# Patient Record
Sex: MALE | Race: BLACK OR AFRICAN AMERICAN | NOT HISPANIC OR LATINO | Employment: OTHER | URBAN - METROPOLITAN AREA
[De-identification: names, ages, dates, MRNs, and addresses within clinical notes are randomized per-mention and may not be internally consistent; named-entity substitution may affect disease eponyms.]

---

## 2017-01-09 ENCOUNTER — CLINICAL SUPPORT (OUTPATIENT)
Dept: FAMILY MEDICINE CLINIC | Facility: CLINIC | Age: 67
End: 2017-01-09

## 2017-01-09 DIAGNOSIS — I82.5Y3 CHRONIC DEEP VEIN THROMBOSIS (DVT) OF PROXIMAL VEIN OF BOTH LOWER EXTREMITIES (HCC): Primary | ICD-10-CM

## 2017-01-09 LAB — INR PPP: 1.9 (ref 2–3)

## 2017-01-09 PROCEDURE — 36416 COLLJ CAPILLARY BLOOD SPEC: CPT | Performed by: FAMILY MEDICINE

## 2017-01-09 PROCEDURE — 99211 OFF/OP EST MAY X REQ PHY/QHP: CPT | Performed by: FAMILY MEDICINE

## 2017-01-09 PROCEDURE — 85610 PROTHROMBIN TIME: CPT | Performed by: FAMILY MEDICINE

## 2017-01-16 DIAGNOSIS — M51.37 DDD (DEGENERATIVE DISC DISEASE), LUMBOSACRAL: ICD-10-CM

## 2017-01-16 DIAGNOSIS — M79.7 FIBROMYALGIA: ICD-10-CM

## 2017-01-16 RX ORDER — HYDROCODONE BITARTRATE AND ACETAMINOPHEN 10; 325 MG/1; MG/1
2 TABLET ORAL EVERY 6 HOURS PRN
Qty: 240 TABLET | Refills: 0 | Status: SHIPPED | OUTPATIENT
Start: 2017-01-16 | End: 2017-02-21 | Stop reason: SDUPTHER

## 2017-02-03 ENCOUNTER — CLINICAL SUPPORT (OUTPATIENT)
Dept: FAMILY MEDICINE CLINIC | Facility: CLINIC | Age: 67
End: 2017-02-03

## 2017-02-03 DIAGNOSIS — I82.5Y3 CHRONIC DEEP VEIN THROMBOSIS (DVT) OF PROXIMAL VEIN OF BOTH LOWER EXTREMITIES (HCC): Primary | ICD-10-CM

## 2017-02-03 DIAGNOSIS — G45.9 TRANSIENT CEREBRAL ISCHEMIA, UNSPECIFIED TYPE: ICD-10-CM

## 2017-02-03 LAB — INR PPP: 3.7 (ref 2–3)

## 2017-02-03 PROCEDURE — 36416 COLLJ CAPILLARY BLOOD SPEC: CPT | Performed by: FAMILY MEDICINE

## 2017-02-03 PROCEDURE — 85610 PROTHROMBIN TIME: CPT | Performed by: FAMILY MEDICINE

## 2017-02-06 ENCOUNTER — CLINICAL SUPPORT (OUTPATIENT)
Dept: FAMILY MEDICINE CLINIC | Facility: CLINIC | Age: 67
End: 2017-02-06

## 2017-02-06 DIAGNOSIS — G45.9 TRANSIENT CEREBRAL ISCHEMIA, UNSPECIFIED TYPE: Primary | ICD-10-CM

## 2017-02-06 LAB — INR PPP: 1.4 (ref 2–3)

## 2017-02-06 PROCEDURE — 36416 COLLJ CAPILLARY BLOOD SPEC: CPT | Performed by: FAMILY MEDICINE

## 2017-02-06 PROCEDURE — 99211 OFF/OP EST MAY X REQ PHY/QHP: CPT | Performed by: FAMILY MEDICINE

## 2017-02-06 PROCEDURE — 85610 PROTHROMBIN TIME: CPT | Performed by: FAMILY MEDICINE

## 2017-02-13 ENCOUNTER — CLINICAL SUPPORT (OUTPATIENT)
Dept: FAMILY MEDICINE CLINIC | Facility: CLINIC | Age: 67
End: 2017-02-13

## 2017-02-13 DIAGNOSIS — I82.5Y3 CHRONIC DEEP VEIN THROMBOSIS (DVT) OF PROXIMAL VEIN OF BOTH LOWER EXTREMITIES (HCC): Primary | ICD-10-CM

## 2017-02-13 LAB — INR PPP: 2.9 (ref 2–3)

## 2017-02-13 PROCEDURE — 36416 COLLJ CAPILLARY BLOOD SPEC: CPT | Performed by: PHYSICIAN ASSISTANT

## 2017-02-13 PROCEDURE — 85610 PROTHROMBIN TIME: CPT | Performed by: PHYSICIAN ASSISTANT

## 2017-02-21 ENCOUNTER — OFFICE VISIT (OUTPATIENT)
Dept: FAMILY MEDICINE CLINIC | Facility: CLINIC | Age: 67
End: 2017-02-21

## 2017-02-21 VITALS
BODY MASS INDEX: 29.66 KG/M2 | HEART RATE: 79 BPM | TEMPERATURE: 98.6 F | RESPIRATION RATE: 16 BRPM | OXYGEN SATURATION: 97 % | HEIGHT: 72 IN | WEIGHT: 219 LBS | SYSTOLIC BLOOD PRESSURE: 126 MMHG | DIASTOLIC BLOOD PRESSURE: 78 MMHG

## 2017-02-21 DIAGNOSIS — K21.00 REFLUX ESOPHAGITIS: ICD-10-CM

## 2017-02-21 DIAGNOSIS — R10.32 LEFT GROIN PAIN: ICD-10-CM

## 2017-02-21 DIAGNOSIS — R63.4 WEIGHT LOSS: ICD-10-CM

## 2017-02-21 DIAGNOSIS — E78.2 MIXED HYPERLIPIDEMIA: Primary | ICD-10-CM

## 2017-02-21 DIAGNOSIS — I25.2 PAST HEART ATTACK: ICD-10-CM

## 2017-02-21 DIAGNOSIS — M51.37 DDD (DEGENERATIVE DISC DISEASE), LUMBOSACRAL: ICD-10-CM

## 2017-02-21 DIAGNOSIS — Z86.711 HISTORY OF PULMONARY EMBOLUS (PE): ICD-10-CM

## 2017-02-21 DIAGNOSIS — M79.7 FIBROMYALGIA: ICD-10-CM

## 2017-02-21 DIAGNOSIS — R06.83 SNORING: ICD-10-CM

## 2017-02-21 DIAGNOSIS — G45.9 TRANSIENT CEREBRAL ISCHEMIA, UNSPECIFIED TYPE: ICD-10-CM

## 2017-02-21 PROCEDURE — 99214 OFFICE O/P EST MOD 30 MIN: CPT | Performed by: PHYSICIAN ASSISTANT

## 2017-02-21 RX ORDER — HYDROCODONE BITARTRATE AND ACETAMINOPHEN 10; 325 MG/1; MG/1
TABLET ORAL
Qty: 120 TABLET | Refills: 0
Start: 2017-02-21 | End: 2017-03-21 | Stop reason: SDUPTHER

## 2017-02-21 RX ORDER — PREGABALIN 75 MG/1
75 CAPSULE ORAL 2 TIMES DAILY
Qty: 60 CAPSULE | Refills: 5 | Status: SHIPPED | OUTPATIENT
Start: 2017-02-21 | End: 2017-08-24 | Stop reason: SDUPTHER

## 2017-02-21 RX ORDER — DULOXETIN HYDROCHLORIDE 30 MG/1
30 CAPSULE, DELAYED RELEASE ORAL DAILY
Qty: 30 CAPSULE | Refills: 1 | Status: SHIPPED | OUTPATIENT
Start: 2017-02-21 | End: 2017-03-21 | Stop reason: SDUPTHER

## 2017-02-21 NOTE — PROGRESS NOTES
Subjective   Javy Reeves is a 66 y.o. male.     History of Present Illness   Javy Reeves 66 y.o. male who presents today for routine follow up check and medication refills.  he has a history of   Patient Active Problem List   Diagnosis   • Arthritis   • Family history of early CAD   • DDD (degenerative disc disease), cervical   • DVT (deep venous thrombosis)   • Fibromyalgia   • Past heart attack   • Hypertension   • Hyperlipidemia   • DDD (degenerative disc disease), lumbosacral   • History of pulmonary embolus (PE)   • Reflux esophagitis   • TIA (transient ischemic attack)   .  Since the last visit, he has overall felt tired.  He has GERD and is well controlled on PPI medication.  he has been compliant with current medications have reviewed them.  The patient denies medication side effects.    He does need f/u DR Manley about weight loss;  Had several tests last year; weight is stable    I will update all labs.  Snores and needs f/u sleep study  I will refer to pain management   Has left chronic hip pain    The following portions of the patient's history were reviewed and updated as appropriate: allergies, current medications, past family history, past medical history, past social history, past surgical history and problem list.    Review of Systems   Constitutional: Positive for appetite change, diaphoresis and unexpected weight change. Negative for activity change.   HENT: Positive for congestion, dental problem, sinus pressure and sore throat. Negative for nosebleeds and trouble swallowing.    Eyes: Negative for pain and visual disturbance.   Respiratory: Negative for chest tightness, shortness of breath and wheezing.    Cardiovascular: Positive for chest pain. Negative for palpitations.   Gastrointestinal: Positive for abdominal pain and rectal pain. Negative for blood in stool.   Endocrine: Negative.    Genitourinary: Negative for difficulty urinating and hematuria.   Musculoskeletal: Positive for back  pain, joint swelling, neck pain and neck stiffness.   Skin: Negative for color change and rash.   Allergic/Immunologic: Negative.    Neurological: Positive for weakness and numbness. Negative for syncope and speech difficulty.   Hematological: Negative for adenopathy.   Psychiatric/Behavioral: Negative for agitation and confusion.   All other systems reviewed and are negative.      Objective   Physical Exam   Constitutional: He is oriented to person, place, and time. He appears well-developed and well-nourished. No distress.   HENT:   Head: Normocephalic and atraumatic.   Eyes: Conjunctivae and EOM are normal. Pupils are equal, round, and reactive to light. Right eye exhibits no discharge. Left eye exhibits no discharge. No scleral icterus.   Neck: Normal range of motion. Neck supple. No tracheal deviation present. No thyromegaly present.   Cardiovascular: Normal rate, regular rhythm, normal heart sounds, intact distal pulses and normal pulses.  Exam reveals no gallop.    No murmur heard.  Pulmonary/Chest: Effort normal and breath sounds normal. No respiratory distress. He has no wheezes. He has no rales.   Musculoskeletal: Normal range of motion.   Neurological: He is alert and oriented to person, place, and time. He exhibits normal muscle tone. Coordination normal.   Skin: Skin is warm. No rash noted. No erythema. No pallor.   Psychiatric: He has a normal mood and affect. His behavior is normal. Judgment and thought content normal.   Nursing note and vitals reviewed.      Assessment/Plan   Problems Addressed this Visit        Cardiovascular and Mediastinum    Hyperlipidemia - Primary    Relevant Orders    Comprehensive metabolic panel    Lipid panel    CBC and Differential    TSH    Urinalysis With Microscopic    T4, Free    Ambulatory Referral to Sleep Medicine    Ambulatory Referral to Orthopedic Surgery    Ambulatory Referral to Pain Management    TIA (transient ischemic attack)    Relevant Orders     Comprehensive metabolic panel    Lipid panel    CBC and Differential    TSH    Urinalysis With Microscopic    T4, Free    Ambulatory Referral to Sleep Medicine    Ambulatory Referral to Orthopedic Surgery    Ambulatory Referral to Pain Management       Digestive    Reflux esophagitis       Nervous and Auditory    Left groin pain    Relevant Orders    Comprehensive metabolic panel    Lipid panel    CBC and Differential    TSH    Urinalysis With Microscopic    T4, Free    Ambulatory Referral to Sleep Medicine    Ambulatory Referral to Orthopedic Surgery    Ambulatory Referral to Pain Management       Musculoskeletal and Integument    Fibromyalgia    Relevant Orders    Comprehensive metabolic panel    Lipid panel    CBC and Differential    TSH    Urinalysis With Microscopic    T4, Free    Ambulatory Referral to Sleep Medicine    Ambulatory Referral to Orthopedic Surgery    Ambulatory Referral to Pain Management    DDD (degenerative disc disease), lumbosacral    Relevant Orders    Comprehensive metabolic panel    Lipid panel    CBC and Differential    TSH    Urinalysis With Microscopic    T4, Free    Ambulatory Referral to Sleep Medicine    Ambulatory Referral to Orthopedic Surgery    Ambulatory Referral to Pain Management       Other    History of pulmonary embolus (PE)    Relevant Orders    Comprehensive metabolic panel    Lipid panel    CBC and Differential    TSH    Urinalysis With Microscopic    T4, Free    Ambulatory Referral to Sleep Medicine    Ambulatory Referral to Orthopedic Surgery    Ambulatory Referral to Pain Management      Other Visit Diagnoses     Snoring        Relevant Orders    Comprehensive metabolic panel    Lipid panel    CBC and Differential    TSH    Urinalysis With Microscopic    T4, Free    Ambulatory Referral to Sleep Medicine    Ambulatory Referral to Orthopedic Surgery    Ambulatory Referral to Pain Management    Weight loss        Relevant Orders    Ambulatory Referral to Gastroenterology  (Completed)

## 2017-02-21 NOTE — PATIENT INSTRUCTIONS
Low glycemic index diet  Exercise 30 minutes most days of the week  Make sure you get results on any labs or tests we ordered today  We discussed medications and how to take them as prescribed  Sleep 6-8 hours each night if possible  If you have not signed up for [employer], please activate your code ASAP from your After Visit Summary today    LDL goal <100  LDL goal if heart disease <70  HDL goal >60  Triglyceride goal <150  BP goal =<130/80  Fasting glucose <100    Referral made  Labs  Try Cymbalta for pain

## 2017-03-03 ENCOUNTER — OFFICE (OUTPATIENT)
Dept: URBAN - METROPOLITAN AREA CLINIC 75 | Facility: CLINIC | Age: 67
End: 2017-03-03

## 2017-03-03 VITALS
WEIGHT: 221 LBS | SYSTOLIC BLOOD PRESSURE: 176 MMHG | DIASTOLIC BLOOD PRESSURE: 98 MMHG | HEIGHT: 72 IN | HEART RATE: 66 BPM

## 2017-03-03 DIAGNOSIS — R10.13 EPIGASTRIC PAIN: ICD-10-CM

## 2017-03-03 PROCEDURE — 99213 OFFICE O/P EST LOW 20 MIN: CPT | Performed by: INTERNAL MEDICINE

## 2017-03-13 ENCOUNTER — CLINICAL SUPPORT (OUTPATIENT)
Dept: FAMILY MEDICINE CLINIC | Facility: CLINIC | Age: 67
End: 2017-03-13

## 2017-03-13 DIAGNOSIS — I82.5Y3 CHRONIC DEEP VEIN THROMBOSIS (DVT) OF PROXIMAL VEIN OF BOTH LOWER EXTREMITIES (HCC): Primary | ICD-10-CM

## 2017-03-13 LAB — INR PPP: 3.7 (ref 2–3)

## 2017-03-13 PROCEDURE — 99211 OFF/OP EST MAY X REQ PHY/QHP: CPT | Performed by: PHYSICIAN ASSISTANT

## 2017-03-13 PROCEDURE — 85610 PROTHROMBIN TIME: CPT | Performed by: PHYSICIAN ASSISTANT

## 2017-03-13 PROCEDURE — 36416 COLLJ CAPILLARY BLOOD SPEC: CPT | Performed by: PHYSICIAN ASSISTANT

## 2017-03-16 ENCOUNTER — CLINICAL SUPPORT (OUTPATIENT)
Dept: FAMILY MEDICINE CLINIC | Facility: CLINIC | Age: 67
End: 2017-03-16

## 2017-03-16 DIAGNOSIS — I82.5Y3 CHRONIC DEEP VEIN THROMBOSIS (DVT) OF PROXIMAL VEIN OF BOTH LOWER EXTREMITIES (HCC): Primary | ICD-10-CM

## 2017-03-16 DIAGNOSIS — G45.9 TRANSIENT CEREBRAL ISCHEMIA, UNSPECIFIED TYPE: ICD-10-CM

## 2017-03-16 LAB — INR PPP: 1.4 (ref 2–3)

## 2017-03-16 PROCEDURE — 36416 COLLJ CAPILLARY BLOOD SPEC: CPT | Performed by: PHYSICIAN ASSISTANT

## 2017-03-16 PROCEDURE — 85610 PROTHROMBIN TIME: CPT | Performed by: PHYSICIAN ASSISTANT

## 2017-03-17 ENCOUNTER — OFFICE VISIT (OUTPATIENT)
Dept: ORTHOPEDIC SURGERY | Facility: CLINIC | Age: 67
End: 2017-03-17

## 2017-03-17 VITALS — BODY MASS INDEX: 34.37 KG/M2 | HEIGHT: 67 IN | TEMPERATURE: 98 F | WEIGHT: 219 LBS

## 2017-03-17 DIAGNOSIS — M54.50 LUMBAR SPINE PAIN: Primary | ICD-10-CM

## 2017-03-17 PROCEDURE — 72100 X-RAY EXAM L-S SPINE 2/3 VWS: CPT | Performed by: ORTHOPAEDIC SURGERY

## 2017-03-17 PROCEDURE — 99204 OFFICE O/P NEW MOD 45 MIN: CPT | Performed by: ORTHOPAEDIC SURGERY
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
## 2017-03-20 ENCOUNTER — CLINICAL SUPPORT (OUTPATIENT)
Dept: FAMILY MEDICINE CLINIC | Facility: CLINIC | Age: 67
End: 2017-03-20

## 2017-03-20 DIAGNOSIS — I82.5Y3 CHRONIC DEEP VEIN THROMBOSIS (DVT) OF PROXIMAL VEIN OF BOTH LOWER EXTREMITIES (HCC): Primary | ICD-10-CM

## 2017-03-20 DIAGNOSIS — G45.9 TRANSIENT CEREBRAL ISCHEMIA, UNSPECIFIED TYPE: ICD-10-CM

## 2017-03-20 LAB — INR PPP: 1.9 (ref 2–3)

## 2017-03-20 PROCEDURE — 36416 COLLJ CAPILLARY BLOOD SPEC: CPT | Performed by: PHYSICIAN ASSISTANT

## 2017-03-20 PROCEDURE — 85610 PROTHROMBIN TIME: CPT | Performed by: PHYSICIAN ASSISTANT

## 2017-03-21 ENCOUNTER — OFFICE VISIT (OUTPATIENT)
Dept: FAMILY MEDICINE CLINIC | Facility: CLINIC | Age: 67
End: 2017-03-21

## 2017-03-21 VITALS
RESPIRATION RATE: 16 BRPM | OXYGEN SATURATION: 98 % | SYSTOLIC BLOOD PRESSURE: 120 MMHG | HEIGHT: 72 IN | WEIGHT: 216 LBS | DIASTOLIC BLOOD PRESSURE: 80 MMHG | TEMPERATURE: 98 F | HEART RATE: 64 BPM | BODY MASS INDEX: 29.26 KG/M2

## 2017-03-21 DIAGNOSIS — M79.7 FIBROMYALGIA: ICD-10-CM

## 2017-03-21 DIAGNOSIS — E78.2 MIXED HYPERLIPIDEMIA: Primary | ICD-10-CM

## 2017-03-21 DIAGNOSIS — I82.5Y3 CHRONIC DEEP VEIN THROMBOSIS (DVT) OF PROXIMAL VEIN OF BOTH LOWER EXTREMITIES (HCC): ICD-10-CM

## 2017-03-21 DIAGNOSIS — I25.2 PAST HEART ATTACK: ICD-10-CM

## 2017-03-21 DIAGNOSIS — M51.37 DDD (DEGENERATIVE DISC DISEASE), LUMBOSACRAL: ICD-10-CM

## 2017-03-21 DIAGNOSIS — M50.30 DDD (DEGENERATIVE DISC DISEASE), CERVICAL: ICD-10-CM

## 2017-03-21 PROCEDURE — 99213 OFFICE O/P EST LOW 20 MIN: CPT | Performed by: PHYSICIAN ASSISTANT

## 2017-03-21 RX ORDER — HYDROCODONE BITARTRATE AND ACETAMINOPHEN 10; 325 MG/1; MG/1
TABLET ORAL
Qty: 120 TABLET | Refills: 0 | Status: CANCELLED
Start: 2017-03-21

## 2017-03-21 RX ORDER — HYDROCODONE BITARTRATE AND ACETAMINOPHEN 10; 325 MG/1; MG/1
TABLET ORAL
Qty: 120 TABLET | Refills: 0
Start: 2017-03-21 | End: 2017-05-15

## 2017-03-21 RX ORDER — DULOXETIN HYDROCHLORIDE 30 MG/1
30 CAPSULE, DELAYED RELEASE ORAL DAILY
Qty: 30 CAPSULE | Refills: 1 | Status: SHIPPED | OUTPATIENT
Start: 2017-03-21 | End: 2017-08-24

## 2017-03-21 NOTE — PROGRESS NOTES
Subjective   Javy Reeves is a 66 y.o. male.     History of Present Illness   Javy Reeves 66 y.o. male who presents today for routine follow up check and medication refills.  he has a history of   Patient Active Problem List   Diagnosis   • Arthritis   • Family history of early CAD   • DDD (degenerative disc disease), cervical   • DVT (deep venous thrombosis)   • Fibromyalgia   • Past heart attack   • Hyperlipidemia   • DDD (degenerative disc disease), lumbosacral   • History of pulmonary embolus (PE)   • Reflux esophagitis   • TIA (transient ischemic attack)   • Left groin pain   .  Since the last visit, he has overall felt well.  He has GERD and is well controlled on PPI medication and CAD and is currently stable on medication management.  he has been compliant with current medications have reviewed them.  The patient denies medication side effects.    He has hx CAD and is not on a statin.  I will get the labs I ordered last visit and confirm liver functions and start this.  I will monitor INR.  I will also monitor INR with adding Cymbalta.  He has not started it yet.  He did see ortho  Needs to see DR Queen for pain management    Has sores in nose and onset 3-4 weeks ago.  Will try Bactroban    The following portions of the patient's history were reviewed and updated as appropriate: allergies, current medications, past family history, past medical history, past social history, past surgical history and problem list.    Review of Systems   Constitutional: Positive for unexpected weight change. Negative for activity change and appetite change.   HENT: Positive for congestion and nosebleeds. Negative for trouble swallowing.    Eyes: Negative for pain and visual disturbance.   Respiratory: Negative for chest tightness, shortness of breath and wheezing.    Cardiovascular: Positive for chest pain. Negative for palpitations.   Gastrointestinal: Negative for abdominal pain and blood in stool.   Endocrine:  Negative.    Genitourinary: Negative for difficulty urinating and hematuria.   Musculoskeletal: Positive for back pain, joint swelling, myalgias and neck pain.   Skin: Negative for color change and rash.   Allergic/Immunologic: Negative.    Neurological: Negative for syncope and speech difficulty.   Hematological: Negative for adenopathy.   Psychiatric/Behavioral: Negative for agitation and confusion.   All other systems reviewed and are negative.      Objective   Physical Exam   Constitutional: He is oriented to person, place, and time. He appears well-developed and well-nourished. No distress.   HENT:   Head: Normocephalic and atraumatic.   Sores in nares   Eyes: Conjunctivae and EOM are normal. Pupils are equal, round, and reactive to light. Right eye exhibits no discharge. Left eye exhibits no discharge. No scleral icterus.   Neck: Normal range of motion. Neck supple. No tracheal deviation present. No thyromegaly present.   Cardiovascular: Normal rate, regular rhythm, normal heart sounds, intact distal pulses and normal pulses.  Exam reveals no gallop.    No murmur heard.  Pulmonary/Chest: Effort normal and breath sounds normal. No respiratory distress. He has no wheezes. He has no rales.   Musculoskeletal: Normal range of motion.   Neurological: He is alert and oriented to person, place, and time. He exhibits normal muscle tone. Coordination normal.   Skin: Skin is warm. No rash noted. No erythema. No pallor.   Psychiatric: He has a normal mood and affect. His behavior is normal. Judgment and thought content normal.   Nursing note and vitals reviewed.      Assessment/Plan   Problems Addressed this Visit        Cardiovascular and Mediastinum    DVT (deep venous thrombosis)    Hyperlipidemia - Primary       Musculoskeletal and Integument    DDD (degenerative disc disease), cervical    Relevant Orders    Ambulatory Referral to Pain Management (Completed)    Fibromyalgia    Relevant Medications     HYDROcodone-acetaminophen (NORCO)  MG per tablet    Other Relevant Orders    Ambulatory Referral to Pain Management (Completed)    DDD (degenerative disc disease), lumbosacral    Relevant Medications    HYDROcodone-acetaminophen (NORCO)  MG per tablet       Other    Past heart attack                I have reviewed the notes, assessments, and/or procedures performed by Aletha Ochoa PA-C, I concur with her/his documentation of Javy Reeves.

## 2017-03-21 NOTE — PATIENT INSTRUCTIONS
Will add statin after labs done  INR Friday and will watch as add meds  Low glycemic index diet  Exercise 30 minutes most days of the week  Make sure you get results on any labs or tests we ordered today  We discussed medications and how to take them as prescribed  Sleep 6-8 hours each night if possible  If you have not signed up for [employer], please activate your code ASAP from your After Visit Summary today    LDL goal <100  LDL goal if heart disease <70  HDL goal >60  Triglyceride goal <150  BP goal =<130/80  Fasting glucose <100    Stop smoking

## 2017-03-22 LAB
ALBUMIN SERPL-MCNC: 4.6 G/DL (ref 3.5–5.2)
ALBUMIN/GLOB SERPL: 1.6 G/DL
ALP SERPL-CCNC: 54 U/L (ref 39–117)
ALT SERPL-CCNC: 15 U/L (ref 1–41)
APPEARANCE UR: CLEAR
AST SERPL-CCNC: 22 U/L (ref 1–40)
BACTERIA #/AREA URNS HPF: NORMAL /HPF
BASOPHILS # BLD AUTO: 0.01 10*3/MM3 (ref 0–0.2)
BASOPHILS NFR BLD AUTO: 0.2 % (ref 0–1.5)
BILIRUB SERPL-MCNC: 0.8 MG/DL (ref 0.1–1.2)
BILIRUB UR QL STRIP: NEGATIVE
BUN SERPL-MCNC: 18 MG/DL (ref 8–23)
BUN/CREAT SERPL: 17.5 (ref 7–25)
CALCIUM SERPL-MCNC: 9.6 MG/DL (ref 8.6–10.5)
CHLORIDE SERPL-SCNC: 102 MMOL/L (ref 98–107)
CHOLEST SERPL-MCNC: 214 MG/DL (ref 0–200)
CO2 SERPL-SCNC: 27.4 MMOL/L (ref 22–29)
COLOR UR: YELLOW
CREAT SERPL-MCNC: 1.03 MG/DL (ref 0.76–1.27)
EOSINOPHIL # BLD AUTO: 0.05 10*3/MM3 (ref 0–0.7)
EOSINOPHIL NFR BLD AUTO: 0.9 % (ref 0.3–6.2)
EPI CELLS #/AREA URNS HPF: NORMAL /HPF
ERYTHROCYTE [DISTWIDTH] IN BLOOD BY AUTOMATED COUNT: 14 % (ref 11.5–14.5)
GLOBULIN SER CALC-MCNC: 2.9 GM/DL
GLUCOSE SERPL-MCNC: 105 MG/DL (ref 65–99)
GLUCOSE UR QL: NEGATIVE
HCT VFR BLD AUTO: 42.9 % (ref 40.4–52.2)
HDLC SERPL-MCNC: 39 MG/DL (ref 40–60)
HGB BLD-MCNC: 14.6 G/DL (ref 13.7–17.6)
HGB UR QL STRIP: (no result)
IMM GRANULOCYTES # BLD: 0 10*3/MM3 (ref 0–0.03)
IMM GRANULOCYTES NFR BLD: 0 % (ref 0–0.5)
KETONES UR QL STRIP: NEGATIVE
LDLC SERPL CALC-MCNC: 149 MG/DL (ref 0–100)
LEUKOCYTE ESTERASE UR QL STRIP: NEGATIVE
LYMPHOCYTES # BLD AUTO: 2.67 10*3/MM3 (ref 0.9–4.8)
LYMPHOCYTES NFR BLD AUTO: 45.7 % (ref 19.6–45.3)
MCH RBC QN AUTO: 32.7 PG (ref 27–32.7)
MCHC RBC AUTO-ENTMCNC: 34 G/DL (ref 32.6–36.4)
MCV RBC AUTO: 96 FL (ref 79.8–96.2)
MONOCYTES # BLD AUTO: 0.67 10*3/MM3 (ref 0.2–1.2)
MONOCYTES NFR BLD AUTO: 11.5 % (ref 5–12)
NEUTROPHILS # BLD AUTO: 2.44 10*3/MM3 (ref 1.9–8.1)
NEUTROPHILS NFR BLD AUTO: 41.7 % (ref 42.7–76)
NITRITE UR QL STRIP: NEGATIVE
PH UR STRIP: 6 [PH] (ref 5–8)
PLATELET # BLD AUTO: 193 10*3/MM3 (ref 140–500)
POTASSIUM SERPL-SCNC: 4.5 MMOL/L (ref 3.5–5.2)
PROT SERPL-MCNC: 7.5 G/DL (ref 6–8.5)
PROT UR QL STRIP: NEGATIVE
RBC # BLD AUTO: 4.47 10*6/MM3 (ref 4.6–6)
RBC #/AREA URNS HPF: NORMAL /HPF
SODIUM SERPL-SCNC: 142 MMOL/L (ref 136–145)
SP GR UR: 1.03 (ref 1–1.03)
T4 FREE SERPL-MCNC: 1.18 NG/DL (ref 0.93–1.7)
TRIGL SERPL-MCNC: 131 MG/DL (ref 0–150)
TSH SERPL DL<=0.005 MIU/L-ACNC: 2.12 MIU/ML (ref 0.27–4.2)
UROBILINOGEN UR STRIP-MCNC: (no result) MG/DL
VLDLC SERPL CALC-MCNC: 26.2 MG/DL (ref 5–40)
WBC # BLD AUTO: 5.84 10*3/MM3 (ref 4.5–10.7)
WBC #/AREA URNS HPF: NORMAL /HPF

## 2017-03-23 RX ORDER — ROSUVASTATIN CALCIUM 20 MG/1
20 TABLET, COATED ORAL DAILY
Qty: 30 TABLET | Refills: 11 | Status: SHIPPED | OUTPATIENT
Start: 2017-03-23 | End: 2017-11-02 | Stop reason: ALTCHOICE

## 2017-03-24 ENCOUNTER — CLINICAL SUPPORT (OUTPATIENT)
Dept: FAMILY MEDICINE CLINIC | Facility: CLINIC | Age: 67
End: 2017-03-24

## 2017-03-24 DIAGNOSIS — G45.9 TRANSIENT CEREBRAL ISCHEMIA, UNSPECIFIED TYPE: Primary | ICD-10-CM

## 2017-03-24 DIAGNOSIS — I82.5Y3 CHRONIC DEEP VEIN THROMBOSIS (DVT) OF PROXIMAL VEIN OF BOTH LOWER EXTREMITIES (HCC): ICD-10-CM

## 2017-03-24 LAB — INR PPP: 2 (ref 2–3)

## 2017-03-24 PROCEDURE — 85610 PROTHROMBIN TIME: CPT | Performed by: PHYSICIAN ASSISTANT

## 2017-03-24 PROCEDURE — 36416 COLLJ CAPILLARY BLOOD SPEC: CPT | Performed by: PHYSICIAN ASSISTANT

## 2017-03-29 LAB
HBA1C MFR BLD: 5.5 % (ref 4.8–5.6)
WRITTEN AUTHORIZATION: NORMAL

## 2017-04-07 ENCOUNTER — CLINICAL SUPPORT (OUTPATIENT)
Dept: FAMILY MEDICINE CLINIC | Facility: CLINIC | Age: 67
End: 2017-04-07

## 2017-04-07 DIAGNOSIS — I82.5Y3 CHRONIC DEEP VEIN THROMBOSIS (DVT) OF PROXIMAL VEIN OF BOTH LOWER EXTREMITIES (HCC): ICD-10-CM

## 2017-04-07 DIAGNOSIS — G45.9 TRANSIENT CEREBRAL ISCHEMIA, UNSPECIFIED TYPE: Primary | ICD-10-CM

## 2017-04-07 LAB — INR PPP: 2.9 (ref 2–3)

## 2017-04-07 PROCEDURE — 36416 COLLJ CAPILLARY BLOOD SPEC: CPT | Performed by: PHYSICIAN ASSISTANT

## 2017-04-07 PROCEDURE — 85610 PROTHROMBIN TIME: CPT | Performed by: PHYSICIAN ASSISTANT

## 2017-05-03 RX ORDER — WARFARIN SODIUM 5 MG/1
10 TABLET ORAL
Qty: 60 TABLET | Refills: 5 | Status: SHIPPED | OUTPATIENT
Start: 2017-05-03 | End: 2017-10-03 | Stop reason: SDUPTHER

## 2017-05-04 ENCOUNTER — CLINICAL SUPPORT (OUTPATIENT)
Dept: FAMILY MEDICINE CLINIC | Facility: CLINIC | Age: 67
End: 2017-05-04

## 2017-05-04 DIAGNOSIS — Z86.711 HISTORY OF PULMONARY EMBOLUS (PE): Primary | ICD-10-CM

## 2017-05-04 LAB — INR PPP: 1.1 (ref 2–3)

## 2017-05-04 PROCEDURE — 85610 PROTHROMBIN TIME: CPT

## 2017-05-04 PROCEDURE — 36416 COLLJ CAPILLARY BLOOD SPEC: CPT

## 2017-05-08 ENCOUNTER — CLINICAL SUPPORT (OUTPATIENT)
Dept: FAMILY MEDICINE CLINIC | Facility: CLINIC | Age: 67
End: 2017-05-08

## 2017-05-08 DIAGNOSIS — I82.5Y3 CHRONIC DEEP VEIN THROMBOSIS (DVT) OF PROXIMAL VEIN OF BOTH LOWER EXTREMITIES (HCC): Primary | ICD-10-CM

## 2017-05-08 LAB — INR PPP: 2 (ref 2–3)

## 2017-05-08 PROCEDURE — 36416 COLLJ CAPILLARY BLOOD SPEC: CPT | Performed by: PHYSICIAN ASSISTANT

## 2017-05-08 PROCEDURE — 85610 PROTHROMBIN TIME: CPT | Performed by: PHYSICIAN ASSISTANT

## 2017-05-15 ENCOUNTER — OFFICE VISIT (OUTPATIENT)
Dept: FAMILY MEDICINE CLINIC | Facility: CLINIC | Age: 67
End: 2017-05-15

## 2017-05-15 VITALS
HEART RATE: 69 BPM | OXYGEN SATURATION: 97 % | SYSTOLIC BLOOD PRESSURE: 118 MMHG | RESPIRATION RATE: 16 BRPM | WEIGHT: 216 LBS | TEMPERATURE: 98.7 F | BODY MASS INDEX: 29.26 KG/M2 | DIASTOLIC BLOOD PRESSURE: 80 MMHG | HEIGHT: 72 IN

## 2017-05-15 DIAGNOSIS — J06.9 ACUTE URI: Primary | ICD-10-CM

## 2017-05-15 DIAGNOSIS — M79.7 FIBROMYALGIA: ICD-10-CM

## 2017-05-15 PROCEDURE — 99213 OFFICE O/P EST LOW 20 MIN: CPT | Performed by: PHYSICIAN ASSISTANT

## 2017-05-15 RX ORDER — CEFDINIR 300 MG/1
CAPSULE ORAL
Qty: 20 CAPSULE | Refills: 0 | Status: SHIPPED | OUTPATIENT
Start: 2017-05-15 | End: 2017-08-24

## 2017-05-17 ENCOUNTER — CLINICAL SUPPORT (OUTPATIENT)
Dept: FAMILY MEDICINE CLINIC | Facility: CLINIC | Age: 67
End: 2017-05-17

## 2017-05-17 DIAGNOSIS — I82.5Y3 CHRONIC DEEP VEIN THROMBOSIS (DVT) OF PROXIMAL VEIN OF BOTH LOWER EXTREMITIES (HCC): Primary | ICD-10-CM

## 2017-05-17 DIAGNOSIS — G45.9 TRANSIENT CEREBRAL ISCHEMIA, UNSPECIFIED TYPE: ICD-10-CM

## 2017-05-17 LAB — INR PPP: 1.8 (ref 2–3)

## 2017-05-17 PROCEDURE — 36416 COLLJ CAPILLARY BLOOD SPEC: CPT | Performed by: PHYSICIAN ASSISTANT

## 2017-05-17 PROCEDURE — 85610 PROTHROMBIN TIME: CPT | Performed by: PHYSICIAN ASSISTANT

## 2017-05-17 PROCEDURE — 99211 OFF/OP EST MAY X REQ PHY/QHP: CPT | Performed by: PHYSICIAN ASSISTANT

## 2017-05-17 RX ORDER — WARFARIN SODIUM 1 MG/1
TABLET ORAL
Qty: 30 TABLET | Refills: 0 | Status: SHIPPED | OUTPATIENT
Start: 2017-05-17 | End: 2017-10-23 | Stop reason: SDUPTHER

## 2017-05-19 ENCOUNTER — CLINICAL SUPPORT (OUTPATIENT)
Dept: FAMILY MEDICINE CLINIC | Facility: CLINIC | Age: 67
End: 2017-05-19

## 2017-05-19 DIAGNOSIS — I82.5Y3 CHRONIC DEEP VEIN THROMBOSIS (DVT) OF PROXIMAL VEIN OF BOTH LOWER EXTREMITIES (HCC): Primary | ICD-10-CM

## 2017-05-19 LAB — INR PPP: 2.4 (ref 2–3)

## 2017-05-19 PROCEDURE — 36416 COLLJ CAPILLARY BLOOD SPEC: CPT | Performed by: PHYSICIAN ASSISTANT

## 2017-05-19 PROCEDURE — 85610 PROTHROMBIN TIME: CPT | Performed by: PHYSICIAN ASSISTANT

## 2017-05-22 ENCOUNTER — CLINICAL SUPPORT (OUTPATIENT)
Dept: FAMILY MEDICINE CLINIC | Facility: CLINIC | Age: 67
End: 2017-05-22

## 2017-05-22 DIAGNOSIS — I82.5Y3 CHRONIC DEEP VEIN THROMBOSIS (DVT) OF PROXIMAL VEIN OF BOTH LOWER EXTREMITIES (HCC): Primary | ICD-10-CM

## 2017-05-22 LAB — INR PPP: 2.5 (ref 2–3)

## 2017-05-22 PROCEDURE — 36416 COLLJ CAPILLARY BLOOD SPEC: CPT | Performed by: PHYSICIAN ASSISTANT

## 2017-05-22 PROCEDURE — 85610 PROTHROMBIN TIME: CPT | Performed by: PHYSICIAN ASSISTANT

## 2017-05-25 ENCOUNTER — CLINICAL SUPPORT (OUTPATIENT)
Dept: FAMILY MEDICINE CLINIC | Facility: CLINIC | Age: 67
End: 2017-05-25

## 2017-05-25 DIAGNOSIS — I82.5Y3 CHRONIC DEEP VEIN THROMBOSIS (DVT) OF PROXIMAL VEIN OF BOTH LOWER EXTREMITIES (HCC): Primary | ICD-10-CM

## 2017-05-25 LAB — INR PPP: 2.8 (ref 2–3)

## 2017-05-25 PROCEDURE — 85610 PROTHROMBIN TIME: CPT | Performed by: PHYSICIAN ASSISTANT

## 2017-05-25 PROCEDURE — 36416 COLLJ CAPILLARY BLOOD SPEC: CPT | Performed by: PHYSICIAN ASSISTANT

## 2017-06-01 ENCOUNTER — CLINICAL SUPPORT (OUTPATIENT)
Dept: FAMILY MEDICINE CLINIC | Facility: CLINIC | Age: 67
End: 2017-06-01

## 2017-06-01 DIAGNOSIS — I82.5Y3 CHRONIC DEEP VEIN THROMBOSIS (DVT) OF PROXIMAL VEIN OF BOTH LOWER EXTREMITIES (HCC): Primary | ICD-10-CM

## 2017-06-01 LAB — INR PPP: 2.2 (ref 2–3)

## 2017-06-01 PROCEDURE — 85610 PROTHROMBIN TIME: CPT | Performed by: PHYSICIAN ASSISTANT

## 2017-06-01 PROCEDURE — 36416 COLLJ CAPILLARY BLOOD SPEC: CPT | Performed by: PHYSICIAN ASSISTANT

## 2017-06-23 ENCOUNTER — RESULTS ENCOUNTER (OUTPATIENT)
Dept: FAMILY MEDICINE CLINIC | Facility: CLINIC | Age: 67
End: 2017-06-23

## 2017-06-23 DIAGNOSIS — G45.9 TRANSIENT CEREBRAL ISCHEMIA, UNSPECIFIED TYPE: ICD-10-CM

## 2017-06-23 DIAGNOSIS — R06.83 SNORING: ICD-10-CM

## 2017-06-23 DIAGNOSIS — E78.2 MIXED HYPERLIPIDEMIA: ICD-10-CM

## 2017-06-23 DIAGNOSIS — M51.37 DDD (DEGENERATIVE DISC DISEASE), LUMBOSACRAL: ICD-10-CM

## 2017-06-23 DIAGNOSIS — M79.7 FIBROMYALGIA: ICD-10-CM

## 2017-06-23 DIAGNOSIS — Z86.711 HISTORY OF PULMONARY EMBOLUS (PE): ICD-10-CM

## 2017-06-23 DIAGNOSIS — K21.00 REFLUX ESOPHAGITIS: ICD-10-CM

## 2017-06-23 DIAGNOSIS — R10.32 LEFT GROIN PAIN: ICD-10-CM

## 2017-06-23 DIAGNOSIS — R63.4 WEIGHT LOSS: ICD-10-CM

## 2017-06-23 DIAGNOSIS — I25.2 PAST HEART ATTACK: ICD-10-CM

## 2017-06-29 ENCOUNTER — CLINICAL SUPPORT (OUTPATIENT)
Dept: FAMILY MEDICINE CLINIC | Facility: CLINIC | Age: 67
End: 2017-06-29

## 2017-06-29 DIAGNOSIS — I82.5Y3 CHRONIC DEEP VEIN THROMBOSIS (DVT) OF PROXIMAL VEIN OF BOTH LOWER EXTREMITIES (HCC): Primary | ICD-10-CM

## 2017-06-29 LAB — INR PPP: 2.5 (ref 2–3)

## 2017-06-29 PROCEDURE — 85610 PROTHROMBIN TIME: CPT | Performed by: PHYSICIAN ASSISTANT

## 2017-06-29 PROCEDURE — 36416 COLLJ CAPILLARY BLOOD SPEC: CPT | Performed by: PHYSICIAN ASSISTANT

## 2017-07-27 ENCOUNTER — CLINICAL SUPPORT (OUTPATIENT)
Dept: FAMILY MEDICINE CLINIC | Facility: CLINIC | Age: 67
End: 2017-07-27

## 2017-07-27 DIAGNOSIS — I82.5Y3 CHRONIC DEEP VEIN THROMBOSIS (DVT) OF PROXIMAL VEIN OF BOTH LOWER EXTREMITIES (HCC): Primary | ICD-10-CM

## 2017-07-27 LAB — INR PPP: 1.5 (ref 2–3)

## 2017-07-27 PROCEDURE — 36416 COLLJ CAPILLARY BLOOD SPEC: CPT

## 2017-07-27 PROCEDURE — 85610 PROTHROMBIN TIME: CPT

## 2017-08-03 ENCOUNTER — CLINICAL SUPPORT (OUTPATIENT)
Dept: FAMILY MEDICINE CLINIC | Facility: CLINIC | Age: 67
End: 2017-08-03

## 2017-08-03 DIAGNOSIS — I82.5Y3 CHRONIC DEEP VEIN THROMBOSIS (DVT) OF PROXIMAL VEIN OF BOTH LOWER EXTREMITIES (HCC): Primary | ICD-10-CM

## 2017-08-03 DIAGNOSIS — G45.9 TRANSIENT CEREBRAL ISCHEMIA, UNSPECIFIED TYPE: ICD-10-CM

## 2017-08-03 LAB — INR PPP: 2.6 (ref 2–3)

## 2017-08-03 PROCEDURE — 36416 COLLJ CAPILLARY BLOOD SPEC: CPT | Performed by: PHYSICIAN ASSISTANT

## 2017-08-03 PROCEDURE — 85610 PROTHROMBIN TIME: CPT | Performed by: PHYSICIAN ASSISTANT

## 2017-08-23 RX ORDER — NITROGLYCERIN 0.4 MG/1
0.4 TABLET SUBLINGUAL
Qty: 30 TABLET | Refills: 5 | Status: SHIPPED | OUTPATIENT
Start: 2017-08-23 | End: 2017-08-24 | Stop reason: SDUPTHER

## 2017-08-24 ENCOUNTER — OFFICE VISIT (OUTPATIENT)
Dept: FAMILY MEDICINE CLINIC | Facility: CLINIC | Age: 67
End: 2017-08-24

## 2017-08-24 VITALS
TEMPERATURE: 98.4 F | HEIGHT: 72 IN | BODY MASS INDEX: 30.07 KG/M2 | OXYGEN SATURATION: 95 % | RESPIRATION RATE: 16 BRPM | HEART RATE: 74 BPM | SYSTOLIC BLOOD PRESSURE: 120 MMHG | DIASTOLIC BLOOD PRESSURE: 70 MMHG | WEIGHT: 222 LBS

## 2017-08-24 DIAGNOSIS — Z72.0 TOBACCO ABUSE: ICD-10-CM

## 2017-08-24 DIAGNOSIS — I25.2 PAST HEART ATTACK: ICD-10-CM

## 2017-08-24 DIAGNOSIS — M79.7 FIBROMYALGIA: ICD-10-CM

## 2017-08-24 DIAGNOSIS — D22.9 ATYPICAL NEVI: ICD-10-CM

## 2017-08-24 DIAGNOSIS — E78.2 MIXED HYPERLIPIDEMIA: Primary | ICD-10-CM

## 2017-08-24 DIAGNOSIS — K21.00 REFLUX ESOPHAGITIS: ICD-10-CM

## 2017-08-24 DIAGNOSIS — I20.9 ISCHEMIC CHEST PAIN (HCC): ICD-10-CM

## 2017-08-24 DIAGNOSIS — R05.9 COUGH: ICD-10-CM

## 2017-08-24 PROCEDURE — 99213 OFFICE O/P EST LOW 20 MIN: CPT | Performed by: PHYSICIAN ASSISTANT

## 2017-08-24 PROCEDURE — 71020 XR CHEST PA AND LATERAL: CPT | Performed by: PHYSICIAN ASSISTANT

## 2017-08-24 RX ORDER — NITROGLYCERIN 0.4 MG/1
0.4 TABLET SUBLINGUAL
Qty: 30 TABLET | Refills: 5 | Status: SHIPPED | OUTPATIENT
Start: 2017-08-24 | End: 2019-07-03 | Stop reason: SDUPTHER

## 2017-08-24 RX ORDER — PREGABALIN 75 MG/1
75 CAPSULE ORAL 2 TIMES DAILY
Qty: 60 CAPSULE | Refills: 5 | Status: CANCELLED | OUTPATIENT
Start: 2017-08-24

## 2017-08-24 NOTE — PATIENT INSTRUCTIONS
Low glycemic index diet  Exercise 30 minutes most days of the week  Make sure you get results on any labs or tests we ordered today  We discussed medications and how to take them as prescribed  Sleep 6-8 hours each night if possible  If you have not signed up for "[employer]t, please activate your code ASAP from your After Visit Summary today    LDL goal <100  LDL goal if heart disease <70  HDL goal >60  Triglyceride goal <150  BP goal =<130/80  Fasting glucose <100    Consider PFT testing if negative CXR    Try Anoro at one puff once daily for cough and see if helps    911 if nitro does not work

## 2017-08-24 NOTE — PROGRESS NOTES
Subjective   Javy Reeves is a 66 y.o. male.     History of Present Illness   Javy Reeves 66 y.o. male who presents today for routine follow up check and medication refills.  he has a history of   Patient Active Problem List   Diagnosis   • Arthritis   • Family history of early CAD   • DDD (degenerative disc disease), cervical   • DVT (deep venous thrombosis)   • Fibromyalgia   • Past heart attack   • Hyperlipidemia   • DDD (degenerative disc disease), lumbosacral   • History of pulmonary embolus (PE)   • Reflux esophagitis   • TIA (transient ischemic attack)   • Left groin pain   .  Since the last visit, he has overall felt well.  He has GERD and is well controlled on PPI medication.  he has been compliant with current medications have reviewed them.  The patient denies medication side effects.  I need to update labs for the Crestor Rx from March;  I will get LP and CMP  X-Ray  Interpretation report in house X-rays that I personally viewed  Mole right anterior lateral thigh and bleeds at times and larger, black  Taking nitro more for chest pain and left arm pain;  Need him to see cardio and call 911 if nitro does not work  Relevant Clinical Issues/Diagnoses/Indications:  SOA        Clinical Findings:  Hyperinflation; calcified nodules          Comparative Data:  Not here          Date of Previous X-ray:  Change on current X-ray    Cough for 6 mos; clear mucus;  Wheezing laying down;  I will get CXR  I am concerned about COPD and need to consider PFT testing if negative  No blood;  No GERD  The following portions of the patient's history were reviewed and updated as appropriate: allergies, current medications, past family history, past medical history, past social history, past surgical history and problem list.    Review of Systems   Constitutional: Negative for activity change, appetite change and unexpected weight change.   HENT: Negative for nosebleeds and trouble swallowing.    Eyes: Negative for pain  and visual disturbance.   Respiratory: Negative for chest tightness, shortness of breath and wheezing.    Cardiovascular: Negative for chest pain and palpitations.   Gastrointestinal: Negative for abdominal pain and blood in stool.   Endocrine: Negative.    Genitourinary: Negative for difficulty urinating and hematuria.   Musculoskeletal: Negative for joint swelling.   Skin: Negative for color change and rash.   Allergic/Immunologic: Negative.    Neurological: Negative for syncope and speech difficulty.   Hematological: Negative for adenopathy.   Psychiatric/Behavioral: Negative for agitation and confusion.   All other systems reviewed and are negative.      Objective   Physical Exam   Constitutional: He is oriented to person, place, and time. He appears well-developed and well-nourished. No distress.   HENT:   Head: Normocephalic and atraumatic.   Eyes: Conjunctivae and EOM are normal. Pupils are equal, round, and reactive to light. Right eye exhibits no discharge. Left eye exhibits no discharge. No scleral icterus.   Neck: Normal range of motion. Neck supple. No tracheal deviation present. No thyromegaly present.   Cardiovascular: Normal rate, regular rhythm, normal heart sounds, intact distal pulses and normal pulses.  Exam reveals no gallop.    No murmur heard.  Pulmonary/Chest: Effort normal and breath sounds normal. No respiratory distress. He has no wheezes. He has no rales.   Musculoskeletal: Normal range of motion.   Neurological: He is alert and oriented to person, place, and time. He exhibits normal muscle tone. Coordination normal.   Skin: Skin is warm. No rash noted. No erythema. No pallor.   Mole right anterior lateral thigh; dark brown and irreg shape and contour    Psychiatric: He has a normal mood and affect. His behavior is normal. Judgment and thought content normal.   Nursing note and vitals reviewed.      Assessment/Plan   Problems Addressed this Visit        Cardiovascular and Mediastinum     Hyperlipidemia - Primary    Relevant Orders    Comprehensive metabolic panel    Lipid panel       Digestive    Reflux esophagitis    Relevant Orders    Comprehensive metabolic panel    Lipid panel       Musculoskeletal and Integument    Fibromyalgia    Relevant Orders    Comprehensive metabolic panel    Lipid panel       Other    Past heart attack    Relevant Orders    Ambulatory Referral to Cardiology      Other Visit Diagnoses     Cough        Relevant Orders    Comprehensive metabolic panel    Lipid panel    XR Chest PA & Lateral (Completed)    Tobacco abuse        Relevant Orders    Comprehensive metabolic panel    Lipid panel    XR Chest PA & Lateral (Completed)    Atypical nevi        Relevant Orders    Ambulatory Referral to Dermatology    Ischemic chest pain        Relevant Orders    Ambulatory Referral to Cardiology                I have reviewed the notes, assessments, and/or procedures performed by Aletha Ochoa PA-C, I concur with her/his documentation of Javy Reeves.

## 2017-08-25 RX ORDER — PREGABALIN 75 MG/1
75 CAPSULE ORAL 2 TIMES DAILY
Qty: 60 CAPSULE | Refills: 5 | Status: SHIPPED | OUTPATIENT
Start: 2017-08-25 | End: 2018-02-12 | Stop reason: SDUPTHER

## 2017-08-27 ENCOUNTER — HOSPITAL ENCOUNTER (EMERGENCY)
Facility: HOSPITAL | Age: 67
Discharge: HOME | End: 2017-08-27
Attending: EMERGENCY MEDICINE | Admitting: EMERGENCY MEDICINE

## 2017-08-27 VITALS
OXYGEN SATURATION: 98 % | HEIGHT: 72 IN | WEIGHT: 229 LBS | RESPIRATION RATE: 19 BRPM | DIASTOLIC BLOOD PRESSURE: 104 MMHG | TEMPERATURE: 97.2 F | SYSTOLIC BLOOD PRESSURE: 152 MMHG | HEART RATE: 78 BPM | BODY MASS INDEX: 31.02 KG/M2

## 2017-08-27 DIAGNOSIS — S61.211A LACERATION OF LEFT INDEX FINGER: Primary | ICD-10-CM

## 2017-08-27 PROCEDURE — 90715 TDAP VACCINE 7 YRS/> IM: CPT | Performed by: EMERGENCY MEDICINE

## 2017-08-27 PROCEDURE — 99283 EMERGENCY DEPT VISIT LOW MDM: CPT

## 2017-08-27 PROCEDURE — 90471 IMMUNIZATION ADMIN: CPT | Performed by: EMERGENCY MEDICINE

## 2017-08-27 PROCEDURE — 25010000002 TDAP 5-2.5-18.5 LF-MCG/0.5 SUSPENSION: Performed by: EMERGENCY MEDICINE

## 2017-08-27 RX ORDER — LIDOCAINE HYDROCHLORIDE 10 MG/ML
10 INJECTION, SOLUTION INFILTRATION; PERINEURAL ONCE
Status: COMPLETED | OUTPATIENT
Start: 2017-08-27 | End: 2017-08-27

## 2017-08-27 RX ADMIN — LIDOCAINE HYDROCHLORIDE 10 ML: 10 INJECTION, SOLUTION INFILTRATION; PERINEURAL at 15:35

## 2017-08-27 RX ADMIN — TETANUS TOXOID, REDUCED DIPHTHERIA TOXOID AND ACELLULAR PERTUSSIS VACCINE, ADSORBED 0.5 ML: 5; 2.5; 8; 8; 2.5 SUSPENSION INTRAMUSCULAR at 16:16

## 2017-08-27 NOTE — ED PROVIDER NOTES
Pt presents to ED complaining of laceration to left index finger sustained roughly 2 hours ago with a . He denies loss of sensation and is able to move finger without difficulty. His tetanus shot is not up-to-date. Upon exam, there is a linear laceration to proximal planax of left index finger. It has been repaired by Deonte Zamarripa (MALINDA). Strength is 5/5 and sensation is intact. Pt will f/u with PCP.     I supervised care provided by the midlevel provider Deonte Zamarripa (MALINDA).   We have discussed this patient's history, physical exam, and treatment plan.   I have reviewed the note and personally saw and examined the patient and agree with the plan of care.    Documentation assistance provided by mariaelena Story.  Information recorded by the scribe was done at my direction and has been verified and validated by me.       Elissa Story  08/27/17 [id number]       Juve Frances MD  08/27/17 1776

## 2017-08-27 NOTE — ED PROVIDER NOTES
EMERGENCY DEPARTMENT ENCOUNTER    CHIEF COMPLAINT  Chief Complaint: Finger Laceration  History given by: Patient  History limited by: Nothing  Room Number: 35/35  PMD: Aletha Ochoa PA-C      HPI:  Pt is a 66 y.o. male with DVT on Coumadin who presents to the ED after the patient sustained a laceration to his left index finger around 1315 while he was cutting imitation brick with a razor blade. Patient notes that he ran the razor blade back towards himself causing him to suddenly cut his finger. He denies any loss of sensation or motor function. The patient reports that he is right handed and his tetanus is not currently UTD. No other complaints at this time.      Duration:  Seconds  Onset: Sudden  Timing: Constant  Location: Left index finger  Radiation: None  Quality: Dull  Intensity/Severity: Moderate  Progression: No Change  Associated Symptoms: Laceration  Aggravating Factors: None  Alleviating Factors: None  Previous Episodes: None  Treatment before arrival: Wrapped PTA    PAST MEDICAL HISTORY  Active Ambulatory Problems     Diagnosis Date Noted   • Arthritis 11/16/2015   • Family history of early CAD 11/16/2015   • DDD (degenerative disc disease), cervical 11/16/2015   • DVT (deep venous thrombosis) 11/16/2015   • Fibromyalgia 11/16/2015   • Past heart attack 11/16/2015   • Hyperlipidemia 11/16/2015   • DDD (degenerative disc disease), lumbosacral 11/16/2015   • History of pulmonary embolus (PE) 11/16/2015   • Reflux esophagitis 11/16/2015   • TIA (transient ischemic attack) 11/16/2015   • Left groin pain 02/21/2017     Resolved Ambulatory Problems     Diagnosis Date Noted   • Hypertension 11/16/2015     Past Medical History:   Diagnosis Date   • Arthritis    • Chronic pain    • Coronary artery disease    • DDD (degenerative disc disease), cervical    • DDD (degenerative disc disease), lumbosacral    • DVT (deep venous thrombosis)    • Encounter for special screening examination for neoplasm of prostate 2012    • Eye exam, routine > 5 yrs ago   • Eye exam, routine 01/2015   • Fibromyalgia    • Hyperlipidemia    • Hypertension    • Irritable bowel    • Past heart attack    • Pulmonary embolism    • Reflux esophagitis    • TIA (transient ischemic attack)        PAST SURGICAL HISTORY  Past Surgical History:   Procedure Laterality Date   • APPENDECTOMY  2004   • COLONOSCOPY  09/2015    removed 2 polyps, Dr. Manley   • KNEE SURGERY     • LUMBAR DISC SURGERY  2006, 2007   • PROSTATE SURGERY     • SCALENOTOMY     • SHOULDER SURGERY Left 04/2012    rotator cuff repair   • WRIST SURGERY      x3 starting in 1984       FAMILY HISTORY  Family History   Problem Relation Age of Onset   • Diabetes Sister    • Cancer Sister    • Hypertension Sister    • Kidney disease Sister    • Stroke Sister    • Heart disease Brother    • Hypertension Brother    • Cancer Mother    • Heart disease Father    • Cancer Father    • Deep vein thrombosis Father        SOCIAL HISTORY  Social History     Social History   • Marital status:      Spouse name: N/A   • Number of children: N/A   • Years of education: N/A     Occupational History   • Not on file.     Social History Main Topics   • Smoking status: Current Every Day Smoker     Packs/day: 0.50     Years: 30.00   • Smokeless tobacco: Never Used   • Alcohol use Yes      Comment: rare   • Drug use: Defer   • Sexual activity: Defer     Other Topics Concern   • Not on file     Social History Narrative       ALLERGIES  Zinc    REVIEW OF SYSTEMS  Review of Systems   Constitutional: Negative.  Negative for chills and fever.   HENT: Negative.    Eyes: Negative.    Cardiovascular: Negative.  Negative for chest pain.   Gastrointestinal: Negative.    Musculoskeletal: Negative.  Negative for back pain.   Skin: Negative.  Negative for rash.        Finger laceration   Neurological: Negative.  Negative for numbness.       PHYSICAL EXAM  ED Triage Vitals   Temp Heart Rate Resp BP SpO2   08/27/17 1345 08/27/17  1345 08/27/17 1345 08/27/17 1408 08/27/17 1345   97.2 °F (36.2 °C) 100 18 134/92 98 %      Temp src Heart Rate Source Patient Position BP Location FiO2 (%)   -- -- -- -- --              Physical Exam   Constitutional: He is oriented to person, place, and time. No distress.   HENT:   Mouth/Throat: Oropharynx is clear and moist.   Cardiovascular: Normal rate and regular rhythm.    Pulmonary/Chest: Breath sounds normal. No respiratory distress.   Musculoskeletal: He exhibits no edema or tenderness.   FROM left index finger without bony tenderness. Patient's left hand is neurovascularly intact distally.    Neurological: He is alert and oriented to person, place, and time.   Skin: No rash noted.   Left index finger at the radial proximal phalanx there is an approx linear 1.5cm laceration.    Psychiatric: Mood and affect normal.   Nursing note and vitals reviewed.      PROCEDURES  Laceration Repair  Date/Time: 8/27/2017 3:20 PM  Performed by: ALEXANDER KWON  Authorized by: SAUNDRA LEONARD   Consent: Verbal consent obtained.  Consent given by: patient  Patient understanding: patient states understanding of the procedure being performed  Patient consent: the patient's understanding of the procedure matches consent given  Patient identity confirmed: verbally with patient  Body area: upper extremity  Location details: left index finger  Laceration length: 1.4 cm  Foreign bodies: no foreign bodies  Tendon involvement: none  Nerve involvement: none  Vascular damage: no  Anesthesia: local infiltration    Anesthesia:  Local Anesthetic: lidocaine 1% without epinephrine  Anesthetic total: 5 mL  Preparation: Patient was prepped and draped in the usual sterile fashion.  Irrigation solution: saline  Irrigation method: jet lavage  Amount of cleaning: standard  Debridement: none  Degree of undermining: none  Skin closure: 5-0 nylon  Number of sutures: 3  Technique: simple  Approximation difficulty: simple  Patient tolerance: Patient  tolerated the procedure well with no immediate complications        PROGRESS AND CONSULTS  ED Course     1525  Rechecked the patient and he is resting comfortably after I repaired the patient's laceration. Discussed plan to discharge the patient home and recommended follow up with his PCP in 10-14 days to have the sutures removed. Patient agrees with the plan and all questions were addressed.     Latest vital signs   BP- 134/92 HR- 100 Temp- 97.2 °F (36.2 °C) O2 sat- 98%    1533  Discussed case with  and he agrees with the treatment plan.       MEDICAL DECISION MAKING  Results were reviewed/discussed with the patient and they were also made aware of online access. Pt also made aware that some labs, such as cultures, will not be resulted during ER visit and follow up with PMD is necessary.     MDM  Number of Diagnoses or Management Options  Laceration of left index finger:   Diagnosis management comments: NV intact distally.  No tendon injury.     Patient Progress  Patient progress: improved       DIAGNOSIS  Final diagnoses:   Laceration of left index finger       DISPOSITION  DISCHARGE    Patient discharged in stable condition.    Reviewed implications of results, diagnosis, meds, responsibility to follow up, warning signs and symptoms of possible worsening, potential complications and reasons to return to ER, including development of a fever.     Patient/Family voiced understanding of above instructions.    Discussed plan for discharge, as there is no emergent indication for admission.  Pt/family is agreeable and understands need for follow up and repeat testing.  Pt is aware that discharge does not mean that nothing is wrong but it indicates no emergency is present that requires admission and they must continue care with follow-up as given below or physician of their choice.     FOLLOW-UP  Aletha Ochoa PA-C  [id number] [facility].[address] [place] [id number] [phone]      for suture removal in  10-14 days         Medication List      Changed          warfarin 5 MG tablet   Commonly known as:  COUMADIN   Take 2 tablets by mouth Daily.   What changed:  Another medication with the same name was removed. Continue   taking this medication, and follow the directions you see here.         Stop          ASPERCREME EX       mupirocin 2 % ointment   Commonly known as:  BACTROBAN       rosuvastatin 20 MG tablet   Commonly known as:  CRESTOR           Latest Documented Vital Signs:  As of 3:25 PM  BP- 134/92 HR- 100 Temp- 97.2 °F (36.2 °C) O2 sat- 98%    --  Documentation assistance provided by mariaelena Sexton for Deonte Zamarripa PA-C.  Information recorded by the scribconor was done at my direction and has been verified and validated by me.           Ge Sexton  08/27/17 1537       MARLY Larson  08/27/17 [id number]

## 2017-08-29 ENCOUNTER — TELEPHONE (OUTPATIENT)
Dept: SOCIAL WORK | Facility: HOSPITAL | Age: 67
End: 2017-08-29

## 2017-08-31 ENCOUNTER — CLINICAL SUPPORT (OUTPATIENT)
Dept: FAMILY MEDICINE CLINIC | Facility: CLINIC | Age: 67
End: 2017-08-31

## 2017-08-31 DIAGNOSIS — I82.5Y3 CHRONIC DEEP VEIN THROMBOSIS (DVT) OF PROXIMAL VEIN OF BOTH LOWER EXTREMITIES (HCC): Primary | ICD-10-CM

## 2017-08-31 LAB — INR PPP: 3.2 (ref 2–3)

## 2017-08-31 PROCEDURE — 85610 PROTHROMBIN TIME: CPT | Performed by: NURSE PRACTITIONER

## 2017-08-31 PROCEDURE — 36416 COLLJ CAPILLARY BLOOD SPEC: CPT | Performed by: NURSE PRACTITIONER

## 2017-08-31 PROCEDURE — 99211 OFF/OP EST MAY X REQ PHY/QHP: CPT | Performed by: NURSE PRACTITIONER

## 2017-09-05 ENCOUNTER — OFFICE VISIT (OUTPATIENT)
Dept: FAMILY MEDICINE CLINIC | Facility: CLINIC | Age: 67
End: 2017-09-05

## 2017-09-05 VITALS
DIASTOLIC BLOOD PRESSURE: 74 MMHG | WEIGHT: 233 LBS | HEART RATE: 76 BPM | OXYGEN SATURATION: 94 % | SYSTOLIC BLOOD PRESSURE: 114 MMHG | RESPIRATION RATE: 16 BRPM | HEIGHT: 72 IN | BODY MASS INDEX: 31.56 KG/M2 | TEMPERATURE: 98.5 F

## 2017-09-05 DIAGNOSIS — Z09 HOSPITAL DISCHARGE FOLLOW-UP: Primary | ICD-10-CM

## 2017-09-05 DIAGNOSIS — R51.9 CHRONIC NONINTRACTABLE HEADACHE, UNSPECIFIED HEADACHE TYPE: ICD-10-CM

## 2017-09-05 DIAGNOSIS — G89.29 CHRONIC NONINTRACTABLE HEADACHE, UNSPECIFIED HEADACHE TYPE: ICD-10-CM

## 2017-09-05 DIAGNOSIS — Z48.02 VISIT FOR SUTURE REMOVAL: ICD-10-CM

## 2017-09-05 LAB — INR PPP: 1.7 (ref 0.9–1.1)

## 2017-09-05 PROCEDURE — 99213 OFFICE O/P EST LOW 20 MIN: CPT | Performed by: PHYSICIAN ASSISTANT

## 2017-09-05 PROCEDURE — 90732 PPSV23 VACC 2 YRS+ SUBQ/IM: CPT | Performed by: PHYSICIAN ASSISTANT

## 2017-09-05 PROCEDURE — G0009 ADMIN PNEUMOCOCCAL VACCINE: HCPCS | Performed by: PHYSICIAN ASSISTANT

## 2017-09-05 PROCEDURE — 85610 PROTHROMBIN TIME: CPT | Performed by: PHYSICIAN ASSISTANT

## 2017-09-05 PROCEDURE — 36416 COLLJ CAPILLARY BLOOD SPEC: CPT | Performed by: PHYSICIAN ASSISTANT

## 2017-09-05 NOTE — PATIENT INSTRUCTIONS
Stop smoking  Low glycemic index diet  Exercise 30 minutes most days of the week  Make sure you get results on any labs or tests we ordered today  We discussed medications and how to take them as prescribed  Sleep 6-8 hours each night if possible  If you have not signed up for [facility], please activate your code ASAP from your After Visit Summary today    LDL goal <100  LDL goal if heart disease <70  HDL goal >60  Triglyceride goal <150  BP goal =<130/80  Fasting glucose <100      ER if h/a worse  See neuro

## 2017-09-05 NOTE — PROGRESS NOTES
Subjective   Javy Reeves Sr. is a 66 y.o. male.     History of Present Illness   Javy Reeves Sr. 66 y.o. male presents today for Emergency Room follow up.  he was treated 8-27-17 for finger laceration  .  I reviewed all of the labs and diagnostic testing and noted:  Exam and sutures;  He is on Coumadin;  3 sutures closed wound  The patient's medications were not changed:  he does not have a follow up appointment with a specialist:     No bleeding or pain with laceration    His bp was up at ER.  He is high risk for bleeding d/t Coumadin    INR today was 1.7 and will add 5mg Coumadin today and then resume 10mg daily and check Monday    I did CXR and had it addended and was negative mass and COPD.  I did Rx Anoro and the wheezing at night and cough is controlled with Rx now.    Worsening of h/a    Javy Reeves Sr. 66 y.o. male presents for evaluation of neck pain and left side face pain and pressure left eye;  feels like water going up his nose. Symptoms began about several years ago. Generally, the headaches last about several hours and occur 2 or more times a week. The headaches are usually pressure. The location of the headache pain is occipital and central occiput and parietal and left side face. Recently, the headaches have been increasing in severity. Work attendance or other daily activities are affected by the headaches. Precipitating factors include: none which have been determined. The headaches are usually not preceded by an aura. Associated symptoms include blurry vision left eye. The patient denies speech difficulties. Home treatment has included sleeping with some improvement. Other history includes: no prior h/a . Family history includes migraine headaches in sister.  Prior therapies tried include just lay down with relief wakes up and pain is gone.  This could be migraine but started a few years ago and is getting worse.  I will consult neuorlogy.  .    No head trauma     The following  portions of the patient's history were reviewed and updated as appropriate: allergies, current medications, past family history, past medical history, past social history, past surgical history and problem list.    Review of Systems   Constitutional: Negative for activity change, appetite change and unexpected weight change.   HENT: Negative for nosebleeds and trouble swallowing.    Eyes: Positive for visual disturbance. Negative for pain.   Respiratory: Negative for chest tightness, shortness of breath and wheezing.    Cardiovascular: Negative for chest pain and palpitations.   Gastrointestinal: Negative for abdominal pain and blood in stool.   Endocrine: Negative.    Genitourinary: Negative for difficulty urinating and hematuria.   Musculoskeletal: Positive for back pain. Negative for joint swelling.   Skin: Negative for color change and rash.   Allergic/Immunologic: Negative.    Neurological: Positive for headaches. Negative for syncope and speech difficulty.   Hematological: Negative for adenopathy.   Psychiatric/Behavioral: Negative for agitation and confusion.   All other systems reviewed and are negative.      Objective   Physical Exam   Constitutional: He is oriented to person, place, and time. He appears well-developed and well-nourished. No distress.   HENT:   Head: Normocephalic and atraumatic.   Eyes: Conjunctivae and EOM are normal. Pupils are equal, round, and reactive to light. Right eye exhibits no discharge. Left eye exhibits no discharge. No scleral icterus.   Neck: Normal range of motion. Neck supple. No tracheal deviation present. No thyromegaly present.   Cardiovascular: Normal rate, regular rhythm, normal heart sounds, intact distal pulses and normal pulses.  Exam reveals no gallop.    No murmur heard.  Pulmonary/Chest: Effort normal and breath sounds normal. No respiratory distress. He has no wheezes. He has no rales.   Musculoskeletal: Normal range of motion.   Neurological: He is alert and  oriented to person, place, and time. He exhibits normal muscle tone. Coordination normal.   Skin: Skin is warm. No rash noted. No erythema. No pallor.   3 sutures interrupted removed left index proximal phalanx -sensation and motor intact   Psychiatric: He has a normal mood and affect. His behavior is normal. Judgment and thought content normal.   Nursing note and vitals reviewed.      Assessment/Plan   Javy was seen today for suture / staple removal.    Diagnoses and all orders for this visit:    Hospital discharge follow-up  Comments:  ER suture 8-27-17    Visit for suture removal    Chronic nonintractable headache, unspecified headache type  -     Ambulatory Referral to Neurology    Other orders  -     POC INR  -     Pneumococcal Polysaccharide Vaccine 23-Valent Greater Than or Equal To 3yo Subcutaneous / IM  -     Umeclidinium-Vilanterol (ANORO ELLIPTA) 62.5-25 MCG/INH aerosol powder ; Inhale one puff once daily for COPD

## 2017-09-11 ENCOUNTER — CLINICAL SUPPORT (OUTPATIENT)
Dept: FAMILY MEDICINE CLINIC | Facility: CLINIC | Age: 67
End: 2017-09-11

## 2017-09-11 DIAGNOSIS — I82.5Y3 CHRONIC DEEP VEIN THROMBOSIS (DVT) OF PROXIMAL VEIN OF BOTH LOWER EXTREMITIES (HCC): Primary | ICD-10-CM

## 2017-09-11 DIAGNOSIS — G45.9 TRANSIENT CEREBRAL ISCHEMIA, UNSPECIFIED TYPE: ICD-10-CM

## 2017-09-11 LAB — INR PPP: 1.5 (ref 2–3)

## 2017-09-11 PROCEDURE — 85610 PROTHROMBIN TIME: CPT | Performed by: PHYSICIAN ASSISTANT

## 2017-09-11 PROCEDURE — 36416 COLLJ CAPILLARY BLOOD SPEC: CPT | Performed by: PHYSICIAN ASSISTANT

## 2017-09-11 PROCEDURE — 99211 OFF/OP EST MAY X REQ PHY/QHP: CPT | Performed by: PHYSICIAN ASSISTANT

## 2017-09-15 ENCOUNTER — TELEPHONE (OUTPATIENT)
Dept: FAMILY MEDICINE CLINIC | Facility: CLINIC | Age: 67
End: 2017-09-15

## 2017-09-15 RX ORDER — CEFUROXIME AXETIL 500 MG/1
500 TABLET ORAL 2 TIMES DAILY
Qty: 20 TABLET | Refills: 0 | Status: SHIPPED | OUTPATIENT
Start: 2017-09-15 | End: 2017-11-02 | Stop reason: ALTCHOICE

## 2017-09-15 NOTE — TELEPHONE ENCOUNTER
If ill > 7days or color to drainage and getting worse;  Then fill Ceftin and sent it;  If viral, it will not help;  INR should be next week

## 2017-09-18 ENCOUNTER — CLINICAL SUPPORT (OUTPATIENT)
Dept: FAMILY MEDICINE CLINIC | Facility: CLINIC | Age: 67
End: 2017-09-18

## 2017-09-18 LAB — INR PPP: 1.9 (ref 2–3)

## 2017-09-18 PROCEDURE — 36416 COLLJ CAPILLARY BLOOD SPEC: CPT | Performed by: PHYSICIAN ASSISTANT

## 2017-09-18 PROCEDURE — 99211 OFF/OP EST MAY X REQ PHY/QHP: CPT | Performed by: PHYSICIAN ASSISTANT

## 2017-09-18 PROCEDURE — 85610 PROTHROMBIN TIME: CPT | Performed by: PHYSICIAN ASSISTANT

## 2017-09-21 ENCOUNTER — CLINICAL SUPPORT (OUTPATIENT)
Dept: FAMILY MEDICINE CLINIC | Facility: CLINIC | Age: 67
End: 2017-09-21

## 2017-09-21 DIAGNOSIS — G45.9 TRANSIENT CEREBRAL ISCHEMIA, UNSPECIFIED TYPE: Primary | ICD-10-CM

## 2017-09-21 DIAGNOSIS — I82.5Y3 CHRONIC DEEP VEIN THROMBOSIS (DVT) OF PROXIMAL VEIN OF BOTH LOWER EXTREMITIES (HCC): ICD-10-CM

## 2017-09-21 LAB — INR PPP: 2.5 (ref 2–3)

## 2017-09-21 PROCEDURE — 36416 COLLJ CAPILLARY BLOOD SPEC: CPT | Performed by: PHYSICIAN ASSISTANT

## 2017-09-21 PROCEDURE — 85610 PROTHROMBIN TIME: CPT | Performed by: PHYSICIAN ASSISTANT

## 2017-10-03 RX ORDER — WARFARIN SODIUM 5 MG/1
TABLET ORAL
Qty: 60 TABLET | Refills: 11 | Status: SHIPPED | OUTPATIENT
Start: 2017-10-03 | End: 2018-09-28

## 2017-10-05 ENCOUNTER — CLINICAL SUPPORT (OUTPATIENT)
Dept: FAMILY MEDICINE CLINIC | Facility: CLINIC | Age: 67
End: 2017-10-05

## 2017-10-05 DIAGNOSIS — G45.9 TRANSIENT CEREBRAL ISCHEMIA, UNSPECIFIED TYPE: Primary | ICD-10-CM

## 2017-10-05 DIAGNOSIS — Z23 IMMUNIZATION DUE: ICD-10-CM

## 2017-10-05 DIAGNOSIS — I82.5Y3 CHRONIC DEEP VEIN THROMBOSIS (DVT) OF PROXIMAL VEIN OF BOTH LOWER EXTREMITIES (HCC): ICD-10-CM

## 2017-10-05 LAB — INR PPP: 3.2 (ref 2–3)

## 2017-10-05 PROCEDURE — 36416 COLLJ CAPILLARY BLOOD SPEC: CPT | Performed by: PHYSICIAN ASSISTANT

## 2017-10-05 PROCEDURE — G0008 ADMIN INFLUENZA VIRUS VAC: HCPCS | Performed by: PHYSICIAN ASSISTANT

## 2017-10-05 PROCEDURE — 85610 PROTHROMBIN TIME: CPT | Performed by: PHYSICIAN ASSISTANT

## 2017-10-12 ENCOUNTER — CLINICAL SUPPORT (OUTPATIENT)
Dept: FAMILY MEDICINE CLINIC | Facility: CLINIC | Age: 67
End: 2017-10-12

## 2017-10-12 DIAGNOSIS — I82.5Y3 CHRONIC DEEP VEIN THROMBOSIS (DVT) OF PROXIMAL VEIN OF BOTH LOWER EXTREMITIES (HCC): Primary | ICD-10-CM

## 2017-10-12 LAB — INR PPP: 2.2 (ref 2–3)

## 2017-10-12 PROCEDURE — 85610 PROTHROMBIN TIME: CPT | Performed by: PHYSICIAN ASSISTANT

## 2017-10-12 PROCEDURE — 36416 COLLJ CAPILLARY BLOOD SPEC: CPT | Performed by: PHYSICIAN ASSISTANT

## 2017-10-23 RX ORDER — WARFARIN SODIUM 1 MG/1
TABLET ORAL
Qty: 30 TABLET | Refills: 5 | Status: SHIPPED | OUTPATIENT
Start: 2017-10-23 | End: 2018-08-25 | Stop reason: SDUPTHER

## 2017-11-02 ENCOUNTER — OFFICE VISIT (OUTPATIENT)
Dept: CARDIOLOGY | Facility: CLINIC | Age: 67
End: 2017-11-02

## 2017-11-02 ENCOUNTER — CLINICAL SUPPORT (OUTPATIENT)
Dept: FAMILY MEDICINE CLINIC | Facility: CLINIC | Age: 67
End: 2017-11-02

## 2017-11-02 VITALS
SYSTOLIC BLOOD PRESSURE: 150 MMHG | HEART RATE: 77 BPM | HEIGHT: 72 IN | DIASTOLIC BLOOD PRESSURE: 88 MMHG | BODY MASS INDEX: 31.97 KG/M2 | WEIGHT: 236 LBS

## 2017-11-02 DIAGNOSIS — I82.5Y3 CHRONIC DEEP VEIN THROMBOSIS (DVT) OF PROXIMAL VEIN OF BOTH LOWER EXTREMITIES (HCC): Primary | ICD-10-CM

## 2017-11-02 DIAGNOSIS — I10 ESSENTIAL HYPERTENSION: Primary | ICD-10-CM

## 2017-11-02 DIAGNOSIS — I25.2 OLD MI (MYOCARDIAL INFARCTION): ICD-10-CM

## 2017-11-02 LAB — INR PPP: 2.3 (ref 2–3)

## 2017-11-02 PROCEDURE — 99214 OFFICE O/P EST MOD 30 MIN: CPT | Performed by: INTERNAL MEDICINE

## 2017-11-02 PROCEDURE — 36416 COLLJ CAPILLARY BLOOD SPEC: CPT | Performed by: PHYSICIAN ASSISTANT

## 2017-11-02 PROCEDURE — 85610 PROTHROMBIN TIME: CPT | Performed by: PHYSICIAN ASSISTANT

## 2017-11-02 PROCEDURE — 93000 ELECTROCARDIOGRAM COMPLETE: CPT | Performed by: INTERNAL MEDICINE

## 2017-11-02 RX ORDER — MULTIVITAMIN WITH IRON
TABLET ORAL DAILY
COMMUNITY
End: 2018-07-27 | Stop reason: ALTCHOICE

## 2017-11-02 NOTE — PROGRESS NOTES
Subjective:     Encounter Date:11/02/2017      Patient ID: Javy Reeves Sr. is a 67 y.o. male.    Chief Complaint:  Coronary Artery Disease   Presents for follow-up visit. Symptoms include shortness of breath. Pertinent negatives include no chest pain, chest pressure, chest tightness, dizziness, leg swelling, muscle weakness, palpitations or weight gain. The symptoms have been stable. Compliance with diet is good. Compliance with exercise is good. Compliance with medications is good.     67-year-old gentleman with a history of prior marker infarction presents today for reevaluation.  Clinically he is doing great haven't seen about 2 years.  He said about 3 or 4 months ago he had some vague discomfort in his chest.  He said it felt muscle skeletal he ultimately started taking some to tumeric and magnesium.  It helped and everything resolved.  He continues to exercise with no issues presents today for reevaluation    Review of Systems   Constitution: Negative for weight gain.   Cardiovascular: Negative for chest pain, leg swelling and palpitations.   Respiratory: Positive for cough, shortness of breath and snoring. Negative for chest tightness.    Musculoskeletal: Negative for muscle weakness.   Gastrointestinal: Positive for abdominal pain.   Neurological: Negative for dizziness.   All other systems reviewed and are negative.        ECG 12 Lead  Date/Time: 11/2/2017 1:35 PM  Performed by: POLINA MCGOVERN  Authorized by: POLINA MCGOVERN   Comparison: compared with previous ECG from 5/20/2015  Similar to previous ECG  Rhythm: sinus rhythm  Clinical impression: normal ECG               Objective:     Physical Exam   Constitutional: He is oriented to person, place, and time. He appears well-developed.   HENT:   Head: Normocephalic.   Eyes: Conjunctivae are normal.   Neck: Normal range of motion.   Cardiovascular: Normal rate, regular rhythm and normal heart sounds.    Pulmonary/Chest: Breath sounds normal.    Abdominal: Soft. Bowel sounds are normal.   Musculoskeletal: Normal range of motion. He exhibits no edema.   Neurological: He is alert and oriented to person, place, and time.   Skin: Skin is warm and dry.   Psychiatric: He has a normal mood and affect. His behavior is normal.   Vitals reviewed.      Lab Review:       Assessment:          Diagnosis Plan   1. Essential hypertension     2. Old MI (myocardial infarction)            Plan:       1.  Hypertension blood pressure was a little high today.  He has not been checking it I told has to followed closely.  2.  History of pulmonary embolism.  3.  Prior marker infarction no cardiac symptoms.  4.  Follow-up in one year sooner or course if his blood pressure remains 140/85 consistently.  I did specifically review these numbers with him explaining the importance.    Coronary Artery Disease  Assessment  • The patient has no angina    Plan  • Lifestyle modifications discussed include adhering to a heart healthy diet, avoidance of tobacco products, maintenance of a healthy weight, medication compliance, regular exercise and regular monitoring of cholesterol and blood pressure

## 2017-11-30 ENCOUNTER — CLINICAL SUPPORT (OUTPATIENT)
Dept: FAMILY MEDICINE CLINIC | Facility: CLINIC | Age: 67
End: 2017-11-30

## 2017-11-30 DIAGNOSIS — I82.5Y3 CHRONIC DEEP VEIN THROMBOSIS (DVT) OF PROXIMAL VEIN OF BOTH LOWER EXTREMITIES (HCC): Primary | ICD-10-CM

## 2017-11-30 DIAGNOSIS — G45.9 TRANSIENT CEREBRAL ISCHEMIA, UNSPECIFIED TYPE: ICD-10-CM

## 2017-11-30 LAB — INR PPP: 2 (ref 2–3)

## 2017-11-30 PROCEDURE — 85610 PROTHROMBIN TIME: CPT | Performed by: PHYSICIAN ASSISTANT

## 2017-11-30 PROCEDURE — 36416 COLLJ CAPILLARY BLOOD SPEC: CPT | Performed by: PHYSICIAN ASSISTANT

## 2017-12-28 ENCOUNTER — CLINICAL SUPPORT (OUTPATIENT)
Dept: FAMILY MEDICINE CLINIC | Facility: CLINIC | Age: 67
End: 2017-12-28

## 2017-12-28 DIAGNOSIS — G45.9 TRANSIENT CEREBRAL ISCHEMIA, UNSPECIFIED TYPE: Primary | ICD-10-CM

## 2017-12-28 DIAGNOSIS — I82.5Y3 CHRONIC DEEP VEIN THROMBOSIS (DVT) OF PROXIMAL VEIN OF BOTH LOWER EXTREMITIES (HCC): ICD-10-CM

## 2017-12-28 LAB — INR PPP: 2.5 (ref 2–3)

## 2017-12-28 PROCEDURE — 36416 COLLJ CAPILLARY BLOOD SPEC: CPT | Performed by: PHYSICIAN ASSISTANT

## 2017-12-28 PROCEDURE — 85610 PROTHROMBIN TIME: CPT | Performed by: PHYSICIAN ASSISTANT

## 2017-12-31 ENCOUNTER — OFFICE VISIT (OUTPATIENT)
Dept: RETAIL CLINIC | Facility: CLINIC | Age: 67
End: 2017-12-31

## 2017-12-31 VITALS
OXYGEN SATURATION: 97 % | SYSTOLIC BLOOD PRESSURE: 142 MMHG | RESPIRATION RATE: 18 BRPM | HEART RATE: 91 BPM | TEMPERATURE: 100.9 F | DIASTOLIC BLOOD PRESSURE: 92 MMHG

## 2017-12-31 DIAGNOSIS — J10.1 INFLUENZA A: Primary | ICD-10-CM

## 2017-12-31 LAB
EXPIRATION DATE: ABNORMAL
FLUAV AG NPH QL: ABNORMAL
FLUBV AG NPH QL: ABNORMAL
INTERNAL CONTROL: ABNORMAL
Lab: ABNORMAL

## 2017-12-31 PROCEDURE — 99213 OFFICE O/P EST LOW 20 MIN: CPT | Performed by: NURSE PRACTITIONER

## 2017-12-31 PROCEDURE — 87804 INFLUENZA ASSAY W/OPTIC: CPT | Performed by: NURSE PRACTITIONER

## 2017-12-31 RX ORDER — OSELTAMIVIR PHOSPHATE 75 MG/1
75 CAPSULE ORAL 2 TIMES DAILY
Qty: 10 CAPSULE | Refills: 0 | Status: SHIPPED | OUTPATIENT
Start: 2017-12-31 | End: 2018-01-05

## 2017-12-31 RX ORDER — BROMPHENIRAMINE MALEATE, PSEUDOEPHEDRINE HYDROCHLORIDE, AND DEXTROMETHORPHAN HYDROBROMIDE 2; 30; 10 MG/5ML; MG/5ML; MG/5ML
SYRUP ORAL
Qty: 240 ML | Refills: 0 | Status: SHIPPED | OUTPATIENT
Start: 2017-12-31 | End: 2018-02-12

## 2017-12-31 NOTE — PROGRESS NOTES
Subjective:     Javy Reeves Sr. is a 67 y.o.     Fever    This is a new problem. The current episode started in the past 7 days. The maximum temperature noted was 101 to 101.9 F. Associated symptoms include congestion, coughing, diarrhea, ear pain, headaches and muscle aches. Pertinent negatives include no nausea, sore throat, vomiting or wheezing. He has tried nothing for the symptoms.         The following portions of the patient's history were reviewed and updated as appropriate: allergies, current medications, past family history, past medical history, past social history, past surgical history and problem list.      Review of Systems   Constitutional: Positive for appetite change (decreased), chills, fatigue and fever.   HENT: Positive for congestion, ear pain, sinus pain and sinus pressure. Negative for sore throat.    Respiratory: Positive for cough. Negative for shortness of breath and wheezing.    Cardiovascular:        See history   Gastrointestinal: Positive for diarrhea. Negative for nausea and vomiting.   Skin: Negative for color change and pallor.   Neurological: Positive for dizziness, light-headedness and headaches.         Objective:      Physical Exam   Constitutional: He is oriented to person, place, and time. He appears well-developed and well-nourished. He is cooperative.   Shivering at visit, two tylenol 325mg given to patient   HENT:   Head: Normocephalic and atraumatic.   Right Ear: Tympanic membrane and ear canal normal.   Left Ear: Tympanic membrane and ear canal normal.   Nose: Nose normal.   Mouth/Throat: Posterior oropharyngeal erythema present. No oropharyngeal exudate.   Eyes: EOM and lids are normal. Pupils are equal, round, and reactive to light.   Neck: Normal range of motion. Neck supple.   Cardiovascular: Normal rate, regular rhythm and normal heart sounds.    Pulmonary/Chest: Effort normal and breath sounds normal.   Abdominal: Soft. Bowel sounds are normal. There is no  tenderness.   Musculoskeletal: Normal range of motion.   Neurological: He is alert and oriented to person, place, and time.   Skin: Skin is warm and dry.   Psychiatric: He has a normal mood and affect. His speech is normal and behavior is normal. Thought content normal.   Vitals reviewed.          Javy was seen today for fever.    Diagnoses and all orders for this visit:    Influenza A  -     POC Influenza A / B    Other orders  -     oseltamivir (TAMIFLU) 75 MG capsule; Take 1 capsule by mouth 2 (Two) Times a Day for 5 days.  -     brompheniramine-pseudoephedrine-DM (BROMFED DM) 30-2-10 MG/5ML syrup; 5 to 10 cc every 4 hours as needed for cough, congestion, allergies

## 2017-12-31 NOTE — PATIENT INSTRUCTIONS
"Influenza, Adult  Influenza, more commonly known as \"the flu,\" is a viral infection that primarily affects the respiratory tract. The respiratory tract includes organs that help you breathe, such as the lungs, nose, and throat. The flu causes many common cold symptoms, as well as a high fever and body aches.  The flu spreads easily from person to person (is contagious). Getting a flu shot (influenza vaccination) every year is the best way to prevent influenza.  CAUSES  Influenza is caused by a virus. You can catch the virus by:  · Breathing in droplets from an infected person's cough or sneeze.  · Touching something that was recently contaminated with the virus and then touching your mouth, nose, or eyes.  RISK FACTORS  The following factors may make you more likely to get the flu:  · Not cleaning your hands frequently with soap and water or alcohol-based hand .  · Having close contact with many people during cold and flu season.  · Touching your mouth, eyes, or nose without washing or sanitizing your hands first.  · Not drinking enough fluids or not eating a healthy diet.  · Not getting enough sleep or exercise.  · Being under a high amount of stress.  · Not getting a yearly (annual) flu shot.  You may be at a higher risk of complications from the flu, such as a severe lung infection (pneumonia), if you:  · Are over the age of 65.  · Are pregnant.  · Have a weakened disease-fighting system (immune system). You may have a weakened immune system if you:    Have HIV or AIDS.    Are undergoing chemotherapy.    Are taking medicines that reduce the activity of (suppress) the immune system.  · Have a long-term (chronic) illness, such as heart disease, kidney disease, diabetes, or lung disease.  · Have a liver disorder.  · Are obese.  · Have anemia.  SYMPTOMS  Symptoms of this condition typically last 4-10 days and may include:  · Fever.  · Chills.  · Headache, body aches, or muscle aches.  · Sore " throat.  · Cough.  · Runny or congested nose.  · Chest discomfort and cough.  · Poor appetite.  · Weakness or tiredness (fatigue).  · Dizziness.  · Nausea or vomiting.  DIAGNOSIS  This condition may be diagnosed based on your medical history and a physical exam. Your health care provider may do a nose or throat swab test to confirm the diagnosis.  TREATMENT  If influenza is detected early, you can be treated with antiviral medicine that can reduce the length of your illness and the severity of your symptoms. This medicine may be given by mouth (orally) or through an IV tube that is inserted in one of your veins.  The goal of treatment is to relieve symptoms by taking care of yourself at home. This may include taking over-the-counter medicines, drinking plenty of fluids, and adding humidity to the air in your home.  In some cases, influenza goes away on its own. Severe influenza or complications from influenza may be treated in a hospital.  HOME CARE INSTRUCTIONS  · Take over-the-counter and prescription medicines only as told by your health care provider.  · Use a cool mist humidifier to add humidity to the air in your home. This can make breathing easier.  · Rest as needed.  · Drink enough fluid to keep your urine clear or pale yellow.  · Cover your mouth and nose when you cough or sneeze.  · Wash your hands with soap and water often, especially after you cough or sneeze. If soap and water are not available, use hand .  · Stay home from work or school as told by your health care provider. Unless you are visiting your health care provider, try to avoid leaving home until your fever has been gone for 24 hours without the use of medicine.  · Keep all follow-up visits as told by your health care provider. This is important.  PREVENTION  · Getting an annual flu shot is the best way to avoid getting the flu. You may get the flu shot in late summer, fall, or winter. Ask your health care provider when you should  get your flu shot.  · Wash your hands often or use hand  often.  · Avoid contact with people who are sick during cold and flu season.  · Eat a healthy diet, drink plenty of fluids, get enough sleep, and exercise regularly.  SEEK MEDICAL CARE IF:  · You develop new symptoms.  · You have:    Chest pain.    Diarrhea.    A fever.  · Your cough gets worse.  · You produce more mucus.  · You feel nauseous or you vomit.  SEEK IMMEDIATE MEDICAL CARE IF:  · You develop shortness of breath or difficulty breathing.  · Your skin or nails turn a bluish color.  · You have severe pain or stiffness in your neck.  · You develop a sudden headache or sudden pain in your face or ear.  · You cannot stop vomiting.     This information is not intended to replace advice given to you by your health care provider. Make sure you discuss any questions you have with your health care provider.     Document Released: 12/15/2001 Document Revised: 04/10/2017 Document Reviewed: 10/11/2016  [employer] Patient Education ©2017 [employer]

## 2018-01-09 ENCOUNTER — CLINICAL SUPPORT (OUTPATIENT)
Dept: FAMILY MEDICINE CLINIC | Facility: CLINIC | Age: 68
End: 2018-01-09

## 2018-01-09 ENCOUNTER — OFFICE VISIT (OUTPATIENT)
Dept: RETAIL CLINIC | Facility: CLINIC | Age: 68
End: 2018-01-09

## 2018-01-09 VITALS
SYSTOLIC BLOOD PRESSURE: 141 MMHG | DIASTOLIC BLOOD PRESSURE: 88 MMHG | OXYGEN SATURATION: 98 % | TEMPERATURE: 98.4 F | HEART RATE: 77 BPM | RESPIRATION RATE: 18 BRPM

## 2018-01-09 DIAGNOSIS — I82.5Y3 CHRONIC DEEP VEIN THROMBOSIS (DVT) OF PROXIMAL VEIN OF BOTH LOWER EXTREMITIES (HCC): Primary | ICD-10-CM

## 2018-01-09 DIAGNOSIS — J40 BRONCHITIS: Primary | ICD-10-CM

## 2018-01-09 LAB — INR PPP: 2.2 (ref 2–3)

## 2018-01-09 PROCEDURE — 36416 COLLJ CAPILLARY BLOOD SPEC: CPT | Performed by: PHYSICIAN ASSISTANT

## 2018-01-09 PROCEDURE — 99213 OFFICE O/P EST LOW 20 MIN: CPT | Performed by: NURSE PRACTITIONER

## 2018-01-09 PROCEDURE — 85610 PROTHROMBIN TIME: CPT | Performed by: PHYSICIAN ASSISTANT

## 2018-01-09 RX ORDER — BROMPHENIRAMINE MALEATE, PSEUDOEPHEDRINE HYDROCHLORIDE, AND DEXTROMETHORPHAN HYDROBROMIDE 2; 30; 10 MG/5ML; MG/5ML; MG/5ML
SYRUP ORAL
Qty: 240 ML | Refills: 0 | Status: SHIPPED | OUTPATIENT
Start: 2018-01-09 | End: 2018-02-12

## 2018-01-09 RX ORDER — AZITHROMYCIN 250 MG/1
TABLET, FILM COATED ORAL
Qty: 6 TABLET | Refills: 0 | Status: SHIPPED | OUTPATIENT
Start: 2018-01-09 | End: 2018-02-12

## 2018-01-09 NOTE — PROGRESS NOTES
Subjective:     Javy Reeves Sr. is a 67 y.o.     Cough   The current episode started 1 to 4 weeks ago (seen in this clinic on 12/31 and diagnosed with the flu but now 15 days later has lingering cough and coughing up yellow). The cough is productive of purulent sputum. Associated symptoms include headaches, postnasal drip, a sore throat (at times), shortness of breath (at times) and wheezing (at times). Pertinent negatives include no ear congestion, ear pain, fever, myalgias, nasal congestion or rash. Treatments tried: tamiflu done, bromfed,nitro for chest pain.         The following portions of the patient's history were reviewed and updated as appropriate: allergies, current medications, past family history, past medical history, past social history, past surgical history and problem list.      Review of Systems   Constitutional: Negative for appetite change and fever.   HENT: Positive for postnasal drip and sore throat (at times). Negative for congestion and ear pain.    Respiratory: Positive for cough, shortness of breath (at times) and wheezing (at times).    Cardiovascular:        See hx   Gastrointestinal: Positive for nausea. Negative for diarrhea and vomiting.   Musculoskeletal: Negative for myalgias.   Skin: Negative for color change, pallor and rash.   Neurological: Positive for dizziness, light-headedness and headaches. Seizures: from coughing.         Objective:      Physical Exam   Constitutional: He is oriented to person, place, and time. He appears well-developed and well-nourished. He is cooperative.   Coughing intermittently t/o visit   HENT:   Head: Normocephalic and atraumatic.   Mouth/Throat: Posterior oropharyngeal erythema present. No oropharyngeal exudate.   Eyes: EOM and lids are normal. Pupils are equal, round, and reactive to light. Right conjunctiva is injected. Left conjunctiva is injected.   Neck: Normal range of motion. Neck supple.   Cardiovascular: Normal rate, regular rhythm and  normal heart sounds.    Pulmonary/Chest: Effort normal and breath sounds normal.   Abdominal: Soft. Bowel sounds are normal. There is no tenderness.   Musculoskeletal: Normal range of motion.   Lymphadenopathy:     He has cervical adenopathy.   Neurological: He is alert and oriented to person, place, and time.   Skin: Skin is warm and dry.   Psychiatric: He has a normal mood and affect. His speech is normal and behavior is normal. Thought content normal.   Vitals reviewed.          Diagnoses and all orders for this visit:    Bronchitis    Other orders  -     azithromycin (ZITHROMAX) 250 MG tablet; Take 2 tablets the first day, then 1 tablet daily for 4 days.

## 2018-01-12 ENCOUNTER — CLINICAL SUPPORT (OUTPATIENT)
Dept: FAMILY MEDICINE CLINIC | Facility: CLINIC | Age: 68
End: 2018-01-12

## 2018-01-12 DIAGNOSIS — G45.9 TRANSIENT CEREBRAL ISCHEMIA, UNSPECIFIED TYPE: Primary | ICD-10-CM

## 2018-01-12 DIAGNOSIS — I82.5Y3 CHRONIC DEEP VEIN THROMBOSIS (DVT) OF PROXIMAL VEIN OF BOTH LOWER EXTREMITIES (HCC): ICD-10-CM

## 2018-01-12 LAB — INR PPP: 2.8 (ref 2–3)

## 2018-01-12 PROCEDURE — 36416 COLLJ CAPILLARY BLOOD SPEC: CPT | Performed by: PHYSICIAN ASSISTANT

## 2018-01-12 PROCEDURE — 85610 PROTHROMBIN TIME: CPT | Performed by: PHYSICIAN ASSISTANT

## 2018-01-25 ENCOUNTER — CLINICAL SUPPORT (OUTPATIENT)
Dept: FAMILY MEDICINE CLINIC | Facility: CLINIC | Age: 68
End: 2018-01-25

## 2018-01-25 DIAGNOSIS — I82.5Y3 CHRONIC DEEP VEIN THROMBOSIS (DVT) OF PROXIMAL VEIN OF BOTH LOWER EXTREMITIES (HCC): Primary | ICD-10-CM

## 2018-01-25 LAB — INR PPP: 2.7 (ref 2–3)

## 2018-01-25 PROCEDURE — 36416 COLLJ CAPILLARY BLOOD SPEC: CPT | Performed by: PHYSICIAN ASSISTANT

## 2018-01-25 PROCEDURE — 85610 PROTHROMBIN TIME: CPT | Performed by: PHYSICIAN ASSISTANT

## 2018-02-12 ENCOUNTER — OFFICE VISIT (OUTPATIENT)
Dept: FAMILY MEDICINE CLINIC | Facility: CLINIC | Age: 68
End: 2018-02-12

## 2018-02-12 VITALS
WEIGHT: 238 LBS | BODY MASS INDEX: 32.23 KG/M2 | HEART RATE: 87 BPM | OXYGEN SATURATION: 98 % | DIASTOLIC BLOOD PRESSURE: 80 MMHG | SYSTOLIC BLOOD PRESSURE: 128 MMHG | TEMPERATURE: 97.7 F | RESPIRATION RATE: 16 BRPM | HEIGHT: 72 IN

## 2018-02-12 DIAGNOSIS — E55.9 VITAMIN D DEFICIENCY: Primary | ICD-10-CM

## 2018-02-12 DIAGNOSIS — R35.1 NOCTURIA: ICD-10-CM

## 2018-02-12 DIAGNOSIS — E78.2 MIXED HYPERLIPIDEMIA: ICD-10-CM

## 2018-02-12 DIAGNOSIS — M50.30 DDD (DEGENERATIVE DISC DISEASE), CERVICAL: Primary | ICD-10-CM

## 2018-02-12 DIAGNOSIS — R73.01 IMPAIRED FASTING GLUCOSE: ICD-10-CM

## 2018-02-12 DIAGNOSIS — M54.12 CERVICAL RADICULAR PAIN: ICD-10-CM

## 2018-02-12 DIAGNOSIS — Z72.0 TOBACCO ABUSE: ICD-10-CM

## 2018-02-12 DIAGNOSIS — Z86.711 HISTORY OF PULMONARY EMBOLUS (PE): ICD-10-CM

## 2018-02-12 PROCEDURE — 99214 OFFICE O/P EST MOD 30 MIN: CPT | Performed by: PHYSICIAN ASSISTANT

## 2018-02-12 RX ORDER — METHYLPREDNISOLONE 4 MG/1
TABLET ORAL
Qty: 21 TABLET | Refills: 0 | Status: SHIPPED | OUTPATIENT
Start: 2018-02-12 | End: 2018-02-12 | Stop reason: SDUPTHER

## 2018-02-12 RX ORDER — TIZANIDINE 4 MG/1
TABLET ORAL
Qty: 45 TABLET | Refills: 1 | Status: SHIPPED | OUTPATIENT
Start: 2018-02-12 | End: 2018-02-21 | Stop reason: SDUPTHER

## 2018-02-12 RX ORDER — PREGABALIN 75 MG/1
75 CAPSULE ORAL 2 TIMES DAILY
Qty: 60 CAPSULE | Refills: 5 | Status: SHIPPED | OUTPATIENT
Start: 2018-02-12 | End: 2018-02-21

## 2018-02-12 RX ORDER — VARENICLINE TARTRATE 1 MG/1
1 TABLET, FILM COATED ORAL 2 TIMES DAILY
Qty: 60 TABLET | Refills: 5 | Status: SHIPPED | OUTPATIENT
Start: 2018-02-12 | End: 2018-03-21

## 2018-02-12 RX ORDER — TIZANIDINE 4 MG/1
TABLET ORAL
Qty: 45 TABLET | Refills: 1 | Status: SHIPPED | OUTPATIENT
Start: 2018-02-12 | End: 2018-02-12 | Stop reason: SDUPTHER

## 2018-02-12 RX ORDER — METHYLPREDNISOLONE 4 MG/1
TABLET ORAL
Qty: 21 TABLET | Refills: 0 | Status: SHIPPED | OUTPATIENT
Start: 2018-02-12 | End: 2018-02-21

## 2018-02-12 NOTE — PATIENT INSTRUCTIONS
Warned patient that this medication can cause drowsiness and impair them operating machinery, including driving a car.  Caution is advised.  Varenicline oral tablets  What is this medicine?  VARENICLINE (pamela EN i kleen) is used to help people quit smoking. It can reduce the symptoms caused by stopping smoking. It is used with a patient support program recommended by your physician.  This medicine may be used for other purposes; ask your health care provider or pharmacist if you have questions.  COMMON BRAND NAME(S): Bria  What should I tell my health care provider before I take this medicine?  They need to know if you have any of these conditions:  -bipolar disorder, depression, schizophrenia or other mental illness  -heart disease  -if you often drink alcohol  -kidney disease  -peripheral vascular disease  -seizures  -stroke  -suicidal thoughts, plans, or attempt; a previous suicide attempt by you or a family member  -an unusual or allergic reaction to varenicline, other medicines, foods, dyes, or preservatives  -pregnant or trying to get pregnant  -breast-feeding  How should I use this medicine?  Take this medicine by mouth after eating. Take with a full glass of water. Follow the directions on the prescription label. Take your doses at regular intervals. Do not take your medicine more often than directed.  There are 3 ways you can use this medicine to help you quit smoking; talk to your health care professional to decide which plan is right for you:  1) you can choose a quit date and start this medicine 1 week before the quit date, or,  2) you can start taking this medicine before you choose a quit date, and then pick a quit date between day 8 and 35 days of treatment, or,  3) if you are not sure that you are able or willing to quit smoking right away, start taking this medicine and slowly decrease the amount you smoke as directed by your health care professional with the goal of being cigarette-free by week  12 of treatment.  Stick to your plan; ask about support groups or other ways to help you remain cigarette-free. If you are motivated to quit smoking and did not succeed during a previous attempt with this medicine for reasons other than side effects, or if you returned to smoking after this treatment, speak with your health care professional about whether another course of this medicine may be right for you.  A special MedGuide will be given to you by the pharmacist with each prescription and refill. Be sure to read this information carefully each time.  Talk to your pediatrician regarding the use of this medicine in children. This medicine is not approved for use in children.  Overdosage: If you think you have taken too much of this medicine contact a poison control center or emergency room at once.  NOTE: This medicine is only for you. Do not share this medicine with others.  What if I miss a dose?  If you miss a dose, take it as soon as you can. If it is almost time for your next dose, take only that dose. Do not take double or extra doses.  What may interact with this medicine?  -alcohol or any product that contains alcohol  -insulin  -other stop smoking aids  -theophylline  -warfarin  This list may not describe all possible interactions. Give your health care provider a list of all the medicines, herbs, non-prescription drugs, or dietary supplements you use. Also tell them if you smoke, drink alcohol, or use illegal drugs. Some items may interact with your medicine.  What should I watch for while using this medicine?  Visit your doctor or health care professional for regular check ups. Ask for ongoing advice and encouragement from your doctor or healthcare professional, friends, and family to help you quit. If you smoke while on this medication, quit again  Your mouth may get dry. Chewing sugarless gum or sucking hard candy, and drinking plenty of water may help. Contact your doctor if the problem does not go  away or is severe.  You may get drowsy or dizzy. Do not drive, use machinery, or do anything that needs mental alertness until you know how this medicine affects you. Do not stand or sit up quickly, especially if you are an older patient. This reduces the risk of dizzy or fainting spells.  Sleepwalking can happen during treatment with this medicine, and can sometimes lead to behavior that is harmful to you, other people, or property. Stop taking this medicine and tell your doctor if you start sleepwalking or have other unusual sleep-related activity.  Decrease the amount of alcoholic beverages that you drink during treatment with this medicine until you know if this medicine affects your ability to tolerate alcohol. Some people have experienced increased drunkenness (intoxication), unusual or sometimes aggressive behavior, or no memory of things that have happened (amnesia) during treatment with this medicine.  The use of this medicine may increase the chance of suicidal thoughts or actions. Pay special attention to how you are responding while on this medicine. Any worsening of mood, or thoughts of suicide or dying should be reported to your health care professional right away.  What side effects may I notice from receiving this medicine?  Side effects that you should report to your doctor or health care professional as soon as possible:  -allergic reactions like skin rash, itching or hives, swelling of the face, lips, tongue, or throat  -acting aggressive, being angry or violent, or acting on dangerous impulses  -breathing problems  -changes in vision  -chest pain or chest tightness  -confusion, trouble speaking or understanding  -new or worsening depression, anxiety, or panic attacks  -extreme increase in activity and talking (meet)  -fast, irregular heartbeat  -feeling faint or lightheaded, falls  -fever  -pain in legs when walking  -problems with balance, talking, walking  -redness, blistering, peeling or  loosening of the skin, including inside the mouth  -ringing in ears  -seeing or hearing things that aren't there (hallucinations)  -seizures  -sleepwalking  -sudden numbness or weakness of the face, arm or leg  -thoughts about suicide or dying, or attempts to commit suicide  -trouble passing urine or change in the amount of urine  -unusual bleeding or bruising  -unusually weak or tired  Side effects that usually do not require medical attention (report to your doctor or health care professional if they continue or are bothersome):  -constipation  -headache  -nausea, vomiting  -strange dreams  -stomach gas  -trouble sleeping  This list may not describe all possible side effects. Call your doctor for medical advice about side effects. You may report side effects to FDA at 1-742-FDA-9308.  Where should I keep my medicine?  Keep out of the reach of children.  Store at room temperature between 15 and 30 degrees C (59 and 86 degrees F). Throw away any unused medicine after the expiration date.  NOTE: This sheet is a summary. It may not cover all possible information. If you have questions about this medicine, talk to your doctor, pharmacist, or health care provider.  © 2018 [employer] (2016-09-01 16:14:23)      INR 2 days after finish medrol

## 2018-02-12 NOTE — PROGRESS NOTES
Subjective   Javy Reeves Sr. is a 67 y.o. male.     History of Present Illness   Javy Reeves Sr. 67 y.o. male who presents for evaluation of neck pain. Event that precipitated these symptoms:  none recalled by the patient  {Onset of symptoms was 7 days ago, and have been unchanged since that time.  Location of this is in the right , scapular, subscapular, biceps, triceps, radiates down right arm, denies upper extremity numbness/tingling and burns and aches down arm;  Worse with ROM area.   Current pain symptoms are described as aching, burning and spasmotic and occurs constantly.  The pain intensity is moderate and severe.  Other associated symptoms include:  limited ROM, worse with prolonged sitting, worse with prolonged laying and worse with prolonged standing. Treatment efforts have included: heat. with some relief.  Patient has had no prior neck problems and had this shoulder and thoracic outlet surgery.   Had prior surgery nerves right lower arm from crush injury in 9-20-84  Has hx of thoracic outlet syndrome and prior surgery  This is new pain and just started last Sunday;  No injury;  Has to bend right arm to walk around.  Heat and Lidoderm helps some    This is new in the scapula  Pain is down to hand and more 2nd an 3rd fingers right;     strength is decreased.    I personally discussed this patient's care and medical decision making with Dr. Cheng.  We reviewed the patient history, exam findings, and plan.  He did approve this plan of care.  Need INR today and 2 days after last Medrol dose.  INR today in 2.8.  Pred can effect Coumadin.  Zanaflex does not and looked this on [facility].  Will do trial oral pred and Zanaflex;  If no help then PT next then MRI  Will get INR today and 2 days after tx d/t Coumadin and is effected by oral pred.  He does have filter.    Javy Reeves Sr. 67 y.o. male who presents today for smoking cessation counseling.  he has a history of   Patient Active Problem  List   Diagnosis   • Arthritis   • Family history of early CAD   • DDD (degenerative disc disease), cervical   • DVT (deep venous thrombosis)   • Fibromyalgia   • Past heart attack   • Hyperlipidemia   • DDD (degenerative disc disease), lumbosacral   • History of pulmonary embolus (PE)   • Reflux esophagitis   • TIA (transient ischemic attack)   • Left groin pain   • Cervical radicular pain   .  He has a 30 pack year history of smoking.  The patient has not tried to quit in the past.  The patient denies a history of COPD.  I talked to the patient about several options to quit, from using the 1-800-QUIT NOW hotline phone number, to nicotine patches/gum/lozenges.  We also discussed the use of Bupropion HCl and Chantix.  I did discuss that Chantix can cause trouble sleeping, night terrors, and had a warning about stopping medication if it makes them depressed or anxious.  The following portions of the patient's history were reviewed and updated as appropriate: allergies, current medications, past family history, past medical history, past social history, past surgical history and problem list.    Review of Systems   Constitutional: Negative for activity change, appetite change and unexpected weight change.   HENT: Negative for nosebleeds and trouble swallowing.    Eyes: Negative for pain and visual disturbance.   Respiratory: Negative for chest tightness, shortness of breath and wheezing.    Cardiovascular: Negative for chest pain and palpitations.   Gastrointestinal: Negative for abdominal pain and blood in stool.   Endocrine: Negative.    Genitourinary: Negative for difficulty urinating and hematuria.   Musculoskeletal: Positive for arthralgias and neck pain. Negative for joint swelling.   Skin: Negative for color change and rash.   Allergic/Immunologic: Negative.    Neurological: Positive for weakness. Negative for syncope and speech difficulty.   Hematological: Negative for adenopathy.   Psychiatric/Behavioral:  Negative for agitation and confusion.   All other systems reviewed and are negative.      Objective   Physical Exam   Constitutional: He is oriented to person, place, and time. He appears well-developed and well-nourished. No distress.   HENT:   Head: Normocephalic and atraumatic.   Eyes: Conjunctivae and EOM are normal. Pupils are equal, round, and reactive to light. No scleral icterus.   Neck: Normal range of motion. Neck supple. No tracheal deviation present. No thyromegaly present.   Cardiovascular: Normal rate, regular rhythm and normal heart sounds.  Exam reveals no gallop.    No murmur heard.  Pulmonary/Chest: Effort normal and breath sounds normal.   Abdominal: Soft.   Musculoskeletal: Normal range of motion. He exhibits tenderness. He exhibits no edema or deformity.   Sore to palp neck, right trapezius into deltoid and biceps; pain with ROM head and right arm;  Motor and sensory intact; pulses 2+   Lymphadenopathy:     He has no cervical adenopathy.   Neurological: He is alert and oriented to person, place, and time. He displays no atrophy, no tremor and normal reflexes. No cranial nerve deficit. He exhibits normal muscle tone. Coordination normal.   Reflex Scores:       Tricep reflexes are 2+ on the right side and 2+ on the left side.       Bicep reflexes are 2+ on the right side and 2+ on the left side.       Brachioradialis reflexes are 2+ on the right side and 2+ on the left side.  Skin: Skin is warm and dry. No rash noted. He is not diaphoretic.   Psychiatric: He has a normal mood and affect. His behavior is normal. Judgment and thought content normal.   Nursing note and vitals reviewed.      Assessment/Plan   Problems Addressed this Visit        Nervous and Auditory    Cervical radicular pain       Musculoskeletal and Integument    DDD (degenerative disc disease), cervical - Primary       Other    History of pulmonary embolus (PE)    Relevant Orders    POC INR      Other Visit Diagnoses     Tobacco  abuse

## 2018-02-20 ENCOUNTER — CLINICAL SUPPORT (OUTPATIENT)
Dept: FAMILY MEDICINE CLINIC | Facility: CLINIC | Age: 68
End: 2018-02-20

## 2018-02-20 DIAGNOSIS — I82.5Y3 CHRONIC DEEP VEIN THROMBOSIS (DVT) OF PROXIMAL VEIN OF BOTH LOWER EXTREMITIES (HCC): Primary | ICD-10-CM

## 2018-02-20 LAB — INR PPP: 2.8 (ref 2–3)

## 2018-02-20 PROCEDURE — 36416 COLLJ CAPILLARY BLOOD SPEC: CPT | Performed by: PHYSICIAN ASSISTANT

## 2018-02-20 PROCEDURE — 85610 PROTHROMBIN TIME: CPT | Performed by: PHYSICIAN ASSISTANT

## 2018-02-21 ENCOUNTER — OFFICE VISIT (OUTPATIENT)
Dept: FAMILY MEDICINE CLINIC | Facility: CLINIC | Age: 68
End: 2018-02-21

## 2018-02-21 VITALS
RESPIRATION RATE: 16 BRPM | HEART RATE: 75 BPM | SYSTOLIC BLOOD PRESSURE: 110 MMHG | TEMPERATURE: 98.3 F | HEIGHT: 72 IN | BODY MASS INDEX: 32.1 KG/M2 | DIASTOLIC BLOOD PRESSURE: 60 MMHG | OXYGEN SATURATION: 99 % | WEIGHT: 237 LBS

## 2018-02-21 DIAGNOSIS — M50.30 DDD (DEGENERATIVE DISC DISEASE), CERVICAL: Primary | ICD-10-CM

## 2018-02-21 DIAGNOSIS — M54.2 ACUTE NECK PAIN: ICD-10-CM

## 2018-02-21 DIAGNOSIS — M54.12 CERVICAL RADICULAR PAIN: ICD-10-CM

## 2018-02-21 PROCEDURE — 99213 OFFICE O/P EST LOW 20 MIN: CPT | Performed by: PHYSICIAN ASSISTANT

## 2018-02-21 RX ORDER — TIZANIDINE 4 MG/1
TABLET ORAL
Qty: 90 TABLET | Refills: 2 | Status: SHIPPED | OUTPATIENT
Start: 2018-02-21 | End: 2018-02-21 | Stop reason: SDUPTHER

## 2018-02-21 RX ORDER — PREGABALIN 100 MG/1
100 CAPSULE ORAL 2 TIMES DAILY
Qty: 60 CAPSULE | Refills: 5 | Status: SHIPPED | OUTPATIENT
Start: 2018-02-21 | End: 2018-03-21 | Stop reason: SDUPTHER

## 2018-02-21 RX ORDER — TIZANIDINE 4 MG/1
TABLET ORAL
Qty: 90 TABLET | Refills: 2 | Status: SHIPPED | OUTPATIENT
Start: 2018-02-21 | End: 2018-09-05

## 2018-02-21 NOTE — PROGRESS NOTES
Subjective   Javy Reeves Sr. is a 67 y.o. male.     History of Present Illness   Javy Reeves Sr. 67 y.o. male who presents for re-evaluation of neck pain. Event that precipitated these symptoms:  none recalled by the patient  {Onset of symptoms was 2-5-18 and did improve while on pred within a week after taking it, pain is same as before.  Location of this is in the right , scapular, subscapular, biceps, triceps, radiates down right arm, denies upper extremity numbness/tingling and burns and aches down arm;  Worse with ROM area.   Current pain symptoms are described as aching, burning and spasmotic and occurs constantly.  The pain intensity is moderate and severe.  Other associated symptoms include:  limited ROM, worse with prolonged sitting, worse with prolonged laying and worse with prolonged standing. Treatment efforts have included: heat. with some relief. Pred was effective;  Zanaflex does help;   Patient has had no prior neck problems and had this shoulder and thoracic outlet surgery.   Had prior surgery nerves right lower arm from crush injury in 9-20-84  Has hx of thoracic outlet syndrome and prior surgery  This is new in the scapula  Pain is down to hand and more 2nd an 3rd fingers right;     strength is decreased intermittently    Zanaflex does help some  I will increase dose Lyrica    Has order for labs  INR yesterday    The following portions of the patient's history were reviewed and updated as appropriate: allergies, current medications, past family history, past medical history, past social history, past surgical history and problem list.    Review of Systems   Constitutional: Negative for activity change, appetite change and unexpected weight change.   HENT: Negative for nosebleeds and trouble swallowing.    Eyes: Negative for pain and visual disturbance.   Respiratory: Negative for chest tightness, shortness of breath and wheezing.    Cardiovascular: Negative for chest pain and  palpitations.   Gastrointestinal: Negative for abdominal pain and blood in stool.   Endocrine: Negative.    Genitourinary: Negative for difficulty urinating and hematuria.   Musculoskeletal: Positive for arthralgias and back pain. Negative for joint swelling.   Skin: Negative for color change and rash.   Allergic/Immunologic: Negative.    Neurological: Positive for weakness. Negative for syncope and speech difficulty.   Hematological: Negative for adenopathy.   Psychiatric/Behavioral: Negative for agitation and confusion.   All other systems reviewed and are negative.      Objective   Physical Exam   Constitutional: He is oriented to person, place, and time. He appears well-developed and well-nourished. No distress.   HENT:   Head: Normocephalic and atraumatic.   Eyes: Conjunctivae and EOM are normal. Pupils are equal, round, and reactive to light. No scleral icterus.   Neck: Normal range of motion. Neck supple. No tracheal deviation present. No thyromegaly present.   Cardiovascular: Normal rate, regular rhythm and normal heart sounds.  Exam reveals no gallop.    No murmur heard.  Pulmonary/Chest: Effort normal and breath sounds normal.   Abdominal: Soft.   Musculoskeletal: Normal range of motion. He exhibits tenderness. He exhibits no edema or deformity.   Sore to palp neck, right trapezius into deltoid and biceps; pain with ROM head and right arm;  Motor and sensory intact; pulses 2+    Less ROM 2nd finger d/t pain   Lymphadenopathy:     He has no cervical adenopathy.   Neurological: He is alert and oriented to person, place, and time. He has normal reflexes. He displays no atrophy, no tremor and normal reflexes. No cranial nerve deficit. He exhibits normal muscle tone. Coordination normal.   Reflex Scores:       Tricep reflexes are 2+ on the right side and 2+ on the left side.       Bicep reflexes are 2+ on the right side and 2+ on the left side.       Brachioradialis reflexes are 2+ on the right side and 2+ on  the left side.  Skin: Skin is warm and dry. No rash noted. He is not diaphoretic.   Psychiatric: He has a normal mood and affect. His behavior is normal. Judgment and thought content normal.   Nursing note and vitals reviewed.      Assessment/Plan   Javy was seen today for arm pain.    Diagnoses and all orders for this visit:    DDD (degenerative disc disease), cervical  -     Ambulatory Referral to Physical Therapy Evaluate and treat  -     MRI Cervical Spine Without Contrast; Future    Cervical radicular pain  -     Ambulatory Referral to Physical Therapy Evaluate and treat  -     MRI Cervical Spine Without Contrast; Future    Acute neck pain  -     Ambulatory Referral to Physical Therapy Evaluate and treat  -     MRI Cervical Spine Without Contrast; Future    Other orders  -     Discontinue: tiZANidine (ZANAFLEX) 4 MG tablet; 1/2-1 tab PO Q 8 hours PRN muscle spasms  -     tiZANidine (ZANAFLEX) 4 MG tablet; 1/2-1 tab PO Q 8 hours PRN muscle spasms

## 2018-02-21 NOTE — PATIENT INSTRUCTIONS
Low glycemic index diet  Exercise 30 minutes most days of the week  Make sure you get results on any labs or tests we ordered today  We discussed medications and how to take them as prescribed  Sleep 6-8 hours each night if possible  If you have not signed up for [facility], please activate your code ASAP from your After Visit Summary today    LDL goal <100  LDL goal if heart disease <70  HDL goal >60  Triglyceride goal <150  BP goal =<130/80  Fasting glucose <100    Warned patient that this medication can cause drowsiness and impair them operating machinery, including driving a car.  Caution is advised.

## 2018-02-23 ENCOUNTER — TELEPHONE (OUTPATIENT)
Dept: FAMILY MEDICINE CLINIC | Facility: CLINIC | Age: 68
End: 2018-02-23

## 2018-02-23 NOTE — TELEPHONE ENCOUNTER
Pt is calling about his shoulder. He is having a lot of pain and now it is swelling. His MRI is in medical review. He wanted to know if he should come in and have a xray or go to the ER.

## 2018-02-28 ENCOUNTER — TREATMENT (OUTPATIENT)
Dept: PHYSICAL THERAPY | Facility: CLINIC | Age: 68
End: 2018-02-28

## 2018-02-28 DIAGNOSIS — M54.2 PAIN, NECK: Primary | ICD-10-CM

## 2018-02-28 DIAGNOSIS — M54.12 CERVICAL RADICULOPATHY: ICD-10-CM

## 2018-02-28 PROCEDURE — G8991 OTHER PT/OT GOAL STATUS: HCPCS | Performed by: PHYSICAL THERAPIST

## 2018-02-28 PROCEDURE — G8990 OTHER PT/OT CURRENT STATUS: HCPCS | Performed by: PHYSICAL THERAPIST

## 2018-02-28 PROCEDURE — 97012 MECHANICAL TRACTION THERAPY: CPT | Performed by: PHYSICAL THERAPIST

## 2018-02-28 PROCEDURE — 97162 PT EVAL MOD COMPLEX 30 MIN: CPT | Performed by: PHYSICAL THERAPIST
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
## 2018-03-01 ENCOUNTER — TREATMENT (OUTPATIENT)
Dept: PHYSICAL THERAPY | Facility: CLINIC | Age: 68
End: 2018-03-01

## 2018-03-01 DIAGNOSIS — M54.2 PAIN, NECK: Primary | ICD-10-CM

## 2018-03-01 DIAGNOSIS — M54.12 CERVICAL RADICULOPATHY: ICD-10-CM

## 2018-03-01 PROCEDURE — 97012 MECHANICAL TRACTION THERAPY: CPT | Performed by: PHYSICAL THERAPIST

## 2018-03-01 PROCEDURE — 97140 MANUAL THERAPY 1/> REGIONS: CPT | Performed by: PHYSICAL THERAPIST

## 2018-03-01 NOTE — PROGRESS NOTES
Physical Therapy Daily Progress Note  Visit: 2    Javy Reeves reports: Felt pretty good yesterday, but it came back last night and I am hurting this morning    Subjective     Objective   See Exercise, Manual, and Modality Logs for complete treatment.       Assessment & Plan     Assessment  Assessment details: Pt tolerated treatment well. Improved radicular sx's after session    Plan  Plan details: Progress per POC        Manual Therapy:    10     mins  62012;  Therapeutic Exercise:    5     mins  62870;     Neuromuscular Yoandy:    -    mins  83907;    Therapeutic Activity:     -     mins  00954;     Gait Training:      -     mins  27334;     Ultrasound:     -     mins  78644;    Electrical Stimulation:    -     mins  32963 ( );  Dry Needling     -     mins self-pay  Traction  __12__ min    Timed Treatment:   15   mins   Total Treatment:     50   mins    Maribel Alcantar PT  [place] License #: [id number]    Physical Therapist

## 2018-03-05 ENCOUNTER — TREATMENT (OUTPATIENT)
Dept: PHYSICAL THERAPY | Facility: CLINIC | Age: 68
End: 2018-03-05

## 2018-03-05 DIAGNOSIS — M54.2 PAIN, NECK: Primary | ICD-10-CM

## 2018-03-05 DIAGNOSIS — M54.12 CERVICAL RADICULOPATHY: ICD-10-CM

## 2018-03-05 PROCEDURE — 97140 MANUAL THERAPY 1/> REGIONS: CPT | Performed by: PHYSICAL THERAPIST

## 2018-03-05 PROCEDURE — 97012 MECHANICAL TRACTION THERAPY: CPT | Performed by: PHYSICAL THERAPIST

## 2018-03-05 PROCEDURE — 97110 THERAPEUTIC EXERCISES: CPT | Performed by: PHYSICAL THERAPIST

## 2018-03-05 NOTE — PROGRESS NOTES
Physical Therapy Daily Progress Note  Visit: 3    Javy Reeves reports: Doing a little better. Had off and on pain this weekend    Subjective     Objective   See Exercise, Manual, and Modality Logs for complete treatment.       Assessment & Plan     Assessment  Assessment details: Demonstrated significantly less muscle guarding in R UE today when he walked in. Making good progress with radicular sx's. Add thoracic side arcs and possibly a thoracic manipulation next session    Plan  Plan details: Progress per POC        Manual Therapy:    10     mins  94051;  Therapeutic Exercise:    15     mins  43668;     Neuromuscular Yoandy:    -    mins  61813;    Therapeutic Activity:     -     mins  69846;     Gait Training:      -     mins  04291;     Ultrasound:     -     mins  00498;    Electrical Stimulation:    -     mins  37729 ( );  Dry Needling     -     mins self-pay  Traction  ___15__ min    Timed Treatment:   25   mins   Total Treatment:     55   mins    Maribel Alcantar, PT  [place] License #: [id number]    Physical Therapist

## 2018-03-07 ENCOUNTER — TREATMENT (OUTPATIENT)
Dept: PHYSICAL THERAPY | Facility: CLINIC | Age: 68
End: 2018-03-07

## 2018-03-07 DIAGNOSIS — M54.2 PAIN, NECK: Primary | ICD-10-CM

## 2018-03-07 DIAGNOSIS — M54.12 CERVICAL RADICULOPATHY: ICD-10-CM

## 2018-03-07 PROCEDURE — 97140 MANUAL THERAPY 1/> REGIONS: CPT | Performed by: PHYSICAL THERAPIST

## 2018-03-07 PROCEDURE — 97012 MECHANICAL TRACTION THERAPY: CPT | Performed by: PHYSICAL THERAPIST

## 2018-03-07 PROCEDURE — 97035 APP MDLTY 1+ULTRASOUND EA 15: CPT | Performed by: PHYSICAL THERAPIST

## 2018-03-07 PROCEDURE — 97110 THERAPEUTIC EXERCISES: CPT | Performed by: PHYSICAL THERAPIST

## 2018-03-07 NOTE — PROGRESS NOTES
Physical Therapy Daily Progress Note  Visit: 4    Javy Reeves reports: I was doing good until this morning. This morning at around 2 am I woke up with neck and arm pain again.    Subjective     Objective   See Exercise, Manual, and Modality Logs for complete treatment.       Assessment & Plan     Assessment  Assessment details: Pt felt great relief with doorway stretch and traction. Continue to focus on relieviing nerve compression     Plan  Plan details: Progress per POC        Manual Therapy:    15     mins  26286;  Therapeutic Exercise:    15     mins  44128;     Neuromuscular Yoandy:    -    mins  30573;    Therapeutic Activity:     -     mins  64347;     Gait Training:      -     mins  50822;     Ultrasound:     10     mins  69781;    Electrical Stimulation:    -     mins  28641 ( );  Dry Needling     -     mins self-pay  Traction  __13__ min    Timed Treatment:   40   mins   Total Treatment:     60   mins    Maribel Alcantar PT  [place] License #: [id number]    Physical Therapist

## 2018-03-09 ENCOUNTER — TREATMENT (OUTPATIENT)
Dept: PHYSICAL THERAPY | Facility: CLINIC | Age: 68
End: 2018-03-09

## 2018-03-09 DIAGNOSIS — M54.2 PAIN, NECK: Primary | ICD-10-CM

## 2018-03-09 DIAGNOSIS — M54.12 CERVICAL RADICULOPATHY: ICD-10-CM

## 2018-03-09 PROCEDURE — 97110 THERAPEUTIC EXERCISES: CPT | Performed by: PHYSICAL THERAPIST

## 2018-03-09 PROCEDURE — 97012 MECHANICAL TRACTION THERAPY: CPT | Performed by: PHYSICAL THERAPIST

## 2018-03-09 PROCEDURE — 97140 MANUAL THERAPY 1/> REGIONS: CPT | Performed by: PHYSICAL THERAPIST

## 2018-03-09 NOTE — PROGRESS NOTES
Physical Therapy Daily Progress Note  Visit: 5    Javy Reeves reports: I am not having pain down the arm and I slept really good the past 2 nights. I liked the tape    Subjective     Objective   See Exercise, Manual, and Modality Logs for complete treatment.       Assessment & Plan     Assessment  Assessment details: Did not progress exercises today. Pt tolerated treatment well and did not have radicular sx's. Will progress next session as able    Plan  Plan details: Progress per POC        Manual Therapy:    10     mins  18868;  Therapeutic Exercise:    10     mins  82249;     Neuromuscular Yoandy:    -    mins  23206;    Therapeutic Activity:     -     mins  87123;     Gait Training:      -     mins  97673;     Ultrasound:     10     mins  83582;    Electrical Stimulation:    -     mins  04234 ( );  Dry Needling     -     mins self-pay  Traction  ___12___ min    Timed Treatment:   30   mins   Total Treatment:     45   mins    Maribel Alcantar PT  [place] License #: [id number]    Physical Therapist

## 2018-03-12 ENCOUNTER — TREATMENT (OUTPATIENT)
Dept: PHYSICAL THERAPY | Facility: CLINIC | Age: 68
End: 2018-03-12

## 2018-03-12 DIAGNOSIS — M54.2 PAIN, NECK: Primary | ICD-10-CM

## 2018-03-12 DIAGNOSIS — M54.12 CERVICAL RADICULOPATHY: ICD-10-CM

## 2018-03-12 PROCEDURE — 97110 THERAPEUTIC EXERCISES: CPT | Performed by: PHYSICAL THERAPIST

## 2018-03-12 PROCEDURE — 97012 MECHANICAL TRACTION THERAPY: CPT | Performed by: PHYSICAL THERAPIST

## 2018-03-12 PROCEDURE — 97140 MANUAL THERAPY 1/> REGIONS: CPT | Performed by: PHYSICAL THERAPIST

## 2018-03-12 NOTE — PROGRESS NOTES
Physical Therapy Daily Progress Note  Visit: 6    Javy Reeves reports: I am doing pretty good. I think I am continuously getting better    Subjective     Objective   See Exercise, Manual, and Modality Logs for complete treatment.       Assessment & Plan     Assessment  Assessment details: Pt making good progress. Reported relief with side arcs. No numbness reported down arm with exercise. Continue to progress as able    Plan  Plan details: Progress per POC        Manual Therapy:    12     mins  91691;  Therapeutic Exercise:    10     mins  53187;     Neuromuscular Yoandy:    -    mins  23742;    Therapeutic Activity:     -     mins  40028;     Gait Training:      -     mins  78776;     Ultrasound:     10     mins  14812;    Electrical Stimulation:    -     mins  69997 ( );  Dry Needling     -     mins self-pay  Traction  __15___ min    Timed Treatment:   32   mins   Total Treatment:     60   mins    Maribel Alcantar PT  [place] License #: [id number]    Physical Therapist

## 2018-03-14 ENCOUNTER — TREATMENT (OUTPATIENT)
Dept: PHYSICAL THERAPY | Facility: CLINIC | Age: 68
End: 2018-03-14

## 2018-03-14 DIAGNOSIS — M54.12 CERVICAL RADICULOPATHY: ICD-10-CM

## 2018-03-14 DIAGNOSIS — M54.2 PAIN, NECK: Primary | ICD-10-CM

## 2018-03-14 PROCEDURE — 97140 MANUAL THERAPY 1/> REGIONS: CPT | Performed by: PHYSICAL THERAPIST

## 2018-03-14 PROCEDURE — 97035 APP MDLTY 1+ULTRASOUND EA 15: CPT | Performed by: PHYSICAL THERAPIST

## 2018-03-14 PROCEDURE — 97012 MECHANICAL TRACTION THERAPY: CPT | Performed by: PHYSICAL THERAPIST

## 2018-03-14 PROCEDURE — 97110 THERAPEUTIC EXERCISES: CPT | Performed by: PHYSICAL THERAPIST

## 2018-03-14 NOTE — PROGRESS NOTES
Physical Therapy Daily Progress Note  Visit: 7    Javy Reeves reports: My neck is still sore, but the pain stops at the base of my neck    Subjective     Objective   See Exercise, Manual, and Modality Logs for complete treatment.       Assessment & Plan     Assessment  Assessment details: Pt radicular pain is centralizing. Educated pt on upper cervical rotation SNAG to add to HEP    Plan  Plan details: Progress per POC        Manual Therapy:    10     mins  27813;  Therapeutic Exercise:    19     mins  83954;     Neuromuscular Yoandy:    -    mins  10223;    Therapeutic Activity:     -     mins  95660;     Gait Training:      -     mins  86043;     Ultrasound:     10     mins  41523;    Electrical Stimulation:    -     mins  00088 ( );  Dry Needling     -     mins self-pay  Traction   __14__ min    Timed Treatment:   39   mins   Total Treatment:     60   mins    Maribel Alcantar PT  [place] License #: [id number]    Physical Therapist

## 2018-03-16 ENCOUNTER — HOSPITAL ENCOUNTER (OUTPATIENT)
Dept: MRI IMAGING | Facility: HOSPITAL | Age: 68
Discharge: HOME | End: 2018-03-16
Admitting: PHYSICIAN ASSISTANT

## 2018-03-16 DIAGNOSIS — M50.30 DDD (DEGENERATIVE DISC DISEASE), CERVICAL: ICD-10-CM

## 2018-03-16 DIAGNOSIS — M54.2 ACUTE NECK PAIN: ICD-10-CM

## 2018-03-16 DIAGNOSIS — M54.12 CERVICAL RADICULAR PAIN: ICD-10-CM

## 2018-03-16 PROCEDURE — 72141 MRI NECK SPINE W/O DYE: CPT

## 2018-03-17 DIAGNOSIS — R93.7 ABNORMAL MRI, CERVICAL SPINE: ICD-10-CM

## 2018-03-17 DIAGNOSIS — R93.7 ABNORMAL MRI, LUMBAR SPINE: Primary | ICD-10-CM

## 2018-03-17 DIAGNOSIS — M54.12 NEUROPATHY, CERVICAL (RADICULAR): ICD-10-CM

## 2018-03-19 ENCOUNTER — TREATMENT (OUTPATIENT)
Dept: PHYSICAL THERAPY | Facility: CLINIC | Age: 68
End: 2018-03-19

## 2018-03-19 DIAGNOSIS — M54.2 PAIN, NECK: Primary | ICD-10-CM

## 2018-03-19 DIAGNOSIS — M54.12 CERVICAL RADICULOPATHY: ICD-10-CM

## 2018-03-19 PROCEDURE — 97110 THERAPEUTIC EXERCISES: CPT | Performed by: PHYSICAL THERAPIST

## 2018-03-19 PROCEDURE — 97012 MECHANICAL TRACTION THERAPY: CPT | Performed by: PHYSICAL THERAPIST

## 2018-03-19 PROCEDURE — 97035 APP MDLTY 1+ULTRASOUND EA 15: CPT | Performed by: PHYSICAL THERAPIST

## 2018-03-19 PROCEDURE — 97140 MANUAL THERAPY 1/> REGIONS: CPT | Performed by: PHYSICAL THERAPIST

## 2018-03-19 NOTE — PROGRESS NOTES
Physical Therapy Daily Progress Note  Visit: 8    Javy Reeves reports: The radiating pain is subsiding, but the neck pain is still there.    Subjective     Objective   See Exercise, Manual, and Modality Logs for complete treatment.       Assessment & Plan     Assessment  Assessment details: Pt is making good progress with therapy. Pt no longer has the radicular sx's in his right arm. Added cervical isometrics to HEP    Plan  Plan details: Progress per POC        Manual Therapy:    10     mins  57465;  Therapeutic Exercise:    20     mins  80177;     Neuromuscular Yoandy:    -    mins  28939;    Therapeutic Activity:     -     mins  10324;     Gait Training:      -     mins  87485;     Ultrasound:     10     mins  02862;    Electrical Stimulation:    -     mins  48814 ( );  Dry Needling     -     mins self-pay  Traction  __15__ min    Timed Treatment:   40   mins   Total Treatment:     65   mins    Maribel Alcantar PT  [place] License #: [id number]    Physical Therapist

## 2018-03-20 ENCOUNTER — CLINICAL SUPPORT (OUTPATIENT)
Dept: FAMILY MEDICINE CLINIC | Facility: CLINIC | Age: 68
End: 2018-03-20

## 2018-03-20 DIAGNOSIS — I82.5Y3 CHRONIC DEEP VEIN THROMBOSIS (DVT) OF PROXIMAL VEIN OF BOTH LOWER EXTREMITIES (HCC): Primary | ICD-10-CM

## 2018-03-20 DIAGNOSIS — G45.9 TRANSIENT CEREBRAL ISCHEMIA, UNSPECIFIED TYPE: ICD-10-CM

## 2018-03-20 LAB — INR PPP: 3.3 (ref 2–3)

## 2018-03-20 PROCEDURE — 36416 COLLJ CAPILLARY BLOOD SPEC: CPT | Performed by: PHYSICIAN ASSISTANT

## 2018-03-20 PROCEDURE — 99211 OFF/OP EST MAY X REQ PHY/QHP: CPT | Performed by: PHYSICIAN ASSISTANT

## 2018-03-20 PROCEDURE — 85610 PROTHROMBIN TIME: CPT | Performed by: PHYSICIAN ASSISTANT

## 2018-03-21 ENCOUNTER — OFFICE VISIT (OUTPATIENT)
Dept: FAMILY MEDICINE CLINIC | Facility: CLINIC | Age: 68
End: 2018-03-21

## 2018-03-21 VITALS
TEMPERATURE: 97.4 F | WEIGHT: 234 LBS | DIASTOLIC BLOOD PRESSURE: 80 MMHG | HEIGHT: 72 IN | BODY MASS INDEX: 31.69 KG/M2 | RESPIRATION RATE: 16 BRPM | HEART RATE: 76 BPM | OXYGEN SATURATION: 98 % | SYSTOLIC BLOOD PRESSURE: 120 MMHG

## 2018-03-21 DIAGNOSIS — Z72.0 TOBACCO ABUSE: ICD-10-CM

## 2018-03-21 DIAGNOSIS — Z86.718 HISTORY OF RECURRENT DEEP VEIN THROMBOSIS (DVT): ICD-10-CM

## 2018-03-21 DIAGNOSIS — R20.0 NUMBNESS OF LEFT JAW: ICD-10-CM

## 2018-03-21 DIAGNOSIS — M54.12 CERVICAL RADICULOPATHY: Primary | ICD-10-CM

## 2018-03-21 DIAGNOSIS — R20.0 NUMBNESS OF RIGHT JAW: ICD-10-CM

## 2018-03-21 PROCEDURE — 99214 OFFICE O/P EST MOD 30 MIN: CPT | Performed by: PHYSICIAN ASSISTANT

## 2018-03-21 RX ORDER — PREGABALIN 100 MG/1
100 CAPSULE ORAL 3 TIMES DAILY
Qty: 90 CAPSULE | Refills: 5 | Status: SHIPPED | OUTPATIENT
Start: 2018-03-21 | End: 2018-09-05 | Stop reason: SDUPTHER

## 2018-03-21 NOTE — PATIENT INSTRUCTIONS
Warned patient that this medication can cause drowsiness and impair them operating machinery, including driving a car.  Caution is advised.  Call Helena Garza  Get carotid doppler

## 2018-03-21 NOTE — PROGRESS NOTES
Subjective   Javy Reeves Sr. is a 67 y.o. male.     History of Present Illness   Javy Reeves Sr. 67 y.o. male who presents for re-evaluation of neck pain. Event that precipitated these symptoms:  none recalled by the patient  {Onset of symptoms was 2-5-18 and did improve while on pred within a week after taking it, pain is same as before.  Location of this is in the right , scapular, subscapular, biceps, triceps, radiates down right arm, denies upper extremity numbness/tingling and burns and aches down arm;  Worse with ROM area.   Current pain symptoms are described as aching, burning and spasmotic and occurs constantly.  The pain intensity is moderate and severe.  Other associated symptoms include:  limited ROM, worse with prolonged sitting, worse with prolonged laying and worse with prolonged standing. Treatment efforts have included: heat. with some relief. Some help with Neurontin; has pain right parietal area to occiput pain in temples and throbs bilat for hours---severe h/a;  Zanaflex does help;   Patient has had no prior neck problems and had this shoulder and thoracic outlet surgery.   Had prior surgery nerves right lower arm from crush injury in 9-20-84  Has hx of thoracic outlet syndrome and prior surgery  This is new in the scapula  Pain is down to hand and more 2nd an 3rd fingers right;     strength is decreased intermittently   He is going to PT and is helping some  Had abnormal MRI and put in to neurosurgery.  Referral made already.    Zanaflex does help some  I will increase dose Lyrica   He has head positional jaw numbness;   I personally discussed this patient's care and medical decision making with Dr. Cheng.  We reviewed the patient history, exam findings, and plan.  He did approve this plan of care.      The following portions of the patient's history were reviewed and updated as appropriate: allergies, current medications, past family history, past medical history, past social  history, past surgical history and problem list.    Review of Systems   Constitutional: Negative for activity change, appetite change and unexpected weight change.   HENT: Negative for nosebleeds and trouble swallowing.    Eyes: Negative for pain and visual disturbance.   Respiratory: Negative for chest tightness, shortness of breath and wheezing.    Cardiovascular: Negative for chest pain and palpitations.   Gastrointestinal: Negative for abdominal pain and blood in stool.   Endocrine: Negative.    Genitourinary: Negative for difficulty urinating and hematuria.   Musculoskeletal: Positive for arthralgias and back pain. Negative for joint swelling.   Skin: Negative for color change and rash.   Allergic/Immunologic: Negative.    Neurological: Positive for weakness. Negative for syncope and speech difficulty.   Hematological: Negative for adenopathy.   Psychiatric/Behavioral: Negative for agitation and confusion.   All other systems reviewed and are negative.      Objective   Physical Exam   Constitutional: He is oriented to person, place, and time. He appears well-developed and well-nourished. He appears distressed (in pain).   HENT:   Head: Normocephalic and atraumatic.   Right Ear: External ear normal.   Left Ear: External ear normal.   Eyes: Conjunctivae and EOM are normal. Pupils are equal, round, and reactive to light. No scleral icterus.   Neck: Normal range of motion. Neck supple. No tracheal deviation present. No thyromegaly present.   Cardiovascular: Normal rate, regular rhythm and normal heart sounds.  Exam reveals no gallop.    No murmur heard.  Pulmonary/Chest: Effort normal and breath sounds normal.   Abdominal: Soft.   Musculoskeletal: Normal range of motion. He exhibits tenderness. He exhibits no edema or deformity.   Sore to palp neck, right trapezius into deltoid and biceps; pain with ROM head and right arm;  Motor and sensory intact; pulses 2+  Temples not sore to touch now  Turn head to right and  jaws N/T  Carotid arteries without bruits  Less ROM 2nd finger d/t pain---    Lymphadenopathy:     He has no cervical adenopathy.   Neurological: He is alert and oriented to person, place, and time. He has normal reflexes. He displays no atrophy, no tremor and normal reflexes. No cranial nerve deficit. He exhibits normal muscle tone. Coordination normal.   Reflex Scores:       Tricep reflexes are 2+ on the right side and 2+ on the left side.       Bicep reflexes are 2+ on the right side and 2+ on the left side.       Brachioradialis reflexes are 2+ on the right side and 2+ on the left side.  Skin: Skin is warm and dry. No rash noted. He is not diaphoretic.   Psychiatric: He has a normal mood and affect. His behavior is normal. Judgment and thought content normal.   Nursing note and vitals reviewed.      Assessment/Plan   Problems Addressed this Visit     None      Visit Diagnoses     Cervical radiculopathy    -  Primary    Relevant Orders    Duplex Carotid Ultrasound CAR    Numbness of right jaw        Relevant Orders    Duplex Carotid Ultrasound CAR    Numbness of left jaw        Relevant Orders    Duplex Carotid Ultrasound CAR    Tobacco abuse        History of recurrent deep vein thrombosis (DVT)

## 2018-03-23 ENCOUNTER — TREATMENT (OUTPATIENT)
Dept: PHYSICAL THERAPY | Facility: CLINIC | Age: 68
End: 2018-03-23

## 2018-03-23 DIAGNOSIS — M54.2 PAIN, NECK: Primary | ICD-10-CM

## 2018-03-23 DIAGNOSIS — M54.12 CERVICAL RADICULOPATHY: ICD-10-CM

## 2018-03-23 PROCEDURE — 97035 APP MDLTY 1+ULTRASOUND EA 15: CPT | Performed by: PHYSICAL THERAPIST

## 2018-03-23 NOTE — PROGRESS NOTES
"Physical Therapy Daily Progress Note  Visit: 9    Javy Reeves reports: I am having a bad day. I shoveled snow on Wednesday and my left side of my neck is bothering me and my low back is hurting today too. MD referred me to neurosurgeon and increased my Lyrica.     Subjective     Objective   See Exercise, Manual, and Modality Logs for complete treatment.       Assessment & Plan     Assessment  Assessment details: Did not get to much exercise today because at the beginning of session pt reported he was feeling like he was in \"[place]\" and pt had glaze over his face. Stated that he was not up to exercises and is going to call wife to pick him up so he does not have to drive. Pt also reported some numb feeling in his jaws. Educated pt to go over and see his MD today or at least give her a call and tell her his new sx's. Sent communication letter to his MD as well.     Plan  Plan details: Will resume therapy as able        Manual Therapy:    -     mins  11378;  Therapeutic Exercise:    -     mins  28976;     Neuromuscular Yoandy:    -    mins  87950;    Therapeutic Activity:     -     mins  29637;     Gait Training:      -     mins  90488;     Ultrasound:     10     mins  01827;    Electrical Stimulation:    -     mins  90374 ( );  Dry Needling     -     mins self-pay    Timed Treatment:   10   mins   Total Treatment:     30   mins    Maribel Alcantar PT  [place] License #: [id number]    Physical Therapist      "

## 2018-03-26 ENCOUNTER — TREATMENT (OUTPATIENT)
Dept: PHYSICAL THERAPY | Facility: CLINIC | Age: 68
End: 2018-03-26

## 2018-03-26 DIAGNOSIS — M54.2 PAIN, NECK: Primary | ICD-10-CM

## 2018-03-26 DIAGNOSIS — M54.12 CERVICAL RADICULOPATHY: ICD-10-CM

## 2018-03-26 PROCEDURE — PTNOCHG PR CUSTOM PT NO CHARGE VISIT: Performed by: PHYSICAL THERAPIST

## 2018-03-26 NOTE — PROGRESS NOTES
Physical Therapy Daily Progress Note  Visit: 10    Javy Reeves reports: I feel a lot better today    Subjective     Objective   See Exercise, Manual, and Modality Logs for complete treatment.       Assessment & Plan     Assessment  Assessment details: I sent message to MD after pt's last appt and spoke with pt about what the doctor stated he should do about his new sx's. Pt stated that the MD wanted him to go to the hospital. He did not end up going to hospital and said that he has been to the hospital too much lately. He has tests tomorrow morning and he is going to wait on that. He has not taken the medicine since Friday and he said he has felt much better since then.  Due to this I told him we will not do therapy until he is cleared medically. Pt was not seen today    Plan  Plan details: Awaiting results from medical tests.            Manual Therapy:    -     mins  75181;  Therapeutic Exercise:    -     mins  83269;     Neuromuscular Yoandy:    -    mins  66552;    Therapeutic Activity:     -     mins  54258;     Gait Training:      -     mins  21102;     Ultrasound:     --     mins  67406;    Electrical Stimulation:    -     mins  06663 ( );  Dry Needling     -     mins self-pay    Timed Treatment:   0   mins   Total Treatment:     0   mins    Maribel Alcantar PT  [place] License #: [id number]    Physical Therapist

## 2018-03-27 ENCOUNTER — CLINICAL SUPPORT (OUTPATIENT)
Dept: FAMILY MEDICINE CLINIC | Facility: CLINIC | Age: 68
End: 2018-03-27

## 2018-03-27 ENCOUNTER — HOSPITAL ENCOUNTER (OUTPATIENT)
Dept: CARDIOLOGY | Facility: HOSPITAL | Age: 68
Discharge: HOME | End: 2018-03-27
Admitting: PHYSICIAN ASSISTANT

## 2018-03-27 ENCOUNTER — TELEPHONE (OUTPATIENT)
Dept: FAMILY MEDICINE CLINIC | Facility: CLINIC | Age: 68
End: 2018-03-27

## 2018-03-27 DIAGNOSIS — R20.0 NUMBNESS OF LEFT JAW: ICD-10-CM

## 2018-03-27 DIAGNOSIS — M54.12 CERVICAL RADICULOPATHY: ICD-10-CM

## 2018-03-27 DIAGNOSIS — G45.9 TRANSIENT CEREBRAL ISCHEMIA, UNSPECIFIED TYPE: ICD-10-CM

## 2018-03-27 DIAGNOSIS — R20.0 NUMBNESS OF RIGHT JAW: ICD-10-CM

## 2018-03-27 DIAGNOSIS — I82.5Y3 CHRONIC DEEP VEIN THROMBOSIS (DVT) OF PROXIMAL VEIN OF BOTH LOWER EXTREMITIES (HCC): Primary | ICD-10-CM

## 2018-03-27 DIAGNOSIS — I65.21 MILD ATHEROSCLEROSIS OF RIGHT CAROTID ARTERY: Primary | ICD-10-CM

## 2018-03-27 LAB
BH CV XLRA MEAS LEFT DIST CCA EDV: 25.1 CM/SEC
BH CV XLRA MEAS LEFT DIST CCA PSV: 84.1 CM/SEC
BH CV XLRA MEAS LEFT DIST ICA EDV: -31.1 CM/SEC
BH CV XLRA MEAS LEFT DIST ICA PSV: -78.6 CM/SEC
BH CV XLRA MEAS LEFT MID ICA EDV: -35.8 CM/SEC
BH CV XLRA MEAS LEFT MID ICA PSV: -84.4 CM/SEC
BH CV XLRA MEAS LEFT PROX CCA EDV: 33.8 CM/SEC
BH CV XLRA MEAS LEFT PROX CCA PSV: 105 CM/SEC
BH CV XLRA MEAS LEFT PROX ECA EDV: -18.8 CM/SEC
BH CV XLRA MEAS LEFT PROX ECA PSV: -76.8 CM/SEC
BH CV XLRA MEAS LEFT PROX ICA EDV: -25.2 CM/SEC
BH CV XLRA MEAS LEFT PROX ICA PSV: -63.9 CM/SEC
BH CV XLRA MEAS LEFT PROX SCLA PSV: 116 CM/SEC
BH CV XLRA MEAS LEFT VERTEBRAL A EDV: 13.4 CM/SEC
BH CV XLRA MEAS LEFT VERTEBRAL A PSV: 38.9 CM/SEC
BH CV XLRA MEAS RIGHT DIST CCA EDV: 21.7 CM/SEC
BH CV XLRA MEAS RIGHT DIST CCA PSV: 76.2 CM/SEC
BH CV XLRA MEAS RIGHT DIST ICA EDV: -29.9 CM/SEC
BH CV XLRA MEAS RIGHT DIST ICA PSV: -66.3 CM/SEC
BH CV XLRA MEAS RIGHT MID ICA EDV: -35.8 CM/SEC
BH CV XLRA MEAS RIGHT MID ICA PSV: -86.8 CM/SEC
BH CV XLRA MEAS RIGHT PROX CCA EDV: 34 CM/SEC
BH CV XLRA MEAS RIGHT PROX CCA PSV: 114 CM/SEC
BH CV XLRA MEAS RIGHT PROX ECA EDV: -29.1 CM/SEC
BH CV XLRA MEAS RIGHT PROX ECA PSV: -101 CM/SEC
BH CV XLRA MEAS RIGHT PROX ICA EDV: -24 CM/SEC
BH CV XLRA MEAS RIGHT PROX ICA PSV: -65.1 CM/SEC
BH CV XLRA MEAS RIGHT PROX SCLA PSV: 136 CM/SEC
BH CV XLRA MEAS RIGHT VERTEBRAL A EDV: 13.4 CM/SEC
BH CV XLRA MEAS RIGHT VERTEBRAL A PSV: 33.4 CM/SEC
INR PPP: 1.9 (ref 2–3)
LEFT ARM BP: NORMAL MMHG
RIGHT ARM BP: NORMAL MMHG

## 2018-03-27 PROCEDURE — 99211 OFF/OP EST MAY X REQ PHY/QHP: CPT | Performed by: PHYSICIAN ASSISTANT

## 2018-03-27 PROCEDURE — 85610 PROTHROMBIN TIME: CPT | Performed by: PHYSICIAN ASSISTANT

## 2018-03-27 PROCEDURE — 93880 EXTRACRANIAL BILAT STUDY: CPT

## 2018-03-27 PROCEDURE — 36416 COLLJ CAPILLARY BLOOD SPEC: CPT | Performed by: PHYSICIAN ASSISTANT

## 2018-03-27 NOTE — TELEPHONE ENCOUNTER
PT is aware of resent results, wants to know what can be done about his pain.     He also wants to know if he should cont physical therapy.

## 2018-03-30 ENCOUNTER — TREATMENT (OUTPATIENT)
Dept: PHYSICAL THERAPY | Facility: CLINIC | Age: 68
End: 2018-03-30

## 2018-03-30 DIAGNOSIS — M54.12 CERVICAL RADICULOPATHY: ICD-10-CM

## 2018-03-30 DIAGNOSIS — M54.2 PAIN, NECK: Primary | ICD-10-CM

## 2018-03-30 PROCEDURE — 97140 MANUAL THERAPY 1/> REGIONS: CPT | Performed by: PHYSICAL THERAPIST

## 2018-03-30 PROCEDURE — 97012 MECHANICAL TRACTION THERAPY: CPT | Performed by: PHYSICAL THERAPIST

## 2018-03-30 PROCEDURE — 97110 THERAPEUTIC EXERCISES: CPT | Performed by: PHYSICAL THERAPIST

## 2018-04-03 ENCOUNTER — CLINICAL SUPPORT (OUTPATIENT)
Dept: FAMILY MEDICINE CLINIC | Facility: CLINIC | Age: 68
End: 2018-04-03

## 2018-04-03 DIAGNOSIS — I82.5Y3 CHRONIC DEEP VEIN THROMBOSIS (DVT) OF PROXIMAL VEIN OF BOTH LOWER EXTREMITIES (HCC): Primary | ICD-10-CM

## 2018-04-03 LAB — INR PPP: 3.3 (ref 2–3)

## 2018-04-03 PROCEDURE — 36416 COLLJ CAPILLARY BLOOD SPEC: CPT | Performed by: PHYSICIAN ASSISTANT

## 2018-04-03 PROCEDURE — 99211 OFF/OP EST MAY X REQ PHY/QHP: CPT | Performed by: PHYSICIAN ASSISTANT

## 2018-04-03 PROCEDURE — 85610 PROTHROMBIN TIME: CPT | Performed by: PHYSICIAN ASSISTANT

## 2018-04-04 ENCOUNTER — TREATMENT (OUTPATIENT)
Dept: PHYSICAL THERAPY | Facility: CLINIC | Age: 68
End: 2018-04-04

## 2018-04-04 DIAGNOSIS — M54.12 CERVICAL RADICULOPATHY: ICD-10-CM

## 2018-04-04 DIAGNOSIS — M54.2 PAIN, NECK: Primary | ICD-10-CM

## 2018-04-04 PROCEDURE — 97012 MECHANICAL TRACTION THERAPY: CPT | Performed by: PHYSICAL THERAPIST

## 2018-04-04 PROCEDURE — 97110 THERAPEUTIC EXERCISES: CPT | Performed by: PHYSICAL THERAPIST

## 2018-04-04 PROCEDURE — 97140 MANUAL THERAPY 1/> REGIONS: CPT | Performed by: PHYSICAL THERAPIST

## 2018-04-04 NOTE — PROGRESS NOTES
Physical Therapy Daily Progress Note  Visit: 12    Javy Reeves reports: I am doing better and better. I can sleep at night and do not usually wake up in the middle of the night. Mornings are the worst. Yesterday when the weather was marni I felt great.    Subjective     Objective   See Exercise, Manual, and Modality Logs for complete treatment.       Assessment & Plan     Assessment  Assessment details: Pt continues to improve with therapy. Continue to focus on reducing nerve compression on the right and postural exercises    Plan  Plan details: Progress per POC. Reassess next session        Manual Therapy:    15     mins  62773;  Therapeutic Exercise:    10     mins  64893;     Neuromuscular Yoandy:    -    mins  34671;    Therapeutic Activity:     -     mins  48202;     Gait Training:      -     mins  07844;     Ultrasound:     -     mins  80372;    Electrical Stimulation:    -     mins  96798 ( );  Dry Needling     -     mins self-pay  Traction  __15___ min    Timed Treatment:   25   mins   Total Treatment:     55   mins    Maribel Alcantar PT  [place] License #: [id number]    Physical Therapist

## 2018-04-09 ENCOUNTER — TREATMENT (OUTPATIENT)
Dept: PHYSICAL THERAPY | Facility: CLINIC | Age: 68
End: 2018-04-09

## 2018-04-09 DIAGNOSIS — M54.2 PAIN, NECK: Primary | ICD-10-CM

## 2018-04-09 DIAGNOSIS — M54.12 CERVICAL RADICULOPATHY: ICD-10-CM

## 2018-04-09 PROCEDURE — 97140 MANUAL THERAPY 1/> REGIONS: CPT | Performed by: PHYSICAL THERAPIST

## 2018-04-09 PROCEDURE — 97110 THERAPEUTIC EXERCISES: CPT | Performed by: PHYSICAL THERAPIST

## 2018-04-09 NOTE — PROGRESS NOTES
Physical Therapy Daily Progress Note  Visit: 13    Javy Reeves reports: I am doing more outside and I think the warm weather helps. I am icing and that helps a lot. I am sleeping good these days. Pain reported as 3/10 and is about that all the time. I am not having the arm pain anymore, but occasional pain in hand from previous surgeries.     Subjective     Objective       Active Range of Motion   Cervical/Thoracic Spine   Cervical    Left rotation: 74 degrees   Right rotation: 70 degrees     Tests   Cervical     Right   Negative Spurling's sign.     Right Shoulder   Negative active compression ([place]).      See Exercise, Manual, and Modality Logs for complete treatment.       Assessment & Plan     Assessment  Assessment details: Pt making excellent progress with therapy. Deferred traction today due to feeling good. Pt has improved in all aspects of pain, mobility and radicular sx's. As long as sx's continue to improve anticipate DC next week    Plan  Plan details: Progress per POC. Anticipate DC next week        Manual Therapy:    10     mins  92911;  Therapeutic Exercise:    20     mins  53716;     Neuromuscular Yoandy:    -    mins  18283;    Therapeutic Activity:     -     mins  36192;     Gait Training:      -     mins  55867;     Ultrasound:     -     mins  09635;    Electrical Stimulation:    -     mins  91672 ( );  Dry Needling     -     mins self-pay    Timed Treatment:   30   mins   Total Treatment:     50   mins    Maribel Alcantar PT  [place] License #: [id number]    Physical Therapist

## 2018-04-10 ENCOUNTER — CLINICAL SUPPORT (OUTPATIENT)
Dept: FAMILY MEDICINE CLINIC | Facility: CLINIC | Age: 68
End: 2018-04-10

## 2018-04-10 DIAGNOSIS — I82.5Y3 CHRONIC DEEP VEIN THROMBOSIS (DVT) OF PROXIMAL VEIN OF BOTH LOWER EXTREMITIES (HCC): Primary | ICD-10-CM

## 2018-04-10 DIAGNOSIS — G45.9 TRANSIENT CEREBRAL ISCHEMIA, UNSPECIFIED TYPE: ICD-10-CM

## 2018-04-10 LAB — INR PPP: 1.6 (ref 2–3)

## 2018-04-10 PROCEDURE — 99211 OFF/OP EST MAY X REQ PHY/QHP: CPT | Performed by: PHYSICIAN ASSISTANT

## 2018-04-10 PROCEDURE — 36416 COLLJ CAPILLARY BLOOD SPEC: CPT | Performed by: PHYSICIAN ASSISTANT

## 2018-04-10 PROCEDURE — 85610 PROTHROMBIN TIME: CPT | Performed by: PHYSICIAN ASSISTANT

## 2018-04-13 ENCOUNTER — TREATMENT (OUTPATIENT)
Dept: PHYSICAL THERAPY | Facility: CLINIC | Age: 68
End: 2018-04-13

## 2018-04-13 DIAGNOSIS — M54.12 CERVICAL RADICULOPATHY: ICD-10-CM

## 2018-04-13 DIAGNOSIS — M54.2 PAIN, NECK: Primary | ICD-10-CM

## 2018-04-13 PROCEDURE — 97110 THERAPEUTIC EXERCISES: CPT | Performed by: PHYSICAL THERAPIST

## 2018-04-13 PROCEDURE — 97140 MANUAL THERAPY 1/> REGIONS: CPT | Performed by: PHYSICAL THERAPIST

## 2018-04-13 NOTE — PROGRESS NOTES
Physical Therapy Daily Progress Note  Visit: 14    Javy Reeves reports: I am doing well. I am feeling good and I think today will be my last day    Subjective     Objective       Active Range of Motion   Cervical/Thoracic Spine   Cervical    Left rotation: 74 degrees   Right rotation: 70 degrees     Tests   Cervical     Right   Negative Spurling's sign.     Right Shoulder   Negative active compression ([place]).      See Exercise, Manual, and Modality Logs for complete treatment. See last visit for objective information (posted below)      Assessment & Plan     Assessment  Assessment details: Pt has made excellent progress with therapy. Pt no longer has radicular sx's and tests negative for special test for nerve root compression. Pt has WFL AROM and good strength. Continued to educated about posture. Pt has good understanding of HEP and is compliant with HEP. Pt has no further questions or concerns regarding therapy at this time. Pt will be DC'd due to goals met    Plan  Plan details: DC at this time      1. Pt will be compliant with HEP. (MET)  2. Pt to exhibit 70 degrees of cervical rotation to allow for viewing traffic without pain or limitations (MET)  3. Pt to report no radicular sx's in R upper extremity (MET)  4. Pt able to perform ADL's and recreational activities without pain (5/10 or less) (MET)    LONG TERM GOALS: 6-8 weeks  1.  Pt to score <20% perceived disability on Neck Index (MET)  2.  Pain level < 3/10 at worse with driving > 30 min. and ADL's (MET)  3.  Pt will be able to sleep through night due to improved sx's (MET)    Manual Therapy:    10     mins  32503;  Therapeutic Exercise:    23     mins  85662;     Neuromuscular Yoandy:   -     mins  76629;    Therapeutic Activity:     -     mins  07932;     Gait Training:      -     mins  61158;     Ultrasound:     -     mins  58940;    Electrical Stimulation:    -     mins  49676 ( );  Dry Needling     -     mins self-pay    Timed Treatment:    33   mins   Total Treatment:     45   mins    Maribel Alcantar, PT  [place] License #: [id number]    Physical Therapist

## 2018-04-20 ENCOUNTER — CLINICAL SUPPORT (OUTPATIENT)
Dept: FAMILY MEDICINE CLINIC | Facility: CLINIC | Age: 68
End: 2018-04-20

## 2018-04-20 DIAGNOSIS — G45.9 TRANSIENT CEREBRAL ISCHEMIA, UNSPECIFIED TYPE: ICD-10-CM

## 2018-04-20 DIAGNOSIS — I82.5Y3 CHRONIC DEEP VEIN THROMBOSIS (DVT) OF PROXIMAL VEIN OF BOTH LOWER EXTREMITIES (HCC): Primary | ICD-10-CM

## 2018-04-20 LAB — INR PPP: 2.3 (ref 2–3)

## 2018-04-20 PROCEDURE — 85610 PROTHROMBIN TIME: CPT | Performed by: PHYSICIAN ASSISTANT

## 2018-04-20 PROCEDURE — 36416 COLLJ CAPILLARY BLOOD SPEC: CPT | Performed by: PHYSICIAN ASSISTANT

## 2018-05-04 ENCOUNTER — CLINICAL SUPPORT (OUTPATIENT)
Dept: FAMILY MEDICINE CLINIC | Facility: CLINIC | Age: 68
End: 2018-05-04

## 2018-05-04 DIAGNOSIS — I82.5Y3 CHRONIC DEEP VEIN THROMBOSIS (DVT) OF PROXIMAL VEIN OF BOTH LOWER EXTREMITIES (HCC): ICD-10-CM

## 2018-05-04 DIAGNOSIS — G45.9 TRANSIENT CEREBRAL ISCHEMIA, UNSPECIFIED TYPE: Primary | ICD-10-CM

## 2018-05-04 LAB — INR PPP: 3.4 (ref 2–3)

## 2018-05-04 PROCEDURE — 85610 PROTHROMBIN TIME: CPT | Performed by: PHYSICIAN ASSISTANT

## 2018-05-04 PROCEDURE — 36416 COLLJ CAPILLARY BLOOD SPEC: CPT | Performed by: PHYSICIAN ASSISTANT

## 2018-05-11 ENCOUNTER — CLINICAL SUPPORT (OUTPATIENT)
Dept: FAMILY MEDICINE CLINIC | Facility: CLINIC | Age: 68
End: 2018-05-11

## 2018-05-11 DIAGNOSIS — I82.5Y3 CHRONIC DEEP VEIN THROMBOSIS (DVT) OF PROXIMAL VEIN OF BOTH LOWER EXTREMITIES (HCC): Primary | ICD-10-CM

## 2018-05-11 LAB — INR PPP: 2.6 (ref 2–3)

## 2018-05-11 PROCEDURE — 36416 COLLJ CAPILLARY BLOOD SPEC: CPT | Performed by: PHYSICIAN ASSISTANT

## 2018-05-11 PROCEDURE — 85610 PROTHROMBIN TIME: CPT | Performed by: PHYSICIAN ASSISTANT

## 2018-05-15 NOTE — PROGRESS NOTES
Subjective   Patient ID: Javy Reeves Sr. is a 67 y.o. male is here today for follow-up on neck pain.    Today the patient is here with a new MRI of the cervical spine. Today he reports neck pain that radiates into the right arm down to the hands along with numbness, tingling and weakness. He reports that he has tried physical therapy and states that it has helped with his symptoms.    Neck Pain    This is a chronic problem. The current episode started more than 1 year ago. The problem occurs daily. The problem has been unchanged. Associated symptoms include headaches, numbness, tingling and weakness.       The following portions of the patient's history were reviewed and updated as appropriate: allergies, current medications, past family history, past medical history, past social history, past surgical history and problem list.    Review of Systems   Musculoskeletal: Positive for neck pain and neck stiffness.   Neurological: Positive for tingling, weakness, numbness and headaches.   All other systems reviewed and are negative.    I saw this patient about 3 years ago for lumbar stenosis. He is a retired  who worked in internal [employer]. About 6 weeks ago he had a spontaneous onset of neck pain and right scapular and arm pain. There is a little bit of weakness but it is slight in the . 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 Physical therapy only helps a mild amount. He is on Lyrica at 100 mg b.i.d. We discussed his MRI. It might come to surgery but I suggested that we try some epidural blocks. In the meantime, will also get some flexion and extension films. 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 He does have a filter and he has been off of Coumadin for various procedures before. If he is not much better, though, we might need to consider a 2 level ACDF at C5-C6 and C6-C7.       Objective   Physical  Exam   Constitutional: He is oriented to person, place, and time. He appears well-developed and well-nourished.   HENT:   Head: Normocephalic and atraumatic.   Eyes: Conjunctivae and EOM are normal. Pupils are equal, round, and reactive to light.   Fundoscopic exam:       The right eye shows no papilledema. The right eye shows venous pulsations.        The left eye shows no papilledema. The left eye shows venous pulsations.   Neck: Carotid bruit is not present.   Neurological: He is oriented to person, place, and time. He has a normal Finger-Nose-Finger Test and a normal Heel to Shin Test. Gait normal.   Reflex Scores:       Tricep reflexes are 2+ on the right side and 2+ on the left side.       Bicep reflexes are 2+ on the right side and 2+ on the left side.       Brachioradialis reflexes are 2+ on the right side and 2+ on the left side.       Patellar reflexes are 2+ on the right side and 2+ on the left side.       Achilles reflexes are 2+ on the right side and 2+ on the left side.  Psychiatric: His speech is normal.     Neurologic Exam     Mental Status   Oriented to person, place, and time.   Registration of memory: Good recent and remote memory.   Attention: normal. Concentration: normal.   Speech: speech is normal   Level of consciousness: alert  Knowledge: consistent with education.     Cranial Nerves     CN II   Visual fields full to confrontation.   Visual acuity: normal    CN III, IV, VI   Pupils are equal, round, and reactive to light.  Extraocular motions are normal.     CN V   Facial sensation intact.   Right corneal reflex: normal  Left corneal reflex: normal    CN VII   Facial expression full, symmetric.   Right facial weakness: none  Left facial weakness: none    CN VIII   Hearing: intact    CN IX, X   Palate: symmetric    CN XI   Right sternocleidomastoid strength: normal  Left sternocleidomastoid strength: normal    CN XII   Tongue: not atrophic  Tongue deviation: none    Motor Exam   Muscle bulk:  normal  Right arm tone: normal  Left arm tone: normal  Right leg tone: normal  Left leg tone: normal    Strength   Strength 5/5 except as noted.     Sensory Exam   Light touch normal.     Gait, Coordination, and Reflexes     Gait  Gait: normal    Coordination   Finger to nose coordination: normal  Heel to shin coordination: normal    Reflexes   Right brachioradialis: 2+  Left brachioradialis: 2+  Right biceps: 2+  Left biceps: 2+  Right triceps: 2+  Left triceps: 2+  Right patellar: 2+  Left patellar: 2+  Right achilles: 2+  Left achilles: 2+  Right : 2+  Left : 2+      Assessment/Plan   Independent Review of Radiographic Studies:    I reviewed the cervical MRI done 3/16/18 which showed bilateral foraminal stenosis and osteophytic disc disease at C5-C6 and C6-C7.  Agree with the report.      Medical Decision Making:    We'll try to avoid surgery although it may come to that.  We'll try some cervical epidural blocks.  He has to be off of Coumadin for this.  He will continue his Lyrica 100 mg twice a day and follow-up with me in a few weeks.  If he is not better he might need to consider an ACDF at C5-C6 and C6-C7.      Javy was seen today for neck pain.    Diagnoses and all orders for this visit:    Cervical disc disorder at C5-C6 level with radiculopathy  -     XR Spine Cervical Complete 4 or 5 View; Future  -     Epidural Block    Cervical disc disorder at C6-C7 level with radiculopathy  -     XR Spine Cervical Complete 4 or 5 View; Future  -     Epidural Block      Return in about 6 weeks (around 6/27/2018) for After tests.

## 2018-05-16 ENCOUNTER — OFFICE VISIT (OUTPATIENT)
Dept: NEUROSURGERY | Facility: CLINIC | Age: 68
End: 2018-05-16

## 2018-05-16 VITALS
HEIGHT: 72 IN | BODY MASS INDEX: 31.69 KG/M2 | DIASTOLIC BLOOD PRESSURE: 80 MMHG | WEIGHT: 234 LBS | SYSTOLIC BLOOD PRESSURE: 143 MMHG | HEART RATE: 72 BPM

## 2018-05-16 DIAGNOSIS — M50.123 CERVICAL DISC DISORDER AT C6-C7 LEVEL WITH RADICULOPATHY: ICD-10-CM

## 2018-05-16 DIAGNOSIS — M50.122 CERVICAL DISC DISORDER AT C5-C6 LEVEL WITH RADICULOPATHY: Primary | ICD-10-CM

## 2018-05-16 PROCEDURE — 99213 OFFICE O/P EST LOW 20 MIN: CPT | Performed by: NEUROLOGICAL SURGERY

## 2018-05-30 ENCOUNTER — ANESTHESIA (OUTPATIENT)
Dept: PAIN MEDICINE | Facility: HOSPITAL | Age: 68
End: 2018-05-30

## 2018-05-30 ENCOUNTER — HOSPITAL ENCOUNTER (OUTPATIENT)
Dept: PAIN MEDICINE | Facility: HOSPITAL | Age: 68
Discharge: HOME | End: 2018-05-30
Attending: NEUROLOGICAL SURGERY | Admitting: NEUROLOGICAL SURGERY

## 2018-05-30 ENCOUNTER — TRANSCRIBE ORDERS (OUTPATIENT)
Dept: PAIN MEDICINE | Facility: HOSPITAL | Age: 68
End: 2018-05-30

## 2018-05-30 ENCOUNTER — ANESTHESIA EVENT (OUTPATIENT)
Dept: PAIN MEDICINE | Facility: HOSPITAL | Age: 68
End: 2018-05-30

## 2018-05-30 ENCOUNTER — HOSPITAL ENCOUNTER (OUTPATIENT)
Dept: GENERAL RADIOLOGY | Facility: HOSPITAL | Age: 68
Discharge: HOME | End: 2018-05-30

## 2018-05-30 VITALS
OXYGEN SATURATION: 96 % | BODY MASS INDEX: 32.51 KG/M2 | HEIGHT: 72 IN | SYSTOLIC BLOOD PRESSURE: 128 MMHG | DIASTOLIC BLOOD PRESSURE: 82 MMHG | RESPIRATION RATE: 16 BRPM | WEIGHT: 240 LBS | HEART RATE: 67 BPM | TEMPERATURE: 97.7 F

## 2018-05-30 DIAGNOSIS — M50.122 CERVICAL DISC DISORDER AT C5-C6 LEVEL WITH RADICULOPATHY: ICD-10-CM

## 2018-05-30 DIAGNOSIS — M50.122 CERVICAL DISC DISORDER AT C5-C6 LEVEL WITH RADICULOPATHY: Primary | ICD-10-CM

## 2018-05-30 DIAGNOSIS — M50.123 CERVICAL DISC DISORDER AT C6-C7 LEVEL WITH RADICULOPATHY: ICD-10-CM

## 2018-05-30 DIAGNOSIS — R52 PAIN: ICD-10-CM

## 2018-05-30 PROCEDURE — 25010000002 IOPAMIDOL 41 % SOLUTION: Performed by: ANESTHESIOLOGY

## 2018-05-30 PROCEDURE — C1755 CATHETER, INTRASPINAL: HCPCS

## 2018-05-30 PROCEDURE — 77003 FLUOROGUIDE FOR SPINE INJECT: CPT

## 2018-05-30 PROCEDURE — 25010000002 DEXAMETHASONE PER 1 MG: Performed by: ANESTHESIOLOGY

## 2018-05-30 RX ORDER — TURMERIC ROOT EXTRACT 500 MG
500 TABLET ORAL DAILY
Status: ON HOLD | COMMUNITY
End: 2022-08-23

## 2018-05-30 RX ORDER — MIDAZOLAM HYDROCHLORIDE 1 MG/ML
2 INJECTION INTRAMUSCULAR; INTRAVENOUS ONCE
Status: DISCONTINUED | OUTPATIENT
Start: 2018-05-30 | End: 2018-05-31 | Stop reason: HOSPADM

## 2018-05-30 RX ORDER — LIDOCAINE HYDROCHLORIDE 10 MG/ML
1 INJECTION, SOLUTION INFILTRATION; PERINEURAL ONCE
Status: DISCONTINUED | OUTPATIENT
Start: 2018-05-30 | End: 2018-05-31 | Stop reason: HOSPADM

## 2018-05-30 RX ORDER — DEXAMETHASONE SODIUM PHOSPHATE 10 MG/ML
10 INJECTION INTRAMUSCULAR; INTRAVENOUS ONCE
Status: COMPLETED | OUTPATIENT
Start: 2018-05-30 | End: 2018-05-30

## 2018-05-30 RX ADMIN — IOPAMIDOL 10 ML: 408 INJECTION, SOLUTION INTRAVASCULAR at 12:18

## 2018-05-30 RX ADMIN — DEXAMETHASONE SODIUM PHOSPHATE 10 MG: 10 INJECTION, SOLUTION INTRAMUSCULAR; INTRAVENOUS at 12:18

## 2018-05-30 NOTE — ANESTHESIA PROCEDURE NOTES
PAIN Epidural block    Patient location during procedure: pain clinic  Start Time: 5/30/2018 12:11 PM  Stop Time: 5/30/2018 12:20 PM  Indication:at surgeon's request and procedure for pain  Performed By  Anesthesiologist: ISABELLA MASSEY  Preanesthetic Checklist  Completed: patient identified, site marked, surgical consent, pre-op evaluation, timeout performed, IV checked, risks and benefits discussed and monitors and equipment checked  Additional Notes  Dx : Post-Op Diagnosis Codes:     * Cervical disc displacement (M50.20)     * Cervical radiculitis (M54.12)      Plan : return to clinic as needed  Prep:  Pt Position:prone (prone)  Sterile Tech:cap, gloves, mask and sterile barrier  Prep:chlorhexidine gluconate and isopropyl alcohol  Monitoring:blood pressure monitoring, continuous pulse oximetry and EKG  Procedure:  Sedation: no   Approach:midline  Guidance: fluoroscopy and c arm pa and lat and loss of resistance  Location:cervical  Level:6-7 (interlaminar)  Needle Type:appAttach  Needle Gauge:20  Aspiration:negative  Medications:  Preservative Free Saline:3mL  Isovue:2mL  Comments:Decadron PF 10 mg in epidural  Post Assessment:  Post-procedure: bandaid.  Pt Tolerance:patient tolerated the procedure well with no apparent complications  Complications:no

## 2018-05-30 NOTE — H&P
[facility]    History and Physical    Patient Name: Javy Reeves Sr.  :  1950  MRN:  [id number]  Date of Admission: 2018    Subjective     Patient is a 67 y.o. male presents with chief complaint of chronic, constant, severe, 5-8/10/ aching, sharp, numb, due to an injury with his car neck, arm: bilateral and headache pain.  Onset of symptoms was gradual starting 14 years ago.  Symptoms are associated/aggravated by inactivity and cold weather. Symptoms improve with pain medication, heating pad and rest    The following portions of the patients history were reviewed and updated as appropriate: current medications, allergies, past medical history, past surgical history, past family history, past social history and problem list                Objective     Past Medical History:   Past Medical History:   Diagnosis Date   • Acute and subacute infective endocarditis in diseases classified elsewhere    • Arthritis    • Chest pain    • Chronic low back pain    • Chronic pain    • Coronary artery disease    • DDD (degenerative disc disease), cervical    • DDD (degenerative disc disease), lumbosacral    • DVT (deep venous thrombosis)    • Encounter for special screening examination for neoplasm of prostate    • Eye exam, routine > 5 yrs ago   • Eye exam, routine 2015   • Fibromyalgia    • Fibromyositis    • Hyperlipidemia    • Hypertension    • Injury of back    • Irritable bowel    • Lumbar stenosis    • Pain in limb    • Past heart attack    • Postlaminectomy syndrome of lumbar region    • Pulmonary embolism    • Reflux esophagitis    • TIA (transient ischemic attack)      Past Surgical History:   Past Surgical History:   Procedure Laterality Date   • APPENDECTOMY     • COLONOSCOPY  2015    removed 2 polyps, Dr. Manley   • KNEE SURGERY     • LUMBAR DISC SURGERY  ,     L4-S1 pseudoarthroses - Dr Cervantes   • PROSTATE SURGERY      Turp   • SCALENOTOMY     • SHOULDER SURGERY Left 2012     rotator cuff repair   • WRIST SURGERY      x3 starting in 1984     Family History:   Family History   Problem Relation Age of Onset   • Diabetes Sister    • Cancer Sister    • Hypertension Sister    • Kidney disease Sister    • Stroke Sister    • Heart disease Brother    • Hypertension Brother    • Cerebral aneurysm Brother    • Sudden death Brother    • Cancer Mother         malignant neoplasm   • Heart disease Father    • Cancer Father         malignant neoplasm   • Deep vein thrombosis Father    • Heart attack Father      Social History:   Social History   Substance Use Topics   • Smoking status: Current Every Day Smoker     Packs/day: 1.50     Years: 30.00   • Smokeless tobacco: Never Used      Comment: caffeine use   • Alcohol use Yes      Comment: rare       Vital Signs Range for the last 24 hours  Temperature:     Temp Source:     BP:     Pulse:     Respirations:     SPO2:     O2 Amount (l/min):     O2 Devices     Weight:           --------------------------------------------------------------------------------    Current Outpatient Prescriptions   Medication Sig Dispense Refill   • Magnesium 250 MG tablet Take  by mouth Daily.     • nitroglycerin (NITROSTAT) 0.4 MG SL tablet Place 1 tablet under the tongue Every 5 (Five) Minutes As Needed for Chest Pain. Take no more than 3 doses in 15 minutes. 30 tablet 5   • polyethylene glycol (MIRALAX) powder Take 17 g by mouth daily.     • pregabalin (LYRICA) 100 MG capsule Take 1 capsule by mouth 3 (Three) Times a Day. New dose for pain 90 capsule 5   • tiZANidine (ZANAFLEX) 4 MG tablet 1/2-1 tab PO Q 8 hours PRN muscle spasms 90 tablet 2   • Umeclidinium-Vilanterol (ANORO ELLIPTA) 62.5-25 MCG/INH aerosol powder  Inhale one puff once daily for COPD 60 each 11   • warfarin (COUMADIN) 1 MG tablet TAKE TWO TABLETS BY MOUTH EVERY DAY 30 tablet 5   • warfarin (COUMADIN) 5 MG tablet TAKE TWO TABLETS BY MOUTH EVERY DAY 60 tablet 11     Current Facility-Administered  Medications   Medication Dose Route Frequency Provider Last Rate Last Dose   • dexamethasone (DECADRON) injection 10 mg  10 mg Intravenous Once Hossein Mayes MD       • lidocaine (XYLOCAINE) 1 % injection 1 mL  1 mL Intradermal Once Hossein Mayes MD       • midazolam (VERSED) injection 2 mg  2 mg Intravenous Once Hossein Mayes MD           --------------------------------------------------------------------------------  Assessment/Plan      Anesthesia Evaluation     Patient summary reviewed and Nursing notes reviewed         Pain impairs ability to perform ADLs: Sleeping  Modalities previously tried to control pain with limited effectiveness: Ice, Rest and Heat     Airway   Mallampati: II  Dental - normal exam     Pulmonary - normal exam   (+) pulmonary embolism, sleep apnea,   Cardiovascular - normal exam    (+) hypertension, PVD, DVT,  carotid artery disease      Neuro/Psych- neuro exam normal  (+) TIA,     GI/Hepatic/Renal/Endo - negative ROS     Musculoskeletal (-) normal exam    (+) neck pain,   Abdominal  - normal exam   Substance History - negative use     OB/GYN negative ob/gyn ROS         Other                 Diagnosis and Plan    Treatment Plan  ASA 2      Procedures: Cervical Epidural Steroid Injection(TATIANA), With fluoroscopy,       Anesthetic plan and risks discussed with patient.          Diagnosis     * Cervical disc displacement [M50.20]     * Cervical radiculitis [M54.12]              PAST MEDICAL HISTORY:  Current Outpatient Prescriptions on File Prior to Encounter   Medication Sig Dispense Refill   • Magnesium 250 MG tablet Take  by mouth Daily.     • nitroglycerin (NITROSTAT) 0.4 MG SL tablet Place 1 tablet under the tongue Every 5 (Five) Minutes As Needed for Chest Pain. Take no more than 3 doses in 15 minutes. 30 tablet 5   • polyethylene glycol (MIRALAX) powder Take 17 g by mouth daily.     • pregabalin (LYRICA) 100 MG capsule Take 1 capsule by mouth 3 (Three) Times a Day. New dose for  pain 90 capsule 5   • tiZANidine (ZANAFLEX) 4 MG tablet 1/2-1 tab PO Q 8 hours PRN muscle spasms 90 tablet 2   • Umeclidinium-Vilanterol (ANORO ELLIPTA) 62.5-25 MCG/INH aerosol powder  Inhale one puff once daily for COPD 60 each 11   • warfarin (COUMADIN) 1 MG tablet TAKE TWO TABLETS BY MOUTH EVERY DAY 30 tablet 5   • warfarin (COUMADIN) 5 MG tablet TAKE TWO TABLETS BY MOUTH EVERY DAY 60 tablet 11     No current facility-administered medications on file prior to encounter.        Past Medical History:   Diagnosis Date   • Acute and subacute infective endocarditis in diseases classified elsewhere    • Arthritis    • Chest pain    • Chronic low back pain    • Chronic pain    • Coronary artery disease    • DDD (degenerative disc disease), cervical    • DDD (degenerative disc disease), lumbosacral    • DVT (deep venous thrombosis)    • Encounter for special screening examination for neoplasm of prostate 2012   • Eye exam, routine > 5 yrs ago   • Eye exam, routine 01/2015   • Fibromyalgia    • Fibromyositis    • Hyperlipidemia    • Hypertension    • Injury of back    • Irritable bowel    • Lumbar stenosis    • Pain in limb    • Past heart attack    • Postlaminectomy syndrome of lumbar region    • Pulmonary embolism    • Reflux esophagitis    • TIA (transient ischemic attack)          SOCIAL HISTORY:  No tobacco    REVIEW OF SYSTEMS:  No hematologic infectious or constitutional symptoms    PHYSICAL EXAM:  There were no vitals taken for this visit.    Well-developed well-nourished no acute distress  Extra ocular movements intact  Mallampati class II airway  Cardiac:  Regular rate and rhythm  Lungs:  Clear to auscultation bilaterally with good effort  Alert and oriented ×3  Deep tendon reflexes normal in the bilateral bicep and tricep   5 out of 5 strength bilateral upper and lower extremities  Cervical spine without obvious deformities ecchymoses  Cervical spine nontender to palpation      DIAGNOSIS:  Post-Op Diagnosis  Codes:     * Cervical disc displacement [M50.20]     * Cervical radiculitis [M54.12]    PLAN:  1.  Cervical epidural steroid injections, up to 3, spaced 1-2 weeks apart.  If pain control is acceptable after 1 or 2 injections, it was discussed with the patient that they may return for the subsequent injections if and when their pain returns.  The risks were discussed with the patient including failure of relief, worsening pain, Headache (post dural puncture headache), bleeding (epidural hematoma) and infection (epidural abscess or skin infection).  2.  Physical therapy exercises at home as prescribed by physical therapy or from the pain clinic handout (given to the patient).  Continuation of these exercises every day, or multiple times per week, even when the patient has good pain relief, was stressed to the patient as a preventative measure to decrease the frequency and severity of future pain episodes.  3.  Continue pain medicines as already prescribed.  If patient not currently taking any, it is recommended to begin Acetaminophen 1000 mg po q 8 hours.  If other medicines containing Acetaminophen are currently prescribed, maintain daily dose at 3000 mg.    4.  If they can tolerate NSAIDS, it is recommended to take Ibuprofen 600 mg po q 6 hours for 7 days during pain exacerbations.  Alternatively, they may substitute an NSAID of their choice (e.g. Aleve).  This may be taken at the same time as Acetaminophen.  5.  Heat and ice to the affected area as tolerated for pain control.  It was discussed that heating pads can cause burns.  6.  Low impact exercise such as walking or water exercise was recommended to maintain overall health and aid in weight control.   7.  Follow up as needed for subsequent injections.  8.  Patient was counseled to abstain from tobacco products.

## 2018-05-30 NOTE — DISCHARGE INSTRUCTIONS
Cervical epidural steroid injection instructions  Plan includes:  1.  Cervical epidural steroid injections, up to 3, spaced 1-2 weeks apart.  If pain control is acceptable after 1 or 2 injections, it was discussed with the patient that they may return for the subsequent injections if and when their pain returns.  The risks were discussed with the patient including failure of relief, worsening pain, Headache (post dural puncture headache), bleeding (epidural hematoma) and infection (epidural abscess or skin infection).  2.  Physical therapy exercises at home as prescribed by physical therapy or from the pain clinic handout (given to the patient).  Continuation of these exercises every day, or multiple times per week, even when the patient has good pain relief, was stressed to the patient as a preventative measure to decrease the frequency and severity of future pain episodes.  3.  Continue pain medicines as already prescribed.  If patient not currently taking any, it is recommended to begin Acetaminophen 1000 mg po q 8 hours.  If other medicines containing Acetaminophen are currently prescribed, maintain daily dose at 3000 mg.    4.  If they can tolerate NSAIDS, it is recommended to take Ibuprofen 600 mg po q 6 hours for 7 days during pain exacerbations.  Alternatively, they may substitute an NSAID of their choice (e.g. Aleve).  This may be taken at the same time as Acetaminophen.  5.  Heat and ice to the affected area as tolerated for pain control.  It was discussed that heating pads can cause burns.  6.  Low impact exercise such as walking or water exercise was recommended to maintain overall health and aid in weight control.   7.  Follow up as needed for subsequent injections.  8.  Patient was counseled to abstain from tobacco products.Guide To Relieving And Avoiding Neck Pain    Exercise is important to help prevent and treat neck pain.  Good posture, exercise and avoiding injury will help to keep your neck  "healthy.    When the neck is strained or over worked symptoms may include headache, upper back pain, shoulder pain or arm pain.  Numbness or tingling in the fingers, dizziness or nausea may also occur.    Posture:    Avoid slumping over a desk.  Raise your work (including computer) to eye level to avoid bending at the neck.      Change Position Often:  Changing position prevents overuse of particular muscles.    Sleep On One Pillow:  Using to many pillows or to large of a pillow causes a \"kink\" in you neck.      Move and Exercise:  Living an active lifestyle is an important part of staying healthy.  Be sure to include the exercise to follow specifically for your neck.                    Range of Motion Exercises:  Do these exercises three times a day.  If you experience increased pain stop and contact your physician.  All exercises can be performed sitting or standing.        1.    2.   3.    1.  Place both hands behind you neck.  Gently tilt your neck backward so that you are looking at the ceiling.  Hold for a count of 10.    2.  Look straight facing forward.  Slowly tip your ear toward your right shoulder.  Do not force the motion.  Hold for a count of 10.  Bring head back to starting position and repeat to left side.    3.  Look straight facing forward.  Gently turn your head to the right.  Do not force the motion.  Hold for a count of 10.  Bring head back to starting position and repeat to the left side.    Exercises To Strengthen Muscles:  1.  Look straight facing forward.  Relax your shoulders.  Raise both shoulders toward your ears.  Hold for 3 seconds.       1.       2.   3.      1.  Look straight facing forward.  Relax your shoulders.  Raise both shoulders toward     2.  Raise your ars to your side and bend your elbows.  Squeeze shoulder blades together as you rotate your arms outward.  Hold for 5 seconds.    3.  Look straight facing forward.  Pull your head straight back.  Do not tip or move your jaw.  " Hold for 5 seconds.Lumbar Epidural Steroid Injection Instructions  Plan includes:  1.  Lumbar epidural steroid injections, up to 3, spaced 1-2 weeks apart.  If pain control is acceptable after 1 or 2 injections, it was discussed with the patient that they may return for the subsequent injections if and when their pain returns.  The risks were discussed with the patient including failure of relief, worsening pain, Headache (post dural puncture headache), bleeding (epidural hematoma) and infection (epidural abscess or skin infection).  2.  Physical therapy exercises at home as prescribed by physical therapy or from the pain clinic handout (given to the patient).  Continuation of these exercises every day, or multiple times per week, even when the patient has good pain relief, was stressed to the patient as a preventative measure to decrease the frequency and severity of future pain episodes.  3.  Continue pain medicines as already prescribed.  If patient not currently taking any, it is recommended to begin Acetaminophen 1000 mg po q 8 hours.  If other medicines containing Acetaminophen are currently prescribed, maintain daily dose at 3000 mg.    4.  If they can tolerate NSAIDS, it is recommended to take Ibuprofen 600 mg po q 6 hours for 7 days during pain exacerbations.  Alternatively, they may substitute an NSAID of their choice (e.g. Aleve).  This may be taken at the same time as Acetaminophen.  5.  Heat and ice to the affected area as tolerated for pain control.  It was discussed that heating pads can cause burns.  6.  Daily low impact exercise such as walking or water exercise was recommended to maintain overall health and aid in weight control.   7.  Follow up as needed for subsequent injections.  8.  Patient was counseled to abstain from tobacco products.

## 2018-06-01 ENCOUNTER — CLINICAL SUPPORT (OUTPATIENT)
Dept: FAMILY MEDICINE CLINIC | Facility: CLINIC | Age: 68
End: 2018-06-01

## 2018-06-01 DIAGNOSIS — Z79.01 LONG-TERM (CURRENT) USE OF ANTICOAGULANTS: ICD-10-CM

## 2018-06-01 DIAGNOSIS — Z51.81 ENCOUNTER FOR THERAPEUTIC DRUG MONITORING: Primary | ICD-10-CM

## 2018-06-01 LAB — INR PPP: 1.2 (ref 2–3)

## 2018-06-01 PROCEDURE — 36416 COLLJ CAPILLARY BLOOD SPEC: CPT | Performed by: PHYSICIAN ASSISTANT

## 2018-06-01 PROCEDURE — 85610 PROTHROMBIN TIME: CPT | Performed by: PHYSICIAN ASSISTANT

## 2018-06-08 ENCOUNTER — CLINICAL SUPPORT (OUTPATIENT)
Dept: FAMILY MEDICINE CLINIC | Facility: CLINIC | Age: 68
End: 2018-06-08

## 2018-06-08 DIAGNOSIS — Z51.81 ENCOUNTER FOR THERAPEUTIC DRUG MONITORING: Primary | ICD-10-CM

## 2018-06-08 DIAGNOSIS — Z79.01 LONG-TERM (CURRENT) USE OF ANTICOAGULANTS: ICD-10-CM

## 2018-06-08 LAB — INR PPP: 2.4 (ref 2–3)

## 2018-06-08 PROCEDURE — 85610 PROTHROMBIN TIME: CPT | Performed by: PHYSICIAN ASSISTANT

## 2018-06-08 PROCEDURE — 36416 COLLJ CAPILLARY BLOOD SPEC: CPT | Performed by: PHYSICIAN ASSISTANT

## 2018-06-15 NOTE — PROGRESS NOTES
Subjective   Patient ID: Javy Reeves Sr. is a 67 y.o. male is here today for follow-up on neck pain.     At the last visit the patient reported neck pain that radiates into the right arm down to the hands along with numbness, tingling and weakness. He stated that he saw some improvement with his symptoms after going to physical therapy. Mr. Reeves was referred to pain management for epidurals at the last visit.    Today the patient reports that he had had one epidural since the last visit and states that he saw significant improvement for about 9 days.    Neck Pain    This is a chronic problem. The current episode started more than 1 year ago. The problem occurs daily. The problem has been gradually improving. The pain is associated with nothing. Associated symptoms include headaches, numbness, tingling and weakness. Pertinent negatives include no leg pain.       The following portions of the patient's history were reviewed and updated as appropriate: allergies, current medications, past family history, past medical history, past social history, past surgical history and problem list.    Review of Systems   Musculoskeletal: Positive for neck pain.   Neurological: Positive for tingling, weakness, numbness and headaches.   All other systems reviewed and are negative.    I have been following this retired  for osteophytic cervical disk disease presenting with neck pain and mostly right arm pain. He had a block and it helped tremendously but the pain started to come back. A 2nd block is scheduled for this coming week. He has done therapy and is doing his exercises on his won. He takes Lyrica 100 mg twice a day. I think we should stick with the nonoperative plan. He has no motor deficits. An ACDF at C5-C6 and C6-C7 is a last resort, but hopefully we can avoid that. I will see him in 2 months after more blocks.         Objective   Physical Exam   Constitutional: He is oriented to person, place, and time.  He appears well-developed and well-nourished.   HENT:   Head: Normocephalic and atraumatic.   Eyes: Conjunctivae and EOM are normal. Pupils are equal, round, and reactive to light.   Fundoscopic exam:       The right eye shows no papilledema. The right eye shows venous pulsations.        The left eye shows no papilledema. The left eye shows venous pulsations.   Neck: Carotid bruit is not present.   Neurological: He is oriented to person, place, and time. He has a normal Finger-Nose-Finger Test and a normal Heel to Shin Test. Gait normal.   Reflex Scores:       Tricep reflexes are 2+ on the right side and 2+ on the left side.       Bicep reflexes are 2+ on the right side and 2+ on the left side.       Brachioradialis reflexes are 2+ on the right side and 2+ on the left side.       Patellar reflexes are 2+ on the right side and 2+ on the left side.       Achilles reflexes are 2+ on the right side and 2+ on the left side.  Psychiatric: His speech is normal.     Neurologic Exam     Mental Status   Oriented to person, place, and time.   Registration of memory: Good recent and remote memory.   Attention: normal. Concentration: normal.   Speech: speech is normal   Level of consciousness: alert  Knowledge: consistent with education.     Cranial Nerves     CN II   Visual fields full to confrontation.   Visual acuity: normal    CN III, IV, VI   Pupils are equal, round, and reactive to light.  Extraocular motions are normal.     CN V   Facial sensation intact.   Right corneal reflex: normal  Left corneal reflex: normal    CN VII   Facial expression full, symmetric.   Right facial weakness: none  Left facial weakness: none    CN VIII   Hearing: intact    CN IX, X   Palate: symmetric    CN XI   Right sternocleidomastoid strength: normal  Left sternocleidomastoid strength: normal    CN XII   Tongue: not atrophic  Tongue deviation: none    Motor Exam   Muscle bulk: normal  Right arm tone: normal  Left arm tone: normal  Right leg  tone: normal  Left leg tone: normal    Strength   Strength 5/5 except as noted.     Sensory Exam   Light touch normal.     Gait, Coordination, and Reflexes     Gait  Gait: normal    Coordination   Finger to nose coordination: normal  Heel to shin coordination: normal    Reflexes   Right brachioradialis: 2+  Left brachioradialis: 2+  Right biceps: 2+  Left biceps: 2+  Right triceps: 2+  Left triceps: 2+  Right patellar: 2+  Left patellar: 2+  Right achilles: 2+  Left achilles: 2+  Right : 2+  Left : 2+      Assessment/Plan   Independent Review of Radiographic Studies:    The cervical MRI done 3/16/18 which does show bilateral osteophytic disease mainly at C5-C6 and C6-C7 with bilateral root entrapment.  Agree with the report.      Medical Decision Making:    We made some progress although there is a bit of recurrence of symptoms.  We'll stick with nonoperative plan.  He will get his second block and possibly the third.  He will continue his exercises and his Lyrica at 100 mg twice a day and see me in about 2 months.  Surgery is a last resort option.      Javy was seen today for neck pain.    Diagnoses and all orders for this visit:    Cervical disc disorder at C5-C6 level with radiculopathy    Cervical disc disorder at C6-C7 level with radiculopathy      Return in about 2 months (around 8/25/2018).

## 2018-06-25 ENCOUNTER — OFFICE VISIT (OUTPATIENT)
Dept: NEUROSURGERY | Facility: CLINIC | Age: 68
End: 2018-06-25

## 2018-06-25 VITALS
SYSTOLIC BLOOD PRESSURE: 133 MMHG | BODY MASS INDEX: 32.51 KG/M2 | HEART RATE: 76 BPM | DIASTOLIC BLOOD PRESSURE: 89 MMHG | WEIGHT: 240 LBS | HEIGHT: 72 IN

## 2018-06-25 DIAGNOSIS — M50.123 CERVICAL DISC DISORDER AT C6-C7 LEVEL WITH RADICULOPATHY: ICD-10-CM

## 2018-06-25 DIAGNOSIS — M50.122 CERVICAL DISC DISORDER AT C5-C6 LEVEL WITH RADICULOPATHY: Primary | ICD-10-CM

## 2018-06-25 PROCEDURE — 99213 OFFICE O/P EST LOW 20 MIN: CPT | Performed by: NEUROLOGICAL SURGERY

## 2018-06-27 ENCOUNTER — ANESTHESIA EVENT (OUTPATIENT)
Dept: PAIN MEDICINE | Facility: HOSPITAL | Age: 68
End: 2018-06-27

## 2018-06-27 ENCOUNTER — HOSPITAL ENCOUNTER (OUTPATIENT)
Dept: PAIN MEDICINE | Facility: HOSPITAL | Age: 68
Discharge: HOME | End: 2018-06-27
Admitting: NEUROLOGICAL SURGERY

## 2018-06-27 ENCOUNTER — HOSPITAL ENCOUNTER (OUTPATIENT)
Dept: GENERAL RADIOLOGY | Facility: HOSPITAL | Age: 68
Discharge: HOME | End: 2018-06-27

## 2018-06-27 ENCOUNTER — ANESTHESIA (OUTPATIENT)
Dept: PAIN MEDICINE | Facility: HOSPITAL | Age: 68
End: 2018-06-27

## 2018-06-27 VITALS
OXYGEN SATURATION: 99 % | TEMPERATURE: 97.6 F | HEIGHT: 72 IN | HEART RATE: 67 BPM | RESPIRATION RATE: 16 BRPM | BODY MASS INDEX: 32.51 KG/M2 | SYSTOLIC BLOOD PRESSURE: 139 MMHG | DIASTOLIC BLOOD PRESSURE: 92 MMHG | WEIGHT: 240 LBS

## 2018-06-27 DIAGNOSIS — M50.123 CERVICAL DISC DISORDER AT C6-C7 LEVEL WITH RADICULOPATHY: ICD-10-CM

## 2018-06-27 DIAGNOSIS — M50.122 CERVICAL DISC DISORDER AT C5-C6 LEVEL WITH RADICULOPATHY: ICD-10-CM

## 2018-06-27 DIAGNOSIS — R52 PAIN: ICD-10-CM

## 2018-06-27 PROCEDURE — 77003 FLUOROGUIDE FOR SPINE INJECT: CPT

## 2018-06-27 PROCEDURE — C1755 CATHETER, INTRASPINAL: HCPCS

## 2018-06-27 PROCEDURE — 0 IOPAMIDOL 41 % SOLUTION: Performed by: ANESTHESIOLOGY

## 2018-06-27 PROCEDURE — 25010000002 METHYLPREDNISOLONE PER 80 MG: Performed by: ANESTHESIOLOGY

## 2018-06-27 RX ORDER — METHYLPREDNISOLONE ACETATE 80 MG/ML
80 INJECTION, SUSPENSION INTRA-ARTICULAR; INTRALESIONAL; INTRAMUSCULAR; SOFT TISSUE ONCE
Status: COMPLETED | OUTPATIENT
Start: 2018-06-27 | End: 2018-06-27

## 2018-06-27 RX ORDER — LIDOCAINE HYDROCHLORIDE 10 MG/ML
1 INJECTION, SOLUTION INFILTRATION; PERINEURAL ONCE
Status: DISCONTINUED | OUTPATIENT
Start: 2018-06-27 | End: 2018-06-28 | Stop reason: HOSPADM

## 2018-06-27 RX ORDER — MIDAZOLAM HYDROCHLORIDE 1 MG/ML
2 INJECTION INTRAMUSCULAR; INTRAVENOUS ONCE
Status: DISCONTINUED | OUTPATIENT
Start: 2018-06-27 | End: 2018-06-28 | Stop reason: HOSPADM

## 2018-06-27 RX ADMIN — METHYLPREDNISOLONE ACETATE 80 MG: 80 INJECTION, SUSPENSION INTRA-ARTICULAR; INTRALESIONAL; INTRAMUSCULAR; SOFT TISSUE at 10:17

## 2018-06-27 RX ADMIN — IOPAMIDOL 10 ML: 408 INJECTION, SOLUTION INTRATHECAL at 10:17

## 2018-06-27 NOTE — ANESTHESIA PROCEDURE NOTES
PAIN Epidural block    Patient location during procedure: pain clinic  Start Time: 6/27/2018 10:11 AM  Stop Time: 6/27/2018 10:20 AM  Indication:at surgeon's request and procedure for pain  Performed By  Anesthesiologist: ISABELLA MASSEY  Preanesthetic Checklist  Completed: patient identified, site marked, surgical consent, pre-op evaluation, timeout performed, IV checked, risks and benefits discussed and monitors and equipment checked  Additional Notes  Dx : Post-Op Diagnosis Codes:     * Cervical disc displacement (M50.20)     * Cervical radiculopathy (M54.12)      Plan : return to clinic as needed  Prep:  Pt Position:prone (prone)  Sterile Tech:cap, gloves, mask and sterile barrier  Prep:chlorhexidine gluconate and isopropyl alcohol  Monitoring:blood pressure monitoring, continuous pulse oximetry and EKG  Procedure:  Sedation: no   Approach:midline  Guidance: fluoroscopy and c arm pa and lat and loss of resistance  Location:cervical  Level:6-7 (interlaminar)  Needle Type:Arctrieval  Needle Gauge:20  Aspiration:negative  Medications:  Preservative Free Saline:3mL  Isovue:2mL    Post Assessment:  Post-procedure: bandaid.  Pt Tolerance:patient tolerated the procedure well with no apparent complications  Complications:no

## 2018-06-27 NOTE — H&P
The patient returns for another Cervical epidural steroid injection 2 today.  They have received 75 % improvement since their last injection with a pain level of 5-10 /10 at its worst recently.    Conservative measures tried in the interim : the pain has also affected their ability to do their daily activities    Radiology reports and/ or previous notes have been reviewed and are consistent with their diagnosis of Post-Op Diagnosis Codes:     * Cervical disc displacement [M50.20]     * Cervical radiculopathy [M54.12]    Alert and oriented  MP - 2  Lungs - clear  CV - rrr    Diagnosis:  Post-Op Diagnosis Codes:     * Cervical disc displacement [M50.20]     * Cervical radiculopathy [M54.12]    Plan:  Cervical epidural steroid injection under fluoroscopic guidance    I have encouraged them to continue:  1.  Physical therapy exercises at home as prescribed by physical therapy or from the pain clinic handout (given to the patient).  Continuation of these exercises every day, or multiple times per week, even when the patient has good pain relief, was stressed to the patient as a preventative measure to decrease the frequency and severity of future pain episodes.  2.  Continue pain medicines as already prescribed.  If patient not currently taking any, it is recommended to begin Acetaminophen 1000 mg po q 8 hours.  If other medicines containing Acetaminophen are currently prescribed, maintain daily dose at 3000mg.    3.  If they can tolerate NSAIDS, it is recommended to take Ibuprofen 600 mg po q 6 hours for 7 days during pain exacerbations.  Alternatively, they may substitute an NSAID of their choice (e.g. Aleve)  4.  Heat and ice to the affected area as tolerated for pain control.  It was discussed that heating pads can cause burns.  5.  Low impact exercise such as walking or water exercise was recommended to maintain overall health and aid in weight control.   6.  Follow up as needed for subsequent injections.  7.   Patient was counseled to abstain from tobacco products.

## 2018-07-06 ENCOUNTER — CLINICAL SUPPORT (OUTPATIENT)
Dept: FAMILY MEDICINE CLINIC | Facility: CLINIC | Age: 68
End: 2018-07-06

## 2018-07-06 DIAGNOSIS — Z51.81 ENCOUNTER FOR THERAPEUTIC DRUG MONITORING: Primary | ICD-10-CM

## 2018-07-06 DIAGNOSIS — Z79.01 LONG-TERM (CURRENT) USE OF ANTICOAGULANTS: ICD-10-CM

## 2018-07-06 LAB — INR PPP: 2.4 (ref 2–3)

## 2018-07-06 PROCEDURE — 85610 PROTHROMBIN TIME: CPT | Performed by: PHYSICIAN ASSISTANT

## 2018-07-06 PROCEDURE — 36416 COLLJ CAPILLARY BLOOD SPEC: CPT | Performed by: PHYSICIAN ASSISTANT

## 2018-07-10 LAB
25(OH)D3+25(OH)D2 SERPL-MCNC: 23.9 NG/ML (ref 30–100)
ALBUMIN SERPL-MCNC: 4.4 G/DL (ref 3.5–5.2)
ALBUMIN/GLOB SERPL: 1.5 G/DL
ALP SERPL-CCNC: 47 U/L (ref 39–117)
ALT SERPL-CCNC: 14 U/L (ref 1–41)
APPEARANCE UR: CLEAR
AST SERPL-CCNC: 16 U/L (ref 1–40)
BACTERIA #/AREA URNS HPF: NORMAL /HPF
BASOPHILS # BLD AUTO: 0.02 10*3/MM3 (ref 0–0.2)
BASOPHILS NFR BLD AUTO: 0.3 % (ref 0–1.5)
BILIRUB SERPL-MCNC: 0.7 MG/DL (ref 0.1–1.2)
BILIRUB UR QL STRIP: NEGATIVE
BUN SERPL-MCNC: 17 MG/DL (ref 8–23)
BUN/CREAT SERPL: 13.7 (ref 7–25)
CALCIUM SERPL-MCNC: 9.1 MG/DL (ref 8.6–10.5)
CASTS URNS MICRO: NORMAL
CHLORIDE SERPL-SCNC: 104 MMOL/L (ref 98–107)
CHOLEST SERPL-MCNC: 264 MG/DL (ref 0–200)
CO2 SERPL-SCNC: 22.3 MMOL/L (ref 22–29)
COLOR UR: YELLOW
CREAT SERPL-MCNC: 1.24 MG/DL (ref 0.76–1.27)
EOSINOPHIL # BLD AUTO: 0.06 10*3/MM3 (ref 0–0.7)
EOSINOPHIL NFR BLD AUTO: 0.9 % (ref 0.3–6.2)
EPI CELLS #/AREA URNS HPF: NORMAL /HPF
ERYTHROCYTE [DISTWIDTH] IN BLOOD BY AUTOMATED COUNT: 14.4 % (ref 11.5–14.5)
FOLATE SERPL-MCNC: 7.79 NG/ML (ref 4.78–24.2)
GLOBULIN SER CALC-MCNC: 2.9 GM/DL
GLUCOSE SERPL-MCNC: 95 MG/DL (ref 65–99)
GLUCOSE UR QL: NEGATIVE
HBA1C MFR BLD: 5.77 % (ref 4.8–5.6)
HCT VFR BLD AUTO: 47.3 % (ref 40.4–52.2)
HDLC SERPL-MCNC: 45 MG/DL (ref 40–60)
HGB BLD-MCNC: 16 G/DL (ref 13.7–17.6)
HGB UR QL STRIP: (no result)
IMM GRANULOCYTES # BLD: 0 10*3/MM3 (ref 0–0.03)
IMM GRANULOCYTES NFR BLD: 0 % (ref 0–0.5)
KETONES UR QL STRIP: NEGATIVE
LDLC SERPL CALC-MCNC: 195 MG/DL (ref 0–100)
LEUKOCYTE ESTERASE UR QL STRIP: NEGATIVE
LYMPHOCYTES # BLD AUTO: 3.26 10*3/MM3 (ref 0.9–4.8)
LYMPHOCYTES NFR BLD AUTO: 46.8 % (ref 19.6–45.3)
MCH RBC QN AUTO: 33 PG (ref 27–32.7)
MCHC RBC AUTO-ENTMCNC: 33.8 G/DL (ref 32.6–36.4)
MCV RBC AUTO: 97.5 FL (ref 79.8–96.2)
MONOCYTES # BLD AUTO: 0.48 10*3/MM3 (ref 0.2–1.2)
MONOCYTES NFR BLD AUTO: 6.9 % (ref 5–12)
NEUTROPHILS # BLD AUTO: 3.14 10*3/MM3 (ref 1.9–8.1)
NEUTROPHILS NFR BLD AUTO: 45.1 % (ref 42.7–76)
NITRITE UR QL STRIP: NEGATIVE
PH UR STRIP: 5.5 [PH] (ref 5–8)
PLATELET # BLD AUTO: 191 10*3/MM3 (ref 140–500)
POTASSIUM SERPL-SCNC: 4.2 MMOL/L (ref 3.5–5.2)
PROT SERPL-MCNC: 7.3 G/DL (ref 6–8.5)
PROT UR QL STRIP: NEGATIVE
PSA SERPL-MCNC: 0.82 NG/ML (ref 0–4)
RBC # BLD AUTO: 4.85 10*6/MM3 (ref 4.6–6)
RBC #/AREA URNS HPF: NORMAL /HPF
SODIUM SERPL-SCNC: 141 MMOL/L (ref 136–145)
SP GR UR: 1.02 (ref 1–1.03)
T4 FREE SERPL-MCNC: 1.1 NG/DL (ref 0.93–1.7)
TRIGL SERPL-MCNC: 120 MG/DL (ref 0–150)
TSH SERPL DL<=0.005 MIU/L-ACNC: 2.39 MIU/ML (ref 0.27–4.2)
UROBILINOGEN UR STRIP-MCNC: (no result) MG/DL
VIT B12 SERPL-MCNC: 346 PG/ML (ref 211–946)
VLDLC SERPL CALC-MCNC: 24 MG/DL (ref 5–40)
WBC # BLD AUTO: 6.96 10*3/MM3 (ref 4.5–10.7)
WBC #/AREA URNS HPF: NORMAL /HPF

## 2018-07-13 ENCOUNTER — TELEPHONE (OUTPATIENT)
Dept: FAMILY MEDICINE CLINIC | Facility: CLINIC | Age: 68
End: 2018-07-13

## 2018-07-13 DIAGNOSIS — K43.9 ABDOMINAL WALL HERNIA: Primary | ICD-10-CM

## 2018-07-13 DIAGNOSIS — E78.2 MIXED HYPERLIPIDEMIA: Primary | ICD-10-CM

## 2018-07-13 RX ORDER — ATORVASTATIN CALCIUM 40 MG/1
40 TABLET, FILM COATED ORAL DAILY
Qty: 30 TABLET | Refills: 11 | Status: SHIPPED | OUTPATIENT
Start: 2018-07-13 | End: 2018-07-20 | Stop reason: SDUPTHER

## 2018-07-13 NOTE — TELEPHONE ENCOUNTER
----- Message from Aletha Ochoa PA-C sent at 7/10/2018  9:00 PM EDT -----  Labs show low Vitamin D levels.  I want you to add OTC Vitamin D 2,000 I.U. Daily.  ?Crestor;  Made note to start this Feb 2017 and if did take it?  On it?   Glucose impaired.  Start Low Glycemic Index Diet.  Weight Watchers is one of these types of diets.  Avoid concentrated sweets, white breads, and potatoes.    RBC urine neg;  Will need f/u on renal functions also

## 2018-07-13 NOTE — TELEPHONE ENCOUNTER
Pt states he has a large knot in his abdomen where he usually has pain. Has intermit sharp pain where the knot is. He feels like it is getting worse. He made an appt for next Friday, should he be seen sooner?

## 2018-07-13 NOTE — TELEPHONE ENCOUNTER
Sounds like hernia and will put in referral to surgeon, Dr Sin;  He also had labs and needs ? results

## 2018-07-20 ENCOUNTER — OFFICE VISIT (OUTPATIENT)
Dept: FAMILY MEDICINE CLINIC | Facility: CLINIC | Age: 68
End: 2018-07-20

## 2018-07-20 VITALS
OXYGEN SATURATION: 98 % | HEIGHT: 72 IN | TEMPERATURE: 98.2 F | BODY MASS INDEX: 33.59 KG/M2 | HEART RATE: 70 BPM | RESPIRATION RATE: 16 BRPM | SYSTOLIC BLOOD PRESSURE: 120 MMHG | WEIGHT: 248 LBS | DIASTOLIC BLOOD PRESSURE: 70 MMHG

## 2018-07-20 DIAGNOSIS — K21.00 GASTROESOPHAGEAL REFLUX DISEASE WITH ESOPHAGITIS: ICD-10-CM

## 2018-07-20 DIAGNOSIS — R06.2 WHEEZING: ICD-10-CM

## 2018-07-20 DIAGNOSIS — Z86.711 HISTORY OF PULMONARY EMBOLUS (PE): ICD-10-CM

## 2018-07-20 DIAGNOSIS — D17.1 LIPOMA OF TORSO: Primary | ICD-10-CM

## 2018-07-20 PROCEDURE — 99213 OFFICE O/P EST LOW 20 MIN: CPT | Performed by: PHYSICIAN ASSISTANT

## 2018-07-20 RX ORDER — ESOMEPRAZOLE MAGNESIUM 40 MG/1
40 CAPSULE, DELAYED RELEASE ORAL DAILY
Qty: 30 CAPSULE | Refills: 11 | Status: SHIPPED | OUTPATIENT
Start: 2018-07-20 | End: 2018-09-07 | Stop reason: HOSPADM

## 2018-07-20 RX ORDER — ATORVASTATIN CALCIUM 40 MG/1
40 TABLET, FILM COATED ORAL DAILY
Qty: 30 TABLET | Refills: 11 | Status: SHIPPED | OUTPATIENT
Start: 2018-07-20 | End: 2018-07-20 | Stop reason: SDUPTHER

## 2018-07-20 RX ORDER — ATORVASTATIN CALCIUM 40 MG/1
40 TABLET, FILM COATED ORAL DAILY
Qty: 30 TABLET | Refills: 11 | Status: SHIPPED | OUTPATIENT
Start: 2018-07-20 | End: 2019-10-08 | Stop reason: SDUPTHER

## 2018-07-20 NOTE — PROGRESS NOTES
Subjective   Javy Reeves Sr. is a 67 y.o. male.     History of Present Illness   Javy Reeves Sr. 67 y.o. male who presents today for routine follow up check and medication refills.  he has a history of   Patient Active Problem List   Diagnosis   • Arthritis   • Family history of early CAD   • DDD (degenerative disc disease), cervical   • DVT (deep venous thrombosis) (CMS/[facility])   • Fibromyalgia   • Past heart attack   • Hyperlipidemia   • DDD (degenerative disc disease), lumbosacral   • History of pulmonary embolus (PE)   • Reflux esophagitis   • TIA (transient ischemic attack)   • Left groin pain   • Cervical radicular pain   • Mild atherosclerosis of right carotid artery   • Cervical disc disorder at C5-C6 level with radiculopathy   • Wheezing   .  Since the last visit, he has overall felt well.  He has Impaired fasting glucose and will continue close lab follow up to watch for DMII, Hyperlipidemia and here to discuss treatment and Vitamin D deficiency and will update labs to confirm level is at goal >30.  he has been compliant with current medications have reviewed them.  The patient denies medication side effects.    Results for orders placed or performed in visit on 07/06/18   POC INR   Result Value Ref Range    INR 2.40 2 - 3     Anoro helps the wheezing and still smoking    Crestor caused h/a and will try Atorvastatin----also want statin for carotid atherosclerosis hx--if pain, will try Zetia  Hx DVT and PE  Also adding Vit D  Getting epidurals for pain management    Has appt next week with Dr. Sin for this ? Lipoma right lateral abdomen and does get sore and enlg and radiates into abdomen.  Also she can do his screening colonoscopy.  He is having pain after meals in epigastric area after meals and will restart his PPI and consider EGD if no help  I will have him get INR Wed next week d/t Nexium can effect Coumadin per [facility].   Still has DVT filter--    The following portions of the patient's  history were reviewed and updated as appropriate: allergies, current medications, past family history, past medical history, past social history, past surgical history and problem list.    Review of Systems   Constitutional: Positive for appetite change. Negative for activity change and unexpected weight change.   HENT: Positive for tinnitus. Negative for nosebleeds and trouble swallowing.    Eyes: Negative for pain and visual disturbance.   Respiratory: Positive for chest tightness and wheezing. Negative for shortness of breath.    Cardiovascular: Positive for chest pain. Negative for palpitations.   Gastrointestinal: Positive for abdominal pain and diarrhea. Negative for blood in stool.   Endocrine: Negative.    Genitourinary: Negative for difficulty urinating and hematuria.   Musculoskeletal: Positive for arthralgias, back pain, joint swelling, neck pain and neck stiffness.   Skin: Negative for color change and rash.   Allergic/Immunologic: Negative.    Neurological: Positive for numbness. Negative for syncope and speech difficulty.   Hematological: Negative for adenopathy.   Psychiatric/Behavioral: Negative for agitation and confusion.   All other systems reviewed and are negative.      Objective   Physical Exam   Constitutional: He is oriented to person, place, and time. He appears well-developed and well-nourished. No distress.   HENT:   Head: Normocephalic and atraumatic.   Eyes: Pupils are equal, round, and reactive to light. Conjunctivae and EOM are normal. Right eye exhibits no discharge. Left eye exhibits no discharge. No scleral icterus.   Neck: Normal range of motion. Neck supple. No tracheal deviation present. No thyromegaly present.   Cardiovascular: Normal rate, regular rhythm, normal heart sounds, intact distal pulses and normal pulses.  Exam reveals no gallop.    No murmur heard.  Pulmonary/Chest: Effort normal and breath sounds normal. No respiratory distress. He has no wheezes. He has no rales.    Abdominal: Soft. He exhibits mass (right lateral side abd about 2.5cm lipoma type mass that is rubbery and not sore not; does have epigastric pain to palp). There is tenderness.   Musculoskeletal: Normal range of motion.   Neurological: He is alert and oriented to person, place, and time. He exhibits normal muscle tone. Coordination normal.   Skin: Skin is warm. No rash noted. No erythema. No pallor.   Psychiatric: He has a normal mood and affect. His behavior is normal. Judgment and thought content normal.   Nursing note and vitals reviewed.      Assessment/Plan   Problems Addressed this Visit        Respiratory    Wheezing       Other    History of pulmonary embolus (PE)      Other Visit Diagnoses     Lipoma of torso    -  Primary    Gastroesophageal reflux disease with esophagitis        Relevant Medications    esomeprazole (nexIUM) 40 MG capsule

## 2018-07-20 NOTE — PATIENT INSTRUCTIONS
INR on Wed nurse appt  Low glycemic index diet  Exercise 30 minutes most days of the week  Make sure you get results on any labs or tests we ordered today  We discussed medications and how to take them as prescribed  Sleep 6-8 hours each night if possible  If you have not signed up for [employer], please activate your code ASAP from your After Visit Summary today    LDL goal <100  LDL goal if heart disease <70  HDL goal >60  Triglyceride goal <150  BP goal =<130/80  Fasting glucose <100      Food Choices for Gastroesophageal Reflux Disease, Adult  When you have gastroesophageal reflux disease (GERD), the foods you eat and your eating habits are very important. Choosing the right foods can help ease the discomfort of GERD. Consider working with a diet and nutrition specialist (dietitian) to help you make healthy food choices.  What general guidelines should I follow?  Eating plan  · Choose healthy foods low in fat, such as fruits, vegetables, whole grains, low-fat dairy products, and lean meat, fish, and poultry.  · Eat frequent, small meals instead of three large meals each day. Eat your meals slowly, in a relaxed setting. Avoid bending over or lying down until 2-3 hours after eating.  · Limit high-fat foods such as fatty meats or fried foods.  · Limit your intake of oils, butter, and shortening to less than 8 teaspoons each day.  · Avoid the following:  ? Foods that cause symptoms. These may be different for different people. Keep a food diary to keep track of foods that cause symptoms.  ? Alcohol.  ? Drinking large amounts of liquid with meals.  ? Eating meals during the 2-3 hours before bed.  · Cook foods using methods other than frying. This may include baking, grilling, or broiling.  Lifestyle    · Maintain a healthy weight. Ask your health care provider what weight is healthy for you. If you need to lose weight, work with your health care provider to do so safely.  · Exercise for at least 30 minutes on 5 or more  days each week, or as told by your health care provider.  · Avoid wearing clothes that fit tightly around your waist and chest.  · Do not use any products that contain nicotine or tobacco, such as cigarettes and e-cigarettes. If you need help quitting, ask your health care provider.  · Sleep with the head of your bed raised. Use a wedge under the mattress or blocks under the bed frame to raise the head of the bed.  What foods are not recommended?  The items listed may not be a complete list. Talk with your dietitian about what dietary choices are best for you.  Grains  Pastries or quick breads with added fat. French toast.  Vegetables  Deep fried vegetables. French fries. Any vegetables prepared with added fat. Any vegetables that cause symptoms. For some people this may include tomatoes and tomato products, chili peppers, onions and garlic, and horseradish.  Fruits  Any fruits prepared with added fat. Any fruits that cause symptoms. For some people this may include citrus fruits, such as oranges, grapefruit, pineapple, and mily.  Meats and other protein foods  High-fat meats, such as fatty beef or pork, hot dogs, ribs, ham, sausage, salami and pedroza. Fried meat or protein, including fried fish and fried chicken. Nuts and nut butters.  Dairy  Whole milk and chocolate milk. Sour cream. Cream. Ice cream. Cream cheese. Milk shakes.  Beverages  Coffee and tea, with or without caffeine. Carbonated beverages. Sodas. Energy drinks. Fruit juice made with acidic fruits (such as orange or grapefruit). Tomato juice. Alcoholic drinks.  Fats and oils  Butter. Margarine. Shortening. Ghee.  Sweets and desserts  Chocolate and cocoa. Donuts.  Seasoning and other foods  Pepper. Peppermint and spearmint. Any condiments, herbs, or seasonings that cause symptoms. For some people, this may include gutierrez, hot sauce, or vinegar-based salad dressings.  Summary  · When you have gastroesophageal reflux disease (GERD), food and lifestyle  choices are very important to help ease the discomfort of GERD.  · Eat frequent, small meals instead of three large meals each day. Eat your meals slowly, in a relaxed setting. Avoid bending over or lying down until 2-3 hours after eating.  · Limit high-fat foods such as fatty meat or fried foods.  This information is not intended to replace advice given to you by your health care provider. Make sure you discuss any questions you have with your health care provider.  Document Released: 12/18/2006 Document Revised: 12/19/2017 Document Reviewed: 12/19/2017  [employer] Patient Education © 2018 [employer]

## 2018-07-25 ENCOUNTER — CLINICAL SUPPORT (OUTPATIENT)
Dept: FAMILY MEDICINE CLINIC | Facility: CLINIC | Age: 68
End: 2018-07-25

## 2018-07-25 DIAGNOSIS — Z79.01 LONG-TERM (CURRENT) USE OF ANTICOAGULANTS: ICD-10-CM

## 2018-07-25 DIAGNOSIS — Z51.81 ENCOUNTER FOR THERAPEUTIC DRUG MONITORING: Primary | ICD-10-CM

## 2018-07-25 LAB — INR PPP: 6.3 (ref 2–3)

## 2018-07-25 PROCEDURE — 85610 PROTHROMBIN TIME: CPT | Performed by: PHYSICIAN ASSISTANT

## 2018-07-25 PROCEDURE — 36416 COLLJ CAPILLARY BLOOD SPEC: CPT | Performed by: PHYSICIAN ASSISTANT

## 2018-07-27 ENCOUNTER — OFFICE VISIT (OUTPATIENT)
Dept: SURGERY | Facility: CLINIC | Age: 68
End: 2018-07-27

## 2018-07-27 ENCOUNTER — CLINICAL SUPPORT (OUTPATIENT)
Dept: FAMILY MEDICINE CLINIC | Facility: CLINIC | Age: 68
End: 2018-07-27

## 2018-07-27 VITALS — HEIGHT: 72 IN | HEART RATE: 84 BPM | OXYGEN SATURATION: 98 % | WEIGHT: 240 LBS | BODY MASS INDEX: 32.51 KG/M2

## 2018-07-27 DIAGNOSIS — Z79.01 LONG-TERM (CURRENT) USE OF ANTICOAGULANTS: ICD-10-CM

## 2018-07-27 DIAGNOSIS — R10.31 RIGHT LOWER QUADRANT ABDOMINAL PAIN: Primary | ICD-10-CM

## 2018-07-27 DIAGNOSIS — Z51.81 ENCOUNTER FOR THERAPEUTIC DRUG MONITORING: Primary | ICD-10-CM

## 2018-07-27 DIAGNOSIS — Z86.010 HISTORY OF COLON POLYPS: ICD-10-CM

## 2018-07-27 DIAGNOSIS — R19.7 DIARRHEA, UNSPECIFIED TYPE: ICD-10-CM

## 2018-07-27 PROBLEM — Z86.0100 HISTORY OF COLON POLYPS: Status: ACTIVE | Noted: 2018-07-27

## 2018-07-27 LAB — INR PPP: 2.1 (ref 2–3)

## 2018-07-27 PROCEDURE — 36416 COLLJ CAPILLARY BLOOD SPEC: CPT | Performed by: PHYSICIAN ASSISTANT

## 2018-07-27 PROCEDURE — 99203 OFFICE O/P NEW LOW 30 MIN: CPT | Performed by: SURGERY

## 2018-07-27 PROCEDURE — 85610 PROTHROMBIN TIME: CPT | Performed by: PHYSICIAN ASSISTANT

## 2018-07-27 RX ORDER — CETIRIZINE HYDROCHLORIDE 10 MG/1
10 TABLET ORAL DAILY
Status: ON HOLD | COMMUNITY
End: 2022-08-23

## 2018-08-03 ENCOUNTER — CLINICAL SUPPORT (OUTPATIENT)
Dept: FAMILY MEDICINE CLINIC | Facility: CLINIC | Age: 68
End: 2018-08-03

## 2018-08-03 DIAGNOSIS — Z51.81 ENCOUNTER FOR THERAPEUTIC DRUG MONITORING: Primary | ICD-10-CM

## 2018-08-03 DIAGNOSIS — Z79.01 LONG-TERM (CURRENT) USE OF ANTICOAGULANTS: ICD-10-CM

## 2018-08-03 LAB — INR PPP: 1.4 (ref 2–3)

## 2018-08-03 PROCEDURE — 36416 COLLJ CAPILLARY BLOOD SPEC: CPT | Performed by: PHYSICIAN ASSISTANT

## 2018-08-03 PROCEDURE — 85610 PROTHROMBIN TIME: CPT | Performed by: PHYSICIAN ASSISTANT

## 2018-08-03 NOTE — PROGRESS NOTES
Capillary blood collection performed in Right finger by Aletha Davila. Patient tolerated well.     pt is off warfarin he is having epidural on 8/7/18, Start warfarin on 8/7 10.5 T, Th 10 ROW check on 8/14

## 2018-08-05 NOTE — PROGRESS NOTES
General Surgery  Initial Office Visit    CC: Possible abdominal wall hernia    HPI: The patient is a pleasant 67 y.o. year-old gentleman who presents today for evaluation of an asymmetry noted along his right flank about 4 months ago. The asymmetry is an intermittent bulge he will notice from time to time, but became more apparent after he lost some weight a few months ago. The bulge is intermittently painful, especially when he stands upright. He will sometimes also notice some pain radiating to his right groin. He has also noticed a change in his bowel habits with intermittent diarrhea recently. He hasn't noticed any particular types of foods that stimulate the diarrhea as he experienced it once when eating peppers but also noticed it when eating cheerios with bananas. His last colonoscopy was done in July 2014 by Dr. Jamie Manley. This demonstrated descending/sigmoid diverticulosis, nonbleeding internal hemorrhoids, and a few small 2-3 mm rectal poylps. He denies any current or recent blood in his stool.    Past Medical History:   Coronary artery disease with history of myocardial infarction  GERD  Arthritis  History of DVT following knee replacement in late 1990's  Fibromyalgia  History of TIA    Past Surgical History:   Left knee replacement x3 (1990's)  Back surgery x2  Appendectomy  Colonoscopy (2014)  IVC filter placement  Right hand/wrist/arm surgery  Right shoulder surgery    Medications:   Coumadin 12 mg daily  Lyrica 100 mg twice daily  Atorvastatin 40 mg daily  Vitamin D 2000 units daily  Tizanidine 4 mg every 8 hours as needed for muscle spasms  Esomeprazole 40 mg daily  Zyrtec 10 mg daily  Extra strength Tylenol 500 mg daily  Anoro Ellipta inhaler daily    Allergies:   Chantix  Zinc    Family History: Mother with metastatic cancer of unknown primary, father with cancer (he is unsure what kind), brother with a brain aneurysm, sister with bilateral breast cancer    Social History: , retired, smokes  1/2 ppd cigarettes for the last 30 years, twice monthly alcohol use    ROS:   Constitutional: Negative for fevers or chills  HENT: Positive for runny nose and sneezing; Negative for hearing loss  Eyes: Negative for vision changes or scleral icterus  Respiratory: Positive for cough; Negative for shortness of breath  Cardiovascular: Positive for chest pain; Negative for heart palpitations  Gastrointestinal: Positive for abdominal pain, diarrhea, nausea, and reflux; Negative for vomiting, constipation, melena, or hematochezia  Genitourinary: Positive for pelvic pain; Negative for hematuria or dysuria  Musculoskeletal: Positive for joint pain, back pain, joint swelling, muscle pain, neck pain, and neck stiffness  Neurologic: Positive for numbness; Negative for headaches or dizziness  Psychiatric: Negative for anxiety or depression  All other systems reviewed and negative    Physical Exam:  Vitals:    07/27/18 0921   Pulse: 84   SpO2: 98%     General: No acute distress, well-nourished & well-developed  HEAD: normocephalic, atraumatic  EYES: normal conjunctiva, sclera anicteric  EARS: grossly normal hearing  NECK: supple, no thyromegaly  CARDIOVASCULAR: regular rate and rhythm  RESPIRATORY: clear to auscultation bilaterally  GASTROINTESTINAL: soft, nontender, non-distended, no palpable abdominal wall hernia with Valsalva, no soft tissue lipoma of the abdominal wall and the colon slight asymmetry of the subcutaneous fat within the right lower quadrant compared to the left  GENITOURINARY: No palpable inguinal hernia bilaterally  MUSCULOSKELETAL: normal gait and station. No gross extremity abnormalities  PSYCHIATRIC: oriented x3, normal mood and affect    IMAGING:  None    ASSESSMENT & PLAN  Mr. Reeves is a 67-year-old gentleman with slight asymmetry of the right side of his abdominal wall with subjective symptoms which would suggest a possible right lower quadrant abdominal wall hernia but no obvious physical exam findings  to confirm this.  I would like to check a CT of the abdomen and pelvis to better identify any muscular wall defect or soft tissue abnormality at the site of the right flank swelling.  Given his change in stool caliber with intermittent recent diarrhea I would also like to repeat a colonoscopy as his last colonoscopy was done 4 years ago and significant for polyps, the pathology of which is not available for me to review.  I will call him with the results of the CT scan as soon as they become available to me and have also discussed the risks of the colonoscopy to include bleeding and possible colon perforation.  Despite these risks, he has consented to proceed.    Berta Sin MD  General and Endoscopic Surgery  [facility]    [address], [address]  [place], [place], [address]  P: [phone]  F: [phone]

## 2018-08-06 NOTE — PROGRESS NOTES
Subjective   Patient ID: Javy Reeves Sr. is a 67 y.o. male is here today for follow-up on neck pain.    At the last visit the patient reported significant improvement with his neck pain after having an epidural, but stated that it was short term relief.    Today the patient reports that his neck pain has returned. He states that he also numbness and pain radiating down the left arm.    Neck Pain    This is a chronic problem. The current episode started more than 1 year ago. The problem occurs daily. The problem has been gradually worsening. The pain is associated with nothing. Associated symptoms include headaches, numbness, tingling and weakness.       The following portions of the patient's history were reviewed and updated as appropriate: allergies, current medications, past family history, past medical history, past social history, past surgical history and problem list.    Review of Systems   Musculoskeletal: Positive for neck pain.   Neurological: Positive for tingling, weakness, numbness and headaches.   All other systems reviewed and are negative.    The patient is with his granddaughter. We have been trying to avoid surgery at C5-C6 and C6-C7. He has osteophytic disease at both of those levels with neck pain and bilateral arm pain, left worse than right. The neck pain is equal to the arm pain. He has gone through physical therapy, medical therapy consisting of Lyrica and blocks. These really did not help over the long term and while he has no motor deficits, he is quite miserable because of his neck pain and radicular pain. He does have a history of heart disease and is on Coumadin for previous blood clots and pulmonary embolus. He does have a filter in. Apparently he has a zinc allergy as well. If we were to move forward with surgery he would need cardiac clearance from his cardiologist, Dr. Dalton, and needs to come off of his Coumadin. We will decide later if he needs a Lovenox bridge. He does feel  he is at that point and wants to move forward with surgery and I described an ACDF at C5-C6 and C6-C7 with a cage and plate. He is a retired  so there is no specific work that he has to go back to, but he has been quite frustrated because he cannot do anything without hurting.         Objective   Physical Exam   Constitutional: He is oriented to person, place, and time. He appears well-developed and well-nourished.   HENT:   Head: Normocephalic and atraumatic.   Eyes: Pupils are equal, round, and reactive to light. Conjunctivae and EOM are normal.   Fundoscopic exam:       The right eye shows no papilledema. The right eye shows venous pulsations.        The left eye shows no papilledema. The left eye shows venous pulsations.   Neck: Carotid bruit is not present.   Neurological: He is oriented to person, place, and time. He has a normal Finger-Nose-Finger Test and a normal Heel to Shin Test. Gait normal.   Reflex Scores:       Tricep reflexes are 2+ on the right side and 2+ on the left side.       Bicep reflexes are 2+ on the right side and 2+ on the left side.       Brachioradialis reflexes are 2+ on the right side and 2+ on the left side.       Patellar reflexes are 2+ on the right side and 2+ on the left side.       Achilles reflexes are 2+ on the right side and 2+ on the left side.  Psychiatric: His speech is normal.     Neurologic Exam     Mental Status   Oriented to person, place, and time.   Registration of memory: Good recent and remote memory.   Attention: normal. Concentration: normal.   Speech: speech is normal   Level of consciousness: alert  Knowledge: consistent with education.     Cranial Nerves     CN II   Visual fields full to confrontation.   Visual acuity: normal    CN III, IV, VI   Pupils are equal, round, and reactive to light.  Extraocular motions are normal.     CN V   Facial sensation intact.   Right corneal reflex: normal  Left corneal reflex: normal    CN VII   Facial  expression full, symmetric.   Right facial weakness: none  Left facial weakness: none    CN VIII   Hearing: intact    CN IX, X   Palate: symmetric    CN XI   Right sternocleidomastoid strength: normal  Left sternocleidomastoid strength: normal    CN XII   Tongue: not atrophic  Tongue deviation: none    Motor Exam   Muscle bulk: normal  Right arm tone: normal  Left arm tone: normal  Right leg tone: normal  Left leg tone: normal    Strength   Strength 5/5 except as noted.     Sensory Exam   Light touch normal.     Gait, Coordination, and Reflexes     Gait  Gait: normal    Coordination   Finger to nose coordination: normal  Heel to shin coordination: normal    Reflexes   Right brachioradialis: 2+  Left brachioradialis: 2+  Right biceps: 2+  Left biceps: 2+  Right triceps: 2+  Left triceps: 2+  Right patellar: 2+  Left patellar: 2+  Right achilles: 2+  Left achilles: 2+  Right : 2+  Left : 2+      Assessment/Plan   Independent Review of Radiographic Studies:    The cervical MRI done 3/60/18 shows bilateral osteophytic disease mainly at C5-C6 and C6-C7 with bilateral root entrapment.  Agree with the report.      Medical Decision Making:    We will need cardiac clearance.  He will need to come off of his Coumadin prior to surgery.  We'll determine if he needs a Lovenox bridge.    I described and recommended an anterior cervical discectomy and fusion with a cage and plate at C5/6 and C6/7. The goal of surgery is relief of radiating arm pain and improvement of numbness, tingling, and weakness. This will help reduce but may not eliminate midline neck pain. The risks include, but are not limited to, infection, hemorrhage requiring transfusion or reoperation, CSF leak requiring reoperation, incomplete relief of symptoms, difficulty swallowing, hoarseness of voice, hardware problems requiring revision surgery, potential need for additional surgery in the future, stroke, paralysis, coma, and death. The patient agrees to  katy     Javy was seen today for neck pain.    Diagnoses and all orders for this visit:    Cervical disc disorder at C5-C6 level with radiculopathy  -     Ambulatory Referral to Cardiology  -     Case request; Standing  -     Case request    Cervical disc disorder at C6-C7 level with radiculopathy  -     Ambulatory Referral to Cardiology  -     Case request; Standing  -     Case request    Cervical spinal stenosis  -     Ambulatory Referral to Cardiology  -     Case request; Standing  -     Case request      Return for 2 weeks with S or L.

## 2018-08-08 ENCOUNTER — HOSPITAL ENCOUNTER (OUTPATIENT)
Dept: PAIN MEDICINE | Facility: HOSPITAL | Age: 68
Discharge: HOME | End: 2018-08-08
Admitting: ANESTHESIOLOGY

## 2018-08-08 ENCOUNTER — ANESTHESIA (OUTPATIENT)
Dept: PAIN MEDICINE | Facility: HOSPITAL | Age: 68
End: 2018-08-08

## 2018-08-08 ENCOUNTER — HOSPITAL ENCOUNTER (OUTPATIENT)
Dept: GENERAL RADIOLOGY | Facility: HOSPITAL | Age: 68
Discharge: HOME | End: 2018-08-08

## 2018-08-08 ENCOUNTER — ANESTHESIA EVENT (OUTPATIENT)
Dept: PAIN MEDICINE | Facility: HOSPITAL | Age: 68
End: 2018-08-08

## 2018-08-08 VITALS
RESPIRATION RATE: 16 BRPM | HEIGHT: 72 IN | OXYGEN SATURATION: 97 % | HEART RATE: 65 BPM | DIASTOLIC BLOOD PRESSURE: 90 MMHG | SYSTOLIC BLOOD PRESSURE: 138 MMHG | BODY MASS INDEX: 32.51 KG/M2 | WEIGHT: 240 LBS

## 2018-08-08 DIAGNOSIS — M50.123 CERVICAL DISC DISORDER AT C6-C7 LEVEL WITH RADICULOPATHY: ICD-10-CM

## 2018-08-08 DIAGNOSIS — R52 PAIN: ICD-10-CM

## 2018-08-08 DIAGNOSIS — M50.122 CERVICAL DISC DISORDER AT C5-C6 LEVEL WITH RADICULOPATHY: ICD-10-CM

## 2018-08-08 PROCEDURE — 77003 FLUOROGUIDE FOR SPINE INJECT: CPT

## 2018-08-08 PROCEDURE — C1755 CATHETER, INTRASPINAL: HCPCS

## 2018-08-08 RX ORDER — METHYLPREDNISOLONE ACETATE 80 MG/ML
80 INJECTION, SUSPENSION INTRA-ARTICULAR; INTRALESIONAL; INTRAMUSCULAR; SOFT TISSUE ONCE
Status: DISCONTINUED | OUTPATIENT
Start: 2018-08-08 | End: 2018-08-09 | Stop reason: HOSPADM

## 2018-08-08 NOTE — ANESTHESIA PROCEDURE NOTES
PAIN Epidural block    Patient location during procedure: pain clinic  Start Time: 8/8/2018 9:04 AM  Stop Time: 8/8/2018 9:18 AM  Indication:at surgeon's request and procedure for pain  Performed By  Anesthesiologist: RENDER, DONTAE RAY  Preanesthetic Checklist  Completed: patient identified, site marked, surgical consent, pre-op evaluation, timeout performed, IV checked, risks and benefits discussed and monitors and equipment checked  Additional Notes  Post-Op Diagnosis Codes:     * Cervical disc displacement (M50.20)     * Cervical radiculopathy (M54.12)    Prep:  Pt Position:prone  Sterile Tech:sterile barrier, mask and gloves  Prep:chlorhexidine gluconate and isopropyl alcohol  Monitoring:blood pressure monitoring, continuous pulse oximetry and EKG  Procedure:  Sedation: no   Approach:midline  Guidance: fluoroscopy  Location:cervical  Level:3-4  Needle Type:Tuohy  Needle Gauge:20 G  Aspiration:negative  Test Dose:negative  Medications:  Depomedrol:80 mg  Preservative Free Saline:2mL  Isovue:2mL  Comments:Cervical epidural steroid injections performed at the C3 4 level under fluoroscopic guidance.  There is no sedation administered.  The C3 4 level was most caudal level I was able to adequately visualize in the lateral plane.  Skin was prepped with ChloraPrep and the target skin was made anesthetic.  A 20-gauge needle was passed into the C3 4 interspace.  The needle slowly advanced under lateral fluoroscopic guidance until is presumed to be the interspinous ligament.  I then switched to an AP plane and readjusted the needle tip to be in the midline.  I then again returned to the lateral plane and slowly advanced the needle until there is loss resistance to injection.  There is no return red blood cells or sural spinal fluid.  Isovue demonstrated spread of contrast media in the epidural space.  80 mg of Depo-Medrol were diluted to 2 mL and slowly ejected without exacerbation of his pain.  I then confirmed the level  by visualizing the C2 spinous process relative to my needle position.  The needle was withdrawn the patient tolerated procedure well.  Post Assessment:  Pt Tolerance:patient tolerated the procedure well with no apparent complications  Complications:no

## 2018-08-08 NOTE — H&P
[facility]    History and Physical    Patient Name: Javy Reeves Sr.  :  1950  MRN:  [id number]  Date of Admission: 2018    Subjective     Patient is a 67 y.o. male presents with chief complaint of chronic neck, shoulder: bilateral and arm: bilateral pain.  Onset of symptoms was gradual starting several years ago.  Symptoms are associated/aggravated by nothing in particular. Symptoms improve with injection  He reports somewhat insidious onset of pain it's in his neck that radiates down both of his arms and into his hands.  Doesn't really follow a dermatomal distribution in his arms or hands although he does describe a little bit more posteriorly in his upper arm.  He has no MRI which shows multiple levels of degenerative degeneration and disc bulging at C5 6.  He has been seen by Dr. Sanket james feels most of his pathology is emanating from the C5 6 and C6 7 levels.  He had a previous cervical epidural steroid injection which gave him excellent relief of his pain about 80%.  He was fairly fleeting however.  Currently rates his pain between an 8 and a 10 out of 10.  The following portions of the patients history were reviewed and updated as appropriate: current medications, allergies, past medical history, past surgical history, past family history, past social history and problem list                Objective     Past Medical History:   Past Medical History:   Diagnosis Date   • Acute and subacute infective endocarditis in diseases classified elsewhere    • Arthritis    • Chest pain    • Chronic low back pain    • Chronic pain    • Colon polyps    • Coronary artery disease    • DDD (degenerative disc disease), cervical    • DDD (degenerative disc disease), lumbosacral    • Diverticulosis    • DVT (deep venous thrombosis) (CMS/[facility])     Left Lower extremity following arthroscopic knee surgery in . IVC fliter placed at that time.   • Encounter for special screening examination for  neoplasm of prostate 2012   • Eye exam, routine > 5 yrs ago   • Eye exam, routine 01/2015   • Fibromyalgia    • Fibromyositis    • GERD (gastroesophageal reflux disease)    • Hyperlipidemia    • Hypertension    • Injury of back    • Irritable bowel    • Lumbar stenosis    • MI (myocardial infarction)    • Neck pain    • Pain in limb    • Past heart attack    • Postlaminectomy syndrome of lumbar region    • Pulmonary embolism (CMS/HCC) 1996    Left Lower extremity following arthroscopic knee surgery in 1996. IVC fliter placed at that time.   • Reflux esophagitis    • Sleep apnea    • TIA (transient ischemic attack)      Past Surgical History:   Past Surgical History:   Procedure Laterality Date   • APPENDECTOMY N/A    • CARDIAC CATHETERIZATION Left 1952    Left Heart Cath Left Ventriculography, selective coronary angiography; iliofemoral angiography; angio seal closure, Dr. Brady Ramos   • COLONOSCOPY  09/2015    removed 2 polyps, Dr. Manley   • COLONOSCOPY N/A 02/12/2007    Left colonic diverticulosis, No evidence of polypoid neoplasia, Dr. Jarret Bloom   • CYSTOSCOPY TRANSURETHRAL RESECTION OF PROSTATE N/A 11/01/2004    Dr. Rodolfo Arnold   • HAND SURGERY Right    • LUMBAR DISC SURGERY  2006, 2007    L4-S1 pseudoarthroses - Dr Cervantes   • LUMBAR DISC SURGERY N/A 01/29/2010    Removal of Instrumentation w/ decompression, Instrumentaion loosening & pt tested positive for zinc allergy, Dr. Kvng Cervantes   • REVISION TOTAL KNEE ARTHROPLASTY Left 01/29/2008    w/ Biomet w/ longer tibial stem, Dr. Wander Chua   • ROTATOR CUFF REPAIR Right 04/2012   • THORACIC OUTLET SURGERY Bilateral    • VENA CAVA FILTER PLACEMENT  1996   • WRIST SURGERY Right     x3 starting in 1984     Family History:   Family History   Problem Relation Age of Onset   • Diabetes Sister    • Cancer Sister    • Hypertension Sister    • Kidney disease Sister    • Stroke Sister    • Heart disease Brother    • Hypertension Brother    • Cerebral  aneurysm Brother    • Sudden death Brother    • Bleeding Disorder Brother    • Cancer Mother    • Heart disease Father    • Cancer Father         malignant neoplasm   • Deep vein thrombosis Father    • Heart attack Father      Social History:   Social History   Substance Use Topics   • Smoking status: Current Every Day Smoker     Packs/day: 0.50     Years: 30.00   • Smokeless tobacco: Never Used      Comment: caffeine use   • Alcohol use Yes      Comment: 2x per month       Vital Signs Range for the last 24 hours  Temperature:     Temp Source:     BP:     Pulse:     Respirations:     SPO2:     O2 Amount (l/min):     O2 Devices     Weight:           --------------------------------------------------------------------------------    Current Outpatient Prescriptions   Medication Sig Dispense Refill   • atorvastatin (LIPITOR) 40 MG tablet Take 1 tablet by mouth Daily. For cholesterol 30 tablet 11   • cetirizine (zyrTEC) 10 MG tablet Take 10 mg by mouth Daily.     • Cholecalciferol 2000 units capsule Take 2,000 Units by mouth Daily. One PO daily 90 each 3   • esomeprazole (nexIUM) 40 MG capsule Take 1 capsule by mouth Daily. For GERD 30 capsule 11   • nitroglycerin (NITROSTAT) 0.4 MG SL tablet Place 1 tablet under the tongue Every 5 (Five) Minutes As Needed for Chest Pain. Take no more than 3 doses in 15 minutes. 30 tablet 5   • pregabalin (LYRICA) 100 MG capsule Take 1 capsule by mouth 3 (Three) Times a Day. New dose for pain 90 capsule 5   • tiZANidine (ZANAFLEX) 4 MG tablet 1/2-1 tab PO Q 8 hours PRN muscle spasms 90 tablet 2   • Turmeric 500 MG tablet Take  by mouth Daily.     • Umeclidinium-Vilanterol (ANORO ELLIPTA) 62.5-25 MCG/INH aerosol powder  Inhale one puff once daily for COPD 60 each 11   • warfarin (COUMADIN) 1 MG tablet TAKE TWO TABLETS BY MOUTH EVERY DAY 30 tablet 5   • warfarin (COUMADIN) 5 MG tablet TAKE TWO TABLETS BY MOUTH EVERY DAY 60 tablet 11     No current facility-administered medications for  this encounter.        --------------------------------------------------------------------------------  Assessment/Plan      Anesthesia Evaluation                  Airway   Mallampati: III  TM distance: >3 FB  Neck ROM: limited  possible difficult intubation  Dental - normal exam     Pulmonary - normal exam   (+) pulmonary embolism, sleep apnea,   Cardiovascular   Exercise tolerance: good (4-7 METS)    Rhythm: regular    (+) hypertension, past MI  >12 months, CAD, PVD, DVT resolved, hyperlipidemia,  carotid artery disease  (-) murmur, carotid bruits    PE comment: Radial pulses are palpable bilaterally    Neuro/Psych  (+)  Sensory Deficit,      PE Comment: He reports exacerbation of his pain with extension of his neck.   strength feels strongly equal bilaterally.  He reports pain that radiates in the posterior aspect of his upper arms and into his entire arms and hands below the elbow.  This was improved with initial injections.  I'm unable to ascertain a dermatomal distribution for the pain in his hands.  GI/Hepatic/Renal/Endo    (+)  GERD,      Musculoskeletal (-) normal exam    (+) neck pain,   Abdominal    Substance History      OB/GYN          Other                   Diagnosis and Plan    Treatment Plan  ASA 3   Patient has had previous injection/procedure with 50-75% improvement.   Procedures: Cervical Epidural Steroid Injection(TATIANA), With fluoroscopy,               Diagnosis     * Cervical disc displacement [M50.20]     * Cervical radiculopathy [M54.12]

## 2018-08-10 ENCOUNTER — HOSPITAL ENCOUNTER (OUTPATIENT)
Dept: CT IMAGING | Facility: HOSPITAL | Age: 68
Discharge: HOME | End: 2018-08-10
Attending: SURGERY | Admitting: SURGERY

## 2018-08-10 DIAGNOSIS — R10.31 RIGHT LOWER QUADRANT ABDOMINAL PAIN: ICD-10-CM

## 2018-08-10 LAB — CREAT BLDA-MCNC: 1 MG/DL (ref 0.6–1.3)

## 2018-08-10 PROCEDURE — 74177 CT ABD & PELVIS W/CONTRAST: CPT

## 2018-08-10 PROCEDURE — 25010000002 IOPAMIDOL 61 % SOLUTION: Performed by: SURGERY

## 2018-08-10 PROCEDURE — 82565 ASSAY OF CREATININE: CPT

## 2018-08-10 RX ADMIN — IOPAMIDOL 85 ML: 612 INJECTION, SOLUTION INTRAVENOUS at 10:13

## 2018-08-20 ENCOUNTER — OFFICE VISIT (OUTPATIENT)
Dept: NEUROSURGERY | Facility: CLINIC | Age: 68
End: 2018-08-20

## 2018-08-20 VITALS
HEART RATE: 75 BPM | SYSTOLIC BLOOD PRESSURE: 126 MMHG | WEIGHT: 240 LBS | HEIGHT: 72 IN | BODY MASS INDEX: 32.51 KG/M2 | DIASTOLIC BLOOD PRESSURE: 83 MMHG

## 2018-08-20 DIAGNOSIS — M48.02 CERVICAL SPINAL STENOSIS: ICD-10-CM

## 2018-08-20 DIAGNOSIS — M50.122 CERVICAL DISC DISORDER AT C5-C6 LEVEL WITH RADICULOPATHY: Primary | ICD-10-CM

## 2018-08-20 DIAGNOSIS — M50.123 CERVICAL DISC DISORDER AT C6-C7 LEVEL WITH RADICULOPATHY: ICD-10-CM

## 2018-08-20 PROCEDURE — 99214 OFFICE O/P EST MOD 30 MIN: CPT | Performed by: NEUROLOGICAL SURGERY

## 2018-08-24 ENCOUNTER — CLINICAL SUPPORT (OUTPATIENT)
Dept: FAMILY MEDICINE CLINIC | Facility: CLINIC | Age: 68
End: 2018-08-24

## 2018-08-24 DIAGNOSIS — Z79.01 LONG-TERM (CURRENT) USE OF ANTICOAGULANTS: ICD-10-CM

## 2018-08-24 DIAGNOSIS — Z51.81 ENCOUNTER FOR THERAPEUTIC DRUG MONITORING: Primary | ICD-10-CM

## 2018-08-24 LAB — INR PPP: 2.3 (ref 0.9–1.1)

## 2018-08-24 PROCEDURE — 85610 PROTHROMBIN TIME: CPT | Performed by: PHYSICIAN ASSISTANT

## 2018-08-24 PROCEDURE — 36416 COLLJ CAPILLARY BLOOD SPEC: CPT | Performed by: PHYSICIAN ASSISTANT

## 2018-08-26 RX ORDER — WARFARIN SODIUM 1 MG/1
TABLET ORAL
Qty: 180 TABLET | Refills: 1 | Status: SHIPPED | OUTPATIENT
Start: 2018-08-26 | End: 2018-09-28

## 2018-09-05 RX ORDER — TIZANIDINE 4 MG/1
TABLET ORAL
Qty: 270 TABLET | Refills: 2 | Status: SHIPPED | OUTPATIENT
Start: 2018-09-05 | End: 2018-09-28

## 2018-09-07 ENCOUNTER — OFFICE VISIT (OUTPATIENT)
Dept: FAMILY MEDICINE CLINIC | Facility: CLINIC | Age: 68
End: 2018-09-07

## 2018-09-07 VITALS
DIASTOLIC BLOOD PRESSURE: 78 MMHG | TEMPERATURE: 97.8 F | WEIGHT: 242 LBS | SYSTOLIC BLOOD PRESSURE: 120 MMHG | RESPIRATION RATE: 16 BRPM | BODY MASS INDEX: 32.78 KG/M2 | HEART RATE: 64 BPM | OXYGEN SATURATION: 99 % | HEIGHT: 72 IN

## 2018-09-07 DIAGNOSIS — M51.37 DDD (DEGENERATIVE DISC DISEASE), LUMBOSACRAL: ICD-10-CM

## 2018-09-07 DIAGNOSIS — M79.7 FIBROMYALGIA: ICD-10-CM

## 2018-09-07 DIAGNOSIS — E78.2 MIXED HYPERLIPIDEMIA: Primary | ICD-10-CM

## 2018-09-07 DIAGNOSIS — I82.403 RECURRENT ACUTE DEEP VEIN THROMBOSIS (DVT) OF BOTH LOWER EXTREMITIES (HCC): ICD-10-CM

## 2018-09-07 DIAGNOSIS — K21.9 GASTROESOPHAGEAL REFLUX DISEASE WITHOUT ESOPHAGITIS: ICD-10-CM

## 2018-09-07 DIAGNOSIS — I65.21 MILD ATHEROSCLEROSIS OF RIGHT CAROTID ARTERY: ICD-10-CM

## 2018-09-07 PROCEDURE — 99214 OFFICE O/P EST MOD 30 MIN: CPT | Performed by: PHYSICIAN ASSISTANT

## 2018-09-07 RX ORDER — PREGABALIN 100 MG/1
100 CAPSULE ORAL 2 TIMES DAILY
Qty: 60 CAPSULE | Refills: 5 | Status: SHIPPED | OUTPATIENT
Start: 2018-09-07 | End: 2019-04-26 | Stop reason: SDUPTHER

## 2018-09-07 RX ORDER — OMEPRAZOLE 20 MG/1
20 CAPSULE, DELAYED RELEASE ORAL NIGHTLY
COMMUNITY
End: 2019-10-08 | Stop reason: SDUPTHER

## 2018-09-07 NOTE — PROGRESS NOTES
Subjective   Javy Reeves Sr. is a 67 y.o. male.     History of Present Illness   Javy Reeves Sr. 67 y.o. male who presents today for routine follow up check and medication refills.  he has a history of   Patient Active Problem List   Diagnosis   • Arthritis   • Family history of early CAD   • DDD (degenerative disc disease), cervical   • DVT (deep venous thrombosis) (CMS/[facility])   • Fibromyalgia   • Past heart attack   • Hyperlipidemia   • DDD (degenerative disc disease), lumbosacral   • History of pulmonary embolus (PE)   • Reflux esophagitis   • TIA (transient ischemic attack)   • Left groin pain   • Cervical radicular pain   • Mild atherosclerosis of right carotid artery   • Cervical disc disorder at C6-C7 level with radiculopathy   • Wheezing   • History of colon polyps   • Right lower quadrant abdominal pain   • Diarrhea   • Cervical spinal stenosis   • Cervical disc disorder at C5-C6 level with radiculopathy   • GERD (gastroesophageal reflux disease)   .  Since the last visit, he has overall felt fairly well.  He has GERD and is well controlled on PPI medication, Hyperlipidemia and here to discuss treatment and CAD and is under care of their Cardiologist for medical management.  he has been compliant with current medications have reviewed them.  The patient denies medication side effects.    Results for orders placed or performed in visit on 08/24/18   POC INR   Result Value Ref Range    INR 2.30 (A) 0.9 - 1.1   Anoro helps COPD  Sees Dr Dalton  Having neck surgery d/t failed epidurals  Colonoscopy screen next week  Crestor caused h/a---tolerating Atorvastatin--I have labs ordered for Oct 5 to f/u  On Coumadin for recurrent DVT and hx PE  Consider Shingrix  Needs refill Lyrica for the Fibromyalgia pain and helps some.  Warned patient that this medication can cause drowsiness and impair them operating machinery, including driving a car.  Caution is advised.  The patient has read and signed the [facility]  Health Controlled Substance Contract.  I will continue to see patient for regular follow up appointments.  They are well controlled on their medication.  ASHLEY has been reviewed by me and is updated every 3 months. The patient is aware of the potential for addiction and dependence.    The following portions of the patient's history were reviewed and updated as appropriate: allergies, current medications, past family history, past medical history, past social history, past surgical history and problem list.    Review of Systems   Constitutional: Negative for activity change, appetite change and unexpected weight change.   HENT: Negative for nosebleeds and trouble swallowing.    Eyes: Negative for pain and visual disturbance.   Respiratory: Negative for chest tightness, shortness of breath and wheezing.    Cardiovascular: Negative for chest pain and palpitations.   Gastrointestinal: Negative for abdominal pain and blood in stool.   Endocrine: Negative.    Genitourinary: Negative for difficulty urinating and hematuria.   Musculoskeletal: Positive for arthralgias, myalgias, neck pain and neck stiffness. Negative for joint swelling.   Skin: Negative for color change and rash.   Allergic/Immunologic: Negative.    Neurological: Negative for syncope and speech difficulty.   Hematological: Negative for adenopathy.   Psychiatric/Behavioral: Negative for agitation and confusion.   All other systems reviewed and are negative.      Objective   Physical Exam   Constitutional: He is oriented to person, place, and time. He appears well-developed and well-nourished. No distress.   HENT:   Head: Normocephalic and atraumatic.   Eyes: Pupils are equal, round, and reactive to light. Conjunctivae and EOM are normal. Right eye exhibits no discharge. Left eye exhibits no discharge. No scleral icterus.   Neck: Normal range of motion. Neck supple. No tracheal deviation present. No thyromegaly present.   Cardiovascular: Normal rate, regular  rhythm, normal heart sounds, intact distal pulses and normal pulses.  Exam reveals no gallop.    No murmur heard.  Trace pedal edema = bilat     Pulmonary/Chest: Effort normal and breath sounds normal. No respiratory distress. He has no wheezes. He has no rales.   Musculoskeletal: Normal range of motion.   Neurological: He is alert and oriented to person, place, and time. He exhibits normal muscle tone. Coordination normal.   Skin: Skin is warm. No rash noted. No erythema. No pallor.   Psychiatric: He has a normal mood and affect. His behavior is normal. Judgment and thought content normal.   Nursing note and vitals reviewed.      Assessment/Plan   Problems Addressed this Visit        Cardiovascular and Mediastinum    Hyperlipidemia - Primary    Mild atherosclerosis of right carotid artery    Relevant Orders    Duplex Carotid Ultrasound CAR       Digestive    GERD (gastroesophageal reflux disease)    Relevant Medications    omeprazole (priLOSEC) 20 MG capsule       Musculoskeletal and Integument    DDD (degenerative disc disease), lumbosacral       Other    Fibromyalgia      Other Visit Diagnoses     Recurrent acute deep vein thrombosis (DVT) of both lower extremities (CMS/HCC)

## 2018-09-07 NOTE — PATIENT INSTRUCTIONS
Warned patient that this medication can cause drowsiness and impair them operating machinery, including driving a car.  Caution is advised.  Low glycemic index diet  Exercise 30 minutes most days of the week  Make sure you get results on any labs or tests we ordered today  We discussed medications and how to take them as prescribed  Sleep 6-8 hours each night if possible  If you have not signed up for Vetrt, please activate your code ASAP from your After Visit Summary today    LDL goal <100  LDL goal if heart disease <70  HDL goal >60  Triglyceride goal <150  BP goal =<130/80  Fasting glucose <100

## 2018-09-09 ENCOUNTER — OFFICE VISIT (OUTPATIENT)
Dept: RETAIL CLINIC | Facility: CLINIC | Age: 68
End: 2018-09-09

## 2018-09-09 VITALS
HEART RATE: 74 BPM | OXYGEN SATURATION: 98 % | DIASTOLIC BLOOD PRESSURE: 88 MMHG | SYSTOLIC BLOOD PRESSURE: 138 MMHG | TEMPERATURE: 98.3 F

## 2018-09-09 DIAGNOSIS — J06.9 ACUTE URI: Primary | ICD-10-CM

## 2018-09-09 PROCEDURE — 99213 OFFICE O/P EST LOW 20 MIN: CPT | Performed by: NURSE PRACTITIONER

## 2018-09-09 RX ORDER — BENZONATATE 100 MG/1
CAPSULE ORAL
Qty: 30 CAPSULE | Refills: 0 | Status: SHIPPED | OUTPATIENT
Start: 2018-09-09 | End: 2018-09-28

## 2018-09-09 RX ORDER — GUAIFENESIN 600 MG/1
600 TABLET, EXTENDED RELEASE ORAL 2 TIMES DAILY
Qty: 28 TABLET | Refills: 0 | Status: SHIPPED | OUTPATIENT
Start: 2018-09-09 | End: 2018-09-23

## 2018-09-09 NOTE — PATIENT INSTRUCTIONS
"Upper Respiratory Infection, Adult  Most upper respiratory infections (URIs) are a viral infection of the air passages leading to the lungs. A URI affects the nose, throat, and upper air passages. The most common type of URI is nasopharyngitis and is typically referred to as \"the common cold.\"  URIs run their course and usually go away on their own. Most of the time, a URI does not require medical attention, but sometimes a bacterial infection in the upper airways can follow a viral infection. This is called a secondary infection. Sinus and middle ear infections are common types of secondary upper respiratory infections.  Bacterial pneumonia can also complicate a URI. A URI can worsen asthma and chronic obstructive pulmonary disease (COPD). Sometimes, these complications can require emergency medical care and may be life threatening.  What are the causes?  Almost all URIs are caused by viruses. A virus is a type of germ and can spread from one person to another.  What increases the risk?  You may be at risk for a URI if:  · You smoke.  · You have chronic heart or lung disease.  · You have a weakened defense (immune) system.  · You are very young or very old.  · You have nasal allergies or asthma.  · You work in crowded or poorly ventilated areas.  · You work in health care facilities or schools.    What are the signs or symptoms?  Symptoms typically develop 2-3 days after you come in contact with a cold virus. Most viral URIs last 7-10 days. However, viral URIs from the influenza virus (flu virus) can last 14-18 days and are typically more severe. Symptoms may include:  · Runny or stuffy (congested) nose.  · Sneezing.  · Cough.  · Sore throat.  · Headache.  · Fatigue.  · Fever.  · Loss of appetite.  · Pain in your forehead, behind your eyes, and over your cheekbones (sinus pain).  · Muscle aches.    How is this diagnosed?  Your health care provider may diagnose a URI by:  · Physical exam.  · Tests to check that your " symptoms are not due to another condition such as:  ? Strep throat.  ? Sinusitis.  ? Pneumonia.  ? Asthma.    How is this treated?  A URI goes away on its own with time. It cannot be cured with medicines, but medicines may be prescribed or recommended to relieve symptoms. Medicines may help:  · Reduce your fever.  · Reduce your cough.  · Relieve nasal congestion.    Follow these instructions at home:  · Take medicines only as directed by your health care provider.  · Gargle warm saltwater or take cough drops to comfort your throat as directed by your health care provider.  · Use a warm mist humidifier or inhale steam from a shower to increase air moisture. This may make it easier to breathe.  · Drink enough fluid to keep your urine clear or pale yellow.  · Eat soups and other clear broths and maintain good nutrition.  · Rest as needed.  · Return to work when your temperature has returned to normal or as your health care provider advises. You may need to stay home longer to avoid infecting others. You can also use a face mask and careful hand washing to prevent spread of the virus.  · Increase the usage of your inhaler if you have asthma.  · Do not use any tobacco products, including cigarettes, chewing tobacco, or electronic cigarettes. If you need help quitting, ask your health care provider.  How is this prevented?  The best way to protect yourself from getting a cold is to practice good hygiene.  · Avoid oral or hand contact with people with cold symptoms.  · Wash your hands often if contact occurs.    There is no clear evidence that vitamin C, vitamin E, echinacea, or exercise reduces the chance of developing a cold. However, it is always recommended to get plenty of rest, exercise, and practice good nutrition.  Contact a health care provider if:  · You are getting worse rather than better.  · Your symptoms are not controlled by medicine.  · You have chills.  · You have worsening shortness of breath.  · You have  brown or red mucus.  · You have yellow or brown nasal discharge.  · You have pain in your face, especially when you bend forward.  · You have a fever.  · You have swollen neck glands.  · You have pain while swallowing.  · You have white areas in the back of your throat.  Get help right away if:  · You have severe or persistent:  ? Headache.  ? Ear pain.  ? Sinus pain.  ? Chest pain.  · You have chronic lung disease and any of the following:  ? Wheezing.  ? Prolonged cough.  ? Coughing up blood.  ? A change in your usual mucus.  · You have a stiff neck.  · You have changes in your:  ? Vision.  ? Hearing.  ? Thinking.  ? Mood.  This information is not intended to replace advice given to you by your health care provider. Make sure you discuss any questions you have with your health care provider.  Document Released: 06/13/2002 Document Revised: 08/20/2017 Document Reviewed: 03/25/2015  [employer] Interactive Patient Education © 2018 [employer]

## 2018-09-09 NOTE — PROGRESS NOTES
Subjective:     Javy Reeves Sr. is a 67 y.o.     Sinusitis   This is a new problem. The current episode started in the past 7 days. There has been no fever. Associated symptoms include congestion, coughing, ear pain, sinus pressure and a sore throat. Pertinent negatives include no chills or shortness of breath. Treatments tried: syed's, nyquil. The treatment provided mild relief.         The following portions of the patient's history were reviewed and updated as appropriate: allergies, current medications, past family history, past medical history, past social history, past surgical history and problem list.      Review of Systems   Constitutional: Negative for chills.   HENT: Positive for congestion, ear pain, sinus pain, sinus pressure and sore throat.    Respiratory: Positive for cough. Negative for shortness of breath and wheezing.    Cardiovascular:        Hx: see hx         Objective:      Physical Exam   Constitutional: Vital signs are normal.   HENT:   Head: Normocephalic and atraumatic.   Right Ear: Tympanic membrane and ear canal normal.   Left Ear: Tympanic membrane and ear canal normal.   Nose: Rhinorrhea present. Right sinus exhibits maxillary sinus tenderness. Left sinus exhibits maxillary sinus tenderness.   Mouth/Throat: Posterior oropharyngeal erythema present. No oropharyngeal exudate.   Cardiovascular: Normal rate, regular rhythm, S1 normal, S2 normal and normal heart sounds.    Pulmonary/Chest: Effort normal and breath sounds normal.   Vitals reviewed.          Diagnoses and all orders for this visit:    Acute URI    Other orders  -     guaiFENesin (MUCINEX) 600 MG 12 hr tablet; Take 1 tablet by mouth 2 (Two) Times a Day for 14 days.  -     benzonatate (TESSALON PERLES) 100 MG capsule; 1 to 2 capsules 3 times a day

## 2018-09-13 ENCOUNTER — TRANSCRIBE ORDERS (OUTPATIENT)
Dept: NEUROSURGERY | Facility: CLINIC | Age: 68
End: 2018-09-13

## 2018-09-13 ENCOUNTER — ANESTHESIA EVENT (OUTPATIENT)
Dept: GASTROENTEROLOGY | Facility: HOSPITAL | Age: 68
End: 2018-09-13

## 2018-09-13 ENCOUNTER — ANESTHESIA (OUTPATIENT)
Dept: GASTROENTEROLOGY | Facility: HOSPITAL | Age: 68
End: 2018-09-13

## 2018-09-13 ENCOUNTER — HOSPITAL ENCOUNTER (OUTPATIENT)
Facility: HOSPITAL | Age: 68
Setting detail: HOSPITAL OUTPATIENT SURGERY
Discharge: HOME | End: 2018-09-13
Attending: SURGERY | Admitting: SURGERY

## 2018-09-13 VITALS
BODY MASS INDEX: 32.12 KG/M2 | TEMPERATURE: 97.6 F | HEART RATE: 71 BPM | SYSTOLIC BLOOD PRESSURE: 152 MMHG | WEIGHT: 237.13 LBS | DIASTOLIC BLOOD PRESSURE: 95 MMHG | HEIGHT: 72 IN | OXYGEN SATURATION: 96 % | RESPIRATION RATE: 20 BRPM

## 2018-09-13 DIAGNOSIS — R19.7 DIARRHEA, UNSPECIFIED TYPE: ICD-10-CM

## 2018-09-13 DIAGNOSIS — Z86.010 HISTORY OF COLON POLYPS: ICD-10-CM

## 2018-09-13 DIAGNOSIS — R10.31 RIGHT LOWER QUADRANT ABDOMINAL PAIN: ICD-10-CM

## 2018-09-13 PROBLEM — K57.90 DIVERTICULOSIS: Status: ACTIVE | Noted: 2018-09-13

## 2018-09-13 PROBLEM — K63.5 COLON POLYPS: Status: ACTIVE | Noted: 2018-07-27

## 2018-09-13 PROCEDURE — 88305 TISSUE EXAM BY PATHOLOGIST: CPT | Performed by: SURGERY

## 2018-09-13 PROCEDURE — S0260 H&P FOR SURGERY: HCPCS | Performed by: SURGERY

## 2018-09-13 PROCEDURE — 25010000002 PROPOFOL 10 MG/ML EMULSION: Performed by: ANESTHESIOLOGY

## 2018-09-13 PROCEDURE — 45380 COLONOSCOPY AND BIOPSY: CPT | Performed by: SURGERY

## 2018-09-13 RX ORDER — LIDOCAINE HYDROCHLORIDE 20 MG/ML
INJECTION, SOLUTION INFILTRATION; PERINEURAL AS NEEDED
Status: DISCONTINUED | OUTPATIENT
Start: 2018-09-13 | End: 2018-09-13 | Stop reason: SURG

## 2018-09-13 RX ORDER — PROPOFOL 10 MG/ML
VIAL (ML) INTRAVENOUS CONTINUOUS PRN
Status: DISCONTINUED | OUTPATIENT
Start: 2018-09-13 | End: 2018-09-13 | Stop reason: SURG

## 2018-09-13 RX ORDER — PROPOFOL 10 MG/ML
VIAL (ML) INTRAVENOUS AS NEEDED
Status: DISCONTINUED | OUTPATIENT
Start: 2018-09-13 | End: 2018-09-13 | Stop reason: SURG

## 2018-09-13 RX ORDER — SODIUM CHLORIDE, SODIUM LACTATE, POTASSIUM CHLORIDE, CALCIUM CHLORIDE 600; 310; 30; 20 MG/100ML; MG/100ML; MG/100ML; MG/100ML
1000 INJECTION, SOLUTION INTRAVENOUS CONTINUOUS
Status: DISCONTINUED | OUTPATIENT
Start: 2018-09-13 | End: 2018-09-13 | Stop reason: HOSPADM

## 2018-09-13 RX ADMIN — PROPOFOL 100 MG: 10 INJECTION, EMULSION INTRAVENOUS at 08:10

## 2018-09-13 RX ADMIN — SODIUM CHLORIDE, POTASSIUM CHLORIDE, SODIUM LACTATE AND CALCIUM CHLORIDE 1000 ML: 600; 310; 30; 20 INJECTION, SOLUTION INTRAVENOUS at 07:46

## 2018-09-13 RX ADMIN — LIDOCAINE HYDROCHLORIDE 60 MG: 20 INJECTION, SOLUTION INFILTRATION; PERINEURAL at 08:11

## 2018-09-13 RX ADMIN — PROPOFOL 100 MCG/KG/MIN: 10 INJECTION, EMULSION INTRAVENOUS at 08:10

## 2018-09-13 NOTE — ANESTHESIA POSTPROCEDURE EVALUATION
"Patient: Javy Reeves Sr.    Procedure Summary     Date:  09/13/18 Room / Location:  [facility] /  [facility]    Anesthesia Start:  0803 Anesthesia Stop:  0858    Procedure:  COLONOSCOPY TO CECUM WITH COLD BX'S POLYPECTOMY (N/A ) Diagnosis:       History of colon polyps      Right lower quadrant abdominal pain      Diarrhea, unspecified type      (History of colon polyps [Z86.010])      (Right lower quadrant abdominal pain [R10.31])      (Diarrhea, unspecified type [R19.7])    Surgeon:  Berta Sin MD Provider:  Wilson Hauser MD    Anesthesia Type:  MAC ASA Status:  3          Anesthesia Type: MAC  Last vitals  BP   132/80 (09/13/18 0856)   Temp   36.4 °C (97.6 °F) (09/13/18 0737)   Pulse   86 (09/13/18 0856)   Resp   20 (09/13/18 0856)     SpO2   96 % (09/13/18 0856)     Post Anesthesia Care and Evaluation    Patient location during evaluation: PACU  Patient participation: complete - patient participated  Level of consciousness: awake  Pain score: 0  Pain management: adequate  Airway patency: patent  Anesthetic complications: No anesthetic complications  PONV Status: none  Cardiovascular status: acceptable  Respiratory status: acceptable  Hydration status: acceptable    Comments: /80 (BP Location: Left arm, Patient Position: Lying)   Pulse 86   Temp 36.4 °C (97.6 °F) (Oral)   Resp 20   Ht 182.9 cm (72\")   Wt 108 kg (237 lb 2 oz)   SpO2 96%   BMI 32.16 kg/m²       "

## 2018-09-13 NOTE — DISCHARGE INSTRUCTIONS
WHAT ARE DIVERTICULOSIS AND DIVERTICULITIS?  Many people have small pouches in their colons that bulge outward through weak spots, like an inner tube that pokes through weak places in a tire.  Each pouch is called a diverticulum.  The condition of having diverticula is DIVERTICULOSIS.  The condition becomes more common as people age.  About half of all people over the age of 60 have diverticulosis    When pouches become infected or inflamed, the condition is called DIVERTICULITIS.  This happens in 10% to 25% of people with diverticulosis.  Diverticulosis and diverticulitis are also called DIVERTICULAR DISEASE.     WHAT ARE THE SYMPTOMS?  Diverticulosis - Most people do not have any discomfort or symptoms.  However, symptoms may include mild cramps, bloating, and constipation.  Other diseases such as irritable bowel syndrome (IBS) and stomach ulcers cause similar problems, so these symptoms do not always mean a person has diverticulosis.  You should visit your doctor if you have these troubling symptoms.    Diverticulitis - The most common symptom is abdominal pain.  The most common sign is tenderness around the left side of the lower abdomen.  If infection is the cause, fever, nausea, vomiting, chills, cramping, and constipation may occur as well.  The severity depends on the extent of the infection and complications.    WHAT ARE THE COMPLICATIONS?  Diverticulitis can lead to bleeding, infections,perforations or tears, or blockages.  These complications always require treatment to prevent them from proggressing and causing serous illness.    Bleeding from a diverticula is a rare complication.  When this occurs, blood may appear in the toilet or in your stool.  Bleeding can be severe, but it may stop by itself and not require treatment.  Doctors believe bleeding diverticula are caused by a small blood vessel in a diverticulum that weakens and finally bursts.  If you have bleeding from the rectum, you should see your  doctor.  If the bleeding does not stop you may need surgery.    Abscess, Perforation, and Peritonitis - The infection causing diverticulitis often clears up after a few days of treatment with antibiotics.  If the condition gets worse, an abscess may form in the colon.  An abscess is an infected area with pus that may cause swelling and destroy tissue.  Sometimes the infected diverticula may develop small holes, called perforations.  These perforations allow pus to leak out of the colon into the abdominal area.  If the abscess is small and remains in the colon, it may clear up after treatment with antibiotics.  If not, the doctor may need to drain it.  A large abscess can become a serious problem if the infection leaks out and contaminates areas outside the colon.  Infection that spreads into the abdominal cavity is called peritonitis.  Peritonitis requires immediate surgery toclean the abdominal cavity and remove the damaged part of the colon.  Without surgery, peritonitis can be fatal.    FISTULA  A fistula is an abnormal connection of tissue between two organs or between an organ and the skin.  When damaged tissues come into contact with each other during infection, they sometimes stick together.  If they heal that way, a fistula forms.  When diverticulitis-related infection spreads through out the colon, the colon's tissue may stick to nearby tissues.  The organs usually involved are the bladder, small intestine, and skin.  The problem can be corrected with surgery to remove the fistula and affected part of the colon.    INTESTINAL OBSTRUCTION  The scarring caused by infection may cause partial or total blockage of the large intestine.  When this happens, the colon is unable to move bowel contents normally.  When the obstruction totally blocks the intestine, emergency surgery is necessary.  Partial blockage is not an emergency, so the surgery to correct it can be planned.    WHAT CAUSES DIVERTICULAR  DISEASE  Although not proven, the dominant theory is that a low-fiber diet is the main cause of diverticular disease.  The disease was first noticed in the [place] in the early 1900s.  At about the same time, processed foods were introduced into the American diet.  Many processed foods contain refined, low-fiber flour.  Unlike whole-wheat flour, refined flour has no wheat bran.    Diverticular disease is common in developed or industrialized countries-particularly the [place], [place], and [place]-where low-fiber diets are common.  The disease is rare in countries of [place] and [place], where people eat high-fiber vegetable diets.    Fiber is the part of fruits, vegetables, and whole grains that the body cannot digest.  Some fiber dissolves easily in water (soluble fiber).  It takes on a soft, jelly-like texture in the intestines.  Some fiber passes almost unchanged through the intestines (insoluble fiber).  Both kinds of fiber help make stools soft and easy to pass.  Fiber also prevents constipation.    Constipation makes the muscles strain to move stool that is too hard.  It is the main cause of increased pressure in the colon.  This excess pressure might cause the weak spots in the colon to bulge out and become diverticula.  Diverticulitis occurs when diverticula become infected or inflamed.  Doctors are not certain what causes the infection.  It may begin when stool or bacteria are caught in the diverticula.  An attack of diverticulitis can develop suddenly and without warning.    HOW DOES THE DOCTOR DIAGNOSE DIVERTICULAR DISEASE  The doctor asks about medical history, does a physical exam, and may perform one or more diagnostic tests.  Because most people do not have symptoms, diverticulosis is often found through tests ordered for another ailment.    When taking a medical history, the doctor may ask about bowel habits, symptoms, pain, diet, and medications.  The physical exam usually involves a  digital rectal exam.  To preform this test. The doctor inserts a gloved, lubricated finger into the rectum to detect tenderness, blockage, or blood.  The doctor may check stool for signs of bleeding and test blood for signs of infection.  The doctor may also order x-rays or other tests.    WHAT IS THE TREATMENT FOR DIVERTICULAR DISEASE  Increasing the amount of fiber in the diet may reduce symptoms of diverticulosis and prevent complications such as diverticulitis.  Fiber keeps stool soft and lowers pressure inside the colon so that bowel contents can move through easily.  The American Dietetic Association. Recommends 20 to 35 grams of fiber each day.  The doctor may also recommend taking a fiber product such as Citrucel or Metamucil once a dya.  These products are mixed water and provide about 2 to 3.5 grams of fiber per  Tablespoon, mixed with 8 ounces of water.    Avoidance of nuts, popcorn, and sunflower, pumpkin, vinnie, and sesame seeds has been recommended by physicians out of fear that food particles could enter, block, or irritate the diverticula.  However, no scientific data support this treatment measure.  Eating a high-fiber diet is the only requirement highly emphasized across the medical literature.  Eliminating specific foods is not necessary.  The seeds in tomatoes, zucchini, cucumbers, strawberries, and raspberries, as well as poppy seeds, are generally considered harmless.  People differ in amounts and types of foods the can eat.  Decisions about diet should be made based on what works best for each person.  Keeping a food diary may help identify what foods may cause symptoms.    If cramps, bloating, and constipation are problems, the doctor may prescribe  Short course of pain medication.  However, many medications affect emptying of the colon, an undesirable side effect for people with diverticulosis.    DIVERTICULITIS  Treatment focuses on clearing up the infection and inflammation, resting the  colon, and preventing or minimizing complications.  An attack of diverticulitis without complications may respond to antibiotics within a few days if treated early.  To help the colon rest, the doctor may recommend bed rest and a liquid diet, along with a pain reliever.    An acute attack with severe pain or sever infection may require a hospital stay.  Most acute cases of diverticulitis are treated with antibiotics and a liquid diet.  The antibiotics are given by injection into a vein.  In some cases, however, surgery may be necessary.    WHEN IS SURGERY NECESSARY  If attacks are severe or frequent, the doctor may advise surgery.  The surgeon removes the affected part of the colon and joins the remaining sections.  This typed of surgery, called colon resection, aims to keep attacks from coming back and to prevent complications.  The doctor may also recommend surgery for complications of a fistula or intestinal obstruction.    If antibiotics do not correct an attack, emergency surgery may be required.  Other reasons for emergency surgery include a large abscess, perforation, peritonitis, or continued bleeding.    Emergency surgery usually involves 2 operations.  The first will clear the infected abdominal cavity and remove part of the colon.  Because infection and sometimes obstruction, it is not safe to rejoin the colon during the first operation.  Instead, the surgeon creates a temporary hole, or stoma, in the abdomen.  The end of the colon is connected to the hole, a procedure called a colostomy, to allow normal eating and bowel movements.  The stool goes into a bag attached to the opening in the abdomen.  In the second operation, the surgeon rejoins the ends of the colon.  DIET:    We recommend a high fiber diet to keep your stool soft and to prevent constipation.  Eat plenty of fruits and vegetables.  Drink 8-10 glasses of water or juice every day.  Eat whole grain cereals and breads.  Salads with raw  vegetables are also a good source of fiber.    STOOL :    Metamucil, Citrucil, Konsyl, Fibercon, Benefiber or Miralax.  Take ____ tablespoon(s)/teaspoon(s) in a glass of water or juice _____ time(s) per day.      PAIN CONTROL:    Sit in a warm tub of water to relieve minor discomfort.  Use Tylenol or other non-aspirin medication.  Do not take aspirin for 10 days following procedure unless ordered by your physician.  Avoid the use of narcotics, such as codeine, because they may cause constipation.    COMPLICATIONS:    1. A small amount of bright red bleeding can be expected.  If bleeding persists or if it is     greater than 1 cup full of blood, call your doctor.    2.  If you have severe pain, fever (greater that 101) or if you are unable to urinate within 6 hours following hemorrhoidal treatment, call your doctor.

## 2018-09-13 NOTE — H&P
General Surgery  History and Physical    CC: Possible abdominal wall hernia     HPI: The patient is a pleasant 67 y.o. year-old gentleman who presents today for evaluation of an asymmetry noted along his right flank about 4 months ago. The asymmetry is an intermittent bulge he will notice from time to time, but became more apparent after he lost some weight a few months ago. The bulge is intermittently painful, especially when he stands upright. He will sometimes also notice some pain radiating to his right groin. He has also noticed a change in his bowel habits with intermittent diarrhea recently. He hasn't noticed any particular types of foods that stimulate the diarrhea as he experienced it once when eating peppers but also noticed it when eating cheerios with bananas. His last colonoscopy was done in July 2014 by Dr. Jamie Manley. This demonstrated descending/sigmoid diverticulosis, nonbleeding internal hemorrhoids, and a few small 2-3 mm rectal poylps. He denies any current or recent blood in his stool.     Past Medical History:   Coronary artery disease with history of myocardial infarction  GERD  Arthritis  History of DVT following knee replacement in late 1990's  Fibromyalgia  History of TIA     Past Surgical History:   Left knee replacement x3 (1990's)  Back surgery x2  Appendectomy  Colonoscopy (2014)  IVC filter placement  Right hand/wrist/arm surgery  Right shoulder surgery     Medications:   Coumadin 12 mg daily  Lyrica 100 mg twice daily  Atorvastatin 40 mg daily  Vitamin D 2000 units daily  Tizanidine 4 mg every 8 hours as needed for muscle spasms  Esomeprazole 40 mg daily  Zyrtec 10 mg daily  Extra strength Tylenol 500 mg daily  Anoro Ellipta inhaler daily     Allergies:   Chantix  Zinc     Family History: Mother with metastatic cancer of unknown primary, father with cancer (he is unsure what kind), brother with a brain aneurysm, sister with bilateral breast cancer     Social History: , retired,  smokes 1/2 ppd cigarettes for the last 30 years, twice monthly alcohol use     ROS:   Constitutional: Negative for fevers or chills  HENT: Positive for runny nose and sneezing; Negative for hearing loss  Eyes: Negative for vision changes or scleral icterus  Respiratory: Positive for cough; Negative for shortness of breath  Cardiovascular: Positive for chest pain; Negative for heart palpitations  Gastrointestinal: Positive for abdominal pain, diarrhea, nausea, and reflux; Negative for vomiting, constipation, melena, or hematochezia  Genitourinary: Positive for pelvic pain; Negative for hematuria or dysuria  Musculoskeletal: Positive for joint pain, back pain, joint swelling, muscle pain, neck pain, and neck stiffness  Neurologic: Positive for numbness; Negative for headaches or dizziness  Psychiatric: Negative for anxiety or depression  All other systems reviewed and negative    Physical Exam:  Vitals:    09/13/18 0737   BP: 136/81   Pulse: 89   Resp: 16   Temp: 97.6 °F (36.4 °C)   SpO2: 94%     General: No acute distress, well-nourished & well-developed  HEAD: normocephalic, atraumatic  EYES: normal conjunctiva, sclera anicteric  EARS: grossly normal hearing  NECK: supple, no thyromegaly  CARDIOVASCULAR: regular rate and rhythm  RESPIRATORY: clear to auscultation bilaterally  GASTROINTESTINAL: soft, nontender, non-distended  PSYCHIATRIC: oriented x3, normal mood and affect    ASSESSMENT & PLAN  Mr. Reeves is a 67-year-old gentleman with slight asymmetry of the right side of his abdominal wall with subjective symptoms which would suggest a possible right lower quadrant abdominal wall hernia but no obvious physical exam findings to confirm this.  I would like to check a CT of the abdomen and pelvis to better identify any muscular wall defect or soft tissue abnormality at the site of the right flank swelling.  Given his change in stool caliber with intermittent recent diarrhea I would also like to repeat a colonoscopy  as his last colonoscopy was done 4 years ago and significant for polyps, the pathology of which is not available for me to review.  I will call him with the results of the CT scan as soon as they become available to me and have also discussed the risks of the colonoscopy to include bleeding and possible colon perforation.  Despite these risks, he has consented to proceed.     Berta Sin MD  General and Endoscopic Surgery  [facility]     [address], [address]  [place], [place], [address]  P: [phone]    F: [phone]

## 2018-09-13 NOTE — OP NOTE
Operative Note :  Berta Sin MD      Javy Reeves Sr.  1950    Procedure Date: 09/13/18    Pre-op Diagnosis:  · History of colon polyps [Z86.010]  · Right lower quadrant abdominal pain [R10.31]  · Diarrhea, unspecified type [R19.7]    Post-Operative Diagnosis:  · Diverticulosis  · Colon polyps    Procedure:   · Flexible colonoscopy to the cecum with cold forcep polypectomy ×14    Surgeon: Berta Sin MD    Assistant: None    Anesthesia:  MAC (monitored anesthetic care)    Estimated Blood Loss: Minimal    Specimens:   Ascending colon biopsy  Transverse colon polyp  Descending colon polyps ×3  Sigmoid colon polyps ×8  Rectal polyps ×2    Complications: None    Indications:  · Mr. Reeves is a 67-year-old gentleman who initially came to see me in July with complaints of right lower quadrant abdominal pain and a probable intermittent bulge.  A CT scan ruled out a ventral hernia.  He also admitted to some intermittent diarrhea that was sometimes diet related.  His last colonoscopy was done a few years back and demonstrated a few rectal polyps although I do not have the pathology of these.  He presents again today for repeat colonoscopy.  He has been counseled on the risks of the procedure to include bleeding, possible colon perforation, and possible missed polyp.  Despite these risks, he has consented to proceed.    Findings:   · 14 sessile colon polyps removed, scattered diverticulosis of the colon    Description of procedure:  The patient was brought to the endoscopy suite and tyra in the left lateral decubitus position.  Continuous propofol anesthesia was administered.  A surgical timeout was completed.  A digital rectal exam was performed, revealing no abnormalities.  An adult colonoscope was then inserted through the anus and passed under direct visualization to the level of the cecum.  The cecum was identified via the ileocecal valve as well as the appendiceal orifice.  The scope was then  slowly withdrawn, examining all circumferential walls of the ascending, transverse, descending, and sigmoid colon.  There was a sessile 2 mm polyp located along the proximal transverse colon removed using the cold biopsy forceps.  There was also scattered diverticulosis beginning at the hepatic flexure and involving the transverse colon, descending colon, and sigmoid colon.  None of the diverticuli showed fecal impaction or bleeding.  There were 3 small sessile colon polyps each measuring 2 mm within the descending colon in close proximity to one another.  These were removed using the cold biopsy forceps.  Within the sigmoid colon adjacent to multiple diverticula were 8 separate sessile colon polyps, each measuring about 2 mm in size.  They were each removed and sent off as one specimen using the cold biopsy forceps.  Within the rectum, there were 1.0 cm sessile colon polyps ×2 side by side which were removed piecemeal using the cold biopsy forceps and also passed off to pathology in formalin.  The scope was retroflexed in the rectum, showing no signs of hemorrhoidal disease.  The scope was then withdrawn and the colon desufflated.  The patient had a very good bowel prep and was transferred to the recovery area in stable condition.     Recommendations:  I will call the patient in 1 week or less with the pathology results of the 14 polyps removed as this will determine when the next screening colonoscopy will be due.    Berta Sin MD  General and Endoscopic Surgery  [facility]    [address], [address]  [place], [place], [address]  P: [phone]  F: [phone]

## 2018-09-13 NOTE — ANESTHESIA PREPROCEDURE EVALUATION
Anesthesia Evaluation     Patient summary reviewed and Nursing notes reviewed                Airway   Mallampati: I  TM distance: >3 FB  Neck ROM: full  No difficulty expected  Dental - normal exam     Pulmonary - normal exam   (+) pulmonary embolism, a smoker Current Abstained day of surgery, sleep apnea,   Cardiovascular - normal exam    (+) hypertension, past MI , CAD, PVD, DVT, hyperlipidemia,  carotid artery disease      Neuro/Psych  (+) TIA,     GI/Hepatic/Renal/Endo    (+)  GERD,      Musculoskeletal     (+) myalgias, neck pain,   Abdominal  - normal exam    Bowel sounds: normal.   Substance History - negative use     OB/GYN negative ob/gyn ROS         Other   (+) arthritis                   Anesthesia Plan    ASA 3     MAC     Anesthetic plan, all risks, benefits, and alternatives have been provided, discussed and informed consent has been obtained with: patient.

## 2018-09-14 LAB
CYTO UR: NORMAL
LAB AP CASE REPORT: NORMAL
PATH REPORT.FINAL DX SPEC: NORMAL
PATH REPORT.GROSS SPEC: NORMAL

## 2018-09-20 ENCOUNTER — OFFICE VISIT (OUTPATIENT)
Dept: CARDIOLOGY | Facility: CLINIC | Age: 68
End: 2018-09-20

## 2018-09-20 VITALS
SYSTOLIC BLOOD PRESSURE: 128 MMHG | HEIGHT: 72 IN | BODY MASS INDEX: 32.23 KG/M2 | DIASTOLIC BLOOD PRESSURE: 80 MMHG | HEART RATE: 72 BPM | WEIGHT: 238 LBS

## 2018-09-20 DIAGNOSIS — I20.8 STABLE ANGINA (HCC): ICD-10-CM

## 2018-09-20 DIAGNOSIS — I25.2 OLD MI (MYOCARDIAL INFARCTION): Primary | ICD-10-CM

## 2018-09-20 PROBLEM — I21.9 MI (MYOCARDIAL INFARCTION): Status: RESOLVED | Noted: 2018-09-20 | Resolved: 2018-09-20

## 2018-09-20 PROBLEM — I21.9 MI (MYOCARDIAL INFARCTION): Status: ACTIVE | Noted: 2018-09-20

## 2018-09-20 PROCEDURE — 93000 ELECTROCARDIOGRAM COMPLETE: CPT | Performed by: INTERNAL MEDICINE

## 2018-09-20 PROCEDURE — 99214 OFFICE O/P EST MOD 30 MIN: CPT | Performed by: INTERNAL MEDICINE

## 2018-09-20 NOTE — PROGRESS NOTES
Subjective:     Encounter Date:11/02/2017      Patient ID: Javy Reeves Sr. is a 67 y.o. male.    Chief Complaint:  Coronary Artery Disease   Presents for follow-up visit. Symptoms include shortness of breath. Pertinent negatives include no chest pain, chest pressure, chest tightness, dizziness, leg swelling, muscle weakness, palpitations or weight gain. The symptoms have been stable. Compliance with diet is good. Compliance with exercise is good. Compliance with medications is good.     67-year-old gentleman with a history of prior myocardial infarction presents today for reevaluation.  Patient takes a nitroglycerin about once a month.  He says he been doing this for about the past 2-3 years when he gets upset sometimes he'll have chest pains.  He denies a types of shortness of breath palpitations edema lightheadedness.        Review of Systems   Constitution: Negative for weight gain.   Cardiovascular: Negative for chest pain, leg swelling and palpitations.   Respiratory: Positive for cough, shortness of breath and snoring. Negative for chest tightness.    Musculoskeletal: Negative for muscle weakness.   Gastrointestinal: Positive for abdominal pain.   Neurological: Negative for dizziness.   All other systems reviewed and are negative.        ECG 12 Lead  Date/Time: 9/20/2018 3:11 PM  Performed by: POLINA MCGOVERN  Authorized by: POLINA MCGOVERN   Comparison: compared with previous ECG from 11/2/2017  Rhythm: sinus rhythm  Clinical impression: non-specific ECG               Objective:     Physical Exam   Constitutional: He is oriented to person, place, and time. He appears well-developed.   HENT:   Head: Normocephalic.   Eyes: Conjunctivae are normal.   Neck: Normal range of motion.   Cardiovascular: Normal rate, regular rhythm and normal heart sounds.    Pulmonary/Chest: Breath sounds normal.   Abdominal: Soft. Bowel sounds are normal.   Musculoskeletal: Normal range of motion. He exhibits no edema.    Neurological: He is alert and oriented to person, place, and time.   Skin: Skin is warm and dry.   Psychiatric: He has a normal mood and affect. His behavior is normal.   Vitals reviewed.      Lab Review:       Assessment:          Diagnosis Plan   1. Old MI (myocardial infarction)            Plan:       1.  Hypertension blood pressure is good today  2.  History of pulmonary embolism.  3.  Prior myocardial infarction patient has been having stable angina for about the past 2-3 years with intermittent episodes of chest pains.  Patient will take about one nitroglycerin a month.  4.  We'll set him up for stress test by her to clearing her for surgery.  If the stress test is normal we will clear    Coronary Artery Disease  Assessment  • The patient has no angina    Plan  • Lifestyle modifications discussed include adhering to a heart healthy diet, avoidance of tobacco products, maintenance of a healthy weight, medication compliance, regular exercise and regular monitoring of cholesterol and blood pressure

## 2018-09-26 ENCOUNTER — HOSPITAL ENCOUNTER (OUTPATIENT)
Dept: CARDIOLOGY | Facility: HOSPITAL | Age: 68
Discharge: HOME | End: 2018-09-26
Attending: INTERNAL MEDICINE | Admitting: INTERNAL MEDICINE

## 2018-09-26 VITALS — BODY MASS INDEX: 32.23 KG/M2 | WEIGHT: 238 LBS | HEIGHT: 72 IN

## 2018-09-26 DIAGNOSIS — I25.2 OLD MI (MYOCARDIAL INFARCTION): ICD-10-CM

## 2018-09-26 DIAGNOSIS — I20.8 STABLE ANGINA (HCC): ICD-10-CM

## 2018-09-26 LAB
BH CV NUCLEAR PRIOR STUDY: 2
BH CV STRESS BP STAGE 1: NORMAL
BH CV STRESS COMMENTS STAGE 1: NORMAL
BH CV STRESS DOSE REGADENOSON STAGE 1: 0.4
BH CV STRESS DURATION MIN STAGE 1: 0
BH CV STRESS DURATION SEC STAGE 1: 10
BH CV STRESS HR STAGE 1: 107
BH CV STRESS PROTOCOL 1: NORMAL
BH CV STRESS RECOVERY BP: NORMAL MMHG
BH CV STRESS RECOVERY HR: 85 BPM
BH CV STRESS STAGE 1: 1
LV EF NUC BP: 55 %
MAXIMAL PREDICTED HEART RATE: 154 BPM
PERCENT MAX PREDICTED HR: 69.48 %
STRESS BASELINE BP: NORMAL MMHG
STRESS BASELINE HR: 77 BPM
STRESS PERCENT HR: 82 %
STRESS POST EXERCISE DUR SEC: 10 SEC
STRESS POST PEAK BP: NORMAL MMHG
STRESS POST PEAK HR: 107 BPM
STRESS TARGET HR: 131 BPM

## 2018-09-26 PROCEDURE — 93018 CV STRESS TEST I&R ONLY: CPT | Performed by: INTERNAL MEDICINE

## 2018-09-26 PROCEDURE — A9502 TC99M TETROFOSMIN: HCPCS | Performed by: INTERNAL MEDICINE

## 2018-09-26 PROCEDURE — 93016 CV STRESS TEST SUPVJ ONLY: CPT | Performed by: INTERNAL MEDICINE

## 2018-09-26 PROCEDURE — 93017 CV STRESS TEST TRACING ONLY: CPT

## 2018-09-26 PROCEDURE — 25010000002 REGADENOSON 0.4 MG/5ML SOLUTION: Performed by: INTERNAL MEDICINE

## 2018-09-26 PROCEDURE — 0 TECHNETIUM TETROFOSMIN KIT: Performed by: INTERNAL MEDICINE

## 2018-09-26 PROCEDURE — 78452 HT MUSCLE IMAGE SPECT MULT: CPT | Performed by: INTERNAL MEDICINE

## 2018-09-26 PROCEDURE — 78452 HT MUSCLE IMAGE SPECT MULT: CPT

## 2018-09-26 RX ADMIN — TETROFOSMIN 1 DOSE: 1.38 INJECTION, POWDER, LYOPHILIZED, FOR SOLUTION INTRAVENOUS at 07:35

## 2018-09-26 RX ADMIN — TETROFOSMIN 1 DOSE: 1.38 INJECTION, POWDER, LYOPHILIZED, FOR SOLUTION INTRAVENOUS at 08:35

## 2018-09-26 RX ADMIN — REGADENOSON 0.4 MG: 0.08 INJECTION, SOLUTION INTRAVENOUS at 08:35

## 2018-09-28 ENCOUNTER — APPOINTMENT (OUTPATIENT)
Dept: PREADMISSION TESTING | Facility: HOSPITAL | Age: 68
End: 2018-09-28

## 2018-09-28 ENCOUNTER — HOSPITAL ENCOUNTER (OUTPATIENT)
Dept: GENERAL RADIOLOGY | Facility: HOSPITAL | Age: 68
Discharge: HOME | End: 2018-09-28
Admitting: NEUROLOGICAL SURGERY

## 2018-09-28 VITALS
WEIGHT: 242.3 LBS | HEIGHT: 72 IN | DIASTOLIC BLOOD PRESSURE: 89 MMHG | RESPIRATION RATE: 16 BRPM | BODY MASS INDEX: 32.82 KG/M2 | SYSTOLIC BLOOD PRESSURE: 138 MMHG | TEMPERATURE: 98.4 F | HEART RATE: 72 BPM | OXYGEN SATURATION: 98 %

## 2018-09-28 LAB
ABO GROUP BLD: NORMAL
ANION GAP SERPL CALCULATED.3IONS-SCNC: 10.4 MMOL/L
APTT PPP: 43.2 SECONDS (ref 22.7–35.4)
BACTERIA UR QL AUTO: ABNORMAL /HPF
BASOPHILS # BLD AUTO: 0.01 10*3/MM3 (ref 0–0.2)
BASOPHILS NFR BLD AUTO: 0.2 % (ref 0–1.5)
BILIRUB UR QL STRIP: NEGATIVE
BLD GP AB SCN SERPL QL: NEGATIVE
BUN BLD-MCNC: 12 MG/DL (ref 8–23)
BUN/CREAT SERPL: 11.5 (ref 7–25)
CALCIUM SPEC-SCNC: 9 MG/DL (ref 8.6–10.5)
CHLORIDE SERPL-SCNC: 106 MMOL/L (ref 98–107)
CLARITY UR: CLEAR
CO2 SERPL-SCNC: 26.6 MMOL/L (ref 22–29)
COLOR UR: YELLOW
CREAT BLD-MCNC: 1.04 MG/DL (ref 0.76–1.27)
DEPRECATED RDW RBC AUTO: 48.5 FL (ref 37–54)
EOSINOPHIL # BLD AUTO: 0.05 10*3/MM3 (ref 0–0.7)
EOSINOPHIL NFR BLD AUTO: 1 % (ref 0.3–6.2)
ERYTHROCYTE [DISTWIDTH] IN BLOOD BY AUTOMATED COUNT: 14.1 % (ref 11.5–14.5)
GFR SERPL CREATININE-BSD FRML MDRD: 87 ML/MIN/1.73
GLUCOSE BLD-MCNC: 102 MG/DL (ref 65–99)
GLUCOSE UR STRIP-MCNC: NEGATIVE MG/DL
HCT VFR BLD AUTO: 44.2 % (ref 40.4–52.2)
HGB BLD-MCNC: 14.7 G/DL (ref 13.7–17.6)
HGB UR QL STRIP.AUTO: ABNORMAL
HYALINE CASTS UR QL AUTO: ABNORMAL /LPF
IMM GRANULOCYTES # BLD: 0 10*3/MM3 (ref 0–0.03)
IMM GRANULOCYTES NFR BLD: 0 % (ref 0–0.5)
INR PPP: 1.96 (ref 0.9–1.1)
KETONES UR QL STRIP: NEGATIVE
LEUKOCYTE ESTERASE UR QL STRIP.AUTO: NEGATIVE
LYMPHOCYTES # BLD AUTO: 1.7 10*3/MM3 (ref 0.9–4.8)
LYMPHOCYTES NFR BLD AUTO: 35.6 % (ref 19.6–45.3)
MCH RBC QN AUTO: 31.6 PG (ref 27–32.7)
MCHC RBC AUTO-ENTMCNC: 33.3 G/DL (ref 32.6–36.4)
MCV RBC AUTO: 95.1 FL (ref 79.8–96.2)
MONOCYTES # BLD AUTO: 0.42 10*3/MM3 (ref 0.2–1.2)
MONOCYTES NFR BLD AUTO: 8.8 % (ref 5–12)
NEUTROPHILS # BLD AUTO: 2.59 10*3/MM3 (ref 1.9–8.1)
NEUTROPHILS NFR BLD AUTO: 54.4 % (ref 42.7–76)
NITRITE UR QL STRIP: NEGATIVE
PH UR STRIP.AUTO: 6.5 [PH] (ref 5–8)
PLATELET # BLD AUTO: 191 10*3/MM3 (ref 140–500)
PMV BLD AUTO: 10.1 FL (ref 6–12)
POTASSIUM BLD-SCNC: 3.8 MMOL/L (ref 3.5–5.2)
PROT UR QL STRIP: NEGATIVE
PROTHROMBIN TIME: 22 SECONDS (ref 11.7–14.2)
RBC # BLD AUTO: 4.65 10*6/MM3 (ref 4.6–6)
RBC # UR: ABNORMAL /HPF
REF LAB TEST METHOD: ABNORMAL
RH BLD: POSITIVE
SODIUM BLD-SCNC: 143 MMOL/L (ref 136–145)
SP GR UR STRIP: 1.02 (ref 1–1.03)
SQUAMOUS #/AREA URNS HPF: ABNORMAL /HPF
T&S EXPIRATION DATE: NORMAL
UROBILINOGEN UR QL STRIP: ABNORMAL
WBC NRBC COR # BLD: 4.77 10*3/MM3 (ref 4.5–10.7)
WBC UR QL AUTO: ABNORMAL /HPF

## 2018-09-28 PROCEDURE — 86901 BLOOD TYPING SEROLOGIC RH(D): CPT | Performed by: NEUROLOGICAL SURGERY

## 2018-09-28 PROCEDURE — 81001 URINALYSIS AUTO W/SCOPE: CPT | Performed by: NEUROLOGICAL SURGERY

## 2018-09-28 PROCEDURE — 86850 RBC ANTIBODY SCREEN: CPT | Performed by: NEUROLOGICAL SURGERY

## 2018-09-28 PROCEDURE — 85610 PROTHROMBIN TIME: CPT | Performed by: NEUROLOGICAL SURGERY

## 2018-09-28 PROCEDURE — 86900 BLOOD TYPING SEROLOGIC ABO: CPT | Performed by: NEUROLOGICAL SURGERY

## 2018-09-28 PROCEDURE — 85730 THROMBOPLASTIN TIME PARTIAL: CPT | Performed by: NEUROLOGICAL SURGERY

## 2018-09-28 PROCEDURE — 36415 COLL VENOUS BLD VENIPUNCTURE: CPT

## 2018-09-28 PROCEDURE — 71046 X-RAY EXAM CHEST 2 VIEWS: CPT

## 2018-09-28 PROCEDURE — 85025 COMPLETE CBC W/AUTO DIFF WBC: CPT | Performed by: NEUROLOGICAL SURGERY

## 2018-09-28 PROCEDURE — 80048 BASIC METABOLIC PNL TOTAL CA: CPT | Performed by: NEUROLOGICAL SURGERY

## 2018-09-28 RX ORDER — TIZANIDINE 4 MG/1
4 TABLET ORAL EVERY 8 HOURS PRN
COMMUNITY
End: 2019-01-11 | Stop reason: SDUPTHER

## 2018-09-28 RX ORDER — WARFARIN SODIUM 1 MG/1
1 TABLET ORAL
COMMUNITY
End: 2019-06-01 | Stop reason: SDUPTHER

## 2018-10-01 ENCOUNTER — TELEPHONE (OUTPATIENT)
Dept: CARDIOLOGY | Facility: CLINIC | Age: 68
End: 2018-10-01

## 2018-10-01 NOTE — TELEPHONE ENCOUNTER
Dr. Coles's office would like to know if this pt has been cleared for surgery?  His stress test was 09-26-18 and is in [facility].  His surgery is Friday, 10-05-18.  Pt is on warfarin.  Please advise.  Thanks,  Gerri

## 2018-10-05 ENCOUNTER — ANESTHESIA EVENT (OUTPATIENT)
Dept: PERIOP | Facility: HOSPITAL | Age: 68
End: 2018-10-05

## 2018-10-05 ENCOUNTER — APPOINTMENT (OUTPATIENT)
Dept: GENERAL RADIOLOGY | Facility: HOSPITAL | Age: 68
End: 2018-10-05
Attending: NEUROLOGICAL SURGERY

## 2018-10-05 ENCOUNTER — RESULTS ENCOUNTER (OUTPATIENT)
Dept: FAMILY MEDICINE CLINIC | Facility: CLINIC | Age: 68
End: 2018-10-05

## 2018-10-05 ENCOUNTER — APPOINTMENT (OUTPATIENT)
Dept: GENERAL RADIOLOGY | Facility: HOSPITAL | Age: 68
End: 2018-10-05

## 2018-10-05 ENCOUNTER — ANESTHESIA (OUTPATIENT)
Dept: PERIOP | Facility: HOSPITAL | Age: 68
End: 2018-10-05

## 2018-10-05 ENCOUNTER — HOSPITAL ENCOUNTER (OUTPATIENT)
Facility: HOSPITAL | Age: 68
Discharge: HOME | End: 2018-10-07
Attending: NEUROLOGICAL SURGERY | Admitting: NEUROLOGICAL SURGERY

## 2018-10-05 DIAGNOSIS — E78.2 MIXED HYPERLIPIDEMIA: ICD-10-CM

## 2018-10-05 PROBLEM — M50.20 HERNIATED CERVICAL DISC: Status: ACTIVE | Noted: 2018-10-05

## 2018-10-05 LAB
INR PPP: 1.07 (ref 0.9–1.1)
PROTHROMBIN TIME: 13.7 SECONDS (ref 11.7–14.2)

## 2018-10-05 PROCEDURE — 25010000002 PHENYLEPHRINE PER 1 ML: Performed by: NURSE ANESTHETIST, CERTIFIED REGISTERED

## 2018-10-05 PROCEDURE — 25010000002 METHOCARBAMOL 1000 MG/10ML SOLUTION: Performed by: NEUROLOGICAL SURGERY

## 2018-10-05 PROCEDURE — A9270 NON-COVERED ITEM OR SERVICE: HCPCS | Performed by: NEUROLOGICAL SURGERY

## 2018-10-05 PROCEDURE — 22551 ARTHRD ANT NTRBDY CERVICAL: CPT | Performed by: NEUROLOGICAL SURGERY

## 2018-10-05 PROCEDURE — C1713 ANCHOR/SCREW BN/BN,TIS/BN: HCPCS | Performed by: NEUROLOGICAL SURGERY

## 2018-10-05 PROCEDURE — 25010000002 HYDROMORPHONE PER 4 MG: Performed by: NEUROLOGICAL SURGERY

## 2018-10-05 PROCEDURE — L0120 CERV FLEX N/ADJ FOAM PRE OTS: HCPCS | Performed by: NEUROLOGICAL SURGERY

## 2018-10-05 PROCEDURE — 76000 FLUOROSCOPY <1 HR PHYS/QHP: CPT

## 2018-10-05 PROCEDURE — 22845 INSERT SPINE FIXATION DEVICE: CPT | Performed by: NEUROLOGICAL SURGERY

## 2018-10-05 PROCEDURE — 63710000001 HYDROCODONE-ACETAMINOPHEN 7.5-325 MG TABLET: Performed by: NEUROLOGICAL SURGERY

## 2018-10-05 PROCEDURE — 63710000001 CETIRIZINE 10 MG TABLET: Performed by: NEUROLOGICAL SURGERY

## 2018-10-05 PROCEDURE — 25010000003 CEFAZOLIN IN DEXTROSE 2-4 GM/100ML-% SOLUTION: Performed by: NEUROLOGICAL SURGERY

## 2018-10-05 PROCEDURE — 25010000002 FENTANYL CITRATE (PF) 100 MCG/2ML SOLUTION: Performed by: NURSE ANESTHETIST, CERTIFIED REGISTERED

## 2018-10-05 PROCEDURE — 72040 X-RAY EXAM NECK SPINE 2-3 VW: CPT

## 2018-10-05 PROCEDURE — 63710000001 HYDRALAZINE 25 MG TABLET: Performed by: INTERNAL MEDICINE

## 2018-10-05 PROCEDURE — 22552 ARTHRD ANT NTRBD CERVICAL EA: CPT | Performed by: NEUROLOGICAL SURGERY

## 2018-10-05 PROCEDURE — 63710000001 DEXAMETHASONE PER 0.25 MG: Performed by: NEUROLOGICAL SURGERY

## 2018-10-05 PROCEDURE — 25010000002 PROPOFOL 10 MG/ML EMULSION: Performed by: NURSE ANESTHETIST, CERTIFIED REGISTERED

## 2018-10-05 PROCEDURE — 25010000002 MIDAZOLAM PER 1 MG: Performed by: ANESTHESIOLOGY

## 2018-10-05 PROCEDURE — 94640 AIRWAY INHALATION TREATMENT: CPT

## 2018-10-05 PROCEDURE — 85610 PROTHROMBIN TIME: CPT | Performed by: NEUROLOGICAL SURGERY

## 2018-10-05 PROCEDURE — 25010000002 DEXAMETHASONE PER 1 MG: Performed by: NURSE ANESTHETIST, CERTIFIED REGISTERED

## 2018-10-05 PROCEDURE — 22853 INSJ BIOMECHANICAL DEVICE: CPT | Performed by: NEUROLOGICAL SURGERY

## 2018-10-05 PROCEDURE — 63710000001 ATORVASTATIN 20 MG TABLET: Performed by: NEUROLOGICAL SURGERY

## 2018-10-05 PROCEDURE — A9270 NON-COVERED ITEM OR SERVICE: HCPCS | Performed by: INTERNAL MEDICINE

## 2018-10-05 PROCEDURE — 25010000002 HYDROMORPHONE PER 4 MG: Performed by: NURSE ANESTHETIST, CERTIFIED REGISTERED

## 2018-10-05 PROCEDURE — 94799 UNLISTED PULMONARY SVC/PX: CPT

## 2018-10-05 PROCEDURE — 25010000002 ONDANSETRON PER 1 MG: Performed by: NURSE ANESTHETIST, CERTIFIED REGISTERED

## 2018-10-05 PROCEDURE — 63710000001 PREGABALIN 50 MG CAPSULE: Performed by: NEUROLOGICAL SURGERY

## 2018-10-05 DEVICE — PLATE 7200042 ATL VISION ELITE 42.5MM
Type: IMPLANTABLE DEVICE | Site: SPINE CERVICAL | Status: FUNCTIONAL
Brand: ATLANTIS® ANTERIOR CERVICAL PLATE SYSTEM

## 2018-10-05 DEVICE — IMPLANT 6240764 ANATOMIC 16X14X7MM
Type: IMPLANTABLE DEVICE | Site: SPINE CERVICAL | Status: FUNCTIONAL
Brand: VERTE-STACK® SPINAL SYSTEM

## 2018-10-05 DEVICE — DBM T43103 2.5CC GRAFTON PUTTY
Type: IMPLANTABLE DEVICE | Site: SPINE CERVICAL | Status: FUNCTIONAL
Brand: GRAFTON®AND GRAFTON PLUS®DEMINERALIZED BONE MATRIX (DBM)

## 2018-10-05 RX ORDER — METHOCARBAMOL 100 MG/ML
1000 INJECTION, SOLUTION INTRAMUSCULAR; INTRAVENOUS ONCE
Status: COMPLETED | OUTPATIENT
Start: 2018-10-05 | End: 2018-10-05

## 2018-10-05 RX ORDER — HYDROMORPHONE HYDROCHLORIDE 1 MG/ML
0.5 INJECTION, SOLUTION INTRAMUSCULAR; INTRAVENOUS; SUBCUTANEOUS
Status: DISCONTINUED | OUTPATIENT
Start: 2018-10-05 | End: 2018-10-07 | Stop reason: HOSPADM

## 2018-10-05 RX ORDER — EPHEDRINE SULFATE 50 MG/ML
INJECTION, SOLUTION INTRAVENOUS AS NEEDED
Status: DISCONTINUED | OUTPATIENT
Start: 2018-10-05 | End: 2018-10-05 | Stop reason: SURG

## 2018-10-05 RX ORDER — FAMOTIDINE 10 MG/ML
20 INJECTION, SOLUTION INTRAVENOUS
Status: DISCONTINUED | OUTPATIENT
Start: 2018-10-05 | End: 2018-10-05 | Stop reason: HOSPADM

## 2018-10-05 RX ORDER — LABETALOL HYDROCHLORIDE 5 MG/ML
5 INJECTION, SOLUTION INTRAVENOUS
Status: DISCONTINUED | OUTPATIENT
Start: 2018-10-05 | End: 2018-10-05 | Stop reason: HOSPADM

## 2018-10-05 RX ORDER — HYDRALAZINE HYDROCHLORIDE 25 MG/1
25 TABLET, FILM COATED ORAL EVERY 6 HOURS PRN
Status: DISCONTINUED | OUTPATIENT
Start: 2018-10-05 | End: 2018-10-07 | Stop reason: HOSPADM

## 2018-10-05 RX ORDER — LIDOCAINE HYDROCHLORIDE 20 MG/ML
INJECTION, SOLUTION INFILTRATION; PERINEURAL AS NEEDED
Status: DISCONTINUED | OUTPATIENT
Start: 2018-10-05 | End: 2018-10-05 | Stop reason: SURG

## 2018-10-05 RX ORDER — ATORVASTATIN CALCIUM 20 MG/1
40 TABLET, FILM COATED ORAL DAILY
Status: DISCONTINUED | OUTPATIENT
Start: 2018-10-05 | End: 2018-10-07 | Stop reason: HOSPADM

## 2018-10-05 RX ORDER — CEFAZOLIN SODIUM 2 G/100ML
2 INJECTION, SOLUTION INTRAVENOUS EVERY 8 HOURS
Status: COMPLETED | OUTPATIENT
Start: 2018-10-05 | End: 2018-10-06

## 2018-10-05 RX ORDER — SODIUM CHLORIDE 0.9 % (FLUSH) 0.9 %
3 SYRINGE (ML) INJECTION EVERY 12 HOURS SCHEDULED
Status: DISCONTINUED | OUTPATIENT
Start: 2018-10-05 | End: 2018-10-07 | Stop reason: HOSPADM

## 2018-10-05 RX ORDER — DEXAMETHASONE SODIUM PHOSPHATE 4 MG/ML
4 INJECTION, SOLUTION INTRA-ARTICULAR; INTRALESIONAL; INTRAMUSCULAR; INTRAVENOUS; SOFT TISSUE EVERY 6 HOURS SCHEDULED
Status: COMPLETED | OUTPATIENT
Start: 2018-10-05 | End: 2018-10-06

## 2018-10-05 RX ORDER — DEXAMETHASONE 4 MG/1
4 TABLET ORAL EVERY 6 HOURS SCHEDULED
Status: COMPLETED | OUTPATIENT
Start: 2018-10-05 | End: 2018-10-06

## 2018-10-05 RX ORDER — SODIUM CHLORIDE, SODIUM LACTATE, POTASSIUM CHLORIDE, CALCIUM CHLORIDE 600; 310; 30; 20 MG/100ML; MG/100ML; MG/100ML; MG/100ML
9 INJECTION, SOLUTION INTRAVENOUS CONTINUOUS PRN
Status: DISCONTINUED | OUTPATIENT
Start: 2018-10-05 | End: 2018-10-05 | Stop reason: HOSPADM

## 2018-10-05 RX ORDER — FENTANYL CITRATE 50 UG/ML
INJECTION, SOLUTION INTRAMUSCULAR; INTRAVENOUS AS NEEDED
Status: DISCONTINUED | OUTPATIENT
Start: 2018-10-05 | End: 2018-10-05 | Stop reason: SURG

## 2018-10-05 RX ORDER — SODIUM CHLORIDE, SODIUM LACTATE, POTASSIUM CHLORIDE, CALCIUM CHLORIDE 600; 310; 30; 20 MG/100ML; MG/100ML; MG/100ML; MG/100ML
100 INJECTION, SOLUTION INTRAVENOUS CONTINUOUS
Status: DISCONTINUED | OUTPATIENT
Start: 2018-10-05 | End: 2018-10-07 | Stop reason: HOSPADM

## 2018-10-05 RX ORDER — DOCUSATE SODIUM 100 MG/1
100 CAPSULE, LIQUID FILLED ORAL 2 TIMES DAILY PRN
Status: DISCONTINUED | OUTPATIENT
Start: 2018-10-05 | End: 2018-10-07 | Stop reason: HOSPADM

## 2018-10-05 RX ORDER — SENNA AND DOCUSATE SODIUM 50; 8.6 MG/1; MG/1
1 TABLET, FILM COATED ORAL NIGHTLY PRN
Status: DISCONTINUED | OUTPATIENT
Start: 2018-10-05 | End: 2018-10-07 | Stop reason: HOSPADM

## 2018-10-05 RX ORDER — PROMETHAZINE HYDROCHLORIDE 25 MG/ML
12.5 INJECTION, SOLUTION INTRAMUSCULAR; INTRAVENOUS ONCE AS NEEDED
Status: DISCONTINUED | OUTPATIENT
Start: 2018-10-05 | End: 2018-10-05 | Stop reason: HOSPADM

## 2018-10-05 RX ORDER — FENTANYL CITRATE 50 UG/ML
25 INJECTION, SOLUTION INTRAMUSCULAR; INTRAVENOUS
Status: DISCONTINUED | OUTPATIENT
Start: 2018-10-05 | End: 2018-10-05 | Stop reason: HOSPADM

## 2018-10-05 RX ORDER — ONDANSETRON 2 MG/ML
4 INJECTION INTRAMUSCULAR; INTRAVENOUS EVERY 6 HOURS PRN
Status: DISCONTINUED | OUTPATIENT
Start: 2018-10-05 | End: 2018-10-07 | Stop reason: HOSPADM

## 2018-10-05 RX ORDER — ONDANSETRON 2 MG/ML
4 INJECTION INTRAMUSCULAR; INTRAVENOUS ONCE AS NEEDED
Status: DISCONTINUED | OUTPATIENT
Start: 2018-10-05 | End: 2018-10-05 | Stop reason: HOSPADM

## 2018-10-05 RX ORDER — CETIRIZINE HYDROCHLORIDE 10 MG/1
10 TABLET ORAL DAILY
Status: DISCONTINUED | OUTPATIENT
Start: 2018-10-05 | End: 2018-10-07 | Stop reason: HOSPADM

## 2018-10-05 RX ORDER — FLUMAZENIL 0.1 MG/ML
0.2 INJECTION INTRAVENOUS AS NEEDED
Status: DISCONTINUED | OUTPATIENT
Start: 2018-10-05 | End: 2018-10-05 | Stop reason: HOSPADM

## 2018-10-05 RX ORDER — PROPOFOL 10 MG/ML
VIAL (ML) INTRAVENOUS AS NEEDED
Status: DISCONTINUED | OUTPATIENT
Start: 2018-10-05 | End: 2018-10-05 | Stop reason: SURG

## 2018-10-05 RX ORDER — NALOXONE HCL 0.4 MG/ML
0.2 VIAL (ML) INJECTION AS NEEDED
Status: DISCONTINUED | OUTPATIENT
Start: 2018-10-05 | End: 2018-10-05 | Stop reason: HOSPADM

## 2018-10-05 RX ORDER — HYDROMORPHONE HYDROCHLORIDE 1 MG/ML
0.5 INJECTION, SOLUTION INTRAMUSCULAR; INTRAVENOUS; SUBCUTANEOUS
Status: DISCONTINUED | OUTPATIENT
Start: 2018-10-05 | End: 2018-10-05 | Stop reason: HOSPADM

## 2018-10-05 RX ORDER — TIZANIDINE 4 MG/1
4 TABLET ORAL EVERY 8 HOURS PRN
Status: DISCONTINUED | OUTPATIENT
Start: 2018-10-05 | End: 2018-10-07 | Stop reason: HOSPADM

## 2018-10-05 RX ORDER — PREGABALIN 50 MG/1
100 CAPSULE ORAL 2 TIMES DAILY
Status: DISCONTINUED | OUTPATIENT
Start: 2018-10-05 | End: 2018-10-07 | Stop reason: HOSPADM

## 2018-10-05 RX ORDER — PROMETHAZINE HYDROCHLORIDE 25 MG/1
25 TABLET ORAL ONCE AS NEEDED
Status: DISCONTINUED | OUTPATIENT
Start: 2018-10-05 | End: 2018-10-05 | Stop reason: HOSPADM

## 2018-10-05 RX ORDER — NALOXONE HCL 0.4 MG/ML
0.4 VIAL (ML) INJECTION
Status: DISCONTINUED | OUTPATIENT
Start: 2018-10-05 | End: 2018-10-07 | Stop reason: HOSPADM

## 2018-10-05 RX ORDER — MEPERIDINE HYDROCHLORIDE 25 MG/ML
12.5 INJECTION INTRAMUSCULAR; INTRAVENOUS; SUBCUTANEOUS
Status: DISCONTINUED | OUTPATIENT
Start: 2018-10-05 | End: 2018-10-05 | Stop reason: HOSPADM

## 2018-10-05 RX ORDER — ROCURONIUM BROMIDE 10 MG/ML
INJECTION, SOLUTION INTRAVENOUS AS NEEDED
Status: DISCONTINUED | OUTPATIENT
Start: 2018-10-05 | End: 2018-10-05 | Stop reason: SURG

## 2018-10-05 RX ORDER — PANTOPRAZOLE SODIUM 40 MG/1
40 TABLET, DELAYED RELEASE ORAL EVERY MORNING
Status: DISCONTINUED | OUTPATIENT
Start: 2018-10-05 | End: 2018-10-07 | Stop reason: HOSPADM

## 2018-10-05 RX ORDER — DEXAMETHASONE SODIUM PHOSPHATE 10 MG/ML
INJECTION INTRAMUSCULAR; INTRAVENOUS AS NEEDED
Status: DISCONTINUED | OUTPATIENT
Start: 2018-10-05 | End: 2018-10-05 | Stop reason: SURG

## 2018-10-05 RX ORDER — ACETAMINOPHEN 325 MG/1
650 TABLET ORAL EVERY 4 HOURS PRN
Status: DISCONTINUED | OUTPATIENT
Start: 2018-10-05 | End: 2018-10-07 | Stop reason: HOSPADM

## 2018-10-05 RX ORDER — NITROGLYCERIN 0.4 MG/1
0.4 TABLET SUBLINGUAL
Status: DISCONTINUED | OUTPATIENT
Start: 2018-10-05 | End: 2018-10-07 | Stop reason: HOSPADM

## 2018-10-05 RX ORDER — ACETAMINOPHEN 500 MG
650 TABLET ORAL DAILY
Status: ON HOLD | COMMUNITY
End: 2022-08-23

## 2018-10-05 RX ORDER — ONDANSETRON 2 MG/ML
INJECTION INTRAMUSCULAR; INTRAVENOUS AS NEEDED
Status: DISCONTINUED | OUTPATIENT
Start: 2018-10-05 | End: 2018-10-05 | Stop reason: SURG

## 2018-10-05 RX ORDER — IPRATROPIUM BROMIDE AND ALBUTEROL SULFATE 2.5; .5 MG/3ML; MG/3ML
3 SOLUTION RESPIRATORY (INHALATION)
Status: DISCONTINUED | OUTPATIENT
Start: 2018-10-05 | End: 2018-10-07 | Stop reason: HOSPADM

## 2018-10-05 RX ORDER — SODIUM CHLORIDE 0.9 % (FLUSH) 0.9 %
3-10 SYRINGE (ML) INJECTION AS NEEDED
Status: DISCONTINUED | OUTPATIENT
Start: 2018-10-05 | End: 2018-10-07 | Stop reason: HOSPADM

## 2018-10-05 RX ORDER — ALBUTEROL SULFATE 2.5 MG/3ML
2.5 SOLUTION RESPIRATORY (INHALATION) ONCE AS NEEDED
Status: DISCONTINUED | OUTPATIENT
Start: 2018-10-05 | End: 2018-10-05 | Stop reason: HOSPADM

## 2018-10-05 RX ORDER — MIDAZOLAM HYDROCHLORIDE 1 MG/ML
2 INJECTION INTRAMUSCULAR; INTRAVENOUS
Status: DISCONTINUED | OUTPATIENT
Start: 2018-10-05 | End: 2018-10-05 | Stop reason: HOSPADM

## 2018-10-05 RX ORDER — EPHEDRINE SULFATE 50 MG/ML
5 INJECTION, SOLUTION INTRAVENOUS ONCE AS NEEDED
Status: DISCONTINUED | OUTPATIENT
Start: 2018-10-05 | End: 2018-10-05 | Stop reason: HOSPADM

## 2018-10-05 RX ORDER — ONDANSETRON 4 MG/1
4 TABLET, ORALLY DISINTEGRATING ORAL EVERY 6 HOURS PRN
Status: DISCONTINUED | OUTPATIENT
Start: 2018-10-05 | End: 2018-10-07 | Stop reason: HOSPADM

## 2018-10-05 RX ORDER — MIDAZOLAM HYDROCHLORIDE 1 MG/ML
1 INJECTION INTRAMUSCULAR; INTRAVENOUS
Status: DISCONTINUED | OUTPATIENT
Start: 2018-10-05 | End: 2018-10-05 | Stop reason: HOSPADM

## 2018-10-05 RX ORDER — FENTANYL CITRATE 50 UG/ML
50 INJECTION, SOLUTION INTRAMUSCULAR; INTRAVENOUS
Status: DISCONTINUED | OUTPATIENT
Start: 2018-10-05 | End: 2018-10-05 | Stop reason: HOSPADM

## 2018-10-05 RX ORDER — WARFARIN SODIUM 5 MG/1
10 TABLET ORAL
COMMUNITY
End: 2018-12-26 | Stop reason: SDUPTHER

## 2018-10-05 RX ORDER — HYDROCODONE BITARTRATE AND ACETAMINOPHEN 7.5; 325 MG/1; MG/1
1 TABLET ORAL ONCE AS NEEDED
Status: DISCONTINUED | OUTPATIENT
Start: 2018-10-05 | End: 2018-10-05 | Stop reason: HOSPADM

## 2018-10-05 RX ORDER — SODIUM CHLORIDE 0.9 % (FLUSH) 0.9 %
3-10 SYRINGE (ML) INJECTION AS NEEDED
Status: DISCONTINUED | OUTPATIENT
Start: 2018-10-05 | End: 2018-10-05 | Stop reason: HOSPADM

## 2018-10-05 RX ORDER — HYDROCODONE BITARTRATE AND ACETAMINOPHEN 7.5; 325 MG/1; MG/1
1 TABLET ORAL EVERY 4 HOURS PRN
Status: DISCONTINUED | OUTPATIENT
Start: 2018-10-05 | End: 2018-10-07 | Stop reason: HOSPADM

## 2018-10-05 RX ORDER — PROMETHAZINE HYDROCHLORIDE 25 MG/1
25 SUPPOSITORY RECTAL ONCE AS NEEDED
Status: DISCONTINUED | OUTPATIENT
Start: 2018-10-05 | End: 2018-10-05 | Stop reason: HOSPADM

## 2018-10-05 RX ORDER — CEFAZOLIN SODIUM 2 G/100ML
2 INJECTION, SOLUTION INTRAVENOUS ONCE
Status: COMPLETED | OUTPATIENT
Start: 2018-10-05 | End: 2018-10-05

## 2018-10-05 RX ORDER — ONDANSETRON 4 MG/1
4 TABLET, FILM COATED ORAL EVERY 6 HOURS PRN
Status: DISCONTINUED | OUTPATIENT
Start: 2018-10-05 | End: 2018-10-07 | Stop reason: HOSPADM

## 2018-10-05 RX ADMIN — PHENYLEPHRINE HYDROCHLORIDE 100 MCG: 10 INJECTION INTRAVENOUS at 09:38

## 2018-10-05 RX ADMIN — LABETALOL HYDROCHLORIDE 5 MG: 5 INJECTION, SOLUTION INTRAVENOUS at 11:41

## 2018-10-05 RX ADMIN — HYDROMORPHONE HYDROCHLORIDE 0.5 MG: 1 INJECTION, SOLUTION INTRAMUSCULAR; INTRAVENOUS; SUBCUTANEOUS at 17:36

## 2018-10-05 RX ADMIN — PHENYLEPHRINE HYDROCHLORIDE 100 MCG: 10 INJECTION INTRAVENOUS at 09:39

## 2018-10-05 RX ADMIN — HYDROMORPHONE HYDROCHLORIDE 0.5 MG: 1 INJECTION, SOLUTION INTRAMUSCULAR; INTRAVENOUS; SUBCUTANEOUS at 11:00

## 2018-10-05 RX ADMIN — PROPOFOL 200 MG: 10 INJECTION, EMULSION INTRAVENOUS at 08:05

## 2018-10-05 RX ADMIN — DEXAMETHASONE SODIUM PHOSPHATE 10 MG: 10 INJECTION INTRAMUSCULAR; INTRAVENOUS at 08:05

## 2018-10-05 RX ADMIN — CETIRIZINE HYDROCHLORIDE 10 MG: 10 TABLET, FILM COATED ORAL at 14:39

## 2018-10-05 RX ADMIN — HYDROMORPHONE HYDROCHLORIDE 0.5 MG: 1 INJECTION, SOLUTION INTRAMUSCULAR; INTRAVENOUS; SUBCUTANEOUS at 12:41

## 2018-10-05 RX ADMIN — SUGAMMADEX 400 MG: 100 INJECTION, SOLUTION INTRAVENOUS at 10:04

## 2018-10-05 RX ADMIN — SODIUM CHLORIDE, POTASSIUM CHLORIDE, SODIUM LACTATE AND CALCIUM CHLORIDE: 600; 310; 30; 20 INJECTION, SOLUTION INTRAVENOUS at 09:16

## 2018-10-05 RX ADMIN — ROCURONIUM BROMIDE 50 MG: 10 INJECTION INTRAVENOUS at 08:05

## 2018-10-05 RX ADMIN — FENTANYL CITRATE 50 MCG: 50 INJECTION, SOLUTION INTRAMUSCULAR; INTRAVENOUS at 09:23

## 2018-10-05 RX ADMIN — HYDRALAZINE HYDROCHLORIDE 25 MG: 25 TABLET, FILM COATED ORAL at 21:42

## 2018-10-05 RX ADMIN — FENTANYL CITRATE 50 MCG: 50 INJECTION, SOLUTION INTRAMUSCULAR; INTRAVENOUS at 11:45

## 2018-10-05 RX ADMIN — CEFAZOLIN SODIUM 2 G: 2 INJECTION, SOLUTION INTRAVENOUS at 23:15

## 2018-10-05 RX ADMIN — SODIUM CHLORIDE, POTASSIUM CHLORIDE, SODIUM LACTATE AND CALCIUM CHLORIDE 100 ML/HR: 600; 310; 30; 20 INJECTION, SOLUTION INTRAVENOUS at 12:31

## 2018-10-05 RX ADMIN — IPRATROPIUM BROMIDE AND ALBUTEROL SULFATE 3 ML: .5; 3 SOLUTION RESPIRATORY (INHALATION) at 22:57

## 2018-10-05 RX ADMIN — EPHEDRINE SULFATE 5 MG: 50 INJECTION INTRAMUSCULAR; INTRAVENOUS; SUBCUTANEOUS at 09:45

## 2018-10-05 RX ADMIN — DEXAMETHASONE 4 MG: 4 TABLET ORAL at 21:08

## 2018-10-05 RX ADMIN — FENTANYL CITRATE 50 MCG: 50 INJECTION, SOLUTION INTRAMUSCULAR; INTRAVENOUS at 10:09

## 2018-10-05 RX ADMIN — Medication 3 ML: at 12:50

## 2018-10-05 RX ADMIN — FENTANYL CITRATE 100 MCG: 50 INJECTION, SOLUTION INTRAMUSCULAR; INTRAVENOUS at 08:05

## 2018-10-05 RX ADMIN — LIDOCAINE HYDROCHLORIDE 100 MG: 20 INJECTION, SOLUTION INFILTRATION; PERINEURAL at 08:05

## 2018-10-05 RX ADMIN — MIDAZOLAM HYDROCHLORIDE 1 MG: 2 INJECTION, SOLUTION INTRAMUSCULAR; INTRAVENOUS at 07:03

## 2018-10-05 RX ADMIN — FENTANYL CITRATE 50 MCG: 50 INJECTION, SOLUTION INTRAMUSCULAR; INTRAVENOUS at 10:50

## 2018-10-05 RX ADMIN — SODIUM CHLORIDE, POTASSIUM CHLORIDE, SODIUM LACTATE AND CALCIUM CHLORIDE 9 ML/HR: 600; 310; 30; 20 INJECTION, SOLUTION INTRAVENOUS at 06:28

## 2018-10-05 RX ADMIN — ATORVASTATIN CALCIUM 40 MG: 20 TABLET, FILM COATED ORAL at 14:39

## 2018-10-05 RX ADMIN — ROCURONIUM BROMIDE 10 MG: 10 INJECTION INTRAVENOUS at 09:23

## 2018-10-05 RX ADMIN — METHOCARBAMOL 1000 MG: 1000 INJECTION, SOLUTION INTRAMUSCULAR; INTRAVENOUS at 11:10

## 2018-10-05 RX ADMIN — PHENYLEPHRINE HYDROCHLORIDE 100 MCG: 10 INJECTION INTRAVENOUS at 09:02

## 2018-10-05 RX ADMIN — FAMOTIDINE 20 MG: 10 INJECTION, SOLUTION INTRAVENOUS at 07:03

## 2018-10-05 RX ADMIN — DEXAMETHASONE 4 MG: 4 TABLET ORAL at 14:39

## 2018-10-05 RX ADMIN — PHENYLEPHRINE HYDROCHLORIDE 100 MCG: 10 INJECTION INTRAVENOUS at 08:09

## 2018-10-05 RX ADMIN — FENTANYL CITRATE 50 MCG: 50 INJECTION, SOLUTION INTRAMUSCULAR; INTRAVENOUS at 10:14

## 2018-10-05 RX ADMIN — LABETALOL HYDROCHLORIDE 5 MG: 5 INJECTION, SOLUTION INTRAVENOUS at 11:06

## 2018-10-05 RX ADMIN — PREGABALIN 100 MG: 50 CAPSULE ORAL at 21:08

## 2018-10-05 RX ADMIN — CEFAZOLIN SODIUM 2 G: 2 INJECTION, SOLUTION INTRAVENOUS at 14:39

## 2018-10-05 RX ADMIN — PHENYLEPHRINE HYDROCHLORIDE 100 MCG: 10 INJECTION INTRAVENOUS at 08:29

## 2018-10-05 RX ADMIN — ONDANSETRON 4 MG: 2 INJECTION INTRAMUSCULAR; INTRAVENOUS at 09:59

## 2018-10-05 RX ADMIN — CEFAZOLIN SODIUM 2 G: 2 INJECTION, SOLUTION INTRAVENOUS at 07:57

## 2018-10-05 RX ADMIN — HYDROCODONE BITARTRATE AND ACETAMINOPHEN 1 TABLET: 7.5; 325 TABLET ORAL at 21:08

## 2018-10-05 NOTE — CONSULTS
A Consult H&P    Patient Care Team:  Aletha Ochoa PA-C as PCP - General (Family Medicine)  Jean Coles MD as Surgeon (Neurosurgery)  Erlin West MD as Surgeon (General Surgery)  Yaneth Vasquez Jr., MD as Consulting Physician (Vascular Surgery)  Artemio Dalton MD as Consulting Physician (Cardiology)  Jamie Manley MD as Consulting Physician (Gastroenterology)  Rodolfo Eugene III, MD as Surgeon (Thoracic Surgery)    Requesting Physician: Dr Coles    Reason for Consult: Postoperative management of hypertension    History of Present Illness    This is a 68-year-old -American male who underwent elective anterior cervical discectomy with fusion today.  He has a history of hypertension, CAD, DVT and PE with IVC filter placed in 1996 following an arthroscopic knee surgery for which she remains on lifelong Coumadin.  He had a stress test prior to surgery which showed no evidence of ischemia and he was Lyrica by his cardiologist.  His Coumadin was held prior to surgery and his INR was 1.0 today.  I am seeing him postoperatively.  He states that he is hungry.  His neck pain is controlled at this time.  He denies any chest pain or shortness of breath.  He denies any nausea, vomiting, abdominal pain.    Past Medical History:   Diagnosis Date   • Acute and subacute infective endocarditis in diseases classified elsewhere    • Arthritis    • Chest pain    • Chronic low back pain    • Chronic pain    • Colon polyps    • Coronary artery disease    • DDD (degenerative disc disease), cervical    • DDD (degenerative disc disease), lumbosacral    • Diverticulosis    • DVT (deep venous thrombosis) (CMS/[facility]) 1996    Left Lower extremity following arthroscopic knee surgery in 1996. IVC fliter placed at that time.   • Encounter for special screening examination for neoplasm of prostate 2012   • Eye exam, routine > 5 yrs ago   • Eye exam, routine 01/2015   • Fibromyalgia    • Fibromyositis    • GERD  (gastroesophageal reflux disease)    • Hyperlipidemia    • Injury of back    • Irritable bowel    • Lumbar stenosis    • MI (myocardial infarction) (CMS/HCC)    • Neck pain    • Pain in limb    • Past heart attack    • Postlaminectomy syndrome of lumbar region    • Pulmonary embolism (CMS/HCC) 1996    Left Lower extremity following arthroscopic knee surgery in 1996. IVC fliter placed at that time.   • Reflux esophagitis    • Sleep apnea     NO CPAP   • TIA (transient ischemic attack)      Past Surgical History:   Procedure Laterality Date   • APPENDECTOMY N/A    • CARDIAC CATHETERIZATION Left 1952    Left Heart Cath Left Ventriculography, selective coronary angiography; iliofemoral angiography; angio seal closure, Dr. Brady Ramos   • COLONOSCOPY  09/2015    removed 2 polyps, Dr. Manley   • COLONOSCOPY N/A 02/12/2007    Left colonic diverticulosis, No evidence of polypoid neoplasia, Dr. Jarret Bloom   • COLONOSCOPY N/A 9/13/2018    Procedure: COLONOSCOPY TO CECUM WITH COLD BX'S POLYPECTOMY;  Surgeon: Berta Sin MD;  Location: [facility] ENDOSCOPY;  Service: General   • CYSTOSCOPY TRANSURETHRAL RESECTION OF PROSTATE N/A 11/01/2004    Dr. Rodolfo Arnold   • HAND SURGERY Right    • KNEE ARTHROPLASTY Left    • LUMBAR DISC SURGERY  2006, 2007    L4-S1 pseudoarthroses - Dr Cervantes   • LUMBAR DISC SURGERY N/A 01/29/2010    Removal of Instrumentation w/ decompression, Instrumentaion loosening & pt tested positive for zinc allergy, Dr. Kvng Cervantes   • ROTATOR CUFF REPAIR Right 04/2012   • THORACIC OUTLET SURGERY Bilateral    • VENA CAVA FILTER PLACEMENT  1996   • WRIST SURGERY Right     x3 starting in 1984     Family History   Problem Relation Age of Onset   • Diabetes Sister    • Cancer Sister    • Hypertension Sister    • Kidney disease Sister    • Stroke Sister    • Heart disease Brother    • Hypertension Brother    • Cerebral aneurysm Brother    • Sudden death Brother    • Bleeding Disorder Brother    • Cancer  Mother    • Heart disease Father    • Cancer Father         malignant neoplasm   • Deep vein thrombosis Father    • Heart attack Father    • Malig Hyperthermia Neg Hx      Social History   Substance Use Topics   • Smoking status: Current Every Day Smoker     Packs/day: 0.50     Years: 30.00   • Smokeless tobacco: Never Used      Comment: caffeine use   • Alcohol use Yes      Comment: 2x per month     Prescriptions Prior to Admission   Medication Sig Dispense Refill Last Dose   • acetaminophen (TYLENOL) 500 MG tablet Take 1,000 mg by mouth Every 6 (Six) Hours As Needed for Mild Pain .   10/4/2018 at 1630   • Cholecalciferol 2000 units capsule Take 2,000 Units by mouth Daily. One PO daily 90 each 3 10/4/2018 at 0930   • pregabalin (LYRICA) 100 MG capsule Take 1 capsule by mouth 2 (Two) Times a Day. For pain 60 capsule 5 10/5/2018 at 0515   • tiZANidine (ZANAFLEX) 4 MG tablet Take 4 mg by mouth Every 8 (Eight) Hours As Needed for Muscle Spasms.   10/4/2018 at 0930   • warfarin (COUMADIN) 5 MG tablet Take 10 mg by mouth Daily.   9/27/2018   • atorvastatin (LIPITOR) 40 MG tablet Take 1 tablet by mouth Daily. For cholesterol 30 tablet 11 10/4/2018 at 0930   • cetirizine (zyrTEC) 10 MG tablet Take 10 mg by mouth Daily.   10/3/2018   • nitroglycerin (NITROSTAT) 0.4 MG SL tablet Place 1 tablet under the tongue Every 5 (Five) Minutes As Needed for Chest Pain. Take no more than 3 doses in 15 minutes. 30 tablet 5 9/11/2018   • omeprazole (priLOSEC) 20 MG capsule Take 20 mg by mouth Every Night.   10/3/2018   • Turmeric 500 MG tablet Take 500 mg by mouth Daily.   9/28/2018   • Umeclidinium-Vilanterol (ANORO ELLIPTA) 62.5-25 MCG/INH aerosol powder  Inhale one puff once daily for COPD (Patient taking differently: Inhale 1 puff As Needed. Inhale one puff once daily for COPD) 60 each 11 9/14/2018   • warfarin (COUMADIN) 1 MG tablet Take 1 mg by mouth Daily.   9/27/2018     Allergies:  Zinc and Chantix [varenicline]    Review of  Systems   Constitutional: Negative for chills and fever.   HENT: Negative for congestion and sore throat.    Eyes: Negative for visual disturbance.   Respiratory: Negative for cough, chest tightness, shortness of breath and wheezing.    Cardiovascular: Negative for chest pain, palpitations and leg swelling.   Gastrointestinal: Negative for abdominal distention, abdominal pain, diarrhea, nausea and vomiting.   Endocrine: Negative for polydipsia and polyuria.   Genitourinary: Negative for difficulty urinating, dysuria, frequency and urgency.   Musculoskeletal: Positive for neck pain. Negative for arthralgias and myalgias.   Skin: Negative for color change and rash.   Neurological: Negative for dizziness and light-headedness.        PHYSICAL EXAM    Vital Signs  tMax 24 hrs:  Temp (24hrs), Av.1 °F (36.7 °C), Min:97.7 °F (36.5 °C), Max:98.7 °F (37.1 °C)    Vitals Ranges:  Temp:  [97.7 °F (36.5 °C)-98.7 °F (37.1 °C)] 97.7 °F (36.5 °C)  Heart Rate:  [68-82] 69  Resp:  [16-20] 16  BP: (133-176)/() 157/110    Physical Exam   Constitutional: He is oriented to person, place, and time. He appears well-developed and well-nourished.   HENT:   Head: Normocephalic and atraumatic.   Eyes: Pupils are equal, round, and reactive to light. EOM are normal.   Neck: Neck supple. No tracheal deviation present.   Right anterior cervical incision is dressed.  He has a soft cervical collar in place   Cardiovascular: Normal rate and regular rhythm.  Exam reveals no gallop.    No murmur heard.  Pulmonary/Chest: Effort normal. No respiratory distress. He has no wheezes.   Abdominal: Soft. Bowel sounds are normal. He exhibits no distension. There is no tenderness.   Musculoskeletal: He exhibits no edema or tenderness.   Neurological: He is alert and oriented to person, place, and time. No cranial nerve deficit.   Skin: Skin is warm and dry.   Nursing note and vitals reviewed.      Results Review:    I reviewed the patient's new clinical  results.      Active Problems:    Cervical disc disorder at C6-C7 level with radiculopathy    Cervical spinal stenosis    Cervical disc disorder at C5-C6 level with radiculopathy    Herniated cervical disc      Assessment & Plan    Hypertension - blood pressure is a little elevated at present which is likely due to pain.    History of DVT and PE - IVC filter is in place.  Restart anticoagulation when okay with neurosurgery.    CAD - preoperative stress test showed no evidence of ischemia.  He denies any chest pain or shortness of breath and appears stable from a cardiac standpoint at this time.    Hyperlipidemia - continue atorvastatin    GERD - continue PPI    I discussed the patients findings and my recommendations with patient and family    Thank you for allowing me to participate in the care of your patient.  We will follow along.    Rodolfo Lyles MD  10/05/18  2:16 PM

## 2018-10-05 NOTE — ANESTHESIA PROCEDURE NOTES
Airway  Urgency: elective    Airway not difficult    General Information and Staff    Patient location during procedure: OR  Anesthesiologist: KSENIA COVINGTON  CRNA: GLORIA OCHOA    Indications and Patient Condition  Indications for airway management: airway protection    Preoxygenated: yes  MILS not maintained throughout  Mask difficulty assessment: 1 - vent by mask    Final Airway Details  Final airway type: endotracheal airway      Successful airway: ETT  Cuffed: yes   Successful intubation technique: direct laryngoscopy  Facilitating devices/methods: intubating stylet  Endotracheal tube insertion site: oral  Blade: Will  Blade size: 3  ETT size: 8.0 mm  Cormack-Lehane Classification: grade I - full view of glottis  Placement verified by: chest auscultation   Cuff volume (mL): 9  Measured from: lips  ETT to lips (cm): 24  Number of attempts at approach: 1    Additional Comments  PreO2 100% face mask, IV induction, easy mask, DVL x1, cords noted, tube through, cuff up, EBBSH, +etCO2, = chest movement, tube secured in place, atraumatic, teeth and lips intact as preop.

## 2018-10-05 NOTE — H&P
Office Visit     8/20/2018  [facility] NEUROSURGERY   Jean Coles MD   Neurosurgery   Cervical disc disorder at C5-C6 level with radiculopathy +2 more   Dx   Neck Pain   Reason for Visit    Progress Notes     Expand All Collapse All       Subjective      Patient ID: Javy Reeves Sr. is a 67 y.o. male is here today for follow-up on neck pain.     At the last visit the patient reported significant improvement with his neck pain after having an epidural, but stated that it was short term relief.     Today the patient reports that his neck pain has returned. He states that he also numbness and pain radiating down the left arm.     Neck Pain    This is a chronic problem. The current episode started more than 1 year ago. The problem occurs daily. The problem has been gradually worsening. The pain is associated with nothing. Associated symptoms include headaches, numbness, tingling and weakness.         The following portions of the patient's history were reviewed and updated as appropriate: allergies, current medications, past family history, past medical history, past social history, past surgical history and problem list.     Review of Systems   Musculoskeletal: Positive for neck pain.   Neurological: Positive for tingling, weakness, numbness and headaches.   All other systems reviewed and are negative.     The patient is with his granddaughter. We have been trying to avoid surgery at C5-C6 and C6-C7. He has osteophytic disease at both of those levels with neck pain and bilateral arm pain, left worse than right. The neck pain is equal to the arm pain. He has gone through physical therapy, medical therapy consisting of Lyrica and blocks. These really did not help over the long term and while he has no motor deficits, he is quite miserable because of his neck pain and radicular pain. He does have a history of heart disease and is on Coumadin for previous blood clots and pulmonary embolus. He  does have a filter in. Apparently he has a zinc allergy as well. If we were to move forward with surgery he would need cardiac clearance from his cardiologist, Dr. Dalton, and needs to come off of his Coumadin. We will decide later if he needs a Lovenox bridge. He does feel he is at that point and wants to move forward with surgery and I described an ACDF at C5-C6 and C6-C7 with a cage and plate. He is a retired  so there is no specific work that he has to go back to, but he has been quite frustrated because he cannot do anything without hurting.            Objective      Physical Exam   Constitutional: He is oriented to person, place, and time. He appears well-developed and well-nourished.   HENT:   Head: Normocephalic and atraumatic.   Eyes: Pupils are equal, round, and reactive to light. Conjunctivae and EOM are normal.   Fundoscopic exam:       The right eye shows no papilledema. The right eye shows venous pulsations.        The left eye shows no papilledema. The left eye shows venous pulsations.   Neck: Carotid bruit is not present.   Neurological: He is oriented to person, place, and time. He has a normal Finger-Nose-Finger Test and a normal Heel to Shin Test. Gait normal.   Reflex Scores:       Tricep reflexes are 2+ on the right side and 2+ on the left side.       Bicep reflexes are 2+ on the right side and 2+ on the left side.       Brachioradialis reflexes are 2+ on the right side and 2+ on the left side.       Patellar reflexes are 2+ on the right side and 2+ on the left side.       Achilles reflexes are 2+ on the right side and 2+ on the left side.  Psychiatric: His speech is normal.      Neurologic Exam      Mental Status   Oriented to person, place, and time.   Registration of memory: Good recent and remote memory.   Attention: normal. Concentration: normal.   Speech: speech is normal   Level of consciousness: alert  Knowledge: consistent with education.      Cranial Nerves      CN II    Visual fields full to confrontation.   Visual acuity: normal     CN III, IV, VI   Pupils are equal, round, and reactive to light.  Extraocular motions are normal.      CN V   Facial sensation intact.   Right corneal reflex: normal  Left corneal reflex: normal     CN VII   Facial expression full, symmetric.   Right facial weakness: none  Left facial weakness: none     CN VIII   Hearing: intact     CN IX, X   Palate: symmetric     CN XI   Right sternocleidomastoid strength: normal  Left sternocleidomastoid strength: normal     CN XII   Tongue: not atrophic  Tongue deviation: none     Motor Exam   Muscle bulk: normal  Right arm tone: normal  Left arm tone: normal  Right leg tone: normal  Left leg tone: normal     Strength   Strength 5/5 except as noted.      Sensory Exam   Light touch normal.      Gait, Coordination, and Reflexes      Gait  Gait: normal     Coordination   Finger to nose coordination: normal  Heel to shin coordination: normal     Reflexes   Right brachioradialis: 2+  Left brachioradialis: 2+  Right biceps: 2+  Left biceps: 2+  Right triceps: 2+  Left triceps: 2+  Right patellar: 2+  Left patellar: 2+  Right achilles: 2+  Left achilles: 2+  Right : 2+  Left : 2+           Assessment/Plan      Independent Review of Radiographic Studies:    The cervical MRI done 3/60/18 shows bilateral osteophytic disease mainly at C5-C6 and C6-C7 with bilateral root entrapment.  Agree with the report.        Medical Decision Making:    We will need cardiac clearance.  He will need to come off of his Coumadin prior to surgery.  We'll determine if he needs a Lovenox bridge.     I described and recommended an anterior cervical discectomy and fusion with a cage and plate at C5/6 and C6/7. The goal of surgery is relief of radiating arm pain and improvement of numbness, tingling, and weakness. This will help reduce but may not eliminate midline neck pain. The risks include, but are not limited to, infection,  "hemorrhage requiring transfusion or reoperation, CSF leak requiring reoperation, incomplete relief of symptoms, difficulty swallowing, hoarseness of voice, hardware problems requiring revision surgery, potential need for additional surgery in the future, stroke, paralysis, coma, and death. The patient agrees to proceed.      Javy was seen today for neck pain.     Diagnoses and all orders for this visit:     Cervical disc disorder at C5-C6 level with radiculopathy  -     Ambulatory Referral to Cardiology  -     Case request; Standing  -     Case request     Cervical disc disorder at C6-C7 level with radiculopathy  -     Ambulatory Referral to Cardiology  -     Case request; Standing  -     Case request     Cervical spinal stenosis  -     Ambulatory Referral to Cardiology  -     Case request; Standing  -     Case request        Return for 2 weeks with S or L.                           Instructions         Return for 2 weeks with S or L.    Patient declined After Visit Summary   Additional Documentation     Vitals:    /83    Pulse 75    Ht 182.9 cm (72.01\")    Wt 109 kg (240 lb)    BMI 32.54 kg/m²    BSA 2.3 m²         More Vitals    Flowsheets:    Custom Formula Data       Encounter Info:    Billing Info,    History,    Allergies,    Detailed Report,    Prep for Surgery Order Report,    PAF,    Chart Review: Routing History,    Coding Summary,    Encounter Facesheet,    Link Facesheet       Communications         Letter sent to Aletha Ochoa PA-C and 2 others   Media     Scan on 8/20/2018 10:08 AM by Nella Lopez : SURGERY LETTER FERMIN    Scan on 8/18/2018 : ASHLEY / 8-18-18    Orders Placed      Ambulatory Referral to Cardiology Closed    Case request Once   Medication Changes        None      Medication List   Visit Diagnoses         Cervical disc disorder at C5-C6 level with radiculopathy      Cervical disc disorder at C6-C7 level with radiculopathy      Cervical spinal stenosis      Problem List "    Current Medications     acetaminophen (TYLENOL) 500 MG tablet   Sig - Route: Take 1,000 mg by mouth Every 6 (Six) Hours As Needed for Mild Pain . - Oral   Class: Historical Med   atorvastatin (LIPITOR) 40 MG tablet   Disp: 30 tablet   Start: 7/20/2018   Sig - Route: Take 1 tablet by mouth Daily. For cholesterol - Oral   cetirizine (zyrTEC) 10 MG tablet   Sig - Route: Take 10 mg by mouth Daily. - Oral   Class: Historical Med   Cholecalciferol 2000 units capsule   Disp: 90 each   Start: 7/20/2018   Sig - Route: Take 2,000 Units by mouth Daily. One PO daily - Oral   nitroglycerin (NITROSTAT) 0.4 MG SL tablet   Disp: 30 tablet   Start: 8/24/2017   Sig - Route: Place 1 tablet under the tongue Every 5 (Five) Minutes As Needed for Chest Pain. Take no more than 3 doses in 15 minutes. - Sublingual   omeprazole (priLOSEC) 20 MG capsule   Sig - Route: Take 20 mg by mouth Every Night. - Oral   Class: Historical Med   pregabalin (LYRICA) 100 MG capsule   Disp: 60 capsule   Start: 9/7/2018   Sig - Route: Take 1 capsule by mouth 2 (Two) Times a Day. For pain - Oral   tiZANidine (ZANAFLEX) 4 MG tablet   Sig - Route: Take 4 mg by mouth Every 8 (Eight) Hours As Needed for Muscle Spasms. - Oral   Class: Historical Med   Turmeric 500 MG tablet   Sig - Route: Take 500 mg by mouth Daily. - Oral   Class: Historical Med   Umeclidinium-Vilanterol (ANORO ELLIPTA) 62.5-25 MCG/INH aerosol powder    Disp: 60 each   Start: 9/5/2017   Sig: Inhale one puff once daily for COPD   warfarin (COUMADIN) 1 MG tablet   Sig - Route: Take 1 mg by mouth Daily. - Oral   Class: Historical Med   warfarin (COUMADIN) 5 MG tablet   Sig - Route: Take 10 mg by mouth Daily. - Oral   Class: Historical Med   Hospital Medications     famotidine (PEPCID) injection 20 mg   Dose: 20 mg   Frequency: 60 Minutes Pre-Op   Start: 10/5/2018   Sig - Route: Infuse 2 mL into a venous catheter 60 Minutes Prior to Surgery. - Intravenous   fentaNYL citrate (PF) (SUBLIMAZE)  "injection 25 mcg   Dose: 25 mcg   Frequency: Every 10 Minutes PRN   Start: 10/5/2018   Sig - Route: Infuse 0.5 mL into a venous catheter Every 10 (Ten) Minutes As Needed for Severe Pain . - Intravenous   lactated ringers infusion   Dose: 9 mL/hr   Frequency: Continuous PRN   Start: 10/5/2018   Sig - Route: Infuse 9 mL/hr into a venous catheter Continuous As Needed (Start Prior to Surgery). - Intravenous   midazolam (VERSED) injection 1 mg   Dose: 1 mg   Frequency: Every 5 Minutes PRN   Start: 10/5/2018   Sig - Route: Infuse 1 mL into a venous catheter Every 5 (Five) Minutes As Needed for Anxiety (Max 4mg midazolam TOTAL). - Intravenous   Linked Group 1: \"Or\" Linked Group Details   midazolam (VERSED) injection 2 mg   Dose: 2 mg   Frequency: Every 5 Minutes PRN   Start: 10/5/2018   Sig - Route: Infuse 2 mL into a venous catheter Every 5 (Five) Minutes As Needed for Anxiety (Max 4mg midazolam TOTAL). - Intravenous   Linked Group 1: \"Or\" Linked Group Details   sodium chloride 0.9 % flush 3-10 mL   Dose: 3-10 mL   Frequency: As Needed   Start: 10/5/2018   Sig - Route: Infuse 3-10 mL into a venous catheter As Needed for Line Care. - Intravenous     No change in PE. Proceed as above.  "

## 2018-10-05 NOTE — BRIEF OP NOTE
CERVICAL DISCECTOMY ANTERIOR FUSION WITH INSTRUMENTATION  Progress Note    Javy E Leandro Hurtado.  10/5/2018    Pre-op Diagnosis:   Cervical spinal stenosis [M48.02]  Cervical disc disorder at C5-C6 level with radiculopathy [M50.122]  Cervical disc disorder at C6-C7 level with radiculopathy [M50.123]       Post-Op Diagnosis Codes:     * Cervical spinal stenosis [M48.02]     * Cervical disc disorder at C5-C6 level with radiculopathy [M50.122]     * Cervical disc disorder at C6-C7 level with radiculopathy [M50.123]    Procedure/CPT® Codes:      Procedure(s):  CERVICAL DISCECTOMY ANTERIOR FUSION WITH INSTRUMENTATION,ACDF C5/6, C6/7 with cages and plate    Surgeon(s):  Jean Coles MD    Anesthesia: General    Staff:   Circulator: Sandra Powers RN; Teena Silverio RN; Edy Mixon RN  Radiology Technologist: Liss Davies RRT  Scrub Person: Greer Gatica  Vendor Representative: Marciano Keating  Assistant: Shakira Manley CSA    Estimated Blood Loss: 50 mL    Urine Voided: NONE    Specimens:              NONE      Drains:  NONE    Findings: severe lateral recess stenosis    Complications: none      Jean Coles MD     Date: 10/5/2018  Time: 10:21 AM         119

## 2018-10-05 NOTE — PLAN OF CARE
Problem: Patient Care Overview  Goal: Plan of Care Review  Outcome: Ongoing (interventions implemented as appropriate)   10/05/18 1889   Coping/Psychosocial   Plan of Care Reviewed With patient   Plan of Care Review   Progress no change   OTHER   Outcome Summary pt admitted for cervical ACDF today. soft collar in place. gauze dressing C/D/I. up with assist. voiding well on own. pain moderate in shoulders and front of neck soreness. dilaudid given 2x. refuses norco. agreed to take zanaflex later on. b/p very hypertensive, MD paged. will continue to monitor.

## 2018-10-05 NOTE — ANESTHESIA PREPROCEDURE EVALUATION
Anesthesia Evaluation     Patient summary reviewed                Airway   Mallampati: II  Neck ROM: limited  No difficulty expected  Dental      Pulmonary    (+) pulmonary embolism, sleep apnea,   Cardiovascular     ECG reviewed  Rhythm: regular    (+) past MI  >12 months, DVT,       Neuro/Psych  GI/Hepatic/Renal/Endo      Musculoskeletal     (+) myalgias,   Abdominal    Substance History      OB/GYN          Other                        Anesthesia Plan    ASA 3     general     Anesthetic plan, all risks, benefits, and alternatives have been provided, discussed and informed consent has been obtained with: patient.  Use of blood products discussed with patient .

## 2018-10-05 NOTE — OP NOTE
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    Postoperative diagnosis: Same as above    Procedures performed: ACDF C5/6, C6/7 with cages and plate    Surgeon: Sanket    First Assistant: Shakira Manley  (She greatly assisted in the exposure, visualization of neural structures, control of bleeding, retraction, and closure of the incision.)    Anesthesia: GET    EBL: minimal    Complications: none    Specimen sent: none    Drains: none    Findings: severe lateral recess stenosis, left worse than right    Postoperative condition: good    Indications for the operation: The patient is a 68-year-old gentleman who has had a long chronic history of neck pain that began involving the arms several years ago. He has now severe bilateral arm pain with weakness, numbness, and tingling, left worse than right. He has known osteophytic cervical disk herniations with bilateral root entrapment, and because of the failure of conservative measures, he was brought to the operating room today for a cervical fusion.        Informed consent: He understood that the goal of surgery is relief of radiating arm pain and improvement of numbness, tingling, and weakness. This will help reduce but may not eliminate midline neck pain. The risks include, but are not limited to, infection, hemorrhage requiring transfusion or reoperation, CSF leak requiring reoperation, incomplete relief of symptoms, difficulty swallowing, hoarseness of voice, hardware problems requiring revision surgery, potential need for additional surgery in the future, stroke, paralysis, coma, and death. The patient agrees to proceed.     Details of the operation:The patient was taken to the operating room and placed on the operating room table in the supine position. After induction and endotracheal intubation, he got 2 g of Kefzol as per the SCIP protocol. He got also 10 mg of Decadron IV. He was put in extension  with the shoulder roll and the shoulders were taped inferiorly. The anterior surface of the neck was prepped and draped in the usual sterile fashion. We did a surgical timeout. I made a transverse incision at the level of the C6 vertebral body with a #10 skin knife. Hemostasis was obtained with monopolar cautery. Dissection was taken all the way down to the platysma which was divided in a transverse fashion and undermined with Metzenbaum scissors. The omohyoid was identified and retracted medially. Sternocleidomastoid was identified and retracted laterally. The pretracheal and prevertebral fascia were opened up sharply. The anterior surface of the spine was palpated. I put 2 needles in 2 disk spaces which turned out to be at C5-C6 and C6-C7. The longus colli were mobilized bilaterally and self retaining retractors were placed medially, laterally, superiorly and inferiorly.     The microscope was draped and brought into the field. I incised the disk spaces at C5-C6 and C6-C7 with a #15 blade. Starting first at C6-C7 I put an intervertebral  in and using the 3 mm matchstick on the electric Philippe drill I did a generous diskectomy from uncovertebral joint to uncovertebral joint all the way down to the posterior longitudinal ligament. The ligament was opened up sharply with the sharp nerve hook and the remainder of the bony ligamentous removal was done with 1 and 2 mm angled Cloward punches. Most of the compression was from osteophytic spur. Of course I did remove the disk material all the way through and was able to decompress the C7 nerve roots bilaterally. I palpated with the 1 nerve hook to make sure that the neural foramen were open, which they were. I then moved on to C5-C6 and again did the same procedure using the 3 mm matchstick and drilling all the way down from uncovertebral joint to uncovertebral joint all the way down to the posterior longitudinal ligament. The ligament was opened up sharply and  the remainder of the bony ligamentous removal was done with 1 and 2 mm angled Cloward punches. I made sure that the C6 roots were fully decompressed. I measured out for an anatomic PEEK cage and placed a 7 x 16 x 14 mm cage at C5-C6 and a 6 x 16 x 14 mm cage at C6-C7. This was packed on both cages with demineralized bone matrix. I then used a 42.5 mm anterior elite plate and using 4.0 x 15 mm screws I screwed the plate in using 2 crews at C5, 2 at C6, 2 at C7 with excellent purchase. I tightened on the locking screws on all 3 levels. Hemostasis was obtained. Antibiotic irrigation was used. Floseal was used. We got permanent x-rays which showed good placement of the cage and the screws and the plate. No drain was necessary. The platysma was reapproximated with 3-0 interrupted Vicryl suture. The subcutaneous layer was reapproximated with 3-0 interrupted Vicryl suture. The skin was closed with 4-0 Vicryl subcuticular. Steri-Strips and a clean, dry dressing were placed. He was extubated and taken to the recovery room in satisfactory condition.

## 2018-10-05 NOTE — ANESTHESIA POSTPROCEDURE EVALUATION
Patient: Javy Reeves Sr.    Procedure Summary     Date:  10/05/18 Room / Location:  [facility] OR [address] [place] [address] MAIN OR    Anesthesia Start:  0757 Anesthesia Stop:  1026    Procedure:  CERVICAL DISCECTOMY ANTERIOR FUSION WITH INSTRUMENTATION,ACDF C5/6, C6/7 with cages and plate (N/A Spine Cervical) Diagnosis:       Cervical spinal stenosis      Cervical disc disorder at C5-C6 level with radiculopathy      Cervical disc disorder at C6-C7 level with radiculopathy      (Cervical spinal stenosis [M48.02])      (Cervical disc disorder at C5-C6 level with radiculopathy [M50.122])      (Cervical disc disorder at C6-C7 level with radiculopathy [M50.123])    Surgeon:  Jean Coles MD Provider:  Aly Dominguez MD    Anesthesia Type:  general ASA Status:  3          Anesthesia Type: general  Last vitals  BP   133/96 (10/05/18 1115)   Temp   37.1 °C (98.7 °F) (10/05/18 1021)   Pulse   74 (10/05/18 1115)   Resp   16 (10/05/18 1115)     SpO2   97 % (10/05/18 1115)     Post Anesthesia Care and Evaluation    Patient location during evaluation: bedside  Patient participation: complete - patient participated  Level of consciousness: awake  Pain management: adequate  Anesthetic complications: No anesthetic complications    Cardiovascular status: acceptable  Respiratory status: acceptable  Hydration status: acceptable

## 2018-10-06 LAB
ANION GAP SERPL CALCULATED.3IONS-SCNC: 15.2 MMOL/L
BUN BLD-MCNC: 11 MG/DL (ref 8–23)
BUN/CREAT SERPL: 10.3 (ref 7–25)
CALCIUM SPEC-SCNC: 9.2 MG/DL (ref 8.6–10.5)
CHLORIDE SERPL-SCNC: 100 MMOL/L (ref 98–107)
CO2 SERPL-SCNC: 20.8 MMOL/L (ref 22–29)
CREAT BLD-MCNC: 1.07 MG/DL (ref 0.76–1.27)
GFR SERPL CREATININE-BSD FRML MDRD: 84 ML/MIN/1.73
GLUCOSE BLD-MCNC: 160 MG/DL (ref 65–99)
GLUCOSE BLDC GLUCOMTR-MCNC: 153 MG/DL (ref 70–130)
POTASSIUM BLD-SCNC: 3.9 MMOL/L (ref 3.5–5.2)
SODIUM BLD-SCNC: 136 MMOL/L (ref 136–145)
TROPONIN T SERPL-MCNC: <0.01 NG/ML (ref 0–0.03)

## 2018-10-06 PROCEDURE — 93010 ELECTROCARDIOGRAM REPORT: CPT | Performed by: INTERNAL MEDICINE

## 2018-10-06 PROCEDURE — A9270 NON-COVERED ITEM OR SERVICE: HCPCS | Performed by: NEUROLOGICAL SURGERY

## 2018-10-06 PROCEDURE — 63710000001 ATORVASTATIN 20 MG TABLET: Performed by: NEUROLOGICAL SURGERY

## 2018-10-06 PROCEDURE — 63710000001 HYDROCODONE-ACETAMINOPHEN 7.5-325 MG TABLET: Performed by: NEUROLOGICAL SURGERY

## 2018-10-06 PROCEDURE — 63710000001 NITROGLYCERIN 0.4 MG SUBLINGUAL TABLET 25 EACH BOTTLE: Performed by: NEUROLOGICAL SURGERY

## 2018-10-06 PROCEDURE — 99203 OFFICE O/P NEW LOW 30 MIN: CPT | Performed by: INTERNAL MEDICINE

## 2018-10-06 PROCEDURE — 80048 BASIC METABOLIC PNL TOTAL CA: CPT | Performed by: INTERNAL MEDICINE

## 2018-10-06 PROCEDURE — A9270 NON-COVERED ITEM OR SERVICE: HCPCS | Performed by: INTERNAL MEDICINE

## 2018-10-06 PROCEDURE — 63710000001 DEXAMETHASONE PER 0.25 MG: Performed by: NEUROLOGICAL SURGERY

## 2018-10-06 PROCEDURE — 63710000001 PANTOPRAZOLE 40 MG TABLET DELAYED-RELEASE: Performed by: NEUROLOGICAL SURGERY

## 2018-10-06 PROCEDURE — 25010000002 HYDROMORPHONE PER 4 MG: Performed by: NEUROLOGICAL SURGERY

## 2018-10-06 PROCEDURE — 94799 UNLISTED PULMONARY SVC/PX: CPT

## 2018-10-06 PROCEDURE — 93005 ELECTROCARDIOGRAM TRACING: CPT | Performed by: NEUROLOGICAL SURGERY

## 2018-10-06 PROCEDURE — 63710000001 METOPROLOL TARTRATE 25 MG TABLET: Performed by: INTERNAL MEDICINE

## 2018-10-06 PROCEDURE — 82962 GLUCOSE BLOOD TEST: CPT

## 2018-10-06 PROCEDURE — 84484 ASSAY OF TROPONIN QUANT: CPT | Performed by: INTERNAL MEDICINE

## 2018-10-06 PROCEDURE — 93005 ELECTROCARDIOGRAM TRACING: CPT | Performed by: INTERNAL MEDICINE

## 2018-10-06 PROCEDURE — 63710000001 CETIRIZINE 10 MG TABLET: Performed by: NEUROLOGICAL SURGERY

## 2018-10-06 PROCEDURE — 63710000001 PREGABALIN 50 MG CAPSULE: Performed by: NEUROLOGICAL SURGERY

## 2018-10-06 PROCEDURE — 99024 POSTOP FOLLOW-UP VISIT: CPT | Performed by: NEUROLOGICAL SURGERY

## 2018-10-06 PROCEDURE — 63710000001 HYDRALAZINE 25 MG TABLET: Performed by: INTERNAL MEDICINE

## 2018-10-06 RX ADMIN — Medication 3 ML: at 20:43

## 2018-10-06 RX ADMIN — IPRATROPIUM BROMIDE AND ALBUTEROL SULFATE 3 ML: .5; 3 SOLUTION RESPIRATORY (INHALATION) at 22:18

## 2018-10-06 RX ADMIN — METOPROLOL TARTRATE 25 MG: 25 TABLET ORAL at 01:50

## 2018-10-06 RX ADMIN — METOPROLOL TARTRATE 25 MG: 25 TABLET ORAL at 09:20

## 2018-10-06 RX ADMIN — HYDROCODONE BITARTRATE AND ACETAMINOPHEN 1 TABLET: 7.5; 325 TABLET ORAL at 05:55

## 2018-10-06 RX ADMIN — DEXAMETHASONE 4 MG: 4 TABLET ORAL at 05:55

## 2018-10-06 RX ADMIN — HYDROCODONE BITARTRATE AND ACETAMINOPHEN 1 TABLET: 7.5; 325 TABLET ORAL at 14:26

## 2018-10-06 RX ADMIN — HYDROCODONE BITARTRATE AND ACETAMINOPHEN 1 TABLET: 7.5; 325 TABLET ORAL at 18:49

## 2018-10-06 RX ADMIN — ATORVASTATIN CALCIUM 40 MG: 20 TABLET, FILM COATED ORAL at 09:21

## 2018-10-06 RX ADMIN — IPRATROPIUM BROMIDE AND ALBUTEROL SULFATE 3 ML: .5; 3 SOLUTION RESPIRATORY (INHALATION) at 15:26

## 2018-10-06 RX ADMIN — DEXAMETHASONE 4 MG: 4 TABLET ORAL at 13:39

## 2018-10-06 RX ADMIN — PANTOPRAZOLE SODIUM 40 MG: 40 TABLET, DELAYED RELEASE ORAL at 05:55

## 2018-10-06 RX ADMIN — IPRATROPIUM BROMIDE AND ALBUTEROL SULFATE 3 ML: .5; 3 SOLUTION RESPIRATORY (INHALATION) at 08:13

## 2018-10-06 RX ADMIN — SODIUM CHLORIDE, POTASSIUM CHLORIDE, SODIUM LACTATE AND CALCIUM CHLORIDE 100 ML/HR: 600; 310; 30; 20 INJECTION, SOLUTION INTRAVENOUS at 04:38

## 2018-10-06 RX ADMIN — CETIRIZINE HYDROCHLORIDE 10 MG: 10 TABLET, FILM COATED ORAL at 09:21

## 2018-10-06 RX ADMIN — HYDRALAZINE HYDROCHLORIDE 25 MG: 25 TABLET, FILM COATED ORAL at 01:27

## 2018-10-06 RX ADMIN — HYDROMORPHONE HYDROCHLORIDE 0.5 MG: 1 INJECTION, SOLUTION INTRAMUSCULAR; INTRAVENOUS; SUBCUTANEOUS at 00:17

## 2018-10-06 RX ADMIN — NITROGLYCERIN 0.4 MG: 0.4 TABLET SUBLINGUAL at 00:13

## 2018-10-06 RX ADMIN — NITROGLYCERIN 0.4 MG: 0.4 TABLET SUBLINGUAL at 00:30

## 2018-10-06 RX ADMIN — PREGABALIN 100 MG: 50 CAPSULE ORAL at 20:43

## 2018-10-06 RX ADMIN — NITROGLYCERIN 0.4 MG: 0.4 TABLET SUBLINGUAL at 00:19

## 2018-10-06 RX ADMIN — PREGABALIN 100 MG: 50 CAPSULE ORAL at 09:21

## 2018-10-06 RX ADMIN — METOPROLOL TARTRATE 25 MG: 25 TABLET ORAL at 20:42

## 2018-10-06 RX ADMIN — HYDROCODONE BITARTRATE AND ACETAMINOPHEN 1 TABLET: 7.5; 325 TABLET ORAL at 10:08

## 2018-10-06 NOTE — NURSING NOTE
Nurse called to pt room for c/o chest pain. Pt states pain began as cramp to left chest that radiated to center of chest and to spine. States feels like someone is pushing a baseball bat into chest. B/P 191/92 HR 97. Rapid response called. Dr Molina and pts wife notified.

## 2018-10-06 NOTE — PLAN OF CARE
Problem: Patient Care Overview  Goal: Plan of Care Review  Outcome: Ongoing (interventions implemented as appropriate)      Problem: Fall Risk (Adult)  Goal: Absence of Fall  Outcome: Ongoing (interventions implemented as appropriate)      Problem: Pain, Acute (Adult)  Goal: Acceptable Pain Control/Comfort Level  Outcome: Ongoing (interventions implemented as appropriate)

## 2018-10-06 NOTE — PROGRESS NOTES
POD 1  S/p ACDF C5/6, C6/7  Rapid called last night  Had chest pain  Cardio eval negative so far  They will want to follow troponin  Probably related to indigestion  Vitals:    10/06/18 0920 10/06/18 0942 10/06/18 1354 10/06/18 1526   BP: 166/86 166/86 157/86    BP Location:  Left arm Left arm    Patient Position:  Lying Lying    Pulse: 88 103 79    Resp:  22 20 20   Temp:  97.5 °F (36.4 °C) 97.6 °F (36.4 °C)    TempSrc:  Oral Oral    SpO2:  97% 96%    Weight:       Height:       arms better  Neck sore  Mild dysphagia  Probably home tomorrow

## 2018-10-06 NOTE — CONSULTS
Date of Hospital Visit: [unfilled]ENC@  Encounter Provider: Erlin Thomas MD  Place of Service: [facility] CARDIOLOGY  Patient Name: Javy Reeves Sr.  :1950  Referral Provider: Jean Coles MD    Chief complaint  Chest Pain    History of Present Illness  68 year old gentlemen with history of prior myocardial infarction seen after episode of chest pain last night.  He is currently admitted following a cervical fusion surgery.  He states that last night he had left shoulder and chest pain.  He associates the symptoms with tachycardia.  The pain went away after 1 hour.   It started just after he received a breathing treatment.  He has a history of chest pain, and was having symptoms 1-2 twice a month prior to surgery, stable for years.  He currently denies any symptoms.   He had a stress test as part of preoperative evaluation that demonstrated a normal ejection fraction and no evidence of ischemia.     Past Medical History:   Diagnosis Date   • Acute and subacute infective endocarditis in diseases classified elsewhere    • Arthritis    • Chest pain    • Chronic low back pain    • Chronic pain    • Colon polyps    • Coronary artery disease    • DDD (degenerative disc disease), cervical    • DDD (degenerative disc disease), lumbosacral    • Diverticulosis    • DVT (deep venous thrombosis) (CMS/HCC)     Left Lower extremity following arthroscopic knee surgery in . IVC fliter placed at that time.   • Encounter for special screening examination for neoplasm of prostate    • Eye exam, routine > 5 yrs ago   • Eye exam, routine 2015   • Fibromyalgia    • Fibromyositis    • GERD (gastroesophageal reflux disease)    • Hyperlipidemia    • Injury of back    • Irritable bowel    • Lumbar stenosis    • MI (myocardial infarction) (CMS/HCC)    • Neck pain    • Pain in limb    • Past heart attack    • Postlaminectomy syndrome of lumbar region    • Pulmonary embolism (CMS/HCC)  1996    Left Lower extremity following arthroscopic knee surgery in 1996. IVC fliter placed at that time.   • Reflux esophagitis    • Sleep apnea     NO CPAP   • TIA (transient ischemic attack)        Past Surgical History:   Procedure Laterality Date   • APPENDECTOMY N/A    • CARDIAC CATHETERIZATION Left 1950    Left Heart Cath Left Ventriculography, selective coronary angiography; iliofemoral angiography; angio seal closure, Dr. Brady Ramos   • COLONOSCOPY  09/2015    removed 2 polyps, Dr. Manley   • COLONOSCOPY N/A 02/12/2007    Left colonic diverticulosis, No evidence of polypoid neoplasia, Dr. Jarret Bloom   • COLONOSCOPY N/A 9/13/2018    Procedure: COLONOSCOPY TO CECUM WITH COLD BX'S POLYPECTOMY;  Surgeon: Berta Sin MD;  Location: [facility] ENDOSCOPY;  Service: General   • CYSTOSCOPY TRANSURETHRAL RESECTION OF PROSTATE N/A 11/01/2004    Dr. Rodolfo Arnold   • HAND SURGERY Right    • KNEE ARTHROPLASTY Left    • LUMBAR DISC SURGERY  2006, 2007    L4-S1 pseudoarthroses - Dr Cervantes   • LUMBAR DISC SURGERY N/A 01/29/2010    Removal of Instrumentation w/ decompression, Instrumentaion loosening & pt tested positive for zinc allergy, Dr. Kvng Cervantes   • ROTATOR CUFF REPAIR Right 04/2012   • THORACIC OUTLET SURGERY Bilateral    • VENA CAVA FILTER PLACEMENT  1996   • WRIST SURGERY Right     x3 starting in 1984       Prescriptions Prior to Admission   Medication Sig Dispense Refill Last Dose   • acetaminophen (TYLENOL) 500 MG tablet Take 1,000 mg by mouth Every 6 (Six) Hours As Needed for Mild Pain .   10/4/2018 at 1630   • Cholecalciferol 2000 units capsule Take 2,000 Units by mouth Daily. One PO daily 90 each 3 10/4/2018 at 0930   • pregabalin (LYRICA) 100 MG capsule Take 1 capsule by mouth 2 (Two) Times a Day. For pain 60 capsule 5 10/5/2018 at 0515   • tiZANidine (ZANAFLEX) 4 MG tablet Take 4 mg by mouth Every 8 (Eight) Hours As Needed for Muscle Spasms.   10/4/2018 at 0930   • warfarin (COUMADIN) 5 MG  tablet Take 10 mg by mouth Daily.   9/27/2018   • atorvastatin (LIPITOR) 40 MG tablet Take 1 tablet by mouth Daily. For cholesterol 30 tablet 11 10/4/2018 at 0930   • cetirizine (zyrTEC) 10 MG tablet Take 10 mg by mouth Daily.   10/3/2018   • nitroglycerin (NITROSTAT) 0.4 MG SL tablet Place 1 tablet under the tongue Every 5 (Five) Minutes As Needed for Chest Pain. Take no more than 3 doses in 15 minutes. 30 tablet 5 9/11/2018   • omeprazole (priLOSEC) 20 MG capsule Take 20 mg by mouth Every Night.   10/3/2018   • Turmeric 500 MG tablet Take 500 mg by mouth Daily.   9/28/2018   • Umeclidinium-Vilanterol (ANORO ELLIPTA) 62.5-25 MCG/INH aerosol powder  Inhale one puff once daily for COPD (Patient taking differently: Inhale 1 puff As Needed. Inhale one puff once daily for COPD) 60 each 11 9/14/2018   • warfarin (COUMADIN) 1 MG tablet Take 1 mg by mouth Daily.   9/27/2018       Current Meds  Scheduled Meds:  atorvastatin 40 mg Oral Daily   cetirizine 10 mg Oral Daily   dexamethasone 4 mg Oral Q6H   Or      dexamethasone 4 mg Intravenous Q6H   ipratropium-albuterol 3 mL Nebulization Q8H - RT   metoprolol tartrate 25 mg Oral Q12H   pantoprazole 40 mg Oral QAM   pregabalin 100 mg Oral BID   sodium chloride 3 mL Intravenous Q12H     Continuous Infusions:  lactated ringers 100 mL/hr Last Rate: Stopped (10/05/18 1232)   lactated ringers 100 mL/hr Last Rate: 100 mL/hr (10/06/18 0438)     PRN Meds:.•  acetaminophen  •  docusate sodium  •  hydrALAZINE  •  HYDROcodone-acetaminophen  •  HYDROmorphone **AND** naloxone  •  nitroglycerin  •  ondansetron **OR** ondansetron ODT **OR** ondansetron  •  sennosides-docusate sodium  •  sodium chloride  •  tiZANidine    Allergies as of 08/20/2018 - Reviewed 08/20/2018   Allergen Reaction Noted   • Chantix [varenicline] Other (See Comments) 07/27/2018   • Zinc Other (See Comments) 07/12/2016       Social History     Social History   • Marital status:      Spouse name: N/A   • Number of  "children: N/A   • Years of education: N/A     Occupational History   • Not on file.     Social History Main Topics   • Smoking status: Current Every Day Smoker     Packs/day: 0.50     Years: 30.00   • Smokeless tobacco: Never Used      Comment: caffeine use   • Alcohol use Yes      Comment: 2x per month   • Drug use: No   • Sexual activity: Defer     Other Topics Concern   • Not on file     Social History Narrative   • No narrative on file       Family History   Problem Relation Age of Onset   • Diabetes Sister    • Cancer Sister    • Hypertension Sister    • Kidney disease Sister    • Stroke Sister    • Heart disease Brother    • Hypertension Brother    • Cerebral aneurysm Brother    • Sudden death Brother    • Bleeding Disorder Brother    • Cancer Mother    • Heart disease Father    • Cancer Father         malignant neoplasm   • Deep vein thrombosis Father    • Heart attack Father    • Malig Hyperthermia Neg Hx        REVIEW OF SYSTEMS:   A complete ROS was performed and is negative except as outlined in HPI            Objective:   Temp:  [97.7 °F (36.5 °C)-98.7 °F (37.1 °C)] 98 °F (36.7 °C)  Heart Rate:  [] 99  Resp:  [16-20] 16  BP: (133-195)/() 145/76  Body mass index is 32.26 kg/m².  Flowsheet Rows      First Filed Value   Admission Height  182.9 cm (72\") Documented at 10/05/2018 0618   Admission Weight  108 kg (237 lb 14 oz) Documented at 10/05/2018 0618        Vitals:    10/06/18 0814   BP:    Pulse:    Resp: 16   Temp:    SpO2:        General Appearance:    Alert, cooperative, in no acute distress, accompanied by family   Head:    Normocephalic, without obvious abnormality, atraumatic   Eyes:            Lids and lashes normal, conjunctivae and sclerae normal, no icterus   Ears:    Ears appear intact with no abnormalities noted   Throat:   No oral lesions, no thrush, oral mucosa moist   Neck:   He is wearing a surgical collar   Back:     No examined due to recent surgery   Lungs:     Clear to " auscultation,respirations regular, even and unlabored    Heart:    Regular rhythm and normal rate, normal S1 and S2, no murmur, no gallop, no rub, no click   Chest Wall:    No abnormalities observed   Abdomen:     Normal bowel sounds, no masses, soft        non-tender, non-distended, no guarding, no rebound  tenderness   Extremities:   Moves all extremities well, no edema, no cyanosis, no redness   Pulses:   Pulses palpable and equal bilaterally. Normal radial pulses   Skin:  Psychiatric:   No bleeding, bruising or rash    Alert and oriented x 3, normal mood and affect                 Lab Review:      Results from last 7 days  Lab Units 10/06/18  0012   SODIUM mmol/L 136   POTASSIUM mmol/L 3.9   CHLORIDE mmol/L 100   CO2 mmol/L 20.8*   BUN mg/dL 11   CREATININE mg/dL 1.07   CALCIUM mg/dL 9.2   GLUCOSE mg/dL 160*       Results from last 7 days  Lab Units 10/06/18  0012   TROPONIN T ng/mL <0.010     @LABRCNTbnp@        Results from last 7 days  Lab Units 10/05/18  0629   INR  1.07         @LABRCNTIP(chol,trig,hdl,ldl)    I personally viewed and interpreted the patient's EKG/Telemetry data  )  Patient Active Problem List   Diagnosis   • Arthritis   • Family history of early CAD   • DDD (degenerative disc disease), cervical   • DVT (deep venous thrombosis) (CMS/[facility])   • Fibromyalgia   • Past heart attack   • Hyperlipidemia   • DDD (degenerative disc disease), lumbosacral   • History of pulmonary embolus (PE)   • Reflux esophagitis   • TIA (transient ischemic attack)   • Left groin pain   • Cervical radicular pain   • Mild atherosclerosis of right carotid artery   • Cervical disc disorder at C6-C7 level with radiculopathy   • Wheezing   • Colon polyps   • Right lower quadrant abdominal pain   • Diarrhea   • Cervical spinal stenosis   • Cervical disc disorder at C5-C6 level with radiculopathy   • GERD (gastroesophageal reflux disease)   • Diverticulosis   • Old MI (myocardial infarction)   • Herniated cervical disc      Assessment and Plan:  Chest Pain    We should trend troponin x 3 q6h today.  Unless there is a increase in troponin no further diagnostics or treatment will be needed.  Will follow-up after troponins.    Erlin Thomas MD  10/06/18  9:21 AM.  Time spent in reviewing chart, discussion and examination:

## 2018-10-06 NOTE — PROGRESS NOTES
" LOS: 0 days     Name: Javy Reeves Sr.  Age: 68 y.o.  Sex: male  :  1950  MRN: [id number]         Primary Care Physician: Aletha Ochoa, MLAINDA    Subjective   Subjective  Developed substernal chest pressure overnight last night.  This is improved this morning.  Denies any associated nausea, vomiting, shortness of breath.  States that he ate [employer] last night for dinner.    Objective   Vital Signs  Temp:  [97.5 °F (36.4 °C)-98.7 °F (37.1 °C)] 97.5 °F (36.4 °C)  Heart Rate:  [] 103  Resp:  [16-22] 22  BP: (133-195)/() 166/86  Body mass index is 32.26 kg/m².    Objective:  General Appearance:  Comfortable and in no acute distress.    Vital signs: (most recent): Blood pressure 166/86, pulse 103, temperature 97.5 °F (36.4 °C), temperature source Oral, resp. rate 22, height 182.9 cm (72\"), weight 108 kg (237 lb 14 oz), SpO2 97 %.    HEENT: (Anterior cervical incision is dressed with soft cervical collar in place)    Lungs:  Normal effort and normal respiratory rate.    Heart: Normal rate.  Regular rhythm.    Abdomen: Abdomen is soft.  Bowel sounds are normal.   There is no abdominal tenderness.     Extremities: There is no dependent edema or local swelling.    Neurological: Patient is alert and oriented to person, place and time.    Skin:  Warm and dry.              Results Review:       I reviewed the patient's new clinical results.          Results from last 7 days  Lab Units 10/06/18  0012   SODIUM mmol/L 136   POTASSIUM mmol/L 3.9   CHLORIDE mmol/L 100   CO2 mmol/L 20.8*   BUN mg/dL 11   CREATININE mg/dL 1.07   CALCIUM mg/dL 9.2   GLUCOSE mg/dL 160*       Results from last 7 days  Lab Units 10/05/18  0629   INR  1.07     Scheduled Meds:     atorvastatin 40 mg Oral Daily   cetirizine 10 mg Oral Daily   dexamethasone 4 mg Oral Q6H   Or      dexamethasone 4 mg Intravenous Q6H   ipratropium-albuterol 3 mL Nebulization Q8H - RT   metoprolol tartrate 25 mg Oral Q12H   pantoprazole 40 mg Oral QAM "   pregabalin 100 mg Oral BID   sodium chloride 3 mL Intravenous Q12H     PRN Meds:   •  acetaminophen  •  docusate sodium  •  hydrALAZINE  •  HYDROcodone-acetaminophen  •  HYDROmorphone **AND** naloxone  •  nitroglycerin  •  ondansetron **OR** ondansetron ODT **OR** ondansetron  •  sennosides-docusate sodium  •  sodium chloride  •  tiZANidine  Continuous Infusions:    lactated ringers 100 mL/hr Last Rate: Stopped (10/05/18 1232)   lactated ringers 100 mL/hr Last Rate: 100 mL/hr (10/06/18 0438)       Assessment/Plan   Active Hospital Problems    Diagnosis Date Noted   • Herniated cervical disc [M50.20] 10/05/2018   • Cervical spinal stenosis [M48.02] 08/20/2018   • Cervical disc disorder at C5-C6 level with radiculopathy [M50.122] 08/20/2018   • Cervical disc disorder at C6-C7 level with radiculopathy [M50.123] 05/16/2018      Resolved Hospital Problems    Diagnosis Date Noted Date Resolved   No resolved problems to display.       Assessment & Plan    Hypertension - blood pressure looking better this morning.  He was started on Lopressor last night after he began complaining of chest pain     History of DVT and PE - IVC filter is in place.  Restart anticoagulation when okay with neurosurgery.     CAD - Preoperative stress test showed no evidence of ischemia.   he complained of substernal chest pressure overnight last night.  Initial troponin was negative and these will be trended over the course of the day today.  Cardiology now following.  Continue beta blocker.     Hyperlipidemia - continue atorvastatin     GERD - continue PPI      Rodolfo Lyles MD  [facility]  10/06/18  10:06 AM

## 2018-10-07 VITALS
TEMPERATURE: 97.8 F | WEIGHT: 237.88 LBS | OXYGEN SATURATION: 95 % | HEIGHT: 72 IN | DIASTOLIC BLOOD PRESSURE: 92 MMHG | RESPIRATION RATE: 16 BRPM | HEART RATE: 73 BPM | BODY MASS INDEX: 32.22 KG/M2 | SYSTOLIC BLOOD PRESSURE: 135 MMHG

## 2018-10-07 LAB
ANION GAP SERPL CALCULATED.3IONS-SCNC: 11.6 MMOL/L
BASOPHILS # BLD AUTO: 0 10*3/MM3 (ref 0–0.2)
BASOPHILS NFR BLD AUTO: 0 % (ref 0–1.5)
BUN BLD-MCNC: 10 MG/DL (ref 8–23)
BUN/CREAT SERPL: 12.3 (ref 7–25)
CALCIUM SPEC-SCNC: 9.1 MG/DL (ref 8.6–10.5)
CHLORIDE SERPL-SCNC: 104 MMOL/L (ref 98–107)
CO2 SERPL-SCNC: 25.4 MMOL/L (ref 22–29)
CREAT BLD-MCNC: 0.81 MG/DL (ref 0.76–1.27)
DEPRECATED RDW RBC AUTO: 49.2 FL (ref 37–54)
EOSINOPHIL # BLD AUTO: 0 10*3/MM3 (ref 0–0.7)
EOSINOPHIL NFR BLD AUTO: 0 % (ref 0.3–6.2)
ERYTHROCYTE [DISTWIDTH] IN BLOOD BY AUTOMATED COUNT: 14.1 % (ref 11.5–14.5)
GFR SERPL CREATININE-BSD FRML MDRD: 116 ML/MIN/1.73
GLUCOSE BLD-MCNC: 150 MG/DL (ref 65–99)
HCT VFR BLD AUTO: 41.9 % (ref 40.4–52.2)
HGB BLD-MCNC: 13.9 G/DL (ref 13.7–17.6)
IMM GRANULOCYTES # BLD: 0.05 10*3/MM3 (ref 0–0.03)
IMM GRANULOCYTES NFR BLD: 0.3 % (ref 0–0.5)
LYMPHOCYTES # BLD AUTO: 1.25 10*3/MM3 (ref 0.9–4.8)
LYMPHOCYTES NFR BLD AUTO: 6.9 % (ref 19.6–45.3)
MCH RBC QN AUTO: 31.7 PG (ref 27–32.7)
MCHC RBC AUTO-ENTMCNC: 33.2 G/DL (ref 32.6–36.4)
MCV RBC AUTO: 95.7 FL (ref 79.8–96.2)
MONOCYTES # BLD AUTO: 1.35 10*3/MM3 (ref 0.2–1.2)
MONOCYTES NFR BLD AUTO: 7.4 % (ref 5–12)
NEUTROPHILS # BLD AUTO: 15.49 10*3/MM3 (ref 1.9–8.1)
NEUTROPHILS NFR BLD AUTO: 85.4 % (ref 42.7–76)
PLATELET # BLD AUTO: 178 10*3/MM3 (ref 140–500)
PMV BLD AUTO: 10 FL (ref 6–12)
POTASSIUM BLD-SCNC: 4.2 MMOL/L (ref 3.5–5.2)
RBC # BLD AUTO: 4.38 10*6/MM3 (ref 4.6–6)
SODIUM BLD-SCNC: 141 MMOL/L (ref 136–145)
WBC NRBC COR # BLD: 18.14 10*3/MM3 (ref 4.5–10.7)

## 2018-10-07 PROCEDURE — A9270 NON-COVERED ITEM OR SERVICE: HCPCS | Performed by: NEUROLOGICAL SURGERY

## 2018-10-07 PROCEDURE — 94799 UNLISTED PULMONARY SVC/PX: CPT

## 2018-10-07 PROCEDURE — 63710000001 ATORVASTATIN 20 MG TABLET: Performed by: NEUROLOGICAL SURGERY

## 2018-10-07 PROCEDURE — 63710000001 METOPROLOL TARTRATE 25 MG TABLET: Performed by: INTERNAL MEDICINE

## 2018-10-07 PROCEDURE — A9270 NON-COVERED ITEM OR SERVICE: HCPCS | Performed by: INTERNAL MEDICINE

## 2018-10-07 PROCEDURE — 63710000001 PREGABALIN 50 MG CAPSULE: Performed by: NEUROLOGICAL SURGERY

## 2018-10-07 PROCEDURE — 63710000001 HYDROCODONE-ACETAMINOPHEN 7.5-325 MG TABLET: Performed by: NEUROLOGICAL SURGERY

## 2018-10-07 PROCEDURE — 63710000001 DOCUSATE SODIUM 100 MG CAPSULE: Performed by: NEUROLOGICAL SURGERY

## 2018-10-07 PROCEDURE — 99024 POSTOP FOLLOW-UP VISIT: CPT | Performed by: NEUROLOGICAL SURGERY

## 2018-10-07 PROCEDURE — 99213 OFFICE O/P EST LOW 20 MIN: CPT | Performed by: INTERNAL MEDICINE

## 2018-10-07 PROCEDURE — 63710000001 CETIRIZINE 10 MG TABLET: Performed by: NEUROLOGICAL SURGERY

## 2018-10-07 PROCEDURE — 80048 BASIC METABOLIC PNL TOTAL CA: CPT | Performed by: INTERNAL MEDICINE

## 2018-10-07 PROCEDURE — 63710000001 PANTOPRAZOLE 40 MG TABLET DELAYED-RELEASE: Performed by: NEUROLOGICAL SURGERY

## 2018-10-07 PROCEDURE — 85025 COMPLETE CBC W/AUTO DIFF WBC: CPT | Performed by: INTERNAL MEDICINE

## 2018-10-07 RX ORDER — HYDROCODONE BITARTRATE AND ACETAMINOPHEN 7.5; 325 MG/1; MG/1
1 TABLET ORAL EVERY 8 HOURS PRN
Qty: 30 TABLET | Refills: 0 | Status: SHIPPED | OUTPATIENT
Start: 2018-10-07 | End: 2018-10-19 | Stop reason: HOSPADM

## 2018-10-07 RX ADMIN — IPRATROPIUM BROMIDE AND ALBUTEROL SULFATE 3 ML: .5; 3 SOLUTION RESPIRATORY (INHALATION) at 07:50

## 2018-10-07 RX ADMIN — ATORVASTATIN CALCIUM 40 MG: 20 TABLET, FILM COATED ORAL at 08:32

## 2018-10-07 RX ADMIN — HYDROCODONE BITARTRATE AND ACETAMINOPHEN 1 TABLET: 7.5; 325 TABLET ORAL at 10:55

## 2018-10-07 RX ADMIN — CETIRIZINE HYDROCHLORIDE 10 MG: 10 TABLET, FILM COATED ORAL at 08:33

## 2018-10-07 RX ADMIN — METOPROLOL TARTRATE 25 MG: 25 TABLET ORAL at 08:33

## 2018-10-07 RX ADMIN — Medication 3 ML: at 08:37

## 2018-10-07 RX ADMIN — DOCUSATE SODIUM 100 MG: 100 CAPSULE, LIQUID FILLED ORAL at 06:55

## 2018-10-07 RX ADMIN — HYDROCODONE BITARTRATE AND ACETAMINOPHEN 1 TABLET: 7.5; 325 TABLET ORAL at 02:58

## 2018-10-07 RX ADMIN — PANTOPRAZOLE SODIUM 40 MG: 40 TABLET, DELAYED RELEASE ORAL at 06:55

## 2018-10-07 RX ADMIN — HYDROCODONE BITARTRATE AND ACETAMINOPHEN 1 TABLET: 7.5; 325 TABLET ORAL at 06:55

## 2018-10-07 RX ADMIN — HYDROCODONE BITARTRATE AND ACETAMINOPHEN 1 TABLET: 7.5; 325 TABLET ORAL at 15:22

## 2018-10-07 RX ADMIN — PREGABALIN 100 MG: 50 CAPSULE ORAL at 08:33

## 2018-10-07 NOTE — PLAN OF CARE
Problem: Patient Care Overview  Goal: Plan of Care Review  Outcome: Ongoing (interventions implemented as appropriate)   10/07/18 0433   Coping/Psychosocial   Plan of Care Reviewed With patient   Plan of Care Review   Progress improving   OTHER   Outcome Summary Pt A/Ox4, VSS. Amb in room with assist x1, without diff. C/o pain to neck, arms and fingers well controlled with po pain meds. No c/o chest pain this shift. Will continue to monitor       Problem: Fall Risk (Adult)  Goal: Identify Related Risk Factors and Signs and Symptoms  Outcome: Ongoing (interventions implemented as appropriate)    Goal: Absence of Fall  Outcome: Ongoing (interventions implemented as appropriate)      Problem: Pain, Acute (Adult)  Goal: Identify Related Risk Factors and Signs and Symptoms  Outcome: Ongoing (interventions implemented as appropriate)    Goal: Acceptable Pain Control/Comfort Level  Outcome: Ongoing (interventions implemented as appropriate)

## 2018-10-07 NOTE — PROGRESS NOTES
" LOS: 0 days     Name: Javy Reeves Sr.  Age: 68 y.o.  Sex: male  :  1950  MRN: [id number]         Primary Care Physician: Aletha Ochoa PA-C    Subjective      No chest pain or soa or cough  Pain controlled  BP better  Wants to go home  No nausea or vomiting  Subjective  Objective   Vital Signs  Temp:  [97.5 °F (36.4 °C)-98.3 °F (36.8 °C)] 97.8 °F (36.6 °C)  Heart Rate:  [] 95  Resp:  [16-22] 16  BP: (131-181)/(68-90) 181/83  Body mass index is 32.26 kg/m².    Objective:  General Appearance:  Comfortable and in no acute distress.    Vital signs: (most recent): Blood pressure (!) 181/83, pulse 95, temperature 97.8 °F (36.6 °C), temperature source Oral, resp. rate 16, height 182.9 cm (72\"), weight 108 kg (237 lb 14 oz), SpO2 95 %.    HEENT: (Anterior cervical incision is dressed with soft cervical collar in place)    Lungs:  Normal effort and normal respiratory rate.  No rales or rhonchi.    Heart: Normal rate.  Regular rhythm.    Abdomen: Abdomen is soft.  Bowel sounds are normal.   There is no abdominal tenderness.     Extremities: There is no dependent edema or local swelling.    Neurological: Patient is alert and oriented to person, place and time.    Skin:  Warm and dry.              Results Review:       I reviewed the patient's new clinical results.      Results from last 7 days  Lab Units 10/07/18  0634   WBC 10*3/mm3 18.14*   HEMOGLOBIN g/dL 13.9   PLATELETS 10*3/mm3 178       Results from last 7 days  Lab Units 10/07/18  0634 10/06/18  0012   SODIUM mmol/L 141 136   POTASSIUM mmol/L 4.2 3.9   CHLORIDE mmol/L 104 100   CO2 mmol/L 25.4 20.8*   BUN mg/dL 10 11   CREATININE mg/dL 0.81 1.07   CALCIUM mg/dL 9.1 9.2   GLUCOSE mg/dL 150* 160*       Results from last 7 days  Lab Units 10/05/18  0629   INR  1.07     Scheduled Meds:     atorvastatin 40 mg Oral Daily   cetirizine 10 mg Oral Daily   ipratropium-albuterol 3 mL Nebulization Q8H - RT   metoprolol tartrate 25 mg Oral Q12H   pantoprazole 40 " mg Oral QAM   pregabalin 100 mg Oral BID   sodium chloride 3 mL Intravenous Q12H     PRN Meds:   •  acetaminophen  •  docusate sodium  •  hydrALAZINE  •  HYDROcodone-acetaminophen  •  HYDROmorphone **AND** naloxone  •  nitroglycerin  •  ondansetron **OR** ondansetron ODT **OR** ondansetron  •  sennosides-docusate sodium  •  sodium chloride  •  tiZANidine  Continuous Infusions:    lactated ringers 100 mL/hr Last Rate: Stopped (10/05/18 1232)   lactated ringers 100 mL/hr Last Rate: Stopped (10/06/18 1427)       Assessment/Plan   Active Hospital Problems    Diagnosis Date Noted   • Herniated cervical disc [M50.20] 10/05/2018   • Cervical spinal stenosis [M48.02] 08/20/2018   • Cervical disc disorder at C5-C6 level with radiculopathy [M50.122] 08/20/2018   • Cervical disc disorder at C6-C7 level with radiculopathy [M50.123] 05/16/2018      Resolved Hospital Problems    Diagnosis Date Noted Date Resolved   No resolved problems to display.       Assessment & Plan    Hypertension - blood pressure better, no previous issues with BP, continue with lopressor, will give 1 month on dc     Leukocytosis: suspect from decadron, no infectious signs/symptoms and afebrile. 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  Restart anticoagulation when okay with neurosurgery. He will need INR check in 3-4 days post discharge     CAD - Preoperative stress test showed no evidence of ischemia.   he complained of substernal chest pressure overnight last night.  Initial troponin was negative and these will be trended over the course of the day today.  Cardiology following.  Continue beta blocker.     Hyperlipidemia - continue atorvastatin     GERD - continue PPI    OK to discharge from IM standpoint    Wilson Mason MD  [facility]  10/07/18  9:01 AM

## 2018-10-07 NOTE — PROGRESS NOTES
LOS: 0 days   Patient Care Team:  Aletha Ochoa PA-C as PCP - General (Family Medicine)  Jean Coles MD as Surgeon (Neurosurgery)  Erlin West MD as Surgeon (General Surgery)  Yaneth Vasquez Jr., MD as Consulting Physician (Vascular Surgery)  Artemio Dalton MD as Consulting Physician (Cardiology)  Jamie Manley MD as Consulting Physician (Gastroenterology)  Rodolfo Eugene III, MD as Surgeon (Thoracic Surgery)    Chief Complaint:   Chest Pain    Interval History:   No further episodes of chest pain.  Blood pressure is currently well controlled.    Objective   Vital Signs  Temp:  [97.6 °F (36.4 °C)-98.3 °F (36.8 °C)] 97.8 °F (36.6 °C)  Heart Rate:  [73-95] 73  Resp:  [16-20] 16  BP: (131-181)/(68-92) 135/92    Intake/Output Summary (Last 24 hours) at 10/07/18 1151  Last data filed at 10/07/18 0930   Gross per 24 hour   Intake              924 ml   Output             1500 ml   Net             -576 ml       Comfortable NAD  Wearing neck collar  S1/S2 RRR, no m/r/g  Lungs CTA B, normal effort  Abdomen S/NT/ND   Extremities warm or edema  No visible or palpable skin lesions  A/Ox4, mood and affect appropriate    Results Review:        Results from last 7 days  Lab Units 10/07/18  0634 10/06/18  0012   SODIUM mmol/L 141 136   POTASSIUM mmol/L 4.2 3.9   CHLORIDE mmol/L 104 100   CO2 mmol/L 25.4 20.8*   BUN mg/dL 10 11   CREATININE mg/dL 0.81 1.07   GLUCOSE mg/dL 150* 160*   CALCIUM mg/dL 9.1 9.2       Results from last 7 days  Lab Units 10/06/18  2102 10/06/18  1511 10/06/18  1231   TROPONIN T ng/mL <0.010 <0.010 <0.010       Results from last 7 days  Lab Units 10/07/18  0634   WBC 10*3/mm3 18.14*   HEMOGLOBIN g/dL 13.9   HEMATOCRIT % 41.9   PLATELETS 10*3/mm3 178       Results from last 7 days  Lab Units 10/05/18  0629   INR  1.07                   I reviewed the patient's new clinical results.  I personally viewed and interpreted the patient's EKG/Telemetry data        Medication Review:      atorvastatin 40 mg Oral Daily   cetirizine 10 mg Oral Daily   ipratropium-albuterol 3 mL Nebulization Q8H - RT   metoprolol tartrate 25 mg Oral Q12H   pantoprazole 40 mg Oral QAM   pregabalin 100 mg Oral BID   sodium chloride 3 mL Intravenous Q12H         lactated ringers 100 mL/hr Last Rate: Stopped (10/05/18 1232)   lactated ringers 100 mL/hr Last Rate: Stopped (10/06/18 1427)       Assessment/Plan     Active Problems:    Cervical disc disorder at C6-C7 level with radiculopathy    Cervical spinal stenosis    Cervical disc disorder at C5-C6 level with radiculopathy    Herniated cervical disc    Chest Pain  EKG normal.  Cardiac enzymes normal.  No further episodes.  No further evaluation needed at this time.     Blood pressure  Currently well controlled    Please contact us if there are further questions.    Erlin Thomas MD  10/07/18  11:51 AM

## 2018-10-07 NOTE — DISCHARGE SUMMARY
Date of admission: 10/5/18     Date of discharge: 10/70/18    Admission diagnosis: Osteophytic cervical disc disease at C5-C6 and C6-C7    Discharge diagnosis: Same as above    Procedures performed: ACDF C5-C6 and C6-C7 on the day of admission    Disposition and/or follow-up: Patient be discharged to home and will follow-up in the office in about 2 weeks    Discharge medication: Summarized in the medicine reconciliation sheet but he'll be going home on pain medications and oral muscle relaxants.    Hospital course: The patient is a 68-year-old gentleman with a history of severe neck and bilateral arm pain and progressive weakness who was known to have osteophytic cervical disc disease at C5-C6 and C6-C7 with root entrapment.  He failed conservative treatments including blocks, physical therapy, medicines, and time itself and was brought to the operating room for a two-level ACDF.  That went well and his postoperative course was characterized by episode of chest pain which turned out to be negative in terms of cardiac events.  His troponins were negative and his EKGs were unremarkable.  He was evaluated by medicine and cardiology.  It was felt that this was probably related indigestion.  But I saw him and on postoperative day 2 he was afebrile feeling well and ready to go home.

## 2018-10-07 NOTE — PROGRESS NOTES
POD 2  Vitals:    10/07/18 0526 10/07/18 0750 10/07/18 0832 10/07/18 0930   BP: 142/87  (!) 181/83 135/92   BP Location: Right arm   Right arm   Patient Position: Lying   Lying   Pulse: 78 80 95 73   Resp:  16  16   Temp: 97.8 °F (36.6 °C)   97.8 °F (36.6 °C)   TempSrc: Oral   Oral   SpO2: 95% 95%     Weight:       Height:       s/p ACDF C5/6 C6/7  Troponin negative  Arms better  Incision dry  Neck pain better  Swallowing ok  WBC   Date Value Ref Range Status   10/07/2018 18.14 (H) 4.50 - 10.70 10*3/mm3 Final     RBC   Date Value Ref Range Status   10/07/2018 4.38 (L) 4.60 - 6.00 10*6/mm3 Final     Hemoglobin   Date Value Ref Range Status   10/07/2018 13.9 13.7 - 17.6 g/dL Final     Hematocrit   Date Value Ref Range Status   10/07/2018 41.9 40.4 - 52.2 % Final     MCV   Date Value Ref Range Status   10/07/2018 95.7 79.8 - 96.2 fL Final     MCH   Date Value Ref Range Status   10/07/2018 31.7 27.0 - 32.7 pg Final     MCHC   Date Value Ref Range Status   10/07/2018 33.2 32.6 - 36.4 g/dL Final     RDW   Date Value Ref Range Status   10/07/2018 14.1 11.5 - 14.5 % Final     RDW-SD   Date Value Ref Range Status   10/07/2018 49.2 37.0 - 54.0 fl Final     MPV   Date Value Ref Range Status   10/07/2018 10.0 6.0 - 12.0 fL Final     Platelets   Date Value Ref Range Status   10/07/2018 178 140 - 500 10*3/mm3 Final     Neutrophil %   Date Value Ref Range Status   10/07/2018 85.4 (H) 42.7 - 76.0 % Final     Lymphocyte %   Date Value Ref Range Status   10/07/2018 6.9 (L) 19.6 - 45.3 % Final     Monocyte %   Date Value Ref Range Status   10/07/2018 7.4 5.0 - 12.0 % Final     Eosinophil %   Date Value Ref Range Status   10/07/2018 0.0 (L) 0.3 - 6.2 % Final     Basophil %   Date Value Ref Range Status   10/07/2018 0.0 0.0 - 1.5 % Final     Immature Grans %   Date Value Ref Range Status   10/07/2018 0.3 0.0 - 0.5 % Final     Neutrophils, Absolute   Date Value Ref Range Status   10/07/2018 15.49 (H) 1.90 - 8.10 10*3/mm3 Final      Lymphocytes, Absolute   Date Value Ref Range Status   10/07/2018 1.25 0.90 - 4.80 10*3/mm3 Final     Monocytes, Absolute   Date Value Ref Range Status   10/07/2018 1.35 (H) 0.20 - 1.20 10*3/mm3 Final     Eosinophils, Absolute   Date Value Ref Range Status   10/07/2018 0.00 0.00 - 0.70 10*3/mm3 Final     Basophils, Absolute   Date Value Ref Range Status   10/07/2018 0.00 0.00 - 0.20 10*3/mm3 Final     Immature Grans, Absolute   Date Value Ref Range Status   10/07/2018 0.05 (H) 0.00 - 0.03 10*3/mm3 Final     Ok to go home

## 2018-10-09 NOTE — PROGRESS NOTES
Subjective   Patient ID: Javy Reeves Baldemar is a 68 y.o. male is here today for follow-up. Mr. Reeves is 2 week out from a ACDF C5/6, C6/7 with cages and plate by Dr. Coles on 10/05/18. The incision looks clean and dry. No redness, swelling or drainage. Patient denies having fever. On a scale of 0-10, the patient rates his pain a 2. Patient denies any pain when swallowing and denies trouble swallowing. Patient is doing well.    Mr. Reeves is here for post op follow up. He is doing very well.         Review of Systems   Respiratory: Negative for chest tightness and shortness of breath.    Cardiovascular: Negative for chest pain.   Musculoskeletal: Positive for neck pain.   All other systems reviewed and are negative.      Objective   Physical Exam   Constitutional: He appears well-developed. No distress.   Neurological:   No upper extremity motor or sensory deficits. Voice quality is normal.    Skin: Skin is warm and dry.   Cervical anterior incision is well approximated. No redness, swelling or drainage.    Psychiatric: He has a normal mood and affect.   Vitals reviewed.    Neurologic Exam    Assessment/Plan   Independent Review of Radiographic Studies:      I have reviewed the post operative cervical spine X-rays today in the office. The X-rays showed good alignment and no evidence of hardware failure at C5/6 or C6/7.      Medical Decision Making:      Mr. Reeves is doing very well. He has no neck or arm pain. He even reports improvement in his low back pain. Walking was encouraged. He will med us if he has any worsening symptoms otherwise we will see him again in 6 weeks.     Javy was seen today for post-op.    Diagnoses and all orders for this visit:    Surgical followup visit      Return in about 6 weeks (around 11/30/2018).

## 2018-10-10 ENCOUNTER — TELEPHONE (OUTPATIENT)
Dept: NEUROSURGERY | Facility: CLINIC | Age: 68
End: 2018-10-10

## 2018-10-10 ENCOUNTER — CLINICAL SUPPORT (OUTPATIENT)
Dept: FAMILY MEDICINE CLINIC | Facility: CLINIC | Age: 68
End: 2018-10-10

## 2018-10-10 DIAGNOSIS — Z79.01 LONG TERM (CURRENT) USE OF ANTICOAGULANTS: ICD-10-CM

## 2018-10-10 DIAGNOSIS — Z51.81 ENCOUNTER FOR THERAPEUTIC DRUG MONITORING: Primary | ICD-10-CM

## 2018-10-10 LAB — INR PPP: 1.3 (ref 2–3)

## 2018-10-10 PROCEDURE — 85610 PROTHROMBIN TIME: CPT | Performed by: PHYSICIAN ASSISTANT

## 2018-10-10 PROCEDURE — 36416 COLLJ CAPILLARY BLOOD SPEC: CPT | Performed by: PHYSICIAN ASSISTANT

## 2018-10-10 NOTE — PROGRESS NOTES
Capillary blood collection performed in Right finger by Aletha Davila. Patient tolerated well.     Cont the same dose and rechk on Friday. He had surgery and started back on Warfarin on 10/8/18

## 2018-10-10 NOTE — TELEPHONE ENCOUNTER
How are you feeling after your surgery?  Feels pretty good.  Sore in the shoulders, but is rubbing some medicated cream on them.  He is following all post op instructions and trying to take it easy.    Are you having any pain?  Pain is minimal     Do you feel better than before surgery?  Arm pain is 85% gone.    Are you taking the prescribed pain medicine?  No, he doesn't like the way it makes him feel.  He is using Tylenol for pain.  He says he has a very high pain tolerance.     Do you feel like it's helping?  He feels like his pain is well controlled.     Surgical dressing/dermabond?  Mr. Reeves has dermabond glue over his incision.  He says it is intact.      How does your incision look?  Surgery site-red, swollen, drainage?  Looks good, when he shaves he looks at it.  No redness or swelling.    Any other problems? (ie:  Nausea or constipation)  He has had just a little problem with swallowing.  He eats some sherbet and it seems to help with the swallowing issue.      Questions regarding Post-op/ discharge instructions?  No     Any other questions?  No     Confirm post op appointment  Yes

## 2018-10-12 ENCOUNTER — CLINICAL SUPPORT (OUTPATIENT)
Dept: FAMILY MEDICINE CLINIC | Facility: CLINIC | Age: 68
End: 2018-10-12

## 2018-10-12 DIAGNOSIS — Z51.81 ENCOUNTER FOR THERAPEUTIC DRUG MONITORING: Primary | ICD-10-CM

## 2018-10-12 DIAGNOSIS — Z79.01 LONG TERM (CURRENT) USE OF ANTICOAGULANTS: ICD-10-CM

## 2018-10-12 LAB — INR PPP: 1.9 (ref 2–3)

## 2018-10-12 PROCEDURE — 85610 PROTHROMBIN TIME: CPT | Performed by: PHYSICIAN ASSISTANT

## 2018-10-12 PROCEDURE — 36416 COLLJ CAPILLARY BLOOD SPEC: CPT | Performed by: PHYSICIAN ASSISTANT

## 2018-10-12 PROCEDURE — 93793 ANTICOAG MGMT PT WARFARIN: CPT | Performed by: PHYSICIAN ASSISTANT

## 2018-10-12 NOTE — PROGRESS NOTES
Capillary blood collection performed in Right finger by Aletha Davila. Patient tolerated well.     Cont the same and rechk on 10/16/18

## 2018-10-16 ENCOUNTER — OFFICE VISIT (OUTPATIENT)
Dept: FAMILY MEDICINE CLINIC | Facility: CLINIC | Age: 68
End: 2018-10-16

## 2018-10-16 VITALS
OXYGEN SATURATION: 98 % | TEMPERATURE: 98.2 F | RESPIRATION RATE: 16 BRPM | HEART RATE: 76 BPM | WEIGHT: 238 LBS | SYSTOLIC BLOOD PRESSURE: 122 MMHG | DIASTOLIC BLOOD PRESSURE: 72 MMHG | BODY MASS INDEX: 32.23 KG/M2 | HEIGHT: 72 IN

## 2018-10-16 DIAGNOSIS — Z86.711 HISTORY OF PULMONARY EMBOLUS (PE): ICD-10-CM

## 2018-10-16 DIAGNOSIS — I25.2 OLD MI (MYOCARDIAL INFARCTION): ICD-10-CM

## 2018-10-16 DIAGNOSIS — Z23 IMMUNIZATION DUE: Primary | ICD-10-CM

## 2018-10-16 DIAGNOSIS — Z09 HOSPITAL DISCHARGE FOLLOW-UP: ICD-10-CM

## 2018-10-16 LAB — INR PPP: 2.4 (ref 2–3)

## 2018-10-16 PROCEDURE — 85610 PROTHROMBIN TIME: CPT | Performed by: PHYSICIAN ASSISTANT

## 2018-10-16 PROCEDURE — 36416 COLLJ CAPILLARY BLOOD SPEC: CPT | Performed by: PHYSICIAN ASSISTANT

## 2018-10-16 PROCEDURE — G0008 ADMIN INFLUENZA VIRUS VAC: HCPCS | Performed by: PHYSICIAN ASSISTANT

## 2018-10-16 PROCEDURE — 90662 IIV NO PRSV INCREASED AG IM: CPT | Performed by: PHYSICIAN ASSISTANT

## 2018-10-16 PROCEDURE — 99214 OFFICE O/P EST MOD 30 MIN: CPT | Performed by: PHYSICIAN ASSISTANT

## 2018-10-16 NOTE — PROGRESS NOTES
Transitional Care Follow Up Visit  Subjective     Javy Reeves  is a 68 y.o. male who presents for a transitional care management visit.    Within 48 business hours after discharge our office contacted him via telephone to coordinate his care and needs.      I reviewed and discussed the details of that call along with the discharge summary, hospital problems, inpatient lab results, inpatient diagnostic studies, and consultation reports with Javy.     Current outpatient and discharge medications have been reconciled for the patient.    No flowsheet data found.  Risk for Readmission (LACE) Score: 4 (10/7/2018  6:00 AM)    History of Present Illness   Course During Hospital Stay:  10-5 to 10-7-18  Had C spine surgery Dr HERNANDEZ     He was on Dexadron IV and see his WBC reflexed this  INR today 2.4    I need to see the f/u labs for starting him on Crestor; I ordered CMP and lipids  CXR 9-28  PA AND LATERAL CHEST X-RAY     HISTORY: Preop. Coronary artery disease and prior pulmonary embolism.  Prior endocarditis and myocardial infarction. Sleep apnea.     Chest x-ray consisting of PA and lateral views is provided.  Comparison  exams: Chest x-ray 04/06/2012.     FINDINGS: The cardiomediastinal silhouette is normal. The lungs are  clear. The costophrenic sulci are dry and the bones appear normal. There  is no pneumothorax.     IMPRESSION:  Negative.     This report was finalized on 9/28/2018 10:42 AM by Dr. Hossein Roach M.D.    He sees cardio Dr Dalton and had f/u Sept  Did well hernia repair and colonoscopy    I did order carotid doppler for March  Current outpatient and discharge medications have been reconciled for the patient.  Reviewed by: Aletha Ochoa PA-C  He is sore chest and back from surgery---from positioning  INR in 2 weeks  Hx recurrent DVT, PE  Has filter  Had h/a on Chantix  The following portions of the patient's history were reviewed and updated as appropriate: allergies, current medications, past  family history, past medical history, past social history, past surgical history and problem list.    Review of Systems   Constitutional: Negative for activity change, appetite change and unexpected weight change.   HENT: Negative for nosebleeds and trouble swallowing.    Eyes: Negative for pain and visual disturbance.   Respiratory: Negative for chest tightness, shortness of breath and wheezing.    Cardiovascular: Negative for chest pain and palpitations.   Gastrointestinal: Negative for abdominal pain and blood in stool.   Endocrine: Negative.    Genitourinary: Negative for difficulty urinating and hematuria.   Musculoskeletal: Positive for arthralgias, myalgias and neck pain. Negative for joint swelling and neck stiffness.   Skin: Negative for color change and rash.   Allergic/Immunologic: Negative.    Neurological: Negative for syncope and speech difficulty.   Hematological: Negative for adenopathy.   Psychiatric/Behavioral: Negative for agitation and confusion.   All other systems reviewed and are negative.      Objective   Physical Exam   Constitutional: He is oriented to person, place, and time. He appears well-developed and well-nourished. No distress.   HENT:   Head: Normocephalic and atraumatic.   Eyes: Pupils are equal, round, and reactive to light. Conjunctivae and EOM are normal. Right eye exhibits no discharge. Left eye exhibits no discharge. No scleral icterus.   Neck: Normal range of motion. Neck supple. No tracheal deviation present. No thyromegaly present.   Cardiovascular: Normal rate, regular rhythm, normal heart sounds, intact distal pulses and normal pulses.  Exam reveals no gallop.    No murmur heard.  Pulmonary/Chest: Effort normal and breath sounds normal. No respiratory distress. He has no wheezes. He has no rales.   Musculoskeletal: Normal range of motion.   Neurological: He is alert and oriented to person, place, and time. He exhibits normal muscle tone. Coordination normal.   Skin: Skin  is warm. No rash noted. No erythema. No pallor.   Psychiatric: He has a normal mood and affect. His behavior is normal. Judgment and thought content normal.   Nursing note and vitals reviewed.      Assessment/Plan   Problems Addressed this Visit        Cardiovascular and Mediastinum    Old MI (myocardial infarction)       Other    History of pulmonary embolus (PE)      Other Visit Diagnoses     Immunization due    -  Primary    Relevant Orders    Fluzone High Dose =>65Years (Completed)    Hospital discharge follow-up

## 2018-10-16 NOTE — PATIENT INSTRUCTIONS
Low glycemic index diet  Exercise 30 minutes most days of the week  Make sure you get results on any labs or tests we ordered today  We discussed medications and how to take them as prescribed  Sleep 6-8 hours each night if possible  If you have not signed up for [employer], please activate your code ASAP from your After Visit Summary today    LDL goal <100  LDL goal if heart disease <70  HDL goal >60  Triglyceride goal <150  BP goal =<130/80  Fasting glucose <100

## 2018-10-17 LAB
ALBUMIN SERPL-MCNC: 4.3 G/DL (ref 3.5–5.2)
ALBUMIN/GLOB SERPL: 1.4 G/DL
ALP SERPL-CCNC: 62 U/L (ref 39–117)
ALT SERPL-CCNC: 27 U/L (ref 1–41)
AST SERPL-CCNC: 18 U/L (ref 1–40)
BILIRUB SERPL-MCNC: 0.4 MG/DL (ref 0.1–1.2)
BUN SERPL-MCNC: 15 MG/DL (ref 8–23)
BUN/CREAT SERPL: 12.6 (ref 7–25)
CALCIUM SERPL-MCNC: 9.6 MG/DL (ref 8.6–10.5)
CHLORIDE SERPL-SCNC: 102 MMOL/L (ref 98–107)
CHOLEST SERPL-MCNC: 162 MG/DL (ref 0–200)
CO2 SERPL-SCNC: 26.2 MMOL/L (ref 22–29)
CREAT SERPL-MCNC: 1.19 MG/DL (ref 0.76–1.27)
GLOBULIN SER CALC-MCNC: 3.1 GM/DL
GLUCOSE SERPL-MCNC: 103 MG/DL (ref 65–99)
HDLC SERPL-MCNC: 40 MG/DL (ref 40–60)
LDLC SERPL CALC-MCNC: 80 MG/DL (ref 0–100)
POTASSIUM SERPL-SCNC: 4.6 MMOL/L (ref 3.5–5.2)
PROT SERPL-MCNC: 7.4 G/DL (ref 6–8.5)
SODIUM SERPL-SCNC: 141 MMOL/L (ref 136–145)
TRIGL SERPL-MCNC: 212 MG/DL (ref 0–150)
VLDLC SERPL CALC-MCNC: 42.4 MG/DL (ref 5–40)

## 2018-10-19 ENCOUNTER — OFFICE VISIT (OUTPATIENT)
Dept: NEUROSURGERY | Facility: CLINIC | Age: 68
End: 2018-10-19

## 2018-10-19 VITALS
HEIGHT: 72 IN | WEIGHT: 238 LBS | DIASTOLIC BLOOD PRESSURE: 87 MMHG | BODY MASS INDEX: 32.23 KG/M2 | HEART RATE: 83 BPM | SYSTOLIC BLOOD PRESSURE: 158 MMHG

## 2018-10-19 DIAGNOSIS — Z09 SURGICAL FOLLOWUP VISIT: Primary | ICD-10-CM

## 2018-10-19 PROCEDURE — 99024 POSTOP FOLLOW-UP VISIT: CPT | Performed by: NURSE PRACTITIONER

## 2018-11-08 ENCOUNTER — OFFICE VISIT (OUTPATIENT)
Dept: CARDIOLOGY | Facility: CLINIC | Age: 68
End: 2018-11-08

## 2018-11-08 VITALS
OXYGEN SATURATION: 98 % | SYSTOLIC BLOOD PRESSURE: 138 MMHG | HEIGHT: 72 IN | BODY MASS INDEX: 33.18 KG/M2 | DIASTOLIC BLOOD PRESSURE: 84 MMHG | WEIGHT: 245 LBS | HEART RATE: 75 BPM

## 2018-11-08 DIAGNOSIS — I10 ESSENTIAL HYPERTENSION: ICD-10-CM

## 2018-11-08 DIAGNOSIS — I25.2 OLD MI (MYOCARDIAL INFARCTION): Primary | ICD-10-CM

## 2018-11-08 PROCEDURE — 99214 OFFICE O/P EST MOD 30 MIN: CPT | Performed by: INTERNAL MEDICINE

## 2018-11-08 NOTE — PROGRESS NOTES
Subjective:     Encounter Date:11/8/18      Patient ID: Javy Reeves Sr. is a 68 y.o. male.    Chief Complaint:  Coronary Artery Disease   Presents for follow-up visit. Pertinent negatives include no chest pain, chest pressure, chest tightness, dizziness, leg swelling, palpitations or weight gain. The symptoms have been stable. Compliance with diet is good. Compliance with exercise is good. Compliance with medications is good.     67-year-old gentleman with a history of prior myocardial infarction recently had a cervical fusion surgery.  Patient had episodes of tachycardia.  Patient also had some chest discomfort.  He had a stress test prior to surgery that was unremarkable and he follows up today for reassessment after that event.  He has had no further problems with chest discomfort.  His only complaint is arthritis in his arms hands and shoulders.      Review of Systems   Constitution: Negative for weight gain.   Cardiovascular: Negative for chest pain, leg swelling and palpitations.   Respiratory: Positive for snoring. Negative for chest tightness.    Musculoskeletal: Positive for joint pain.   Neurological: Negative for dizziness.   All other systems reviewed and are negative.      Procedures         Objective:     Physical Exam   Constitutional: He is oriented to person, place, and time. He appears well-developed.   HENT:   Head: Normocephalic.   Eyes: Conjunctivae are normal.   Neck: Normal range of motion.   Cardiovascular: Normal rate, regular rhythm and normal heart sounds.    Pulmonary/Chest: Breath sounds normal.   Abdominal: Soft. Bowel sounds are normal.   Musculoskeletal: Normal range of motion. He exhibits no edema.   Neurological: He is alert and oriented to person, place, and time.   Skin: Skin is warm and dry.   Psychiatric: He has a normal mood and affect. His behavior is normal.   Vitals reviewed.    Interpretation Summary     · Myocardial perfusion imaging indicates a normal myocardial  perfusion study with no evidence of ischemia.  · Left ventricular ejection fraction is normal (Calculated EF = 55%). Mild distal septal hypokinesis.  · Impressions are consistent with a low risk study.          Lab Review:       Assessment:          Diagnosis Plan   1. Old MI (myocardial infarction)     2. Essential hypertension            Plan:       1.  Hypertension blood pressure is good today  2.  History of pulmonary embolism.  Patient remains on warfarin  3.  History of coronary artery disease no further chest discomfort.  4.  Would continue the same follow-up one year sooner if issues    Coronary Artery Disease  Assessment  • The patient has no angina    Plan  • Lifestyle modifications discussed include adhering to a heart healthy diet, avoidance of tobacco products, maintenance of a healthy weight, medication compliance, regular exercise and regular monitoring of cholesterol and blood pressure

## 2018-11-16 ENCOUNTER — APPOINTMENT (OUTPATIENT)
Dept: GENERAL RADIOLOGY | Facility: HOSPITAL | Age: 68
End: 2018-11-16

## 2018-11-16 ENCOUNTER — HOSPITAL ENCOUNTER (EMERGENCY)
Facility: HOSPITAL | Age: 68
Discharge: HOME | End: 2018-11-16
Attending: EMERGENCY MEDICINE | Admitting: EMERGENCY MEDICINE

## 2018-11-16 VITALS
WEIGHT: 244.8 LBS | SYSTOLIC BLOOD PRESSURE: 149 MMHG | TEMPERATURE: 98.1 F | OXYGEN SATURATION: 98 % | BODY MASS INDEX: 33.16 KG/M2 | HEART RATE: 59 BPM | RESPIRATION RATE: 15 BRPM | DIASTOLIC BLOOD PRESSURE: 95 MMHG | HEIGHT: 72 IN

## 2018-11-16 DIAGNOSIS — R07.2 PRECORDIAL PAIN: Primary | ICD-10-CM

## 2018-11-16 LAB
ALBUMIN SERPL-MCNC: 3.2 G/DL (ref 3.5–5.2)
ALBUMIN/GLOB SERPL: 1.1 G/DL
ALP SERPL-CCNC: 41 U/L (ref 39–117)
ALT SERPL W P-5'-P-CCNC: 13 U/L (ref 1–41)
ANION GAP SERPL CALCULATED.3IONS-SCNC: 9.3 MMOL/L
AST SERPL-CCNC: 15 U/L (ref 1–40)
BASOPHILS # BLD AUTO: 0.02 10*3/MM3 (ref 0–0.2)
BASOPHILS NFR BLD AUTO: 0.3 % (ref 0–1.5)
BILIRUB SERPL-MCNC: 0.5 MG/DL (ref 0.1–1.2)
BUN BLD-MCNC: 9 MG/DL (ref 8–23)
BUN/CREAT SERPL: 9.9 (ref 7–25)
CALCIUM SPEC-SCNC: 7.2 MG/DL (ref 8.6–10.5)
CHLORIDE SERPL-SCNC: 111 MMOL/L (ref 98–107)
CO2 SERPL-SCNC: 21.7 MMOL/L (ref 22–29)
CREAT BLD-MCNC: 0.91 MG/DL (ref 0.76–1.27)
DEPRECATED RDW RBC AUTO: 47.6 FL (ref 37–54)
EOSINOPHIL # BLD AUTO: 0.03 10*3/MM3 (ref 0–0.7)
EOSINOPHIL NFR BLD AUTO: 0.5 % (ref 0.3–6.2)
ERYTHROCYTE [DISTWIDTH] IN BLOOD BY AUTOMATED COUNT: 13.8 % (ref 11.5–14.5)
GFR SERPL CREATININE-BSD FRML MDRD: 101 ML/MIN/1.73
GLOBULIN UR ELPH-MCNC: 2.9 GM/DL
GLUCOSE BLD-MCNC: 108 MG/DL (ref 65–99)
HCT VFR BLD AUTO: 42.1 % (ref 40.4–52.2)
HGB BLD-MCNC: 14.3 G/DL (ref 13.7–17.6)
HOLD SPECIMEN: NORMAL
HOLD SPECIMEN: NORMAL
IMM GRANULOCYTES # BLD: 0.02 10*3/MM3 (ref 0–0.03)
IMM GRANULOCYTES NFR BLD: 0.3 % (ref 0–0.5)
INR PPP: 2.51 (ref 0.9–1.1)
LYMPHOCYTES # BLD AUTO: 2.93 10*3/MM3 (ref 0.9–4.8)
LYMPHOCYTES NFR BLD AUTO: 49 % (ref 19.6–45.3)
MCH RBC QN AUTO: 32.3 PG (ref 27–32.7)
MCHC RBC AUTO-ENTMCNC: 34 G/DL (ref 32.6–36.4)
MCV RBC AUTO: 95 FL (ref 79.8–96.2)
MONOCYTES # BLD AUTO: 0.47 10*3/MM3 (ref 0.2–1.2)
MONOCYTES NFR BLD AUTO: 7.9 % (ref 5–12)
NEUTROPHILS # BLD AUTO: 2.53 10*3/MM3 (ref 1.9–8.1)
NEUTROPHILS NFR BLD AUTO: 42.3 % (ref 42.7–76)
PLATELET # BLD AUTO: 187 10*3/MM3 (ref 140–500)
PMV BLD AUTO: 9.9 FL (ref 6–12)
POTASSIUM BLD-SCNC: 3.1 MMOL/L (ref 3.5–5.2)
PROT SERPL-MCNC: 6.1 G/DL (ref 6–8.5)
PROTHROMBIN TIME: 26.7 SECONDS (ref 11.7–14.2)
RBC # BLD AUTO: 4.43 10*6/MM3 (ref 4.6–6)
SODIUM BLD-SCNC: 142 MMOL/L (ref 136–145)
TROPONIN T SERPL-MCNC: <0.01 NG/ML (ref 0–0.03)
TROPONIN T SERPL-MCNC: <0.01 NG/ML (ref 0–0.03)
WBC NRBC COR # BLD: 5.98 10*3/MM3 (ref 4.5–10.7)
WHOLE BLOOD HOLD SPECIMEN: NORMAL
WHOLE BLOOD HOLD SPECIMEN: NORMAL

## 2018-11-16 PROCEDURE — 85025 COMPLETE CBC W/AUTO DIFF WBC: CPT | Performed by: EMERGENCY MEDICINE

## 2018-11-16 PROCEDURE — 99285 EMERGENCY DEPT VISIT HI MDM: CPT

## 2018-11-16 PROCEDURE — 71046 X-RAY EXAM CHEST 2 VIEWS: CPT

## 2018-11-16 PROCEDURE — 93005 ELECTROCARDIOGRAM TRACING: CPT | Performed by: EMERGENCY MEDICINE

## 2018-11-16 PROCEDURE — 84484 ASSAY OF TROPONIN QUANT: CPT | Performed by: EMERGENCY MEDICINE

## 2018-11-16 PROCEDURE — 85610 PROTHROMBIN TIME: CPT | Performed by: EMERGENCY MEDICINE

## 2018-11-16 PROCEDURE — 80053 COMPREHEN METABOLIC PANEL: CPT | Performed by: EMERGENCY MEDICINE

## 2018-11-16 PROCEDURE — 93010 ELECTROCARDIOGRAM REPORT: CPT | Performed by: INTERNAL MEDICINE

## 2018-11-16 RX ORDER — METOPROLOL SUCCINATE 25 MG/1
25 TABLET, EXTENDED RELEASE ORAL DAILY
Qty: 30 TABLET | Refills: 0 | Status: SHIPPED | OUTPATIENT
Start: 2018-11-16 | End: 2019-04-26

## 2018-11-16 RX ORDER — SODIUM CHLORIDE 0.9 % (FLUSH) 0.9 %
10 SYRINGE (ML) INJECTION AS NEEDED
Status: DISCONTINUED | OUTPATIENT
Start: 2018-11-16 | End: 2018-11-16 | Stop reason: HOSPADM

## 2018-11-16 RX ORDER — ASPIRIN 325 MG
325 TABLET ORAL ONCE
Status: DISCONTINUED | OUTPATIENT
Start: 2018-11-16 | End: 2018-11-16

## 2018-11-16 RX ORDER — NITROGLYCERIN 0.4 MG/1
0.4 TABLET SUBLINGUAL
Status: DISCONTINUED | OUTPATIENT
Start: 2018-11-16 | End: 2018-11-16 | Stop reason: HOSPADM

## 2018-11-16 RX ADMIN — NITROGLYCERIN 0.4 MG: 0.4 TABLET, ORALLY DISINTEGRATING SUBLINGUAL at 14:09

## 2018-11-16 RX ADMIN — NITROGLYCERIN 0.4 MG: 0.4 TABLET, ORALLY DISINTEGRATING SUBLINGUAL at 14:03

## 2018-11-16 NOTE — DISCHARGE INSTRUCTIONS
Take medications as prescribed.  Return to emergency Department for persistent or worsening chest pain, shortness of breath, or other concern.  Follow-up with Dr. Dalton as soon as possible.  Call his office to schedule a follow-up appointment.

## 2018-11-16 NOTE — ED NOTES
Pt states he was given 324 mg of aspirin and 1 nitro by ems staff.     Jamee Hurst, RN  11/16/18 2563

## 2018-11-16 NOTE — ED NOTES
Pt c/o chest pain since 1145 this am that radiates through to his spine     Jamee Hurst, RN  11/16/18 6701

## 2018-11-16 NOTE — ED PROVIDER NOTES
EMERGENCY DEPARTMENT ENCOUNTER    CHIEF COMPLAINT  Chief Complaint: Chest pain  History given by: Patient  History limited by: None  Room Number: 10/10  PMD: Aletha Ochoa PA-C    HPI:  Pt is a 68 y.o. male who presents, with hx of PE, CAD, hyperlipidemia, TIA, and MI, complaining of sternal chest pain, that radiates to neck, since 1145 today, that was initially sharp in nature, but currently feels like pressure. He states at its worst the pain was a 10/10 in severity and is currently a 4/10. Pt also c/o nausea and upper back pain, he states is chronic, but denies diaphoresis. Pt has taken two nitro PTA. Pt is on Coumadin.    Duration: Since 1145 today  Onset: Gradual  Timing: Constant  Location: Sternal chest  Radiation: Radiates to neck  Quality: Pain was initially sharp, but currently feels like pressure  Intensity/Severity: At its worst pain was a 10/10 in severity and is currently a 4/10  Progression: Improved  Associated Symptoms: Nausea  Treatment before arrival: Pt took two nitro PTA.    PAST MEDICAL HISTORY  Active Ambulatory Problems     Diagnosis Date Noted   • Arthritis 11/16/2015   • Family history of early CAD 11/16/2015   • DDD (degenerative disc disease), cervical 11/16/2015   • DVT (deep venous thrombosis) (CMS/[facility]) 11/16/2015   • Fibromyalgia 11/16/2015   • Past heart attack 11/16/2015   • Hyperlipidemia 11/16/2015   • DDD (degenerative disc disease), lumbosacral 11/16/2015   • History of pulmonary embolus (PE) 11/16/2015   • Reflux esophagitis 11/16/2015   • TIA (transient ischemic attack) 11/16/2015   • Left groin pain 02/21/2017   • Cervical radicular pain 02/12/2018   • Mild atherosclerosis of right carotid artery 03/27/2018   • Cervical disc disorder at C6-C7 level with radiculopathy 05/16/2018   • Wheezing 07/20/2018   • Colon polyps 07/27/2018   • Right lower quadrant abdominal pain 07/27/2018   • Diarrhea 07/27/2018   • Cervical spinal stenosis 08/20/2018   • Cervical disc disorder at  C5-C6 level with radiculopathy 08/20/2018   • GERD (gastroesophageal reflux disease) 09/07/2018   • Diverticulosis 09/13/2018   • Old MI (myocardial infarction) 09/20/2018   • Herniated cervical disc 10/05/2018   • Encounter for therapeutic drug monitoring 10/10/2018   • Long term (current) use of anticoagulants 10/10/2018     Resolved Ambulatory Problems     Diagnosis Date Noted   • Hypertension 11/16/2015     Past Medical History:   Diagnosis Date   • Acute and subacute infective endocarditis in diseases classified elsewhere    • Arthritis    • Chest pain    • Chronic low back pain    • Chronic pain    • Colon polyps    • Coronary artery disease    • DDD (degenerative disc disease), cervical    • DDD (degenerative disc disease), lumbosacral    • Diverticulosis    • DVT (deep venous thrombosis) (CMS/[facility]) 1996   • Encounter for special screening examination for neoplasm of prostate 2012   • Eye exam, routine > 5 yrs ago   • Eye exam, routine 01/2015   • Fibromyalgia    • Fibromyositis    • GERD (gastroesophageal reflux disease)    • Hyperlipidemia    • Injury of back    • Irritable bowel    • Lumbar stenosis    • MI (myocardial infarction) (CMS/HCC)    • Neck pain    • Pain in limb    • Past heart attack    • Postlaminectomy syndrome of lumbar region    • Pulmonary embolism (CMS/HCC) 1996   • Reflux esophagitis    • Sleep apnea    • TIA (transient ischemic attack)        PAST SURGICAL HISTORY  Past Surgical History:   Procedure Laterality Date   • APPENDECTOMY N/A    • CARDIAC CATHETERIZATION Left 1952    Left Heart Cath Left Ventriculography, selective coronary angiography; iliofemoral angiography; angio seal closure, Dr. Brady Ramos   • COLONOSCOPY  09/2015    removed 2 polyps, Dr. Manley   • COLONOSCOPY N/A 02/12/2007    Left colonic diverticulosis, No evidence of polypoid neoplasia, Dr. Jarret Bloom   • CYSTOSCOPY TRANSURETHRAL RESECTION OF PROSTATE N/A 11/01/2004    Dr. Rodolfo Arnold   • HAND SURGERY  Right    • KNEE ARTHROPLASTY Left    • LUMBAR DISC SURGERY  2006, 2007    L4-S1 pseudoarthroses - Dr Cervantes   • LUMBAR DISC SURGERY N/A 01/29/2010    Removal of Instrumentation w/ decompression, Instrumentaion loosening & pt tested positive for zinc allergy, Dr. Kvng Cervantes   • ROTATOR CUFF REPAIR Right 04/2012   • THORACIC OUTLET SURGERY Bilateral    • VENA CAVA FILTER PLACEMENT  1996   • WRIST SURGERY Right     x3 starting in 1984       FAMILY HISTORY  Family History   Problem Relation Age of Onset   • Diabetes Sister    • Cancer Sister    • Hypertension Sister    • Kidney disease Sister    • Stroke Sister    • Heart disease Brother    • Hypertension Brother    • Cerebral aneurysm Brother    • Sudden death Brother    • Bleeding Disorder Brother    • Cancer Mother    • Heart disease Father    • Cancer Father         malignant neoplasm   • Deep vein thrombosis Father    • Heart attack Father    • Malig Hyperthermia Neg Hx        SOCIAL HISTORY  Social History     Socioeconomic History   • Marital status:      Spouse name: Not on file   • Number of children: Not on file   • Years of education: Not on file   • Highest education level: Not on file   Social Needs   • Financial resource strain: Not on file   • Food insecurity - worry: Not on file   • Food insecurity - inability: Not on file   • Transportation needs - medical: Not on file   • Transportation needs - non-medical: Not on file   Occupational History   • Not on file   Tobacco Use   • Smoking status: Current Every Day Smoker     Packs/day: 0.50     Years: 30.00     Pack years: 15.00   • Smokeless tobacco: Never Used   • Tobacco comment: caffeine use   Substance and Sexual Activity   • Alcohol use: Yes     Comment: 2x per month   • Drug use: No   • Sexual activity: Defer   Other Topics Concern   • Not on file   Social History Narrative   • Not on file       ALLERGIES  Zinc and Chantix [varenicline]    REVIEW OF SYSTEMS  Review of Systems    Constitutional: Negative for activity change, appetite change, diaphoresis and fever.   HENT: Negative for congestion and sore throat.    Eyes: Negative.    Respiratory: Negative for cough and shortness of breath.    Cardiovascular: Positive for chest pain (constant, sternal, chest pain, that radiates to neck, initially felt sharp in nature and was a 10/10 in severity, but currently feels like pressure at a 4/10 in severity). Negative for leg swelling.   Gastrointestinal: Positive for nausea. Negative for abdominal pain, diarrhea and vomiting.   Endocrine: Negative.    Genitourinary: Negative for decreased urine volume and dysuria.   Musculoskeletal: Positive for back pain (upper back pain pt states is chronic). Negative for neck pain.   Skin: Negative for rash and wound.   Allergic/Immunologic: Negative.    Neurological: Negative for weakness, numbness and headaches.   Hematological: Negative.    Psychiatric/Behavioral: Negative.    All other systems reviewed and are negative.      PHYSICAL EXAM  ED Triage Vitals [11/16/18 1253]   Temp Heart Rate Resp BP SpO2   97 °F (36.1 °C) 100 -- (!) 170/110 98 %      Temp src Heart Rate Source Patient Position BP Location FiO2 (%)   -- -- -- -- --       Physical Exam   Constitutional: He is oriented to person, place, and time. No distress.   HENT:   Head: Normocephalic and atraumatic.   Eyes: EOM are normal. Pupils are equal, round, and reactive to light.   Neck: Normal range of motion. Neck supple.   Cardiovascular: Normal rate, regular rhythm and normal heart sounds.   Pulmonary/Chest: Effort normal and breath sounds normal. No respiratory distress. He exhibits tenderness (anterior).   Abdominal: Soft. There is no tenderness. There is no rebound and no guarding.   Musculoskeletal: Normal range of motion. He exhibits no edema.   Neurological: He is alert and oriented to person, place, and time. He has normal sensation and normal strength.   Skin: Skin is warm and dry.    Psychiatric: Mood and affect normal.   Nursing note and vitals reviewed.      LAB RESULTS  Lab Results (last 24 hours)     Procedure Component Value Units Date/Time    Protime-INR [[id number]]  (Abnormal) Collected:  11/16/18 1303    Specimen:  Blood Updated:  11/16/18 1328     Protime 26.7 Seconds      INR 2.51    CBC & Differential [[id number]] Collected:  11/16/18 1303    Specimen:  Blood Updated:  11/16/18 1344    Narrative:       The following orders were created for panel order CBC & Differential.  Procedure                               Abnormality         Status                     ---------                               -----------         ------                     CBC Auto Differential[[id number]]        Abnormal            Final result                 Please view results for these tests on the individual orders.    Comprehensive Metabolic Panel [[id number]]  (Abnormal) Collected:  11/16/18 1303    Specimen:  Blood Updated:  11/16/18 1350     Glucose 108 mg/dL      BUN 9 mg/dL      Creatinine 0.91 mg/dL      Sodium 142 mmol/L      Potassium 3.1 mmol/L      Chloride 111 mmol/L      CO2 21.7 mmol/L      Calcium 7.2 mg/dL      Total Protein 6.1 g/dL      Albumin 3.20 g/dL      ALT (SGPT) 13 U/L      AST (SGOT) 15 U/L      Alkaline Phosphatase 41 U/L      Total Bilirubin 0.5 mg/dL      eGFR  African Amer 101 mL/min/1.73      Globulin 2.9 gm/dL      A/G Ratio 1.1 g/dL      BUN/Creatinine Ratio 9.9     Anion Gap 9.3 mmol/L     Troponin [[id number]]  (Normal) Collected:  11/16/18 1303    Specimen:  Blood Updated:  11/16/18 1350     Troponin T <0.010 ng/mL     Narrative:       Troponin T Reference Ranges:  Less than 0.03 ng/mL:    Negative for AMI  0.03 to 0.09 ng/mL:      Indeterminant for AMI  Greater than 0.09 ng/mL: Positive for AMI    CBC Auto Differential [[id number]]  (Abnormal) Collected:  11/16/18 1303    Specimen:  Blood Updated:  11/16/18 1344     WBC 5.98 10*3/mm3      RBC 4.43 10*6/mm3      Hemoglobin  14.3 g/dL      Hematocrit 42.1 %      MCV 95.0 fL      MCH 32.3 pg      MCHC 34.0 g/dL      RDW 13.8 %      RDW-SD 47.6 fl      MPV 9.9 fL      Platelets 187 10*3/mm3      Neutrophil % 42.3 %      Lymphocyte % 49.0 %      Monocyte % 7.9 %      Eosinophil % 0.5 %      Basophil % 0.3 %      Immature Grans % 0.3 %      Neutrophils, Absolute 2.53 10*3/mm3      Lymphocytes, Absolute 2.93 10*3/mm3      Monocytes, Absolute 0.47 10*3/mm3      Eosinophils, Absolute 0.03 10*3/mm3      Basophils, Absolute 0.02 10*3/mm3      Immature Grans, Absolute 0.02 10*3/mm3           I ordered the above labs and reviewed the results    RADIOLOGY  XR Chest 2 View      FINDINGS:  The lungs are well-expanded and clear and the heart is top  normal in size. There is no acute disease or change from 09/28/2018.  There are several calcified hilar lymph nodes bilaterally.        I ordered the above noted radiological studies. Interpreted by radiologist. Reviewed by me in PACS.       PROCEDURES  Procedures  EKG          EKG time: 1300  Rhythm/Rate: Sinus rhythm rate 72  P waves and NE: Nml P waves, Nml KRISTEL  QRS, axis: L axis deviation, Incomplete RBBB   ST and T waves: Non specific T wave changes     Interpreted Contemporaneously by me, independently viewed  Unchanged compared to prior 10/6/18    HEART SCORE:    History  Highly suspicious              2    Moderately suspicious             1    Slightly or non-suspicious             0    ECG  Significant ST depression              2    Nonspecific repol disturbance            1    Normal                           0    Age  > or = 65                          2     46-65                           1    < or = 45                          0    Risk factors (hypercholesterolemia, HTN, DM, smoking, pos fam hx, obesity)                            > or = to 3 RF for atherosclerotic dx   2    1 or 2                 1    No risk factors                0    Troponin > or = 3x normal limit                2    1-3x normal limit    1    < or = Normal limit    0        HEART Score Key:  Scores 0-3: 0.9-1.7% risk of adverse cardiac event. In the HEART Score study, these patients were discharged (0.99% in the retrospective study, 1.7% in the prospective study)  Scores 4-6: 12-16.6% risk of adverse cardiac event. In the HEART Score study, these patients were admitted to the hospital. (11.6% retrospective, 16.6% prospective)  Scores ?7: 50-65% risk of adverse cardiac event. In the HEART Score study, these patients were candidates for early invasive measures. (65.2% retrospective, 50.1% prospective)      This patient's HEART score is 6.    PROGRESS AND CONSULTS   1301 Ordered EKG for further evaluation.    1318 Ordered CBC, INR, troponin, CMP, and CXR for further evaluation. Ordered nitroglycerin for treatment of chest pain.    1408 Placed call to [facility] for pt consult.    1412 Discussed pt with Dr. Dalton, [facility], who recommends obtaining repeat troponin. If repeat if negative, pt should be d/c on 81 mg ASA QD and 25 mg Meoprolol XL QD. Pt should also continue Coumadin.    1416 Ordered repeat troponin for further evaluation.    1425 Rechecked with pt, who is resting comfortably, and discussed that pt has no acute abnormalities in labs nd imaging at this time. Plan to repeat troponin. Pt understands if repeat troponin is negative pt will be discharged on ASA and metoprolol XL. Pt understands and agrees with the plan, all questions answered.    1530 Pt care turned over to Dr. Ochoa, pending repeat troponin and disposition.    MEDICAL DECISION MAKING  Results were reviewed/discussed with the patient and they were also made aware of online access. Pt also made aware that some labs, such as cultures, will not be resulted during ER visit and follow up with PMD is necessary.     MDM  Number of Diagnoses or Management Options  Precordial pain:   Diagnosis management comments: Patient presented to the ER complaining of chest pain.   Patient's EKG was unchanged.  Initial troponin was negative.  Patient had a heart score 4.  Case was discussed with Dr. Dalton.  He recommended checking a repeat troponin.  If that is negative, the patient be discharged with prescriptions for aspirin 81 mg daily and Toprol-XL 25 mg daily, per his recommendations.  Patient had a normal stress test approximately 2 months ago.  Care was turned over to Dr. Ochoa at 1530.  Repeat troponin is pending.       Amount and/or Complexity of Data Reviewed  Clinical lab tests: ordered and reviewed (Troponin= <0.010, WBC= 5.98, Glucose= 108, potassium= 3.1, INR= 2.51)  Tests in the radiology section of CPT®: ordered and reviewed (CXR shows no acute abnormalities.)  Tests in the medicine section of CPT®: ordered and reviewed (See procedure notes for EKG.)  Decide to obtain previous medical records or to obtain history from someone other than the patient: yes  Review and summarize past medical records: yes (Pt had stress test in September 2018 that was normal and showed no evidence of ischemia. Pt saw Dr. Dalton last week for CAD f/u. Last month, pt underwent cervical discectomy and anterior fusion.)    Patient Progress  Patient progress: stable        HEART Score (for prediction of 6-week risk of major adverse cardiac event) reviewed and/or performed as part of the patient evaluation and treatment planning process.  The result associated with this review/performance is: 6      DIAGNOSIS  Final diagnoses:   Precordial pain       DISPOSITION  PT CARE TURNED OVER TO DR. OCHOA    Latest Documented Vital Signs:  As of 3:35 PM  BP- 125/79 HR- 55 Temp- 97 °F (36.1 °C) O2 sat- 94%    --  Documentation assistance provided by mariaelena Chicas for Dr. Mejia.  Information recorded by the mariaelena was done at my direction and has been verified and validated by me.       Mimi Chicas  11/16/18 3581       Jomar Mejia MD  11/16/18 8415

## 2018-11-19 NOTE — PROGRESS NOTES
Subjective   Patient ID: Javy Reeves . is a 68 y.o. male is here today for follow-up. Patient is 8 weeks out from having a ACDF C5/6, C6/7 with cages and plate by Dr. Coles on 10/05/18. Patient is doing well. He states that he is healing well.    Mr. Reeves is now 8 weeks out from 2 level ACDF procedure with Dr. Coles.  He reports significant improvement in his arm and neck pain.            Review of Systems   Respiratory: Negative for chest tightness and shortness of breath.    Cardiovascular: Negative for chest pain.   Musculoskeletal: Positive for neck pain.   Neurological: Negative for numbness.   All other systems reviewed and are negative.      Objective   Physical Exam   Constitutional: He appears well-developed. No distress.   Skin: Skin is warm and dry.   Cervical anterior incision is well healed.    Psychiatric: He has a normal mood and affect.   Vitals reviewed.      Assessment/Plan         Medical Decision Making:      Mr. Reeves is doing quite well.  He is very pleased with his progress.  He notes no significant neck pain.  His arm pain is gone.  We discussed doing some physical therapy for further strengthening.  A prescription was given.  Mr. Reeves will call the office if he expresses any worsening pain symptoms or weakness.  We will see him as needed from here.      Jvay was seen today for post-op.    Diagnoses and all orders for this visit:    Surgical followup visit  -     Ambulatory Referral to Physical Therapy Evaluate and treat; Heat, Electrotherapy; Ultrasound; E-stim; Stretching (for neck/core strengthening and intro to HEP), Strengthening, ROM      Return if symptoms worsen or fail to improve.

## 2018-11-28 ENCOUNTER — CLINICAL SUPPORT (OUTPATIENT)
Dept: FAMILY MEDICINE CLINIC | Facility: CLINIC | Age: 68
End: 2018-11-28

## 2018-11-28 DIAGNOSIS — Z79.01 LONG TERM CURRENT USE OF ANTICOAGULANT THERAPY: ICD-10-CM

## 2018-11-28 DIAGNOSIS — Z51.81 ENCOUNTER FOR THERAPEUTIC DRUG MONITORING: Primary | ICD-10-CM

## 2018-11-28 LAB — INR PPP: 3 (ref 2–3)

## 2018-11-28 PROCEDURE — 93793 ANTICOAG MGMT PT WARFARIN: CPT | Performed by: PHYSICIAN ASSISTANT

## 2018-11-28 PROCEDURE — 36416 COLLJ CAPILLARY BLOOD SPEC: CPT | Performed by: PHYSICIAN ASSISTANT

## 2018-11-28 PROCEDURE — 85610 PROTHROMBIN TIME: CPT | Performed by: PHYSICIAN ASSISTANT

## 2018-11-30 ENCOUNTER — OFFICE VISIT (OUTPATIENT)
Dept: NEUROSURGERY | Facility: CLINIC | Age: 68
End: 2018-11-30

## 2018-11-30 VITALS
HEART RATE: 78 BPM | WEIGHT: 244 LBS | BODY MASS INDEX: 33.05 KG/M2 | DIASTOLIC BLOOD PRESSURE: 76 MMHG | SYSTOLIC BLOOD PRESSURE: 129 MMHG | HEIGHT: 72 IN

## 2018-11-30 DIAGNOSIS — Z09 SURGICAL FOLLOWUP VISIT: Primary | ICD-10-CM

## 2018-11-30 PROCEDURE — 99024 POSTOP FOLLOW-UP VISIT: CPT | Performed by: NURSE PRACTITIONER

## 2018-12-06 ENCOUNTER — TREATMENT (OUTPATIENT)
Dept: PHYSICAL THERAPY | Facility: CLINIC | Age: 68
End: 2018-12-06

## 2018-12-06 DIAGNOSIS — M54.2 ACUTE NECK PAIN: Primary | ICD-10-CM

## 2018-12-06 PROCEDURE — 97161 PT EVAL LOW COMPLEX 20 MIN: CPT | Performed by: PHYSICAL THERAPIST

## 2018-12-06 PROCEDURE — G0283 ELEC STIM OTHER THAN WOUND: HCPCS | Performed by: PHYSICAL THERAPIST

## 2018-12-06 PROCEDURE — G8993 SUB PT/OT CURRENT STATUS: HCPCS | Performed by: PHYSICAL THERAPIST

## 2018-12-06 PROCEDURE — 97110 THERAPEUTIC EXERCISES: CPT | Performed by: PHYSICAL THERAPIST

## 2018-12-06 PROCEDURE — G8994 SUB PT/OT GOAL STATUS: HCPCS | Performed by: PHYSICAL THERAPIST

## 2018-12-06 NOTE — PROGRESS NOTES
Physical Therapy Initial Evaluation and Plan of Care    Patient: Javy Reeves Sr.   : 1950  Diagnosis/ICD-10 Code:  Acute neck pain [M54.2]  Referring practitioner: Aletha Ochoa PA-C  Past Medical History Reviewed: 2018    PLOF: independent, active    Subjective Evaluation    History of Present Illness  Date of surgery: 10/5/2018  Mechanism of injury: I had neck surgery. I am sore in my shoulders, my whole neck, and upper back. I can't lay flat on my back and it hasn't gotten any better since surgery. I have to keep my neck straight. I can't lay on the back of my head, it causes it a lot of pain in my neck. The MD told me I won't have full movement looking up and down, but I should have full movement looking side to side. I use asper cream and it has really helped a lot. I am not big on pain meds. I can sleep about 3 hours before I have to get up. I have a very high tolerance to pain. I take Lyrica for my fibromyalgia. Even before the surgery holding my hands over my head bothered me. Sleeping is uncomfortable, but since I am retired I can sleep on and off during the day. I ride motorcycles but I won't get back on until Spring. I also play the drums but I can do it with just a little pain. I am wanting to join the gym this month at [employer].       Patient Occupation: Retired  Pain  Current pain ratin  At best pain ratin  At worst pain rating: 10  Location: Neck and shoulders  Quality: dull ache  Relieving factors: ice, heat and medications (cream, tylenol extra strength)  Aggravating factors: lifting, overhead activity and sleeping  Progression: improved    Hand dominance: right    Treatments  Previous treatment: physical therapy       Objective       Palpation   Left   No palpable tenderness to the deltoid, lower trapezius, middle trapezius, pectoralis major, pectoralis minor, sternocleidomastoid and suboccipitals.   Tenderness of the levator scapulae and upper trapezius.     Right    No palpable tenderness to the deltoid, lower trapezius, middle trapezius, pectoralis major, pectoralis minor and suboccipitals. Tenderness of the levator scapulae, sternocleidomastoid and upper trapezius.     Tenderness   Cervical Spine   Tenderness in the sternum and spinous process.     Left Shoulder   Tenderness in the clavicle.     Right Shoulder  Tenderness in the clavicle.     Additional Tenderness Details  Pain in the CT junction   Spinous process of cervical and thoracic spine    Neurological Testing     Additional Neurological Details  No numbness or tingling in BUE     Active Range of Motion   Cervical/Thoracic Spine   Cervical    Flexion: 35 degrees with pain  Extension: 20 degrees with pain  Left lateral flexion: 28 degrees with pain  Right lateral flexion: 25 degrees with pain  Left rotation: 48 degrees with pain  Right rotation: 36 degrees with pain    Strength/Myotome Testing   Cervical Spine   Neck extension: 4  Neck flexion: 4-    Left   Neck lateral flexion (C3): 4    Right   Neck lateral flexion (C3): 4-    Left Shoulder     Planes of Motion   Flexion: 4+   Abduction: 5   External rotation at 0°: 5   Internal rotation at 0°: 5     Right Shoulder     Planes of Motion   Flexion: 4+   Abduction: 5   External rotation at 0°: 5   Internal rotation at 0°: 5     Left Elbow   Flexion: 5  Extension: 5    Right Elbow   Flexion: 5  Extension: 5    Left Wrist/Hand   Wrist extension: 5  Wrist flexion: 5    Right Wrist/Hand   Wrist extension: 5  Wrist flexion: 5         Assessment & Plan     Assessment  Impairments: abnormal or restricted ROM, activity intolerance, impaired physical strength, lacks appropriate home exercise program and pain with function  Assessment details: Pt presents to therapy with acute neck pain secondary to recent cervical fusion. Pt demonstrates increased cervical/thoracic pain, decreased cervical ROM, neck muscular tightness, and inability to lay flat. Pt would benefit from skilled  therapy to address limitations listed in order to return to functional activities.   Prognosis: good    Goals  Plan Goals: SHORT TERM GOALS: 6-8 visits  1. Pt will be compliant with HEP.  2. Pt to exhibit 50 degrees of cervical R and L to allow for viewing traffic without pain or limitations  3. Pt to report ability to sleep through the night without awakening  4. Pt able to perform ADL's and recreational activities without pain     LONG TERM GOALS:10-14 visits   1.  Pt to score <20% perceived disability on Neck Index  2.  Pain level < 3/10 at worse with driving > 30 min. and ADL's  3.  Increased cervical AROM to WFL to allow for driving and household tasks with less restrictions.  4.  Pt able to job requirements and cleaning  activities without complaints of pain limiting function.     Plan  Therapy options: will be seen for skilled physical therapy services  Planned modality interventions: cryotherapy, electrical stimulation/Russian stimulation, iontophoresis, TENS, thermotherapy (hydrocollator packs), traction and ultrasound  Planned therapy interventions: abdominal trunk stabilization, ADL retraining, body mechanics training, flexibility, functional ROM exercises, home exercise program, joint mobilization, manual therapy, neuromuscular re-education, postural training, soft tissue mobilization, spinal/joint mobilization, strengthening, stretching and therapeutic activities  Frequency: 2x week  Duration in visits: 14  Treatment plan discussed with: patient  Plan details: Continue to progress strengthening/stretching/stability/functional exercises per pt tolerance.         Manual Therapy:    -     mins  96744;  Therapeutic Exercise:    10     mins  00781;     Neuromuscular Yoandy:    -    mins  23743;    Therapeutic Activity:     -     mins  11368;     Gait Training:      -     mins  67145;     Ultrasound:     -     mins  51280;    Electrical Stimulation:    15     mins  69600 ( );  Dry Needling     -     mins  self-pay    Timed Treatment:   10   mins   Total Treatment:     65   mins      PT SIGNATURE: Maribel Alcantar, PT   DATE TREATMENT INITIATED: 12/6/2018    Medicare Initial Certification  Certification Period: 3/6/2019  I certify that the therapy services are furnished while this patient is under my care.  The services outlined above are required by this patient, and will be reviewed every 90 days.     PHYSICIAN: Aletha Ochoa PA-C      DATE:     Please sign and return via fax to [phone].. Thank you, [facility].

## 2018-12-10 ENCOUNTER — TREATMENT (OUTPATIENT)
Dept: PHYSICAL THERAPY | Facility: CLINIC | Age: 68
End: 2018-12-10

## 2018-12-10 DIAGNOSIS — M54.2 PAIN, NECK: ICD-10-CM

## 2018-12-10 DIAGNOSIS — M54.12 CERVICAL RADICULOPATHY: ICD-10-CM

## 2018-12-10 DIAGNOSIS — M54.2 ACUTE NECK PAIN: Primary | ICD-10-CM

## 2018-12-10 PROCEDURE — G0283 ELEC STIM OTHER THAN WOUND: HCPCS | Performed by: PHYSICAL THERAPIST

## 2018-12-10 PROCEDURE — 97110 THERAPEUTIC EXERCISES: CPT | Performed by: PHYSICAL THERAPIST

## 2018-12-10 NOTE — PROGRESS NOTES
Physical Therapy Daily Progress Note  Visit: 2    Javy Reeves reports: Neck is feeling a little better today. My lower back is hurting more today. I am just having a hard day with my fibromyalgia. My pain comes in the afternoon usually so I prefer to do things in the morning.     Subjective     Objective   See Exercise, Manual, and Modality Logs for complete treatment.       Assessment/Plan   Pt tolerated treatment well. Pt was able to progress without complaints of increased pain in neck. Today pt was experiencing pain secondary to fibromyalgia and had whole body soreness.   Plan: continue to progress strengthening/stability/functional tasks per pt tolerance.    Manual Therapy:    -     mins  40167;  Therapeutic Exercise:    17     mins  99921;     Neuromuscular Yoandy:    -    mins  40772;    Therapeutic Activity:     -     mins  11778;     Gait Training:      -     mins  51222;     Ultrasound:     -     mins  32075;    Electrical Stimulation:    15     mins  85774 ( );  Dry Needling     -     mins self-pay    Timed Treatment:   17   mins   Total Treatment:     32   mins    Maribel Alcantar PT  [place] License #: [id number]    Physical Therapist

## 2018-12-13 ENCOUNTER — TREATMENT (OUTPATIENT)
Dept: PHYSICAL THERAPY | Facility: CLINIC | Age: 68
End: 2018-12-13

## 2018-12-13 DIAGNOSIS — M54.2 PAIN, NECK: ICD-10-CM

## 2018-12-13 DIAGNOSIS — M54.2 ACUTE NECK PAIN: Primary | ICD-10-CM

## 2018-12-13 PROCEDURE — G0283 ELEC STIM OTHER THAN WOUND: HCPCS | Performed by: PHYSICAL THERAPIST

## 2018-12-13 PROCEDURE — 97110 THERAPEUTIC EXERCISES: CPT | Performed by: PHYSICAL THERAPIST

## 2018-12-13 NOTE — PROGRESS NOTES
Physical Therapy Daily Progress Note  Visit: 3    Javy Reeves reports: Neck is feeling good. I have been moving around a lot this morning    Subjective     Objective   See Exercise, Manual, and Modality Logs for complete treatment.       Assessment & Plan     Assessment  Assessment details: Pt doing well. Progressed with stabilization activities for cervical spine.     Plan  Plan details: Progress per POC        Manual Therapy:    4     mins  02246;  Therapeutic Exercise:    35     mins  75827;     Neuromuscular Yoandy:    -    mins  88590;    Therapeutic Activity:     -     mins  17484;     Gait Training:      -     mins  18337;     Ultrasound:     -     mins  02715;    Electrical Stimulation:    15     mins  97104 ( );  Dry Needling     -     mins self-pay    Timed Treatment:   39   mins   Total Treatment:     70   mins    Maribel Alcantar, PT  [place] License #: [id number]    Physical Therapist

## 2018-12-26 ENCOUNTER — CLINICAL SUPPORT (OUTPATIENT)
Dept: FAMILY MEDICINE CLINIC | Facility: CLINIC | Age: 68
End: 2018-12-26

## 2018-12-26 DIAGNOSIS — Z79.01 LONG TERM CURRENT USE OF ANTICOAGULANT THERAPY: ICD-10-CM

## 2018-12-26 DIAGNOSIS — Z51.81 ENCOUNTER FOR THERAPEUTIC DRUG MONITORING: Primary | ICD-10-CM

## 2018-12-26 LAB — INR PPP: 2.3 (ref 2–3)

## 2018-12-26 PROCEDURE — 93793 ANTICOAG MGMT PT WARFARIN: CPT | Performed by: PHYSICIAN ASSISTANT

## 2018-12-26 PROCEDURE — 85610 PROTHROMBIN TIME: CPT | Performed by: PHYSICIAN ASSISTANT

## 2018-12-26 PROCEDURE — 36416 COLLJ CAPILLARY BLOOD SPEC: CPT | Performed by: PHYSICIAN ASSISTANT

## 2018-12-26 RX ORDER — WARFARIN SODIUM 5 MG/1
TABLET ORAL
Qty: 180 TABLET | Refills: 1 | Status: SHIPPED | OUTPATIENT
Start: 2018-12-26 | End: 2019-08-02 | Stop reason: SDUPTHER

## 2018-12-26 NOTE — PROGRESS NOTES
Capillary blood collection performed in Right finger by Aletha Davila. Patient tolerated well.     Cont he same dose and rechk in a month

## 2019-01-03 ENCOUNTER — TREATMENT (OUTPATIENT)
Dept: PHYSICAL THERAPY | Facility: CLINIC | Age: 69
End: 2019-01-03

## 2019-01-03 DIAGNOSIS — M54.2 PAIN, NECK: ICD-10-CM

## 2019-01-03 DIAGNOSIS — M54.12 CERVICAL RADICULOPATHY: ICD-10-CM

## 2019-01-03 DIAGNOSIS — M54.2 ACUTE NECK PAIN: Primary | ICD-10-CM

## 2019-01-03 PROCEDURE — 97110 THERAPEUTIC EXERCISES: CPT | Performed by: PHYSICAL THERAPIST

## 2019-01-03 NOTE — PROGRESS NOTES
Physical Therapy Daily Progress Note  Visit: 4    Javy Reeves reports: My neck is doing good. I want to start going to the gym to do very gentle stretching    Subjective     Objective   See Exercise, Manual, and Modality Logs for complete treatment.       Assessment & Plan     Assessment  Assessment details: Pt doing excellent. Educated that he may try going to gym this weekend to perform gentle stretching and exercises we have performed in this gym. Educated that no lifting heavy or lifting overhead. Anticipate DC next week if he transitions with this well    Plan  Plan details: Reassess next week        Manual Therapy:    -     mins  66835;  Therapeutic Exercise:    33     mins  11296;     Neuromuscular Yoandy:    -    mins  14581;    Therapeutic Activity:     -     mins  83044;     Gait Training:      -     mins  63835;     Ultrasound:     -     mins  34730;    Electrical Stimulation:    -     mins  07281 ( );  Dry Needling     -     mins self-pay    Timed Treatment:   33   mins   Total Treatment:     50   mins    Maribel Alcantar PT  [place] License #: [id number]    Physical Therapist

## 2019-01-08 ENCOUNTER — TREATMENT (OUTPATIENT)
Dept: PHYSICAL THERAPY | Facility: CLINIC | Age: 69
End: 2019-01-08

## 2019-01-08 DIAGNOSIS — M54.2 PAIN, NECK: ICD-10-CM

## 2019-01-08 DIAGNOSIS — M54.2 ACUTE NECK PAIN: Primary | ICD-10-CM

## 2019-01-08 DIAGNOSIS — M54.12 CERVICAL RADICULOPATHY: ICD-10-CM

## 2019-01-08 PROCEDURE — 97110 THERAPEUTIC EXERCISES: CPT | Performed by: PHYSICAL THERAPIST

## 2019-01-08 NOTE — PROGRESS NOTES
Physical Therapy Daily Progress Note  Visit: 5    Javy Reeves reports: I have been doing good. I went to the gym and did not have any trouble afterwards    Subjective     Objective   See Exercise, Manual, and Modality Logs for complete treatment.       Assessment & Plan     Assessment  Assessment details: Pt doing excellent. He is back to the gym and regular ADL's as desired. Pt has met most all goals and he will call me next week.    Plan  Plan details: If continued improvement and sx relief this week will DC. Pt going to call next week        Manual Therapy:    -     mins  88531;  Therapeutic Exercise:    29     mins  13617;     Neuromuscular Yoandy:    -    mins  61209;    Therapeutic Activity:     -     mins  77959;     Gait Training:      -     mins  98747;     Ultrasound:     -     mins  84656;    Electrical Stimulation:    -     mins  34348 ( );  Dry Needling     -     mins self-pay    Timed Treatment:   29   mins   Total Treatment:     40   mins    Maribel Alcantar PT  [place] License #: [id number]    Physical Therapist

## 2019-01-11 RX ORDER — TIZANIDINE 4 MG/1
4 TABLET ORAL EVERY 8 HOURS PRN
Qty: 90 TABLET | Refills: 3 | Status: SHIPPED | OUTPATIENT
Start: 2019-01-11 | End: 2019-10-08 | Stop reason: SDUPTHER

## 2019-01-23 ENCOUNTER — CLINICAL SUPPORT (OUTPATIENT)
Dept: FAMILY MEDICINE CLINIC | Facility: CLINIC | Age: 69
End: 2019-01-23

## 2019-01-23 DIAGNOSIS — Z51.81 ENCOUNTER FOR THERAPEUTIC DRUG MONITORING: Primary | ICD-10-CM

## 2019-01-23 DIAGNOSIS — Z79.01 LONG TERM CURRENT USE OF ANTICOAGULANT THERAPY: ICD-10-CM

## 2019-01-23 LAB — INR PPP: 2.3 (ref 2–3)

## 2019-01-23 PROCEDURE — 85610 PROTHROMBIN TIME: CPT | Performed by: PHYSICIAN ASSISTANT

## 2019-01-23 PROCEDURE — 36416 COLLJ CAPILLARY BLOOD SPEC: CPT | Performed by: PHYSICIAN ASSISTANT

## 2019-01-23 PROCEDURE — 93793 ANTICOAG MGMT PT WARFARIN: CPT | Performed by: PHYSICIAN ASSISTANT

## 2019-01-23 NOTE — PROGRESS NOTES
Capillary blood collection performed in Left finger by Aletha Davila. Patient tolerated well.     Cont the same dose and rechk in a month

## 2019-02-20 ENCOUNTER — CLINICAL SUPPORT (OUTPATIENT)
Dept: FAMILY MEDICINE CLINIC | Facility: CLINIC | Age: 69
End: 2019-02-20

## 2019-02-20 DIAGNOSIS — Z51.81 ENCOUNTER FOR THERAPEUTIC DRUG MONITORING: ICD-10-CM

## 2019-02-20 DIAGNOSIS — Z79.01 LONG TERM CURRENT USE OF ANTICOAGULANT THERAPY: Primary | ICD-10-CM

## 2019-02-20 LAB — INR PPP: 2.3 (ref 2–3)

## 2019-02-20 PROCEDURE — 85610 PROTHROMBIN TIME: CPT | Performed by: PHYSICIAN ASSISTANT

## 2019-02-20 PROCEDURE — 93793 ANTICOAG MGMT PT WARFARIN: CPT | Performed by: PHYSICIAN ASSISTANT

## 2019-02-20 PROCEDURE — 36416 COLLJ CAPILLARY BLOOD SPEC: CPT | Performed by: PHYSICIAN ASSISTANT

## 2019-03-20 ENCOUNTER — CLINICAL SUPPORT (OUTPATIENT)
Dept: FAMILY MEDICINE CLINIC | Facility: CLINIC | Age: 69
End: 2019-03-20

## 2019-03-20 DIAGNOSIS — Z79.01 LONG TERM CURRENT USE OF ANTICOAGULANT THERAPY: Primary | ICD-10-CM

## 2019-03-20 DIAGNOSIS — Z51.81 ENCOUNTER FOR THERAPEUTIC DRUG MONITORING: ICD-10-CM

## 2019-03-20 LAB — INR PPP: 1.7 (ref 2–3)

## 2019-03-20 PROCEDURE — 93793 ANTICOAG MGMT PT WARFARIN: CPT | Performed by: PHYSICIAN ASSISTANT

## 2019-03-20 PROCEDURE — 36416 COLLJ CAPILLARY BLOOD SPEC: CPT | Performed by: PHYSICIAN ASSISTANT

## 2019-03-20 PROCEDURE — 85610 PROTHROMBIN TIME: CPT | Performed by: PHYSICIAN ASSISTANT

## 2019-03-20 NOTE — PROGRESS NOTES
Capillary blood collection performed in Right finger by Aletha Davila. Patient tolerated well.    Take 15 mg for two day then resume normal dose and rechk in a week

## 2019-03-27 ENCOUNTER — CLINICAL SUPPORT (OUTPATIENT)
Dept: FAMILY MEDICINE CLINIC | Facility: CLINIC | Age: 69
End: 2019-03-27

## 2019-03-27 ENCOUNTER — OFFICE VISIT (OUTPATIENT)
Dept: FAMILY MEDICINE CLINIC | Facility: CLINIC | Age: 69
End: 2019-03-27

## 2019-03-27 VITALS
HEIGHT: 72 IN | HEART RATE: 80 BPM | RESPIRATION RATE: 16 BRPM | WEIGHT: 249 LBS | OXYGEN SATURATION: 100 % | DIASTOLIC BLOOD PRESSURE: 88 MMHG | TEMPERATURE: 97.8 F | SYSTOLIC BLOOD PRESSURE: 152 MMHG | BODY MASS INDEX: 33.72 KG/M2

## 2019-03-27 DIAGNOSIS — Z79.01 LONG TERM CURRENT USE OF ANTICOAGULANT THERAPY: ICD-10-CM

## 2019-03-27 DIAGNOSIS — Z51.81 ENCOUNTER FOR THERAPEUTIC DRUG MONITORING: Primary | ICD-10-CM

## 2019-03-27 DIAGNOSIS — Z51.81 ENCOUNTER FOR THERAPEUTIC DRUG MONITORING: ICD-10-CM

## 2019-03-27 DIAGNOSIS — J40 BRONCHITIS: Primary | ICD-10-CM

## 2019-03-27 DIAGNOSIS — I82.5Y3 CHRONIC DEEP VEIN THROMBOSIS (DVT) OF PROXIMAL VEIN OF BOTH LOWER EXTREMITIES (HCC): ICD-10-CM

## 2019-03-27 LAB
INR PPP: 3.2
INR PPP: 3.2 (ref 2–3)
INR PPP: 3.2 (ref 2–3)

## 2019-03-27 PROCEDURE — 36416 COLLJ CAPILLARY BLOOD SPEC: CPT | Performed by: FAMILY MEDICINE

## 2019-03-27 PROCEDURE — 99213 OFFICE O/P EST LOW 20 MIN: CPT | Performed by: FAMILY MEDICINE

## 2019-03-27 PROCEDURE — 85610 PROTHROMBIN TIME: CPT | Performed by: FAMILY MEDICINE

## 2019-03-27 RX ORDER — AZITHROMYCIN 250 MG/1
TABLET, FILM COATED ORAL
Qty: 6 TABLET | Refills: 0 | Status: SHIPPED | OUTPATIENT
Start: 2019-03-27 | End: 2019-04-26

## 2019-03-27 RX ORDER — METHYLPREDNISOLONE 4 MG/1
TABLET ORAL
Qty: 21 TABLET | Refills: 0 | Status: SHIPPED | OUTPATIENT
Start: 2019-03-27 | End: 2019-04-26

## 2019-03-27 NOTE — PROGRESS NOTES
"Subjective   Javy Reeves Sr. is a 68 y.o. male.     CC: URI    History of Present Illness     Javy Reeves Sr. 68 y.o. male who presents for evaluation of sore throat, cough, wheezing. Symptoms include congestion and post nasal drip.  Onset of symptoms was 2 weeks ago, unchanged since that time. Patient denies hemoptysis.   Evaluation to date: none Treatment to date:  none.       The following portions of the patient's history were reviewed and updated as appropriate: allergies, current medications, past family history, past medical history, past social history, past surgical history and problem list.    Review of Systems   Constitutional: Negative for activity change, chills, fatigue and fever.   HENT: Positive for congestion and postnasal drip.    Respiratory: Positive for cough and wheezing. Negative for shortness of breath.    Cardiovascular: Negative for chest pain and palpitations.   Gastrointestinal: Negative for abdominal pain.   Endocrine: Negative for cold intolerance.   Psychiatric/Behavioral: Negative for behavioral problems and dysphoric mood. The patient is not nervous/anxious.        /88   Pulse 80   Temp 97.8 °F (36.6 °C) (Oral)   Resp 16   Ht 182.9 cm (72\")   Wt 113 kg (249 lb)   SpO2 100%   BMI 33.77 kg/m²     Objective   Physical Exam   Constitutional: He appears well-developed and well-nourished.   HENT:   Right Ear: Tympanic membrane normal.   Left Ear: Tympanic membrane normal.   Nose: Right sinus exhibits no maxillary sinus tenderness and no frontal sinus tenderness. Left sinus exhibits no maxillary sinus tenderness and no frontal sinus tenderness.   Mouth/Throat: Uvula is midline. No posterior oropharyngeal erythema.   Neck: Neck supple. No thyromegaly present.   Cardiovascular: Normal rate and regular rhythm.   No murmur heard.  Pulmonary/Chest: Effort normal. He has wheezes (mild, diffuse). He has no rales.   Abdominal: Bowel sounds are normal. There is no tenderness. "   Psychiatric: He has a normal mood and affect. His behavior is normal.   Nursing note and vitals reviewed.      Assessment/Plan   Javy was seen today for cough and wheezing.    Diagnoses and all orders for this visit:    Bronchitis  -     azithromycin (ZITHROMAX Z-STEVE) 250 MG tablet; Take 2 tablets the first day, then 1 tablet daily for 4 days.  -     methylPREDNISolone (MEDROL) 4 MG tablet; follow package directions    Chronic deep vein thrombosis (DVT) of proximal vein of both lower extremities (CMS/HCC)  -     POC Protime / INR    Pt asked to get his INR checked in 7-10 days.

## 2019-04-01 ENCOUNTER — TELEPHONE (OUTPATIENT)
Dept: FAMILY MEDICINE CLINIC | Facility: CLINIC | Age: 69
End: 2019-04-01

## 2019-04-17 ENCOUNTER — DOCUMENTATION (OUTPATIENT)
Dept: PHYSICAL THERAPY | Facility: CLINIC | Age: 69
End: 2019-04-17

## 2019-04-25 ENCOUNTER — CLINICAL SUPPORT (OUTPATIENT)
Dept: FAMILY MEDICINE CLINIC | Facility: CLINIC | Age: 69
End: 2019-04-25

## 2019-04-25 DIAGNOSIS — Z79.01 LONG TERM CURRENT USE OF ANTICOAGULANT THERAPY: ICD-10-CM

## 2019-04-25 DIAGNOSIS — Z51.81 ENCOUNTER FOR THERAPEUTIC DRUG MONITORING: Primary | ICD-10-CM

## 2019-04-25 LAB — INR PPP: 2.7 (ref 2–3)

## 2019-04-25 PROCEDURE — 36416 COLLJ CAPILLARY BLOOD SPEC: CPT | Performed by: PHYSICIAN ASSISTANT

## 2019-04-25 PROCEDURE — 93793 ANTICOAG MGMT PT WARFARIN: CPT | Performed by: PHYSICIAN ASSISTANT

## 2019-04-25 PROCEDURE — 85610 PROTHROMBIN TIME: CPT | Performed by: PHYSICIAN ASSISTANT

## 2019-04-26 ENCOUNTER — OFFICE VISIT (OUTPATIENT)
Dept: FAMILY MEDICINE CLINIC | Facility: CLINIC | Age: 69
End: 2019-04-26

## 2019-04-26 VITALS
WEIGHT: 247 LBS | DIASTOLIC BLOOD PRESSURE: 80 MMHG | TEMPERATURE: 97.6 F | BODY MASS INDEX: 33.46 KG/M2 | SYSTOLIC BLOOD PRESSURE: 122 MMHG | HEART RATE: 72 BPM | HEIGHT: 72 IN | OXYGEN SATURATION: 99 % | RESPIRATION RATE: 16 BRPM

## 2019-04-26 DIAGNOSIS — K21.00 REFLUX ESOPHAGITIS: ICD-10-CM

## 2019-04-26 DIAGNOSIS — I20.8 STABLE ANGINA (HCC): ICD-10-CM

## 2019-04-26 DIAGNOSIS — Z86.711 HISTORY OF PULMONARY EMBOLUS (PE): ICD-10-CM

## 2019-04-26 DIAGNOSIS — E78.2 MIXED HYPERLIPIDEMIA: ICD-10-CM

## 2019-04-26 DIAGNOSIS — K21.9 GASTROESOPHAGEAL REFLUX DISEASE WITHOUT ESOPHAGITIS: ICD-10-CM

## 2019-04-26 DIAGNOSIS — I65.21 MILD ATHEROSCLEROSIS OF RIGHT CAROTID ARTERY: ICD-10-CM

## 2019-04-26 DIAGNOSIS — E55.9 VITAMIN D DEFICIENCY: ICD-10-CM

## 2019-04-26 DIAGNOSIS — I25.2 PAST HEART ATTACK: Primary | ICD-10-CM

## 2019-04-26 DIAGNOSIS — G45.9 TIA (TRANSIENT ISCHEMIC ATTACK): ICD-10-CM

## 2019-04-26 PROBLEM — I20.89 STABLE ANGINA: Status: ACTIVE | Noted: 2019-04-26

## 2019-04-26 PROCEDURE — 99214 OFFICE O/P EST MOD 30 MIN: CPT | Performed by: PHYSICIAN ASSISTANT

## 2019-04-26 RX ORDER — ALBUTEROL SULFATE 90 UG/1
2 AEROSOL, METERED RESPIRATORY (INHALATION) EVERY 4 HOURS PRN
Qty: 1 INHALER | Refills: 11 | Status: SHIPPED | OUTPATIENT
Start: 2019-04-26 | End: 2021-02-22 | Stop reason: SDUPTHER

## 2019-04-26 NOTE — PROGRESS NOTES
Subjective   Javy Reeves Sr. is a 68 y.o. male.     History of Present Illness   Javy Reeves Sr. 68 y.o. male who presents today for routine follow up check and medication refills.  he has a history of   Patient Active Problem List   Diagnosis   • Arthritis   • Family history of early CAD   • DDD (degenerative disc disease), cervical   • DVT (deep venous thrombosis) (CMS/HCC)   • Fibromyalgia   • Past heart attack   • Hyperlipidemia   • DDD (degenerative disc disease), lumbosacral   • History of pulmonary embolus (PE)   • Reflux esophagitis   • TIA (transient ischemic attack)   • Left groin pain   • Cervical radicular pain   • Mild atherosclerosis of right carotid artery   • Cervical disc disorder at C6-C7 level with radiculopathy   • Wheezing   • Colon polyps   • Right lower quadrant abdominal pain   • Diarrhea   • Cervical spinal stenosis   • Cervical disc disorder at C5-C6 level with radiculopathy   • GERD (gastroesophageal reflux disease)   • Diverticulosis   • Old MI (myocardial infarction)   • Herniated cervical disc   • Encounter for therapeutic drug monitoring   • Long term current use of anticoagulant therapy   • ERRONEOUS ENCOUNTER--DISREGARD   • Stable angina (CMS/[facility])   .  Since the last visit, he has overall felt fairly well.  He has GERD and is well controlled on PPI medication, CAD and is currently stable on medication management, Seasonal allergies and is doing well on their medication PRN and Vitamin D deficiency and will update labs to confirm level is at goal >30.  he has been compliant with current medications have reviewed them.  The patient denies medication side effects.    Results for orders placed or performed in visit on 04/26/19   Comprehensive metabolic panel   Result Value Ref Range    Glucose 89 65 - 99 mg/dL    BUN 10 8 - 23 mg/dL    Creatinine 0.98 0.76 - 1.27 mg/dL    eGFR Non African Am 77 >60 mL/min/1.73    eGFR African Am 93 >60 mL/min/1.73    BUN/Creatinine Ratio 10.2 7.0  - 25.0    Sodium 138 136 - 145 mmol/L    Potassium 4.4 3.5 - 5.2 mmol/L    Chloride 101 98 - 107 mmol/L    Total CO2 26.0 22.0 - 29.0 mmol/L    Calcium 9.9 8.6 - 10.5 mg/dL    Total Protein 7.8 6.0 - 8.5 g/dL    Albumin 4.40 3.50 - 5.20 g/dL    Globulin 3.4 gm/dL    A/G Ratio 1.3 g/dL    Total Bilirubin 0.7 0.2 - 1.2 mg/dL    Alkaline Phosphatase 53 39 - 117 U/L    AST (SGOT) 19 1 - 40 U/L    ALT (SGPT) 19 1 - 41 U/L   Lipid panel   Result Value Ref Range    Total Cholesterol 234 (H) 0 - 200 mg/dL    Triglycerides 223 (H) 0 - 150 mg/dL    HDL Cholesterol 33 (L) 40 - 60 mg/dL    VLDL Cholesterol 44.6 mg/dL    LDL Cholesterol  156 (H) 0 - 100 mg/dL   TSH   Result Value Ref Range    TSH 2.560 0.270 - 4.200 mIU/mL   T3, Free   Result Value Ref Range    T3, Free 3.2 2.0 - 4.4 pg/mL   T4, Free   Result Value Ref Range    Free T4 1.01 0.93 - 1.70 ng/dL   Vitamin B12   Result Value Ref Range    Vitamin B-12 368 211 - 946 pg/mL   Folate   Result Value Ref Range    Folate 8.32 4.78 - 24.20 ng/mL   Vitamin D 25 Hydroxy   Result Value Ref Range    25 Hydroxy, Vitamin D 40.1 30.0 - 100.0 ng/ml   CBC and Differential   Result Value Ref Range    WBC 4.86 3.40 - 10.80 10*3/mm3    RBC 4.90 4.14 - 5.80 10*6/mm3    Hemoglobin 15.5 13.0 - 17.7 g/dL    Hematocrit 46.6 37.5 - 51.0 %    MCV 95.1 79.0 - 97.0 fL    MCH 31.6 26.6 - 33.0 pg    MCHC 33.3 31.5 - 35.7 g/dL    RDW 14.4 12.3 - 15.4 %    Platelets 203 140 - 450 10*3/mm3    Neutrophil Rel % 41.0 (L) 42.7 - 76.0 %    Lymphocyte Rel % 48.8 (H) 19.6 - 45.3 %    Monocyte Rel % 8.4 5.0 - 12.0 %    Eosinophil Rel % 1.0 0.3 - 6.2 %    Basophil Rel % 0.4 0.0 - 1.5 %    Neutrophils Absolute 1.99 1.70 - 7.00 10*3/mm3    Lymphocytes Absolute 2.37 0.70 - 3.10 10*3/mm3    Monocytes Absolute 0.41 0.10 - 0.90 10*3/mm3    Eosinophils Absolute 0.05 0.00 - 0.40 10*3/mm3    Basophils Absolute 0.02 0.00 - 0.20 10*3/mm3    Immature Granulocyte Rel % 0.4 0.0 - 0.5 %    Immature Grans Absolute 0.02 0.00 -  0.05 10*3/mm3    nRBC 0.0 0.0 - 0.2 /100 WBC       Need to f/u on carotid doppler  Coumadin for VTEs recurrent  On Lyrica for Fibromyalgia    The patient has read and signed the [facility] Controlled Substance Contract.  I will continue to see patient for regular follow up appointments.  They are well controlled on their medication.  ASHLEY has been reviewed by me and is updated every 3 months. The patient is aware of the potential for addiction and dependence.    The following portions of the patient's history were reviewed and updated as appropriate: allergies, current medications, past family history, past medical history, past social history, past surgical history and problem list.    Review of Systems   Constitutional: Negative for activity change, appetite change and unexpected weight change.   HENT: Positive for sneezing, sore throat, tinnitus and voice change. Negative for nosebleeds and trouble swallowing.    Eyes: Negative for pain and visual disturbance.   Respiratory: Positive for cough and choking. Negative for chest tightness, shortness of breath and wheezing.    Cardiovascular: Positive for chest pain. Negative for palpitations.   Gastrointestinal: Negative for abdominal pain and blood in stool.   Endocrine: Positive for cold intolerance.   Genitourinary: Negative for difficulty urinating and hematuria.   Musculoskeletal: Positive for back pain, neck pain and neck stiffness. Negative for joint swelling.   Skin: Negative for color change and rash.   Allergic/Immunologic: Negative.    Neurological: Positive for tremors. Negative for syncope and speech difficulty.   Hematological: Negative for adenopathy.   Psychiatric/Behavioral: Negative for agitation and confusion.   All other systems reviewed and are negative.      Objective   Physical Exam   Constitutional: He is oriented to person, place, and time. He appears well-developed and well-nourished. No distress.   HENT:   Head: Normocephalic and  atraumatic.   Eyes: Conjunctivae and EOM are normal. Pupils are equal, round, and reactive to light. Right eye exhibits no discharge. Left eye exhibits no discharge. No scleral icterus.   Neck: Normal range of motion. Neck supple. No tracheal deviation present. No thyromegaly present.   Cardiovascular: Normal rate, regular rhythm, normal heart sounds, intact distal pulses and normal pulses. Exam reveals no gallop.   No murmur heard.  Pulmonary/Chest: Effort normal and breath sounds normal. No respiratory distress. He has no wheezes. He has no rales.   Musculoskeletal: Normal range of motion.   Neurological: He is alert and oriented to person, place, and time. He exhibits normal muscle tone. Coordination normal.   Skin: Skin is warm. No rash noted. No erythema. No pallor.   Psychiatric: He has a normal mood and affect. His behavior is normal. Judgment and thought content normal.   Nursing note and vitals reviewed.      Assessment/Plan   Javy was seen today for hyperlipidemia.    Diagnoses and all orders for this visit:    Past heart attack  -     Comprehensive metabolic panel  -     Lipid panel  -     CBC and Differential  -     TSH  -     T3, Free  -     T4, Free  -     Vitamin B12  -     Folate  -     Vitamin D 25 Hydroxy    History of pulmonary embolus (PE)  -     Comprehensive metabolic panel  -     Lipid panel  -     CBC and Differential  -     TSH  -     T3, Free  -     T4, Free  -     Vitamin B12  -     Folate  -     Vitamin D 25 Hydroxy    Reflux esophagitis  -     Comprehensive metabolic panel  -     Lipid panel  -     CBC and Differential  -     TSH  -     T3, Free  -     T4, Free  -     Vitamin B12  -     Folate  -     Vitamin D 25 Hydroxy    TIA (transient ischemic attack)  -     Comprehensive metabolic panel  -     Lipid panel  -     CBC and Differential  -     TSH  -     T3, Free  -     T4, Free  -     Vitamin B12  -     Folate  -     Vitamin D 25 Hydroxy    Mild atherosclerosis of right carotid  artery  -     Duplex Carotid Ultrasound CAR; Future  -     Comprehensive metabolic panel  -     Lipid panel  -     CBC and Differential  -     TSH  -     T3, Free  -     T4, Free  -     Vitamin B12  -     Folate  -     Vitamin D 25 Hydroxy    Stable angina (CMS/HCC)  -     Comprehensive metabolic panel  -     Lipid panel  -     CBC and Differential  -     TSH  -     T3, Free  -     T4, Free  -     Vitamin B12  -     Folate  -     Vitamin D 25 Hydroxy    Mixed hyperlipidemia  -     Comprehensive metabolic panel  -     Lipid panel  -     CBC and Differential  -     TSH  -     T3, Free  -     T4, Free  -     Vitamin B12  -     Folate  -     Vitamin D 25 Hydroxy    Gastroesophageal reflux disease without esophagitis  -     Comprehensive metabolic panel  -     Lipid panel  -     CBC and Differential  -     TSH  -     T3, Free  -     T4, Free  -     Vitamin B12  -     Folate  -     Vitamin D 25 Hydroxy    Vitamin D deficiency  -     Comprehensive metabolic panel  -     Lipid panel  -     CBC and Differential  -     TSH  -     T3, Free  -     T4, Free  -     Vitamin B12  -     Folate  -     Vitamin D 25 Hydroxy    Other orders  -     albuterol sulfate  (90 Base) MCG/ACT inhaler; Inhale 2 puffs Every 4 (Four) Hours As Needed for Wheezing or Shortness of Air.    refill Lyrica  Carotid doppler due  F/u cardio  See about the swallowing with neurosurgeon  Stop smoking    In summary, Javy Reeves Sr., was seen today.  he was seen for  GERD and is well controlled on PPI medication, Hyperlipidemia and is well controlled on medication, Seasonal allergies and is doing well on their medication PRN and Vitamin D deficiency and will update labs to confirm level is at goal >30,

## 2019-04-26 NOTE — PATIENT INSTRUCTIONS
Low glycemic index diet  Exercise 30 minutes most days of the week  Make sure you get results on any labs or tests we ordered today  We discussed medications and how to take them as prescribed  Sleep 6-8 hours each night if possible  If you have not signed up for [employer], please activate your code ASAP from your After Visit Summary today    LDL goal <100  LDL goal if heart disease <70  HDL goal >60  Triglyceride goal <150  BP goal =<130/80  Fasting glucose <100    Warned patient that this medication can cause drowsiness and impair them operating machinery, including driving a car.  Caution is advised.\

## 2019-04-27 LAB
25(OH)D3+25(OH)D2 SERPL-MCNC: 40.1 NG/ML (ref 30–100)
ALBUMIN SERPL-MCNC: 4.4 G/DL (ref 3.5–5.2)
ALBUMIN/GLOB SERPL: 1.3 G/DL
ALP SERPL-CCNC: 53 U/L (ref 39–117)
ALT SERPL-CCNC: 19 U/L (ref 1–41)
AST SERPL-CCNC: 19 U/L (ref 1–40)
BASOPHILS # BLD AUTO: 0.02 10*3/MM3 (ref 0–0.2)
BASOPHILS NFR BLD AUTO: 0.4 % (ref 0–1.5)
BILIRUB SERPL-MCNC: 0.7 MG/DL (ref 0.2–1.2)
BUN SERPL-MCNC: 10 MG/DL (ref 8–23)
BUN/CREAT SERPL: 10.2 (ref 7–25)
CALCIUM SERPL-MCNC: 9.9 MG/DL (ref 8.6–10.5)
CHLORIDE SERPL-SCNC: 101 MMOL/L (ref 98–107)
CHOLEST SERPL-MCNC: 234 MG/DL (ref 0–200)
CO2 SERPL-SCNC: 26 MMOL/L (ref 22–29)
CREAT SERPL-MCNC: 0.98 MG/DL (ref 0.76–1.27)
EOSINOPHIL # BLD AUTO: 0.05 10*3/MM3 (ref 0–0.4)
EOSINOPHIL NFR BLD AUTO: 1 % (ref 0.3–6.2)
ERYTHROCYTE [DISTWIDTH] IN BLOOD BY AUTOMATED COUNT: 14.4 % (ref 12.3–15.4)
FOLATE SERPL-MCNC: 8.32 NG/ML (ref 4.78–24.2)
GLOBULIN SER CALC-MCNC: 3.4 GM/DL
GLUCOSE SERPL-MCNC: 89 MG/DL (ref 65–99)
HCT VFR BLD AUTO: 46.6 % (ref 37.5–51)
HDLC SERPL-MCNC: 33 MG/DL (ref 40–60)
HGB BLD-MCNC: 15.5 G/DL (ref 13–17.7)
IMM GRANULOCYTES # BLD AUTO: 0.02 10*3/MM3 (ref 0–0.05)
IMM GRANULOCYTES NFR BLD AUTO: 0.4 % (ref 0–0.5)
LDLC SERPL CALC-MCNC: 156 MG/DL (ref 0–100)
LYMPHOCYTES # BLD AUTO: 2.37 10*3/MM3 (ref 0.7–3.1)
LYMPHOCYTES NFR BLD AUTO: 48.8 % (ref 19.6–45.3)
MCH RBC QN AUTO: 31.6 PG (ref 26.6–33)
MCHC RBC AUTO-ENTMCNC: 33.3 G/DL (ref 31.5–35.7)
MCV RBC AUTO: 95.1 FL (ref 79–97)
MONOCYTES # BLD AUTO: 0.41 10*3/MM3 (ref 0.1–0.9)
MONOCYTES NFR BLD AUTO: 8.4 % (ref 5–12)
NEUTROPHILS # BLD AUTO: 1.99 10*3/MM3 (ref 1.7–7)
NEUTROPHILS NFR BLD AUTO: 41 % (ref 42.7–76)
NRBC BLD AUTO-RTO: 0 /100 WBC (ref 0–0.2)
PLATELET # BLD AUTO: 203 10*3/MM3 (ref 140–450)
POTASSIUM SERPL-SCNC: 4.4 MMOL/L (ref 3.5–5.2)
PROT SERPL-MCNC: 7.8 G/DL (ref 6–8.5)
RBC # BLD AUTO: 4.9 10*6/MM3 (ref 4.14–5.8)
SODIUM SERPL-SCNC: 138 MMOL/L (ref 136–145)
T3FREE SERPL-MCNC: 3.2 PG/ML (ref 2–4.4)
T4 FREE SERPL-MCNC: 1.01 NG/DL (ref 0.93–1.7)
TRIGL SERPL-MCNC: 223 MG/DL (ref 0–150)
TSH SERPL DL<=0.005 MIU/L-ACNC: 2.56 MIU/ML (ref 0.27–4.2)
VIT B12 SERPL-MCNC: 368 PG/ML (ref 211–946)
VLDLC SERPL CALC-MCNC: 44.6 MG/DL
WBC # BLD AUTO: 4.86 10*3/MM3 (ref 3.4–10.8)

## 2019-04-27 RX ORDER — PREGABALIN 100 MG/1
100 CAPSULE ORAL 2 TIMES DAILY
Qty: 60 CAPSULE | Refills: 5 | Status: SHIPPED | OUTPATIENT
Start: 2019-04-27 | End: 2019-10-08 | Stop reason: SDUPTHER

## 2019-04-29 DIAGNOSIS — I65.21 MILD ATHEROSCLEROSIS OF RIGHT CAROTID ARTERY: Primary | ICD-10-CM

## 2019-04-29 DIAGNOSIS — E78.2 MIXED HYPERLIPIDEMIA: ICD-10-CM

## 2019-05-03 ENCOUNTER — CLINICAL SUPPORT (OUTPATIENT)
Dept: FAMILY MEDICINE CLINIC | Facility: CLINIC | Age: 69
End: 2019-05-03

## 2019-05-03 DIAGNOSIS — Z79.01 LONG TERM CURRENT USE OF ANTICOAGULANT THERAPY: ICD-10-CM

## 2019-05-03 DIAGNOSIS — Z51.81 ENCOUNTER FOR THERAPEUTIC DRUG MONITORING: Primary | ICD-10-CM

## 2019-05-03 LAB — INR PPP: 2.7 (ref 2–3)

## 2019-05-03 PROCEDURE — 85610 PROTHROMBIN TIME: CPT | Performed by: PHYSICIAN ASSISTANT

## 2019-05-03 PROCEDURE — 93793 ANTICOAG MGMT PT WARFARIN: CPT | Performed by: PHYSICIAN ASSISTANT

## 2019-05-03 PROCEDURE — 36416 COLLJ CAPILLARY BLOOD SPEC: CPT | Performed by: PHYSICIAN ASSISTANT

## 2019-05-03 NOTE — PROGRESS NOTES
Venipuncture performed in the second finger by Aletha Davila with good hemostasis. Patient tolerated well. 05/03/2019

## 2019-05-29 ENCOUNTER — HOSPITAL ENCOUNTER (OUTPATIENT)
Dept: CARDIOLOGY | Facility: HOSPITAL | Age: 69
Discharge: HOME | End: 2019-05-29
Admitting: PHYSICIAN ASSISTANT

## 2019-05-29 DIAGNOSIS — I65.21 MILD ATHEROSCLEROSIS OF RIGHT CAROTID ARTERY: ICD-10-CM

## 2019-05-29 LAB
BH CV XLRA MEAS LEFT DIST CCA EDV: 32.9 CM/SEC
BH CV XLRA MEAS LEFT DIST CCA PSV: 90.2 CM/SEC
BH CV XLRA MEAS LEFT DIST ICA EDV: -34.2 CM/SEC
BH CV XLRA MEAS LEFT DIST ICA PSV: -79 CM/SEC
BH CV XLRA MEAS LEFT ICA/CCA RATIO: 0.78
BH CV XLRA MEAS LEFT MID CCA EDV: 27.3 CM/SEC
BH CV XLRA MEAS LEFT MID CCA PSV: 97 CM/SEC
BH CV XLRA MEAS LEFT MID ICA EDV: -35.1 CM/SEC
BH CV XLRA MEAS LEFT MID ICA PSV: -84.3 CM/SEC
BH CV XLRA MEAS LEFT PROX CCA EDV: 22.8 CM/SEC
BH CV XLRA MEAS LEFT PROX CCA PSV: 107.2 CM/SEC
BH CV XLRA MEAS LEFT PROX ECA PSV: -90.4 CM/SEC
BH CV XLRA MEAS LEFT PROX ICA EDV: -25.5 CM/SEC
BH CV XLRA MEAS LEFT PROX ICA PSV: -79.9 CM/SEC
BH CV XLRA MEAS LEFT PROX SCLA PSV: 85.1 CM/SEC
BH CV XLRA MEAS LEFT VERTEBRAL A PSV: -39 CM/SEC
BH CV XLRA MEAS RIGHT DIST CCA EDV: 39.8 CM/SEC
BH CV XLRA MEAS RIGHT DIST CCA PSV: 113.7 CM/SEC
BH CV XLRA MEAS RIGHT DIST ICA EDV: -22.2 CM/SEC
BH CV XLRA MEAS RIGHT DIST ICA PSV: -71.4 CM/SEC
BH CV XLRA MEAS RIGHT ICA/CCA RATIO: 0.84
BH CV XLRA MEAS RIGHT MID CCA EDV: 34.2 CM/SEC
BH CV XLRA MEAS RIGHT MID CCA PSV: 114.9 CM/SEC
BH CV XLRA MEAS RIGHT MID ICA EDV: -28.1 CM/SEC
BH CV XLRA MEAS RIGHT MID ICA PSV: -95.1 CM/SEC
BH CV XLRA MEAS RIGHT PROX CCA EDV: 28.6 CM/SEC
BH CV XLRA MEAS RIGHT PROX CCA PSV: 105.6 CM/SEC
BH CV XLRA MEAS RIGHT PROX ECA PSV: -118.6 CM/SEC
BH CV XLRA MEAS RIGHT PROX ICA EDV: -21.8 CM/SEC
BH CV XLRA MEAS RIGHT PROX ICA PSV: -91.1 CM/SEC
BH CV XLRA MEAS RIGHT PROX SCLA PSV: 147 CM/SEC

## 2019-05-29 PROCEDURE — 93880 EXTRACRANIAL BILAT STUDY: CPT

## 2019-05-29 PROCEDURE — 93880 EXTRACRANIAL BILAT STUDY: CPT | Performed by: INTERNAL MEDICINE

## 2019-06-01 RX ORDER — WARFARIN SODIUM 1 MG/1
TABLET ORAL
Qty: 60 TABLET | Refills: 0 | Status: SHIPPED | OUTPATIENT
Start: 2019-06-01 | End: 2020-01-05

## 2019-06-03 ENCOUNTER — CLINICAL SUPPORT (OUTPATIENT)
Dept: FAMILY MEDICINE CLINIC | Facility: CLINIC | Age: 69
End: 2019-06-03

## 2019-06-03 DIAGNOSIS — Z51.81 ENCOUNTER FOR THERAPEUTIC DRUG MONITORING: ICD-10-CM

## 2019-06-03 DIAGNOSIS — Z79.01 LONG TERM CURRENT USE OF ANTICOAGULANT THERAPY: ICD-10-CM

## 2019-06-03 LAB — INR PPP: 2.3 (ref 2–3)

## 2019-06-03 PROCEDURE — 93793 ANTICOAG MGMT PT WARFARIN: CPT | Performed by: PHYSICIAN ASSISTANT

## 2019-06-03 PROCEDURE — 36416 COLLJ CAPILLARY BLOOD SPEC: CPT | Performed by: PHYSICIAN ASSISTANT

## 2019-06-03 PROCEDURE — 85610 PROTHROMBIN TIME: CPT | Performed by: PHYSICIAN ASSISTANT

## 2019-06-03 NOTE — PROGRESS NOTES
Venipuncture performed in the second finger by Aletha Davila with good hemostasis. Patient tolerated well. 06/03/19

## 2019-06-06 ENCOUNTER — OFFICE VISIT (OUTPATIENT)
Dept: FAMILY MEDICINE CLINIC | Facility: CLINIC | Age: 69
End: 2019-06-06

## 2019-06-06 VITALS
SYSTOLIC BLOOD PRESSURE: 120 MMHG | WEIGHT: 247 LBS | TEMPERATURE: 98.4 F | BODY MASS INDEX: 33.46 KG/M2 | DIASTOLIC BLOOD PRESSURE: 72 MMHG | HEART RATE: 72 BPM | RESPIRATION RATE: 16 BRPM | OXYGEN SATURATION: 97 % | HEIGHT: 72 IN

## 2019-06-06 DIAGNOSIS — J18.9 PNEUMONIA OF RIGHT MIDDLE LOBE DUE TO INFECTIOUS ORGANISM: ICD-10-CM

## 2019-06-06 DIAGNOSIS — Z72.0 TOBACCO ABUSE: ICD-10-CM

## 2019-06-06 DIAGNOSIS — R05.9 COUGH: Primary | ICD-10-CM

## 2019-06-06 PROCEDURE — 99213 OFFICE O/P EST LOW 20 MIN: CPT | Performed by: PHYSICIAN ASSISTANT

## 2019-06-06 NOTE — PATIENT INSTRUCTIONS
Low glycemic index diet  Exercise 30 minutes most days of the week  Make sure you get results on any labs or tests we ordered today  We discussed medications and how to take them as prescribed  Sleep 6-8 hours each night if possible  If you have not signed up for [employer], please activate your code ASAP from your After Visit Summary today    LDL goal <100  LDL goal if heart disease <70  HDL goal >60  Triglyceride goal <150  BP goal =<130/80  Fasting glucose <100

## 2019-06-06 NOTE — PROGRESS NOTES
Subjective   Javy Reeves Sr. is a 68 y.o. male.     History of Present Illness   Javy Reeves Sr. 68 y.o. male who presents today for f/u had carotid doppler;   he has a history of   Patient Active Problem List   Diagnosis   • Arthritis   • Family history of early CAD   • DDD (degenerative disc disease), cervical   • DVT (deep venous thrombosis) (CMS/HCC)   • Fibromyalgia   • Past heart attack   • Hyperlipidemia   • DDD (degenerative disc disease), lumbosacral   • History of pulmonary embolus (PE)   • Reflux esophagitis   • TIA (transient ischemic attack)   • Left groin pain   • Cervical radicular pain   • Mild atherosclerosis of right carotid artery   • Cervical disc disorder at C6-C7 level with radiculopathy   • Wheezing   • Colon polyps   • Right lower quadrant abdominal pain   • Diarrhea   • Cervical spinal stenosis   • Cervical disc disorder at C5-C6 level with radiculopathy   • GERD (gastroesophageal reflux disease)   • Diverticulosis   • Old MI (myocardial infarction)   • Herniated cervical disc   • Encounter for therapeutic drug monitoring   • Long term current use of anticoagulant therapy   • ERRONEOUS ENCOUNTER--DISREGARD   • Stable angina (CMS/[facility])   .    I did carotid doppler d/t 3-21-18  Interpretation Summary     · Proximal right internal carotid artery mild stenosis.  · No hemodynamically significant stenosis is present in the left internal carotid artery.        Now this is gone    Javy Reeves Sr. 68 y.o. male who presents for evaluation of cough. Symptoms include productive cough.  Onset of symptoms was 6 months ago, unchanged since that time. Patient denies shortness of breath, wheezing, upper respiratory congestion, GERD.   Evaluation to date: was seen recently by me Treatment to date:  nothing helps.     Cough since Dec  He is smoker  CXR Nov neg;  NO SOA  Cough and can choke on drg;   Was having wheezing and stopped when I started him on Anoro  Cough worse laying; no SOA to lay  down  Hx of PE    He is already on PPI    I need to get CXR  Need to see pulm    Albuterol MDI does not help cough    X-Ray  Interpretation report in house X-rays that I personally viewed    Relevant Clinical Issues/Diagnoses/Indications:  Cough 6 mos; smoker        Clinical Findings:  Heart size upper limits; calcified hilar nodes bilat; clear lungs          Comparative Data:  yes          Date of Previous X-ray:  2-13-13  Change on current X-ray:  no    The following portions of the patient's history were reviewed and updated as appropriate: allergies, current medications, past family history, past medical history, past social history, past surgical history and problem list.    Review of Systems   Constitutional: Positive for fatigue. Negative for activity change, appetite change and unexpected weight change.   HENT: Negative for nosebleeds and trouble swallowing.    Eyes: Negative for pain and visual disturbance.   Respiratory: Positive for cough and choking. Negative for chest tightness, shortness of breath and wheezing.    Cardiovascular: Positive for chest pain. Negative for palpitations.   Gastrointestinal: Positive for abdominal pain. Negative for blood in stool.   Endocrine: Negative.    Genitourinary: Negative for difficulty urinating and hematuria.   Musculoskeletal: Positive for back pain, joint swelling and neck pain.   Skin: Negative for color change and rash.   Allergic/Immunologic: Negative.    Neurological: Positive for tremors and weakness. Negative for syncope and speech difficulty.   Hematological: Negative for adenopathy.   Psychiatric/Behavioral: Negative for agitation and confusion.   All other systems reviewed and are negative.      Objective   Physical Exam   Constitutional: He is oriented to person, place, and time. He appears well-developed and well-nourished. No distress.   HENT:   Head: Normocephalic and atraumatic.   Right Ear: External ear normal.   Left Ear: External ear normal.    Nose: Nose normal.   Mouth/Throat: Oropharynx is clear and moist. No oropharyngeal exudate.   Eyes: Conjunctivae and EOM are normal. Pupils are equal, round, and reactive to light. Right eye exhibits no discharge. Left eye exhibits no discharge. No scleral icterus.   Neck: Normal range of motion. Neck supple. No tracheal deviation present. No thyromegaly present.   Cardiovascular: Normal rate, regular rhythm, normal heart sounds, intact distal pulses and normal pulses. Exam reveals no gallop.   No murmur heard.  Pulmonary/Chest: Effort normal and breath sounds normal. No respiratory distress. He has no wheezes. He has no rales.   Abdominal: Soft.   Musculoskeletal: Normal range of motion.   Neurological: He is alert and oriented to person, place, and time. He exhibits normal muscle tone. Coordination normal.   Skin: Skin is warm. No rash noted. No erythema. No pallor.   Psychiatric: He has a normal mood and affect. His behavior is normal. Judgment and thought content normal.   Nursing note and vitals reviewed.      Assessment/Plan   Problems Addressed this Visit     None      Visit Diagnoses     Cough    -  Primary    Relevant Orders    XR Chest PA & Lateral    Ambulatory Referral to Allergy (Completed)    Tobacco abuse        Relevant Orders    XR Chest PA & Lateral    Ambulatory Referral to Allergy (Completed)        Get CXR and consider CT chest  See Dr Kyle roberson ? Asthma or COPD

## 2019-06-10 RX ORDER — MOXIFLOXACIN HYDROCHLORIDE 400 MG/1
400 TABLET ORAL DAILY
Qty: 10 TABLET | Refills: 0 | Status: SHIPPED | OUTPATIENT
Start: 2019-06-10 | End: 2019-10-08

## 2019-06-10 NOTE — PROGRESS NOTES
PT wants to know if he should hold up on seeing the allergy doctor since this was pneumonia.     Called patient and advised of results.   Patient verbalized understanding. Knows to follow up in 3 days for INR

## 2019-06-13 ENCOUNTER — CLINICAL SUPPORT (OUTPATIENT)
Dept: FAMILY MEDICINE CLINIC | Facility: CLINIC | Age: 69
End: 2019-06-13

## 2019-06-13 DIAGNOSIS — Z51.81 ENCOUNTER FOR THERAPEUTIC DRUG MONITORING: ICD-10-CM

## 2019-06-13 DIAGNOSIS — Z79.01 LONG TERM CURRENT USE OF ANTICOAGULANT THERAPY: Primary | ICD-10-CM

## 2019-06-13 LAB — INR PPP: 2.2 (ref 2–3)

## 2019-06-13 PROCEDURE — 93793 ANTICOAG MGMT PT WARFARIN: CPT | Performed by: PHYSICIAN ASSISTANT

## 2019-06-13 PROCEDURE — 85610 PROTHROMBIN TIME: CPT | Performed by: PHYSICIAN ASSISTANT

## 2019-06-13 PROCEDURE — 36416 COLLJ CAPILLARY BLOOD SPEC: CPT | Performed by: PHYSICIAN ASSISTANT

## 2019-06-13 NOTE — PROGRESS NOTES
Venipuncture performed in the second finger by Oanh Capellan with good hemostasis. Patient tolerated well. 06/13/2019

## 2019-06-25 DIAGNOSIS — R05.9 COUGH: Primary | ICD-10-CM

## 2019-06-25 DIAGNOSIS — Z72.0 TOBACCO ABUSE: ICD-10-CM

## 2019-06-25 PROCEDURE — 71046 X-RAY EXAM CHEST 2 VIEWS: CPT | Performed by: PHYSICIAN ASSISTANT

## 2019-06-26 NOTE — PROGRESS NOTES
X-Ray  Interpretation report in house X-rays that I personally viewed    Relevant Clinical Issues/Diagnoses/Indications:  F/u CXR         Clinical Findings:  F/u CXR right middle lobe atelectasis or pneumonia noted on prior film; looks same today; upper limits heart size; no mass; no infiltrate          Comparative Data:  yes          Date of Previous X-ray:  6-26-19  Change on current X-ray:  no

## 2019-06-29 ENCOUNTER — HOSPITAL ENCOUNTER (OUTPATIENT)
Dept: CT IMAGING | Facility: HOSPITAL | Age: 69
Discharge: HOME | End: 2019-06-29
Admitting: PHYSICIAN ASSISTANT

## 2019-06-29 DIAGNOSIS — R05.9 COUGH: ICD-10-CM

## 2019-06-29 DIAGNOSIS — Z72.0 TOBACCO ABUSE: ICD-10-CM

## 2019-06-29 PROCEDURE — 71250 CT THORAX DX C-: CPT

## 2019-07-01 ENCOUNTER — CLINICAL SUPPORT (OUTPATIENT)
Dept: FAMILY MEDICINE CLINIC | Facility: CLINIC | Age: 69
End: 2019-07-01

## 2019-07-01 DIAGNOSIS — Z51.81 ENCOUNTER FOR THERAPEUTIC DRUG MONITORING: ICD-10-CM

## 2019-07-01 DIAGNOSIS — Z79.01 LONG TERM CURRENT USE OF ANTICOAGULANT THERAPY: Primary | ICD-10-CM

## 2019-07-01 LAB — INR PPP: 4.4 (ref 2–3)

## 2019-07-01 PROCEDURE — 36416 COLLJ CAPILLARY BLOOD SPEC: CPT | Performed by: PHYSICIAN ASSISTANT

## 2019-07-01 PROCEDURE — 93793 ANTICOAG MGMT PT WARFARIN: CPT | Performed by: PHYSICIAN ASSISTANT

## 2019-07-01 PROCEDURE — 85610 PROTHROMBIN TIME: CPT | Performed by: PHYSICIAN ASSISTANT

## 2019-07-01 NOTE — PROGRESS NOTES
Venipuncture performed in the second finger by Karen Camejo with good hemostasis. Patient tolerated well. 07/01/2019

## 2019-07-03 ENCOUNTER — CLINICAL SUPPORT (OUTPATIENT)
Dept: FAMILY MEDICINE CLINIC | Facility: CLINIC | Age: 69
End: 2019-07-03

## 2019-07-03 DIAGNOSIS — Z51.81 ENCOUNTER FOR THERAPEUTIC DRUG MONITORING: ICD-10-CM

## 2019-07-03 LAB — INR PPP: 2.6 (ref 2–3)

## 2019-07-03 PROCEDURE — 93793 ANTICOAG MGMT PT WARFARIN: CPT | Performed by: PHYSICIAN ASSISTANT

## 2019-07-03 PROCEDURE — 85610 PROTHROMBIN TIME: CPT | Performed by: PHYSICIAN ASSISTANT

## 2019-07-03 PROCEDURE — 36416 COLLJ CAPILLARY BLOOD SPEC: CPT | Performed by: PHYSICIAN ASSISTANT

## 2019-07-03 RX ORDER — NITROGLYCERIN 0.4 MG/1
0.4 TABLET SUBLINGUAL
Qty: 30 TABLET | Refills: 5 | Status: SHIPPED | OUTPATIENT
Start: 2019-07-03 | End: 2020-12-01 | Stop reason: SDUPTHER

## 2019-07-03 NOTE — PROGRESS NOTES
Venipuncture performed in the second finger by Aletha Pena with good hemostasis. Patient tolerated well.

## 2019-07-22 ENCOUNTER — RESULTS ENCOUNTER (OUTPATIENT)
Dept: FAMILY MEDICINE CLINIC | Facility: CLINIC | Age: 69
End: 2019-07-22

## 2019-07-22 DIAGNOSIS — I65.21 MILD ATHEROSCLEROSIS OF RIGHT CAROTID ARTERY: ICD-10-CM

## 2019-07-22 DIAGNOSIS — E78.2 MIXED HYPERLIPIDEMIA: ICD-10-CM

## 2019-08-03 RX ORDER — WARFARIN SODIUM 5 MG/1
TABLET ORAL
Qty: 180 TABLET | Refills: 0 | Status: SHIPPED | OUTPATIENT
Start: 2019-08-03 | End: 2019-11-08 | Stop reason: SDUPTHER

## 2019-08-05 ENCOUNTER — CLINICAL SUPPORT (OUTPATIENT)
Dept: FAMILY MEDICINE CLINIC | Facility: CLINIC | Age: 69
End: 2019-08-05

## 2019-08-05 ENCOUNTER — TRANSCRIBE ORDERS (OUTPATIENT)
Dept: ADMINISTRATIVE | Facility: HOSPITAL | Age: 69
End: 2019-08-05

## 2019-08-05 DIAGNOSIS — Z51.81 ENCOUNTER FOR THERAPEUTIC DRUG MONITORING: ICD-10-CM

## 2019-08-05 DIAGNOSIS — J84.9 ILD (INTERSTITIAL LUNG DISEASE) (HCC): Primary | ICD-10-CM

## 2019-08-05 DIAGNOSIS — Z79.01 LONG TERM CURRENT USE OF ANTICOAGULANT THERAPY: Primary | ICD-10-CM

## 2019-08-05 LAB — INR PPP: 1.9 (ref 2–3)

## 2019-08-05 PROCEDURE — 85610 PROTHROMBIN TIME: CPT | Performed by: PHYSICIAN ASSISTANT

## 2019-08-05 PROCEDURE — 36416 COLLJ CAPILLARY BLOOD SPEC: CPT | Performed by: PHYSICIAN ASSISTANT

## 2019-08-05 PROCEDURE — 93793 ANTICOAG MGMT PT WARFARIN: CPT | Performed by: PHYSICIAN ASSISTANT

## 2019-08-19 ENCOUNTER — CLINICAL SUPPORT (OUTPATIENT)
Dept: FAMILY MEDICINE CLINIC | Facility: CLINIC | Age: 69
End: 2019-08-19

## 2019-08-19 DIAGNOSIS — Z79.01 LONG TERM CURRENT USE OF ANTICOAGULANT THERAPY: ICD-10-CM

## 2019-08-19 DIAGNOSIS — Z51.81 ENCOUNTER FOR THERAPEUTIC DRUG MONITORING: Primary | ICD-10-CM

## 2019-08-19 LAB — INR PPP: 2.5 (ref 2–3)

## 2019-08-19 PROCEDURE — 36416 COLLJ CAPILLARY BLOOD SPEC: CPT | Performed by: PHYSICIAN ASSISTANT

## 2019-08-19 PROCEDURE — 85610 PROTHROMBIN TIME: CPT | Performed by: PHYSICIAN ASSISTANT

## 2019-08-19 PROCEDURE — 93793 ANTICOAG MGMT PT WARFARIN: CPT | Performed by: PHYSICIAN ASSISTANT

## 2019-09-13 ENCOUNTER — HOSPITAL ENCOUNTER (OUTPATIENT)
Dept: CT IMAGING | Facility: HOSPITAL | Age: 69
Discharge: HOME | End: 2019-09-13
Admitting: INTERNAL MEDICINE

## 2019-09-13 DIAGNOSIS — J84.9 ILD (INTERSTITIAL LUNG DISEASE) (HCC): ICD-10-CM

## 2019-09-13 PROCEDURE — 71250 CT THORAX DX C-: CPT

## 2019-09-16 ENCOUNTER — CLINICAL SUPPORT (OUTPATIENT)
Dept: FAMILY MEDICINE CLINIC | Facility: CLINIC | Age: 69
End: 2019-09-16

## 2019-09-16 DIAGNOSIS — Z79.01 LONG TERM CURRENT USE OF ANTICOAGULANT THERAPY: Primary | ICD-10-CM

## 2019-09-16 DIAGNOSIS — Z51.81 ENCOUNTER FOR THERAPEUTIC DRUG MONITORING: ICD-10-CM

## 2019-09-16 LAB — INR PPP: 2.7 (ref 2–3)

## 2019-09-16 PROCEDURE — 36416 COLLJ CAPILLARY BLOOD SPEC: CPT | Performed by: PHYSICIAN ASSISTANT

## 2019-09-16 PROCEDURE — 93793 ANTICOAG MGMT PT WARFARIN: CPT | Performed by: PHYSICIAN ASSISTANT

## 2019-09-16 PROCEDURE — 85610 PROTHROMBIN TIME: CPT | Performed by: PHYSICIAN ASSISTANT

## 2019-09-16 NOTE — PROGRESS NOTES
Venipuncture performed in the third finger by Oanh Capellan with good hemostasis. Patient tolerated well. 09/16/19.

## 2019-10-08 ENCOUNTER — OFFICE VISIT (OUTPATIENT)
Dept: FAMILY MEDICINE CLINIC | Facility: CLINIC | Age: 69
End: 2019-10-08

## 2019-10-08 VITALS
BODY MASS INDEX: 31.83 KG/M2 | HEART RATE: 74 BPM | OXYGEN SATURATION: 99 % | TEMPERATURE: 98.4 F | WEIGHT: 235 LBS | DIASTOLIC BLOOD PRESSURE: 74 MMHG | SYSTOLIC BLOOD PRESSURE: 128 MMHG | HEIGHT: 72 IN | RESPIRATION RATE: 16 BRPM

## 2019-10-08 DIAGNOSIS — E78.2 MIXED HYPERLIPIDEMIA: ICD-10-CM

## 2019-10-08 DIAGNOSIS — M79.7 FIBROMYALGIA: Primary | ICD-10-CM

## 2019-10-08 DIAGNOSIS — Z86.711 HISTORY OF PULMONARY EMBOLUS (PE): ICD-10-CM

## 2019-10-08 DIAGNOSIS — M50.30 DDD (DEGENERATIVE DISC DISEASE), CERVICAL: ICD-10-CM

## 2019-10-08 DIAGNOSIS — I82.5Y3 CHRONIC DEEP VEIN THROMBOSIS (DVT) OF PROXIMAL VEIN OF BOTH LOWER EXTREMITIES (HCC): ICD-10-CM

## 2019-10-08 DIAGNOSIS — Z12.5 ENCOUNTER FOR SCREENING FOR MALIGNANT NEOPLASM OF PROSTATE: ICD-10-CM

## 2019-10-08 DIAGNOSIS — J84.9 INTERSTITIAL LUNG DISEASE (HCC): ICD-10-CM

## 2019-10-08 DIAGNOSIS — I25.2 PAST HEART ATTACK: ICD-10-CM

## 2019-10-08 DIAGNOSIS — Z23 IMMUNIZATION DUE: ICD-10-CM

## 2019-10-08 LAB — INR PPP: 2.9 (ref 2–3)

## 2019-10-08 PROCEDURE — G0008 ADMIN INFLUENZA VIRUS VAC: HCPCS | Performed by: PHYSICIAN ASSISTANT

## 2019-10-08 PROCEDURE — 90653 IIV ADJUVANT VACCINE IM: CPT | Performed by: PHYSICIAN ASSISTANT

## 2019-10-08 PROCEDURE — 36416 COLLJ CAPILLARY BLOOD SPEC: CPT | Performed by: PHYSICIAN ASSISTANT

## 2019-10-08 PROCEDURE — 85610 PROTHROMBIN TIME: CPT | Performed by: PHYSICIAN ASSISTANT

## 2019-10-08 PROCEDURE — 99214 OFFICE O/P EST MOD 30 MIN: CPT | Performed by: PHYSICIAN ASSISTANT

## 2019-10-08 RX ORDER — ATORVASTATIN CALCIUM 40 MG/1
40 TABLET, FILM COATED ORAL DAILY
Qty: 30 TABLET | Refills: 11 | Status: SHIPPED | OUTPATIENT
Start: 2019-10-08 | End: 2021-02-22 | Stop reason: SDUPTHER

## 2019-10-08 RX ORDER — TIZANIDINE 4 MG/1
4 TABLET ORAL EVERY 8 HOURS PRN
Qty: 90 TABLET | Refills: 3 | Status: SHIPPED | OUTPATIENT
Start: 2019-10-08 | End: 2021-02-22 | Stop reason: SDUPTHER

## 2019-10-08 RX ORDER — OMEPRAZOLE 20 MG/1
20 CAPSULE, DELAYED RELEASE ORAL NIGHTLY
Qty: 30 CAPSULE | Refills: 11 | Status: SHIPPED | OUTPATIENT
Start: 2019-10-08 | End: 2019-11-18

## 2019-10-08 RX ORDER — PREGABALIN 100 MG/1
100 CAPSULE ORAL 2 TIMES DAILY
Qty: 60 CAPSULE | Refills: 5 | Status: SHIPPED | OUTPATIENT
Start: 2019-10-08 | End: 2021-02-22

## 2019-10-08 RX ORDER — NICOTINE 21 MG/24HR
PATCH, TRANSDERMAL 24 HOURS TRANSDERMAL
COMMUNITY
Start: 2019-08-02 | End: 2020-02-20 | Stop reason: ALTCHOICE

## 2019-10-08 NOTE — PATIENT INSTRUCTIONS
Fat and Cholesterol Restricted Eating Plan  Eating a diet that limits fat and cholesterol may help lower your risk for heart disease and other conditions. Your body needs fat and cholesterol for basic functions, but eating too much of these things can be harmful to your health.  Your health care provider may order lab tests to check your blood fat (lipid) and cholesterol levels. This helps your health care provider understand your risk for certain conditions and whether you need to make diet changes. Work with your health care provider or dietitian to make an eating plan that is right for you.  Your plan includes:  · Limit your fat intake to ______% or less of your total calories a day.  · Limit your saturated fat intake to ______% or less of your total calories a day.  · Limit the amount of cholesterol in your diet to less than _________mg a day.  · Eat ___________ g of fiber a day.  What are tips for following this plan?  General guidelines    · If you are overweight, work with your health care provider to lose weight safely. Losing just 5-10% of your body weight can improve your overall health and help prevent diseases such as diabetes and heart disease.  · Avoid:  ? Foods with added sugar.  ? Fried foods.  ? Foods that contain partially hydrogenated oils, including stick margarine, some tub margarines, cookies, crackers, and other baked goods.  · Limit alcohol intake to no more than 1 drink a day for nonpregnant women and 2 drinks a day for men. One drink equals 12 oz of beer, 5 oz of wine, or 1½ oz of hard liquor.  Reading food labels  · Check food labels for:  ? Trans fats, partially hydrogenated oils, or high amounts of saturated fat. Avoid foods that contain saturated fat and trans fat.  ? The amount of cholesterol in each serving. Try to eat no more than 200 mg of cholesterol each day.  ? The amount of fiber in each serving. Try to eat at least 20-30 g of fiber each day.  · Choose foods with healthy fats,  "such as:  ? Monounsaturated and polyunsaturated fats. These include olive and canola oil, flaxseeds, walnuts, almonds, and seeds.  ? Omega-3 fats. These are found in foods such as salmon, mackerel, sardines, tuna, flaxseed oil, and ground flaxseeds.  · Choose grain products that have whole grains. Look for the word \"whole\" as the first word in the ingredient list.  Cooking  · Cook foods using methods other than frying. Baking, boiling, grilling, and broiling are some healthy options.  · Eat more home-cooked food and less restaurant, buffet, and fast food.  · Avoid cooking using saturated fats.  ? Animal sources of saturated fats include meats, butter, and cream.  ? Plant sources of saturated fats include palm oil, palm kernel oil, and coconut oil.  Meal planning    · At meals, imagine dividing your plate into fourths:  ? Fill one-half of your plate with vegetables and green salads.  ? Fill one-fourth of your plate with whole grains.  ? Fill one-fourth of your plate with lean protein foods.  · Eat fish that is high in omega-3 fats at least two times a week.  · Eat more foods that contain fiber, such as whole grains, beans, apples, broccoli, carrots, peas, and barley. These foods help promote healthy cholesterol levels in the blood.  Recommended foods  Grains  · Whole grains, such as whole wheat or whole grain breads, crackers, cereals, and pasta. Unsweetened oatmeal, bulgur, barley, quinoa, or brown rice. Corn or whole wheat flour tortillas.  Vegetables  · Fresh or frozen vegetables (raw, steamed, roasted, or grilled). Green salads.  Fruits  · All fresh, canned (in natural juice), or frozen fruits.  Meats and other protein foods  · Ground beef (85% or leaner), grass-fed beef, or beef trimmed of fat. Skinless chicken or turkey. Ground chicken or turkey. Pork trimmed of fat. All fish and seafood. Egg whites. Dried beans, peas, or lentils. Unsalted nuts or seeds. Unsalted canned beans. Natural nut butters without added " sugar and oil.  Dairy  · Low-fat or nonfat dairy products, such as skim or 1% milk, 2% or reduced-fat cheeses, low-fat and fat-free ricotta or cottage cheese, or plain low-fat and nonfat yogurt.  Fats and oils  · Tub margarine without trans fats. Light or reduced-fat mayonnaise and salad dressings. Avocado. Olive, canola, sesame, or safflower oils.  The items listed above may not be a complete list of recommended foods or beverages. Contact your dietitian for more options.  Foods to avoid  Grains  · White bread. White pasta. White rice. Cornbread. Bagels, pastries, and croissants. Crackers and snack foods that contain trans fat and hydrogenated oils.  Vegetables  · Vegetables cooked in cheese, cream, or butter sauce. Fried vegetables.  Fruits  · Canned fruit in heavy syrup. Fruit in cream or butter sauce. Fried fruit.  Meats and other protein foods  · Fatty cuts of meat. Ribs, chicken wings, pedroza, sausage, bologna, salami, chitterlings, fatback, hot dogs, bratwurst, and packaged lunch meats. Liver and organ meats. Whole eggs and egg yolks. Chicken and turkey with skin. Fried meat.  Dairy  · Whole or 2% milk, cream, half-and-half, and cream cheese. Whole milk cheeses. Whole-fat or sweetened yogurt. Full-fat cheeses. Nondairy creamers and whipped toppings. Processed cheese, cheese spreads, and cheese curds.  Beverages  · Alcohol. Sugar-sweetened drinks such as sodas, lemonade, and fruit drinks.  Fats and oils  · Butter, stick margarine, lard, shortening, ghee, or pedroza fat. Coconut, palm kernel, and palm oils.  Sweets and desserts  · Corn syrup, sugars, honey, and molasses. Candy. Jam and jelly. Syrup. Sweetened cereals. Cookies, pies, cakes, donuts, muffins, and ice cream.  The items listed above may not be a complete list of foods and beverages to avoid. Contact your dietitian for more information.  Summary  · Your body needs fat and cholesterol for basic functions. However, eating too much of these things can be  harmful to your health.  · Work with your health care provider and dietitian to follow a diet low in fat and cholesterol. Doing this may help lower your risk for heart disease and other conditions.  · Choose healthy fats, such as monounsaturated and polyunsaturated fats, and foods high in omega-3 fatty acids.  · Eat fiber-rich foods, such as whole grains, beans, peas, fruits, and vegetables.  · Limit or avoid alcohol, fried foods, and foods high in saturated fats, partially hydrogenated oils, and sugar.  This information is not intended to replace advice given to you by your health care provider. Make sure you discuss any questions you have with your health care provider.  Document Released: 12/18/2006 Document Revised: 09/04/2018 Document Reviewed: 09/04/2018  [employer] Interactive Patient Education © 2019 [employer]

## 2019-10-08 NOTE — PROGRESS NOTES
"Subjective   Javy Reeves Sr. is a 69 y.o. male.     History of Present Illness    Since the last visit, he has overall felt fairly well.  He has CAD and remains under care of their Cardiologist for mangement, Vitamin D deficiency and labs are at goal >30 ng/mL and Hyperlipidemia and need to see if at LDL goal with labs.  he has been compliant with current medications have reviewed them.  The patient denies medication side effects.  Will refill medications. /74 (BP Location: Left arm, Patient Position: Sitting, Cuff Size: Large Adult)   Pulse 74   Temp 98.4 °F (36.9 °C) (Oral)   Resp 16   Ht 182.9 cm (72\")   Wt 107 kg (235 lb)   SpO2 99%   BMI 31.87 kg/m²   He failed H2 blocker  Results for orders placed or performed in visit on 10/08/19   POC INR   Result Value Ref Range    INR 2.90 2 - 3     Coumadin for VTEs recurrent  On Lyrica for Fibromyalgia  Zanaflex PRN muscle pain    Still smokes; sees pulm and being worked up for interstitial lung disease  I did f/u carotid u/s April and was normal!!    I had him see cardio---Dr Dalton last Nov; has hx MI;   Has hx sleep apnea and intol to cpap; I did suggest oral appliance and he declines    Lab Results   Component Value Date    [id number] 368 04/26/2019       The following portions of the patient's history were reviewed and updated as appropriate: allergies, current medications, past family history, past medical history, past social history, past surgical history and problem list.    Review of Systems   Constitutional: Positive for fatigue. Negative for activity change, appetite change and unexpected weight change.   HENT: Negative for nosebleeds and trouble swallowing.    Eyes: Negative for pain and visual disturbance.   Respiratory: Positive for cough. Negative for choking, chest tightness, shortness of breath and wheezing.    Cardiovascular: Positive for chest pain. Negative for palpitations.   Gastrointestinal: Negative for abdominal pain and blood " in stool.   Endocrine: Negative.    Genitourinary: Negative for difficulty urinating and hematuria.   Musculoskeletal: Positive for back pain, joint swelling, myalgias and neck pain.   Skin: Negative for color change and rash.   Allergic/Immunologic: Negative.    Neurological: Positive for weakness. Negative for syncope and speech difficulty.   Hematological: Negative for adenopathy.   Psychiatric/Behavioral: Negative for agitation and confusion.   All other systems reviewed and are negative.      Objective   Physical Exam   Constitutional: He is oriented to person, place, and time. He appears well-developed and well-nourished. No distress.   HENT:   Head: Normocephalic and atraumatic.   Nose: Nose normal.   Mouth/Throat: No oropharyngeal exudate.   Eyes: Conjunctivae and EOM are normal. Pupils are equal, round, and reactive to light. Right eye exhibits no discharge. Left eye exhibits no discharge. No scleral icterus.   Neck: Normal range of motion. Neck supple. No tracheal deviation present. No thyromegaly present.   Cardiovascular: Normal rate, regular rhythm, normal heart sounds, intact distal pulses and normal pulses. Exam reveals no gallop.   No murmur heard.  Nail clubbing   Pulmonary/Chest: Effort normal and breath sounds normal. No respiratory distress. He has no wheezes. He has no rales.   Abdominal: Soft.   Musculoskeletal: Normal range of motion.   Neurological: He is alert and oriented to person, place, and time. He exhibits normal muscle tone. Coordination normal.   Skin: Skin is warm. No rash noted. No erythema. No pallor.   Psychiatric: He has a normal mood and affect. His behavior is normal. Judgment and thought content normal.   Nursing note and vitals reviewed.      Assessment/Plan   Javy was seen today for hyperlipidemia and med management.    Diagnoses and all orders for this visit:    Fibromyalgia  -     Comprehensive metabolic panel  -     Lipid panel  -     CBC and Differential  -      TSH  -     Vitamin B12  -     Folate  -     PSA Screen    Immunization due  -     Fluad Tri 65yr+  -     POC INR    DDD (degenerative disc disease), cervical  -     Comprehensive metabolic panel  -     Lipid panel  -     CBC and Differential  -     TSH  -     Vitamin B12  -     Folate  -     PSA Screen    Chronic deep vein thrombosis (DVT) of proximal vein of both lower extremities (CMS/HCC)  -     Comprehensive metabolic panel  -     Lipid panel  -     CBC and Differential  -     TSH  -     Vitamin B12  -     Folate  -     PSA Screen    Mixed hyperlipidemia  -     Comprehensive metabolic panel  -     Lipid panel  -     CBC and Differential  -     TSH  -     Vitamin B12  -     Folate  -     PSA Screen    History of pulmonary embolus (PE)  -     Comprehensive metabolic panel  -     Lipid panel  -     CBC and Differential  -     TSH  -     Vitamin B12  -     Folate  -     PSA Screen    Past heart attack  -     Comprehensive metabolic panel  -     Lipid panel  -     CBC and Differential  -     TSH  -     Vitamin B12  -     Folate  -     PSA Screen    Interstitial lung disease (CMS/HCC)  -     Comprehensive metabolic panel  -     Lipid panel  -     CBC and Differential  -     TSH  -     Vitamin B12  -     Folate  -     PSA Screen    Encounter for screening for malignant neoplasm of prostate   -     PSA Screen    Stop smoking  F/u cardio and pulm  Plan, Javy Reeves Sr., was seen today.  he was seen for Hyperlipidemia and will continue current medication, CAD and is currently stable on medication management and Vitamin D deficiency and supplemented.  Update labs  Talked about sleep study and declines  Refill Zanaflex and Lyrica for Fibro pain

## 2019-11-08 ENCOUNTER — CLINICAL SUPPORT (OUTPATIENT)
Dept: FAMILY MEDICINE CLINIC | Facility: CLINIC | Age: 69
End: 2019-11-08

## 2019-11-08 DIAGNOSIS — Z79.01 LONG TERM CURRENT USE OF ANTICOAGULANT THERAPY: Primary | ICD-10-CM

## 2019-11-08 LAB — INR PPP: 3.9 (ref 2–3)

## 2019-11-08 PROCEDURE — 85610 PROTHROMBIN TIME: CPT | Performed by: PHYSICIAN ASSISTANT

## 2019-11-08 PROCEDURE — 36416 COLLJ CAPILLARY BLOOD SPEC: CPT | Performed by: PHYSICIAN ASSISTANT

## 2019-11-08 RX ORDER — WARFARIN SODIUM 5 MG/1
TABLET ORAL
Qty: 180 TABLET | Refills: 0 | Status: SHIPPED | OUTPATIENT
Start: 2019-11-08 | End: 2020-02-03

## 2019-11-08 NOTE — PROGRESS NOTES
Venipuncture performed in the second finger by Greer Yarbrough with good hemostasis. Patient tolerated well. November 8, 2019

## 2019-11-15 ENCOUNTER — CLINICAL SUPPORT (OUTPATIENT)
Dept: FAMILY MEDICINE CLINIC | Facility: CLINIC | Age: 69
End: 2019-11-15

## 2019-11-15 DIAGNOSIS — Z51.81 ENCOUNTER FOR THERAPEUTIC DRUG MONITORING: Primary | ICD-10-CM

## 2019-11-15 DIAGNOSIS — Z79.01 LONG TERM CURRENT USE OF ANTICOAGULANT THERAPY: ICD-10-CM

## 2019-11-15 LAB — INR PPP: 1.9 (ref 2–3)

## 2019-11-15 PROCEDURE — 36416 COLLJ CAPILLARY BLOOD SPEC: CPT | Performed by: PHYSICIAN ASSISTANT

## 2019-11-15 PROCEDURE — 93793 ANTICOAG MGMT PT WARFARIN: CPT | Performed by: PHYSICIAN ASSISTANT

## 2019-11-15 PROCEDURE — 85610 PROTHROMBIN TIME: CPT | Performed by: PHYSICIAN ASSISTANT

## 2019-11-15 NOTE — PROGRESS NOTES
Venipuncture performed in the second finger by Aletha Davila with good hemostasis. Patient tolerated well. 11/15/2019

## 2019-11-18 RX ORDER — ESOMEPRAZOLE MAGNESIUM 40 MG/1
40 CAPSULE, DELAYED RELEASE ORAL DAILY
Qty: 30 CAPSULE | Refills: 0 | Status: SHIPPED | OUTPATIENT
Start: 2019-11-18 | End: 2020-11-03 | Stop reason: HOSPADM

## 2019-12-09 ENCOUNTER — CLINICAL SUPPORT (OUTPATIENT)
Dept: FAMILY MEDICINE CLINIC | Facility: CLINIC | Age: 69
End: 2019-12-09

## 2019-12-09 DIAGNOSIS — Z79.01 LONG TERM CURRENT USE OF ANTICOAGULANT THERAPY: ICD-10-CM

## 2019-12-09 DIAGNOSIS — Z51.81 ENCOUNTER FOR THERAPEUTIC DRUG MONITORING: Primary | ICD-10-CM

## 2019-12-09 LAB — INR PPP: 3.1 (ref 2–3)

## 2019-12-09 PROCEDURE — 85610 PROTHROMBIN TIME: CPT | Performed by: PHYSICIAN ASSISTANT

## 2019-12-09 PROCEDURE — 93793 ANTICOAG MGMT PT WARFARIN: CPT | Performed by: PHYSICIAN ASSISTANT

## 2019-12-09 PROCEDURE — 36416 COLLJ CAPILLARY BLOOD SPEC: CPT | Performed by: PHYSICIAN ASSISTANT

## 2019-12-09 NOTE — PROGRESS NOTES
Venipuncture performed in the second finger by Aletha Davila with good hemostasis. Patient tolerated well. 12/9/2019

## 2019-12-23 ENCOUNTER — CLINICAL SUPPORT (OUTPATIENT)
Dept: FAMILY MEDICINE CLINIC | Facility: CLINIC | Age: 69
End: 2019-12-23

## 2019-12-23 DIAGNOSIS — Z79.01 LONG TERM CURRENT USE OF ANTICOAGULANT THERAPY: Primary | ICD-10-CM

## 2019-12-23 DIAGNOSIS — Z51.81 ENCOUNTER FOR THERAPEUTIC DRUG MONITORING: ICD-10-CM

## 2019-12-23 LAB — INR PPP: 2.3 (ref 2–3)

## 2019-12-23 PROCEDURE — 93793 ANTICOAG MGMT PT WARFARIN: CPT | Performed by: PHYSICIAN ASSISTANT

## 2019-12-23 PROCEDURE — 36416 COLLJ CAPILLARY BLOOD SPEC: CPT | Performed by: PHYSICIAN ASSISTANT

## 2019-12-23 PROCEDURE — 85610 PROTHROMBIN TIME: CPT | Performed by: PHYSICIAN ASSISTANT

## 2019-12-23 NOTE — PROGRESS NOTES
Venipuncture performed in the third finger by Oanh Capellan with good hemostasis. Patient tolerated well. 12/23/2019.

## 2020-01-05 RX ORDER — WARFARIN SODIUM 1 MG/1
TABLET ORAL
Qty: 60 TABLET | Refills: 11 | Status: SHIPPED | OUTPATIENT
Start: 2020-01-05 | End: 2021-08-01

## 2020-01-06 RX ORDER — PREGABALIN 100 MG/1
CAPSULE ORAL
Qty: 60 CAPSULE | OUTPATIENT
Start: 2020-01-06

## 2020-01-20 ENCOUNTER — CLINICAL SUPPORT (OUTPATIENT)
Dept: FAMILY MEDICINE CLINIC | Facility: CLINIC | Age: 70
End: 2020-01-20

## 2020-01-20 DIAGNOSIS — Z79.01 LONG TERM CURRENT USE OF ANTICOAGULANT THERAPY: ICD-10-CM

## 2020-01-20 DIAGNOSIS — Z51.81 ENCOUNTER FOR THERAPEUTIC DRUG MONITORING: ICD-10-CM

## 2020-01-20 LAB — INR PPP: 1.4 (ref 2–3)

## 2020-01-20 PROCEDURE — 36416 COLLJ CAPILLARY BLOOD SPEC: CPT | Performed by: PHYSICIAN ASSISTANT

## 2020-01-20 PROCEDURE — 93793 ANTICOAG MGMT PT WARFARIN: CPT | Performed by: PHYSICIAN ASSISTANT

## 2020-01-20 PROCEDURE — 85610 PROTHROMBIN TIME: CPT | Performed by: PHYSICIAN ASSISTANT

## 2020-01-20 NOTE — PROGRESS NOTES
Venipuncture performed in the second finger by Karen Camejo with good hemostasis. Patient tolerated well.

## 2020-01-24 ENCOUNTER — CLINICAL SUPPORT (OUTPATIENT)
Dept: FAMILY MEDICINE CLINIC | Facility: CLINIC | Age: 70
End: 2020-01-24

## 2020-01-24 DIAGNOSIS — Z51.81 ENCOUNTER FOR THERAPEUTIC DRUG MONITORING: Primary | ICD-10-CM

## 2020-01-24 DIAGNOSIS — Z79.01 LONG TERM CURRENT USE OF ANTICOAGULANT THERAPY: ICD-10-CM

## 2020-01-24 LAB — INR PPP: 1.9 (ref 2–3)

## 2020-01-24 PROCEDURE — 85610 PROTHROMBIN TIME: CPT | Performed by: PHYSICIAN ASSISTANT

## 2020-01-24 PROCEDURE — 93793 ANTICOAG MGMT PT WARFARIN: CPT | Performed by: PHYSICIAN ASSISTANT

## 2020-01-24 PROCEDURE — 36416 COLLJ CAPILLARY BLOOD SPEC: CPT | Performed by: PHYSICIAN ASSISTANT

## 2020-01-24 NOTE — PROGRESS NOTES
Venipuncture performed in the second finger by Aletha Davila with good hemostasis. Patient tolerated well. 1/24/2020

## 2020-02-03 ENCOUNTER — CLINICAL SUPPORT (OUTPATIENT)
Dept: FAMILY MEDICINE CLINIC | Facility: CLINIC | Age: 70
End: 2020-02-03

## 2020-02-03 DIAGNOSIS — I82.5Y3 CHRONIC DEEP VEIN THROMBOSIS (DVT) OF PROXIMAL VEIN OF BOTH LOWER EXTREMITIES (HCC): ICD-10-CM

## 2020-02-03 LAB — INR PPP: 2.8 (ref 2–3)

## 2020-02-03 PROCEDURE — 93793 ANTICOAG MGMT PT WARFARIN: CPT | Performed by: PHYSICIAN ASSISTANT

## 2020-02-03 PROCEDURE — 85610 PROTHROMBIN TIME: CPT | Performed by: PHYSICIAN ASSISTANT

## 2020-02-03 RX ORDER — WARFARIN SODIUM 5 MG/1
TABLET ORAL
Qty: 180 TABLET | Refills: 0 | Status: SHIPPED | OUTPATIENT
Start: 2020-02-03 | End: 2020-03-31 | Stop reason: SDUPTHER

## 2020-02-20 ENCOUNTER — OFFICE VISIT (OUTPATIENT)
Dept: CARDIOLOGY | Facility: CLINIC | Age: 70
End: 2020-02-20

## 2020-02-20 VITALS
DIASTOLIC BLOOD PRESSURE: 80 MMHG | HEART RATE: 73 BPM | HEIGHT: 72 IN | WEIGHT: 233 LBS | BODY MASS INDEX: 31.56 KG/M2 | SYSTOLIC BLOOD PRESSURE: 130 MMHG

## 2020-02-20 DIAGNOSIS — Z86.711 HISTORY OF PULMONARY EMBOLUS (PE): ICD-10-CM

## 2020-02-20 DIAGNOSIS — I25.2 OLD MI (MYOCARDIAL INFARCTION): Primary | ICD-10-CM

## 2020-02-20 DIAGNOSIS — G45.9 TIA (TRANSIENT ISCHEMIC ATTACK): ICD-10-CM

## 2020-02-20 PROCEDURE — 93000 ELECTROCARDIOGRAM COMPLETE: CPT | Performed by: INTERNAL MEDICINE

## 2020-02-20 PROCEDURE — 99214 OFFICE O/P EST MOD 30 MIN: CPT | Performed by: INTERNAL MEDICINE

## 2020-02-20 NOTE — PROGRESS NOTES
Subjective:     Encounter Date:02/20/2020      Patient ID: Javy Reeves Sr. is a 69 y.o. male.    Chief Complaint: CAD.  History of Present Illness    69-year-old gentleman who presents today for reevaluation.  Patient has a history of coronary artery disease, pulmonary embolism, TIA.  Clinically patient is doing well.  He is going to get a biopsy of the mass in his mouth.  With his history of pulmonary embolism interruption of his anticoagulation is not a benign thing from a coronary disease standpoint he has had an MI in the past denies any types of cardiac symptoms.  He denies chest pain shortness of breath palpitations lightheadedness swelling and fatigue.    Review of Systems   All other systems reviewed and are negative.        ECG 12 Lead  Date/Time: 2/20/2020 1:30 PM  Performed by: Artemio Dalton MD  Authorized by: Artemio Dalton MD   Comparison: compared with previous ECG from 11/16/2018  Similar to previous ECG  Rhythm: sinus rhythm  Conduction: left anterior fascicular block    Clinical impression: abnormal EKG               Objective:     Physical Exam   Constitutional: He is oriented to person, place, and time. He appears well-developed.   HENT:   Head: Normocephalic.   Eyes: Conjunctivae are normal.   Neck: Normal range of motion.   Cardiovascular: Normal rate, regular rhythm and normal heart sounds.   Pulmonary/Chest: Breath sounds normal.   Abdominal: Soft. Bowel sounds are normal.   Musculoskeletal: Normal range of motion. He exhibits no edema.   Neurological: He is alert and oriented to person, place, and time.   Skin: Skin is warm and dry.   Psychiatric: He has a normal mood and affect. His behavior is normal.   Vitals reviewed.      Lab Review:       Assessment:          Diagnosis Plan   1. Old MI (myocardial infarction)     2. History of pulmonary embolus (PE)     3. TIA (transient ischemic attack)            Plan:       1.  History of prior myocardial infarction.  Clinically he  is doing well.  His ECG is unchanged.  Exercise follow clinically.  2.  History of pulmonary embolism patient remains on anticoagulation.  3.  History of TIA.  4.  Patient is going to have a biopsy of an abnormality in his mouth.  Anticoagulation will not be discontinued I did explain current recommendations.  5.  Follow-up 1 year sooner if he has issues.

## 2020-02-27 ENCOUNTER — CLINICAL SUPPORT (OUTPATIENT)
Dept: FAMILY MEDICINE CLINIC | Facility: CLINIC | Age: 70
End: 2020-02-27

## 2020-02-27 DIAGNOSIS — Z51.81 ENCOUNTER FOR THERAPEUTIC DRUG MONITORING: ICD-10-CM

## 2020-02-27 DIAGNOSIS — Z79.01 LONG TERM CURRENT USE OF ANTICOAGULANT THERAPY: Primary | ICD-10-CM

## 2020-02-27 LAB — INR PPP: 3.6 (ref 2–3)

## 2020-02-27 PROCEDURE — 85610 PROTHROMBIN TIME: CPT | Performed by: PHYSICIAN ASSISTANT

## 2020-02-27 PROCEDURE — 36416 COLLJ CAPILLARY BLOOD SPEC: CPT | Performed by: PHYSICIAN ASSISTANT

## 2020-02-27 PROCEDURE — 93793 ANTICOAG MGMT PT WARFARIN: CPT | Performed by: PHYSICIAN ASSISTANT

## 2020-02-27 NOTE — PROGRESS NOTES
Venipuncture performed in the second finger by Briseyda Bimringham with good hemostasis. Patient tolerated well.

## 2020-03-05 ENCOUNTER — CLINICAL SUPPORT (OUTPATIENT)
Dept: FAMILY MEDICINE CLINIC | Facility: CLINIC | Age: 70
End: 2020-03-05

## 2020-03-05 DIAGNOSIS — Z51.81 ENCOUNTER FOR THERAPEUTIC DRUG MONITORING: ICD-10-CM

## 2020-03-05 DIAGNOSIS — Z79.01 LONG TERM CURRENT USE OF ANTICOAGULANT THERAPY: Primary | ICD-10-CM

## 2020-03-05 LAB — INR PPP: 3 (ref 2–3)

## 2020-03-05 PROCEDURE — 85610 PROTHROMBIN TIME: CPT | Performed by: PHYSICIAN ASSISTANT

## 2020-03-05 PROCEDURE — 93793 ANTICOAG MGMT PT WARFARIN: CPT | Performed by: PHYSICIAN ASSISTANT

## 2020-03-05 PROCEDURE — 36416 COLLJ CAPILLARY BLOOD SPEC: CPT | Performed by: PHYSICIAN ASSISTANT

## 2020-03-05 NOTE — PROGRESS NOTES
Venipuncture performed in the third finger by Oanh Capellan with good hemostasis. Patient tolerated well. 03/05/2020.

## 2020-03-09 ENCOUNTER — TELEPHONE (OUTPATIENT)
Dept: FAMILY MEDICINE CLINIC | Facility: CLINIC | Age: 70
End: 2020-03-09

## 2020-03-09 DIAGNOSIS — Z86.711 PERSONAL HISTORY OF PULMONARY EMBOLISM: ICD-10-CM

## 2020-03-09 DIAGNOSIS — I82.403 RECURRENT ACUTE DEEP VEIN THROMBOSIS (DVT) OF BOTH LOWER EXTREMITIES (HCC): ICD-10-CM

## 2020-03-09 DIAGNOSIS — Z79.01 LONG TERM CURRENT USE OF ANTICOAGULANT THERAPY: Primary | ICD-10-CM

## 2020-03-09 NOTE — TELEPHONE ENCOUNTER
Patient's daughter sending me a message on my chart asking for referral to different vascular group for his history of recurrent DVT and PE

## 2020-03-19 NOTE — PROGRESS NOTES
1. Caller Name: Mom: Jodee                   Call Back Number: [phone] (home)   [facility] PCP or Specialty Provider: Yes         2.  Does patient have any active symptoms of respiratory illness (fever OR cough OR shortness of breath)? Yes, the patient reports the following respiratory symptoms: fever of at least 100.4°F (38°C) or greater, cough and congestion. Runny nose. .    3.  Does patient have any comoribidities? None     4.  In the last 30 days, has the patient traveled outside of the country OR in a high risk area within the  OR have any known contact with someone who has or is suspected to have COVID-19?  No.    5. Disposition: Advised to perform self care, monitor for worsening symptoms and to call back in 3 days if no improvement    Note routed to PCP: Provider action needed: Please call Mom if any other recommendations.         Physical Therapy Daily Progress Note  Visit: 11    Javy Reeves reports: The doppler they said was minimal. The neck is sore today, but I think therapy is helping and I want to continue therapy instead of going to pain management    Subjective     Objective   See Exercise, Manual, and Modality Logs for complete treatment.       Assessment & Plan     Assessment  Assessment details: Pt continues to report intermittent tingling in his shoulder. Continued education to relax shoulders and to be more aware of his seated and standing posture    Plan  Plan details: Progress per POC        Manual Therapy:    10     mins  76860;  Therapeutic Exercise:    15     mins  90023;     Neuromuscular Yoandy:    -    mins  58464;    Therapeutic Activity:     -     mins  25474;     Gait Training:      -     mins  25897;     Ultrasound:     10     mins  50987;    Electrical Stimulation:    -     mins  23751 ( );  Dry Needling     -     mins self-pay  Traction  ___12__ min    Timed Treatment:   35   mins   Total Treatment:     65   mins    Maribel Alcantar PT  [place] License #: [id number]    Physical Therapist

## 2020-03-27 ENCOUNTER — CLINICAL SUPPORT (OUTPATIENT)
Dept: FAMILY MEDICINE CLINIC | Facility: CLINIC | Age: 70
End: 2020-03-27

## 2020-03-27 DIAGNOSIS — Z51.81 ENCOUNTER FOR THERAPEUTIC DRUG MONITORING: ICD-10-CM

## 2020-03-27 DIAGNOSIS — Z79.01 LONG TERM CURRENT USE OF ANTICOAGULANT THERAPY: Primary | ICD-10-CM

## 2020-03-27 LAB — INR PPP: 2.8 (ref 2–3)

## 2020-03-27 PROCEDURE — 93793 ANTICOAG MGMT PT WARFARIN: CPT | Performed by: PHYSICIAN ASSISTANT

## 2020-03-27 PROCEDURE — 85610 PROTHROMBIN TIME: CPT | Performed by: PHYSICIAN ASSISTANT

## 2020-03-31 ENCOUNTER — TELEMEDICINE (OUTPATIENT)
Dept: FAMILY MEDICINE CLINIC | Facility: CLINIC | Age: 70
End: 2020-03-31

## 2020-03-31 DIAGNOSIS — Z86.711 HISTORY OF PULMONARY EMBOLUS (PE): ICD-10-CM

## 2020-03-31 DIAGNOSIS — I25.2 OLD MI (MYOCARDIAL INFARCTION): ICD-10-CM

## 2020-03-31 DIAGNOSIS — Z12.5 SCREENING PSA (PROSTATE SPECIFIC ANTIGEN): ICD-10-CM

## 2020-03-31 DIAGNOSIS — M51.37 DDD (DEGENERATIVE DISC DISEASE), LUMBOSACRAL: ICD-10-CM

## 2020-03-31 DIAGNOSIS — E78.2 MIXED HYPERLIPIDEMIA: ICD-10-CM

## 2020-03-31 DIAGNOSIS — K21.9 GASTROESOPHAGEAL REFLUX DISEASE WITHOUT ESOPHAGITIS: ICD-10-CM

## 2020-03-31 DIAGNOSIS — E55.9 VITAMIN D DEFICIENCY: ICD-10-CM

## 2020-03-31 DIAGNOSIS — M79.7 FIBROMYALGIA: Primary | ICD-10-CM

## 2020-03-31 PROCEDURE — 99443 PR PHYS/QHP TELEPHONE EVALUATION 21-30 MIN: CPT | Performed by: PHYSICIAN ASSISTANT

## 2020-03-31 RX ORDER — WARFARIN SODIUM 5 MG/1
10 TABLET ORAL DAILY
Qty: 180 TABLET | Refills: 3 | Status: SHIPPED | OUTPATIENT
Start: 2020-03-31 | End: 2020-05-19

## 2020-03-31 RX ORDER — PREDNISONE 20 MG/1
TABLET ORAL
Qty: 14 TABLET | Refills: 0 | Status: SHIPPED | OUTPATIENT
Start: 2020-03-31 | End: 2020-09-09

## 2020-03-31 NOTE — PROGRESS NOTES
Subjective   Javy Reeves Sr. is a 69 y.o. male.   You have chosen to receive care through a telephone visit today. Do you consent to use a telephone visit for your medical care today? Yes    History of Present Illness    Since the last visit, he has overall felt tired.  He has GERD controlled on PPI Rx, CAD and remains under care of their Cardiologist for mangement, Vitamin D deficiency and will update labs for continued management and Mixed hyperlipidemia and will update labs to make sure goals are met. .  he has been compliant with current medications have reviewed them.  The patient denies medication side effects.  Will refill medications--needs Coumadin. Need to update labs and make sure LDL <70. He is taking the statin.  He is on Coumadin for recurrent DVT and PE.  His INR is checked regularly here.  He is intolerant to CPAP and did try. There were no vitals taken for this visit.  Failed H2 blocker  Javy Reeves Sr. 69 y.o. male There were no vitals taken for this visit. who presents today for pain.  He feels like he is having a flareup of his fibromyalgia pain for the last few weeks especially this week he just aches especially if he has had a surgery in a particular area just really really aches and hurts.  He has had random aches in his arms the pain concentrates in his lumbar spine and down the left leg he is having sciatic pain down the left leg.  He does have weakness and at times he will drag the left leg.  He has had prior lumbar surgery.  I talked about doing a trial of oral prednisone to try to reduce inflammation overall especially on that sciatic nerve on the left leg and he is agreeable to a trial.  We may have to consider further imaging if this does not help.  His last lumbar MRI was 7/21/2015 he has a history of   Patient Active Problem List   Diagnosis   • Arthritis   • Family history of early CAD   • DDD (degenerative disc disease), cervical   • DVT (deep venous thrombosis) (CMS/[facility])    • Fibromyalgia   • Past heart attack   • Hyperlipidemia   • DDD (degenerative disc disease), lumbosacral   • History of pulmonary embolus (PE)   • Reflux esophagitis   • TIA (transient ischemic attack)   • Left groin pain   • Cervical radicular pain   • Mild atherosclerosis of right carotid artery   • Cervical disc disorder at C6-C7 level with radiculopathy   • Wheezing   • Colon polyps   • Right lower quadrant abdominal pain   • Diarrhea   • Cervical spinal stenosis   • Cervical disc disorder at C5-C6 level with radiculopathy   • GERD (gastroesophageal reflux disease)   • Diverticulosis   • Old MI (myocardial infarction)   • Herniated cervical disc   • Encounter for therapeutic drug monitoring   • Long term current use of anticoagulant therapy   • ERRONEOUS ENCOUNTER--DISREGARD   • Stable angina (CMS/HCC)   .    He is taking the Lyrica and over-the-counter tumor rec for pain and it is just not helping.  He is also tried Tylenol not helping.  Cannot do anti-inflammatories because of the Coumadin is [place] and try some Oral prednisone.  I will update labs are due in April he can wait 6 weeks after he finishes the prednisone to do the labs.  We want to make sure he is not having any B12 deficiency or thyroid issues going on.  I also want a make sure his LDL is less than 70.  Results for orders placed or performed in visit on 03/27/20   POC INR   Result Value Ref Range    INR 2.80 2 - 3         The following portions of the patient's history were reviewed and updated as appropriate: allergies, current medications, past family history, past medical history, past social history, past surgical history and problem list.    Review of Systems   Constitutional: Positive for fatigue. Negative for activity change, appetite change and unexpected weight change.   HENT: Negative for nosebleeds and trouble swallowing.    Eyes: Negative for pain and visual disturbance.   Respiratory: Negative for cough, choking, chest tightness,  shortness of breath and wheezing.    Cardiovascular: Negative for chest pain and palpitations.   Gastrointestinal: Negative for abdominal pain and blood in stool.   Endocrine: Negative.    Genitourinary: Negative for difficulty urinating and hematuria.   Musculoskeletal: Positive for back pain, joint swelling, myalgias and neck pain.   Skin: Negative for color change and rash.   Allergic/Immunologic: Negative.    Neurological: Positive for weakness. Negative for syncope and speech difficulty.   Hematological: Negative for adenopathy.   Psychiatric/Behavioral: Negative for agitation and confusion.   All other systems reviewed and are negative.      Objective   Physical Exam   Constitutional: He appears well-developed and well-nourished.   Pulmonary/Chest: Effort normal.   Psychiatric: He has a normal mood and affect. His behavior is normal. Judgment and thought content normal.       Assessment/Plan   Diagnoses and all orders for this visit:    Fibromyalgia    Old MI (myocardial infarction)    Mixed hyperlipidemia    DDD (degenerative disc disease), lumbosacral  Comments:  with acute left leg radiculopathy    Other orders  -     warfarin (COUMADIN) 5 MG tablet; Take 2 tablets by mouth Daily.  -     predniSONE (DELTASONE) 20 MG tablet; One PO BID with food for 7 days      Will order labs for 6 weeks and make sure LDL is less than 70  Sending oral prednisone to pharmacy for sciatic pain left leg and overall inflammation with fibromyalgia flare--consider MRI lumbar spine if no help or worse  He continues on Coumadin for recurrent DVT and PE  Plan, Javy Reeves Sr., was seen today.  he was seen for GERD and will continue on PPI medication, Hyperlipidemia and will continue current medication, CAD and is under care of their Cardiologist for medical management and Vitamin D deficiency and will update labs .  He has 3 refills left on Lyrica    Time spent was 21 [id number]

## 2020-04-20 ENCOUNTER — APPOINTMENT (OUTPATIENT)
Dept: CARDIOLOGY | Facility: HOSPITAL | Age: 70
End: 2020-04-20

## 2020-04-20 ENCOUNTER — HOSPITAL ENCOUNTER (EMERGENCY)
Facility: HOSPITAL | Age: 70
Discharge: HOME | End: 2020-04-21
Attending: EMERGENCY MEDICINE | Admitting: EMERGENCY MEDICINE

## 2020-04-20 ENCOUNTER — APPOINTMENT (OUTPATIENT)
Dept: CT IMAGING | Facility: HOSPITAL | Age: 70
End: 2020-04-20

## 2020-04-20 DIAGNOSIS — M79.605 LEFT LEG PAIN: Primary | ICD-10-CM

## 2020-04-20 DIAGNOSIS — R06.00 DYSPNEA, UNSPECIFIED TYPE: ICD-10-CM

## 2020-04-20 LAB
ALBUMIN SERPL-MCNC: 3.8 G/DL (ref 3.5–5.2)
ALBUMIN/GLOB SERPL: 1.2 G/DL
ALP SERPL-CCNC: 48 U/L (ref 39–117)
ALT SERPL W P-5'-P-CCNC: 25 U/L (ref 1–41)
ANION GAP SERPL CALCULATED.3IONS-SCNC: 7.8 MMOL/L (ref 5–15)
AST SERPL-CCNC: 28 U/L (ref 1–40)
BASOPHILS # BLD AUTO: 0.03 10*3/MM3 (ref 0–0.2)
BASOPHILS NFR BLD AUTO: 0.6 % (ref 0–1.5)
BILIRUB SERPL-MCNC: 0.8 MG/DL (ref 0.2–1.2)
BUN BLD-MCNC: 14 MG/DL (ref 8–23)
BUN/CREAT SERPL: 13.7 (ref 7–25)
CALCIUM SPEC-SCNC: 8.9 MG/DL (ref 8.6–10.5)
CHLORIDE SERPL-SCNC: 102 MMOL/L (ref 98–107)
CO2 SERPL-SCNC: 27.2 MMOL/L (ref 22–29)
CREAT BLD-MCNC: 1.02 MG/DL (ref 0.76–1.27)
DEPRECATED RDW RBC AUTO: 49.5 FL (ref 37–54)
EOSINOPHIL # BLD AUTO: 0.06 10*3/MM3 (ref 0–0.4)
EOSINOPHIL NFR BLD AUTO: 1.2 % (ref 0.3–6.2)
ERYTHROCYTE [DISTWIDTH] IN BLOOD BY AUTOMATED COUNT: 14.4 % (ref 12.3–15.4)
GFR SERPL CREATININE-BSD FRML MDRD: 88 ML/MIN/1.73
GLOBULIN UR ELPH-MCNC: 3.2 GM/DL
GLUCOSE BLD-MCNC: 102 MG/DL (ref 65–99)
HCT VFR BLD AUTO: 41.4 % (ref 37.5–51)
HGB BLD-MCNC: 14.5 G/DL (ref 13–17.7)
HOLD SPECIMEN: NORMAL
HOLD SPECIMEN: NORMAL
IMM GRANULOCYTES # BLD AUTO: 0.02 10*3/MM3 (ref 0–0.05)
IMM GRANULOCYTES NFR BLD AUTO: 0.4 % (ref 0–0.5)
INR PPP: 2.24 (ref 0.9–1.1)
LYMPHOCYTES # BLD AUTO: 1.97 10*3/MM3 (ref 0.7–3.1)
LYMPHOCYTES NFR BLD AUTO: 39.2 % (ref 19.6–45.3)
MCH RBC QN AUTO: 33.1 PG (ref 26.6–33)
MCHC RBC AUTO-ENTMCNC: 35 G/DL (ref 31.5–35.7)
MCV RBC AUTO: 94.5 FL (ref 79–97)
MONOCYTES # BLD AUTO: 0.45 10*3/MM3 (ref 0.1–0.9)
MONOCYTES NFR BLD AUTO: 8.9 % (ref 5–12)
NEUTROPHILS # BLD AUTO: 2.5 10*3/MM3 (ref 1.7–7)
NEUTROPHILS NFR BLD AUTO: 49.7 % (ref 42.7–76)
NRBC BLD AUTO-RTO: 0 /100 WBC (ref 0–0.2)
PLATELET # BLD AUTO: 187 10*3/MM3 (ref 140–450)
PMV BLD AUTO: 9.3 FL (ref 6–12)
POTASSIUM BLD-SCNC: 4.2 MMOL/L (ref 3.5–5.2)
PROT SERPL-MCNC: 7 G/DL (ref 6–8.5)
PROTHROMBIN TIME: 24.4 SECONDS (ref 11.7–14.2)
RBC # BLD AUTO: 4.38 10*6/MM3 (ref 4.14–5.8)
SODIUM BLD-SCNC: 137 MMOL/L (ref 136–145)
WBC NRBC COR # BLD: 5.03 10*3/MM3 (ref 3.4–10.8)
WHOLE BLOOD HOLD SPECIMEN: NORMAL
WHOLE BLOOD HOLD SPECIMEN: NORMAL

## 2020-04-20 PROCEDURE — 85610 PROTHROMBIN TIME: CPT | Performed by: EMERGENCY MEDICINE

## 2020-04-20 PROCEDURE — 93971 EXTREMITY STUDY: CPT

## 2020-04-20 PROCEDURE — 0 IOPAMIDOL PER 1 ML: Performed by: EMERGENCY MEDICINE

## 2020-04-20 PROCEDURE — 85025 COMPLETE CBC W/AUTO DIFF WBC: CPT | Performed by: EMERGENCY MEDICINE

## 2020-04-20 PROCEDURE — 80053 COMPREHEN METABOLIC PANEL: CPT | Performed by: EMERGENCY MEDICINE

## 2020-04-20 PROCEDURE — 99283 EMERGENCY DEPT VISIT LOW MDM: CPT

## 2020-04-20 PROCEDURE — 71275 CT ANGIOGRAPHY CHEST: CPT

## 2020-04-20 RX ORDER — SODIUM CHLORIDE 0.9 % (FLUSH) 0.9 %
10 SYRINGE (ML) INJECTION AS NEEDED
Status: DISCONTINUED | OUTPATIENT
Start: 2020-04-20 | End: 2020-04-21 | Stop reason: HOSPADM

## 2020-04-20 RX ADMIN — IOPAMIDOL 95 ML: 755 INJECTION, SOLUTION INTRAVENOUS at 23:20

## 2020-04-21 ENCOUNTER — TELEPHONE (OUTPATIENT)
Dept: FAMILY MEDICINE CLINIC | Facility: CLINIC | Age: 70
End: 2020-04-21

## 2020-04-21 VITALS
HEIGHT: 72 IN | BODY MASS INDEX: 32.37 KG/M2 | TEMPERATURE: 98.6 F | OXYGEN SATURATION: 98 % | SYSTOLIC BLOOD PRESSURE: 135 MMHG | HEART RATE: 63 BPM | RESPIRATION RATE: 15 BRPM | DIASTOLIC BLOOD PRESSURE: 85 MMHG | WEIGHT: 239 LBS

## 2020-04-21 DIAGNOSIS — M79.7 FIBROMYALGIA: Primary | ICD-10-CM

## 2020-04-21 DIAGNOSIS — M54.6 ACUTE RIGHT-SIDED THORACIC BACK PAIN: ICD-10-CM

## 2020-04-21 LAB
BH CV LOW VAS LEFT DISTAL FEMORAL SPONT: 1
BH CV LOW VAS LEFT POPLITEAL SPONT: 1
BH CV LOWER VASCULAR LEFT COMMON FEMORAL AUGMENT: NORMAL
BH CV LOWER VASCULAR LEFT COMMON FEMORAL COMPETENT: NORMAL
BH CV LOWER VASCULAR LEFT COMMON FEMORAL COMPRESS: NORMAL
BH CV LOWER VASCULAR LEFT COMMON FEMORAL PHASIC: NORMAL
BH CV LOWER VASCULAR LEFT COMMON FEMORAL SPONT: NORMAL
BH CV LOWER VASCULAR LEFT DISTAL FEMORAL COMPRESS: NORMAL
BH CV LOWER VASCULAR LEFT DISTAL FEMORAL THROMBUS: NORMAL
BH CV LOWER VASCULAR LEFT GASTRONEMIUS COMPRESS: NORMAL
BH CV LOWER VASCULAR LEFT GREATER SAPH AK COMPRESS: NORMAL
BH CV LOWER VASCULAR LEFT GREATER SAPH BK COMPRESS: NORMAL
BH CV LOWER VASCULAR LEFT MID FEMORAL AUGMENT: NORMAL
BH CV LOWER VASCULAR LEFT MID FEMORAL COMPETENT: NORMAL
BH CV LOWER VASCULAR LEFT MID FEMORAL COMPRESS: NORMAL
BH CV LOWER VASCULAR LEFT MID FEMORAL PHASIC: NORMAL
BH CV LOWER VASCULAR LEFT MID FEMORAL SPONT: NORMAL
BH CV LOWER VASCULAR LEFT PERONEAL COMPRESS: NORMAL
BH CV LOWER VASCULAR LEFT POPLITEAL AUGMENT: NORMAL
BH CV LOWER VASCULAR LEFT POPLITEAL COMPETENT: NORMAL
BH CV LOWER VASCULAR LEFT POPLITEAL COMPRESS: NORMAL
BH CV LOWER VASCULAR LEFT POPLITEAL PHASIC: NORMAL
BH CV LOWER VASCULAR LEFT POPLITEAL SPONT: NORMAL
BH CV LOWER VASCULAR LEFT POPLITEAL THROMBUS: NORMAL
BH CV LOWER VASCULAR LEFT POSTERIOR TIBIAL COMPRESS: NORMAL
BH CV LOWER VASCULAR LEFT PROFUNDA FEMORAL COMPRESS: NORMAL
BH CV LOWER VASCULAR LEFT PROXIMAL FEMORAL COMPRESS: NORMAL
BH CV LOWER VASCULAR LEFT SAPHENOFEMORAL JUNCTION COMPRESS: NORMAL
BH CV LOWER VASCULAR LEFT SOLEAL COMPRESS: NORMAL
BH CV LOWER VASCULAR RIGHT COMMON FEMORAL AUGMENT: NORMAL
BH CV LOWER VASCULAR RIGHT COMMON FEMORAL COMPETENT: NORMAL
BH CV LOWER VASCULAR RIGHT COMMON FEMORAL COMPRESS: NORMAL
BH CV LOWER VASCULAR RIGHT COMMON FEMORAL PHASIC: NORMAL
BH CV LOWER VASCULAR RIGHT COMMON FEMORAL SPONT: NORMAL

## 2020-04-21 PROCEDURE — 93005 ELECTROCARDIOGRAM TRACING: CPT | Performed by: EMERGENCY MEDICINE

## 2020-04-21 PROCEDURE — 93010 ELECTROCARDIOGRAM REPORT: CPT | Performed by: INTERNAL MEDICINE

## 2020-04-21 NOTE — ED PROVIDER NOTES
EMERGENCY DEPARTMENT ENCOUNTER    CHIEF COMPLAINT  Chief Complaint: L leg pain  History given by: pt  History limited by: none  Room Number: 17/17  PMD: Aletha Ochoa, MALINDA      HPI:  Pt is a 69 y.o. male who presents complaining of left leg pain that occurred suddenly upon standing tonight.  The patient states that the pain began in his ankle and then radiated upward his leg with mild shortness of breath for a few seconds.  The patient does have a history of DVT and is currently anticoagulated with warfarin and has a [place] filter in place.  The patient as we speak complains of mild left leg pain but no associated shortness of breath or chest pain.  The patient does state that the pain is worse with movement and standing.  The patient denies fevers or chills, nausea and vomiting, abdominal pain, headache.  The patient is described as an aching in nature.  The patient is without further complaint    PAST MEDICAL HISTORY  Active Ambulatory Problems     Diagnosis Date Noted   • Arthritis 11/16/2015   • Family history of early CAD 11/16/2015   • DDD (degenerative disc disease), cervical 11/16/2015   • DVT (deep venous thrombosis) (CMS/[facility]) 11/16/2015   • Fibromyalgia 11/16/2015   • Past heart attack 11/16/2015   • Hyperlipidemia 11/16/2015   • DDD (degenerative disc disease), lumbosacral 11/16/2015   • History of pulmonary embolus (PE) 11/16/2015   • Reflux esophagitis 11/16/2015   • TIA (transient ischemic attack) 11/16/2015   • Left groin pain 02/21/2017   • Cervical radicular pain 02/12/2018   • Mild atherosclerosis of right carotid artery 03/27/2018   • Cervical disc disorder at C6-C7 level with radiculopathy 05/16/2018   • Wheezing 07/20/2018   • Colon polyps 07/27/2018   • Right lower quadrant abdominal pain 07/27/2018   • Diarrhea 07/27/2018   • Cervical spinal stenosis 08/20/2018   • Cervical disc disorder at C5-C6 level with radiculopathy 08/20/2018   • GERD (gastroesophageal reflux disease) 09/07/2018    • Diverticulosis 09/13/2018   • Old MI (myocardial infarction) 09/20/2018   • Herniated cervical disc 10/05/2018   • Encounter for therapeutic drug monitoring 10/10/2018   • Long term current use of anticoagulant therapy 10/10/2018   • ERRONEOUS ENCOUNTER--DISREGARD 03/27/2019   • Stable angina (CMS/HCC) 04/26/2019     Resolved Ambulatory Problems     Diagnosis Date Noted   • Hypertension 11/16/2015     Past Medical History:   Diagnosis Date   • Acute and subacute infective endocarditis in diseases classified elsewhere    • Allergic    • Chest pain    • Chronic low back pain    • Chronic pain    • Coronary artery disease    • Encounter for special screening examination for neoplasm of prostate 2012   • Eye exam, routine > 5 yrs ago   • Eye exam, routine 01/2015   • Fibromyositis    • Injury of back    • Injury of neck    • Irritable bowel    • Lumbar stenosis    • MI (myocardial infarction) (CMS/HCC)    • Neck pain    • Pain in limb    • Postlaminectomy syndrome of lumbar region    • Pulmonary embolism (CMS/HCC) 1996   • Shortness of breath    • Sleep apnea    • Stroke (CMS/HCC)        PAST SURGICAL HISTORY  Past Surgical History:   Procedure Laterality Date   • ANTERIOR CERVICAL DISCECTOMY W/ FUSION N/A 10/5/2018    Procedure: CERVICAL DISCECTOMY ANTERIOR FUSION WITH INSTRUMENTATION,ACDF C5/6, C6/7 with cages and plate;  Surgeon: Jean Coles MD;  Location: [facility];  Service: Neurosurgery   • APPENDECTOMY N/A    • CARDIAC CATHETERIZATION Left 1952    Left Heart Cath Left Ventriculography, selective coronary angiography; iliofemoral angiography; angio seal closure, Dr. Brady Ramos   • COLONOSCOPY  09/2015    removed 2 polyps, Dr. Manley   • COLONOSCOPY N/A 02/12/2007    Left colonic diverticulosis, No evidence of polypoid neoplasia, Dr. Jarret Bloom   • COLONOSCOPY N/A 9/13/2018    Procedure: COLONOSCOPY TO CECUM WITH COLD BX'S POLYPECTOMY;  Surgeon: Berta Sin MD;  Location: [facility]  ENDOSCOPY;  Service: General   • CYSTOSCOPY TRANSURETHRAL RESECTION OF PROSTATE N/A 11/01/2004    Dr. Rodolfo Arnold   • HAND SURGERY Right    • KNEE ARTHROPLASTY Left    • LUMBAR DISC SURGERY  2006, 2007    L4-S1 pseudoarthroses - Dr Cervantes   • LUMBAR DISC SURGERY N/A 01/29/2010    Removal of Instrumentation w/ decompression, Instrumentaion loosening & pt tested positive for zinc allergy, Dr. Kvng Cervantes   • ROTATOR CUFF REPAIR Right 04/2012   • THORACIC OUTLET SURGERY Bilateral    • VENA CAVA FILTER PLACEMENT  1996   • WRIST SURGERY Right     x3 starting in 1984       FAMILY HISTORY  Family History   Problem Relation Age of Onset   • Diabetes Sister    • Cancer Sister    • Hypertension Sister    • Kidney disease Sister    • Stroke Sister    • Heart disease Brother    • Hypertension Brother    • Cerebral aneurysm Brother    • Sudden death Brother    • Bleeding Disorder Brother    • Cancer Mother    • Heart disease Father    • Cancer Father         malignant neoplasm   • Deep vein thrombosis Father    • Heart attack Father    • Malig Hyperthermia Neg Hx        SOCIAL HISTORY  Social History     Socioeconomic History   • Marital status:      Spouse name: Not on file   • Number of children: Not on file   • Years of education: Not on file   • Highest education level: Not on file   Tobacco Use   • Smoking status: Current Every Day Smoker     Packs/day: 0.50     Years: 30.00     Pack years: 15.00   • Smokeless tobacco: Never Used   • Tobacco comment: caffeine use   Substance and Sexual Activity   • Alcohol use: Yes     Comment: 2x per month   • Drug use: No   • Sexual activity: Defer       ALLERGIES  Zinc and Chantix [varenicline]    REVIEW OF SYSTEMS  Review of Systems   Constitutional: Negative for activity change, appetite change and fever.   HENT: Negative for congestion and sore throat.    Eyes: Negative.    Respiratory: Positive for shortness of breath. Negative for cough.    Cardiovascular: Negative for  chest pain and leg swelling.   Gastrointestinal: Negative for abdominal pain, diarrhea and vomiting.   Endocrine: Negative.    Genitourinary: Negative for decreased urine volume and dysuria.   Musculoskeletal: Positive for gait problem. Negative for neck pain.        Left leg pain   Skin: Negative for rash and wound.   Allergic/Immunologic: Negative.    Neurological: Negative for weakness, numbness and headaches.   Hematological: Negative.    Psychiatric/Behavioral: Negative.    All other systems reviewed and are negative.      PHYSICAL EXAM  ED Triage Vitals   Temp Heart Rate Resp BP SpO2   04/20/20 1945 04/20/20 1945 04/20/20 1945 04/20/20 2220 04/20/20 1945   98.6 °F (37 °C) 86 18 132/81 98 %      Temp src Heart Rate Source Patient Position BP Location FiO2 (%)   -- 04/20/20 2222 04/20/20 2222 04/20/20 2222 --    Monitor;Apical Lying Left arm        Physical Exam   Constitutional: He is oriented to person, place, and time. No distress.   HENT:   Head: Normocephalic and atraumatic.   Eyes: Pupils are equal, round, and reactive to light. EOM are normal.   Neck: Normal range of motion. Neck supple.   Cardiovascular: Normal rate, regular rhythm and normal heart sounds.   Pulmonary/Chest: Effort normal and breath sounds normal. No respiratory distress.   Abdominal: Soft. There is no tenderness. There is no rebound and no guarding.   Musculoskeletal: Normal range of motion. He exhibits no edema.   Neurological: He is alert and oriented to person, place, and time. He has normal sensation and normal strength.   Skin: Skin is warm and dry.   Psychiatric: Mood and affect normal.   Nursing note and vitals reviewed.      LAB RESULTS  Lab Results (last 24 hours)     Procedure Component Value Units Date/Time    CBC & Differential [[id number]] Collected:  04/20/20 2230    Specimen:  Blood Updated:  04/20/20 2247    Narrative:       The following orders were created for panel order CBC & Differential.  Procedure                                Abnormality         Status                     ---------                               -----------         ------                     CBC Auto Differential[[id number]]        Abnormal            Final result                 Please view results for these tests on the individual orders.    Comprehensive Metabolic Panel [[id number]]  (Abnormal) Collected:  04/20/20 2230    Specimen:  Blood Updated:  04/20/20 2308     Glucose 102 mg/dL      BUN 14 mg/dL      Creatinine 1.02 mg/dL      Sodium 137 mmol/L      Potassium 4.2 mmol/L      Chloride 102 mmol/L      CO2 27.2 mmol/L      Calcium 8.9 mg/dL      Total Protein 7.0 g/dL      Albumin 3.80 g/dL      ALT (SGPT) 25 U/L      AST (SGOT) 28 U/L      Alkaline Phosphatase 48 U/L      Total Bilirubin 0.8 mg/dL      eGFR  African Amer 88 mL/min/1.73      Globulin 3.2 gm/dL      A/G Ratio 1.2 g/dL      BUN/Creatinine Ratio 13.7     Anion Gap 7.8 mmol/L     Narrative:       GFR Normal >60  Chronic Kidney Disease <60  Kidney Failure <15      Protime-INR [[id number]]  (Abnormal) Collected:  04/20/20 2230    Specimen:  Blood Updated:  04/20/20 2326     Protime 24.4 Seconds      INR 2.24    CBC Auto Differential [[id number]]  (Abnormal) Collected:  04/20/20 2230    Specimen:  Blood Updated:  04/20/20 2247     WBC 5.03 10*3/mm3      RBC 4.38 10*6/mm3      Hemoglobin 14.5 g/dL      Hematocrit 41.4 %      MCV 94.5 fL      MCH 33.1 pg      MCHC 35.0 g/dL      RDW 14.4 %      RDW-SD 49.5 fl      MPV 9.3 fL      Platelets 187 10*3/mm3      Neutrophil % 49.7 %      Lymphocyte % 39.2 %      Monocyte % 8.9 %      Eosinophil % 1.2 %      Basophil % 0.6 %      Immature Grans % 0.4 %      Neutrophils, Absolute 2.50 10*3/mm3      Lymphocytes, Absolute 1.97 10*3/mm3      Monocytes, Absolute 0.45 10*3/mm3      Eosinophils, Absolute 0.06 10*3/mm3      Basophils, Absolute 0.03 10*3/mm3      Immature Grans, Absolute 0.02 10*3/mm3      nRBC 0.0 /100 WBC           I ordered the above labs and  reviewed the results    RADIOLOGY  CT Angiogram Chest   Final Result       1. No acute pulmonary thromboembolus identified.   2. Background emphysematous changes, with stable basilar predominant   fibrotic changes noted.   3. Reflux of contrast material into the hepatic veins can be associated   with right-sided heart failure.   4. Prominent right hilar nodes, likely reactive. However, short-term   follow-up is suggested.       Radiation dose reduction techniques were utilized, including automated   exposure control and exposure modulation based on body size.       This report was finalized on 4/20/2020 11:46 PM by Dr. Tracy Hawk M.D.               I ordered the above noted radiological studies. Interpreted by radiologist. Reviewed by me in PACS.       PROCEDURES  Procedures  EKG          EKG time: 0044  Rhythm/Rate: nsr, 61  P waves and MN: nml  QRS, axis: LAD, nml QRS    ST and T waves: Nonspecific T wave abnormalities    Interpreted Contemporaneously by me, independently viewed  unchanged compared to prior 11/16/18      PROGRESS AND CONSULTS     The patient was wearing a facemask upon entrance into the room and remained in such throughout their visit.  I was wearing PPE including a facemask as well as gloves at any point entering the room and throughout the visit    0020  Patient resting comfortably upon reevaluation and states he has no pain or discomfort.  Discussed with the patient that there is no acute abnormality seen on lab results, CT of the chest, ultrasound of the lower extremity.  The ultrasound of the lower extremity showed chronic DVT without acute DVT.  The patient states he feels much better and all questions have been answered and addressed the patient will be discharged home      MEDICAL DECISION MAKING  Results were reviewed/discussed with the patient and they were also made aware of online access. Pt also made aware that some labs, such as cultures, will not be resulted during ER visit  and follow up with PMD is necessary.     MDM  Number of Diagnoses or Management Options     Amount and/or Complexity of Data Reviewed  Clinical lab tests: ordered and reviewed  Tests in the radiology section of CPT®: ordered and reviewed  Review and summarize past medical records: yes (Last seen in the emergency department November, 2018 for precordial chest pain)           DIAGNOSIS  Final diagnoses:   Left leg pain   Dyspnea, unspecified type       DISPOSITION  DISCHARGE    Patient discharged in stable condition.    Reviewed implications of results, diagnosis, meds, responsibility to follow up, warning signs and symptoms of possible worsening, potential complications and reasons to return to ER    Patient/Family voiced understanding of above instructions.    Discussed plan for discharge, as there is no emergent indication for admission. Patient referred to primary care provider for BP management due to today's BP. Pt/family is agreeable and understands need for follow up and repeat testing.  Pt is aware that discharge does not mean that nothing is wrong but it indicates no emergency is present that requires admission and they must continue care with follow-up as given below or physician of their choice.     FOLLOW-UP  Aletha Ochoa, PA-C  [address] [address]  [phone]    Schedule an appointment as soon as possible for a visit            Medication List      Changed    pregabalin 100 MG capsule  Commonly known as:  Lyrica  Take 1 capsule by mouth 2 (Two) Times a Day. For pain  What changed:  how much to take              Latest Documented Vital Signs:  As of 00:51  BP- 135/85 HR- 63 Temp- 98.6 °F (37 °C) O2 sat- 98%         Perez Gordon MD  04/21/20 0051

## 2020-04-21 NOTE — ED NOTES
Pt provided discharge and follow up instructions at this time.  Medications reviewed.  Questions answered.  Pt verbalized understanding of all discharge information. Pt vitals stable, no obvious distress noted.Pt care performed wearing mask and gloves.     Buffy Hanson, RN  04/21/20 0105

## 2020-04-21 NOTE — ED NOTES
Javy is wearing a surgical mask, and I am wearing a surgical mask and protective glasses.     Jasbir Claire RN  04/20/20 4175

## 2020-04-21 NOTE — TELEPHONE ENCOUNTER
.  I did just note on his CTA chest from ER     Reflux of contrast material into the hepatic veins can be associated   with right-sided heart failure.     Also descending thoracic aorta dilated    I sent message to his cardiologist, Dr. Dalton about possible needing echo    Still having this right side thoracic pain; refer to pain management    I just called him and went over the CTA  Placing referral for pain management      4-22-20  I reviewed CT scan.  It has been since 2014 since we did an echo so I did place the order for echo already.  Dilation of the descending aorta is only at 3.3 cm is extremely minimal and does not need intervention or follow-up at this current time.   ----- Message -----   From: Aletha Ochoa PA-C   Sent: 4/21/2020   4:51 PM EDT   To: Artemio Dalton MD

## 2020-04-22 DIAGNOSIS — I25.2 OLD MI (MYOCARDIAL INFARCTION): ICD-10-CM

## 2020-04-22 DIAGNOSIS — I82.5Y3 CHRONIC DEEP VEIN THROMBOSIS (DVT) OF PROXIMAL VEIN OF BOTH LOWER EXTREMITIES (HCC): Primary | ICD-10-CM

## 2020-04-24 ENCOUNTER — CLINICAL SUPPORT (OUTPATIENT)
Dept: FAMILY MEDICINE CLINIC | Facility: CLINIC | Age: 70
End: 2020-04-24

## 2020-04-24 DIAGNOSIS — Z79.01 LONG TERM CURRENT USE OF ANTICOAGULANT THERAPY: ICD-10-CM

## 2020-04-24 DIAGNOSIS — Z51.81 ENCOUNTER FOR THERAPEUTIC DRUG MONITORING: ICD-10-CM

## 2020-04-24 LAB — INR PPP: 2.4 (ref 2–3)

## 2020-04-24 PROCEDURE — 93793 ANTICOAG MGMT PT WARFARIN: CPT | Performed by: PHYSICIAN ASSISTANT

## 2020-04-24 PROCEDURE — 85610 PROTHROMBIN TIME: CPT | Performed by: PHYSICIAN ASSISTANT

## 2020-04-24 NOTE — PROGRESS NOTES
Venipuncture performed in the second finger by Karen Camejo with good hemostasis. Patient tolerated well. Pt told that he will start INR at home.

## 2020-04-27 ENCOUNTER — HOSPITAL ENCOUNTER (OUTPATIENT)
Dept: CARDIOLOGY | Facility: HOSPITAL | Age: 70
Discharge: HOME | End: 2020-04-27
Admitting: INTERNAL MEDICINE

## 2020-04-27 VITALS
BODY MASS INDEX: 32.37 KG/M2 | HEIGHT: 72 IN | HEART RATE: 68 BPM | SYSTOLIC BLOOD PRESSURE: 130 MMHG | DIASTOLIC BLOOD PRESSURE: 80 MMHG | WEIGHT: 239 LBS

## 2020-04-27 DIAGNOSIS — I25.2 OLD MI (MYOCARDIAL INFARCTION): ICD-10-CM

## 2020-04-27 DIAGNOSIS — I82.5Y3 CHRONIC DEEP VEIN THROMBOSIS (DVT) OF PROXIMAL VEIN OF BOTH LOWER EXTREMITIES (HCC): ICD-10-CM

## 2020-04-27 LAB
AORTIC ARCH: 2.5 CM
AORTIC ROOT ANNULUS: 2.1 CM
ASCENDING AORTA: 3.7 CM
BH CV ECHO MEAS - AO MAX PG: 8 MMHG
BH CV ECHO MEAS - AO MEAN PG: 4 MMHG
BH CV ECHO MEAS - AO V2 MAX: 143 CM/SEC
BH CV ECHO MEAS - AO V2 VTI: 32 CM
BH CV ECHO MEAS - EDV(MOD-SP2): 65 ML
BH CV ECHO MEAS - EDV(MOD-SP4): 76 ML
BH CV ECHO MEAS - EF(MOD-BP): 65 %
BH CV ECHO MEAS - ESV(MOD-SP2): 23 ML
BH CV ECHO MEAS - ESV(MOD-SP4): 26 ML
BH CV ECHO MEAS - IVSD: 1.2 CM
BH CV ECHO MEAS - LAT PEAK E' VEL: 12 CM/SEC
BH CV ECHO MEAS - LV V1 MAX: 91 CM/SEC
BH CV ECHO MEAS - LV V1 VTI: 22 CM
BH CV ECHO MEAS - LVIDD: 5.1 CM
BH CV ECHO MEAS - LVIDS: 3.6 CM
BH CV ECHO MEAS - LVOT DIAM: 2.3 CM
BH CV ECHO MEAS - LVPWD: 1.2 CM
BH CV ECHO MEAS - MED PEAK E' VEL: 10 CM/SEC
BH CV ECHO MEAS - MV A MAX VEL: 96 CM/SEC
BH CV ECHO MEAS - MV DEC TIME: 218 MSEC
BH CV ECHO MEAS - MV E MAX VEL: 78 CM/SEC
BH CV ECHO MEAS - MV E/A: 0.8
BH CV ECHO MEAS - MV MEAN PG: 1 MMHG
BH CV ECHO MEAS - MV V2 VTI: 30 CM
BH CV ECHO MEAS - MVA(P1/2T): 3.3 CM2
BH CV ECHO MEAS - PULM A REVS DUR: 118 SEC
BH CV ECHO MEAS - PULM A REVS VEL: 28 CM/SEC
BH CV ECHO MEAS - PULM DIAS VEL: 30 CM/SEC
BH CV ECHO MEAS - PULM S/D: 1.6
BH CV ECHO MEAS - PULM SYS VEL: 47 CM/SEC
BH CV ECHO MEAS - RV V1 MAX: 90 CM/SEC
BH CV ECHO MEAS - RV V1 VTI: 11 CM
BH CV ECHO MEAS - RVOT DIAM: 2 CM
BH CV ECHO MEAS - SUP REN AO DIAM: 2.2 CM
BH CV ECHO MEAS - TAPSE (>1.6): 2.7 CM2
BH CV ECHO MEASUREMENTS AVERAGE E/E' RATIO: 7.09
BH CV XLRA - RV BASE: 3.1 CM
BH CV XLRA - RV LENGTH: 7.5 CM
BH CV XLRA - RV MID: 2.4 CM
BH CV XLRA - TDI S': 13 CM/SEC
LEFT ATRIUM VOLUME INDEX: 17 ML/M2
MAXIMAL PREDICTED HEART RATE: 153 BPM
SINUS: 3.9 CM
STJ: 3.4 CM
STRESS TARGET HR: 130 BPM

## 2020-04-27 PROCEDURE — 93306 TTE W/DOPPLER COMPLETE: CPT | Performed by: INTERNAL MEDICINE

## 2020-04-27 PROCEDURE — 93306 TTE W/DOPPLER COMPLETE: CPT

## 2020-04-30 ENCOUNTER — PATIENT OUTREACH (OUTPATIENT)
Dept: CASE MANAGEMENT | Facility: OTHER | Age: 70
End: 2020-04-30

## 2020-04-30 NOTE — OUTREACH NOTE
Care Plan Note      Responses   Lifestyle Goals  Routine follow-up with doctor(s), Medication management, Fewer ER/urgent care visits   Barriers  Disease education   Self Management  Medication Adherence   Suggested Appointments  Other (See Comment) [Follow up with cardiology regarding echo results and CT results of dilating decending aortic]   Annual Wellness Visit:   Patient Will Schedule [Patient to request AWV via [employer] with Aletha LUKE]   Specific Disease Process Teaching  Heart Disease [Continue making healthy diet and life style choices. Patient reports MI at 21 and has been active with heart healthy choices ever since. ]   Does patient have depression diagnosis?  No   Advanced Directives:  -- [Patient interested. Discussions have been make. No documents have been filed. Encouraged patient to request from Aletha Ochoa' office. ]   Ed Visits past 12 months:  1   Hospitalizations past 12 months  None   Discharge destination:  Home   Medication Adherence  Medications understood   Goal Progress  Making Progress Toward Goal(s)   Readiness Scale  9   Confidence Scale  9   Health Literacy  Good        The main concerns and/or symptoms the patient would like to address are: Spoke with patient. Patient recently completed an echo due to CT results showing dilation of descending aorta. Patient confused the echo was normal. Encouraged patient to reach out to cardiology for a more detailed review and explanation.     Education/instruction provided by Care Coordinator: Introduced self, explained ACM RN role and provided contact information. Reviewed follow up appointments. Patient plans to follow up with Aletha LUKE and cardiology regarding echo results and CT results. Patient is actively making healthy life style choices with diet, exercise, and avoiding stressors. Patient has discussed what he would want his wife and son to do if he were unable to make decisions for himself, but has not filled out Living Will or  Advanced Directive paperwork. Encouraged patient to request AD documents from the PCP to complete. Encouraged patient to schedule AWV with PCP via [facility]. Patient plans to do so. No further questions at this time.     Follow Up Outreach Due: 2 weeks    Shari Ballesteros RN  Ambulatory     4/30/2020, 18:29

## 2020-05-12 ENCOUNTER — RESULTS ENCOUNTER (OUTPATIENT)
Dept: FAMILY MEDICINE CLINIC | Facility: CLINIC | Age: 70
End: 2020-05-12

## 2020-05-12 DIAGNOSIS — I25.2 OLD MI (MYOCARDIAL INFARCTION): ICD-10-CM

## 2020-05-12 DIAGNOSIS — M51.37 DDD (DEGENERATIVE DISC DISEASE), LUMBOSACRAL: ICD-10-CM

## 2020-05-12 DIAGNOSIS — E55.9 VITAMIN D DEFICIENCY: ICD-10-CM

## 2020-05-12 DIAGNOSIS — K21.9 GASTROESOPHAGEAL REFLUX DISEASE WITHOUT ESOPHAGITIS: ICD-10-CM

## 2020-05-12 DIAGNOSIS — Z12.5 SCREENING PSA (PROSTATE SPECIFIC ANTIGEN): ICD-10-CM

## 2020-05-12 DIAGNOSIS — Z86.711 HISTORY OF PULMONARY EMBOLUS (PE): ICD-10-CM

## 2020-05-12 DIAGNOSIS — M79.7 FIBROMYALGIA: ICD-10-CM

## 2020-05-12 DIAGNOSIS — E78.2 MIXED HYPERLIPIDEMIA: ICD-10-CM

## 2020-05-19 RX ORDER — WARFARIN SODIUM 5 MG/1
TABLET ORAL
Qty: 180 TABLET | Refills: 3 | Status: SHIPPED | OUTPATIENT
Start: 2020-05-19 | End: 2021-08-01

## 2020-06-01 ENCOUNTER — CLINICAL SUPPORT (OUTPATIENT)
Dept: FAMILY MEDICINE CLINIC | Facility: CLINIC | Age: 70
End: 2020-06-01

## 2020-06-01 VITALS — TEMPERATURE: 97.2 F

## 2020-06-01 DIAGNOSIS — Z51.81 ENCOUNTER FOR THERAPEUTIC DRUG MONITORING: ICD-10-CM

## 2020-06-01 DIAGNOSIS — Z79.01 LONG TERM CURRENT USE OF ANTICOAGULANT THERAPY: Primary | ICD-10-CM

## 2020-06-01 LAB — INR PPP: 1.9 (ref 2–3)

## 2020-06-01 PROCEDURE — 93793 ANTICOAG MGMT PT WARFARIN: CPT | Performed by: PHYSICIAN ASSISTANT

## 2020-06-01 PROCEDURE — 36416 COLLJ CAPILLARY BLOOD SPEC: CPT | Performed by: PHYSICIAN ASSISTANT

## 2020-06-01 PROCEDURE — 85610 PROTHROMBIN TIME: CPT | Performed by: PHYSICIAN ASSISTANT

## 2020-06-19 ENCOUNTER — PATIENT OUTREACH (OUTPATIENT)
Dept: CASE MANAGEMENT | Facility: OTHER | Age: 70
End: 2020-06-19

## 2020-06-19 NOTE — OUTREACH NOTE
"Patient Outreach Note    Reviewed patient's health and wellness today. Patient states he has been dealing with teeth removal since the COVID precautions have been lifted. Patient had upper and lower teeth pulled. He has been eating orange surbert and has been loosing weight due to his soft diet. Patient still has back pain, but it is 2/10 at this time. Patient taking his Lyrica, Tumeric, and Tylenol each day and it controls his pain for the day. 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 Patient reports his B/P is an \"athlete's\" b/p. No number provided. Wife is cooking by baking and broiling. Patient has clean diet. When his teeth were pulled on 6/1, he bled for 12 hours and had to call the dentist early in the morning. Since, his upper gums have healed, but his lower gums are not healing as quickly. Patient requests call in a few weeks. He plans to follow up with cardiology regarding his echo after completing his follow up with his dentist. Patient's pain management doctor recommended a back xray to further evaluate his back pain, but patient states he does not plan to complete due to cost and his back pain is manageable at this time.     Shari Ballesteros RN  Ambulatory     6/19/2020, 12:59      "

## 2020-07-13 ENCOUNTER — PATIENT OUTREACH (OUTPATIENT)
Dept: CASE MANAGEMENT | Facility: OTHER | Age: 70
End: 2020-07-13

## 2020-07-13 NOTE — OUTREACH NOTE
Care Plan Note      Responses   Advanced Directives:  Send Materials   Health Literacy  Good        The main concerns and/or symptoms the patient would like to address are: Patient requested Advanced Care Information during previous outreach. Patient states cardiology reviewed his results from the ED and stated they were not concerned. Patient states his mouth is getting better. He hasn't followed up yet with cardiology due to cardiology stating there is no concern.    Education/instruction provided by Care Coordinator: Advanced Care Information sent to patient. [facility] recommended patient speak again with Dr. Ochoa. If Don is still concerned, have her reach out to cardiology for clarification. Patient verbalized understanding. No further needs or concerns at this time.  Patient has met care plan goals, all care gaps have been addressed, and patient has a strong sense of health self-management. The patient's annual wellness visit has been reviewed. Mychart is active. Patient has received advanced care planning materials.     Follow Up Outreach Due: as needed    Shari Ballesteros RN  Ambulatory     7/13/2020, 16:12

## 2020-07-17 ENCOUNTER — TELEPHONE (OUTPATIENT)
Dept: FAMILY MEDICINE CLINIC | Facility: CLINIC | Age: 70
End: 2020-07-17

## 2020-07-17 NOTE — TELEPHONE ENCOUNTER
Spoke to pt - pt verbalizes understands to take 5 mg warfarin today - resume regular dose tomorrow and recheck INR next Friday.

## 2020-07-17 NOTE — TELEPHONE ENCOUNTER
MD INR called reporting pt's INR today was 3.3.  Per Aletha Davila - have pt take 5 mg warfarin today and recheck INR next week.  Called pt - no answer - left Vm to return my call.

## 2020-07-31 ENCOUNTER — TELEPHONE (OUTPATIENT)
Dept: FAMILY MEDICINE CLINIC | Facility: CLINIC | Age: 70
End: 2020-07-31

## 2020-08-03 ENCOUNTER — CLINICAL SUPPORT (OUTPATIENT)
Dept: FAMILY MEDICINE CLINIC | Facility: CLINIC | Age: 70
End: 2020-08-03

## 2020-08-03 VITALS — TEMPERATURE: 96.9 F

## 2020-08-03 DIAGNOSIS — Z79.01 LONG TERM CURRENT USE OF ANTICOAGULANT THERAPY: Primary | ICD-10-CM

## 2020-08-03 DIAGNOSIS — Z51.81 ENCOUNTER FOR THERAPEUTIC DRUG MONITORING: ICD-10-CM

## 2020-08-03 LAB — INR PPP: 3.1 (ref 2–3)

## 2020-08-03 PROCEDURE — 85610 PROTHROMBIN TIME: CPT | Performed by: PHYSICIAN ASSISTANT

## 2020-08-03 PROCEDURE — 93793 ANTICOAG MGMT PT WARFARIN: CPT | Performed by: PHYSICIAN ASSISTANT

## 2020-09-09 ENCOUNTER — OFFICE VISIT (OUTPATIENT)
Dept: FAMILY MEDICINE CLINIC | Facility: CLINIC | Age: 70
End: 2020-09-09

## 2020-09-09 VITALS
WEIGHT: 216 LBS | TEMPERATURE: 97.4 F | RESPIRATION RATE: 16 BRPM | HEIGHT: 72 IN | BODY MASS INDEX: 29.26 KG/M2 | DIASTOLIC BLOOD PRESSURE: 80 MMHG | SYSTOLIC BLOOD PRESSURE: 128 MMHG | OXYGEN SATURATION: 100 % | HEART RATE: 78 BPM

## 2020-09-09 DIAGNOSIS — R79.89 OTHER SPECIFIED ABNORMAL FINDINGS OF BLOOD CHEMISTRY: ICD-10-CM

## 2020-09-09 DIAGNOSIS — I20.8 STABLE ANGINA (HCC): ICD-10-CM

## 2020-09-09 DIAGNOSIS — E53.8 LOW SERUM VITAMIN B12: ICD-10-CM

## 2020-09-09 DIAGNOSIS — Z12.5 SCREENING PSA (PROSTATE SPECIFIC ANTIGEN): ICD-10-CM

## 2020-09-09 DIAGNOSIS — M79.7 FIBROMYALGIA: ICD-10-CM

## 2020-09-09 DIAGNOSIS — E53.8 LOW FOLIC ACID: ICD-10-CM

## 2020-09-09 DIAGNOSIS — Z72.0 TOBACCO ABUSE: ICD-10-CM

## 2020-09-09 DIAGNOSIS — Z79.01 LONG TERM CURRENT USE OF ANTICOAGULANT THERAPY: ICD-10-CM

## 2020-09-09 DIAGNOSIS — Z11.4 ENCOUNTER FOR SCREENING FOR HUMAN IMMUNODEFICIENCY VIRUS (HIV): ICD-10-CM

## 2020-09-09 DIAGNOSIS — J84.9 INTERSTITIAL LUNG DISEASE (HCC): ICD-10-CM

## 2020-09-09 DIAGNOSIS — R63.4 UNINTENDED WEIGHT LOSS: Primary | ICD-10-CM

## 2020-09-09 DIAGNOSIS — I82.5Y3 CHRONIC DEEP VEIN THROMBOSIS (DVT) OF PROXIMAL VEIN OF BOTH LOWER EXTREMITIES (HCC): ICD-10-CM

## 2020-09-09 DIAGNOSIS — R93.89 ABNORMAL CT OF THE CHEST: ICD-10-CM

## 2020-09-09 DIAGNOSIS — E55.9 VITAMIN D DEFICIENCY: ICD-10-CM

## 2020-09-09 DIAGNOSIS — K21.9 GASTROESOPHAGEAL REFLUX DISEASE WITHOUT ESOPHAGITIS: ICD-10-CM

## 2020-09-09 DIAGNOSIS — M51.37 DDD (DEGENERATIVE DISC DISEASE), LUMBOSACRAL: ICD-10-CM

## 2020-09-09 DIAGNOSIS — I65.21 MILD ATHEROSCLEROSIS OF RIGHT CAROTID ARTERY: ICD-10-CM

## 2020-09-09 DIAGNOSIS — E78.2 MIXED HYPERLIPIDEMIA: ICD-10-CM

## 2020-09-09 PROCEDURE — 99213 OFFICE O/P EST LOW 20 MIN: CPT | Performed by: PHYSICIAN ASSISTANT

## 2020-09-09 NOTE — PROGRESS NOTES
"Subjective   Javy Reeves Sr. is a 69 y.o. male.     History of Present Illness   Javy Reeves Sr. 69 y.o. male /80   Pulse 78   Temp 97.4 °F (36.3 °C) (Skin)   Resp 16   Ht 182.9 cm (72\")   Wt 98 kg (216 lb)   SpO2 100%   BMI 29.29 kg/m²  who presents today for unexplained weight loss in last 6 mos.   he has a history of   Patient Active Problem List   Diagnosis   • Arthritis   • Family history of early CAD   • DDD (degenerative disc disease), cervical   • DVT (deep venous thrombosis) (CMS/HCC)   • Fibromyalgia   • Past heart attack   • Hyperlipidemia   • DDD (degenerative disc disease), lumbosacral   • History of pulmonary embolus (PE)   • Reflux esophagitis   • TIA (transient ischemic attack)   • Left groin pain   • Cervical radicular pain   • Mild atherosclerosis of right carotid artery   • Cervical disc disorder at C6-C7 level with radiculopathy   • Wheezing   • Colon polyps   • Right lower quadrant abdominal pain   • Diarrhea   • Cervical spinal stenosis   • Cervical disc disorder at C5-C6 level with radiculopathy   • GERD (gastroesophageal reflux disease)   • Diverticulosis   • Old MI (myocardial infarction)   • Herniated cervical disc   • Encounter for therapeutic drug monitoring   • Long term current use of anticoagulant therapy   • ERRONEOUS ENCOUNTER--DISREGARD   • Stable angina (CMS/HCC)   .      He is working again---since Oct; eating less---but still losing weight too much      Has been to Dr Jamil Willoughby MD 9-12-19 last visit for known interstitial lung disease and emphysema changes.  He was to have follow-up 6 months and CT--- not done  Did have CTA of the chest through the emergency room on 4/28/2020--I do want a note he had some prominence in his right hilar nodes likely reactive but short-term follow-up was recommended.  I will make sure to follow-up CT of the chest with contrast due to his weight loss as well.  He will need to see pulmonary for follow-up.  Weight to 239 to 216 " in last 6 mos; not trying.  Has no complaints of change in bowels no diarrhea no blood no black stools, no new shortness of breath, wheezing, some cough with PND    More tired for no reason  Intol cpap  Dr Sin 2018; polyps---repeat 5 yrs    TURP in past;   Lab Results   Component Value Date    PSA 0.817 07/10/2018    PSA 0.664 03/25/2016     Saw cardio 2-20-20; has CAD  Dentures now;  Teeth pulled  No oral sores  Need PSA    The following portions of the patient's history were reviewed and updated as appropriate: allergies, current medications, past family history, past medical history, past social history, past surgical history and problem list.    Review of Systems   Constitutional: Positive for fatigue. Negative for activity change, appetite change and unexpected weight change.   HENT: Negative for nosebleeds and trouble swallowing.    Eyes: Negative for pain and visual disturbance.   Respiratory: Negative for chest tightness, shortness of breath and wheezing.    Cardiovascular: Negative for chest pain and palpitations.   Gastrointestinal: Negative for abdominal pain and blood in stool.   Endocrine: Negative.    Genitourinary: Negative for difficulty urinating and hematuria.   Musculoskeletal: Negative for joint swelling.   Skin: Negative for color change and rash.   Allergic/Immunologic: Negative.    Neurological: Negative for syncope and speech difficulty.   Hematological: Negative for adenopathy.   Psychiatric/Behavioral: Negative for agitation, confusion, dysphoric mood and sleep disturbance.   All other systems reviewed and are negative.      Objective   Physical Exam   Constitutional: He is oriented to person, place, and time. He appears well-developed and well-nourished. No distress.   HENT:   Head: Normocephalic and atraumatic.   Mouth/Throat: No oropharyngeal exudate.   edentulous    Eyes: Pupils are equal, round, and reactive to light. Conjunctivae and EOM are normal. Right eye exhibits no  discharge. Left eye exhibits no discharge. No scleral icterus.   Neck: Normal range of motion. Neck supple. No tracheal deviation present. No thyromegaly present.   Cardiovascular: Normal rate, regular rhythm, normal heart sounds, intact distal pulses and normal pulses. Exam reveals no gallop.   No murmur heard.  Pulmonary/Chest: Effort normal and breath sounds normal. No respiratory distress. He has no wheezes. He has no rales. He exhibits no tenderness.   Abdominal: Soft. Bowel sounds are normal. He exhibits no mass. There is tenderness (chronic lower abd).   Musculoskeletal: Normal range of motion.   Lymphadenopathy:     He has no cervical adenopathy.     He has no axillary adenopathy.        Right: No inguinal, no supraclavicular and no epitrochlear adenopathy present.        Left: No inguinal, no supraclavicular and no epitrochlear adenopathy present.   Neurological: He is alert and oriented to person, place, and time. No sensory deficit. He exhibits normal muscle tone. Coordination normal.   Skin: Skin is warm. No rash noted. No erythema. No pallor.   Psychiatric: He has a normal mood and affect. His behavior is normal. Judgment and thought content normal.   Nursing note and vitals reviewed.      Assessment/Plan   Javy was seen today for weight loss.    Diagnoses and all orders for this visit:    Unintended weight loss  -     Comprehensive metabolic panel  -     Lipid panel  -     CBC and Differential  -     TSH  -     Hemoglobin A1c  -     Vitamin D 25 Hydroxy  -     Vitamin B12  -     Folate  -     HIV-1 / O / 2 Ag / Antibody 4th Generation  -     Hepatitis Panel, Acute  -     PSA Screen  -     Lactate Dehydrogenase  -     C-reactive Protein  -     BHARATI  -     Uric Acid  -     CT Chest With Contrast; Future  -     CT Abdomen Pelvis With Contrast; Future    Gastroesophageal reflux disease without esophagitis  -     Comprehensive metabolic panel  -     Lipid panel  -     CBC and Differential  -     TSH  -      Hemoglobin A1c  -     Vitamin D 25 Hydroxy  -     Vitamin B12  -     Folate  -     HIV-1 / O / 2 Ag / Antibody 4th Generation  -     Hepatitis Panel, Acute  -     PSA Screen  -     Lactate Dehydrogenase  -     C-reactive Protein  -     BHARATI  -     Uric Acid  -     CT Chest With Contrast; Future  -     CT Abdomen Pelvis With Contrast; Future    Mild atherosclerosis of right carotid artery  -     Comprehensive metabolic panel  -     Lipid panel  -     CBC and Differential  -     TSH  -     Hemoglobin A1c  -     Vitamin D 25 Hydroxy  -     Vitamin B12  -     Folate  -     HIV-1 / O / 2 Ag / Antibody 4th Generation  -     Hepatitis Panel, Acute  -     PSA Screen  -     Lactate Dehydrogenase  -     C-reactive Protein  -     BHARATI  -     Uric Acid  -     CT Chest With Contrast; Future  -     CT Abdomen Pelvis With Contrast; Future    Long term current use of anticoagulant therapy  -     Comprehensive metabolic panel  -     Lipid panel  -     CBC and Differential  -     TSH  -     Hemoglobin A1c  -     Vitamin D 25 Hydroxy  -     Vitamin B12  -     Folate  -     HIV-1 / O / 2 Ag / Antibody 4th Generation  -     Hepatitis Panel, Acute  -     PSA Screen  -     Lactate Dehydrogenase  -     C-reactive Protein  -     BHARATI  -     Uric Acid  -     CT Chest With Contrast; Future  -     CT Abdomen Pelvis With Contrast; Future    DDD (degenerative disc disease), lumbosacral  -     Comprehensive metabolic panel  -     Lipid panel  -     CBC and Differential  -     TSH  -     Hemoglobin A1c  -     Vitamin D 25 Hydroxy  -     Vitamin B12  -     Folate  -     HIV-1 / O / 2 Ag / Antibody 4th Generation  -     Hepatitis Panel, Acute  -     PSA Screen  -     Lactate Dehydrogenase  -     C-reactive Protein  -     BHARATI  -     Uric Acid  -     CT Chest With Contrast; Future  -     CT Abdomen Pelvis With Contrast; Future    Mixed hyperlipidemia  -     Comprehensive metabolic panel  -     Lipid panel  -     CBC and Differential  -     TSH  -      Hemoglobin A1c  -     Vitamin D 25 Hydroxy  -     Vitamin B12  -     Folate  -     HIV-1 / O / 2 Ag / Antibody 4th Generation  -     Hepatitis Panel, Acute  -     PSA Screen  -     Lactate Dehydrogenase  -     C-reactive Protein  -     BHARATI  -     Uric Acid  -     CT Chest With Contrast; Future  -     CT Abdomen Pelvis With Contrast; Future    Interstitial lung disease (CMS/HCC)  -     Ambulatory Referral to Pulmonology  -     Comprehensive metabolic panel  -     Lipid panel  -     CBC and Differential  -     TSH  -     Hemoglobin A1c  -     Vitamin D 25 Hydroxy  -     Vitamin B12  -     Folate  -     HIV-1 / O / 2 Ag / Antibody 4th Generation  -     Hepatitis Panel, Acute  -     PSA Screen  -     Lactate Dehydrogenase  -     C-reactive Protein  -     BHARATI  -     Uric Acid  -     CT Chest With Contrast; Future  -     CT Abdomen Pelvis With Contrast; Future    Tobacco abuse  -     Comprehensive metabolic panel  -     Lipid panel  -     CBC and Differential  -     TSH  -     Hemoglobin A1c  -     Vitamin D 25 Hydroxy  -     Vitamin B12  -     Folate  -     HIV-1 / O / 2 Ag / Antibody 4th Generation  -     Hepatitis Panel, Acute  -     PSA Screen  -     Lactate Dehydrogenase  -     C-reactive Protein  -     BHARATI  -     Uric Acid  -     CT Chest With Contrast; Future  -     CT Abdomen Pelvis With Contrast; Future    Fibromyalgia  -     Comprehensive metabolic panel  -     Lipid panel  -     CBC and Differential  -     TSH  -     Hemoglobin A1c  -     Vitamin D 25 Hydroxy  -     Vitamin B12  -     Folate  -     HIV-1 / O / 2 Ag / Antibody 4th Generation  -     Hepatitis Panel, Acute  -     PSA Screen  -     Lactate Dehydrogenase  -     C-reactive Protein  -     BHARATI  -     Uric Acid  -     CT Chest With Contrast; Future  -     CT Abdomen Pelvis With Contrast; Future    Vitamin D deficiency  -     Comprehensive metabolic panel  -     Lipid panel  -     CBC and Differential  -     TSH  -     Hemoglobin A1c  -     Vitamin D 25  Hydroxy  -     Vitamin B12  -     Folate  -     HIV-1 / O / 2 Ag / Antibody 4th Generation  -     Hepatitis Panel, Acute  -     PSA Screen  -     Lactate Dehydrogenase  -     C-reactive Protein  -     BHARATI  -     Uric Acid  -     CT Chest With Contrast; Future  -     CT Abdomen Pelvis With Contrast; Future    Screening PSA (prostate specific antigen)  -     Comprehensive metabolic panel  -     Lipid panel  -     CBC and Differential  -     TSH  -     Hemoglobin A1c  -     Vitamin D 25 Hydroxy  -     Vitamin B12  -     Folate  -     HIV-1 / O / 2 Ag / Antibody 4th Generation  -     Hepatitis Panel, Acute  -     PSA Screen  -     Lactate Dehydrogenase  -     C-reactive Protein  -     BHARATI  -     Uric Acid  -     CT Chest With Contrast; Future  -     CT Abdomen Pelvis With Contrast; Future    Other specified abnormal findings of blood chemistry   -     Hemoglobin A1c  -     CT Chest With Contrast; Future  -     CT Abdomen Pelvis With Contrast; Future    Encounter for screening for human immunodeficiency virus (HIV)   -     HIV-1 / O / 2 Ag / Antibody 4th Generation  -     CT Chest With Contrast; Future  -     CT Abdomen Pelvis With Contrast; Future        CT chest and abd with contast  Need work up for weight loss  Sees cardio Q Feb  Want him to f/u pulm Dr Willoughby--interstitial lung disease

## 2020-09-14 ENCOUNTER — TELEPHONE (OUTPATIENT)
Dept: FAMILY MEDICINE CLINIC | Facility: CLINIC | Age: 70
End: 2020-09-14

## 2020-09-14 NOTE — TELEPHONE ENCOUNTER
mdINR left Vm that pt's INR was 3.1 on 09/12/20.  Per Shannan we shouldn't adjust dose based on reading from Saturday.  Called pt and notified to check INR on Wednesday and let us know what it is.  Pt agreed.

## 2020-09-17 ENCOUNTER — TELEPHONE (OUTPATIENT)
Dept: FAMILY MEDICINE CLINIC | Facility: CLINIC | Age: 70
End: 2020-09-17

## 2020-09-17 NOTE — TELEPHONE ENCOUNTER
Pt returned my call - pt notified.      Barry called today stating that pt's INR was 1.7 yesterday.  Called pt to give directions - no answer - left VM for pt to return my call.      Take 15 mg today resume reg dose tomorrow and recheck INR 09/23/20.  Pt needs to call us himself with INR level especially if it is abnormal.

## 2020-09-22 PROBLEM — E53.8 LOW SERUM VITAMIN B12: Status: ACTIVE | Noted: 2020-09-22

## 2020-09-22 PROBLEM — E53.8 LOW FOLIC ACID: Status: ACTIVE | Noted: 2020-09-22

## 2020-09-22 LAB
25(OH)D3+25(OH)D2 SERPL-MCNC: 32.1 NG/ML (ref 30–100)
ALBUMIN SERPL-MCNC: 4.4 G/DL (ref 3.5–5.2)
ALBUMIN/GLOB SERPL: 1.9 G/DL
ALP SERPL-CCNC: 57 U/L (ref 39–117)
ALT SERPL-CCNC: 19 U/L (ref 1–41)
ANA SER QL: NEGATIVE
AST SERPL-CCNC: 22 U/L (ref 1–40)
BASOPHILS # BLD AUTO: 0.03 10*3/MM3 (ref 0–0.2)
BASOPHILS NFR BLD AUTO: 0.7 % (ref 0–1.5)
BILIRUB SERPL-MCNC: 0.5 MG/DL (ref 0–1.2)
BUN SERPL-MCNC: 10 MG/DL (ref 8–23)
BUN/CREAT SERPL: 10.9 (ref 7–25)
CALCIUM SERPL-MCNC: 8.9 MG/DL (ref 8.6–10.5)
CHLORIDE SERPL-SCNC: 103 MMOL/L (ref 98–107)
CHOLEST SERPL-MCNC: 116 MG/DL (ref 0–200)
CO2 SERPL-SCNC: 26.8 MMOL/L (ref 22–29)
CREAT SERPL-MCNC: 0.92 MG/DL (ref 0.76–1.27)
CRP SERPL-MCNC: 0.35 MG/DL (ref 0–0.5)
EOSINOPHIL # BLD AUTO: 0.03 10*3/MM3 (ref 0–0.4)
EOSINOPHIL NFR BLD AUTO: 0.7 % (ref 0.3–6.2)
ERYTHROCYTE [DISTWIDTH] IN BLOOD BY AUTOMATED COUNT: 13.4 % (ref 12.3–15.4)
FOLATE SERPL-MCNC: 6.2 NG/ML (ref 4.78–24.2)
GLOBULIN SER CALC-MCNC: 2.3 GM/DL
GLUCOSE SERPL-MCNC: 97 MG/DL (ref 65–99)
HAV IGM SERPL QL IA: NEGATIVE
HBA1C MFR BLD: 6 % (ref 4.8–5.6)
HBV CORE IGM SERPL QL IA: NEGATIVE
HBV SURFACE AG SERPL QL IA: NEGATIVE
HCT VFR BLD AUTO: 40.3 % (ref 37.5–51)
HCV AB S/CO SERPL IA: 0.1 S/CO RATIO (ref 0–0.9)
HDLC SERPL-MCNC: 33 MG/DL (ref 40–60)
HGB BLD-MCNC: 13.7 G/DL (ref 13–17.7)
HIV 1+2 AB+HIV1 P24 AG SERPL QL IA: NON REACTIVE
IMM GRANULOCYTES # BLD AUTO: 0.01 10*3/MM3 (ref 0–0.05)
IMM GRANULOCYTES NFR BLD AUTO: 0.2 % (ref 0–0.5)
LDH SERPL-CCNC: 214 U/L (ref 135–225)
LDLC SERPL CALC-MCNC: 69 MG/DL (ref 0–100)
LYMPHOCYTES # BLD AUTO: 1.61 10*3/MM3 (ref 0.7–3.1)
LYMPHOCYTES NFR BLD AUTO: 38.2 % (ref 19.6–45.3)
MCH RBC QN AUTO: 31.6 PG (ref 26.6–33)
MCHC RBC AUTO-ENTMCNC: 34 G/DL (ref 31.5–35.7)
MCV RBC AUTO: 92.9 FL (ref 79–97)
MONOCYTES # BLD AUTO: 0.38 10*3/MM3 (ref 0.1–0.9)
MONOCYTES NFR BLD AUTO: 9 % (ref 5–12)
NEUTROPHILS # BLD AUTO: 2.15 10*3/MM3 (ref 1.7–7)
NEUTROPHILS NFR BLD AUTO: 51.2 % (ref 42.7–76)
NRBC BLD AUTO-RTO: 0 /100 WBC (ref 0–0.2)
PLATELET # BLD AUTO: 180 10*3/MM3 (ref 140–450)
POTASSIUM SERPL-SCNC: 4.7 MMOL/L (ref 3.5–5.2)
PROT SERPL-MCNC: 6.7 G/DL (ref 6–8.5)
PSA SERPL-MCNC: 1.02 NG/ML (ref 0–4)
RBC # BLD AUTO: 4.34 10*6/MM3 (ref 4.14–5.8)
SODIUM SERPL-SCNC: 138 MMOL/L (ref 136–145)
TRIGL SERPL-MCNC: 69 MG/DL (ref 0–150)
TSH SERPL DL<=0.005 MIU/L-ACNC: 1.62 UIU/ML (ref 0.27–4.2)
URATE SERPL-MCNC: 5.1 MG/DL (ref 3.4–7)
VIT B12 SERPL-MCNC: 369 PG/ML (ref 211–946)
VLDLC SERPL CALC-MCNC: 13.8 MG/DL
WBC # BLD AUTO: 4.21 10*3/MM3 (ref 3.4–10.8)

## 2020-09-22 RX ORDER — MAGNESIUM 200 MG
TABLET ORAL
Qty: 90 EACH | Refills: 3 | Status: SHIPPED | OUTPATIENT
Start: 2020-09-22 | End: 2021-11-03 | Stop reason: SDUPTHER

## 2020-09-22 RX ORDER — FOLIC ACID 1 MG/1
1 TABLET ORAL DAILY
Qty: 90 TABLET | Refills: 1 | Status: SHIPPED | OUTPATIENT
Start: 2020-09-22 | End: 2021-02-22 | Stop reason: SDUPTHER

## 2020-09-23 ENCOUNTER — ANTICOAGULATION VISIT (OUTPATIENT)
Dept: FAMILY MEDICINE CLINIC | Facility: CLINIC | Age: 70
End: 2020-09-23

## 2020-09-23 LAB — INR PPP: 2.7 (ref 2–3)

## 2020-09-28 ENCOUNTER — HOSPITAL ENCOUNTER (OUTPATIENT)
Dept: CT IMAGING | Facility: HOSPITAL | Age: 70
Discharge: HOME | End: 2020-09-28
Admitting: PHYSICIAN ASSISTANT

## 2020-09-28 ENCOUNTER — FLU SHOT (OUTPATIENT)
Dept: FAMILY MEDICINE CLINIC | Facility: CLINIC | Age: 70
End: 2020-09-28

## 2020-09-28 DIAGNOSIS — R79.89 OTHER SPECIFIED ABNORMAL FINDINGS OF BLOOD CHEMISTRY: ICD-10-CM

## 2020-09-28 DIAGNOSIS — Z23 NEED FOR INFLUENZA VACCINATION: ICD-10-CM

## 2020-09-28 DIAGNOSIS — M51.37 DDD (DEGENERATIVE DISC DISEASE), LUMBOSACRAL: ICD-10-CM

## 2020-09-28 DIAGNOSIS — E55.9 VITAMIN D DEFICIENCY: ICD-10-CM

## 2020-09-28 DIAGNOSIS — M79.7 FIBROMYALGIA: ICD-10-CM

## 2020-09-28 DIAGNOSIS — J84.9 INTERSTITIAL LUNG DISEASE (HCC): ICD-10-CM

## 2020-09-28 DIAGNOSIS — R63.4 UNINTENDED WEIGHT LOSS: ICD-10-CM

## 2020-09-28 DIAGNOSIS — Z11.4 ENCOUNTER FOR SCREENING FOR HUMAN IMMUNODEFICIENCY VIRUS (HIV): ICD-10-CM

## 2020-09-28 DIAGNOSIS — Z79.01 LONG TERM CURRENT USE OF ANTICOAGULANT THERAPY: ICD-10-CM

## 2020-09-28 DIAGNOSIS — Z72.0 TOBACCO ABUSE: ICD-10-CM

## 2020-09-28 DIAGNOSIS — Z12.5 SCREENING PSA (PROSTATE SPECIFIC ANTIGEN): ICD-10-CM

## 2020-09-28 DIAGNOSIS — K21.9 GASTROESOPHAGEAL REFLUX DISEASE WITHOUT ESOPHAGITIS: ICD-10-CM

## 2020-09-28 DIAGNOSIS — I65.21 MILD ATHEROSCLEROSIS OF RIGHT CAROTID ARTERY: ICD-10-CM

## 2020-09-28 DIAGNOSIS — E78.2 MIXED HYPERLIPIDEMIA: ICD-10-CM

## 2020-09-28 LAB — CREAT BLDA-MCNC: 1.1 MG/DL (ref 0.6–1.3)

## 2020-09-28 PROCEDURE — 25010000002 IOPAMIDOL 61 % SOLUTION: Performed by: PHYSICIAN ASSISTANT

## 2020-09-28 PROCEDURE — G0008 ADMIN INFLUENZA VIRUS VAC: HCPCS | Performed by: PHYSICIAN ASSISTANT

## 2020-09-28 PROCEDURE — 71260 CT THORAX DX C+: CPT

## 2020-09-28 PROCEDURE — 90694 VACC AIIV4 NO PRSRV 0.5ML IM: CPT | Performed by: PHYSICIAN ASSISTANT

## 2020-09-28 PROCEDURE — 82565 ASSAY OF CREATININE: CPT

## 2020-09-28 PROCEDURE — 74177 CT ABD & PELVIS W/CONTRAST: CPT

## 2020-09-28 RX ADMIN — IOPAMIDOL 100 ML: 612 INJECTION, SOLUTION INTRAVENOUS at 07:38

## 2020-09-30 ENCOUNTER — TELEPHONE (OUTPATIENT)
Dept: FAMILY MEDICINE CLINIC | Facility: CLINIC | Age: 70
End: 2020-09-30

## 2020-09-30 NOTE — TELEPHONE ENCOUNTER
----- Message from Aletha Ochoa PA-C sent at 9/29/2020  5:27 PM EDT -----  Ask radiologist to clarify the appendix  Ask Him to weigh himself to see if he is still losing weight?  and I am going to send him to an oncologist if he is

## 2020-10-07 ENCOUNTER — ANTICOAGULATION VISIT (OUTPATIENT)
Dept: FAMILY MEDICINE CLINIC | Facility: CLINIC | Age: 70
End: 2020-10-07

## 2020-10-07 LAB — INR PPP: 3.3 (ref 2–3)

## 2020-10-12 ENCOUNTER — TELEPHONE (OUTPATIENT)
Dept: FAMILY MEDICINE CLINIC | Facility: CLINIC | Age: 70
End: 2020-10-12

## 2020-10-12 DIAGNOSIS — R63.4 UNINTENTIONAL WEIGHT LOSS: Primary | ICD-10-CM

## 2020-10-15 ENCOUNTER — ANTICOAGULATION VISIT (OUTPATIENT)
Dept: FAMILY MEDICINE CLINIC | Facility: CLINIC | Age: 70
End: 2020-10-15

## 2020-10-15 LAB — INR PPP: 2.3 (ref 2–3)

## 2020-10-28 ENCOUNTER — ANTICOAGULATION VISIT (OUTPATIENT)
Dept: FAMILY MEDICINE CLINIC | Facility: CLINIC | Age: 70
End: 2020-10-28

## 2020-10-28 LAB — INR PPP: 3.3 (ref 2–3)

## 2020-10-30 ENCOUNTER — TRANSCRIBE ORDERS (OUTPATIENT)
Dept: SLEEP MEDICINE | Facility: HOSPITAL | Age: 70
End: 2020-10-30

## 2020-10-30 DIAGNOSIS — Z01.818 OTHER SPECIFIED PRE-OPERATIVE EXAMINATION: Primary | ICD-10-CM

## 2020-10-31 ENCOUNTER — LAB (OUTPATIENT)
Dept: LAB | Facility: HOSPITAL | Age: 70
End: 2020-10-31

## 2020-10-31 DIAGNOSIS — Z01.818 OTHER SPECIFIED PRE-OPERATIVE EXAMINATION: ICD-10-CM

## 2020-10-31 PROCEDURE — U0004 COV-19 TEST NON-CDC HGH THRU: HCPCS | Performed by: INTERNAL MEDICINE

## 2020-10-31 PROCEDURE — C9803 HOPD COVID-19 SPEC COLLECT: HCPCS

## 2020-11-02 LAB — SARS-COV-2 RNA RESP QL NAA+PROBE: NOT DETECTED

## 2020-11-03 ENCOUNTER — ANESTHESIA EVENT (OUTPATIENT)
Dept: GASTROENTEROLOGY | Facility: HOSPITAL | Age: 70
End: 2020-11-03

## 2020-11-03 ENCOUNTER — HOSPITAL ENCOUNTER (OUTPATIENT)
Facility: HOSPITAL | Age: 70
Setting detail: HOSPITAL OUTPATIENT SURGERY
Discharge: HOME | End: 2020-11-03
Attending: INTERNAL MEDICINE | Admitting: INTERNAL MEDICINE

## 2020-11-03 ENCOUNTER — APPOINTMENT (OUTPATIENT)
Dept: GENERAL RADIOLOGY | Facility: HOSPITAL | Age: 70
End: 2020-11-03

## 2020-11-03 ENCOUNTER — ANESTHESIA (OUTPATIENT)
Dept: GASTROENTEROLOGY | Facility: HOSPITAL | Age: 70
End: 2020-11-03

## 2020-11-03 VITALS
BODY MASS INDEX: 28.44 KG/M2 | HEART RATE: 73 BPM | OXYGEN SATURATION: 97 % | HEIGHT: 72 IN | SYSTOLIC BLOOD PRESSURE: 114 MMHG | DIASTOLIC BLOOD PRESSURE: 78 MMHG | RESPIRATION RATE: 18 BRPM | WEIGHT: 210 LBS

## 2020-11-03 DIAGNOSIS — R59.1 LAD (LYMPHADENOPATHY): ICD-10-CM

## 2020-11-03 LAB
APPEARANCE FLD: ABNORMAL
B PARAPERT DNA SPEC QL NAA+PROBE: NOT DETECTED
B PERT DNA SPEC QL NAA+PROBE: NOT DETECTED
C PNEUM DNA NPH QL NAA+NON-PROBE: NOT DETECTED
COLOR FLD: COLORLESS
EOSINOPHIL NFR FLD MANUAL: 3 %
FLUAV SUBTYP SPEC NAA+PROBE: NOT DETECTED
FLUBV RNA ISLT QL NAA+PROBE: NOT DETECTED
GIE STN SPEC: NORMAL
HADV DNA SPEC NAA+PROBE: NOT DETECTED
HCOV 229E RNA SPEC QL NAA+PROBE: NOT DETECTED
HCOV HKU1 RNA SPEC QL NAA+PROBE: NOT DETECTED
HCOV NL63 RNA SPEC QL NAA+PROBE: NOT DETECTED
HCOV OC43 RNA SPEC QL NAA+PROBE: NOT DETECTED
HMPV RNA NPH QL NAA+NON-PROBE: NOT DETECTED
HPIV1 RNA SPEC QL NAA+PROBE: NOT DETECTED
HPIV2 RNA SPEC QL NAA+PROBE: NOT DETECTED
HPIV3 RNA NPH QL NAA+PROBE: NOT DETECTED
HPIV4 P GENE NPH QL NAA+PROBE: NOT DETECTED
INR PPP: 1.15 (ref 0.9–1.1)
LYMPHOCYTES NFR FLD MANUAL: 2 %
M PNEUMO IGG SER IA-ACNC: NOT DETECTED
METHOD: ABNORMAL
MONOCYTES NFR FLD: 5 %
MONOS+MACROS NFR FLD: 28 %
NEUTROPHILS NFR FLD MANUAL: 62 %
NUC CELL # FLD: 128 /MM3
PROTHROMBIN TIME: 14.6 SECONDS (ref 11.7–14.2)
RBC # FLD AUTO: 66 /MM3
RHINOVIRUS RNA SPEC NAA+PROBE: NOT DETECTED
RSV RNA NPH QL NAA+NON-PROBE: NOT DETECTED

## 2020-11-03 PROCEDURE — 88112 CYTOPATH CELL ENHANCE TECH: CPT | Performed by: INTERNAL MEDICINE

## 2020-11-03 PROCEDURE — 88173 CYTOPATH EVAL FNA REPORT: CPT | Performed by: INTERNAL MEDICINE

## 2020-11-03 PROCEDURE — 87116 MYCOBACTERIA CULTURE: CPT | Performed by: INTERNAL MEDICINE

## 2020-11-03 PROCEDURE — 0100U HC BIOFIRE FILMARRAY RESP PANEL 2: CPT | Performed by: INTERNAL MEDICINE

## 2020-11-03 PROCEDURE — 76000 FLUOROSCOPY <1 HR PHYS/QHP: CPT

## 2020-11-03 PROCEDURE — 85610 PROTHROMBIN TIME: CPT | Performed by: INTERNAL MEDICINE

## 2020-11-03 PROCEDURE — 88312 SPECIAL STAINS GROUP 1: CPT | Performed by: INTERNAL MEDICINE

## 2020-11-03 PROCEDURE — 87102 FUNGUS ISOLATION CULTURE: CPT | Performed by: INTERNAL MEDICINE

## 2020-11-03 PROCEDURE — 71045 X-RAY EXAM CHEST 1 VIEW: CPT

## 2020-11-03 PROCEDURE — 87071 CULTURE AEROBIC QUANT OTHER: CPT | Performed by: INTERNAL MEDICINE

## 2020-11-03 PROCEDURE — 89051 BODY FLUID CELL COUNT: CPT | Performed by: INTERNAL MEDICINE

## 2020-11-03 PROCEDURE — 88185 FLOWCYTOMETRY/TC ADD-ON: CPT

## 2020-11-03 PROCEDURE — 25010000002 PROPOFOL 10 MG/ML EMULSION: Performed by: ANESTHESIOLOGY

## 2020-11-03 PROCEDURE — 88184 FLOWCYTOMETRY/ TC 1 MARKER: CPT

## 2020-11-03 PROCEDURE — 87205 SMEAR GRAM STAIN: CPT | Performed by: INTERNAL MEDICINE

## 2020-11-03 PROCEDURE — C1726 CATH, BAL DIL, NON-VASCULAR: HCPCS | Performed by: INTERNAL MEDICINE

## 2020-11-03 PROCEDURE — 88305 TISSUE EXAM BY PATHOLOGIST: CPT | Performed by: INTERNAL MEDICINE

## 2020-11-03 PROCEDURE — 87206 SMEAR FLUORESCENT/ACID STAI: CPT | Performed by: INTERNAL MEDICINE

## 2020-11-03 PROCEDURE — 88300 SURGICAL PATH GROSS: CPT | Performed by: INTERNAL MEDICINE

## 2020-11-03 RX ORDER — LIDOCAINE HYDROCHLORIDE 20 MG/ML
INJECTION, SOLUTION INFILTRATION; PERINEURAL AS NEEDED
Status: DISCONTINUED | OUTPATIENT
Start: 2020-11-03 | End: 2020-11-03 | Stop reason: SURG

## 2020-11-03 RX ORDER — LIDOCAINE HYDROCHLORIDE 20 MG/ML
INJECTION, SOLUTION EPIDURAL; INFILTRATION; INTRACAUDAL; PERINEURAL AS NEEDED
Status: DISCONTINUED | OUTPATIENT
Start: 2020-11-03 | End: 2020-11-03 | Stop reason: HOSPADM

## 2020-11-03 RX ORDER — PROPOFOL 10 MG/ML
VIAL (ML) INTRAVENOUS AS NEEDED
Status: DISCONTINUED | OUTPATIENT
Start: 2020-11-03 | End: 2020-11-03 | Stop reason: SURG

## 2020-11-03 RX ORDER — PROPOFOL 10 MG/ML
VIAL (ML) INTRAVENOUS CONTINUOUS PRN
Status: DISCONTINUED | OUTPATIENT
Start: 2020-11-03 | End: 2020-11-03 | Stop reason: SURG

## 2020-11-03 RX ORDER — SODIUM CHLORIDE, SODIUM LACTATE, POTASSIUM CHLORIDE, CALCIUM CHLORIDE 600; 310; 30; 20 MG/100ML; MG/100ML; MG/100ML; MG/100ML
30 INJECTION, SOLUTION INTRAVENOUS CONTINUOUS PRN
Status: DISCONTINUED | OUTPATIENT
Start: 2020-11-03 | End: 2020-11-03 | Stop reason: HOSPADM

## 2020-11-03 RX ORDER — LIDOCAINE HYDROCHLORIDE 10 MG/ML
INJECTION, SOLUTION EPIDURAL; INFILTRATION; INTRACAUDAL; PERINEURAL AS NEEDED
Status: DISCONTINUED | OUTPATIENT
Start: 2020-11-03 | End: 2020-11-03 | Stop reason: HOSPADM

## 2020-11-03 RX ORDER — EPHEDRINE SULFATE 50 MG/ML
INJECTION, SOLUTION INTRAVENOUS AS NEEDED
Status: DISCONTINUED | OUTPATIENT
Start: 2020-11-03 | End: 2020-11-03 | Stop reason: SURG

## 2020-11-03 RX ADMIN — PROPOFOL 180 MCG/KG/MIN: 10 INJECTION, EMULSION INTRAVENOUS at 09:25

## 2020-11-03 RX ADMIN — EPHEDRINE SULFATE 10 MG: 50 INJECTION INTRAVENOUS at 09:48

## 2020-11-03 RX ADMIN — PROPOFOL 150 MG: 10 INJECTION, EMULSION INTRAVENOUS at 09:23

## 2020-11-03 RX ADMIN — SODIUM CHLORIDE, POTASSIUM CHLORIDE, SODIUM LACTATE AND CALCIUM CHLORIDE 30 ML/HR: 600; 310; 30; 20 INJECTION, SOLUTION INTRAVENOUS at 08:46

## 2020-11-03 RX ADMIN — LIDOCAINE HYDROCHLORIDE 50 MG: 20 INJECTION, SOLUTION INFILTRATION; PERINEURAL at 09:23

## 2020-11-03 NOTE — ANESTHESIA PROCEDURE NOTES
Airway  Date/Time: 11/3/2020 9:25 AM  Difficult airway    General Information and Staff    Patient location: [facility].  Anesthesiologist: Mindi Ch MD    Indications and Patient Condition  Indications for airway management: airway protection    Preoxygenated: yes  MILS maintained throughout  Mask difficulty assessment: 1 - vent by mask    Final Airway Details  Final airway type: supraglottic airway      Successful airway: bronch  Size 4.5    Number of attempts at approach: 1  Assessment: lips, teeth, and gum same as pre-op and atraumatic intubation

## 2020-11-03 NOTE — ANESTHESIA PREPROCEDURE EVALUATION
Anesthesia Evaluation     Patient summary reviewed and Nursing notes reviewed   no history of anesthetic complications:  NPO Solid Status: > 8 hours  NPO Liquid Status: > 4 hours           Airway   Mallampati: II  TM distance: >3 FB  Neck ROM: full  No difficulty expected  Dental    (+) edentulous    Pulmonary - normal exam    breath sounds clear to auscultation  (+) pulmonary embolism, shortness of breath, sleep apnea,     ROS comment: Lung mass, weight loss  Cardiovascular - normal exam    Rhythm: regular  Rate: normal    (+) past MI , CAD, angina, DVT, hyperlipidemia,  carotid artery disease right carotid      Neuro/Psych  (+) TIA, CVA,     GI/Hepatic/Renal/Endo    (+)  GERD,      ROS Comment: IBS    Musculoskeletal     (+) myalgias (fibromyositis), neck pain,       ROS comment: DDD  Abdominal  - normal exam   Substance History - negative use     OB/GYN negative ob/gyn ROS         Other   arthritis,                      Anesthesia Plan    ASA 3     general     intravenous induction     Anesthetic plan, all risks, benefits, and alternatives have been provided, discussed and informed consent has been obtained with: patient.

## 2020-11-03 NOTE — ANESTHESIA POSTPROCEDURE EVALUATION
"Patient: Javy Reeves Sr.    Procedure Summary     Date: 11/03/20 Room / Location:  [facility]    Anesthesia Start: 0919 Anesthesia Stop: 1012    Procedure: EBUS BRONCHOSCOPY WITH BAL & FLUOROSCOPY WITH MAC ANESTHESIA, BX & TBNA (N/A Bronchus) Diagnosis:     Surgeon: Jamil Willoughby MD Provider: Mindi Ch MD    Anesthesia Type: general ASA Status: 3          Anesthesia Type: general    Vitals  Vitals Value Taken Time   /78 11/03/20 1032   Temp     Pulse 73 11/03/20 1032   Resp 18 11/03/20 1032   SpO2 97 % 11/03/20 1032           Post Anesthesia Care and Evaluation    Patient location during evaluation: bedside  Patient participation: complete - patient participated  Level of consciousness: awake and alert  Pain management: adequate  Airway patency: patent  Anesthetic complications: No anesthetic complications    Cardiovascular status: acceptable  Respiratory status: acceptable  Hydration status: acceptable    Comments: /78 (BP Location: Right arm, Patient Position: Lying)   Pulse 73   Resp 18   Ht 182.9 cm (72\")   Wt 95.3 kg (210 lb)   SpO2 97%   BMI 28.48 kg/m²       "

## 2020-11-04 LAB
CYTO UR: NORMAL
CYTO UR: NORMAL
LAB AP CASE REPORT: NORMAL
LAB AP CASE REPORT: NORMAL
LAB AP CLINICAL INFORMATION: NORMAL
LAB AP CLINICAL INFORMATION: NORMAL
LAB AP DIAGNOSIS COMMENT: NORMAL
LAB AP DIAGNOSIS COMMENT: NORMAL
LAB AP NON-GYN SPECIMEN ADEQUACY: NORMAL
PATH REPORT.FINAL DX SPEC: NORMAL
PATH REPORT.FINAL DX SPEC: NORMAL
PATH REPORT.GROSS SPEC: NORMAL
PATH REPORT.GROSS SPEC: NORMAL

## 2020-11-05 LAB
BACTERIA SPEC AEROBE CULT: NO GROWTH
CYTO UR: NORMAL
GRAM STN SPEC: NORMAL
GRAM STN SPEC: NORMAL
LAB AP CASE REPORT: NORMAL
LAB AP FLOW CYTOMETRY SUMMARY: NORMAL
PATH REPORT.FINAL DX SPEC: NORMAL
PATH REPORT.GROSS SPEC: NORMAL

## 2020-11-11 ENCOUNTER — ANTICOAGULATION VISIT (OUTPATIENT)
Dept: FAMILY MEDICINE CLINIC | Facility: CLINIC | Age: 70
End: 2020-11-11

## 2020-11-11 LAB — INR PPP: 1.6 (ref 2–3)

## 2020-11-12 ENCOUNTER — TRANSCRIBE ORDERS (OUTPATIENT)
Dept: ADMINISTRATIVE | Facility: HOSPITAL | Age: 70
End: 2020-11-12

## 2020-11-12 DIAGNOSIS — J84.9 ILD (INTERSTITIAL LUNG DISEASE) (HCC): Primary | ICD-10-CM

## 2020-11-13 ENCOUNTER — ANTICOAGULATION VISIT (OUTPATIENT)
Dept: FAMILY MEDICINE CLINIC | Facility: CLINIC | Age: 70
End: 2020-11-13

## 2020-11-13 LAB — INR PPP: 2.2 (ref 2–3)

## 2020-11-18 ENCOUNTER — ANTICOAGULATION VISIT (OUTPATIENT)
Dept: FAMILY MEDICINE CLINIC | Facility: CLINIC | Age: 70
End: 2020-11-18

## 2020-11-18 LAB — INR PPP: 2.7 (ref 2–3)

## 2020-11-23 ENCOUNTER — HOSPITAL ENCOUNTER (EMERGENCY)
Facility: HOSPITAL | Age: 70
Discharge: HOME | End: 2020-11-24
Attending: EMERGENCY MEDICINE | Admitting: EMERGENCY MEDICINE

## 2020-11-23 ENCOUNTER — APPOINTMENT (OUTPATIENT)
Dept: GENERAL RADIOLOGY | Facility: HOSPITAL | Age: 70
End: 2020-11-23

## 2020-11-23 DIAGNOSIS — I10 ESSENTIAL HYPERTENSION: ICD-10-CM

## 2020-11-23 DIAGNOSIS — R07.9 CHEST PAIN, UNSPECIFIED TYPE: Primary | ICD-10-CM

## 2020-11-23 DIAGNOSIS — Z86.79 HISTORY OF CORONARY ARTERY DISEASE: ICD-10-CM

## 2020-11-23 LAB
ALBUMIN SERPL-MCNC: 4 G/DL (ref 3.5–5.2)
ALBUMIN/GLOB SERPL: 1.4 G/DL
ALP SERPL-CCNC: 51 U/L (ref 39–117)
ALT SERPL W P-5'-P-CCNC: 21 U/L (ref 1–41)
ANION GAP SERPL CALCULATED.3IONS-SCNC: 9 MMOL/L (ref 5–15)
AST SERPL-CCNC: 19 U/L (ref 1–40)
BASOPHILS # BLD AUTO: 0.02 10*3/MM3 (ref 0–0.2)
BASOPHILS NFR BLD AUTO: 0.4 % (ref 0–1.5)
BILIRUB SERPL-MCNC: 0.5 MG/DL (ref 0–1.2)
BUN SERPL-MCNC: 11 MG/DL (ref 8–23)
BUN/CREAT SERPL: 13.1 (ref 7–25)
CALCIUM SPEC-SCNC: 9.1 MG/DL (ref 8.6–10.5)
CHLORIDE SERPL-SCNC: 104 MMOL/L (ref 98–107)
CO2 SERPL-SCNC: 26 MMOL/L (ref 22–29)
CREAT SERPL-MCNC: 0.84 MG/DL (ref 0.76–1.27)
DEPRECATED RDW RBC AUTO: 52.3 FL (ref 37–54)
EOSINOPHIL # BLD AUTO: 0.06 10*3/MM3 (ref 0–0.4)
EOSINOPHIL NFR BLD AUTO: 1.1 % (ref 0.3–6.2)
ERYTHROCYTE [DISTWIDTH] IN BLOOD BY AUTOMATED COUNT: 14.6 % (ref 12.3–15.4)
GFR SERPL CREATININE-BSD FRML MDRD: 110 ML/MIN/1.73
GLOBULIN UR ELPH-MCNC: 2.9 GM/DL
GLUCOSE SERPL-MCNC: 120 MG/DL (ref 65–99)
HCT VFR BLD AUTO: 41.7 % (ref 37.5–51)
HGB BLD-MCNC: 14 G/DL (ref 13–17.7)
HOLD SPECIMEN: NORMAL
HOLD SPECIMEN: NORMAL
IMM GRANULOCYTES # BLD AUTO: 0.01 10*3/MM3 (ref 0–0.05)
IMM GRANULOCYTES NFR BLD AUTO: 0.2 % (ref 0–0.5)
INR PPP: 2.99 (ref 0.9–1.1)
LYMPHOCYTES # BLD AUTO: 1.84 10*3/MM3 (ref 0.7–3.1)
LYMPHOCYTES NFR BLD AUTO: 33.2 % (ref 19.6–45.3)
MCH RBC QN AUTO: 32.4 PG (ref 26.6–33)
MCHC RBC AUTO-ENTMCNC: 33.6 G/DL (ref 31.5–35.7)
MCV RBC AUTO: 96.5 FL (ref 79–97)
MONOCYTES # BLD AUTO: 0.42 10*3/MM3 (ref 0.1–0.9)
MONOCYTES NFR BLD AUTO: 7.6 % (ref 5–12)
NEUTROPHILS NFR BLD AUTO: 3.19 10*3/MM3 (ref 1.7–7)
NEUTROPHILS NFR BLD AUTO: 57.5 % (ref 42.7–76)
NRBC BLD AUTO-RTO: 0 /100 WBC (ref 0–0.2)
PLATELET # BLD AUTO: 174 10*3/MM3 (ref 140–450)
PMV BLD AUTO: 9.5 FL (ref 6–12)
POTASSIUM SERPL-SCNC: 3.5 MMOL/L (ref 3.5–5.2)
PROT SERPL-MCNC: 6.9 G/DL (ref 6–8.5)
PROTHROMBIN TIME: 30.8 SECONDS (ref 11.7–14.2)
RBC # BLD AUTO: 4.32 10*6/MM3 (ref 4.14–5.8)
SODIUM SERPL-SCNC: 139 MMOL/L (ref 136–145)
TROPONIN T SERPL-MCNC: <0.01 NG/ML (ref 0–0.03)
WBC # BLD AUTO: 5.54 10*3/MM3 (ref 3.4–10.8)
WHOLE BLOOD HOLD SPECIMEN: NORMAL
WHOLE BLOOD HOLD SPECIMEN: NORMAL

## 2020-11-23 PROCEDURE — 84484 ASSAY OF TROPONIN QUANT: CPT

## 2020-11-23 PROCEDURE — 71046 X-RAY EXAM CHEST 2 VIEWS: CPT

## 2020-11-23 PROCEDURE — 85610 PROTHROMBIN TIME: CPT

## 2020-11-23 PROCEDURE — 99284 EMERGENCY DEPT VISIT MOD MDM: CPT

## 2020-11-23 PROCEDURE — 93010 ELECTROCARDIOGRAM REPORT: CPT | Performed by: INTERNAL MEDICINE

## 2020-11-23 PROCEDURE — 36415 COLL VENOUS BLD VENIPUNCTURE: CPT

## 2020-11-23 PROCEDURE — 93005 ELECTROCARDIOGRAM TRACING: CPT | Performed by: EMERGENCY MEDICINE

## 2020-11-23 PROCEDURE — 93005 ELECTROCARDIOGRAM TRACING: CPT

## 2020-11-23 PROCEDURE — 80053 COMPREHEN METABOLIC PANEL: CPT

## 2020-11-23 PROCEDURE — 85025 COMPLETE CBC W/AUTO DIFF WBC: CPT

## 2020-11-23 RX ORDER — SODIUM CHLORIDE 0.9 % (FLUSH) 0.9 %
10 SYRINGE (ML) INJECTION AS NEEDED
Status: DISCONTINUED | OUTPATIENT
Start: 2020-11-23 | End: 2020-11-24 | Stop reason: HOSPADM

## 2020-11-23 RX ORDER — ASPIRIN 325 MG
325 TABLET ORAL ONCE
Status: COMPLETED | OUTPATIENT
Start: 2020-11-23 | End: 2020-11-24

## 2020-11-24 ENCOUNTER — HOSPITAL ENCOUNTER (OUTPATIENT)
Dept: CARDIOLOGY | Facility: HOSPITAL | Age: 70
Discharge: HOME | End: 2020-11-24

## 2020-11-24 VITALS
DIASTOLIC BLOOD PRESSURE: 83 MMHG | OXYGEN SATURATION: 100 % | HEART RATE: 50 BPM | SYSTOLIC BLOOD PRESSURE: 147 MMHG | TEMPERATURE: 98 F

## 2020-11-24 VITALS
RESPIRATION RATE: 20 BRPM | SYSTOLIC BLOOD PRESSURE: 129 MMHG | WEIGHT: 214 LBS | HEART RATE: 50 BPM | DIASTOLIC BLOOD PRESSURE: 76 MMHG | HEIGHT: 72 IN | BODY MASS INDEX: 28.99 KG/M2 | TEMPERATURE: 97.5 F | OXYGEN SATURATION: 96 %

## 2020-11-24 DIAGNOSIS — R07.2 PRECORDIAL PAIN: ICD-10-CM

## 2020-11-24 DIAGNOSIS — I25.119 CORONARY ARTERY DISEASE INVOLVING NATIVE CORONARY ARTERY OF NATIVE HEART WITH ANGINA PECTORIS (HCC): Primary | ICD-10-CM

## 2020-11-24 DIAGNOSIS — I25.119 CORONARY ARTERY DISEASE INVOLVING NATIVE CORONARY ARTERY OF NATIVE HEART WITH ANGINA PECTORIS (HCC): ICD-10-CM

## 2020-11-24 LAB
BH CV NUCLEAR PRIOR STUDY: 3
BH CV STRESS BP STAGE 1: NORMAL
BH CV STRESS COMMENTS STAGE 1: NORMAL
BH CV STRESS DOSE REGADENOSON STAGE 1: 0.4
BH CV STRESS DURATION MIN STAGE 1: 0
BH CV STRESS DURATION SEC STAGE 1: 10
BH CV STRESS HR STAGE 1: 80
BH CV STRESS PROTOCOL 1: NORMAL
BH CV STRESS RECOVERY BP: NORMAL MMHG
BH CV STRESS RECOVERY HR: 67 BPM
BH CV STRESS STAGE 1: 1
LV EF NUC BP: 70 %
MAXIMAL PREDICTED HEART RATE: 152 BPM
PERCENT MAX PREDICTED HR: 52.63 %
QT INTERVAL: 381 MS
STRESS BASELINE BP: NORMAL MMHG
STRESS BASELINE HR: 49 BPM
STRESS PERCENT HR: 62 %
STRESS POST EXERCISE DUR SEC: 10 SEC
STRESS POST PEAK BP: NORMAL MMHG
STRESS POST PEAK HR: 80 BPM
STRESS TARGET HR: 129 BPM
TROPONIN T SERPL-MCNC: <0.01 NG/ML (ref 0–0.03)

## 2020-11-24 PROCEDURE — 36415 COLL VENOUS BLD VENIPUNCTURE: CPT

## 2020-11-24 PROCEDURE — 93018 CV STRESS TEST I&R ONLY: CPT | Performed by: INTERNAL MEDICINE

## 2020-11-24 PROCEDURE — 0 RUBIDIUM CHLORIDE: Performed by: INTERNAL MEDICINE

## 2020-11-24 PROCEDURE — 93017 CV STRESS TEST TRACING ONLY: CPT

## 2020-11-24 PROCEDURE — 78492 MYOCRD IMG PET MLT RST&STRS: CPT

## 2020-11-24 PROCEDURE — 84484 ASSAY OF TROPONIN QUANT: CPT | Performed by: EMERGENCY MEDICINE

## 2020-11-24 PROCEDURE — 93016 CV STRESS TEST SUPVJ ONLY: CPT | Performed by: INTERNAL MEDICINE

## 2020-11-24 PROCEDURE — 94760 N-INVAS EAR/PLS OXIMETRY 1: CPT

## 2020-11-24 PROCEDURE — 25010000002 REGADENOSON 0.4 MG/5ML SOLUTION: Performed by: INTERNAL MEDICINE

## 2020-11-24 PROCEDURE — 99214 OFFICE O/P EST MOD 30 MIN: CPT | Performed by: INTERNAL MEDICINE

## 2020-11-24 PROCEDURE — 78492 MYOCRD IMG PET MLT RST&STRS: CPT | Performed by: INTERNAL MEDICINE

## 2020-11-24 PROCEDURE — A9555 RB82 RUBIDIUM: HCPCS | Performed by: INTERNAL MEDICINE

## 2020-11-24 RX ADMIN — ASPIRIN 325 MG: 325 TABLET ORAL at 01:16

## 2020-11-24 RX ADMIN — REGADENOSON 0.4 MG: 0.08 INJECTION, SOLUTION INTRAVENOUS at 10:53

## 2020-11-24 NOTE — ED PROVIDER NOTES
The SILVINO and I have discussed this patients history, physical exam, and treatment plan. I have reviewed the documentation and personally had a face to face interaction with the patient. I affirm the documentation and agree with the treatment and plan.  The following note describes my personal findings    This patient is a 70-year-old male with a history of coronary artery disease presenting to the emergency room today complaining of chest pain.  The patient states that his symptoms were sudden and began at rest and radiated into the neck and shoulder.  The patient did take nitroglycerin at home without relief but currently states that his chest pain has markedly improved to essentially resolved.    Exam: This patient is resting comfortably and in no distress, without gross neurological deficit.  She is afebrile with stable vital signs and nontoxic in appearance.  Cardiovascular exam shows a regular rate and rhythm without murmur.  Lungs are clear to auscultation bilaterally.    Plan: The patient has been essentially chest pain-free throughout the entire ED visit today.  His EKG is unchanged and currently has 2 - cardiac enzymes.  We will discussed the case with the patient's cardiologist and discussed the disposition plan.      The patient was wearing a facemask upon entrance into the room and remained in such throughout their visit.  I was wearing PPE including a facemask, eye protection, as well as gloves at any point entering the room and throughout the visit     Perez Gordon MD  11/24/20 2509

## 2020-11-24 NOTE — ED TRIAGE NOTES
Pt to ED from home per [facility] with reports of chest pain.  Pt denies SOA, and reports pain radiates down left arm and into left neck.    Pt self administered 2 SL nitro and then received ASA and 2 SL nitro per EMS during transport.      All triage performed with this RN wearing appropriate PPE.  Pt placed in mask upon arrival to ED.

## 2020-11-24 NOTE — ED PROVIDER NOTES
EMERGENCY DEPARTMENT ENCOUNTER    Room Number:  08/08  Date of encounter:  11/24/2020  PCP: Aletha Ochoa PA-C  Historian: Patient      HPI:  Chief Complaint: Chest pain      Context: Javy Reeves Sr. is a 70 y.o. male with past medical history of CAD, HLD, history of DVT/PE on Coumadin who presents to the ED c/o chest pains.  Patient states that his symptoms started about 845 this evening, pain was across the chest and radiated to the left neck, shoulder, back.  Pain feels like a pressure and tightness in the chest, and aching in the head and neck.  Symptoms are associated with shortness of breath.  Denies sweats, nausea, cough, fever.  States that nothing makes it better and nothing makes it worse.  Took nitro without relief.  Patient states that his cardiologist is Dr. Dalton.      PAST MEDICAL HISTORY  Active Ambulatory Problems     Diagnosis Date Noted   • Arthritis 11/16/2015   • Family history of early CAD 11/16/2015   • DDD (degenerative disc disease), cervical 11/16/2015   • DVT (deep venous thrombosis) (CMS/[facility]) 11/16/2015   • Fibromyalgia 11/16/2015   • Past heart attack 11/16/2015   • Hyperlipidemia 11/16/2015   • DDD (degenerative disc disease), lumbosacral 11/16/2015   • History of pulmonary embolus (PE) 11/16/2015   • Reflux esophagitis 11/16/2015   • TIA (transient ischemic attack) 11/16/2015   • Left groin pain 02/21/2017   • Cervical radicular pain 02/12/2018   • Mild atherosclerosis of right carotid artery 03/27/2018   • Cervical disc disorder at C6-C7 level with radiculopathy 05/16/2018   • Wheezing 07/20/2018   • Colon polyps 07/27/2018   • Right lower quadrant abdominal pain 07/27/2018   • Diarrhea 07/27/2018   • Cervical spinal stenosis 08/20/2018   • Cervical disc disorder at C5-C6 level with radiculopathy 08/20/2018   • GERD (gastroesophageal reflux disease) 09/07/2018   • Diverticulosis 09/13/2018   • Old MI (myocardial infarction) 09/20/2018   • Herniated cervical disc 10/05/2018   •  Encounter for therapeutic drug monitoring 10/10/2018   • Long term current use of anticoagulant therapy 10/10/2018   • ERRONEOUS ENCOUNTER--DISREGARD 03/27/2019   • Stable angina (CMS/HCC) 04/26/2019   • Low folic acid 09/22/2020   • Low serum vitamin B12 09/22/2020     Resolved Ambulatory Problems     Diagnosis Date Noted   • Hypertension 11/16/2015     Past Medical History:   Diagnosis Date   • Acute and subacute infective endocarditis in diseases classified elsewhere    • Allergic    • Chest pain    • Chronic low back pain    • Chronic pain    • Coronary artery disease    • Encounter for special screening examination for neoplasm of prostate 2012   • Eye exam, routine > 5 yrs ago   • Eye exam, routine 01/2015   • Fibromyositis    • Injury of back    • Injury of neck    • Irritable bowel    • Lumbar stenosis    • MI (myocardial infarction) (CMS/HCC)    • Neck pain    • Pain in limb    • Postlaminectomy syndrome of lumbar region    • Pulmonary embolism (CMS/HCC) 1996   • Shortness of breath    • Sleep apnea    • Stroke (CMS/HCC)          PAST SURGICAL HISTORY  Past Surgical History:   Procedure Laterality Date   • ANTERIOR CERVICAL DISCECTOMY W/ FUSION N/A 10/5/2018    Procedure: CERVICAL DISCECTOMY ANTERIOR FUSION WITH INSTRUMENTATION,ACDF C5/6, C6/7 with cages and plate;  Surgeon: Jean Coles MD;  Location: [facility] MAIN OR;  Service: Neurosurgery   • APPENDECTOMY N/A    • BRONCHOSCOPY N/A 11/3/2020    Procedure: EBUS BRONCHOSCOPY WITH BAL & FLUOROSCOPY WITH MAC ANESTHESIA, BX & TBNA;  Surgeon: Jamil Willoughby MD;  Location: [facility] ENDOSCOPY;  Service: Pulmonary;  Laterality: N/A;  PRE/POST-ABNORMAL CT, LAD   • CARDIAC CATHETERIZATION Left 1950    Left Heart Cath Left Ventriculography, selective coronary angiography; iliofemoral angiography; angio seal closure, Dr. Brady Ramos   • COLONOSCOPY  09/2015    removed 2 polyps, Dr. Manley   • COLONOSCOPY N/A 02/12/2007    Left colonic diverticulosis, No  evidence of polypoid neoplasia, Dr. Jarret Bloom   • COLONOSCOPY N/A 9/13/2018    Procedure: COLONOSCOPY TO CECUM WITH COLD BX'S POLYPECTOMY;  Surgeon: Berta Sin MD;  Location: [facility] ENDOSCOPY;  Service: General   • CYSTOSCOPY TRANSURETHRAL RESECTION OF PROSTATE N/A 11/01/2004    Dr. Rodolfo Anrold   • HAND SURGERY Right    • KNEE ARTHROPLASTY Left    • LUMBAR DISC SURGERY  2006, 2007    L4-S1 pseudoarthroses - Dr Cervantes   • LUMBAR DISC SURGERY N/A 01/29/2010    Removal of Instrumentation w/ decompression, Instrumentaion loosening & pt tested positive for zinc allergy, Dr. Kvng Cervantes   • ROTATOR CUFF REPAIR Right 04/2012   • THORACIC OUTLET SURGERY Bilateral    • VENA CAVA FILTER PLACEMENT  1996   • WRIST SURGERY Right     x3 starting in 1984         FAMILY HISTORY  Family History   Problem Relation Age of Onset   • Diabetes Sister    • Cancer Sister    • Hypertension Sister    • Kidney disease Sister    • Stroke Sister    • Heart disease Brother    • Hypertension Brother    • Cerebral aneurysm Brother    • Sudden death Brother    • Bleeding Disorder Brother    • Cancer Mother    • Heart disease Father    • Cancer Father         malignant neoplasm   • Deep vein thrombosis Father    • Heart attack Father    • Malig Hyperthermia Neg Hx          SOCIAL HISTORY  Social History     Socioeconomic History   • Marital status:      Spouse name: Not on file   • Number of children: Not on file   • Years of education: Not on file   • Highest education level: Not on file   Tobacco Use   • Smoking status: Current Every Day Smoker     Packs/day: 0.50     Years: 30.00     Pack years: 15.00     Types: Cigarettes, Cigars   • Smokeless tobacco: Never Used   • Tobacco comment: caffeine use   Substance and Sexual Activity   • Alcohol use: Yes     Comment: 2x per month   • Drug use: No   • Sexual activity: Defer         ALLERGIES  Zinc and Chantix [varenicline]        REVIEW OF SYSTEMS  Review of Systems   All systems  reviewed and negative except for those discussed in HPI.       PHYSICAL EXAM    I have reviewed the triage vital signs and nursing notes.    ED Triage Vitals   Temp Heart Rate Resp BP SpO2   11/23/20 2206 11/23/20 2202 11/23/20 2202 11/23/20 2202 11/23/20 2202   97.5 °F (36.4 °C) 76 20 180/90 97 %      Temp src Heart Rate Source Patient Position BP Location FiO2 (%)   11/23/20 2206 11/23/20 2202 11/23/20 2202 -- --   Tympanic Monitor Sitting         Physical Exam  GENERAL: Alert and oriented x3, not distressed  HENT: Mask in place  EYES: no scleral icterus  CV: regular rhythm, regular rate, no murmurs, rubs or gallops  RESPIRATORY: normal effort, CTA bilaterally, no chest wall tenderness  ABDOMEN: soft  MUSCULOSKELETAL: no deformity  NEURO: alert, moves all extremities, follows commands  SKIN: warm, dry        LAB RESULTS  Recent Results (from the past 24 hour(s))   ECG 12 Lead    Collection Time: 11/23/20 10:05 PM   Result Value Ref Range    QT Interval 381 ms   Comprehensive Metabolic Panel    Collection Time: 11/23/20 10:22 PM    Specimen: Blood   Result Value Ref Range    Glucose 120 (H) 65 - 99 mg/dL    BUN 11 8 - 23 mg/dL    Creatinine 0.84 0.76 - 1.27 mg/dL    Sodium 139 136 - 145 mmol/L    Potassium 3.5 3.5 - 5.2 mmol/L    Chloride 104 98 - 107 mmol/L    CO2 26.0 22.0 - 29.0 mmol/L    Calcium 9.1 8.6 - 10.5 mg/dL    Total Protein 6.9 6.0 - 8.5 g/dL    Albumin 4.00 3.50 - 5.20 g/dL    ALT (SGPT) 21 1 - 41 U/L    AST (SGOT) 19 1 - 40 U/L    Alkaline Phosphatase 51 39 - 117 U/L    Total Bilirubin 0.5 0.0 - 1.2 mg/dL    eGFR  African Amer 110 >60 mL/min/1.73    Globulin 2.9 gm/dL    A/G Ratio 1.4 g/dL    BUN/Creatinine Ratio 13.1 7.0 - 25.0    Anion Gap 9.0 5.0 - 15.0 mmol/L   Troponin    Collection Time: 11/23/20 10:22 PM    Specimen: Blood   Result Value Ref Range    Troponin T <0.010 0.000 - 0.030 ng/mL   Protime-INR    Collection Time: 11/23/20 10:22 PM    Specimen: Blood   Result Value Ref Range    Protime  30.8 (H) 11.7 - 14.2 Seconds    INR 2.99 (H) 0.90 - 1.10   Light Blue Top    Collection Time: 11/23/20 10:22 PM   Result Value Ref Range    Extra Tube hold for add-on    Green Top (Gel)    Collection Time: 11/23/20 10:22 PM   Result Value Ref Range    Extra Tube Hold for add-ons.    Lavender Top    Collection Time: 11/23/20 10:22 PM   Result Value Ref Range    Extra Tube hold for add-on    Gold Top - SST    Collection Time: 11/23/20 10:22 PM   Result Value Ref Range    Extra Tube Hold for add-ons.    CBC Auto Differential    Collection Time: 11/23/20 10:22 PM    Specimen: Blood   Result Value Ref Range    WBC 5.54 3.40 - 10.80 10*3/mm3    RBC 4.32 4.14 - 5.80 10*6/mm3    Hemoglobin 14.0 13.0 - 17.7 g/dL    Hematocrit 41.7 37.5 - 51.0 %    MCV 96.5 79.0 - 97.0 fL    MCH 32.4 26.6 - 33.0 pg    MCHC 33.6 31.5 - 35.7 g/dL    RDW 14.6 12.3 - 15.4 %    RDW-SD 52.3 37.0 - 54.0 fl    MPV 9.5 6.0 - 12.0 fL    Platelets 174 140 - 450 10*3/mm3    Neutrophil % 57.5 42.7 - 76.0 %    Lymphocyte % 33.2 19.6 - 45.3 %    Monocyte % 7.6 5.0 - 12.0 %    Eosinophil % 1.1 0.3 - 6.2 %    Basophil % 0.4 0.0 - 1.5 %    Immature Grans % 0.2 0.0 - 0.5 %    Neutrophils, Absolute 3.19 1.70 - 7.00 10*3/mm3    Lymphocytes, Absolute 1.84 0.70 - 3.10 10*3/mm3    Monocytes, Absolute 0.42 0.10 - 0.90 10*3/mm3    Eosinophils, Absolute 0.06 0.00 - 0.40 10*3/mm3    Basophils, Absolute 0.02 0.00 - 0.20 10*3/mm3    Immature Grans, Absolute 0.01 0.00 - 0.05 10*3/mm3    nRBC 0.0 0.0 - 0.2 /100 WBC   Troponin    Collection Time: 11/24/20  1:25 AM    Specimen: Blood   Result Value Ref Range    Troponin T <0.010 0.000 - 0.030 ng/mL       Ordered the above labs and independently reviewed the results.        RADIOLOGY  Xr Chest 2 View    Result Date: 11/24/2020  PA and lateral chest x-ray  HISTORY: Chest pain.  TECHNIQUE: PA and lateral images of the chest are provided and correlated with CT chest 09/28/2020. Bronchoscopy was performed 11/03/2020. Electronic  medical records are currently unavailable. I do not have specific details regarding those results.  FINDINGS: The cardiomediastinal silhouette is normal. There is cervical spine fusion hardware. Hazy density throughout both lungs is again observed and appears similar to the numerous previous chest CT exams. No effusion or pneumothorax is present. There is an inferior vena cava filter.      Abnormal appearing pulmonary parenchyma is again observed and appears similar to that seen on previous chest CT scans. No other abnormality is evident.  This report was finalized on 11/24/2020 12:15 AM by Dr. Hossein Roach M.D.        I ordered the above noted radiological studies. Reviewed by me and discussed with radiologist.  See dictation for official radiology interpretation.      PROCEDURES    Procedures      MEDICATIONS GIVEN IN ER    Medications   sodium chloride 0.9 % flush 10 mL (has no administration in time range)   aspirin tablet 325 mg (325 mg Oral Given 11/24/20 0116)         PROGRESS, DATA ANALYSIS, CONSULTS, AND MEDICAL DECISION MAKING    All labs have been independently reviewed by me.  All radiology studies have been reviewed by me and discussed with radiologist dictating the report.   EKG's independently viewed and interpreted by me.  Discussion below represents my analysis of pertinent findings related to patient's condition, differential diagnosis, treatment plan and final disposition.      ED Course as of Nov 24 0612   Tue Nov 24, 2020   0119 EKG          EKG time: 22:05  Rhythm/Rate: Sinus rhythm with a rate of 75 bpm  P waves and AL: Normal  QRS, axis: Normal  ST and T waves: No acute ST or T wave changes    Interpreted Contemporaneously by me, independently viewed  Unchanged compared to prior of 4/21/2020.      [ROMAINE]   0321 Patient rechecked, resting comfortably in bed, denies any chest pain currently.  Discussed results including negative repeat troponin, and recommendation for admission for cardiology  consultation and rule out.  Patient expressed understanding and agrees with plan.    [ROMAINE]   0532 Patient rechecked, continues to remain pain-free.  Cardiology has been paged twice, they have still not returned the page.    [ROMAINE]   0541 Discussed patient with cardiology on-call, Dr. Alicea, would like to have patient follow-up in the office at 8:00 this morning.    [ROMAINE]   0552 Patient rechecked, still denies any chest pain.  Discussed the plan to follow-up in the office at 8:00 this morning for an outpatient stress test with cardiology.  Patient states that he feels comfortable with this plan.    [ROMAINE]      ED Course User Index  [ROMAINE] Hossein Bobby, PA         PPE: Both the patient and I wore a surgical mask throughout the entire patient encounter. I wore protective goggles.     AS OF 06:12 EST VITALS:    BP - 129/84  HR - 52  TEMP - 97.5 °F (36.4 °C) (Tympanic)  O2 SATS - 96%        DIAGNOSIS  Final diagnoses:   Chest pain, unspecified type   History of coronary artery disease   Essential hypertension         DISPOSITION  DISCHARGE    Patient discharged in stable condition.    Reviewed implications of results, diagnosis, meds, responsibility to follow up, warning signs and symptoms of possible worsening, potential complications and reasons to return to ER.    Patient/Family voiced understanding of above instructions.    Discussed plan for discharge, as there is no emergent indication for admission. Patient referred to primary care provider for BP management due to today's BP. Pt/family is agreeable and understands need for follow up and repeat testing.  Pt is aware that discharge does not mean that nothing is wrong but it indicates no emergency is present that requires admission and they must continue care with follow-up as given below or physician of their choice.     FOLLOW-UP  Shakira Alicea MD  [address] [facility]  [address]  [place] [id number] [phone]    Go to   at 8:00 this morning.         Medication List       Changed    pregabalin 100 MG capsule  Commonly known as: Lyrica  Take 1 capsule by mouth 2 (Two) Times a Day. For pain  What changed: how much to take               Hossein Bobby PA  11/24/20 0612

## 2020-11-24 NOTE — PROGRESS NOTES
Date of Office Visit: 2020  Encounter Provider: Abdulaziz uDong MD  Place of Service: [facility] CARDIOLOGY  Patient Name: Javy Reeves Sr.  :1950    Chief complaint: Chest pain, coronary artery disease.    History of Present Illness:    I had the pleasure of seeing the patient in our CEC unit on 2020.  He is a very pleasant 70 year-old male who is normally followed by Dr. Dalton in our group.  The patient has a remote history of myocardial infarction, although he states that this was 15 to 20 years ago, and he did not have stents placed.  He has had ongoing tobacco use since that time.  He also has a history of a left lower extremity DVT and pulmonary embolism in  following an arthroscopic surgery.  He had an IVC filter placed at that time, and has been maintained on long-term warfarin.    The patient states that he was in his garage last night, and was smoking a cigar.  He states that he began to experience significant chest pressure substernally which then radiated into his left shoulder and up his left neck into the side of his head.  He states that the pain was intense, and lasted for several hours.  He took 2 sublingual nitroglycerin with partial relief.  He subsequently came to the emergency department.  His troponin was negative.  His INR was therapeutic at 2.99.  He had a questionable old anterior infarct on his EKG, although it was unchanged.  He did not have ischemic changes.  He last had an echocardiogram in 2020, at which time his ejection fraction was normal.  He did tell me that this was similar to his previous anginal pain, although it was actually more intense.    Past Medical History:   Diagnosis Date   • Acute and subacute infective endocarditis in diseases classified elsewhere    • Allergic    • Arthritis    • Chest pain    • Chronic low back pain    • Chronic pain    • Colon polyps    • Coronary artery disease    • DDD  (degenerative disc disease), cervical    • DDD (degenerative disc disease), lumbosacral    • Diverticulosis    • DVT (deep venous thrombosis) (CMS/HCC) 1996    Left Lower extremity following arthroscopic knee surgery in 1996. IVC fliter placed at that time.   • Encounter for special screening examination for neoplasm of prostate 2012   • Eye exam, routine > 5 yrs ago   • Eye exam, routine 01/2015   • Fibromyalgia    • Fibromyositis    • GERD (gastroesophageal reflux disease)    • Hyperlipidemia    • Injury of back    • Injury of neck    • Irritable bowel    • Lumbar stenosis    • MI (myocardial infarction) (CMS/HCC)    • Neck pain    • Pain in limb    • Past heart attack    • Postlaminectomy syndrome of lumbar region    • Pulmonary embolism (CMS/HCC) 1996    Left Lower extremity following arthroscopic knee surgery in 1996. IVC fliter placed at that time.   • Reflux esophagitis    • Shortness of breath    • Sleep apnea     NO CPAP   • Stroke (CMS/HCC)     Mini Stroke    • TIA (transient ischemic attack)        Past Surgical History:   Procedure Laterality Date   • ANTERIOR CERVICAL DISCECTOMY W/ FUSION N/A 10/5/2018    Procedure: CERVICAL DISCECTOMY ANTERIOR FUSION WITH INSTRUMENTATION,ACDF C5/6, C6/7 with cages and plate;  Surgeon: Jean Coles MD;  Location: [facility] MAIN OR;  Service: Neurosurgery   • APPENDECTOMY N/A    • BRONCHOSCOPY N/A 11/3/2020    Procedure: EBUS BRONCHOSCOPY WITH BAL & FLUOROSCOPY WITH MAC ANESTHESIA, BX & TBNA;  Surgeon: Jamil Willoughby MD;  Location: [facility] ENDOSCOPY;  Service: Pulmonary;  Laterality: N/A;  PRE/POST-ABNORMAL CT, LAD   • CARDIAC CATHETERIZATION Left 1950    Left Heart Cath Left Ventriculography, selective coronary angiography; iliofemoral angiography; angio seal closure, Dr. Brady Ramos   • COLONOSCOPY  09/2015    removed 2 polyps, Dr. Manley   • COLONOSCOPY N/A 02/12/2007    Left colonic diverticulosis, No evidence of polypoid neoplasia, Dr. Jarret Bloom   •  COLONOSCOPY N/A 9/13/2018    Procedure: COLONOSCOPY TO CECUM WITH COLD BX'S POLYPECTOMY;  Surgeon: Berta Sin MD;  Location: [facility] ENDOSCOPY;  Service: General   • CYSTOSCOPY TRANSURETHRAL RESECTION OF PROSTATE N/A 11/01/2004    Dr. Rodolfo Arnold   • HAND SURGERY Right    • KNEE ARTHROPLASTY Left    • LUMBAR DISC SURGERY  2006, 2007    L4-S1 pseudoarthroses - Dr Cervantes   • LUMBAR DISC SURGERY N/A 01/29/2010    Removal of Instrumentation w/ decompression, Instrumentaion loosening & pt tested positive for zinc allergy, Dr. Kvng Cervantes   • ROTATOR CUFF REPAIR Right 04/2012   • THORACIC OUTLET SURGERY Bilateral    • VENA CAVA FILTER PLACEMENT  1996   • WRIST SURGERY Right     x3 starting in 1984       Current Outpatient Medications on File Prior to Encounter   Medication Sig Dispense Refill   • acetaminophen (TYLENOL) 500 MG tablet Take 1,000 mg by mouth Every 6 (Six) Hours As Needed for Mild Pain .     • albuterol sulfate  (90 Base) MCG/ACT inhaler Inhale 2 puffs Every 4 (Four) Hours As Needed for Wheezing or Shortness of Air. 1 inhaler 11   • atorvastatin (LIPITOR) 40 MG tablet Take 1 tablet by mouth Daily. For cholesterol 30 tablet 11   • cetirizine (zyrTEC) 10 MG tablet Take 10 mg by mouth Daily.     • Cyanocobalamin (Vitamin B-12) 1000 MCG sublingual tablet One SL daily 90 each 3   • folic acid (FOLVITE) 1 MG tablet Take 1 tablet by mouth Daily. 90 tablet 1   • nitroglycerin (NITROSTAT) 0.4 MG SL tablet Place 1 tablet under the tongue Every 5 (Five) Minutes As Needed for Chest Pain. Take no more than 3 doses in 15 minutes. 30 tablet 5   • pregabalin (LYRICA) 100 MG capsule Take 1 capsule by mouth 2 (Two) Times a Day. For pain (Patient taking differently: Take 75 mg by mouth 2 (Two) Times a Day. For pain) 60 capsule 5   • Turmeric 500 MG tablet Take 500 mg by mouth Daily.     • warfarin (COUMADIN) 5 MG tablet TAKE 2 TABLETS BY MOUTH EVERY  tablet 3   • tiZANidine (ZANAFLEX) 4 MG tablet Take  1 tablet by mouth Every 8 (Eight) Hours As Needed for Muscle Spasms. 90 tablet 3   • umeclidinium-vilanterol (ANORO ELLIPTA) 62.5-25 MCG/INH aerosol powder  inhaler Inhale one puff once daily for COPD 60 each 11   • warfarin (COUMADIN) 1 MG tablet TAKE 2 TABLETS BY MOUTH EVERY DAY 60 tablet 11     Current Facility-Administered Medications on File Prior to Encounter   Medication Dose Route Frequency Provider Last Rate Last Admin   • [COMPLETED] aspirin tablet 325 mg  325 mg Oral Once Perez Gordon MD   325 mg at 11/24/20 0116   • [DISCONTINUED] sodium chloride 0.9 % flush 10 mL  10 mL Intravenous PRN Perez Gordon MD         Allergies as of 11/24/2020 - Reviewed 11/24/2020   Allergen Reaction Noted   • Zinc Other (See Comments) 07/12/2016   • Chantix [varenicline] Other (See Comments) 07/27/2018     Social History     Socioeconomic History   • Marital status:      Spouse name: Not on file   • Number of children: Not on file   • Years of education: Not on file   • Highest education level: Not on file   Tobacco Use   • Smoking status: Current Every Day Smoker     Packs/day: 0.50     Years: 30.00     Pack years: 15.00     Types: Cigarettes, Cigars   • Smokeless tobacco: Never Used   • Tobacco comment: caffeine use   Substance and Sexual Activity   • Alcohol use: Yes     Comment: 2x per month   • Drug use: No   • Sexual activity: Defer     Family History   Problem Relation Age of Onset   • Diabetes Sister    • Cancer Sister    • Hypertension Sister    • Kidney disease Sister    • Stroke Sister    • Heart disease Brother    • Hypertension Brother    • Cerebral aneurysm Brother    • Sudden death Brother    • Bleeding Disorder Brother    • Cancer Mother    • Heart disease Father    • Cancer Father         malignant neoplasm   • Deep vein thrombosis Father    • Heart attack Father    • Malig Hyperthermia Neg Hx        Review of Systems   Cardiovascular: Positive for chest pain.   All other systems  reviewed and are negative.     Objective:     Vitals:    11/24/20 0849   BP: 147/83   BP Location: Right arm   Patient Position: Sitting   Pulse: 50   Temp: 98 °F (36.7 °C)   TempSrc: Temporal   SpO2: 100%     There is no height or weight on file to calculate BMI.    Constitutional:       Appearance: Healthy appearance. Well-developed.   Eyes:      Conjunctiva/sclera: Conjunctivae normal.   HENT:      Head: Normocephalic and atraumatic.   Neck:      Musculoskeletal: Neck supple.   Pulmonary:      Effort: Pulmonary effort is normal.      Breath sounds: Normal breath sounds.   Cardiovascular:      Normal rate. Regular rhythm.      Murmurs: There is no murmur.      No gallop.   Edema:     Peripheral edema absent.   Abdominal:      Palpations: Abdomen is soft.      Tenderness: There is no abdominal tenderness.   Skin:     General: Skin is warm.   Neurological:      Mental Status: Alert and oriented to person, place, and time.   Psychiatric:         Behavior: Behavior normal.       Lab Review:   Procedures      Assessment:       Diagnosis Plan   1. Coronary artery disease involving native coronary artery of native heart with angina pectoris (CMS/[facility])  Stress Test With Myocardial Perfusion One Day   2. Precordial pain  Stress Test With Myocardial Perfusion One Day     Plan:       He obviously has risk factors for progression of disease.  He has had an infarct in the past remotely.  He has continued to smoke tobacco.  His symptoms were fairly significant, although his EKG did not show acute changes, and his troponin was negative.  His INR was therapeutic, and I do not feel that this is from a pulmonary embolism.  I have recommended proceeding with a Lexiscan Myoview stress test today to further evaluate this.  He cannot exercise on a treadmill secondary to musculoskeletal issues.  The patient was in agreement with this plan.  Further plans will be made after the results are known.  I would have a fairly low threshold for  cardiac catheterization in this case.

## 2020-12-01 ENCOUNTER — OFFICE VISIT (OUTPATIENT)
Dept: CARDIOLOGY | Facility: CLINIC | Age: 70
End: 2020-12-01

## 2020-12-01 VITALS
HEART RATE: 65 BPM | BODY MASS INDEX: 29.61 KG/M2 | HEIGHT: 72 IN | OXYGEN SATURATION: 96 % | DIASTOLIC BLOOD PRESSURE: 82 MMHG | SYSTOLIC BLOOD PRESSURE: 130 MMHG | WEIGHT: 218.6 LBS

## 2020-12-01 DIAGNOSIS — Z71.6 TOBACCO ABUSE COUNSELING: ICD-10-CM

## 2020-12-01 DIAGNOSIS — Z72.0 TOBACCO ABUSE: ICD-10-CM

## 2020-12-01 DIAGNOSIS — Z79.01 LONG TERM CURRENT USE OF ANTICOAGULANT THERAPY: ICD-10-CM

## 2020-12-01 DIAGNOSIS — I82.5Y3 CHRONIC DEEP VEIN THROMBOSIS (DVT) OF PROXIMAL VEIN OF BOTH LOWER EXTREMITIES (HCC): ICD-10-CM

## 2020-12-01 DIAGNOSIS — G45.9 TIA (TRANSIENT ISCHEMIC ATTACK): ICD-10-CM

## 2020-12-01 DIAGNOSIS — R07.2 PRECORDIAL CHEST PAIN: Primary | ICD-10-CM

## 2020-12-01 DIAGNOSIS — Z86.711 HISTORY OF PULMONARY EMBOLUS (PE): ICD-10-CM

## 2020-12-01 DIAGNOSIS — J98.4 LUNG DISEASE: ICD-10-CM

## 2020-12-01 DIAGNOSIS — I25.2 OLD MI (MYOCARDIAL INFARCTION): ICD-10-CM

## 2020-12-01 LAB — FUNGUS WND CULT: NORMAL

## 2020-12-01 PROCEDURE — 99214 OFFICE O/P EST MOD 30 MIN: CPT | Performed by: NURSE PRACTITIONER

## 2020-12-01 PROCEDURE — 93000 ELECTROCARDIOGRAM COMPLETE: CPT | Performed by: NURSE PRACTITIONER

## 2020-12-01 RX ORDER — NITROGLYCERIN 0.4 MG/1
0.4 TABLET SUBLINGUAL
Qty: 30 TABLET | Refills: 5 | Status: SHIPPED | OUTPATIENT
Start: 2020-12-01 | End: 2022-04-29 | Stop reason: SDUPTHER

## 2020-12-01 RX ORDER — NITROGLYCERIN 0.4 MG/1
0.4 TABLET SUBLINGUAL
Qty: 30 TABLET | Refills: 5 | Status: SHIPPED | OUTPATIENT
Start: 2020-12-01 | End: 2020-12-01 | Stop reason: SDUPTHER

## 2020-12-01 NOTE — PROGRESS NOTES
Date of Office Visit: 2020  Encounter Provider: GEOVANNI Garvey  Primary Cardiologist: Dr. Dalton  Place of Service: [facility] CARDIOLOGY  Patient Name: Javy Reeves Sr.  :1950      Subjective:     Chief Complaint:  [facility] follow up for chest pain    History of Present Illness:  Javy Reeves Sr. is a pleasant 70 y.o. male who is new to me .  Outside records have been requested and reviewed by me if available.     This is a patient of Dr. Dalton with a history of an old MI several years ago around 15+ years ago without stents placed, ongoing tobacco abuse, left lower extremity DVT and pulmonary embolism in  with prior IVC filter placement on long-term warfarin managed by PCP, interstitial lung disease follows with Dr. Willoughby., prior TIA.    2020 patient was last in the office to see Dr. Dalton .    2020 patient had an echo that was unremarkable with EF 65% with normal wall contractility and diastolic function no significant valvular regurgitation noted.  2020 low lower extremity venous duplex showed chronic left lower extremity DVT in the distal femoral and popliteal veins    2020 patient was seen in the [facility]  by Dr. Duong after going to the emergency department he developed significant chest pressure substernally radiating to left shoulder and up into the left neck and side of his head that was intense and lasted for several hours that occurred while he was smoking a cigar in his garage.  He took 2 sublingual nitroglycerin with partial relief.  His troponin was negative INR was therapeutic at 2.99.CBC normal, CMP normal except for elevated glucose 120.  EKG showed questionable old anterior infarct although was unchanged and no other ischemic changes.  This was similar to prior anginal pain.  He had another troponin that was negative.  Patient had a stress test that showed no evidence of ischemia EF 70% was a low risk  study.      Patient is presenting today for [facility] follow-up.  At today's visit patient reports that intermittently throughout his life he is taking as needed nitroglycerin for chest pain.  Chest pain typically always occurs at rest and a lot of times he feels like might be related to indigestion.  He walks about 6000 steps a day and is pretty active at his job at the [employer] and this does not bring on chest pain symptoms.  He has not had any significant chest pain episodes like he experienced when he went to the ER.  The time he went to the ER recently he had some change in his breathing with that normally that is not the case.  On average she will take a nitroglycerin about once every 3 months and this has not really changed her in the last few months.  He has a lot of chronic problems including fibromyalgia and sometimes he attributes symptoms to that.  He has chronic abdominal pain that is unchanged he has a cough that has been ongoing since his neck surgery.  He has intermittent left-sided arm numbness and tingling that is not new but he does have short-lived intermittent sharp left-sided headaches that have encouraged him to discuss with his PCP.  I also encouraged him to monitor his blood pressure at home as he is not doing that.  Unfortunately continues to smoke about 5 cigars a day.        Past Medical History:   Diagnosis Date   • Acute and subacute infective endocarditis in diseases classified elsewhere    • Allergic    • Arthritis    • Chest pain    • Chronic low back pain    • Chronic pain    • Colon polyps    • Coronary artery disease    • DDD (degenerative disc disease), cervical    • DDD (degenerative disc disease), lumbosacral    • Diverticulosis    • DVT (deep venous thrombosis) (CMS/Formerly [facility]) 1996    Left Lower extremity following arthroscopic knee surgery in 1996. IVC fliter placed at that time.   • Encounter for special screening examination for neoplasm of prostate 2012   • Eye exam, routine > 5 yrs  ago   • Eye exam, routine 01/2015   • Fibromyalgia    • Fibromyositis    • GERD (gastroesophageal reflux disease)    • Hyperlipidemia    • Injury of back    • Injury of neck    • Irritable bowel    • Lumbar stenosis    • MI (myocardial infarction) (CMS/HCC)    • Neck pain    • Pain in limb    • Past heart attack    • Postlaminectomy syndrome of lumbar region    • Pulmonary embolism (CMS/HCC) 1996    Left Lower extremity following arthroscopic knee surgery in 1996. IVC fliter placed at that time.   • Reflux esophagitis    • Shortness of breath    • Sleep apnea     NO CPAP   • Stroke (CMS/HCC)     Mini Stroke    • TIA (transient ischemic attack)      Past Surgical History:   Procedure Laterality Date   • ANTERIOR CERVICAL DISCECTOMY W/ FUSION N/A 10/5/2018    Procedure: CERVICAL DISCECTOMY ANTERIOR FUSION WITH INSTRUMENTATION,ACDF C5/6, C6/7 with cages and plate;  Surgeon: Jean Coles MD;  Location: [facility] MAIN OR;  Service: Neurosurgery   • APPENDECTOMY N/A    • BRONCHOSCOPY N/A 11/3/2020    Procedure: EBUS BRONCHOSCOPY WITH BAL & FLUOROSCOPY WITH MAC ANESTHESIA, BX & TBNA;  Surgeon: Jamil Willoughby MD;  Location: [facility] ENDOSCOPY;  Service: Pulmonary;  Laterality: N/A;  PRE/POST-ABNORMAL CT, LAD   • CARDIAC CATHETERIZATION Left 1950    Left Heart Cath Left Ventriculography, selective coronary angiography; iliofemoral angiography; angio seal closure, Dr. Brady Ramos   • COLONOSCOPY  09/2015    removed 2 polyps, Dr. Manley   • COLONOSCOPY N/A 02/12/2007    Left colonic diverticulosis, No evidence of polypoid neoplasia, Dr. Jarret Bloom   • COLONOSCOPY N/A 9/13/2018    Procedure: COLONOSCOPY TO CECUM WITH COLD BX'S POLYPECTOMY;  Surgeon: Berta Sin MD;  Location: [facility] ENDOSCOPY;  Service: General   • CYSTOSCOPY TRANSURETHRAL RESECTION OF PROSTATE N/A 11/01/2004    Dr. Rodolfo Arnold   • HAND SURGERY Right    • KNEE ARTHROPLASTY Left    • LUMBAR DISC SURGERY  2006, 2007    L4-S1 pseudoarthroses  - Dr Cervantes   • LUMBAR DISC SURGERY N/A 01/29/2010    Removal of Instrumentation w/ decompression, Instrumentaion loosening & pt tested positive for zinc allergy, Dr. Kvng Cervantes   • ROTATOR CUFF REPAIR Right 04/2012   • THORACIC OUTLET SURGERY Bilateral    • VENA CAVA FILTER PLACEMENT  1996   • WRIST SURGERY Right     x3 starting in 1984     Outpatient Medications Prior to Visit   Medication Sig Dispense Refill   • acetaminophen (TYLENOL) 500 MG tablet Take 650 mg by mouth Every 6 (Six) Hours As Needed for Mild Pain .     • albuterol sulfate  (90 Base) MCG/ACT inhaler Inhale 2 puffs Every 4 (Four) Hours As Needed for Wheezing or Shortness of Air. 1 inhaler 11   • atorvastatin (LIPITOR) 40 MG tablet Take 1 tablet by mouth Daily. For cholesterol 30 tablet 11   • cetirizine (zyrTEC) 10 MG tablet Take 10 mg by mouth Daily.     • Cyanocobalamin (Vitamin B-12) 1000 MCG sublingual tablet One SL daily 90 each 3   • folic acid (FOLVITE) 1 MG tablet Take 1 tablet by mouth Daily. 90 tablet 1   • nitroglycerin (NITROSTAT) 0.4 MG SL tablet Place 1 tablet under the tongue Every 5 (Five) Minutes As Needed for Chest Pain. Take no more than 3 doses in 15 minutes. 30 tablet 5   • pregabalin (LYRICA) 100 MG capsule Take 1 capsule by mouth 2 (Two) Times a Day. For pain (Patient taking differently: Take 75 mg by mouth As Needed. For pain) 60 capsule 5   • tiZANidine (ZANAFLEX) 4 MG tablet Take 1 tablet by mouth Every 8 (Eight) Hours As Needed for Muscle Spasms. 90 tablet 3   • Turmeric 500 MG tablet Take 500 mg by mouth Daily.     • warfarin (COUMADIN) 1 MG tablet TAKE 2 TABLETS BY MOUTH EVERY DAY 60 tablet 11   • warfarin (COUMADIN) 5 MG tablet TAKE 2 TABLETS BY MOUTH EVERY  tablet 3   • umeclidinium-vilanterol (ANORO ELLIPTA) 62.5-25 MCG/INH aerosol powder  inhaler Inhale one puff once daily for COPD 60 each 11     No facility-administered medications prior to visit.        Allergies as of 12/01/2020 - Reviewed  12/01/2020   Allergen Reaction Noted   • Zinc Other (See Comments) 07/12/2016   • Chantix [varenicline] Other (See Comments) 07/27/2018     Social History     Socioeconomic History   • Marital status:      Spouse name: Not on file   • Number of children: Not on file   • Years of education: Not on file   • Highest education level: Not on file   Tobacco Use   • Smoking status: Current Every Day Smoker     Packs/day: 0.50     Years: 30.00     Pack years: 15.00     Types: Cigarettes, Cigars   • Smokeless tobacco: Never Used   • Tobacco comment: caffeine use   Substance and Sexual Activity   • Alcohol use: Yes     Comment: 2x per month   • Drug use: No   • Sexual activity: Defer     Family History   Problem Relation Age of Onset   • Diabetes Sister    • Cancer Sister    • Hypertension Sister    • Kidney disease Sister    • Stroke Sister    • Heart disease Brother    • Hypertension Brother    • Cerebral aneurysm Brother    • Sudden death Brother    • Bleeding Disorder Brother    • Cancer Mother    • Heart disease Father    • Cancer Father         malignant neoplasm   • Deep vein thrombosis Father    • Heart attack Father    • Malig Hyperthermia Neg Hx      Review of Systems   Constitution: Positive for weight loss (35 lbs). Negative for chills, fever and malaise/fatigue.   HENT: Negative for ear pain, hearing loss, nosebleeds and sore throat.    Eyes: Negative for double vision, pain, vision loss in left eye and vision loss in right eye.   Cardiovascular: Negative for chest pain, claudication, dyspnea on exertion, irregular heartbeat, leg swelling, near-syncope, orthopnea, palpitations, paroxysmal nocturnal dyspnea and syncope.   Respiratory: Positive for cough (unchanged since neck surgery). Negative for shortness of breath, snoring and wheezing.    Endocrine: Positive for cold intolerance. Negative for heat intolerance.   Hematologic/Lymphatic: Negative for bleeding problem.   Skin: Negative for color change,  "itching, rash and unusual hair distribution.   Musculoskeletal: Positive for joint pain and myalgias. Negative for joint swelling.   Gastrointestinal: Positive for abdominal pain (chronic). Negative for diarrhea, hematochezia, melena, nausea and vomiting.   Genitourinary: Negative for decreased libido, frequency, hematuria, hesitancy and incomplete emptying.   Neurological: Positive for headaches (left side  new starting 1 month ago), numbness (left side at the same time as headaches- ongoing, not new) and paresthesias (left side at the same time as headaches- ongoing, not new). Negative for excessive daytime sleepiness, dizziness, light-headedness, loss of balance and seizures.   Psychiatric/Behavioral: Negative for depression.          Objective:     Vitals:    12/01/20 1504 12/01/20 1543   BP: 130/82    BP Location: Right arm    Patient Position: Sitting    Pulse: 59 65   SpO2:  96%   Weight: 99.2 kg (218 lb 9.6 oz)    Height: 182.9 cm (72\")      Body mass index is 29.65 kg/m².    PHYSICAL EXAM:  Vitals signs and nursing note reviewed.   Constitutional:       General: Not in acute distress.     Appearance: Well-developed and not in distress. Not diaphoretic.   Eyes:      Comments: Wearing glasses   HENT:      Head: Normocephalic and atraumatic.   Neck:      Vascular: No carotid bruit or JVD.   Pulmonary:      Effort: Pulmonary effort is normal. No tachypnea or respiratory distress.      Breath sounds: Normal breath sounds. No decreased breath sounds. No wheezing. No rhonchi. No rales.   Cardiovascular:      Normal rate. Regular rhythm.      Murmurs: There is no murmur.      Comments: No pitting lower extremity edema  Scar on right forearm/wrist  Pulses:     Radial: 2+ bilaterally.  Edema:     Ankle: bilateral trace edema of the ankle.     Feet: bilateral trace edema of the feet.  Abdominal:      General: Bowel sounds are normal.      Palpations: Abdomen is soft.   Skin:     General: Skin is warm and dry.      " Findings: No erythema.   Neurological:      Mental Status: Alert and oriented to person, place, and time.   Psychiatric:         Attention and Perception: Attention normal.         Mood and Affect: Mood normal.         Speech: Speech normal.         Behavior: Behavior normal.         Thought Content: Thought content normal.         Judgment: Judgment normal.           ECG 12 Lead    Date/Time: 12/1/2020 3:33 PM  Performed by: Kaitlin Najera APRN  Authorized by: Kaitlin Najera APRN   Comparison: compared with previous ECG   Rhythm: sinus arrhythmia  Rate: normal  BPM: 63  Conduction: incomplete right bundle branch block and left anterior fascicular block  Q waves: V1, V2, V3 and V4    T elevation: V1 and V2  QRS axis: left  Other findings: non-specific ST-T wave changes and T wave abnormality    Clinical impression: abnormal EKG  Comments: Reviewed current and prior ECG with Dr. Dalton and ECG overall unchanged  Indication: Recent chest pain, prior MI            Most recent lab review:  11/23/2020 His troponin was negative INR was therapeutic at 2.99.CBC normal, CMP normal except for elevated glucose 120.  9/21/2020 PCP labs: Lipids under decent control LDL 69, HDL low 33, triglycerides 69, total cholesterol 116, a1c 6.00%  Assessment:       Diagnosis Plan   1. Precordial chest pain     2. Old MI (myocardial infarction)     3. Chronic deep vein thrombosis (DVT) of proximal vein of both lower extremities (CMS/HCC)     4. History of pulmonary embolus (PE)     5. Long term current use of anticoagulant therapy     6. TIA (transient ischemic attack)     7. Tobacco abuse     8. Tobacco abuse counseling     9. Lung disease         Plan:     1. Chest pain: History of anginal type chest pain in the past.  Apparently had an MI several years ago with no required stenting.  Went to ED 11/23 and Ruled out for MI.  Had a stress test 11/24/2020 that was negative for ischemia.  April 2020 echo unremarkable EF normal.  Abnormal  EKG was reviewed with Dr. Dalton and is unchanged.  2. Ongoing tobacco abuse: Educated to stop tobacco altogether but he does not seem motivated.  3. Interstitial lung disease: Follows with Dr. Willoughby and had recent work-up for weight loss including bronchoscopy.  He has chronic cough since neck surgery several months ago.  4. History of DVT/PE: Has chronic left lower extremity DVT.  Maintained on warfarin and INRs are managed by PCP.  5.Prediabetes: Hemoglobin A1c 6.00% September 2020 PCP labs.  Defer.  6. Hyperlipidemia: Lipids under decent control LDL 69, HDL low 33, triglycerides 69, total cholesterol 116 September 2020 PCP labs.  On atorvastatin managed by another provider  7.  Listed history of TIA: Has intermittent left-sided headaches lasting just a couple minutes and intermittent left arm paresthesias that he reports are not new but the headache started about a month or so prior.  Advised to discuss with PCP.    I advised on the importance of medication compliance, good blood pressure, blood sugar and cholesterol control, as well as heart healthy diet, regular exercise and avoidance of tobacco for cardiovascular disease risk factor modification.   Have encouraged him to discuss his noncardiac complaints with his PCP including intermittent headache on the left side as well as periodically monitor his blood pressure at home with a goal of 130/80 or below.  Also concern for possible GI source of his chest pain as typically symptoms occur at rest and sometimes related to meals.  We will continue to monitor for progression of symptoms otherwise we will continue current regimen with as needed sublingual nitroglycerin for overall stable symptoms.    Follow up with Dr. Dalton in 3 months, unless otherwise needed sooner.  I advised the patient to contact our office with any questions or concerns.      I have reviewed this case with Dr. Dalton. It has been a pleasure to participate in this patient's care. Please  feel free to contact me with any questions or concerns.     GEOVANNI Garvey  12/01/2020             Your medication list          Accurate as of December 1, 2020  3:52 PM. If you have any questions, ask your nurse or doctor.            CHANGE how you take these medications      Instructions Last Dose Given Next Dose Due   pregabalin 100 MG capsule  Commonly known as: Lyrica  What changed:   · how much to take  · when to take this  · reasons to take this      Take 1 capsule by mouth 2 (Two) Times a Day. For pain          CONTINUE taking these medications      Instructions Last Dose Given Next Dose Due   acetaminophen 500 MG tablet  Commonly known as: TYLENOL      Take 650 mg by mouth Every 6 (Six) Hours As Needed for Mild Pain .       albuterol sulfate  (90 Base) MCG/ACT inhaler  Commonly known as: PROVENTIL HFA;VENTOLIN HFA;PROAIR HFA      Inhale 2 puffs Every 4 (Four) Hours As Needed for Wheezing or Shortness of Air.       atorvastatin 40 MG tablet  Commonly known as: Lipitor      Take 1 tablet by mouth Daily. For cholesterol       cetirizine 10 MG tablet  Commonly known as: zyrTEC      Take 10 mg by mouth Daily.       folic acid 1 MG tablet  Commonly known as: FOLVITE      Take 1 tablet by mouth Daily.       nitroglycerin 0.4 MG SL tablet  Commonly known as: NITROSTAT      Place 1 tablet under the tongue Every 5 (Five) Minutes As Needed for Chest Pain. Take no more than 3 doses in 15 minutes.       tiZANidine 4 MG tablet  Commonly known as: ZANAFLEX      Take 1 tablet by mouth Every 8 (Eight) Hours As Needed for Muscle Spasms.       Turmeric 500 MG tablet      Take 500 mg by mouth Daily.       Vitamin B-12 1000 MCG sublingual tablet      One SL daily       warfarin 1 MG tablet  Commonly known as: COUMADIN      TAKE 2 TABLETS BY MOUTH EVERY DAY       warfarin 5 MG tablet  Commonly known as: COUMADIN      TAKE 2 TABLETS BY MOUTH EVERY DAY          STOP taking these medications    umeclidinium-vilanterol  62.5-25 MCG/INH aerosol powder  inhaler  Commonly known as: Anoro Ellipta  Stopped by: GEOVANNI Garvey               The above medication changes may not have been made by this provider.  Medication list was updated to reflect medications patient is currently taking including medication changes and discontinuations made by other healthcare providers or the patients themselves.     Dictated utilizing Dragon Dictation System.

## 2020-12-15 ENCOUNTER — TELEPHONE (OUTPATIENT)
Dept: FAMILY MEDICINE CLINIC | Facility: CLINIC | Age: 70
End: 2020-12-15

## 2020-12-15 LAB
MYCOBACTERIUM SPEC CULT: NORMAL
NIGHT BLUE STAIN TISS: NORMAL

## 2020-12-16 ENCOUNTER — TRANSCRIBE ORDERS (OUTPATIENT)
Dept: ADMINISTRATIVE | Facility: HOSPITAL | Age: 70
End: 2020-12-16

## 2020-12-16 DIAGNOSIS — R63.4 WEIGHT LOSS: ICD-10-CM

## 2020-12-16 DIAGNOSIS — R11.0 NAUSEA: ICD-10-CM

## 2020-12-16 DIAGNOSIS — R10.13 EPIGASTRIC PAIN: Primary | ICD-10-CM

## 2020-12-20 ENCOUNTER — HOSPITAL ENCOUNTER (OUTPATIENT)
Dept: ULTRASOUND IMAGING | Facility: HOSPITAL | Age: 70
Discharge: HOME | End: 2020-12-20
Admitting: SURGERY

## 2020-12-20 DIAGNOSIS — R63.4 WEIGHT LOSS: ICD-10-CM

## 2020-12-20 DIAGNOSIS — R11.0 NAUSEA: ICD-10-CM

## 2020-12-20 DIAGNOSIS — R10.13 EPIGASTRIC PAIN: ICD-10-CM

## 2020-12-20 PROCEDURE — 76705 ECHO EXAM OF ABDOMEN: CPT

## 2021-01-28 ENCOUNTER — ANTICOAGULATION VISIT (OUTPATIENT)
Dept: FAMILY MEDICINE CLINIC | Facility: CLINIC | Age: 71
End: 2021-01-28

## 2021-01-28 LAB — INR PPP: 1.5 (ref 2–3)

## 2021-02-10 ENCOUNTER — ANTICOAGULATION VISIT (OUTPATIENT)
Dept: FAMILY MEDICINE CLINIC | Facility: CLINIC | Age: 71
End: 2021-02-10

## 2021-02-10 LAB — INR PPP: 3.3 (ref 2–3)

## 2021-02-17 ENCOUNTER — ANTICOAGULATION VISIT (OUTPATIENT)
Dept: FAMILY MEDICINE CLINIC | Facility: CLINIC | Age: 71
End: 2021-02-17

## 2021-02-17 LAB — INR PPP: 1.3 (ref 2–3)

## 2021-02-19 ENCOUNTER — ANTICOAGULATION VISIT (OUTPATIENT)
Dept: FAMILY MEDICINE CLINIC | Facility: CLINIC | Age: 71
End: 2021-02-19

## 2021-02-19 LAB — INR PPP: 1.7 (ref 2–3)

## 2021-02-22 ENCOUNTER — OFFICE VISIT (OUTPATIENT)
Dept: FAMILY MEDICINE CLINIC | Facility: CLINIC | Age: 71
End: 2021-02-22

## 2021-02-22 VITALS
TEMPERATURE: 97.5 F | HEIGHT: 72 IN | HEART RATE: 70 BPM | WEIGHT: 214 LBS | OXYGEN SATURATION: 98 % | BODY MASS INDEX: 28.99 KG/M2 | RESPIRATION RATE: 18 BRPM | SYSTOLIC BLOOD PRESSURE: 120 MMHG | DIASTOLIC BLOOD PRESSURE: 60 MMHG

## 2021-02-22 DIAGNOSIS — M79.7 FIBROMYALGIA: Chronic | ICD-10-CM

## 2021-02-22 DIAGNOSIS — E53.8 LOW SERUM VITAMIN B12: Chronic | ICD-10-CM

## 2021-02-22 DIAGNOSIS — R63.4 UNINTENTIONAL WEIGHT LOSS: ICD-10-CM

## 2021-02-22 DIAGNOSIS — R93.89 ABNORMAL CT OF THE CHEST: Chronic | ICD-10-CM

## 2021-02-22 DIAGNOSIS — R73.01 IMPAIRED FASTING GLUCOSE: Primary | ICD-10-CM

## 2021-02-22 DIAGNOSIS — E53.8 LOW FOLIC ACID: Chronic | ICD-10-CM

## 2021-02-22 DIAGNOSIS — Z72.0 TOBACCO ABUSE: Chronic | ICD-10-CM

## 2021-02-22 DIAGNOSIS — E55.9 VITAMIN D DEFICIENCY: Chronic | ICD-10-CM

## 2021-02-22 DIAGNOSIS — I82.5Y3 CHRONIC DEEP VEIN THROMBOSIS (DVT) OF PROXIMAL VEIN OF BOTH LOWER EXTREMITIES (HCC): Chronic | ICD-10-CM

## 2021-02-22 DIAGNOSIS — E78.2 MIXED HYPERLIPIDEMIA: Chronic | ICD-10-CM

## 2021-02-22 PROBLEM — J84.9 INTERSTITIAL LUNG DISEASE: Status: ACTIVE | Noted: 2020-12-01

## 2021-02-22 PROBLEM — Z86.73 HISTORY OF TRANSIENT ISCHEMIC ATTACK (TIA): Status: ACTIVE | Noted: 2021-02-22

## 2021-02-22 PROCEDURE — 1170F FXNL STATUS ASSESSED: CPT | Performed by: PHYSICIAN ASSISTANT

## 2021-02-22 PROCEDURE — G0439 PPPS, SUBSEQ VISIT: HCPCS | Performed by: PHYSICIAN ASSISTANT

## 2021-02-22 PROCEDURE — 1125F AMNT PAIN NOTED PAIN PRSNT: CPT | Performed by: PHYSICIAN ASSISTANT

## 2021-02-22 PROCEDURE — 99214 OFFICE O/P EST MOD 30 MIN: CPT | Performed by: PHYSICIAN ASSISTANT

## 2021-02-22 PROCEDURE — 96160 PT-FOCUSED HLTH RISK ASSMT: CPT | Performed by: PHYSICIAN ASSISTANT

## 2021-02-22 RX ORDER — FOLIC ACID 1 MG/1
1 TABLET ORAL DAILY
Qty: 90 TABLET | Refills: 1 | Status: SHIPPED | OUTPATIENT
Start: 2021-02-22 | End: 2021-11-03 | Stop reason: SDUPTHER

## 2021-02-22 RX ORDER — PREGABALIN 75 MG/1
75 CAPSULE ORAL 2 TIMES DAILY
Qty: 180 CAPSULE | Refills: 1 | Status: SHIPPED | OUTPATIENT
Start: 2021-02-22 | End: 2021-11-03 | Stop reason: SDUPTHER

## 2021-02-22 RX ORDER — ATORVASTATIN CALCIUM 40 MG/1
40 TABLET, FILM COATED ORAL DAILY
Qty: 90 TABLET | Refills: 3 | Status: SHIPPED | OUTPATIENT
Start: 2021-02-22 | End: 2021-11-03 | Stop reason: SDUPTHER

## 2021-02-22 RX ORDER — ALBUTEROL SULFATE 90 UG/1
2 AEROSOL, METERED RESPIRATORY (INHALATION) EVERY 4 HOURS PRN
Qty: 54 G | Refills: 3 | Status: SHIPPED | OUTPATIENT
Start: 2021-02-22 | End: 2021-09-26

## 2021-02-22 RX ORDER — TIZANIDINE 4 MG/1
4 TABLET ORAL EVERY 8 HOURS PRN
Qty: 90 TABLET | Refills: 3 | Status: SHIPPED | OUTPATIENT
Start: 2021-02-22 | End: 2021-11-03 | Stop reason: SDDI

## 2021-02-22 NOTE — PROGRESS NOTES
"Subjective   Javy Reeves Sr. is a 70 y.o. male.     History of Present Illness        Since the last visit, he has overall felt fairly well.  He has Hyperlipidemia with goals met with current Rx, CAD and remains under care of their Cardiologist for mangement and Vitamin D deficiency and labs are at goal >30 ng/mL.  he has been compliant with current medications have reviewed them.  The patient denies medication side effects.  Will refill medications. /75 (BP Location: Left arm, Patient Position: Sitting, Cuff Size: Adult)   Pulse 70   Temp 97.5 °F (36.4 °C)   Resp 18   Ht 182.9 cm (72.01\")   Wt 97.1 kg (214 lb)   SpO2 98%   BMI 29.02 kg/m²     Results for orders placed or performed in visit on 02/19/21   Protime-INR    Specimen: Blood   Result Value Ref Range    INR 1.70 (A) 2 - 3     Just had Cholecystectomy; did show chronic cholecystitis---his nausea and abd pain now resolved.    F/u today from Sept visit for weight loss--need to do prostate exam and testicular exam update; weight stable since Sept;   No irreg nevi    Did f/u pulm and bronch 11-3-20---neg; also bx lymph nodes--  The following portions of the patient's history were reviewed and updated as appropriate: allergies, current medications, past family history, past medical history, past social history, past surgical history and problem list.   Final Diagnosis   1.  Mediastinum Station 7, FNA:  a. NEGATIVE FOR MALIGNANT CELLS  b. Reactive ciliated glandular cells, chondrocytes, lymphocytes and blood     Has pulm f/u next mo;   Hx CAD and to see cardio end April    He is eating less and more active-----less appetite;  CT scans and labs neg for cancer Sept; saw pulm ---had bronch---has f/u March for CT and discuss if need further bx lung.  Trelogy and Anoro tried and no help; off;  On Albuterol PRN works  He is taking B12 and folate  Coumadin for VTEs recurrent  On Lyrica for Fibromyalgia--and Zanaflex--helps, still pain  Not " depressed  Review of Systems   Constitutional: Positive for activity change and appetite change. Negative for fatigue, fever and unexpected weight change.   HENT: Negative for nosebleeds and trouble swallowing.    Eyes: Negative for pain and visual disturbance.   Respiratory: Negative for choking, chest tightness, shortness of breath, wheezing and stridor.    Cardiovascular: Negative for chest pain and palpitations.   Gastrointestinal: Negative for abdominal pain and blood in stool.   Endocrine: Negative.  Negative for polydipsia.   Genitourinary: Negative for difficulty urinating, genital sores and hematuria.   Musculoskeletal: Negative for joint swelling.   Skin: Negative for color change and rash.   Allergic/Immunologic: Negative.  Negative for immunocompromised state.   Neurological: Negative for seizures, syncope, facial asymmetry and speech difficulty.   Hematological: Negative for adenopathy.   Psychiatric/Behavioral: Negative for agitation, behavioral problems, confusion, dysphoric mood, self-injury, sleep disturbance and suicidal ideas. The patient is not nervous/anxious.    All other systems reviewed and are negative.      Objective   Physical Exam  Vitals signs and nursing note reviewed.   Constitutional:       General: He is not in acute distress.     Appearance: Normal appearance. He is well-developed. He is not ill-appearing, toxic-appearing or diaphoretic.   HENT:      Head: Normocephalic and atraumatic. No right periorbital erythema or left periorbital erythema.      Nose: Nose normal.   Eyes:      General: No scleral icterus.        Right eye: No discharge.         Left eye: No discharge.      Conjunctiva/sclera: Conjunctivae normal.      Pupils: Pupils are equal, round, and reactive to light.   Neck:      Musculoskeletal: Normal range of motion and neck supple.      Vascular: No carotid bruit.   Cardiovascular:      Rate and Rhythm: Normal rate and regular rhythm.      Pulses: Normal pulses.       Heart sounds: Normal heart sounds. No murmur.   Pulmonary:      Effort: Pulmonary effort is normal.      Breath sounds: No rales.   Abdominal:      General: Bowel sounds are normal.      Palpations: Abdomen is soft.      Tenderness: There is no abdominal tenderness.   Genitourinary:     Penis: Normal.       Scrotum/Testes: Normal.      Prostate: Normal.   Musculoskeletal: Normal range of motion.         General: No tenderness or deformity.      Right lower leg: No edema.      Left lower leg: No edema.   Lymphadenopathy:      Cervical: No cervical adenopathy.   Skin:     General: Skin is warm and dry.      Coloration: Skin is not jaundiced.      Findings: No erythema or rash.   Neurological:      General: No focal deficit present.      Mental Status: He is alert and oriented to person, place, and time. Mental status is at baseline.      Cranial Nerves: No cranial nerve deficit.      Motor: No tremor, atrophy or abnormal muscle tone.      Coordination: Coordination normal.      Deep Tendon Reflexes: Reflexes are normal and symmetric. Reflexes normal.      Reflex Scores:       Tricep reflexes are 2+ on the right side and 2+ on the left side.       Bicep reflexes are 2+ on the right side and 2+ on the left side.       Brachioradialis reflexes are 2+ on the right side and 2+ on the left side.       Patellar reflexes are 2+ on the right side and 2+ on the left side.       Achilles reflexes are 2+ on the right side and 2+ on the left side.  Psychiatric:         Mood and Affect: Mood normal.         Behavior: Behavior normal.         Thought Content: Thought content normal.         Judgment: Judgment normal.         Assessment/Plan   Diagnoses and all orders for this visit:    1. Impaired fasting glucose (Primary)  -     Comprehensive Metabolic Panel  -     Hemoglobin A1c  -     CBC & Differential  -     Vitamin B12  -     Folate    2. Chronic deep vein thrombosis (DVT) of proximal vein of both lower extremities  (CMS/[facility])  -     Comprehensive Metabolic Panel  -     Hemoglobin A1c  -     CBC & Differential  -     Vitamin B12  -     Folate    3. Interstitial lung disease (CMS/[facility])    4. Stable angina (CMS/[facility])  -     Comprehensive Metabolic Panel  -     Hemoglobin A1c  -     CBC & Differential  -     Vitamin B12  -     Folate    5. Unintentional weight loss    6. Tobacco abuse  -     Comprehensive Metabolic Panel  -     Hemoglobin A1c  -     CBC & Differential  -     Vitamin B12  -     Folate    7. Fibromyalgia  -     pregabalin (LYRICA) 75 MG capsule; Take 1 capsule by mouth 2 (Two) Times a Day. For Fibromyalgia  Dispense: 180 capsule; Refill: 1  -     Comprehensive Metabolic Panel  -     Hemoglobin A1c  -     CBC & Differential  -     Vitamin B12  -     Folate    8. Vitamin D deficiency  -     Comprehensive Metabolic Panel  -     Hemoglobin A1c  -     CBC & Differential  -     Vitamin B12  -     Folate    9. Low serum vitamin B12  -     Comprehensive Metabolic Panel  -     Hemoglobin A1c  -     CBC & Differential  -     Vitamin B12  -     Folate    10. Low folic acid  -     Comprehensive Metabolic Panel  -     Hemoglobin A1c  -     CBC & Differential  -     Vitamin B12  -     Folate    Other orders  -     atorvastatin (Lipitor) 40 MG tablet; Take 1 tablet by mouth Daily. For cholesterol  Dispense: 90 tablet; Refill: 3  -     albuterol sulfate  (90 Base) MCG/ACT inhaler; Inhale 2 puffs Every 4 (Four) Hours As Needed for Wheezing or Shortness of Air.  Dispense: 54 g; Refill: 3  -     folic acid (FOLVITE) 1 MG tablet; Take 1 tablet by mouth Daily.  Dispense: 90 tablet; Refill: 1  -     tiZANidine (ZANAFLEX) 4 MG tablet; Take 1 tablet by mouth Every 8 (Eight) Hours As Needed for Muscle Spasms.  Dispense: 90 tablet; Refill: 3        Following weight and stable---eating less and more active  F/u on B12 and folate---taking  Plan, Javy Reeves Sr., was seen today.  he was seen for Imparied fasting glucose and plan follow  up labs, diet, and exercise, Hyperlipidemia and will continue current medication, CAD and is under care of their Cardiologist for medical management and Vitamin D deficiency and supplemented.  Refill Zanaflex and Lyrical for Fibro pain and helps  F/u pulm March about abn CT lung and aware weight loss,smoker; bronch Nov negative         Answers for HPI/ROS submitted by the patient on 2/20/2021   What is the primary reason for your visit?: Other  Please describe your symptoms.: Pain in left shoulder that travel down to my left hand primarily to left index finger. Pain gets worse in the evening and throughout the night waking me up from the discomfort. Need refills for Lyrica already contacted pharmacy and your office for two months but no response about getting refills.  Have you had these symptoms before?: Yes  How long have you been having these symptoms?: Greater than 2 weeks  Please list any medications you are currently taking for this condition.: Tylenol extra strength.  Please describe any probable cause for these symptoms. : None known.

## 2021-02-22 NOTE — PROGRESS NOTES
The ABCs of the Annual Wellness Visit  Subsequent Medicare Wellness Visit    Chief Complaint   Patient presents with   • Hyperlipidemia   • Arm Pain       Subjective   History of Present Illness:  Javy Reeves Sr. is a 70 y.o. male who presents for a Subsequent Medicare Wellness Visit.    HEALTH RISK ASSESSMENT    Recent Hospitalizations:  Recently treated at the following:  Other: [facility]--gallbladder removed outpatient    Current Medical Providers:  Patient Care Team:  Aletha Ochoa PA-C as PCP - General (Family Medicine)  Jean Coles MD as Surgeon (Neurosurgery)  Erlin West MD as Surgeon (General Surgery)  Yaneth Vasquez Jr., MD as Consulting Physician (Vascular Surgery)  Artemio Dalton MD as Consulting Physician (Cardiology)  Jamie Manley MD as Consulting Physician (Gastroenterology)  Rodolfo Eugene III, MD as Surgeon (Thoracic Surgery)    Smoking Status:  Social History     Tobacco Use   Smoking Status Current Every Day Smoker   • Packs/day: 0.50   • Years: 30.00   • Pack years: 15.00   • Types: Cigars   Smokeless Tobacco Never Used   Tobacco Comment    caffeine use, smokes about 5 cigars/day       Alcohol Consumption:  Social History     Substance and Sexual Activity   Alcohol Use Yes    Comment: 2x per month       Depression Screen:   PHQ-2/PHQ-9 Depression Screening 2/22/2021   Little interest or pleasure in doing things 0   Feeling down, depressed, or hopeless 0   Trouble falling or staying asleep, or sleeping too much 0   Feeling tired or having little energy 0   Poor appetite or overeating 0   Feeling bad about yourself - or that you are a failure or have let yourself or your family down 0   Trouble concentrating on things, such as reading the newspaper or watching television 0   Moving or speaking so slowly that other people could have noticed. Or the opposite - being so fidgety or restless that you have been moving around a lot more than usual 0   Thoughts that you would  be better off dead, or of hurting yourself in some way 0   Total Score 0       Fall Risk Screen:  CYRUS Fall Risk Assessment has not been completed.    Health Habits and Functional and Cognitive Screening:  Functional & Cognitive Status 2/22/2021   Do you have difficulty preparing food and eating? No   Do you have difficulty bathing yourself, getting dressed or grooming yourself? No   Do you have difficulty using the toilet? No   Do you have difficulty moving around from place to place? No   Do you have trouble with steps or getting out of a bed or a chair? No   Current Diet Well Balanced Diet   Dental Exam Up to date   Eye Exam Up to date   Exercise (times per week) 5 times per week   Current Exercises Include Walking   Current Exercise Activities Include -   Do you need help using the phone?  No   Are you deaf or do you have serious difficulty hearing?  No   Do you need help with transportation? No   Do you need help shopping? No   Do you need help preparing meals?  No   Do you need help with housework?  No   Do you need help with laundry? No   Do you need help taking your medications? No   Do you need help managing money? No   Do you ever drive or ride in a car without wearing a seat belt? No   Have you felt unusual stress, anger or loneliness in the last month? No   Who do you live with? Spouse   If you need help, do you have trouble finding someone available to you? No   Have you been bothered in the last four weeks by sexual problems? No   Do you have difficulty concentrating, remembering or making decisions? No         Does the patient have evidence of cognitive impairment? No    Asprin use counseling:Contraindicated from taking ASA    Age-appropriate Screening Schedule:  Refer to the list below for future screening recommendations based on patient's age, sex and/or medical conditions. Orders for these recommended tests are listed in the plan section. The patient has been provided with a written  plan.    Health Maintenance   Topic Date Due   • ZOSTER VACCINE (1 of 2) 09/18/2021 (Originally 9/22/2002)   • LIPID PANEL  09/21/2021   • TDAP/TD VACCINES (2 - Td) 08/27/2027   • COLONOSCOPY  09/13/2028   • INFLUENZA VACCINE  Completed          The following portions of the patient's history were reviewed and updated as appropriate: allergies, current medications, past family history, past medical history, past social history, past surgical history and problem list.    Outpatient Medications Prior to Visit   Medication Sig Dispense Refill   • acetaminophen (TYLENOL) 500 MG tablet Take 650 mg by mouth Every 6 (Six) Hours As Needed for Mild Pain .     • albuterol sulfate  (90 Base) MCG/ACT inhaler Inhale 2 puffs Every 4 (Four) Hours As Needed for Wheezing or Shortness of Air. 1 inhaler 11   • atorvastatin (LIPITOR) 40 MG tablet Take 1 tablet by mouth Daily. For cholesterol 30 tablet 11   • cetirizine (zyrTEC) 10 MG tablet Take 10 mg by mouth Daily.     • Cyanocobalamin (Vitamin B-12) 1000 MCG sublingual tablet One SL daily 90 each 3   • folic acid (FOLVITE) 1 MG tablet Take 1 tablet by mouth Daily. 90 tablet 1   • nitroglycerin (NITROSTAT) 0.4 MG SL tablet Place 1 tablet under the tongue Every 5 (Five) Minutes As Needed for Chest Pain. Take no more than 3 doses in 15 minutes. 30 tablet 5   • pregabalin (LYRICA) 100 MG capsule Take 1 capsule by mouth 2 (Two) Times a Day. For pain (Patient taking differently: Take 75 mg by mouth As Needed. For pain) 60 capsule 5   • tiZANidine (ZANAFLEX) 4 MG tablet Take 1 tablet by mouth Every 8 (Eight) Hours As Needed for Muscle Spasms. 90 tablet 3   • Turmeric 500 MG tablet Take 500 mg by mouth Daily.     • warfarin (COUMADIN) 1 MG tablet TAKE 2 TABLETS BY MOUTH EVERY DAY 60 tablet 11   • warfarin (COUMADIN) 5 MG tablet TAKE 2 TABLETS BY MOUTH EVERY  tablet 3     No facility-administered medications prior to visit.        Patient Active Problem List   Diagnosis   •  "Arthritis   • Family history of early CAD   • DDD (degenerative disc disease), cervical   • DVT (deep venous thrombosis) (CMS/HCC)   • Fibromyalgia   • Past heart attack   • Hyperlipidemia   • DDD (degenerative disc disease), lumbosacral   • History of pulmonary embolus (PE)   • Reflux esophagitis   • TIA (transient ischemic attack)   • Left groin pain   • Cervical radicular pain   • Mild atherosclerosis of right carotid artery   • Cervical disc disorder at C6-C7 level with radiculopathy   • Wheezing   • Colon polyps   • Right lower quadrant abdominal pain   • Diarrhea   • Cervical spinal stenosis   • Cervical disc disorder at C5-C6 level with radiculopathy   • GERD (gastroesophageal reflux disease)   • Diverticulosis   • Old MI (myocardial infarction)   • Herniated cervical disc   • Encounter for therapeutic drug monitoring   • Long term current use of anticoagulant therapy   • ERRONEOUS ENCOUNTER--DISREGARD   • Stable angina (CMS/HCC)   • Low folic acid   • Low serum vitamin B12   • Precordial chest pain   • Tobacco abuse   • Tobacco abuse counseling   • Lung disease       Advanced Care Planning:  ACP discussion was held with the patient during this visit. Patient does not have an advance directive, information provided.    Review of Systems    Compared to one year ago, the patient feels his physical health is the same.  Compared to one year ago, the patient feels his mental health is the same.    Reviewed chart for potential of high risk medication in the elderly: yes  Reviewed chart for potential of harmful drug interactions in the elderly:yes    Objective         Vitals:    02/22/21 1525   BP: 142/75   BP Location: Left arm   Patient Position: Sitting   Cuff Size: Adult   Pulse: 70   Resp: 18   Temp: 97.5 °F (36.4 °C)   SpO2: 98%   Weight: 97.1 kg (214 lb)   Height: 182.9 cm (72.01\")       Body mass index is 29.02 kg/m².  Discussed the patient's BMI with him. The BMI is above average; BMI management plan is " completed.    Physical Exam          Assessment/Plan   Medicare Risks and Personalized Health Plan  CMS Preventative Services Quick Reference  Cardiovascular risk  Lung Cancer Risk  Tobacco Use/Dependance (use dotphrase .tobaccocessation for documentation)    The above risks/problems have been discussed with the patient.  Pertinent information has been shared with the patient in the After Visit Summary.  Follow up plans and orders are seen below in the Assessment/Plan Section.    There are no diagnoses linked to this encounter.  Follow Up:  No follow-ups on file.     An After Visit Summary and PPPS were given to the patient.

## 2021-02-22 NOTE — PATIENT INSTRUCTIONS
Dyslipidemia  Dyslipidemia is an imbalance of waxy, fat-like substances (lipids) in the blood. The body needs lipids in small amounts. Dyslipidemia often involves a high level of cholesterol or triglycerides, which are types of lipids.  Common forms of dyslipidemia include:  · High levels of LDL cholesterol. LDL is the type of cholesterol that causes fatty deposits (plaques) to build up in the blood vessels that carry blood away from your heart (arteries).  · Low levels of HDL cholesterol. HDL cholesterol is the type of cholesterol that protects against heart disease. High levels of HDL remove the LDL buildup from arteries.  · High levels of triglycerides. Triglycerides are a fatty substance in the blood that is linked to a buildup of plaques in the arteries.  What are the causes?  Primary dyslipidemia is caused by changes (mutations) in genes that are passed down through families (inherited). These mutations cause several types of dyslipidemia.  Secondary dyslipidemia is caused by lifestyle choices and diseases that lead to dyslipidemia, such as:  · Eating a diet that is high in animal fat.  · Not getting enough exercise.  · Having diabetes, kidney disease, liver disease, or thyroid disease.  · Drinking large amounts of alcohol.  · Using certain medicines.  What increases the risk?  You are more likely to develop this condition if you are an older man or if you are a woman who has gone through menopause. Other risk factors include:  · Having a family history of dyslipidemia.  · Taking certain medicines, including birth control pills, steroids, some diuretics, and beta-blockers.  · Smoking cigarettes.  · Eating a high-fat diet.  · Having certain medical conditions such as diabetes, polycystic ovary syndrome (PCOS), kidney disease, liver disease, or hypothyroidism.  · Not exercising regularly.  · Being overweight or obese with too much belly fat.  What are the signs or symptoms?  In most cases, dyslipidemia does not  usually cause any symptoms.  In severe cases, very high lipid levels can cause:  · Fatty bumps under the skin (xanthomas).  · White or gray ring around the black center (pupil) of the eye.  Very high triglyceride levels can cause inflammation of the pancreas (pancreatitis).  How is this diagnosed?  Your health care provider may diagnose dyslipidemia based on a routine blood test (fasting blood test). Because most people do not have symptoms of the condition, this blood testing (lipid profile) is done on adults age 20 and older and is repeated every 5 years. This test checks:  · Total cholesterol. This measures the total amount of cholesterol in your blood, including LDL cholesterol, HDL cholesterol, and triglycerides. A healthy number is below 200.  · LDL cholesterol. The target number for LDL cholesterol is different for each person, depending on individual risk factors. Ask your health care provider what your LDL cholesterol should be.  · HDL cholesterol. An HDL level of 60 or higher is best because it helps to protect against heart disease. A number below 40 for men or below 50 for women increases the risk for heart disease.  · Triglycerides. A healthy triglyceride number is below 150.  If your lipid profile is abnormal, your health care provider may do other blood tests.  How is this treated?  Treatment depends on the type of dyslipidemia that you have and your other risk factors for heart disease and stroke. Your health care provider will have a target range for your lipid levels based on this information.  For many people, this condition may be treated by lifestyle changes, such as diet and exercise. Your health care provider may recommend that you:  · Get regular exercise.  · Make changes to your diet.  · Quit smoking if you smoke.  If diet changes and exercise do not help you reach your goals, your health care provider may also prescribe medicine to lower lipids. The most commonly prescribed type of medicine  lowers your LDL cholesterol (statin drug). If you have a high triglyceride level, your provider may prescribe another type of drug (fibrate) or an omega-3 fish oil supplement, or both.  Follow these instructions at home:    Eating and drinking  · Follow instructions from your health care provider or dietitian about eating or drinking restrictions.  · Eat a healthy diet as told by your health care provider. This can help you reach and maintain a healthy weight, lower your LDL cholesterol, and raise your HDL cholesterol. This may include:  ? Limiting your calories, if you are overweight.  ? Eating more fruits, vegetables, whole grains, fish, and lean meats.  ? Limiting saturated fat, trans fat, and cholesterol.  · If you drink alcohol:  ? Limit how much you use.  ? Be aware of how much alcohol is in your drink. In the [place] one drink equals one 12 oz bottle of beer (355 mL), one 5 oz glass of wine (148 mL), or one 1½ oz glass of hard liquor (44 mL).  · Do not drink alcohol if:  ? Your health care provider tells you not to drink.  ? You are pregnant, may be pregnant, or are planning to become pregnant.  Activity  · Get regular exercise. Start an exercise and strength training program as told by your health care provider. Ask your health care provider what activities are safe for you. Your health care provider may recommend:  ? 30 minutes of aerobic activity 4-6 days a week. Brisk walking is an example of aerobic activity.  ? Strength training 2 days a week.  General instructions  · Do not use any products that contain nicotine or tobacco, such as cigarettes, e-cigarettes, and chewing tobacco. If you need help quitting, ask your health care provider.  · Take over-the-counter and prescription medicines only as told by your health care provider. This includes supplements.  · Keep all follow-up visits as told by your health care provider.  Contact a health care provider if:  · You are:  ? Having trouble sticking to your  exercise or diet plan.  ? Struggling to quit smoking or control your use of alcohol.  Summary  · Dyslipidemia often involves a high level of cholesterol or triglycerides, which are types of lipids.  · Treatment depends on the type of dyslipidemia that you have and your other risk factors for heart disease and stroke.  · For many people, treatment starts with lifestyle changes, such as diet and exercise.  · Your health care provider may prescribe medicine to lower lipids.  This information is not intended to replace advice given to you by your health care provider. Make sure you discuss any questions you have with your health care provider.  Document Revised: 08/12/2019 Document Reviewed: 07/19/2019  [employer] Patient Education © 2020 [employer]

## 2021-02-25 ENCOUNTER — TELEPHONE (OUTPATIENT)
Dept: FAMILY MEDICINE CLINIC | Facility: CLINIC | Age: 71
End: 2021-02-25

## 2021-02-25 ENCOUNTER — ANTICOAGULATION VISIT (OUTPATIENT)
Dept: FAMILY MEDICINE CLINIC | Facility: CLINIC | Age: 71
End: 2021-02-25

## 2021-02-25 LAB — INR PPP: 1.8 (ref 2–3)

## 2021-03-03 ENCOUNTER — TELEPHONE (OUTPATIENT)
Dept: FAMILY MEDICINE CLINIC | Facility: CLINIC | Age: 71
End: 2021-03-03

## 2021-03-03 ENCOUNTER — ANTICOAGULATION VISIT (OUTPATIENT)
Dept: FAMILY MEDICINE CLINIC | Facility: CLINIC | Age: 71
End: 2021-03-03

## 2021-03-03 LAB — INR PPP: 3.4 (ref 2–3)

## 2021-03-16 ENCOUNTER — BULK ORDERING (OUTPATIENT)
Dept: CASE MANAGEMENT | Facility: OTHER | Age: 71
End: 2021-03-16

## 2021-03-16 DIAGNOSIS — Z23 IMMUNIZATION DUE: ICD-10-CM

## 2021-03-17 ENCOUNTER — TELEPHONE (OUTPATIENT)
Dept: FAMILY MEDICINE CLINIC | Facility: CLINIC | Age: 71
End: 2021-03-17

## 2021-03-17 ENCOUNTER — ANTICOAGULATION VISIT (OUTPATIENT)
Dept: FAMILY MEDICINE CLINIC | Facility: CLINIC | Age: 71
End: 2021-03-17

## 2021-03-17 LAB — INR PPP: 4.1 (ref 2–3)

## 2021-03-17 NOTE — TELEPHONE ENCOUNTER
Called patient and he did answer this time.  Patient got a reading of 4.1 on his home INR machine.  He denies any dizziness or evidence of bleeding.  He takes his medicine in the morning.  I told him to skip taking his medicine tomorrow and then recheck his INR tomorrow before our office closes and contact his PCP if symptoms are still problematic with tomorrow's reading.  He knows to go to the emergency room if if symptoms change or worsen.

## 2021-03-17 NOTE — TELEPHONE ENCOUNTER
Tried calling patient.  He called with the answering service and did not answer when I tried calling him back.  No message left.

## 2021-03-18 ENCOUNTER — TELEPHONE (OUTPATIENT)
Dept: FAMILY MEDICINE CLINIC | Facility: CLINIC | Age: 71
End: 2021-03-18

## 2021-03-18 ENCOUNTER — ANTICOAGULATION VISIT (OUTPATIENT)
Dept: FAMILY MEDICINE CLINIC | Facility: CLINIC | Age: 71
End: 2021-03-18

## 2021-03-18 LAB — INR PPP: 3.6 (ref 2–3)

## 2021-03-18 NOTE — TELEPHONE ENCOUNTER
Caller: Javy Reeves Sr.    Relationship to patient: Self    Best call back number: [phone]    Patient is needing: PATIENTS INR TODAY 3/18/21-- 3.6

## 2021-03-20 ENCOUNTER — HOSPITAL ENCOUNTER (OUTPATIENT)
Dept: CT IMAGING | Facility: HOSPITAL | Age: 71
Discharge: HOME | End: 2021-03-20
Admitting: INTERNAL MEDICINE

## 2021-03-20 DIAGNOSIS — J84.9 ILD (INTERSTITIAL LUNG DISEASE) (HCC): ICD-10-CM

## 2021-03-20 PROCEDURE — 71250 CT THORAX DX C-: CPT

## 2021-03-22 ENCOUNTER — TELEPHONE (OUTPATIENT)
Dept: FAMILY MEDICINE CLINIC | Facility: CLINIC | Age: 71
End: 2021-03-22

## 2021-03-22 ENCOUNTER — ANTICOAGULATION VISIT (OUTPATIENT)
Dept: FAMILY MEDICINE CLINIC | Facility: CLINIC | Age: 71
End: 2021-03-22

## 2021-03-22 LAB — INR PPP: 1.5 (ref 2–3)

## 2021-03-23 ENCOUNTER — TELEPHONE (OUTPATIENT)
Dept: FAMILY MEDICINE CLINIC | Facility: CLINIC | Age: 71
End: 2021-03-23

## 2021-03-23 NOTE — TELEPHONE ENCOUNTER
I talked with pt yesterday. He is going to stay on 10 mg daily rechk on Friday. He held two days last week

## 2021-03-24 ENCOUNTER — APPOINTMENT (OUTPATIENT)
Dept: CT IMAGING | Facility: HOSPITAL | Age: 71
End: 2021-03-24

## 2021-03-24 ENCOUNTER — HOSPITAL ENCOUNTER (EMERGENCY)
Facility: HOSPITAL | Age: 71
Discharge: HOME | End: 2021-03-24
Attending: EMERGENCY MEDICINE | Admitting: EMERGENCY MEDICINE

## 2021-03-24 VITALS
TEMPERATURE: 96.6 F | HEIGHT: 72 IN | RESPIRATION RATE: 17 BRPM | SYSTOLIC BLOOD PRESSURE: 152 MMHG | BODY MASS INDEX: 29.02 KG/M2 | HEART RATE: 70 BPM | DIASTOLIC BLOOD PRESSURE: 82 MMHG | OXYGEN SATURATION: 100 %

## 2021-03-24 DIAGNOSIS — S06.0X0A CONCUSSION WITHOUT LOSS OF CONSCIOUSNESS, INITIAL ENCOUNTER: Primary | ICD-10-CM

## 2021-03-24 LAB
INR PPP: 1.44 (ref 0.9–1.1)
PROTHROMBIN TIME: 17.4 SECONDS (ref 11.7–14.2)

## 2021-03-24 PROCEDURE — 70450 CT HEAD/BRAIN W/O DYE: CPT

## 2021-03-24 PROCEDURE — 85610 PROTHROMBIN TIME: CPT | Performed by: EMERGENCY MEDICINE

## 2021-03-24 PROCEDURE — 99283 EMERGENCY DEPT VISIT LOW MDM: CPT

## 2021-03-25 NOTE — ED TRIAGE NOTES
Pt states that he hit head while attempting to get into car this weekend. Pt currently on blood thinners. Pt reports headache since.    Pt wearing mask on arrival. Staff wearing mask and goggles at time of triage.

## 2021-03-25 NOTE — ED NOTES
Pt states he hit his head while getting in a car on Saturday. Reports a constant headache on both sides of his head since then. Pt is on blood thinners. States he had another head injury about 6 weeks ago and has been having left leg weakness ever since.Denies any other sx at this time.      Yesenia Del Valle, RN  03/24/21 2110

## 2021-03-25 NOTE — ED PROVIDER NOTES
EMERGENCY DEPARTMENT ENCOUNTER    Room Number:  42/42  Date of encounter:  3/24/2021  PCP: Aletha Ochoa, MALINDA  Historian: Patient      HPI:  Chief Complaint: Headache  A complete HPI/ROS/PMH/PSH/SH/FH are unobtainable due to: Nothing    Context: Javy Reeves Sr. is a 70 y.o. male who presents to the ED c/o moderate severity, right-sided, dull headache for the last 3 days.  The pain is constant, nothing makes it better or worse, and it began shortly after hitting the right side of his head on the car while he was trying to get in.  At the time he said he did not think much of it, he did not lose consciousness and had no significant pain right away.  He had no swelling or bruising on that side of his head, but when he began having headaches the next day started raising concerns.  Patient is on warfarin for cardiac indications.    He said no nausea vomiting diarrhea, has had no fever, no focal neurologic deficits, no neck pain, and only the headache which is been constant      PAST MEDICAL HISTORY  Active Ambulatory Problems     Diagnosis Date Noted   • Arthritis 11/16/2015   • Family history of early CAD 11/16/2015   • DDD (degenerative disc disease), cervical 11/16/2015   • DVT (deep venous thrombosis) (CMS/[facility]) 11/16/2015   • Fibromyalgia 11/16/2015   • Past heart attack 11/16/2015   • Hyperlipidemia 11/16/2015   • DDD (degenerative disc disease), lumbosacral 11/16/2015   • History of pulmonary embolus (PE) 11/16/2015   • Reflux esophagitis 11/16/2015   • TIA (transient ischemic attack) 11/16/2015   • Left groin pain 02/21/2017   • Cervical radicular pain 02/12/2018   • Mild atherosclerosis of right carotid artery 03/27/2018   • Cervical disc disorder at C6-C7 level with radiculopathy 05/16/2018   • Wheezing 07/20/2018   • Colon polyps 07/27/2018   • Right lower quadrant abdominal pain 07/27/2018   • Diarrhea 07/27/2018   • Cervical spinal stenosis 08/20/2018   • Cervical disc disorder at C5-C6 level with  radiculopathy 08/20/2018   • GERD (gastroesophageal reflux disease) 09/07/2018   • Diverticulosis 09/13/2018   • Old MI (myocardial infarction) 09/20/2018   • Herniated cervical disc 10/05/2018   • Encounter for therapeutic drug monitoring 10/10/2018   • Long term current use of anticoagulant therapy 10/10/2018   • ERRONEOUS ENCOUNTER--DISREGARD 03/27/2019   • Stable angina (CMS/HCC) 04/26/2019   • Low folic acid 09/22/2020   • Low serum vitamin B12 09/22/2020   • Precordial chest pain 12/01/2020   • Tobacco abuse 12/01/2020   • Tobacco abuse counseling 12/01/2020   • Interstitial lung disease (CMS/HCC) 12/01/2020   • History of transient ischemic attack (TIA) 02/22/2021     Resolved Ambulatory Problems     Diagnosis Date Noted   • Hypertension 11/16/2015     Past Medical History:   Diagnosis Date   • Acute and subacute infective endocarditis in diseases classified elsewhere    • Allergic    • Chest pain    • Chronic low back pain    • Chronic pain    • Coronary artery disease    • Encounter for special screening examination for neoplasm of prostate 2012   • Eye exam, routine > 5 yrs ago   • Eye exam, routine 01/2015   • Fibromyositis    • Injury of back    • Injury of neck    • Irritable bowel    • Lumbar stenosis    • MI (myocardial infarction) (CMS/HCC)    • Neck pain    • Pain in limb    • Postlaminectomy syndrome of lumbar region    • Pulmonary embolism (CMS/HCC) 1996   • Shortness of breath    • Sleep apnea          PAST SURGICAL HISTORY  Past Surgical History:   Procedure Laterality Date   • ANTERIOR CERVICAL DISCECTOMY W/ FUSION N/A 10/5/2018    Procedure: CERVICAL DISCECTOMY ANTERIOR FUSION WITH INSTRUMENTATION,ACDF C5/6, C6/7 with cages and plate;  Surgeon: Jean Coles MD;  Location[facility];  Service: Neurosurgery   • APPENDECTOMY N/A    • BRONCHOSCOPY N/A 11/3/2020    Procedure: EBUS BRONCHOSCOPY WITH BAL & FLUOROSCOPY WITH MAC ANESTHESIA, BX & TBNA;  Surgeon: Jamil Willoughby MD;   Location: [facility] ENDOSCOPY;  Service: Pulmonary;  Laterality: N/A;  PRE/POST-ABNORMAL CT, LAD   • CARDIAC CATHETERIZATION Left 1952    Left Heart Cath Left Ventriculography, selective coronary angiography; iliofemoral angiography; angio seal closure, Dr. Brady Ramos   • COLONOSCOPY  09/2015    removed 2 polyps, Dr. Manley   • COLONOSCOPY N/A 02/12/2007    Left colonic diverticulosis, No evidence of polypoid neoplasia, Dr. Jarret Bloom   • COLONOSCOPY N/A 9/13/2018    Procedure: COLONOSCOPY TO CECUM WITH COLD BX'S POLYPECTOMY;  Surgeon: Berta Sin MD;  Location: [facility] ENDOSCOPY;  Service: General   • CYSTOSCOPY TRANSURETHRAL RESECTION OF PROSTATE N/A 11/01/2004    Dr. Rodolfo Arnold   • HAND SURGERY Right    • KNEE ARTHROPLASTY Left    • LUMBAR DISC SURGERY  2006, 2007    L4-S1 pseudoarthroses - Dr Cervantes   • LUMBAR DISC SURGERY N/A 01/29/2010    Removal of Instrumentation w/ decompression, Instrumentaion loosening & pt tested positive for zinc allergy, Dr. Kvng Cervantes   • ROTATOR CUFF REPAIR Right 04/2012   • THORACIC OUTLET SURGERY Bilateral    • VENA CAVA FILTER PLACEMENT  1996   • WRIST SURGERY Right     x3 starting in 1984         FAMILY HISTORY  Family History   Problem Relation Age of Onset   • Diabetes Sister    • Cancer Sister    • Hypertension Sister    • Kidney disease Sister    • Stroke Sister    • Heart disease Brother    • Hypertension Brother    • Cerebral aneurysm Brother    • Sudden death Brother    • Bleeding Disorder Brother    • Cancer Mother    • Heart disease Father    • Cancer Father         malignant neoplasm   • Deep vein thrombosis Father    • Heart attack Father    • Malig Hyperthermia Neg Hx          SOCIAL HISTORY  Social History     Socioeconomic History   • Marital status:      Spouse name: Not on file   • Number of children: Not on file   • Years of education: Not on file   • Highest education level: Not on file   Tobacco Use   • Smoking status: Current Every  Day Smoker     Packs/day: 0.50     Years: 30.00     Pack years: 15.00     Types: Cigars   • Smokeless tobacco: Never Used   • Tobacco comment: caffeine use, smokes about 5 cigars/day   Substance and Sexual Activity   • Alcohol use: Yes     Comment: 2x per month   • Drug use: No   • Sexual activity: Defer         ALLERGIES  Zinc and Chantix [varenicline]        REVIEW OF SYSTEMS  Review of Systems     All systems reviewed and negative except for those discussed in HPI.       PHYSICAL EXAM    I have reviewed the triage vital signs and nursing notes.    ED Triage Vitals   Temp Heart Rate Resp BP SpO2   03/24/21 2051 03/24/21 2051 03/24/21 2051 03/24/21 2103 03/24/21 2051   96.6 °F (35.9 °C) 85 18 164/90 100 %      Temp src Heart Rate Source Patient Position BP Location FiO2 (%)   03/24/21 2051 03/24/21 2051 -- -- --   Tympanic Monitor          Physical Exam  GENERAL: Awake and alert, nontoxic, GCS 15  HENT: nares patent, NCAT with no scalp tenderness or swelling  EYES: no scleral icterus, PERRL, EOMI  CV: regular rhythm, regular rate  RESPIRATORY: normal effort  ABDOMEN: soft  MUSCULOSKELETAL: no deformity  NEURO: alert, moves all extremities, follows commands, cranial nerves II through XII grossly intact, no focal neurologic deficits present.  SKIN: warm, dry        LAB RESULTS  Recent Results (from the past 24 hour(s))   Protime-INR    Collection Time: 03/24/21  9:01 PM    Specimen: Blood   Result Value Ref Range    Protime 17.4 (H) 11.7 - 14.2 Seconds    INR 1.44 (H) 0.90 - 1.10       Ordered the above labs and independently reviewed the results.        RADIOLOGY  CT Head Without Contrast    Result Date: 3/24/2021  CT HEAD WITHOUT CONTRAST  HISTORY: Fall. Headache. HISTORY of blood thinners.  COMPARISON: None available.  TECHNIQUE: Axial CT imaging was obtained through the brain. No IV contrast was administered.  FINDINGS: No acute intracranial hemorrhage is seen. The ventricles are normal in size. There is no  midline shift or mass effect. There is periventricular and deep white matter microangiopathic change. There is no calvarial fracture. The paranasal sinuses and mastoid air cells appear clear.      No acute intracranial findings.  Radiation dose reduction techniques were utilized, including automated exposure control and exposure modulation based on body size.  This report was finalized on 3/24/2021 9:33 PM by Dr. Tracy Hawk M.D.        I ordered the above noted radiological studies. Reviewed by me and discussed with radiologist.  See dictation for official radiology interpretation.      PROCEDURES    Procedures      MEDICATIONS GIVEN IN ER    Medications - No data to display      PROGRESS, DATA ANALYSIS, CONSULTS, AND MEDICAL DECISION MAKING    All labs have been independently reviewed by me.  All radiology studies have been reviewed by me and discussed with radiologist dictating the report.   EKG's independently viewed and interpreted by me.  Discussion below represents my analysis of pertinent findings related to patient's condition, differential diagnosis, treatment plan and final disposition.        ED Course as of Mar 24 2156   Wed Mar 24, 2021   2156 CT scan of the head shows no acute disease    [DP]   2156 INR is 1.44    [DP]   2156 Patient safe for discharge home with typical head injury precautions    [DP]      ED Course User Index  [DP] Jadiel Eid MD           PPE: Both the patient and I wore a surgical mask throughout the entire patient encounter. I wore protective goggles.    AS OF 21:56 EDT VITALS:    BP - 164/90  HR - 85  TEMP - 96.6 °F (35.9 °C) (Tympanic)  O2 SATS - 98%        DIAGNOSIS  Final diagnoses:   Concussion without loss of consciousness, initial encounter         DISPOSITION  Discharge           Jadiel Eid MD  03/24/21 2156

## 2021-03-31 ENCOUNTER — ANTICOAGULATION VISIT (OUTPATIENT)
Dept: FAMILY MEDICINE CLINIC | Facility: CLINIC | Age: 71
End: 2021-03-31

## 2021-03-31 LAB — INR PPP: 2.4 (ref 2–3)

## 2021-04-01 ENCOUNTER — TELEPHONE (OUTPATIENT)
Dept: FAMILY MEDICINE CLINIC | Facility: CLINIC | Age: 71
End: 2021-04-01

## 2021-04-01 NOTE — TELEPHONE ENCOUNTER
Caller: Javy Reeves Sr.    Relationship to patient: Self    Best call back number: [phone]    Patient is needing: INR RESULTS  2.4  TAKEN  03-31-21  P.M.

## 2021-04-07 LAB — INR PPP: 4.7 (ref 2–3)

## 2021-04-08 ENCOUNTER — ANTICOAGULATION VISIT (OUTPATIENT)
Dept: FAMILY MEDICINE CLINIC | Facility: CLINIC | Age: 71
End: 2021-04-08

## 2021-04-08 ENCOUNTER — TELEPHONE (OUTPATIENT)
Dept: FAMILY MEDICINE CLINIC | Facility: CLINIC | Age: 71
End: 2021-04-08

## 2021-04-08 NOTE — TELEPHONE ENCOUNTER
Pharmacy Name:  [facility]    Pharmacy representative name: LUCY    Pharmacy representative phone number: [phone]    What question does the pharmacy have: PATIENTS INR RESULTS ARE 3.6 ON 4/7     Additional notes: PLEASE CALL IF ANY CHANGES

## 2021-04-12 ENCOUNTER — ANTICOAGULATION VISIT (OUTPATIENT)
Dept: FAMILY MEDICINE CLINIC | Facility: CLINIC | Age: 71
End: 2021-04-12

## 2021-04-12 ENCOUNTER — TELEPHONE (OUTPATIENT)
Dept: FAMILY MEDICINE CLINIC | Facility: CLINIC | Age: 71
End: 2021-04-12

## 2021-04-12 LAB — INR PPP: 1.8 (ref 2–3)

## 2021-04-13 ENCOUNTER — TELEPHONE (OUTPATIENT)
Dept: FAMILY MEDICINE CLINIC | Facility: CLINIC | Age: 71
End: 2021-04-13

## 2021-04-13 NOTE — TELEPHONE ENCOUNTER
MAGGY WITH [facility] CALLED TO REPORT AN OUT OF RANGE INR OF 1.8 ON 04/12/21.    PLEASE ADVISE  [phone]

## 2021-04-19 ENCOUNTER — TELEPHONE (OUTPATIENT)
Dept: FAMILY MEDICINE CLINIC | Facility: CLINIC | Age: 71
End: 2021-04-19

## 2021-04-19 NOTE — TELEPHONE ENCOUNTER
Caller: Javy Reeves Sr.    Relationship: Self    Best call back number: [phone]    What medication are you requesting: SINUS INFECTION MEDICATION    What are your current symptoms: YELLOW MUCUS, SINUS PRESSURE, BURNING IN NOSE AND EYES, WATERY EYES.     How long have you been experiencing symptoms: SINCE Thursday 04/15/2021    Have you had these symptoms before:    [x] Yes  [] No    Have you been treated for these symptoms before:   [x] Yes  [] No    If a prescription is needed, what is your preferred pharmacy and phone number[id number] [place], [place] - [address] AT [facility] - [phone] [facility] [phone]      Additional notes:PATIENT STATES HE FREQUENTLY HAS SINUS INFECTIONS AND HAS THE SAME SYMPTOMS THAT HE USUALLY HAS. PLEASE CALL PATIENT IF HE MUST BE SEEN   .

## 2021-04-26 ENCOUNTER — OFFICE VISIT (OUTPATIENT)
Dept: FAMILY MEDICINE CLINIC | Facility: CLINIC | Age: 71
End: 2021-04-26

## 2021-04-26 VITALS
DIASTOLIC BLOOD PRESSURE: 80 MMHG | SYSTOLIC BLOOD PRESSURE: 136 MMHG | OXYGEN SATURATION: 100 % | HEART RATE: 72 BPM | HEIGHT: 72 IN | BODY MASS INDEX: 27.5 KG/M2 | TEMPERATURE: 97.5 F | WEIGHT: 203 LBS | RESPIRATION RATE: 16 BRPM

## 2021-04-26 DIAGNOSIS — E53.8 LOW FOLIC ACID: ICD-10-CM

## 2021-04-26 DIAGNOSIS — I65.21 MILD ATHEROSCLEROSIS OF RIGHT CAROTID ARTERY: ICD-10-CM

## 2021-04-26 DIAGNOSIS — M79.7 FIBROMYALGIA: ICD-10-CM

## 2021-04-26 DIAGNOSIS — Z72.0 TOBACCO ABUSE: ICD-10-CM

## 2021-04-26 DIAGNOSIS — J84.9 INTERSTITIAL LUNG DISEASE (HCC): ICD-10-CM

## 2021-04-26 DIAGNOSIS — E78.2 MIXED HYPERLIPIDEMIA: ICD-10-CM

## 2021-04-26 DIAGNOSIS — R79.9 ABNORMAL FINDING OF BLOOD CHEMISTRY, UNSPECIFIED: ICD-10-CM

## 2021-04-26 DIAGNOSIS — J01.90 ACUTE SINUSITIS, RECURRENCE NOT SPECIFIED, UNSPECIFIED LOCATION: Primary | ICD-10-CM

## 2021-04-26 DIAGNOSIS — E55.9 VITAMIN D DEFICIENCY: ICD-10-CM

## 2021-04-26 DIAGNOSIS — E53.8 LOW SERUM VITAMIN B12: ICD-10-CM

## 2021-04-26 PROBLEM — J45.909 ASTHMA: Status: RESOLVED | Noted: 2021-04-26 | Resolved: 2021-04-26

## 2021-04-26 PROBLEM — M51.37 DEGENERATION OF INTERVERTEBRAL DISC OF LUMBOSACRAL REGION: Status: ACTIVE | Noted: 2021-04-26

## 2021-04-26 PROBLEM — M51.379 DEGENERATION OF INTERVERTEBRAL DISC OF LUMBOSACRAL REGION: Status: ACTIVE | Noted: 2021-04-26

## 2021-04-26 PROBLEM — K58.9 IRRITABLE BOWEL SYNDROME: Status: ACTIVE | Noted: 2021-04-26

## 2021-04-26 PROBLEM — J45.909 ASTHMA: Status: ACTIVE | Noted: 2021-04-26

## 2021-04-26 PROCEDURE — 99214 OFFICE O/P EST MOD 30 MIN: CPT | Performed by: PHYSICIAN ASSISTANT

## 2021-04-26 RX ORDER — NITROGLYCERIN 0.1MG/HR
400 PATCH, TRANSDERMAL 24 HOURS TRANSDERMAL
COMMUNITY
Start: 2021-01-20 | End: 2021-04-30 | Stop reason: ALTCHOICE

## 2021-04-26 RX ORDER — CEFDINIR 300 MG/1
CAPSULE ORAL
Qty: 20 CAPSULE | Refills: 0 | Status: SHIPPED | OUTPATIENT
Start: 2021-04-26 | End: 2021-11-03

## 2021-04-26 RX ORDER — PREGABALIN 100 MG/1
CAPSULE ORAL
COMMUNITY
Start: 2021-01-20 | End: 2021-04-30 | Stop reason: ALTCHOICE

## 2021-04-26 NOTE — PROGRESS NOTES
Subjective   Javy Reeves Sr. is a 70 y.o. male.     History of Present Illness   Javy Reeves Sr. 70 y.o. male who presents for evaluation of sinus pressure and congestion. Symptoms include congestion, post nasal drip and sinus pain.  Onset of symptoms was 1 week ago, gradually worsening since that time. Patient denies shortness of breath, wheezing, fever.   Evaluation to date: none Treatment to date:  OTC antihistamines.     INR 2.6 Thurs  Just had Cholecystectomy; did show chronic cholecystitis---his nausea and abd pain now resolved.  More physical job--weight still coming down and eats less!!  Has f/u pulm.---f/u mild diffuse bronchiectasis  Do want him to take B12, 1,000mcg daily;  Start  I do want update labs to make sure his LDL cholesterol stays below 70 due to atherosclerosis of the carotid artery and history of past MI.  Patient still smokes advise stop smoking.  Also is low folic acid and is taking folic acid and low vitamin D and taking vitamin D but not taking his B12 and this is been low.  Restart B12 over-the-counter with follow-up labs planned in June  The following portions of the patient's history were reviewed and updated as appropriate: allergies, current medications, past family history, past medical history, past social history, past surgical history and problem list.    Review of Systems   Constitutional: Negative for activity change, appetite change and unexpected weight change.   HENT: Positive for congestion, postnasal drip, sinus pressure and sinus pain. Negative for nosebleeds and trouble swallowing.    Eyes: Negative for pain and visual disturbance.   Respiratory: Negative for chest tightness, shortness of breath and wheezing.    Cardiovascular: Negative for chest pain and palpitations.   Gastrointestinal: Negative for abdominal pain and blood in stool.   Endocrine: Negative.    Genitourinary: Negative for difficulty urinating and hematuria.   Musculoskeletal: Negative for joint  swelling.   Skin: Negative for color change and rash.   Allergic/Immunologic: Negative.    Neurological: Negative for syncope and speech difficulty.   Hematological: Negative for adenopathy.   Psychiatric/Behavioral: Negative for agitation and confusion.   All other systems reviewed and are negative.      Objective   Physical Exam  Vitals and nursing note reviewed.   Constitutional:       General: He is not in acute distress.     Appearance: He is well-developed. He is not diaphoretic.   HENT:      Head: Normocephalic and atraumatic. No right periorbital erythema or left periorbital erythema.      Right Ear: Tympanic membrane, ear canal and external ear normal.      Left Ear: Tympanic membrane, ear canal and external ear normal.      Ears:      Comments: fluid     Nose: Congestion present.      Mouth/Throat:      Pharynx: Posterior oropharyngeal erythema present.   Eyes:      General: No scleral icterus.        Right eye: No discharge.         Left eye: No discharge.      Conjunctiva/sclera: Conjunctivae normal.      Pupils: Pupils are equal, round, and reactive to light.   Cardiovascular:      Rate and Rhythm: Normal rate and regular rhythm.      Pulses: Normal pulses.      Heart sounds: Normal heart sounds. No murmur heard.     Pulmonary:      Effort: Pulmonary effort is normal.      Breath sounds: Normal breath sounds. No rales.   Abdominal:      General: Bowel sounds are normal.      Palpations: Abdomen is soft.      Tenderness: There is no abdominal tenderness.   Genitourinary:     Penis: Normal.       Testes: Normal.      Prostate: Normal.   Musculoskeletal:         General: No swelling, tenderness or deformity. Normal range of motion.      Cervical back: Normal range of motion and neck supple.      Right lower leg: No edema.      Left lower leg: No edema.   Lymphadenopathy:      Cervical: No cervical adenopathy.   Skin:     General: Skin is warm and dry.      Coloration: Skin is not jaundiced.      Findings:  No erythema or rash.   Neurological:      General: No focal deficit present.      Mental Status: He is alert and oriented to person, place, and time. Mental status is at baseline.      Cranial Nerves: No cranial nerve deficit.      Motor: No tremor, atrophy or abnormal muscle tone.      Coordination: Coordination normal.      Deep Tendon Reflexes: Reflexes are normal and symmetric. Reflexes normal.      Reflex Scores:       Tricep reflexes are 2+ on the right side and 2+ on the left side.       Bicep reflexes are 2+ on the right side and 2+ on the left side.       Brachioradialis reflexes are 2+ on the right side and 2+ on the left side.       Patellar reflexes are 2+ on the right side and 2+ on the left side.       Achilles reflexes are 2+ on the right side and 2+ on the left side.  Psychiatric:         Mood and Affect: Mood normal. Affect is not inappropriate.         Behavior: Behavior normal.         Thought Content: Thought content normal.         Judgment: Judgment normal.         Assessment/Plan   Diagnoses and all orders for this visit:    1. Acute sinusitis, recurrence not specified, unspecified location (Primary)  -     Comprehensive metabolic panel; Future  -     Lipid panel; Future  -     CBC and Differential; Future  -     TSH; Future  -     Hemoglobin A1c; Future  -     T3, Free; Future  -     T4, Free; Future  -     Vitamin B12; Future  -     Folate; Future  -     Vitamin D 25 Hydroxy; Future    2. Interstitial lung disease (CMS/HCC)  -     Comprehensive metabolic panel; Future  -     Lipid panel; Future  -     CBC and Differential; Future  -     TSH; Future  -     Hemoglobin A1c; Future  -     T3, Free; Future  -     T4, Free; Future  -     Vitamin B12; Future  -     Folate; Future  -     Vitamin D 25 Hydroxy; Future    3. Stable angina (CMS/HCC)  -     Comprehensive metabolic panel; Future  -     Lipid panel; Future  -     CBC and Differential; Future  -     TSH; Future  -     Hemoglobin A1c;  Future  -     T3, Free; Future  -     T4, Free; Future  -     Vitamin B12; Future  -     Folate; Future  -     Vitamin D 25 Hydroxy; Future    4. Fibromyalgia  -     Comprehensive metabolic panel; Future  -     Lipid panel; Future  -     CBC and Differential; Future  -     TSH; Future  -     Hemoglobin A1c; Future  -     T3, Free; Future  -     T4, Free; Future  -     Vitamin B12; Future  -     Folate; Future  -     Vitamin D 25 Hydroxy; Future    5. Tobacco abuse  -     Comprehensive metabolic panel; Future  -     Lipid panel; Future  -     CBC and Differential; Future  -     TSH; Future  -     Hemoglobin A1c; Future  -     T3, Free; Future  -     T4, Free; Future  -     Vitamin B12; Future  -     Folate; Future  -     Vitamin D 25 Hydroxy; Future    6. Mild atherosclerosis of right carotid artery  -     Comprehensive metabolic panel; Future  -     Lipid panel; Future  -     CBC and Differential; Future  -     TSH; Future  -     Hemoglobin A1c; Future  -     T3, Free; Future  -     T4, Free; Future  -     Vitamin B12; Future  -     Folate; Future  -     Vitamin D 25 Hydroxy; Future    7. Mixed hyperlipidemia  -     Comprehensive metabolic panel; Future  -     Lipid panel; Future  -     CBC and Differential; Future  -     TSH; Future  -     Hemoglobin A1c; Future  -     T3, Free; Future  -     T4, Free; Future  -     Vitamin B12; Future  -     Folate; Future  -     Vitamin D 25 Hydroxy; Future    8. Low folic acid  -     Comprehensive metabolic panel; Future  -     Lipid panel; Future  -     CBC and Differential; Future  -     TSH; Future  -     Hemoglobin A1c; Future  -     T3, Free; Future  -     T4, Free; Future  -     Vitamin B12; Future  -     Folate; Future  -     Vitamin D 25 Hydroxy; Future    9. Low serum vitamin B12  -     Comprehensive metabolic panel; Future  -     Lipid panel; Future  -     CBC and Differential; Future  -     TSH; Future  -     Hemoglobin A1c; Future  -     T3, Free; Future  -     T4,  Free; Future  -     Vitamin B12; Future  -     Folate; Future  -     Vitamin D 25 Hydroxy; Future    10. Vitamin D deficiency  -     Comprehensive metabolic panel; Future  -     Lipid panel; Future  -     CBC and Differential; Future  -     TSH; Future  -     Hemoglobin A1c; Future  -     T3, Free; Future  -     T4, Free; Future  -     Vitamin B12; Future  -     Folate; Future  -     Vitamin D 25 Hydroxy; Future    11. Abnormal finding of blood chemistry, unspecified   -     Hemoglobin A1c; Future    Other orders  -     cefdinir (OMNICEF) 300 MG capsule; One cap PO BID for infection  Dispense: 20 capsule; Refill: 0        Will Rx Cefdinir for sinus infx---to have INR Wed---gets weekly  On coumadin  Get OTC B12--start--was low  Labs end June  Need LDL <70  F/u D also  Stop smoking  Plan, Javy Reeves Sr., was seen today.  he was seen for Imparied fasting glucose and plan follow up labs, diet, and exercise, Hyperlipidemia and will continue current medication, Vitamin D deficiency and will update labs  and History of prior MI sees cardiology.  Do need follow-up on B12, vitamin D, folic acid and restart the B12.  We will continue his Lyrica for fibromyalgia working well.  Impaired fasting glucose need to update A1c to watch for diabetes type 2.  Takes omeprazole as needed only since lost weight rare GERD.

## 2021-04-26 NOTE — PATIENT INSTRUCTIONS
Sinusitis, Adult  Sinusitis is inflammation of your sinuses. Sinuses are hollow spaces in the bones around your face. Your sinuses are located:  · Around your eyes.  · In the middle of your forehead.  · Behind your nose.  · In your cheekbones.  Mucus normally drains out of your sinuses. When your nasal tissues become inflamed or swollen, mucus can become trapped or blocked. This allows bacteria, viruses, and fungi to grow, which leads to infection. Most infections of the sinuses are caused by a virus.  Sinusitis can develop quickly. It can last for up to 4 weeks (acute) or for more than 12 weeks (chronic). Sinusitis often develops after a cold.  What are the causes?  This condition is caused by anything that creates swelling in the sinuses or stops mucus from draining. This includes:  · Allergies.  · Asthma.  · Infection from bacteria or viruses.  · Deformities or blockages in your nose or sinuses.  · Abnormal growths in the nose (nasal polyps).  · Pollutants, such as chemicals or irritants in the air.  · Infection from fungi (rare).  What increases the risk?  You are more likely to develop this condition if you:  · Have a weak body defense system (immune system).  · Do a lot of swimming or diving.  · Overuse nasal sprays.  · Smoke.  What are the signs or symptoms?  The main symptoms of this condition are pain and a feeling of pressure around the affected sinuses. Other symptoms include:  · Stuffy nose or congestion.  · Thick drainage from your nose.  · Swelling and warmth over the affected sinuses.  · Headache.  · Upper toothache.  · A cough that may get worse at night.  · Extra mucus that collects in the throat or the back of the nose (postnasal drip).  · Decreased sense of smell and taste.  · Fatigue.  · A fever.  · Sore throat.  · Bad breath.  How is this diagnosed?  This condition is diagnosed based on:  · Your symptoms.  · Your medical history.  · A physical exam.  · Tests to find out if your condition is  acute or chronic. This may include:  ? Checking your nose for nasal polyps.  ? Viewing your sinuses using a device that has a light (endoscope).  ? Testing for allergies or bacteria.  ? Imaging tests, such as an MRI or CT scan.  In rare cases, a bone biopsy may be done to rule out more serious types of fungal sinus disease.  How is this treated?  Treatment for sinusitis depends on the cause and whether your condition is chronic or acute.  · If caused by a virus, your symptoms should go away on their own within 10 days. You may be given medicines to relieve symptoms. They include:  ? Medicines that shrink swollen nasal passages (topical intranasal decongestants).  ? Medicines that treat allergies (antihistamines).  ? A spray that eases inflammation of the nostrils (topical intranasal corticosteroids).  ? Rinses that help get rid of thick mucus in your nose (nasal saline washes).  · If caused by bacteria, your health care provider may recommend waiting to see if your symptoms improve. Most bacterial infections will get better without antibiotic medicine. You may be given antibiotics if you have:  ? A severe infection.  ? A weak immune system.  · If caused by narrow nasal passages or nasal polyps, you may need to have surgery.  Follow these instructions at home:  Medicines  · Take, use, or apply over-the-counter and prescription medicines only as told by your health care provider. These may include nasal sprays.  · If you were prescribed an antibiotic medicine, take it as told by your health care provider. Do not stop taking the antibiotic even if you start to feel better.  Hydrate and humidify    · Drink enough fluid to keep your urine pale yellow. Staying hydrated will help to thin your mucus.  · Use a cool mist humidifier to keep the humidity level in your home above 50%.  · Inhale steam for 10-15 minutes, 3-4 times a day, or as told by your health care provider. You can do this in the bathroom while a hot shower is  running.  · Limit your exposure to cool or dry air.  Rest  · Rest as much as possible.  · Sleep with your head raised (elevated).  · Make sure you get enough sleep each night.  General instructions    · Apply a warm, moist washcloth to your face 3-4 times a day or as told by your health care provider. This will help with discomfort.  · Wash your hands often with soap and water to reduce your exposure to germs. If soap and water are not available, use hand .  · Do not smoke. Avoid being around people who are smoking (secondhand smoke).  · Keep all follow-up visits as told by your health care provider. This is important.  Contact a health care provider if:  · You have a fever.  · Your symptoms get worse.  · Your symptoms do not improve within 10 days.  Get help right away if:  · You have a severe headache.  · You have persistent vomiting.  · You have severe pain or swelling around your face or eyes.  · You have vision problems.  · You develop confusion.  · Your neck is stiff.  · You have trouble breathing.  Summary  · Sinusitis is soreness and inflammation of your sinuses. Sinuses are hollow spaces in the bones around your face.  · This condition is caused by nasal tissues that become inflamed or swollen. The swelling traps or blocks the flow of mucus. This allows bacteria, viruses, and fungi to grow, which leads to infection.  · If you were prescribed an antibiotic medicine, take it as told by your health care provider. Do not stop taking the antibiotic even if you start to feel better.  · Keep all follow-up visits as told by your health care provider. This is important.  This information is not intended to replace advice given to you by your health care provider. Make sure you discuss any questions you have with your health care provider.  Document Revised: 05/20/2019 Document Reviewed: 05/20/2019  [employer] Patient Education © 2021 [employer]

## 2021-04-28 ENCOUNTER — TELEPHONE (OUTPATIENT)
Dept: FAMILY MEDICINE CLINIC | Facility: CLINIC | Age: 71
End: 2021-04-28

## 2021-04-28 NOTE — TELEPHONE ENCOUNTER
I want to confirm he is on 10 mg every day?      I do have him on antibiotics right now and would like for him to go down on his warfarin dose to 7 mg daily for the next 2 days and then to his regular dose of 10 mg after that with INR next Wednesday

## 2021-04-30 ENCOUNTER — OFFICE VISIT (OUTPATIENT)
Dept: CARDIOLOGY | Facility: CLINIC | Age: 71
End: 2021-04-30

## 2021-04-30 VITALS
HEIGHT: 72 IN | BODY MASS INDEX: 27.77 KG/M2 | OXYGEN SATURATION: 99 % | DIASTOLIC BLOOD PRESSURE: 80 MMHG | WEIGHT: 205 LBS | HEART RATE: 63 BPM | SYSTOLIC BLOOD PRESSURE: 136 MMHG

## 2021-04-30 DIAGNOSIS — R07.2 PRECORDIAL CHEST PAIN: Primary | ICD-10-CM

## 2021-04-30 DIAGNOSIS — Z86.711 HISTORY OF PULMONARY EMBOLUS (PE): ICD-10-CM

## 2021-04-30 PROCEDURE — 99214 OFFICE O/P EST MOD 30 MIN: CPT | Performed by: INTERNAL MEDICINE

## 2021-04-30 RX ORDER — PANTOPRAZOLE SODIUM 40 MG/1
40 TABLET, DELAYED RELEASE ORAL DAILY
Qty: 90 TABLET | Refills: 3 | Status: SHIPPED | OUTPATIENT
Start: 2021-04-30 | End: 2021-11-03 | Stop reason: SDUPTHER

## 2021-04-30 RX ORDER — PANTOPRAZOLE SODIUM 40 MG/1
40 TABLET, DELAYED RELEASE ORAL DAILY
Qty: 30 TABLET | Refills: 1 | Status: SHIPPED | OUTPATIENT
Start: 2021-04-30 | End: 2021-04-30

## 2021-05-03 ENCOUNTER — TELEPHONE (OUTPATIENT)
Dept: FAMILY MEDICINE CLINIC | Facility: CLINIC | Age: 71
End: 2021-05-03

## 2021-05-03 ENCOUNTER — ANTICOAGULATION VISIT (OUTPATIENT)
Dept: FAMILY MEDICINE CLINIC | Facility: CLINIC | Age: 71
End: 2021-05-03

## 2021-05-03 LAB — INR PPP: 2.3 (ref 2–3)

## 2021-05-13 ENCOUNTER — TELEPHONE (OUTPATIENT)
Dept: FAMILY MEDICINE CLINIC | Facility: CLINIC | Age: 71
End: 2021-05-13

## 2021-05-13 LAB — INR PPP: 3.3 (ref 2–3)

## 2021-05-13 NOTE — TELEPHONE ENCOUNTER
Caller: LUCY    Relationship to patient: [facility]    Best call back number: [phone]     Patient is needing: LUCY WANTED TO REPORT ON OUT OF RANGE INR OF 3.3 THAT WAS TAKEN LAST NIGHT 5/12/2021

## 2021-05-19 LAB — INR PPP: 3.6 (ref 2–3)

## 2021-05-20 ENCOUNTER — ANTICOAGULATION VISIT (OUTPATIENT)
Dept: FAMILY MEDICINE CLINIC | Facility: CLINIC | Age: 71
End: 2021-05-20

## 2021-05-20 ENCOUNTER — TELEPHONE (OUTPATIENT)
Dept: FAMILY MEDICINE CLINIC | Facility: CLINIC | Age: 71
End: 2021-05-20

## 2021-05-20 NOTE — TELEPHONE ENCOUNTER
Caller: SHREYAS    Relationship: LAB SERVICE    Best call back number: [phone] SHREYAS.         What test was performed: INR    When was the test performed: 5/19/2021    Where was the test performed:CALLER STATES AT PATIENTS HOME    Additional notes:  OUT OF RANGE 3.6

## 2021-06-02 ENCOUNTER — TELEPHONE (OUTPATIENT)
Dept: FAMILY MEDICINE CLINIC | Facility: CLINIC | Age: 71
End: 2021-06-02

## 2021-06-02 ENCOUNTER — ANTICOAGULATION VISIT (OUTPATIENT)
Dept: FAMILY MEDICINE CLINIC | Facility: CLINIC | Age: 71
End: 2021-06-02

## 2021-06-02 LAB — INR PPP: 3.8 (ref 2–3)

## 2021-06-09 LAB — INR PPP: 4 (ref 2–3)

## 2021-06-10 ENCOUNTER — ANTICOAGULATION VISIT (OUTPATIENT)
Dept: FAMILY MEDICINE CLINIC | Facility: CLINIC | Age: 71
End: 2021-06-10

## 2021-06-10 ENCOUNTER — TELEPHONE (OUTPATIENT)
Dept: FAMILY MEDICINE CLINIC | Facility: CLINIC | Age: 71
End: 2021-06-10

## 2021-06-11 ENCOUNTER — ANTICOAGULATION VISIT (OUTPATIENT)
Dept: FAMILY MEDICINE CLINIC | Facility: CLINIC | Age: 71
End: 2021-06-11

## 2021-06-11 LAB — INR PPP: 2.9 (ref 2–3)

## 2021-06-16 ENCOUNTER — ANTICOAGULATION VISIT (OUTPATIENT)
Dept: FAMILY MEDICINE CLINIC | Facility: CLINIC | Age: 71
End: 2021-06-16

## 2021-06-16 LAB — INR PPP: 2.5 (ref 2–3)

## 2021-06-23 ENCOUNTER — ANTICOAGULATION VISIT (OUTPATIENT)
Dept: FAMILY MEDICINE CLINIC | Facility: CLINIC | Age: 71
End: 2021-06-23

## 2021-06-23 LAB — INR PPP: 2 (ref 2–3)

## 2021-06-24 DIAGNOSIS — J01.90 ACUTE SINUSITIS, RECURRENCE NOT SPECIFIED, UNSPECIFIED LOCATION: ICD-10-CM

## 2021-06-24 DIAGNOSIS — Z72.0 TOBACCO ABUSE: ICD-10-CM

## 2021-06-24 DIAGNOSIS — M79.7 FIBROMYALGIA: ICD-10-CM

## 2021-06-24 DIAGNOSIS — E53.8 LOW SERUM VITAMIN B12: ICD-10-CM

## 2021-06-24 DIAGNOSIS — E55.9 VITAMIN D DEFICIENCY: ICD-10-CM

## 2021-06-24 DIAGNOSIS — I65.21 MILD ATHEROSCLEROSIS OF RIGHT CAROTID ARTERY: ICD-10-CM

## 2021-06-24 DIAGNOSIS — E53.8 LOW FOLIC ACID: ICD-10-CM

## 2021-06-24 DIAGNOSIS — E78.2 MIXED HYPERLIPIDEMIA: ICD-10-CM

## 2021-06-24 DIAGNOSIS — R79.9 ABNORMAL FINDING OF BLOOD CHEMISTRY, UNSPECIFIED: ICD-10-CM

## 2021-06-24 DIAGNOSIS — J84.9 INTERSTITIAL LUNG DISEASE (HCC): ICD-10-CM

## 2021-06-30 ENCOUNTER — TELEPHONE (OUTPATIENT)
Dept: FAMILY MEDICINE CLINIC | Facility: CLINIC | Age: 71
End: 2021-06-30

## 2021-06-30 ENCOUNTER — ANTICOAGULATION VISIT (OUTPATIENT)
Dept: FAMILY MEDICINE CLINIC | Facility: CLINIC | Age: 71
End: 2021-06-30

## 2021-06-30 LAB — INR PPP: 3.3 (ref 2–3)

## 2021-06-30 NOTE — TELEPHONE ENCOUNTER
Caller: YUE    Relationship to patient: Other    Best call back number: [phone]    Patient is needing: PATIENT HAD AN OUT OF RANGE INR. IT IS: 3.3 FOR TODAY 6/30/21

## 2021-07-07 ENCOUNTER — ANTICOAGULATION VISIT (OUTPATIENT)
Dept: FAMILY MEDICINE CLINIC | Facility: CLINIC | Age: 71
End: 2021-07-07

## 2021-07-07 LAB — INR PPP: 3.5 (ref 2–3)

## 2021-07-14 ENCOUNTER — ANTICOAGULATION VISIT (OUTPATIENT)
Dept: FAMILY MEDICINE CLINIC | Facility: CLINIC | Age: 71
End: 2021-07-14

## 2021-07-14 LAB — INR PPP: 1.8 (ref 2–3)

## 2021-07-26 ENCOUNTER — OFFICE VISIT (OUTPATIENT)
Dept: FAMILY MEDICINE CLINIC | Facility: CLINIC | Age: 71
End: 2021-07-26

## 2021-07-26 VITALS
BODY MASS INDEX: 27.09 KG/M2 | SYSTOLIC BLOOD PRESSURE: 121 MMHG | OXYGEN SATURATION: 99 % | TEMPERATURE: 97.1 F | DIASTOLIC BLOOD PRESSURE: 66 MMHG | WEIGHT: 200 LBS | RESPIRATION RATE: 20 BRPM | HEIGHT: 72 IN | HEART RATE: 93 BPM

## 2021-07-26 DIAGNOSIS — R10.9 CHRONIC ABDOMINAL PAIN: Primary | ICD-10-CM

## 2021-07-26 DIAGNOSIS — G89.29 CHRONIC ABDOMINAL PAIN: Primary | ICD-10-CM

## 2021-07-26 DIAGNOSIS — R63.4 UNINTENTIONAL WEIGHT LOSS: ICD-10-CM

## 2021-07-26 PROCEDURE — 99213 OFFICE O/P EST LOW 20 MIN: CPT | Performed by: FAMILY MEDICINE

## 2021-07-26 NOTE — PROGRESS NOTES
Subjective   Javy Reeves Sr. is a 70 y.o. male.     History of Present Illness     70-year-old male who is a new patient to me with hyperlipidemia stable angina DVT history PE history low B12 low folic acid reflux GERD IBS arthritis fibromyalgia chronic neck and back pain interstitial lung disease presents today to discuss abdominal pain.    Burning a bd pain woke him up from sleep; lasted 5 min. 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 Burning sensation stayed in that one spot epigastric.     Reports chronic abd pain x over a year; TTP generalized. GB removed this year.     Regular BMs once a day some times twice a day. No blood in stool. Appetite: despite eating and having appetite, losing weight; lost 45 lbs in the last 8 months.  This is been worked up several months ago with his PCP; follows with pulmonary; he was referred to oncology however patient was told needed more than unintentional weight loss for consultation.    Denies dysuria frequency urgency.    The following portions of the patient's history were reviewed and updated as appropriate: allergies, current medications, past family history, past medical history, past social history, past surgical history and problem list.    Review of Systems   Constitutional: Positive for unexpected weight loss. Negative for chills and fever.   Respiratory: Negative for cough and shortness of breath.    Cardiovascular: Negative for chest pain, palpitations and leg swelling.   Gastrointestinal: Positive for abdominal pain and GERD. Negative for abdominal distention, anal bleeding, blood in stool, constipation, diarrhea, nausea, rectal pain and vomiting.       Objective   Physical Exam  Constitutional:       Appearance: He is well-developed.   HENT:      Head: Normocephalic and atraumatic.   Eyes:      Pupils: Pupils are equal, round, and reactive to light.   Cardiovascular:      Rate and Rhythm: Normal rate and regular rhythm.       Heart sounds: Normal heart sounds. No murmur heard.     Pulmonary:      Effort: Pulmonary effort is normal. No respiratory distress.      Breath sounds: Normal breath sounds. No stridor. No wheezing or rales.   Musculoskeletal:      Cervical back: Normal range of motion and neck supple.   Skin:     General: Skin is warm.   Neurological:      Mental Status: He is alert and oriented to person, place, and time.   Psychiatric:         Behavior: Behavior normal.                 Assessment/Plan     Diagnoses and all orders for this visit:    1. Chronic abdominal pain (Primary)  -     Ambulatory Referral to Gastroenterology    2. Unintentional weight loss  -     Ambulatory Referral to Gastroenterology        Add Pepcid AC to pantoprazole regimen.  Urgent referral to gastroenterology given chronic nature of symptoms.  If any new or worsening symptoms return to clinic sooner or go to ER.  Patient expressed understanding and agreeable to plan.

## 2021-08-01 RX ORDER — WARFARIN SODIUM 1 MG/1
TABLET ORAL
Qty: 60 TABLET | Refills: 11 | Status: SHIPPED | OUTPATIENT
Start: 2021-08-01 | End: 2021-11-03 | Stop reason: SDUPTHER

## 2021-08-01 RX ORDER — WARFARIN SODIUM 5 MG/1
TABLET ORAL
Qty: 180 TABLET | Refills: 3 | Status: SHIPPED | OUTPATIENT
Start: 2021-08-01 | End: 2021-11-03 | Stop reason: SDUPTHER

## 2021-08-01 RX ORDER — WARFARIN SODIUM 1 MG/1
TABLET ORAL
Qty: 60 TABLET | Refills: 11 | Status: SHIPPED | OUTPATIENT
Start: 2021-08-01 | End: 2021-11-03 | Stop reason: SDDI

## 2021-08-01 RX ORDER — WARFARIN SODIUM 5 MG/1
TABLET ORAL
Qty: 180 TABLET | Refills: 3 | Status: ON HOLD | OUTPATIENT
Start: 2021-08-01 | End: 2022-08-23

## 2021-08-04 ENCOUNTER — ANTICOAGULATION VISIT (OUTPATIENT)
Dept: FAMILY MEDICINE CLINIC | Facility: CLINIC | Age: 71
End: 2021-08-04

## 2021-08-04 ENCOUNTER — TELEPHONE (OUTPATIENT)
Dept: FAMILY MEDICINE CLINIC | Facility: CLINIC | Age: 71
End: 2021-08-04

## 2021-08-04 LAB — INR PPP: 1.6 (ref 2–3)

## 2021-08-04 NOTE — TELEPHONE ENCOUNTER
NEELA WITH [facility] CALLED TO REPORT PATIENTS INR FOR TODAY 1.6.  HE STATES THAT HE HAS ALREADY FAXED THEM IN AS WELL.

## 2021-09-01 ENCOUNTER — TELEPHONE (OUTPATIENT)
Dept: FAMILY MEDICINE CLINIC | Facility: CLINIC | Age: 71
End: 2021-09-01

## 2021-09-01 ENCOUNTER — ANTICOAGULATION VISIT (OUTPATIENT)
Dept: FAMILY MEDICINE CLINIC | Facility: CLINIC | Age: 71
End: 2021-09-01

## 2021-09-01 LAB — INR PPP: 3.4 (ref 2–3)

## 2021-09-08 ENCOUNTER — OFFICE VISIT (OUTPATIENT)
Dept: GASTROENTEROLOGY | Facility: CLINIC | Age: 71
End: 2021-09-08

## 2021-09-08 VITALS
SYSTOLIC BLOOD PRESSURE: 125 MMHG | BODY MASS INDEX: 27.22 KG/M2 | WEIGHT: 201 LBS | DIASTOLIC BLOOD PRESSURE: 72 MMHG | HEIGHT: 72 IN

## 2021-09-08 DIAGNOSIS — R10.30 LOWER ABDOMINAL PAIN: Chronic | ICD-10-CM

## 2021-09-08 DIAGNOSIS — R09.89 CHOKING SENSATION: Chronic | ICD-10-CM

## 2021-09-08 DIAGNOSIS — R63.4 WEIGHT LOSS: Primary | ICD-10-CM

## 2021-09-08 PROCEDURE — 99204 OFFICE O/P NEW MOD 45 MIN: CPT | Performed by: INTERNAL MEDICINE

## 2021-09-08 NOTE — PROGRESS NOTES
"Chief Complaint   Patient presents with   • Abdominal Pain   • Weight Loss       Subjective     HPI    Javy Reeves Sr. is a 70 y.o. male with a past medical history noted below who presents for abdominal pain and weight loss.  His weight has been stable all of 2021 but he reports he weighed about 247 in 2020. Around this time, he took a job at [employer].  He was retired prior to this and said he was getting bored sitting around the house.  Takes about 99906 to 30568 steps in a day while at work.  Eats 1-2 meals dailly.  Denies change in appetite, no nausea or vomiting.  Rare diarrhea, resolves with some pepto.  Rare heartburn, no dysphagia.      Wakes up with lots of mucus that he has to cough up, he says it feels that he gets choked by it.  He says the mucus that he coughs up is clear.  He is a smoker though he says that he has no evidence of COPD.  He does have some lung nodules.  He follows with Dr. Jamil Miguel.  He feels that the cough is not related to his lungs and feels that it might be reflux related though he has no heartburn per se.  He denies dysphagia.    Used to have pain in his lower abdomen, achey.  Has not happeneed in about 3 months.  He says it lasted about 3 years, about 2 times per week.  Not related to BMs, eating, or movement.  Uncomfortable    Colonoscopy in 2018 with Dr. Sin.  Benign hyperplastic polyp.    Mother with cancer \" all throughout her body\" but he doesn't know the original site of cancer.    On warfarin for history of DVT.     Smokes 1/2 ppd.  Rare ETOH.          Today's visit was in the office.  Both the patient and I were wearing face masks and proper hand hygiene was performed before and after the physical exam.           Current Outpatient Medications:   •  acetaminophen (TYLENOL) 500 MG tablet, Take 650 mg by mouth Every 6 (Six) Hours As Needed for Mild Pain ., Disp: , Rfl:   •  albuterol sulfate  (90 Base) MCG/ACT inhaler, Inhale 2 puffs Every 4 (Four) " Hours As Needed for Wheezing or Shortness of Air., Disp: 54 g, Rfl: 3  •  atorvastatin (Lipitor) 40 MG tablet, Take 1 tablet by mouth Daily. For cholesterol, Disp: 90 tablet, Rfl: 3  •  cetirizine (zyrTEC) 10 MG tablet, Take 10 mg by mouth Daily., Disp: , Rfl:   •  Cyanocobalamin (Vitamin B-12) 1000 MCG sublingual tablet, One SL daily, Disp: 90 each, Rfl: 3  •  folic acid (FOLVITE) 1 MG tablet, Take 1 tablet by mouth Daily., Disp: 90 tablet, Rfl: 1  •  nitroglycerin (NITROSTAT) 0.4 MG SL tablet, Place 1 tablet under the tongue Every 5 (Five) Minutes As Needed for Chest Pain. Take no more than 3 doses in 15 minutes., Disp: 30 tablet, Rfl: 5  •  pantoprazole (PROTONIX) 40 MG EC tablet, TAKE 1 TABLET BY MOUTH DAILY, Disp: 90 tablet, Rfl: 3  •  pregabalin (LYRICA) 75 MG capsule, Take 1 capsule by mouth 2 (Two) Times a Day. For Fibromyalgia, Disp: 180 capsule, Rfl: 1  •  tiZANidine (ZANAFLEX) 4 MG tablet, Take 1 tablet by mouth Every 8 (Eight) Hours As Needed for Muscle Spasms., Disp: 90 tablet, Rfl: 3  •  Turmeric 500 MG tablet, Take 500 mg by mouth Daily., Disp: , Rfl:   •  warfarin (COUMADIN) 1 MG tablet, TAKE 2 TABLETS BY MOUTH EVERY DAY, Disp: 60 tablet, Rfl: 11  •  warfarin (COUMADIN) 1 MG tablet, TAKE 2 TABLETS BY MOUTH EVERY DAY, Disp: 60 tablet, Rfl: 11  •  warfarin (COUMADIN) 5 MG tablet, Take 1 tablet by mouth 2 (two) times a day for 30 days., Disp: 60 tablet, Rfl: 5  •  warfarin (COUMADIN) 5 MG tablet, TAKE 2 TABLETS BY MOUTH EVERY DAY, Disp: 180 tablet, Rfl: 3  •  warfarin (COUMADIN) 5 MG tablet, TAKE 2 TABLETS BY MOUTH EVERY DAY, Disp: 180 tablet, Rfl: 3  •  cefdinir (OMNICEF) 300 MG capsule, One cap PO BID for infection, Disp: 20 capsule, Rfl: 0  •  Fluticasone-Umeclidin-Vilant (Trelegy Ellipta) 100-62.5-25 MCG/INH inhaler, , Disp: , Rfl:       Objective     Vitals:    09/08/21 1319   BP: 125/72         09/08/21  1319   Weight: 91.2 kg (201 lb)     Body mass index is 27.26 kg/m².    Physical Exam  Vitals  reviewed.   Constitutional:       General: He is not in acute distress.     Appearance: Normal appearance. He is well-developed. He is not diaphoretic.   HENT:      Head: Normocephalic.   Neck:      Thyroid: No thyromegaly.   Cardiovascular:      Rate and Rhythm: Normal rate and regular rhythm.   Pulmonary:      Effort: Pulmonary effort is normal. No respiratory distress.      Breath sounds: Normal breath sounds. No wheezing.   Abdominal:      General: Bowel sounds are normal. There is no distension.      Palpations: Abdomen is soft. Abdomen is not rigid. There is no mass.      Tenderness: There is no abdominal tenderness. There is no guarding or rebound.      Hernia: No hernia is present.   Genitourinary:     Comments: Rectal exam deferred  Musculoskeletal:         General: No tenderness.   Neurological:      Mental Status: He is alert and oriented to person, place, and time.      Motor: No atrophy.      Coordination: Coordination normal.   Psychiatric:         Behavior: Behavior normal.         Thought Content: Thought content normal.         Judgment: Judgment normal.             WBC   Date Value Ref Range Status   11/23/2020 5.54 3.40 - 10.80 10*3/mm3 Final   09/21/2020 4.21 3.40 - 10.80 10*3/mm3 Final     RBC   Date Value Ref Range Status   11/23/2020 4.32 4.14 - 5.80 10*6/mm3 Final   09/21/2020 4.34 4.14 - 5.80 10*6/mm3 Final     Hemoglobin   Date Value Ref Range Status   11/23/2020 14.0 13.0 - 17.7 g/dL Final     Hematocrit   Date Value Ref Range Status   11/23/2020 41.7 37.5 - 51.0 % Final     MCV   Date Value Ref Range Status   11/23/2020 96.5 79.0 - 97.0 fL Final     MCH   Date Value Ref Range Status   11/23/2020 32.4 26.6 - 33.0 pg Final     MCHC   Date Value Ref Range Status   11/23/2020 33.6 31.5 - 35.7 g/dL Final     RDW   Date Value Ref Range Status   11/23/2020 14.6 12.3 - 15.4 % Final     RDW-SD   Date Value Ref Range Status   11/23/2020 52.3 37.0 - 54.0 fl Final     MPV   Date Value Ref Range  Status   11/23/2020 9.5 6.0 - 12.0 fL Final     Platelets   Date Value Ref Range Status   11/23/2020 174 140 - 450 10*3/mm3 Final     Neutrophil %   Date Value Ref Range Status   11/23/2020 57.5 42.7 - 76.0 % Final     Lymphocyte %   Date Value Ref Range Status   11/23/2020 33.2 19.6 - 45.3 % Final     Monocyte %   Date Value Ref Range Status   11/23/2020 7.6 5.0 - 12.0 % Final     Eosinophil %   Date Value Ref Range Status   11/23/2020 1.1 0.3 - 6.2 % Final     Basophil %   Date Value Ref Range Status   11/23/2020 0.4 0.0 - 1.5 % Final     Immature Grans %   Date Value Ref Range Status   11/23/2020 0.2 0.0 - 0.5 % Final     Neutrophils, Absolute   Date Value Ref Range Status   11/23/2020 3.19 1.70 - 7.00 10*3/mm3 Final     Lymphocytes, Absolute   Date Value Ref Range Status   11/23/2020 1.84 0.70 - 3.10 10*3/mm3 Final     Monocytes, Absolute   Date Value Ref Range Status   11/23/2020 0.42 0.10 - 0.90 10*3/mm3 Final     Eosinophils, Absolute   Date Value Ref Range Status   11/23/2020 0.06 0.00 - 0.40 10*3/mm3 Final     Basophils, Absolute   Date Value Ref Range Status   11/23/2020 0.02 0.00 - 0.20 10*3/mm3 Final     Immature Grans, Absolute   Date Value Ref Range Status   11/23/2020 0.01 0.00 - 0.05 10*3/mm3 Final     nRBC   Date Value Ref Range Status   11/23/2020 0.0 0.0 - 0.2 /100 WBC Final       Lab Results   Component Value Date    GLUCOSE 120 (H) 11/23/2020    BUN 11 11/23/2020    CREATININE 0.84 11/23/2020    EGFRIFNONA 82 09/21/2020    EGFRIFAFRI 110 11/23/2020    BCR 13.1 11/23/2020    CO2 26.0 11/23/2020    CALCIUM 9.1 11/23/2020    PROTENTOTREF 6.7 09/21/2020    ALBUMIN 4.00 11/23/2020    LABIL2 1.9 09/21/2020    AST 19 11/23/2020    ALT 21 11/23/2020     Component      Glucose   BUN   Creatinine   Sodium   Potassium   Chloride   CO2   Anion Gap   Calcium   BUN/Creatinine Ratio   eGFR   Component 01/21/2021 01/21/2021 01/21/2021 01/21/2021 01/21/2021 01/21/2021 01/21/2021 01/21/2021 01/21/2021 01/21/2021                 Glucose 103 103 -- 103 -- 103 103 -- 103 --   BUN 14 14 -- 14 -- 14 14 -- 14 --   Creatinine 0.86 0.86 -- 0.86 -- 0.86 0.86 -- 0.86 --   Sodium 136 136 -- 136 -- 136 136 -- 136 --   Potassium 3.7 3.7 -- 3.7 -- 3.7 3.7 -- 3.7 --   Chloride 104 104 -- 104 -- 104 104 -- 104 --   CO2 23.6Low     23.6Low     -- 23.6Low     -- 23.6Low     23.6Low     -- 23.6Low     --   Anion Gap 8.4 8.4 -- 8.4 -- 8.4 8.4 -- 8.4 --   Calcium 8.9 8.9 -- 8.9 -- 8.9 8.9 -- 8.9 --   BUN/Creatinine Ratio 16 16 -- 16 -- 16 16 -- 16 --   eGFR --                    CT chest, abd, pel  IMPRESSION:  COMBINED IMPRESSION:      1.  Multiple bilateral sub-0.5 cm pulmonary nodules are nonspecific but  new from 04/20/2020. Some areas of corresponding central lucency suggest  cavitation versus focal bronchiectasis/bronchiolectasis. Additionally,  some nodules may represent areas of distal mucous plugging. Short-term  follow-up CT chest is recommended in 3 months.  2.  No significant change in a mildly enlarged right hilar lymph node,  which is nonspecific. Attention on follow-up imaging is recommended.  3.  Peripheral and basilar predominant groundglass and reticular  opacities are consistent with background pulmonary fibrosis, better  assessed on prior dedicated high-resolution CT.  4.  Nonobstructing left renal stone, unchanged.     This report was finalized on 9/28/2020 11:28 AM by Dr. Yanni Meeks M.D.         I personally reviewed data as detailed below:     The labs listed above.    The radiology studies listed above.    Office notes from: 7/26/21 pcp    Endoscopy procedures and pathology from: 9/2018 colonoscopy with Dr Sin      No notes on file    Assessment/Plan    1.  Weight loss: This occurred in 2020.  He has been stable all of 2021.  I suspect this is most related to his more active job where he is taking up to 16,000 steps in a day    2.  Lower abdominal pain: I do not have a good answer for what is going on here.  He  had a reassuring CT scan a year ago.  Pain currently not present for the past few weeks but he is still concerned about it    3.  Choking sensation with cough.  I do think he does have some lung disease based on what I can see from CT imaging but he is convinced this is not the issue.  He thinks there is some component of reflux    Plan  Encouraged him to quit smoking  Encouraged monitoring his weight every 1 to 2 weeks to ensure he is not losing  CT of the abdomen pelvis to rule out a more worrisome etiology to his lower abdominal pain  Esophagram to rule out evidence of reflux or abnormality within the esophagus causing his cough and choking sensation    Diagnoses and all orders for this visit:    1. Weight loss (Primary)  -     CT Abdomen Pelvis With Contrast; Future  -     FL Esophagram Complete Double-Contrast; Future    2. Lower abdominal pain  -     CT Abdomen Pelvis With Contrast; Future    3. Choking sensation  -     FL Esophagram Complete Double-Contrast; Future        I have discussed the above plan with the patient.  They verbalize understanding and are in agreement with the plan.  They have been advised to contact the office for any questions, concerns, or changes related to their health.    Dictated utilizing Dragon dictation

## 2021-09-09 ENCOUNTER — CLINICAL SUPPORT (OUTPATIENT)
Dept: FAMILY MEDICINE CLINIC | Facility: CLINIC | Age: 71
End: 2021-09-09

## 2021-09-09 ENCOUNTER — ANTICOAGULATION VISIT (OUTPATIENT)
Dept: FAMILY MEDICINE CLINIC | Facility: CLINIC | Age: 71
End: 2021-09-09

## 2021-09-09 ENCOUNTER — TELEPHONE (OUTPATIENT)
Dept: FAMILY MEDICINE CLINIC | Facility: CLINIC | Age: 71
End: 2021-09-09

## 2021-09-09 DIAGNOSIS — Z51.81 ENCOUNTER FOR THERAPEUTIC DRUG MONITORING: Primary | ICD-10-CM

## 2021-09-09 DIAGNOSIS — Z79.01 LONG TERM CURRENT USE OF ANTICOAGULANT THERAPY: ICD-10-CM

## 2021-09-09 LAB
INR PPP: 1.4 (ref 2–3)
INR PPP: 1.6 (ref 0.9–1.1)

## 2021-09-09 PROCEDURE — 36416 COLLJ CAPILLARY BLOOD SPEC: CPT | Performed by: PHYSICIAN ASSISTANT

## 2021-09-09 PROCEDURE — 93793 ANTICOAG MGMT PT WARFARIN: CPT | Performed by: PHYSICIAN ASSISTANT

## 2021-09-09 PROCEDURE — 85610 PROTHROMBIN TIME: CPT | Performed by: PHYSICIAN ASSISTANT

## 2021-09-09 NOTE — PROGRESS NOTES
Venipuncture performed in the third finger by Aletha Davila with good hemostasis. Patient tolerated well.

## 2021-09-26 RX ORDER — ALBUTEROL SULFATE 90 UG/1
AEROSOL, METERED RESPIRATORY (INHALATION)
Qty: 54 G | Refills: 3 | Status: SHIPPED | OUTPATIENT
Start: 2021-09-26 | End: 2022-12-05 | Stop reason: SDUPTHER

## 2021-10-05 DIAGNOSIS — M79.7 FIBROMYALGIA: Chronic | ICD-10-CM

## 2021-10-05 RX ORDER — PREGABALIN 75 MG/1
CAPSULE ORAL
Qty: 180 CAPSULE | OUTPATIENT
Start: 2021-10-05

## 2021-10-19 ENCOUNTER — ANTICOAGULATION VISIT (OUTPATIENT)
Dept: FAMILY MEDICINE CLINIC | Facility: CLINIC | Age: 71
End: 2021-10-19

## 2021-10-19 LAB — INR PPP: 1.9 (ref 2–3)

## 2021-10-20 ENCOUNTER — HOSPITAL ENCOUNTER (OUTPATIENT)
Dept: GENERAL RADIOLOGY | Facility: HOSPITAL | Age: 71
Discharge: HOME | End: 2021-10-20

## 2021-10-20 ENCOUNTER — HOSPITAL ENCOUNTER (OUTPATIENT)
Dept: CT IMAGING | Facility: HOSPITAL | Age: 71
Discharge: HOME | End: 2021-10-20

## 2021-10-20 DIAGNOSIS — R63.4 WEIGHT LOSS: ICD-10-CM

## 2021-10-20 DIAGNOSIS — R10.30 LOWER ABDOMINAL PAIN: Chronic | ICD-10-CM

## 2021-10-20 DIAGNOSIS — R09.89 CHOKING SENSATION: Chronic | ICD-10-CM

## 2021-10-20 LAB — CREAT BLDA-MCNC: 0.9 MG/DL (ref 0.6–1.3)

## 2021-10-20 PROCEDURE — 74220 X-RAY XM ESOPHAGUS 1CNTRST: CPT

## 2021-10-20 PROCEDURE — 74177 CT ABD & PELVIS W/CONTRAST: CPT

## 2021-10-20 PROCEDURE — A9270 NON-COVERED ITEM OR SERVICE: HCPCS | Performed by: INTERNAL MEDICINE

## 2021-10-20 PROCEDURE — 25010000002 IOPAMIDOL 61 % SOLUTION: Performed by: INTERNAL MEDICINE

## 2021-10-20 PROCEDURE — 63710000001 BARIUM SULFATE 700 MG TABLET: Performed by: INTERNAL MEDICINE

## 2021-10-20 PROCEDURE — 63710000001 BARIUM SULFATE 96 % RECONSTITUTED SUSPENSION: Performed by: INTERNAL MEDICINE

## 2021-10-20 PROCEDURE — 82565 ASSAY OF CREATININE: CPT

## 2021-10-20 RX ADMIN — BARIUM SULFATE 700 MG: 700 TABLET ORAL at 09:12

## 2021-10-20 RX ADMIN — BARIUM SULFATE 183 ML: 960 POWDER, FOR SUSPENSION ORAL at 09:12

## 2021-10-20 RX ADMIN — IOPAMIDOL 85 ML: 612 INJECTION, SOLUTION INTRAVENOUS at 08:30

## 2021-10-21 ENCOUNTER — FLU SHOT (OUTPATIENT)
Dept: FAMILY MEDICINE CLINIC | Facility: CLINIC | Age: 71
End: 2021-10-21

## 2021-10-21 DIAGNOSIS — Z23 NEED FOR INFLUENZA VACCINATION: Primary | ICD-10-CM

## 2021-10-21 PROCEDURE — 90662 IIV NO PRSV INCREASED AG IM: CPT | Performed by: FAMILY MEDICINE

## 2021-10-21 PROCEDURE — G0008 ADMIN INFLUENZA VIRUS VAC: HCPCS | Performed by: FAMILY MEDICINE

## 2021-10-21 NOTE — PROGRESS NOTES
CT scan shows stable fibrotic changes of the lungs.  Normal liver.  Normal spleen.  Nonobstructive left kidney stone.    Diverticulosis of the colon but no diverticulitis.No worrisome findings

## 2021-10-22 ENCOUNTER — TELEPHONE (OUTPATIENT)
Dept: GASTROENTEROLOGY | Facility: CLINIC | Age: 71
End: 2021-10-22

## 2021-10-22 NOTE — PROGRESS NOTES
His esophagram shows a normal esophagus.  The barium tablet passed easily.  No hiatal hernia.  No evidence of reflux on this exam.

## 2021-10-22 NOTE — TELEPHONE ENCOUNTER
----- Message from Cristina Faulkner MD sent at 10/21/2021  4:15 PM EDT -----  CT scan shows stable fibrotic changes of the lungs.  Normal liver.  Normal spleen.  Nonobstructive left kidney stone.    Diverticulosis of the colon but no diverticulitis.No worrisome findings

## 2021-10-25 ENCOUNTER — TELEPHONE (OUTPATIENT)
Dept: GASTROENTEROLOGY | Facility: CLINIC | Age: 71
End: 2021-10-25

## 2021-10-25 NOTE — TELEPHONE ENCOUNTER
----- Message from Cristina Faulkner MD sent at 10/22/2021  3:41 PM EDT -----  His esophagram shows a normal esophagus.  The barium tablet passed easily.  No hiatal hernia.  No evidence of reflux on this exam.

## 2021-11-03 ENCOUNTER — OFFICE VISIT (OUTPATIENT)
Dept: FAMILY MEDICINE CLINIC | Facility: CLINIC | Age: 71
End: 2021-11-03

## 2021-11-03 VITALS
SYSTOLIC BLOOD PRESSURE: 122 MMHG | TEMPERATURE: 97.5 F | HEIGHT: 72 IN | HEART RATE: 89 BPM | OXYGEN SATURATION: 100 % | DIASTOLIC BLOOD PRESSURE: 65 MMHG | BODY MASS INDEX: 27.22 KG/M2 | RESPIRATION RATE: 16 BRPM | WEIGHT: 201 LBS

## 2021-11-03 DIAGNOSIS — E78.2 HYPERLIPIDEMIA, MIXED: Primary | ICD-10-CM

## 2021-11-03 DIAGNOSIS — Z12.5 SCREENING PSA (PROSTATE SPECIFIC ANTIGEN): ICD-10-CM

## 2021-11-03 DIAGNOSIS — M79.7 FIBROMYALGIA: ICD-10-CM

## 2021-11-03 DIAGNOSIS — I25.2 OLD MI (MYOCARDIAL INFARCTION): ICD-10-CM

## 2021-11-03 DIAGNOSIS — E53.8 LOW FOLIC ACID: ICD-10-CM

## 2021-11-03 DIAGNOSIS — I65.21 MILD ATHEROSCLEROSIS OF RIGHT CAROTID ARTERY: ICD-10-CM

## 2021-11-03 DIAGNOSIS — E53.8 LOW SERUM VITAMIN B12: ICD-10-CM

## 2021-11-03 DIAGNOSIS — K21.9 GASTROESOPHAGEAL REFLUX DISEASE WITHOUT ESOPHAGITIS: ICD-10-CM

## 2021-11-03 DIAGNOSIS — R73.01 IMPAIRED FASTING GLUCOSE: ICD-10-CM

## 2021-11-03 DIAGNOSIS — E55.9 VITAMIN D DEFICIENCY: ICD-10-CM

## 2021-11-03 PROBLEM — M54.16 LUMBAR RADICULOPATHY: Status: ACTIVE | Noted: 2021-11-03

## 2021-11-03 PROBLEM — G89.4 CHRONIC PAIN DISORDER: Status: ACTIVE | Noted: 2021-11-03

## 2021-11-03 PROBLEM — M96.1 FAILED BACK SYNDROME: Status: ACTIVE | Noted: 2021-11-03

## 2021-11-03 PROBLEM — Z79.891 LONG TERM (CURRENT) USE OF OPIATE ANALGESIC: Status: ACTIVE | Noted: 2021-11-03

## 2021-11-03 PROBLEM — M47.816 LUMBAR SPONDYLOSIS: Status: ACTIVE | Noted: 2021-11-03

## 2021-11-03 PROCEDURE — 99214 OFFICE O/P EST MOD 30 MIN: CPT | Performed by: PHYSICIAN ASSISTANT

## 2021-11-03 RX ORDER — PREGABALIN 75 MG/1
75 CAPSULE ORAL 2 TIMES DAILY
Qty: 180 CAPSULE | Refills: 1 | Status: SHIPPED | OUTPATIENT
Start: 2021-11-03 | End: 2022-03-22 | Stop reason: SDUPTHER

## 2021-11-03 RX ORDER — ATORVASTATIN CALCIUM 40 MG/1
40 TABLET, FILM COATED ORAL DAILY
Qty: 90 TABLET | Refills: 3 | Status: SHIPPED | OUTPATIENT
Start: 2021-11-03 | End: 2022-03-31

## 2021-11-03 RX ORDER — PANTOPRAZOLE SODIUM 40 MG/1
40 TABLET, DELAYED RELEASE ORAL DAILY
Qty: 90 TABLET | Refills: 3 | Status: SHIPPED | OUTPATIENT
Start: 2021-11-03 | End: 2022-03-22 | Stop reason: SDUPTHER

## 2021-11-03 RX ORDER — MAGNESIUM 200 MG
TABLET ORAL
Qty: 90 EACH | Refills: 3 | Status: ON HOLD | OUTPATIENT
Start: 2021-11-03 | End: 2022-08-23

## 2021-11-03 RX ORDER — WARFARIN SODIUM 1 MG/1
3 TABLET ORAL DAILY
Qty: 270 TABLET | Refills: 3 | Status: SHIPPED | OUTPATIENT
Start: 2021-11-03 | End: 2022-03-02

## 2021-11-03 RX ORDER — TIZANIDINE HYDROCHLORIDE 4 MG/1
CAPSULE, GELATIN COATED ORAL EVERY 8 HOURS SCHEDULED
COMMUNITY
End: 2021-11-03 | Stop reason: SDUPTHER

## 2021-11-03 RX ORDER — TIZANIDINE HYDROCHLORIDE 4 MG/1
4 CAPSULE, GELATIN COATED ORAL 3 TIMES DAILY PRN
Qty: 270 CAPSULE | Refills: 3 | Status: SHIPPED | OUTPATIENT
Start: 2021-11-03 | End: 2021-11-11

## 2021-11-03 RX ORDER — WARFARIN SODIUM 5 MG/1
5 TABLET ORAL DAILY
Qty: 90 TABLET | Refills: 3 | Status: SHIPPED | OUTPATIENT
Start: 2021-11-03 | End: 2022-04-21

## 2021-11-03 RX ORDER — FOLIC ACID 1 MG/1
1 TABLET ORAL DAILY
Qty: 90 TABLET | Refills: 3 | Status: SHIPPED | OUTPATIENT
Start: 2021-11-03 | End: 2023-03-10 | Stop reason: SDUPTHER

## 2021-11-03 NOTE — PATIENT INSTRUCTIONS
Dyslipidemia  Dyslipidemia is an imbalance of waxy, fat-like substances (lipids) in the blood. The body needs lipids in small amounts. Dyslipidemia often involves a high level of cholesterol or triglycerides, which are types of lipids.  Common forms of dyslipidemia include:  · High levels of LDL cholesterol. LDL is the type of cholesterol that causes fatty deposits (plaques) to build up in the blood vessels that carry blood away from your heart (arteries).  · Low levels of HDL cholesterol. HDL cholesterol is the type of cholesterol that protects against heart disease. High levels of HDL remove the LDL buildup from arteries.  · High levels of triglycerides. Triglycerides are a fatty substance in the blood that is linked to a buildup of plaques in the arteries.  What are the causes?  Primary dyslipidemia is caused by changes (mutations) in genes that are passed down through families (inherited). These mutations cause several types of dyslipidemia.  Secondary dyslipidemia is caused by lifestyle choices and diseases that lead to dyslipidemia, such as:  · Eating a diet that is high in animal fat.  · Not getting enough exercise.  · Having diabetes, kidney disease, liver disease, or thyroid disease.  · Drinking large amounts of alcohol.  · Using certain medicines.  What increases the risk?  You are more likely to develop this condition if you are an older man or if you are a woman who has gone through menopause. Other risk factors include:  · Having a family history of dyslipidemia.  · Taking certain medicines, including birth control pills, steroids, some diuretics, and beta-blockers.  · Smoking cigarettes.  · Eating a high-fat diet.  · Having certain medical conditions such as diabetes, polycystic ovary syndrome (PCOS), kidney disease, liver disease, or hypothyroidism.  · Not exercising regularly.  · Being overweight or obese with too much belly fat.  What are the signs or symptoms?  In most cases, dyslipidemia does not  usually cause any symptoms.  In severe cases, very high lipid levels can cause:  · Fatty bumps under the skin (xanthomas).  · White or gray ring around the black center (pupil) of the eye.  Very high triglyceride levels can cause inflammation of the pancreas (pancreatitis).  How is this diagnosed?  Your health care provider may diagnose dyslipidemia based on a routine blood test (fasting blood test). Because most people do not have symptoms of the condition, this blood testing (lipid profile) is done on adults age 20 and older and is repeated every 5 years. This test checks:  · Total cholesterol. This measures the total amount of cholesterol in your blood, including LDL cholesterol, HDL cholesterol, and triglycerides. A healthy number is below 200.  · LDL cholesterol. The target number for LDL cholesterol is different for each person, depending on individual risk factors. Ask your health care provider what your LDL cholesterol should be.  · HDL cholesterol. An HDL level of 60 or higher is best because it helps to protect against heart disease. A number below 40 for men or below 50 for women increases the risk for heart disease.  · Triglycerides. A healthy triglyceride number is below 150.  If your lipid profile is abnormal, your health care provider may do other blood tests.  How is this treated?  Treatment depends on the type of dyslipidemia that you have and your other risk factors for heart disease and stroke. Your health care provider will have a target range for your lipid levels based on this information.  For many people, this condition may be treated by lifestyle changes, such as diet and exercise. Your health care provider may recommend that you:  · Get regular exercise.  · Make changes to your diet.  · Quit smoking if you smoke.  If diet changes and exercise do not help you reach your goals, your health care provider may also prescribe medicine to lower lipids. The most commonly prescribed type of medicine  lowers your LDL cholesterol (statin drug). If you have a high triglyceride level, your provider may prescribe another type of drug (fibrate) or an omega-3 fish oil supplement, or both.  Follow these instructions at home:    Eating and drinking  · Follow instructions from your health care provider or dietitian about eating or drinking restrictions.  · Eat a healthy diet as told by your health care provider. This can help you reach and maintain a healthy weight, lower your LDL cholesterol, and raise your HDL cholesterol. This may include:  ? Limiting your calories, if you are overweight.  ? Eating more fruits, vegetables, whole grains, fish, and lean meats.  ? Limiting saturated fat, trans fat, and cholesterol.  · If you drink alcohol:  ? Limit how much you use.  ? Be aware of how much alcohol is in your drink. In the [place] one drink equals one 12 oz bottle of beer (355 mL), one 5 oz glass of wine (148 mL), or one 1½ oz glass of hard liquor (44 mL).  · Do not drink alcohol if:  ? Your health care provider tells you not to drink.  ? You are pregnant, may be pregnant, or are planning to become pregnant.  Activity  · Get regular exercise. Start an exercise and strength training program as told by your health care provider. Ask your health care provider what activities are safe for you. Your health care provider may recommend:  ? 30 minutes of aerobic activity 4-6 days a week. Brisk walking is an example of aerobic activity.  ? Strength training 2 days a week.  General instructions  · Do not use any products that contain nicotine or tobacco, such as cigarettes, e-cigarettes, and chewing tobacco. If you need help quitting, ask your health care provider.  · Take over-the-counter and prescription medicines only as told by your health care provider. This includes supplements.  · Keep all follow-up visits as told by your health care provider.  Contact a health care provider if:  · You are:  ? Having trouble sticking to your  exercise or diet plan.  ? Struggling to quit smoking or control your use of alcohol.  Summary  · Dyslipidemia often involves a high level of cholesterol or triglycerides, which are types of lipids.  · Treatment depends on the type of dyslipidemia that you have and your other risk factors for heart disease and stroke.  · For many people, treatment starts with lifestyle changes, such as diet and exercise.  · Your health care provider may prescribe medicine to lower lipids.  This information is not intended to replace advice given to you by your health care provider. Make sure you discuss any questions you have with your health care provider.  Document Revised: 08/12/2019 Document Reviewed: 07/19/2019  [employer] Patient Education © 2021 [employer]

## 2021-11-03 NOTE — PROGRESS NOTES
"Subjective   Javy Reeves Sr. is a 71 y.o. male.     History of Present Illness      Since the last visit, he has overall felt fairly well.  He has Impaired fasting glucose and will monitor labs to watch for DMII, GERD controlled on PPI Rx, Hyperlipidemia with goals met with current Rx, CAD and remains under care of their Cardiologist for mangement, CAD and remains on medication regimen and Vitamin D deficiency and will update labs for continued management.  he has been compliant with current medications have reviewed them.  The patient denies medication side effects.  Will refill medications. /65 (BP Location: Right arm, Patient Position: Sitting, Cuff Size: Adult)   Pulse 89   Temp 97.5 °F (36.4 °C)   Resp 16   Ht 182.9 cm (72.01\")   Wt 91.2 kg (201 lb)   SpO2 100%   BMI 27.25 kg/m²     Results for orders placed or performed during the hospital encounter of 10/20/21   POC Creatinine    Specimen: Blood   Result Value Ref Range    Creatinine 0.90 0.60 - 1.30 mg/dL       Sees Dr Willoughby for bronchiectasis     DR Faulkner---did GI work up and had normal esophagram  CT abd/pelvis 10-20-21     Refill Zanaflex and Lyrical for Fibro pain and helps  ABDOMEN:  Stable fibrotic changes in the lung bases. The liver is unremarkable.  Cholecystectomy. Spleen unremarkable. Stable small nonobstructing stone  in the lower pole of the left kidney. Right kidney is unremarkable. The  adrenal glands and pancreas are unremarkable. IVC filter.     PELVIS:  The bladder is unremarkable. No free fluid in the pelvis. Sigmoid  diverticulosis without evidence for diverticulitis. Postop changes  lumbar spine     IMPRESSION:  1. Sigmoid diverticulosis  2. Cholecystectomy  3. Stable small nonobstructing left kidney stone  Coumadin for VTEs recurrent  On Lyrica for Fibromyalgia--and Zanaflex--helps, still pain  Sees cardio for CAD--Dr Dalton  The following portions of the patient's history were reviewed and updated as appropriate: " allergies, current medications, past family history, past medical history, past social history, past surgical history and problem list.    Review of Systems   Constitutional: Negative for activity change, appetite change and unexpected weight change.   HENT: Negative for nosebleeds and trouble swallowing.    Eyes: Negative for pain and visual disturbance.   Respiratory: Positive for cough. Negative for chest tightness, shortness of breath and wheezing.    Cardiovascular: Negative for chest pain and palpitations.   Gastrointestinal: Negative for abdominal pain and blood in stool.   Endocrine: Negative.    Genitourinary: Negative for difficulty urinating and hematuria.   Musculoskeletal: Positive for myalgias.   Skin: Negative for color change and rash.   Allergic/Immunologic: Negative.    Neurological: Negative for syncope and speech difficulty.   Hematological: Negative for adenopathy.   Psychiatric/Behavioral: Negative for agitation, confusion and dysphoric mood.   All other systems reviewed and are negative.      Objective   Physical Exam  Vitals and nursing note reviewed.   Constitutional:       General: He is not in acute distress.     Appearance: He is well-developed. He is not diaphoretic.   HENT:      Head: Normocephalic.   Eyes:      Conjunctiva/sclera: Conjunctivae normal.      Pupils: Pupils are equal, round, and reactive to light.   Cardiovascular:      Rate and Rhythm: Normal rate and regular rhythm.      Heart sounds: Normal heart sounds. No murmur heard.      Pulmonary:      Effort: Pulmonary effort is normal.      Breath sounds: Normal breath sounds.   Musculoskeletal:         General: Normal range of motion.      Cervical back: Normal range of motion and neck supple.   Skin:     General: Skin is warm and dry.      Findings: No rash.   Neurological:      Mental Status: He is alert and oriented to person, place, and time.   Psychiatric:         Mood and Affect: Mood normal. Affect is not inappropriate.          Behavior: Behavior normal.         Thought Content: Thought content normal.         Judgment: Judgment normal.         Assessment/Plan   Diagnoses and all orders for this visit:    1. Hyperlipidemia, mixed (Primary)  -     Comprehensive metabolic panel  -     Lipid panel  -     CBC and Differential  -     TSH  -     Hemoglobin A1c  -     T4, Free  -     T3, Free  -     Vitamin B12  -     Folate  -     Vitamin D 25 Hydroxy  -     Urinalysis With Microscopic - Urine, Clean Catch  -     PSA Screen  -     Magnesium    2. Impaired fasting glucose  -     Comprehensive metabolic panel  -     Lipid panel  -     CBC and Differential  -     TSH  -     Hemoglobin A1c  -     T4, Free  -     T3, Free  -     Vitamin B12  -     Folate  -     Vitamin D 25 Hydroxy  -     Urinalysis With Microscopic - Urine, Clean Catch  -     PSA Screen  -     Magnesium    3. Mild atherosclerosis of right carotid artery  -     Comprehensive metabolic panel  -     Lipid panel  -     CBC and Differential  -     TSH  -     Hemoglobin A1c  -     T4, Free  -     T3, Free  -     Vitamin B12  -     Folate  -     Vitamin D 25 Hydroxy  -     Urinalysis With Microscopic - Urine, Clean Catch  -     PSA Screen  -     Magnesium    4. Fibromyalgia  -     Comprehensive metabolic panel  -     Lipid panel  -     CBC and Differential  -     TSH  -     Hemoglobin A1c  -     T4, Free  -     T3, Free  -     Vitamin B12  -     Folate  -     Vitamin D 25 Hydroxy  -     Urinalysis With Microscopic - Urine, Clean Catch  -     PSA Screen  -     Magnesium  -     pregabalin (LYRICA) 75 MG capsule; Take 1 capsule by mouth 2 (Two) Times a Day. For Fibromyalgia  Dispense: 180 capsule; Refill: 1    5. Old MI (myocardial infarction)  -     Comprehensive metabolic panel  -     Lipid panel  -     CBC and Differential  -     TSH  -     Hemoglobin A1c  -     T4, Free  -     T3, Free  -     Vitamin B12  -     Folate  -     Vitamin D 25 Hydroxy  -     Urinalysis With Microscopic  - Urine, Clean Catch  -     PSA Screen  -     Magnesium    6. Low folic acid  -     Comprehensive metabolic panel  -     Lipid panel  -     CBC and Differential  -     TSH  -     Hemoglobin A1c  -     T4, Free  -     T3, Free  -     Vitamin B12  -     Folate  -     Vitamin D 25 Hydroxy  -     Urinalysis With Microscopic - Urine, Clean Catch  -     PSA Screen  -     Magnesium    7. Gastroesophageal reflux disease without esophagitis  -     Comprehensive metabolic panel  -     Lipid panel  -     CBC and Differential  -     TSH  -     Hemoglobin A1c  -     T4, Free  -     T3, Free  -     Vitamin B12  -     Folate  -     Vitamin D 25 Hydroxy  -     Urinalysis With Microscopic - Urine, Clean Catch  -     PSA Screen  -     Magnesium    8. Vitamin D deficiency  -     Comprehensive metabolic panel  -     Lipid panel  -     CBC and Differential  -     TSH  -     Hemoglobin A1c  -     T4, Free  -     T3, Free  -     Vitamin B12  -     Folate  -     Vitamin D 25 Hydroxy  -     Urinalysis With Microscopic - Urine, Clean Catch  -     PSA Screen  -     Magnesium    9. Low serum vitamin B12  -     Comprehensive metabolic panel  -     Lipid panel  -     CBC and Differential  -     TSH  -     Hemoglobin A1c  -     T4, Free  -     T3, Free  -     Vitamin B12  -     Folate  -     Vitamin D 25 Hydroxy  -     Urinalysis With Microscopic - Urine, Clean Catch  -     PSA Screen  -     Magnesium    10. Screening PSA (prostate specific antigen)  -     Comprehensive metabolic panel  -     Lipid panel  -     CBC and Differential  -     TSH  -     Hemoglobin A1c  -     T4, Free  -     T3, Free  -     Vitamin B12  -     Folate  -     Vitamin D 25 Hydroxy  -     Urinalysis With Microscopic - Urine, Clean Catch  -     PSA Screen  -     Magnesium    Other orders  -     pantoprazole (PROTONIX) 40 MG EC tablet; Take 1 tablet by mouth Daily.  Dispense: 90 tablet; Refill: 3  -     atorvastatin (Lipitor) 40 MG tablet; Take 1 tablet by mouth Daily. For  cholesterol  Dispense: 90 tablet; Refill: 3  -     TiZANidine (Zanaflex) 4 MG capsule; Take 1 capsule by mouth 3 (Three) Times a Day As Needed for Muscle Spasms.  Dispense: 270 capsule; Refill: 3  -     Cyanocobalamin (Vitamin B-12) 1000 MCG sublingual tablet; One SL daily  Dispense: 90 each; Refill: 3  -     folic acid (FOLVITE) 1 MG tablet; Take 1 tablet by mouth Daily.  Dispense: 90 tablet; Refill: 3  -     warfarin (COUMADIN) 1 MG tablet; Take 3 tablets by mouth Daily.  Dispense: 270 tablet; Refill: 3  -     warfarin (COUMADIN) 5 MG tablet; Take 1 tablet by mouth Daily.  Dispense: 90 tablet; Refill: 3      Sukh, Javy Reeves Sr., was seen today.  he was seen for Imparied fasting glucose and plan follow up labs, diet, and exercise, GERD and will continue on PPI medication, Hyperlipidemia and will continue current medication, CAD and is currently stable on medication management and stable, CAD and is under care of their Cardiologist for medical management and stable and Vitamin D deficiency and will update labs .  Cont Lyrica----helps Fibro pain; and cont  Labs  F/u pulm--bronchiectasis   Stop smoking  Following weight and stable---eating less and more active  F/u on B12 and folate  Refill Zanaflex and Lyrical for Fibro pain and helps

## 2021-11-08 ENCOUNTER — PRIOR AUTHORIZATION (OUTPATIENT)
Dept: FAMILY MEDICINE CLINIC | Facility: CLINIC | Age: 71
End: 2021-11-08

## 2021-11-10 ENCOUNTER — ANTICOAGULATION VISIT (OUTPATIENT)
Dept: FAMILY MEDICINE CLINIC | Facility: CLINIC | Age: 71
End: 2021-11-10

## 2021-11-10 LAB — INR PPP: 1.9 (ref 2–3)

## 2021-11-11 RX ORDER — BACLOFEN 10 MG/1
10 TABLET ORAL 3 TIMES DAILY
Qty: 270 TABLET | Refills: 3 | Status: SHIPPED | OUTPATIENT
Start: 2021-11-11 | End: 2022-04-28

## 2021-11-24 LAB
25(OH)D3+25(OH)D2 SERPL-MCNC: 21.1 NG/ML (ref 30–100)
ALBUMIN SERPL-MCNC: 3.9 G/DL (ref 3.8–4.8)
ALBUMIN/GLOB SERPL: 1.3 {RATIO} (ref 1.2–2.2)
ALP SERPL-CCNC: 59 IU/L (ref 44–121)
ALT SERPL-CCNC: 15 IU/L (ref 0–44)
APPEARANCE UR: CLEAR
AST SERPL-CCNC: 18 IU/L (ref 0–40)
BACTERIA #/AREA URNS HPF: NORMAL /[HPF]
BASOPHILS # BLD AUTO: 0 X10E3/UL (ref 0–0.2)
BASOPHILS NFR BLD AUTO: 1 %
BILIRUB SERPL-MCNC: 0.7 MG/DL (ref 0–1.2)
BILIRUB UR QL STRIP: NEGATIVE
BUN SERPL-MCNC: 8 MG/DL (ref 8–27)
BUN/CREAT SERPL: 9 (ref 10–24)
CALCIUM SERPL-MCNC: 8.7 MG/DL (ref 8.6–10.2)
CASTS URNS QL MICRO: NORMAL /LPF
CHLORIDE SERPL-SCNC: 104 MMOL/L (ref 96–106)
CHOLEST SERPL-MCNC: 113 MG/DL (ref 100–199)
CO2 SERPL-SCNC: 27 MMOL/L (ref 20–29)
COLOR UR: YELLOW
CREAT SERPL-MCNC: 0.92 MG/DL (ref 0.76–1.27)
EOSINOPHIL # BLD AUTO: 0.1 X10E3/UL (ref 0–0.4)
EOSINOPHIL NFR BLD AUTO: 2 %
EPI CELLS #/AREA URNS HPF: NORMAL /HPF (ref 0–10)
ERYTHROCYTE [DISTWIDTH] IN BLOOD BY AUTOMATED COUNT: 13.8 % (ref 11.6–15.4)
FOLATE SERPL-MCNC: 9.8 NG/ML
GLOBULIN SER CALC-MCNC: 2.9 G/DL (ref 1.5–4.5)
GLUCOSE SERPL-MCNC: 93 MG/DL (ref 65–99)
GLUCOSE UR QL: NEGATIVE
HBA1C MFR BLD: 5.8 % (ref 4.8–5.6)
HCT VFR BLD AUTO: 42.4 % (ref 37.5–51)
HDLC SERPL-MCNC: 38 MG/DL
HGB BLD-MCNC: 14 G/DL (ref 13–17.7)
HGB UR QL STRIP: NEGATIVE
IMM GRANULOCYTES # BLD AUTO: 0 X10E3/UL (ref 0–0.1)
IMM GRANULOCYTES NFR BLD AUTO: 0 %
KETONES UR QL STRIP: NEGATIVE
LDLC SERPL CALC-MCNC: 61 MG/DL (ref 0–99)
LEUKOCYTE ESTERASE UR QL STRIP: NEGATIVE
LYMPHOCYTES # BLD AUTO: 1.6 X10E3/UL (ref 0.7–3.1)
LYMPHOCYTES NFR BLD AUTO: 40 %
MAGNESIUM SERPL-MCNC: 2 MG/DL (ref 1.6–2.3)
MCH RBC QN AUTO: 31.5 PG (ref 26.6–33)
MCHC RBC AUTO-ENTMCNC: 33 G/DL (ref 31.5–35.7)
MCV RBC AUTO: 96 FL (ref 79–97)
MICRO URNS: NORMAL
MICRO URNS: NORMAL
MONOCYTES # BLD AUTO: 0.5 X10E3/UL (ref 0.1–0.9)
MONOCYTES NFR BLD AUTO: 13 %
NEUTROPHILS # BLD AUTO: 1.7 X10E3/UL (ref 1.4–7)
NEUTROPHILS NFR BLD AUTO: 44 %
NITRITE UR QL STRIP: NEGATIVE
PH UR STRIP: 7 [PH] (ref 5–7.5)
PLATELET # BLD AUTO: 180 X10E3/UL (ref 150–450)
POTASSIUM SERPL-SCNC: 4.2 MMOL/L (ref 3.5–5.2)
PROT SERPL-MCNC: 6.8 G/DL (ref 6–8.5)
PROT UR QL STRIP: NORMAL
PSA SERPL-MCNC: 1.1 NG/ML (ref 0–4)
RBC # BLD AUTO: 4.44 X10E6/UL (ref 4.14–5.8)
RBC #/AREA URNS HPF: NORMAL /HPF (ref 0–2)
SODIUM SERPL-SCNC: 142 MMOL/L (ref 134–144)
SP GR UR: 1.02 (ref 1–1.03)
T3FREE SERPL-MCNC: 3.3 PG/ML (ref 2–4.4)
T4 FREE SERPL-MCNC: 1.04 NG/DL (ref 0.82–1.77)
TRIGL SERPL-MCNC: 64 MG/DL (ref 0–149)
TSH SERPL DL<=0.005 MIU/L-ACNC: 1.51 UIU/ML (ref 0.45–4.5)
UROBILINOGEN UR STRIP-MCNC: 1 MG/DL (ref 0.2–1)
VIT B12 SERPL-MCNC: 897 PG/ML (ref 232–1245)
VLDLC SERPL CALC-MCNC: 14 MG/DL (ref 5–40)
WBC # BLD AUTO: 3.9 X10E3/UL (ref 3.4–10.8)
WBC #/AREA URNS HPF: NORMAL /HPF (ref 0–5)

## 2021-12-01 ENCOUNTER — TELEPHONE (OUTPATIENT)
Dept: FAMILY MEDICINE CLINIC | Facility: CLINIC | Age: 71
End: 2021-12-01

## 2021-12-01 ENCOUNTER — ANTICOAGULATION VISIT (OUTPATIENT)
Dept: FAMILY MEDICINE CLINIC | Facility: CLINIC | Age: 71
End: 2021-12-01

## 2021-12-01 LAB — INR PPP: 1.8 (ref 2–3)

## 2021-12-01 NOTE — TELEPHONE ENCOUNTER
Caller: KODY     Relationship to patient:     Best call back number: [phone]    Patient is needing: OUT OF RANGE INR READING   1.8 ON 12-1-21

## 2021-12-15 ENCOUNTER — TRANSCRIBE ORDERS (OUTPATIENT)
Dept: ADMINISTRATIVE | Facility: HOSPITAL | Age: 71
End: 2021-12-15

## 2021-12-15 DIAGNOSIS — J84.9 ILD (INTERSTITIAL LUNG DISEASE) (HCC): Primary | ICD-10-CM

## 2022-01-07 NOTE — PLAN OF CARE
Problem: Fall Risk (Adult)  Goal: Identify Related Risk Factors and Signs and Symptoms  Outcome: Ongoing (interventions implemented as appropriate)         There are no Wet Read(s) to document.

## 2022-01-26 LAB — INR PPP: 2.4 (ref 2–3)

## 2022-01-27 ENCOUNTER — ANTICOAGULATION VISIT (OUTPATIENT)
Dept: FAMILY MEDICINE CLINIC | Facility: CLINIC | Age: 72
End: 2022-01-27

## 2022-02-02 ENCOUNTER — ANTICOAGULATION VISIT (OUTPATIENT)
Dept: FAMILY MEDICINE CLINIC | Facility: CLINIC | Age: 72
End: 2022-02-02

## 2022-02-02 LAB — INR PPP: 2.4 (ref 2–3)

## 2022-03-01 ENCOUNTER — ANTICOAGULATION VISIT (OUTPATIENT)
Dept: FAMILY MEDICINE CLINIC | Facility: CLINIC | Age: 72
End: 2022-03-01

## 2022-03-01 LAB — INR PPP: 2.3 (ref 2–3)

## 2022-03-02 RX ORDER — WARFARIN SODIUM 1 MG/1
3 TABLET ORAL DAILY
Qty: 270 TABLET | Refills: 3 | Status: ON HOLD | OUTPATIENT
Start: 2022-03-02 | End: 2022-08-25 | Stop reason: SDUPTHER

## 2022-03-16 ENCOUNTER — ANTICOAGULATION VISIT (OUTPATIENT)
Dept: FAMILY MEDICINE CLINIC | Facility: CLINIC | Age: 72
End: 2022-03-16

## 2022-03-16 LAB — INR PPP: 2 (ref 2–3)

## 2022-03-22 ENCOUNTER — OFFICE VISIT (OUTPATIENT)
Dept: FAMILY MEDICINE CLINIC | Facility: CLINIC | Age: 72
End: 2022-03-22

## 2022-03-22 VITALS
HEART RATE: 90 BPM | DIASTOLIC BLOOD PRESSURE: 62 MMHG | TEMPERATURE: 97.5 F | WEIGHT: 200 LBS | RESPIRATION RATE: 16 BRPM | SYSTOLIC BLOOD PRESSURE: 110 MMHG | OXYGEN SATURATION: 99 % | BODY MASS INDEX: 27.09 KG/M2 | HEIGHT: 72 IN

## 2022-03-22 DIAGNOSIS — K21.9 GASTROESOPHAGEAL REFLUX DISEASE WITHOUT ESOPHAGITIS: ICD-10-CM

## 2022-03-22 DIAGNOSIS — Z72.0 TOBACCO ABUSE: ICD-10-CM

## 2022-03-22 DIAGNOSIS — E55.9 VITAMIN D DEFICIENCY: ICD-10-CM

## 2022-03-22 DIAGNOSIS — Z79.01 LONG TERM CURRENT USE OF ANTICOAGULANT THERAPY: ICD-10-CM

## 2022-03-22 DIAGNOSIS — I25.2 OLD MI (MYOCARDIAL INFARCTION): ICD-10-CM

## 2022-03-22 DIAGNOSIS — R10.9 CHRONIC ABDOMINAL PAIN: ICD-10-CM

## 2022-03-22 DIAGNOSIS — E78.2 MIXED HYPERLIPIDEMIA: ICD-10-CM

## 2022-03-22 DIAGNOSIS — I82.5Y3 CHRONIC DEEP VEIN THROMBOSIS (DVT) OF PROXIMAL VEIN OF BOTH LOWER EXTREMITIES: ICD-10-CM

## 2022-03-22 DIAGNOSIS — J84.9 INTERSTITIAL LUNG DISEASE: ICD-10-CM

## 2022-03-22 DIAGNOSIS — G89.29 CHRONIC ABDOMINAL PAIN: ICD-10-CM

## 2022-03-22 DIAGNOSIS — K58.2 IRRITABLE BOWEL SYNDROME WITH BOTH CONSTIPATION AND DIARRHEA: ICD-10-CM

## 2022-03-22 DIAGNOSIS — M79.7 FIBROMYALGIA: Primary | ICD-10-CM

## 2022-03-22 DIAGNOSIS — Z95.828 PRESENCE OF INFERIOR VENA CAVA FILTER: ICD-10-CM

## 2022-03-22 PROBLEM — I65.21: Status: RESOLVED | Noted: 2018-03-27 | Resolved: 2022-03-22

## 2022-03-22 PROCEDURE — 99214 OFFICE O/P EST MOD 30 MIN: CPT | Performed by: PHYSICIAN ASSISTANT

## 2022-03-22 RX ORDER — PREGABALIN 75 MG/1
75 CAPSULE ORAL 2 TIMES DAILY
Qty: 180 CAPSULE | Refills: 1 | Status: SHIPPED | OUTPATIENT
Start: 2022-03-22 | End: 2022-05-25

## 2022-03-22 RX ORDER — PANTOPRAZOLE SODIUM 40 MG/1
40 TABLET, DELAYED RELEASE ORAL DAILY
Qty: 90 TABLET | Refills: 3 | Status: SHIPPED | OUTPATIENT
Start: 2022-03-22 | End: 2022-07-28

## 2022-03-22 RX ORDER — DICYCLOMINE HYDROCHLORIDE 10 MG/1
10 CAPSULE ORAL
Qty: 45 CAPSULE | Refills: 11 | Status: SHIPPED | OUTPATIENT
Start: 2022-03-22 | End: 2022-04-28

## 2022-03-22 NOTE — PROGRESS NOTES
Subjective   Javy Reeves Sr. is a 71 y.o. male.     History of Present Illness    Since the last visit, he has overall felt fairly well.  He has Impaired fasting glucose and will monitor labs to watch for DMII, GERD controlled on PPI Rx, Hyperlipidemia with goals met with current Rx, CAD and remains under care of their Cardiologist for mangement, CAD and remains on medication regimen and Vitamin D deficiency and will update labs for continued management.  he has been compliant with current medications have reviewed them.  The patient denies medication side effects.  Will refill medications. There were no vitals taken for this visit.  Having some IBS D.  No blood or tarry stools. Can have normal BMs ; last colonoscopy 2018 Dr Sin, benign polyp  Had CT   Results for orders placed or performed in visit on 03/16/22   Protime-INR    Specimen: Blood   Result Value Ref Range    INR 2.00 2 - 3   DR Faulkner---did GI work up and had normal esophagram  CT abd/pelvis 10-20-21   Lab Results   Component Value Date    GLUCOSE 93 11/23/2021    BUN 8 11/23/2021    CREATININE 0.92 11/23/2021    EGFRIFNONA 85 11/23/2021    EGFRIFAFRI 98 11/23/2021    BCR 9 (L) 11/23/2021    K 4.2 11/23/2021    CO2 27 11/23/2021    CALCIUM 8.7 11/23/2021    PROTENTOTREF 6.8 11/23/2021    ALBUMIN 3.9 11/23/2021    LABIL2 1.3 11/23/2021    AST 18 11/23/2021    ALT 15 11/23/2021     Lab Results   Component Value Date    CHLPL 113 11/23/2021    TRIG 64 11/23/2021    HDL 38 (L) 11/23/2021    LDL 61 11/23/2021   Last labs 11/23/2020  Advise adding vitamin D at 2000 IU daily, A1c was improved from prior at 5.8 and still watching for type 2 diabetes.  LDL cholesterol goal of less than 70 due to CAD was also met.  Did check magnesium at 2.0 due to PPI Rx.  B12 was much improved from last labs.  Continue taking.    Sees Dr Willoughby and had appointment 12/15/2021 noted that he had interstitial lung disease he is status post bronchoscopy with biopsy without  diagnosis.  Noted his stable pulmonary function test.  And planned CT of chest for April and then follow-up appointment.  We will continue to observe and monitor  Stop smoking  Also on Baclofen for muscle pain with his fibromyalgia and helps.  Lyrica also prescribed and helps fibro pain  Has CAD and monitored by Dr. Krystle pappas.  Last stress test 11/24/2020.  Also recurrent PE and DVT remains on chronic anticoagulation with Coumadin. Has filter--inf vena cava.  He has abdominal pain he feels like is related this filter and will have him see vascular for further assessment  Did have carotid Doppler screening 4/26/2019 and negative.  Weight stable  The following portions of the patient's history were reviewed and updated as appropriate: allergies, current medications, past family history, past medical history, past social history, past surgical history and problem list.    Review of Systems   Constitutional: Negative for activity change, appetite change and unexpected weight change.   HENT: Negative for nosebleeds and trouble swallowing.    Eyes: Negative for pain and visual disturbance.   Respiratory: Positive for cough. Negative for chest tightness, shortness of breath and wheezing.    Cardiovascular: Negative for chest pain and palpitations.   Gastrointestinal: Positive for abdominal pain, constipation and diarrhea. Negative for blood in stool.   Endocrine: Negative.    Genitourinary: Negative for difficulty urinating and hematuria.   Musculoskeletal: Positive for myalgias. Negative for joint swelling.   Skin: Negative for color change and rash.   Allergic/Immunologic: Negative.    Neurological: Negative for syncope and speech difficulty.   Hematological: Negative for adenopathy.   Psychiatric/Behavioral: Negative for agitation, confusion and dysphoric mood.   All other systems reviewed and are negative.      Objective   Physical Exam  Vitals and nursing note reviewed.   Constitutional:       General: He is not  in acute distress.     Appearance: He is well-developed. He is not diaphoretic.   HENT:      Head: Normocephalic.   Eyes:      Conjunctiva/sclera: Conjunctivae normal.      Pupils: Pupils are equal, round, and reactive to light.   Cardiovascular:      Rate and Rhythm: Normal rate and regular rhythm.      Heart sounds: Normal heart sounds. No murmur heard.  Pulmonary:      Effort: Pulmonary effort is normal.      Breath sounds: Normal breath sounds.   Abdominal:      Palpations: Abdomen is soft.      Tenderness: There is abdominal tenderness.      Comments: Lower abd and epigastric   Musculoskeletal:         General: Normal range of motion.      Cervical back: Normal range of motion and neck supple.   Skin:     General: Skin is warm and dry.      Findings: No rash.   Neurological:      Mental Status: He is alert and oriented to person, place, and time.   Psychiatric:         Mood and Affect: Mood normal. Affect is not inappropriate.         Behavior: Behavior normal.         Thought Content: Thought content normal.         Judgment: Judgment normal.         Assessment/Plan   Diagnoses and all orders for this visit:    1. Fibromyalgia (Primary)  -     pregabalin (LYRICA) 75 MG capsule; Take 1 capsule by mouth 2 (Two) Times a Day. For Fibromyalgia  Dispense: 180 capsule; Refill: 1    2. Gastroesophageal reflux disease without esophagitis    3. Long term current use of anticoagulant therapy    4. Mixed hyperlipidemia    5. Interstitial lung disease (HCC)    6. Vitamin D deficiency    7. Tobacco abuse    8. Chronic abdominal pain  -     Ambulatory Referral to Vascular Surgery    9. Old MI (myocardial infarction)    10. Irritable bowel syndrome with both constipation and diarrhea    11. Chronic deep vein thrombosis (DVT) of proximal vein of both lower extremities (HCC)  -     Ambulatory Referral to Vascular Surgery    12. Presence of inferior vena cava filter  -     Ambulatory Referral to Vascular Surgery    Other  orders  -     dicyclomine (Bentyl) 10 MG capsule; Take 1 capsule by mouth 4 (Four) Times a Day Before Meals & at Bedtime. FOR IBS PRN  Dispense: 45 capsule; Refill: 11    Weight stable no issues with swallowing or GERD  Stop smoking  Refer to vascular surgery for his concern with abdominal pain with the inferior vena cava filter  Due to see cardiology for CAD  Interstitial lung disease monitored closely by pulmonologist and has CT scheduled next month and follow-up appointment  Continues on Coumadin for VTE history recurrent--stable and controlled  Try Bentyl for irritable bowel syndrome and also probiotic like IBgard  See GI---tender on exam, restart pantoprazole out and take daily  Plan, Javy Reeves Sr., was seen today.  he was seen for Imparied fasting glucose and plan follow up labs, diet, and exercise, Hyperlipidemia and will continue current medication, CAD and is currently stable on medication management and stable, CAD and is under care of their Cardiologist for medical management and stable and Vitamin D deficiency and will update labs .

## 2022-03-24 ENCOUNTER — ANTICOAGULATION VISIT (OUTPATIENT)
Dept: FAMILY MEDICINE CLINIC | Facility: CLINIC | Age: 72
End: 2022-03-24

## 2022-03-24 LAB — INR PPP: 2.2 (ref 2–3)

## 2022-03-30 ENCOUNTER — ANTICOAGULATION VISIT (OUTPATIENT)
Dept: FAMILY MEDICINE CLINIC | Facility: CLINIC | Age: 72
End: 2022-03-30

## 2022-03-30 LAB — INR PPP: 2.5 (ref 2–3)

## 2022-03-31 RX ORDER — ATORVASTATIN CALCIUM 40 MG/1
40 TABLET, FILM COATED ORAL DAILY
Qty: 90 TABLET | Refills: 3 | Status: SHIPPED | OUTPATIENT
Start: 2022-03-31 | End: 2023-03-24

## 2022-04-06 ENCOUNTER — ANTICOAGULATION VISIT (OUTPATIENT)
Dept: FAMILY MEDICINE CLINIC | Facility: CLINIC | Age: 72
End: 2022-04-06

## 2022-04-06 LAB — INR PPP: 2.3 (ref 2–3)

## 2022-04-07 ENCOUNTER — OFFICE VISIT (OUTPATIENT)
Dept: GASTROENTEROLOGY | Facility: CLINIC | Age: 72
End: 2022-04-07

## 2022-04-07 VITALS — BODY MASS INDEX: 28.59 KG/M2 | TEMPERATURE: 97.1 F | HEIGHT: 71 IN | WEIGHT: 204.2 LBS

## 2022-04-07 DIAGNOSIS — K21.9 GASTROESOPHAGEAL REFLUX DISEASE, UNSPECIFIED WHETHER ESOPHAGITIS PRESENT: ICD-10-CM

## 2022-04-07 DIAGNOSIS — R19.7 DIARRHEA, UNSPECIFIED TYPE: Primary | ICD-10-CM

## 2022-04-07 DIAGNOSIS — R10.10 PAIN OF UPPER ABDOMEN: ICD-10-CM

## 2022-04-07 DIAGNOSIS — R10.30 LOWER ABDOMINAL PAIN: ICD-10-CM

## 2022-04-07 DIAGNOSIS — Z86.010 HISTORY OF COLON POLYPS: ICD-10-CM

## 2022-04-07 PROCEDURE — 99214 OFFICE O/P EST MOD 30 MIN: CPT | Performed by: NURSE PRACTITIONER

## 2022-04-07 NOTE — PROGRESS NOTES
Chief Complaint   Patient presents with   • Abdominal Pain   • Diarrhea       Javy Reeves  is a  71 y.o. male here for a follow up visit for diarrhea.    HPI  71-year-old male presents today for follow-up visit for diarrhea.  He is a patient of Dr. Faulkner.  He was last seen in the office by her on 9/8/2021.  He is new to me today.  His last colonoscopy was on 9/2018.  He does have a history of colon polyps.  He has been having work-up for worsening upper and lower abdominal pain.  He admits he is had this pain for years.  No one has really been of to figure out what is causing it.  He had a esophagram done recently last October which was unremarkable for any acute abdominal process.  He had a CT scan of the abdomen pelvis done at the same time that was normal.  He does have history of DVTs and is on Coumadin.  He also has a history of the Yudelka filter placed.  He tells me he is done a lot of research on his own he is convinced that this Yudelka filter is causing all of his problems.  He supposed to be following up with cardiology and vascular soon to discuss this further.  He tells me he is tried bentyl for the abdominal pain and cramping but it gave him bad vision so he quit taking it.  He does not he is also been losing weight without even trying.  He does have a pretty physical job but just feels like something else is going on.  He tells me lately he has been having episodes of worsening diarrhea.  He is not really sure what is causing it.  He tells me for the last 2 weeks he has had no diarrhea.  But he has been taking Pepto-Bismol over-the-counter as needed for it.  He tells me he is also been having episodes of chest pain that was relieved with taking nitroglycerin at home.  He tells me he does plan to tell this to his cardiologist next week.  He is also supposed to see his PCP next week as well.  He tells me he is just quite concerned that something ominous is going on.  He denies any  dysphagia, reflux, nausea and vomiting, constipation, rectal bleeding or melena.  He does have history of appendectomy.  Past Medical History:   Diagnosis Date   • Acute and subacute infective endocarditis in diseases classified elsewhere    • Allergic    • Arthritis    • Asthma 4/26/2021   • Chest pain    • Chronic low back pain    • Chronic pain    • Colon polyps    • Coronary artery disease    • DDD (degenerative disc disease), cervical    • DDD (degenerative disc disease), lumbosacral    • Diverticulosis    • DVT (deep venous thrombosis) ([facility]) 1996    Left Lower extremity following arthroscopic knee surgery in 1996. IVC fliter placed at that time.   • Encounter for special screening examination for neoplasm of prostate 2012   • Eye exam, routine > 5 yrs ago   • Eye exam, routine 01/2015   • Fibromyalgia    • Fibromyositis    • GERD (gastroesophageal reflux disease)    • Hyperlipidemia    • Injury of back    • Injury of neck    • Irritable bowel    • Lumbar stenosis    • MI (myocardial infarction) ([facility])    • Neck pain    • Pain in limb    • Past heart attack    • Postlaminectomy syndrome of lumbar region    • Pulmonary embolism ([facility]) 1996    Left Lower extremity following arthroscopic knee surgery in 1996. IVC fliter placed at that time.   • Reflux esophagitis    • Shortness of breath    • Sleep apnea     NO CPAP   • TIA (transient ischemic attack)        Past Surgical History:   Procedure Laterality Date   • ANTERIOR CERVICAL DISCECTOMY W/ FUSION N/A 10/5/2018    Procedure: CERVICAL DISCECTOMY ANTERIOR FUSION WITH INSTRUMENTATION,ACDF C5/6, C6/7 with cages and plate;  Surgeon: Jean Coles MD;  Location: [facility] MAIN OR;  Service: Neurosurgery   • APPENDECTOMY N/A    • BRONCHOSCOPY N/A 11/3/2020    Procedure: EBUS BRONCHOSCOPY WITH BAL & FLUOROSCOPY WITH MAC ANESTHESIA, BX & TBNA;  Surgeon: Jamil Willoughby MD;  Location: [facility] ENDOSCOPY;  Service: Pulmonary;  Laterality: N/A;  PRE/POST-ABNORMAL CT, LAD    • CARDIAC CATHETERIZATION Left 1952    Left Heart Cath Left Ventriculography, selective coronary angiography; iliofemoral angiography; angio seal closure, Dr. Brady Ramos   • COLONOSCOPY  09/2015    removed 2 polyps, Dr. Manley   • COLONOSCOPY N/A 02/12/2007    Left colonic diverticulosis, No evidence of polypoid neoplasia, Dr. Jarret Bloom   • COLONOSCOPY N/A 9/13/2018    Procedure: COLONOSCOPY TO CECUM WITH COLD BX'S POLYPECTOMY;  Surgeon: Berta Sin MD;  Location: [facility] ENDOSCOPY;  Service: General   • CYSTOSCOPY TRANSURETHRAL RESECTION OF PROSTATE N/A 11/01/2004    Dr. Rodolfo Arnold   • HAND SURGERY Right    • KNEE ARTHROPLASTY Left    • LUMBAR DISC SURGERY  2006, 2007    L4-S1 pseudoarthroses - Dr Cervantes   • LUMBAR DISC SURGERY N/A 01/29/2010    Removal of Instrumentation w/ decompression, Instrumentaion loosening & pt tested positive for zinc allergy, Dr. Kvng Cervantes   • ROTATOR CUFF REPAIR Right 04/2012   • THORACIC OUTLET SURGERY Bilateral    • VENA CAVA FILTER PLACEMENT  1996   • WRIST SURGERY Right     x3 starting in 1984       Scheduled Meds:    Continuous Infusions:No current facility-administered medications for this visit.      PRN Meds:.    Allergies   Allergen Reactions   • Zinc Other (See Comments)     Patient tested positive, had to have hardware removed from back.   • Chantix [Varenicline] Other (See Comments)     Headache       Social History     Socioeconomic History   • Marital status:    Tobacco Use   • Smoking status: Current Every Day Smoker     Packs/day: 0.50     Years: 30.00     Pack years: 15.00     Types: Cigarettes   • Smokeless tobacco: Never Used   • Tobacco comment: caffeine use, smokes about 5 cigars/day   Substance and Sexual Activity   • Alcohol use: Yes     Comment: occasinal   • Drug use: No   • Sexual activity: Defer       Family History   Problem Relation Age of Onset   • Diabetes Sister    • Cancer Sister    • Hypertension Sister    • Kidney  disease Sister    • Stroke Sister    • Heart disease Brother    • Hypertension Brother    • Cerebral aneurysm Brother    • Sudden death Brother    • Bleeding Disorder Brother    • Cancer Mother    • Heart disease Father    • Cancer Father         malignant neoplasm   • Deep vein thrombosis Father    • Heart attack Father    • Malig Hyperthermia Neg Hx        Review of Systems   Constitutional: Positive for fatigue and unexpected weight change. Negative for appetite change, chills, diaphoresis and fever.   HENT: Negative for nosebleeds, postnasal drip, sore throat, trouble swallowing and voice change.    Respiratory: Positive for chest tightness. Negative for cough, choking, shortness of breath, wheezing and stridor.    Cardiovascular: Positive for chest pain. Negative for palpitations and leg swelling.   Gastrointestinal: Positive for abdominal distention, abdominal pain and diarrhea. Negative for anal bleeding, blood in stool, constipation, nausea, rectal pain and vomiting.   Endocrine: Negative for polydipsia, polyphagia and polyuria.   Musculoskeletal: Negative for gait problem.   Skin: Negative for rash and wound.   Allergic/Immunologic: Negative for food allergies.   Neurological: Negative for dizziness, speech difficulty and light-headedness.   Psychiatric/Behavioral: Negative for confusion, self-injury, sleep disturbance and suicidal ideas.       Vitals:    04/07/22 1500   Temp: 97.1 °F (36.2 °C)       Physical Exam  Constitutional:       General: He is not in acute distress.     Appearance: He is well-developed. He is not ill-appearing.   HENT:      Head: Normocephalic.   Eyes:      Pupils: Pupils are equal, round, and reactive to light.   Cardiovascular:      Rate and Rhythm: Normal rate and regular rhythm.      Heart sounds: Normal heart sounds.   Pulmonary:      Effort: Pulmonary effort is normal.      Breath sounds: Normal breath sounds.   Abdominal:      General: Bowel sounds are normal. There is  distension.      Palpations: Abdomen is soft. There is no mass.      Tenderness: There is abdominal tenderness. There is no guarding or rebound.      Hernia: No hernia is present.   Musculoskeletal:         General: Normal range of motion.   Skin:     General: Skin is warm and dry.   Neurological:      Mental Status: He is alert and oriented to person, place, and time.   Psychiatric:         Speech: Speech normal.         Behavior: Behavior normal.         Judgment: Judgment normal.         No radiology results for the last 7 days     Diagnoses and all orders for this visit:    1. Diarrhea, unspecified type (Primary)  Overview:  Added automatically from request for surgery [id number]    Orders:  -     Clostridium Difficile Toxin, PCR - Stool, Per Rectum  -     Fecal Lactoferrin Qual. - Stool, Per Rectum  -     Pancreatic Elastase, Fecal - Stool, Per Rectum    2. Pain of upper abdomen    3. Lower abdominal pain    4. Gastroesophageal reflux disease, unspecified whether esophagitis present    5. History of colon polyps      Not sure what is causing his episodes of diarrhea with lower abdominal pain and cramping.  I do worry about his chest pain that was relieved with nitroglycerin.  I would like him to move up his appointment with cardiology if possible.  Recommend he call their office today to try to schedule a sooner appointment.  He tells me he supposed to see them first thing next week.  I expressed to him that if he has any more episodes of chest pain that he go on over the [facility] ER for immediate evaluation.  Since the diarrhea is not every day or all the time and the patient still unsure of when it is going to happen we will go ahead and give him stool kit to take home to try to capture some of the diarrhea.  For now okay to continue Pepto-Bismol over-the-counter for the diarrhea.  Continue bland diet.  GERD seems well controlled on Protonix 40 mg daily.  Continue GERD precautions.  Once his cardiac work-up is  completed and he is more stable from that standpoint would recommend an EGD and colonoscopy with Dr. Faulkner for further evaluation.  Patient is agreeable to the scopes.  Patient to call the office next week with an update.  Patient to follow-up with me in 3 weeks.  Patient is agreeable to the plan.

## 2022-04-11 ENCOUNTER — LAB (OUTPATIENT)
Dept: LAB | Facility: HOSPITAL | Age: 72
End: 2022-04-11

## 2022-04-11 LAB — C DIFF TOX GENS STL QL NAA+PROBE: NEGATIVE

## 2022-04-11 PROCEDURE — 87493 C DIFF AMPLIFIED PROBE: CPT | Performed by: NURSE PRACTITIONER

## 2022-04-11 PROCEDURE — 82653 EL-1 FECAL QUANTITATIVE: CPT | Performed by: NURSE PRACTITIONER

## 2022-04-12 ENCOUNTER — ANTICOAGULATION VISIT (OUTPATIENT)
Dept: FAMILY MEDICINE CLINIC | Facility: CLINIC | Age: 72
End: 2022-04-12

## 2022-04-12 LAB — INR PPP: 2.1 (ref 2–3)

## 2022-04-14 LAB — ELASTASE PANC STL-MCNT: 353 UG ELAST./G

## 2022-04-16 ENCOUNTER — HOSPITAL ENCOUNTER (OUTPATIENT)
Dept: CT IMAGING | Facility: HOSPITAL | Age: 72
Discharge: HOME | End: 2022-04-16
Admitting: INTERNAL MEDICINE

## 2022-04-16 DIAGNOSIS — J84.9 ILD (INTERSTITIAL LUNG DISEASE): ICD-10-CM

## 2022-04-16 PROCEDURE — 71250 CT THORAX DX C-: CPT

## 2022-04-20 ENCOUNTER — ANTICOAGULATION VISIT (OUTPATIENT)
Dept: FAMILY MEDICINE CLINIC | Facility: CLINIC | Age: 72
End: 2022-04-20

## 2022-04-20 LAB — INR PPP: 2.4 (ref 2–3)

## 2022-04-21 RX ORDER — WARFARIN SODIUM 5 MG/1
5 TABLET ORAL DAILY
Qty: 90 TABLET | Refills: 3 | Status: ON HOLD | OUTPATIENT
Start: 2022-04-21 | End: 2022-08-25 | Stop reason: SDUPTHER

## 2022-04-27 ENCOUNTER — ANTICOAGULATION VISIT (OUTPATIENT)
Dept: FAMILY MEDICINE CLINIC | Facility: CLINIC | Age: 72
End: 2022-04-27

## 2022-04-27 LAB — INR PPP: 2.1 (ref 2–3)

## 2022-04-28 ENCOUNTER — OFFICE VISIT (OUTPATIENT)
Dept: GASTROENTEROLOGY | Facility: CLINIC | Age: 72
End: 2022-04-28

## 2022-04-28 VITALS
WEIGHT: 203 LBS | OXYGEN SATURATION: 96 % | DIASTOLIC BLOOD PRESSURE: 82 MMHG | BODY MASS INDEX: 28.42 KG/M2 | TEMPERATURE: 97.3 F | HEART RATE: 71 BPM | SYSTOLIC BLOOD PRESSURE: 149 MMHG | HEIGHT: 71 IN

## 2022-04-28 DIAGNOSIS — K21.9 GASTROESOPHAGEAL REFLUX DISEASE WITHOUT ESOPHAGITIS: ICD-10-CM

## 2022-04-28 DIAGNOSIS — R10.10 PAIN OF UPPER ABDOMEN: ICD-10-CM

## 2022-04-28 DIAGNOSIS — R19.7 DIARRHEA, UNSPECIFIED TYPE: Primary | ICD-10-CM

## 2022-04-28 DIAGNOSIS — Z86.010 HISTORY OF COLON POLYPS: ICD-10-CM

## 2022-04-28 PROCEDURE — 99214 OFFICE O/P EST MOD 30 MIN: CPT | Performed by: NURSE PRACTITIONER

## 2022-04-28 RX ORDER — MULTIPLE VITAMINS W/ MINERALS TAB 9MG-400MCG
1 TAB ORAL DAILY
COMMUNITY

## 2022-04-29 NOTE — TELEPHONE ENCOUNTER
Caller: Javy Reeves Sr.    Relationship: Self    Best call back number: [phone]    Requested Prescriptions:   Requested Prescriptions     Pending Prescriptions Disp Refills   • nitroglycerin (NITROSTAT) 0.4 MG SL tablet 30 tablet 5     Sig: Place 1 tablet under the tongue Every 5 (Five) Minutes As Needed for Chest Pain. Take no more than 3 doses in 15 minutes.        Pharmacy where request should be sent: [id number] [address] AT [facility] - [phone] [facility] [phone] FX     Additional details provided by patient: PATIENT HAS 5 PILLS LEFT, PATIENT HAS BEEN TAKING MORE THE LAST COUPLE OF WEEKS. WANTED TO LET CHONG ALBRIGHT KNOW.     Does the patient have less than a 3 day supply:  [x] Yes  [] No    Monica Hernandez Rep   04/29/22 16:16 EDT

## 2022-04-30 RX ORDER — NITROGLYCERIN 0.4 MG/1
0.4 TABLET SUBLINGUAL
Qty: 30 TABLET | Refills: 5 | Status: SHIPPED | OUTPATIENT
Start: 2022-04-30

## 2022-05-04 ENCOUNTER — ANTICOAGULATION VISIT (OUTPATIENT)
Dept: FAMILY MEDICINE CLINIC | Facility: CLINIC | Age: 72
End: 2022-05-04

## 2022-05-04 LAB — INR PPP: 2.2 (ref 2–3)

## 2022-05-09 ENCOUNTER — TELEPHONE (OUTPATIENT)
Dept: NEUROSURGERY | Facility: CLINIC | Age: 72
End: 2022-05-09

## 2022-05-09 NOTE — TELEPHONE ENCOUNTER
PREV PATIENT OF DR CHRISTENSEN (LAST SEEN 11/30/18 BY MARK LIVE) CALLING WITH NEW/RETURNING SYMPTOMS. I ADVISED DUE TO THE LENGTH OF TIME SINCE HIS LAST VISIT, WE WILL NEED A NEW REFERRAL. ALSO ADVISED THAT DR CHRISTENSEN IS CURRENTLY SCHEDULING IN October; PATIENT MAY BE REQUESTING DR RUSSELL OR DR LOYA TO BE SEEN SOONER.

## 2022-05-11 ENCOUNTER — ANTICOAGULATION VISIT (OUTPATIENT)
Dept: FAMILY MEDICINE CLINIC | Facility: CLINIC | Age: 72
End: 2022-05-11

## 2022-05-11 LAB — INR PPP: 2.4 (ref 2–3)

## 2022-05-17 ENCOUNTER — ANTICOAGULATION VISIT (OUTPATIENT)
Dept: FAMILY MEDICINE CLINIC | Facility: CLINIC | Age: 72
End: 2022-05-17

## 2022-05-17 DIAGNOSIS — J84.9 INTERSTITIAL LUNG DISEASE: Primary | ICD-10-CM

## 2022-05-17 LAB — INR PPP: 2.6 (ref 2–3)

## 2022-05-18 NOTE — ADDENDUM NOTE
Addended by: JEN RAI on: 5/18/2022 02:35 PM     Modules accepted: Orders     Healthy diet. Stay active. Call Dr. Ghazala Oliva for evaluation/ appointment. Do fasting blood work. If cholesterol is high we should start cholesterol-lowering medication. Start aspirin 81 mg 1 tablet daily.   Use eardrops to right ear 3 times per day for

## 2022-05-19 ENCOUNTER — OFFICE VISIT (OUTPATIENT)
Dept: FAMILY MEDICINE CLINIC | Facility: CLINIC | Age: 72
End: 2022-05-19

## 2022-05-19 VITALS
TEMPERATURE: 97.5 F | RESPIRATION RATE: 18 BRPM | BODY MASS INDEX: 28.56 KG/M2 | HEART RATE: 72 BPM | HEIGHT: 71 IN | SYSTOLIC BLOOD PRESSURE: 126 MMHG | DIASTOLIC BLOOD PRESSURE: 58 MMHG | OXYGEN SATURATION: 99 % | WEIGHT: 204 LBS

## 2022-05-19 DIAGNOSIS — R10.31 BILATERAL GROIN PAIN: ICD-10-CM

## 2022-05-19 DIAGNOSIS — M51.37 DDD (DEGENERATIVE DISC DISEASE), LUMBOSACRAL: Primary | ICD-10-CM

## 2022-05-19 DIAGNOSIS — M25.559 HIP PAIN: ICD-10-CM

## 2022-05-19 DIAGNOSIS — M54.6 CHRONIC MIDLINE THORACIC BACK PAIN: ICD-10-CM

## 2022-05-19 DIAGNOSIS — G89.29 CHRONIC MIDLINE THORACIC BACK PAIN: ICD-10-CM

## 2022-05-19 DIAGNOSIS — R10.32 BILATERAL GROIN PAIN: ICD-10-CM

## 2022-05-19 DIAGNOSIS — M50.30 DDD (DEGENERATIVE DISC DISEASE), CERVICAL: ICD-10-CM

## 2022-05-19 PROCEDURE — 72072 X-RAY EXAM THORAC SPINE 3VWS: CPT | Performed by: PHYSICIAN ASSISTANT

## 2022-05-19 PROCEDURE — 99214 OFFICE O/P EST MOD 30 MIN: CPT | Performed by: PHYSICIAN ASSISTANT

## 2022-05-19 NOTE — PROGRESS NOTES
Jake BERRIOS Leandro Hurtado. is a 71 y.o. male.     History of Present Illness     Over a year new pain  No unilateral pain  Prior surgery C spine and has had prior lumbar surgery  Pain is worse  More pain in hip and groin----Xrays  occas pain in feet;  Still N/T toes bilat and plantar areas  No burning pain in legs  Does have thoracic pain ------can radiate to chest  Javy BERRIOS Leandro Hurtado. 71 y.o. male who presents for evaluation of neck pain. Event that precipitated these symptoms:  nothing in particular but has a history of DDD of C-Spine  Onset of symptoms was several years ago, and have been gradually worsening since that time.  Location of this is in the right , left, mid, central, bilateral, trapezius, C-spine Vertebral, denies motor loss in upper extremities, denies sensory loss in upper extremities, denies upper extremity weakness, denies upper extremity numbness/tingling, denies upper extremity burning and also pain down thoracic spine  area.   Current pain symptoms are described as aching, sharp and throbbing and occurs constantly.  The pain intensity is moderate.  Other associated symptoms include:  ROM pain. Treatment efforts have included: analgesics, heat and topical. without relief.  Patient has had previous spinal surgery - Dr ALBAN Reeves Sr. 71 y.o. male presents for evaluation and treatment of  low back pain. The patient first noted symptoms several years ago. It was not related to nothing in particular but has a history of DDD of L-Spine.  The pain intensity is moderate , and is located right side , left side, central, lumbar and more upper lumbar. The pain is described as aching, sharp and stiff and occurs constantly. The symptoms are progressive. Symptoms are exacerbated by light exertion and prolonged sitting, standing, and laying. Factors which relieve the pain include analgesics, ice and back exercises. Other associated symptoms include denies pain radiating down right leg,  denies pain radiating down left leg, denies motor loss, denies weakness in legs and does have N/T toes; bottom of feet--chronic. Previous history of symptoms: ongoing  . Treatment efforts have included analgesics, heat and back exercises , with and without relief.        The following portions of the patient's history were reviewed and updated as appropriate: allergies, current medications, past family history, past medical history, past social history, past surgical history and problem list.    Review of Systems   Constitutional: Negative for activity change, appetite change and unexpected weight change.   HENT: Negative for nosebleeds and trouble swallowing.    Eyes: Negative for pain and visual disturbance.   Respiratory: Negative for chest tightness, shortness of breath and wheezing.    Cardiovascular: Negative for chest pain and palpitations.   Gastrointestinal: Negative for abdominal pain and blood in stool.   Endocrine: Negative.    Genitourinary: Negative for difficulty urinating and hematuria.   Musculoskeletal: Negative for joint swelling.   Skin: Negative for color change and rash.   Allergic/Immunologic: Negative.    Neurological: Negative for syncope and speech difficulty.   Hematological: Negative for adenopathy.   Psychiatric/Behavioral: Negative for agitation and confusion.   All other systems reviewed and are negative.      Objective   Physical Exam  Vitals and nursing note reviewed.   Constitutional:       General: He is not in acute distress.     Appearance: He is well-developed. He is not diaphoretic.   HENT:      Head: Normocephalic.   Eyes:      Conjunctiva/sclera: Conjunctivae normal.      Pupils: Pupils are equal, round, and reactive to light.   Pulmonary:      Effort: Pulmonary effort is normal.   Musculoskeletal:         General: No tenderness or deformity. Normal range of motion.      Cervical back: Normal range of motion and neck supple.   Skin:     General: Skin is warm and dry.       Findings: No rash.   Neurological:      General: No focal deficit present.      Mental Status: He is alert and oriented to person, place, and time. Mental status is at baseline.      Sensory: Sensory deficit present.      Coordination: Coordination normal.      Deep Tendon Reflexes: Reflexes normal.      Comments: toes   Psychiatric:         Mood and Affect: Mood normal. Affect is not inappropriate.         Behavior: Behavior normal.         Thought Content: Thought content normal.         Judgment: Judgment normal.         Assessment & Plan   Diagnoses and all orders for this visit:    1. DDD (degenerative disc disease), lumbosacral (Primary)  -     XR Spine Lumbar 2 or 3 View; Future  -     XR Spine Cervical Complete 4 or 5 View; Future  -     XR Hips Bilateral With or Without Pelvis 3-4 View; Future  -     XR Spine Thoracic 3 View (In Office); Future    2. DDD (degenerative disc disease), cervical  -     XR Spine Lumbar 2 or 3 View; Future  -     XR Spine Cervical Complete 4 or 5 View; Future  -     XR Hips Bilateral With or Without Pelvis 3-4 View; Future  -     XR Spine Thoracic 3 View (In Office); Future    3. Chronic midline thoracic back pain  -     XR Spine Lumbar 2 or 3 View; Future  -     XR Spine Cervical Complete 4 or 5 View; Future  -     XR Hips Bilateral With or Without Pelvis 3-4 View; Future  -     XR Spine Thoracic 3 View (In Office); Future    4. Hip pain  -     XR Spine Lumbar 2 or 3 View; Future  -     XR Spine Cervical Complete 4 or 5 View; Future  -     XR Hips Bilateral With or Without Pelvis 3-4 View; Future  -     XR Spine Thoracic 3 View (In Office); Future    5. Bilateral groin pain  -     XR Spine Lumbar 2 or 3 View; Future  -     XR Spine Cervical Complete 4 or 5 View; Future  -     XR Hips Bilateral With or Without Pelvis 3-4 View; Future  -     XR Spine Thoracic 3 View (In Office); Future      No NSAIDs  xrays  Consider PT

## 2022-05-25 DIAGNOSIS — M79.7 FIBROMYALGIA: ICD-10-CM

## 2022-05-25 RX ORDER — PREGABALIN 75 MG/1
CAPSULE ORAL
Qty: 180 CAPSULE | Refills: 1 | Status: SHIPPED | OUTPATIENT
Start: 2022-05-25 | End: 2022-09-22 | Stop reason: SDUPTHER

## 2022-06-01 ENCOUNTER — TELEPHONE (OUTPATIENT)
Dept: FAMILY MEDICINE CLINIC | Facility: CLINIC | Age: 72
End: 2022-06-01

## 2022-06-01 ENCOUNTER — ANTICOAGULATION VISIT (OUTPATIENT)
Dept: FAMILY MEDICINE CLINIC | Facility: CLINIC | Age: 72
End: 2022-06-01

## 2022-06-01 LAB — INR PPP: 1.6 (ref 2–3)

## 2022-06-01 NOTE — TELEPHONE ENCOUNTER
Caller: GIDEON    Relationship to patient:     Best call back number: [phone]    Patient is needing: INR 1.6 ON 6/1/22.

## 2022-06-08 ENCOUNTER — ANTICOAGULATION VISIT (OUTPATIENT)
Dept: FAMILY MEDICINE CLINIC | Facility: CLINIC | Age: 72
End: 2022-06-08

## 2022-06-08 LAB — INR PPP: 1.4 (ref 2–3)

## 2022-06-09 ENCOUNTER — TELEPHONE (OUTPATIENT)
Dept: FAMILY MEDICINE CLINIC | Facility: CLINIC | Age: 72
End: 2022-06-09

## 2022-06-09 NOTE — TELEPHONE ENCOUNTER
Caller: NEELA    Relationship: MD INR    Best call back number: [phone]    Who are you requesting to speak with (clinical staff, provider,  specific staff member): MALINDA ALBRIGHT OR MA    What was the call regarding: NEELA CALLED TO UPDATE PATIENT'S INR    INR ON 6/7/22=1.4

## 2022-06-15 ENCOUNTER — ANTICOAGULATION VISIT (OUTPATIENT)
Dept: FAMILY MEDICINE CLINIC | Facility: CLINIC | Age: 72
End: 2022-06-15

## 2022-06-15 LAB — INR PPP: 2.2 (ref 2–3)

## 2022-06-22 ENCOUNTER — ANTICOAGULATION VISIT (OUTPATIENT)
Dept: FAMILY MEDICINE CLINIC | Facility: CLINIC | Age: 72
End: 2022-06-22

## 2022-06-22 ENCOUNTER — TREATMENT (OUTPATIENT)
Dept: PHYSICAL THERAPY | Facility: CLINIC | Age: 72
End: 2022-06-22

## 2022-06-22 DIAGNOSIS — M54.40 BILATERAL LOW BACK PAIN WITH SCIATICA, SCIATICA LATERALITY UNSPECIFIED, UNSPECIFIED CHRONICITY: Primary | ICD-10-CM

## 2022-06-22 DIAGNOSIS — M25.559 PAIN, HIP: ICD-10-CM

## 2022-06-22 DIAGNOSIS — M54.2 CERVICAL PAIN: ICD-10-CM

## 2022-06-22 LAB — INR PPP: 1.9 (ref 2–3)

## 2022-06-22 PROCEDURE — 97110 THERAPEUTIC EXERCISES: CPT | Performed by: PHYSICAL THERAPIST

## 2022-06-22 PROCEDURE — 97112 NEUROMUSCULAR REEDUCATION: CPT | Performed by: PHYSICAL THERAPIST

## 2022-06-22 PROCEDURE — 97161 PT EVAL LOW COMPLEX 20 MIN: CPT | Performed by: PHYSICAL THERAPIST

## 2022-06-22 NOTE — PROGRESS NOTES
Physical Therapy Initial Evaluation and Plan of Care      Patient: Javy Reeves .   : 1950  Diagnosis/ICD-10 Code:  Bilateral low back pain with sciatica, sciatica laterality unspecified, unspecified chronicity [M54.40]  Referring practitioner: Aletha Ochoa PA-C  Date of Initial Visit: 2022  Today's Date: 2022  Patient seen for 1 sessions           Subjective Evaluation    History of Present Illness    Subjective comment: Ms. Pompa recommended I come to PT but I'm not having any new issues or problems. I have pain and aches in different areas but I manage with stretching, CBD cream, staying active. I'm realistic about where I am. I have kept up some of the exercises from last time but not all. I am more concerned right now with this lung issue I'm having and seeing multiple specialists for. I'm open to doing more strenghtening exercises.          Objective          Active Range of Motion     Additional Active Range of Motion Details  Full lumbar flexion, very limited extension, slightly limited rotation. Mild pain with extension  Normal strength in LE, decreased UE B testing of core.   Forward head and rounded shoulders posture especially in sitting.   Poor isolation of TA with PPT, held breath and required cuing for form.         See Exercise, Manual, and Modality Logs for complete treatment.     EDUCATION: At length discussion with pt regarding his goals, needs for therapy, response to previous therapy, need for more strengthening/toning in addition to stretching. Discussed realistic expectations and ideal HEP compliance .           Assessment & Plan     Assessment  Impairments: abnormal or restricted ROM, lacks appropriate home exercise program and pain with function  Functional Limitations: carrying objects, lifting, walking, pulling, pushing, uncomfortable because of pain and moving in bed  Assessment details: Javy Reeves . is a 71 y.o. year-old male referred to physical therapy  for multi-joint OA-related pain. Pt has had therapy in past with successful results. He has continued his HEP and reports no worsening in symptoms since time of discharge. He presents with a evolving clinical presentation.  He has comorbidities including multi-joint OA, DDD, and DJD and personal factors including lung issue being diagnosed that may affect his progress in the plan of care.  Performed screening, consultation, and HEP review today. Pt would benefit from 1-2 more sessions to update/advance his HEP with likely discharge at that point.   Prognosis: good    Goals  Plan Goals: LTG to be met by 4 weeks  1. Pt will be independent and compliant with HEP.   2. Pt will verbalize plan to continue HEP after discharge.     Plan  Therapy options: will be seen for skilled therapy services  Planned therapy interventions: strengthening, stretching, home exercise program, functional ROM exercises and flexibility  Frequency: 2x month  Duration in visits: 4  Duration in weeks: 8  Plan details: Assess response to HEP, pt to resume previous HEP and bring in questions/ issues.   Advance as needed and issue long-term HEP.         Timed:  Manual Therapy:         mins  75943;  Therapeutic Exercise:    16     mins  02273;     Neuromuscular Yoandy:    14    mins  86261;    Therapeutic Activity:          mins  98942;     Gait Training:           mins  14375;     Ultrasound:          mins  83445;    Iontophoresis         mins 22685  Dry Needling        mins 41262/ 20561 (Self-pay)      Untimed:  Electrical Stimulation:         mins  87769 ( );  Traction:       mins  83165;   Low Eval     15     Mins  38728  Mod Eval          Mins  21256  High Eval                            Mins  84498    Timed Treatment:   30   mins   Total Treatment:     45  mins    PT SIGNATURE: Danielle Molina PT     License Number: [id number]    Electronically signed by Danielle Molina, PT, 06/22/22, 2:23 PM EDT    DATE TREATMENT INITIATED:  7/13/2022    Initial Certification  Certification Period: 10/11/2022  I certify that the therapy services are furnished while this patient is under my care.  The services outlined above are required by this patient, and will be reviewed every 90 days.     PHYSICIAN: Aletha Ochoa PA-C   NPI: [id number]                                         DATE:     Please sign and return via fax to [phone] Thank you, [facility].

## 2022-06-23 ENCOUNTER — TELEPHONE (OUTPATIENT)
Dept: FAMILY MEDICINE CLINIC | Facility: CLINIC | Age: 72
End: 2022-06-23

## 2022-06-29 ENCOUNTER — ANTICOAGULATION VISIT (OUTPATIENT)
Dept: FAMILY MEDICINE CLINIC | Facility: CLINIC | Age: 72
End: 2022-06-29

## 2022-06-29 LAB — INR PPP: 2.7 (ref 2–3)

## 2022-07-06 ENCOUNTER — TREATMENT (OUTPATIENT)
Dept: PHYSICAL THERAPY | Facility: CLINIC | Age: 72
End: 2022-07-06

## 2022-07-06 ENCOUNTER — ANTICOAGULATION VISIT (OUTPATIENT)
Dept: FAMILY MEDICINE CLINIC | Facility: CLINIC | Age: 72
End: 2022-07-06

## 2022-07-06 DIAGNOSIS — M54.2 CERVICAL PAIN: Primary | ICD-10-CM

## 2022-07-06 DIAGNOSIS — M54.41 CHRONIC BILATERAL LOW BACK PAIN WITH BILATERAL SCIATICA: ICD-10-CM

## 2022-07-06 DIAGNOSIS — G89.29 CHRONIC BILATERAL LOW BACK PAIN WITH BILATERAL SCIATICA: ICD-10-CM

## 2022-07-06 DIAGNOSIS — M25.552 LEFT HIP PAIN: ICD-10-CM

## 2022-07-06 DIAGNOSIS — M25.551 RIGHT HIP PAIN: ICD-10-CM

## 2022-07-06 DIAGNOSIS — M54.42 CHRONIC BILATERAL LOW BACK PAIN WITH BILATERAL SCIATICA: ICD-10-CM

## 2022-07-06 LAB — INR PPP: 2.2 (ref 2–3)

## 2022-07-06 PROCEDURE — 97112 NEUROMUSCULAR REEDUCATION: CPT | Performed by: PHYSICAL THERAPIST

## 2022-07-06 PROCEDURE — 97110 THERAPEUTIC EXERCISES: CPT | Performed by: PHYSICAL THERAPIST

## 2022-07-06 NOTE — PROGRESS NOTES
Physical Therapy Daily Treatment Note/ Discharge Summary    Patient: Javy Reeves Sr.   : 1950  Diagnosis/ICD-10 Code:  Bilateral low back pain with sciatica, sciatica laterality unspecified, unspecified chronicity [M54.40]  Referring practitioner: Aletha Ochoa PA-C  Today's Date: 2022  Patient seen for 2 sessions           Subjective Doing well, wearing the brace during the day and at night and no more back pain. I found it does hurt if I do the exercises on my soft bed, better on floor. The stretches have taken a lot of the hip pain away.     Objective   See Exercise, Manual, and Modality Logs for complete treatment.   Verbal and tactile cues for form correction.     Goals  Plan Goals: LTG to be met by 4 weeks  1. Pt will be independent and compliant with HEP. MET  2. Pt will verbalize plan to continue HEP after discharge. MET    Assessment/Plan Pt responding very well to HEP. Advanced today with plan for pt to continue independently and call with any issues.            Timed:    Manual Therapy:         mins  62198;  Therapeutic Exercise:    16     mins  09245;     Neuromuscular Yoandy:    14    mins  33412;    Therapeutic Activity:          mins  36533;     Gait Training:           mins  88971;     Ultrasound:          mins  51268;    Electrical Stimulation:         mins  53744 ( );  Iontophoresis         mins 11805;  Aquatic Therapy         mins 08824;  Dry Needling                   mins 90997/  (Self-pay)    Untimed:  Electrical Stimulation:         mins  57887 ( );  Traction:         mins  69017;     Timed Treatment:   30   mins   Total Treatment:     30   mins    Danielle Molina, PT  Physical Therapist    [place] License:[id number]

## 2022-07-13 ENCOUNTER — ANTICOAGULATION VISIT (OUTPATIENT)
Dept: FAMILY MEDICINE CLINIC | Facility: CLINIC | Age: 72
End: 2022-07-13

## 2022-07-13 LAB — INR PPP: 2 (ref 2–3)

## 2022-07-20 ENCOUNTER — ANTICOAGULATION VISIT (OUTPATIENT)
Dept: FAMILY MEDICINE CLINIC | Facility: CLINIC | Age: 72
End: 2022-07-20

## 2022-07-20 LAB — INR PPP: 2.2 (ref 2–3)

## 2022-07-21 ENCOUNTER — APPOINTMENT (OUTPATIENT)
Dept: SLEEP MEDICINE | Facility: HOSPITAL | Age: 72
End: 2022-07-21

## 2022-07-27 ENCOUNTER — ANTICOAGULATION VISIT (OUTPATIENT)
Dept: FAMILY MEDICINE CLINIC | Facility: CLINIC | Age: 72
End: 2022-07-27

## 2022-07-27 LAB — INR PPP: 2.6 (ref 2–3)

## 2022-07-28 ENCOUNTER — OFFICE VISIT (OUTPATIENT)
Dept: GASTROENTEROLOGY | Facility: CLINIC | Age: 72
End: 2022-07-28

## 2022-07-28 VITALS
SYSTOLIC BLOOD PRESSURE: 152 MMHG | DIASTOLIC BLOOD PRESSURE: 82 MMHG | TEMPERATURE: 97.5 F | BODY MASS INDEX: 27.93 KG/M2 | WEIGHT: 206.2 LBS | HEIGHT: 72 IN

## 2022-07-28 DIAGNOSIS — K21.00 GASTROESOPHAGEAL REFLUX DISEASE WITH ESOPHAGITIS WITHOUT HEMORRHAGE: Primary | ICD-10-CM

## 2022-07-28 DIAGNOSIS — R19.7 DIARRHEA, UNSPECIFIED TYPE: ICD-10-CM

## 2022-07-28 DIAGNOSIS — Z86.010 HISTORY OF COLON POLYPS: ICD-10-CM

## 2022-07-28 PROBLEM — Z86.0100 HISTORY OF COLON POLYPS: Status: ACTIVE | Noted: 2022-07-28

## 2022-07-28 PROCEDURE — 99214 OFFICE O/P EST MOD 30 MIN: CPT | Performed by: NURSE PRACTITIONER

## 2022-07-28 NOTE — PROGRESS NOTES
Chief Complaint   Patient presents with   • Heartburn       Javy Reeves  is a  71 y.o. male here for a follow up visit for GERD.    HPI  71-year-old male presents today for follow-up visit for GERD.  He is a patient of Dr. Faulkner.  He was last seen in the office by me on 4/28/2022.  He has a history of GERD and admits he has been doing so lately that he took himself off his pantoprazole.  He felt like it could have been contributing to his diarrhea.  So far he has been controlling his reflux symptoms with diet alone.  His last colonoscopy was on 9/2018.  Last EGD was on 12/2020.  He had asbestos exposure as a child and has been diagnosed by pulmonology with interstitial lung disease.  He tells me he is trying to get a new patient appointment with the [facility] and is supposed to see them at the end of August.  He tells me he has been doing really well lately and trying to stay as active as possible.  He feels like stretching has really helped him.  He is happy report his diarrhea has completely resolved.  Bowels are moving well daily.  He denies any dysphagia, reflux, abdominal pain, nausea and vomiting, diarrhea, constipation, rectal bleeding or melena.  He admits his appetite is okay and his weight is up 2 pounds since last visit.  He denies any significant GI family history at this time.  He does have history of cholecystectomy.  Past Medical History:   Diagnosis Date   • Acute and subacute infective endocarditis in diseases classified elsewhere    • Allergic    • Arthritis    • Asthma 04/26/2021   • Chest pain    • Chronic low back pain    • Chronic pain    • Colon polyps    • Coronary artery disease    • DDD (degenerative disc disease), cervical    • DDD (degenerative disc disease), lumbosacral    • Diverticulosis    • DVT (deep venous thrombosis) ([facility]) 1996    Left Lower extremity following arthroscopic knee surgery in 1996. IVC fliter placed at that time.   • Encounter for special screening  examination for neoplasm of prostate 2012   • Eye exam, routine > 5 yrs ago   • Eye exam, routine 01/2015   • Fibromyalgia    • Fibromyositis    • GERD (gastroesophageal reflux disease)    • HIV disease (HCC)    • Hyperlipidemia    • Injury of back    • Injury of neck    • Irritable bowel    • Lumbar stenosis    • MI (myocardial infarction) (HCC)    • Neck pain    • Pain in limb    • Past heart attack    • Postlaminectomy syndrome of lumbar region    • Pulmonary embolism (HCC) 1996    Left Lower extremity following arthroscopic knee surgery in 1996. IVC fliter placed at that time.   • Reflux esophagitis    • Shortness of breath    • Sleep apnea     NO CPAP   • Stroke (HCC)    • TIA (transient ischemic attack)        Past Surgical History:   Procedure Laterality Date   • ANTERIOR CERVICAL DISCECTOMY W/ FUSION N/A 10/5/2018    Procedure: CERVICAL DISCECTOMY ANTERIOR FUSION WITH INSTRUMENTATION,ACDF C5/6, C6/7 with cages and plate;  Surgeon: Jean Coles MD;  Location: [facility] MAIN OR;  Service: Neurosurgery   • APPENDECTOMY N/A    • BRONCHOSCOPY N/A 11/3/2020    Procedure: EBUS BRONCHOSCOPY WITH BAL & FLUOROSCOPY WITH MAC ANESTHESIA, BX & TBNA;  Surgeon: Jamil Willoughby MD;  Location: [facility] ENDOSCOPY;  Service: Pulmonary;  Laterality: N/A;  PRE/POST-ABNORMAL CT, LAD   • CARDIAC CATHETERIZATION Left 1950    Left Heart Cath Left Ventriculography, selective coronary angiography; iliofemoral angiography; angio seal closure, Dr. Brady Ramos   • COLONOSCOPY  09/2015    removed 2 polyps, Dr. Manley   • COLONOSCOPY N/A 02/12/2007    Left colonic diverticulosis, No evidence of polypoid neoplasia, Dr. Jarret Bloom   • COLONOSCOPY N/A 9/13/2018    Procedure: COLONOSCOPY TO CECUM WITH COLD BX'S POLYPECTOMY;  Surgeon: Berta Sin MD;  Location: [facility] ENDOSCOPY;  Service: General   • CYSTOSCOPY TRANSURETHRAL RESECTION OF PROSTATE N/A 11/01/2004    Dr. Rodolfo Arnold   • HAND SURGERY Right    • KNEE  ARTHROPLASTY Left    • LUMBAR DISC SURGERY  2006, 2007    L4-S1 pseudoarthroses - Dr Cervantes   • LUMBAR DISC SURGERY N/A 01/29/2010    Removal of Instrumentation w/ decompression, Instrumentaion loosening & pt tested positive for zinc allergy, Dr. Kvng Cervantes   • ROTATOR CUFF REPAIR Right 04/2012   • THORACIC OUTLET SURGERY Bilateral    • VENA CAVA FILTER PLACEMENT  1996   • WRIST SURGERY Right     x3 starting in 1984       Scheduled Meds:    Continuous Infusions:No current facility-administered medications for this visit.      PRN Meds:.    Allergies   Allergen Reactions   • Zinc Other (See Comments) and Diarrhea     Patient tested positive, had to have hardware removed from back.   • Chantix [Varenicline] Other (See Comments)     Headache       Social History     Socioeconomic History   • Marital status:    Tobacco Use   • Smoking status: Current Every Day Smoker     Packs/day: 0.50     Years: 30.00     Pack years: 15.00     Types: Cigarettes   • Smokeless tobacco: Never Used   • Tobacco comment: caffeine use, smokes about 5 cigars/day   Vaping Use   • Vaping Use: Never used   Substance and Sexual Activity   • Alcohol use: Yes     Comment: social   • Drug use: No   • Sexual activity: Defer       Family History   Problem Relation Age of Onset   • Diabetes Sister    • Cancer Sister    • Hypertension Sister    • Kidney disease Sister    • Stroke Sister    • Heart disease Brother    • Hypertension Brother    • Cerebral aneurysm Brother    • Sudden death Brother    • Bleeding Disorder Brother    • Cancer Mother    • Heart disease Father    • Cancer Father         malignant neoplasm   • Deep vein thrombosis Father    • Heart attack Father    • Malig Hyperthermia Neg Hx        Review of Systems   Constitutional: Negative for appetite change, chills, diaphoresis, fatigue, fever and unexpected weight change.   HENT: Negative for nosebleeds, postnasal drip, sore throat, trouble swallowing and voice change.     Respiratory: Negative for cough, choking, chest tightness, shortness of breath, wheezing and stridor.    Cardiovascular: Negative for chest pain, palpitations and leg swelling.   Gastrointestinal: Negative for abdominal distention, abdominal pain, anal bleeding, blood in stool, constipation, diarrhea, nausea, rectal pain and vomiting.   Endocrine: Negative for polydipsia, polyphagia and polyuria.   Musculoskeletal: Negative for gait problem.   Skin: Negative for rash and wound.   Allergic/Immunologic: Negative for food allergies.   Neurological: Negative for dizziness, speech difficulty and light-headedness.   Psychiatric/Behavioral: Negative for confusion, self-injury, sleep disturbance and suicidal ideas.       Vitals:    07/28/22 1510   BP: 152/82   Temp: 97.5 °F (36.4 °C)       Physical Exam  Constitutional:       General: He is not in acute distress.     Appearance: He is well-developed. He is not ill-appearing.   HENT:      Head: Normocephalic.   Eyes:      Pupils: Pupils are equal, round, and reactive to light.   Cardiovascular:      Rate and Rhythm: Normal rate and regular rhythm.      Heart sounds: Normal heart sounds.   Pulmonary:      Effort: Pulmonary effort is normal.      Breath sounds: Normal breath sounds.   Abdominal:      General: Bowel sounds are normal. There is no distension.      Palpations: Abdomen is soft. There is no mass.      Tenderness: There is no abdominal tenderness. There is no guarding or rebound.      Hernia: No hernia is present.   Musculoskeletal:         General: Normal range of motion.   Skin:     General: Skin is warm and dry.   Neurological:      Mental Status: He is alert and oriented to person, place, and time.   Psychiatric:         Speech: Speech normal.         Behavior: Behavior normal.         Judgment: Judgment normal.         No radiology results for the last 7 days     Diagnoses and all orders for this visit:    1. Gastroesophageal reflux disease with esophagitis  without hemorrhage (Primary)    2. Diarrhea, unspecified type  Overview:  Added automatically from request for surgery [id number]      3. History of colon polyps     GERD seems well controlled on diet alone currently.  No longer on pantoprazole.  Diarrhea has resolved since last visit.  Bowels moving well currently.  Overall from a GI standpoint he seems to be doing quite well.  Patient to go ahead and keep new patient appointment with [facility] as planned at the end of August.  Screening colonoscopy will be due next year.  Patient to call the office with any issues.  Patient to follow-up with me in 6 months.  Patient is agreeable to the plan.

## 2022-08-03 ENCOUNTER — ANTICOAGULATION VISIT (OUTPATIENT)
Dept: FAMILY MEDICINE CLINIC | Facility: CLINIC | Age: 72
End: 2022-08-03

## 2022-08-03 LAB — INR PPP: 2.6 (ref 2–3)

## 2022-08-10 ENCOUNTER — ANTICOAGULATION VISIT (OUTPATIENT)
Dept: FAMILY MEDICINE CLINIC | Facility: CLINIC | Age: 72
End: 2022-08-10

## 2022-08-10 LAB — INR PPP: 2.3 (ref 2–3)

## 2022-08-22 ENCOUNTER — APPOINTMENT (OUTPATIENT)
Dept: CT IMAGING | Facility: HOSPITAL | Age: 72
End: 2022-08-22

## 2022-08-22 ENCOUNTER — HOSPITAL ENCOUNTER (OUTPATIENT)
Facility: HOSPITAL | Age: 72
Setting detail: OBSERVATION
LOS: 2 days | Discharge: HOME | End: 2022-08-25
Attending: EMERGENCY MEDICINE | Admitting: INTERNAL MEDICINE

## 2022-08-22 ENCOUNTER — APPOINTMENT (OUTPATIENT)
Dept: GENERAL RADIOLOGY | Facility: HOSPITAL | Age: 72
End: 2022-08-22

## 2022-08-22 DIAGNOSIS — R07.9 ACUTE CHEST PAIN: Primary | ICD-10-CM

## 2022-08-22 DIAGNOSIS — I26.93 SINGLE SUBSEGMENTAL PULMONARY EMBOLISM WITHOUT ACUTE COR PULMONALE: ICD-10-CM

## 2022-08-22 DIAGNOSIS — Z79.01 ANTICOAGULATED ON COUMADIN: ICD-10-CM

## 2022-08-22 LAB
ALBUMIN SERPL-MCNC: 4.4 G/DL (ref 3.5–5.2)
ALBUMIN/GLOB SERPL: 1.5 G/DL
ALP SERPL-CCNC: 60 U/L (ref 39–117)
ALT SERPL W P-5'-P-CCNC: 20 U/L (ref 1–41)
ANION GAP SERPL CALCULATED.3IONS-SCNC: 10 MMOL/L (ref 5–15)
APTT PPP: 38.9 SECONDS (ref 22.7–35.4)
AST SERPL-CCNC: 26 U/L (ref 1–40)
BASOPHILS # BLD AUTO: 0.01 10*3/MM3 (ref 0–0.2)
BASOPHILS NFR BLD AUTO: 0.2 % (ref 0–1.5)
BILIRUB SERPL-MCNC: 0.8 MG/DL (ref 0–1.2)
BUN SERPL-MCNC: 15 MG/DL (ref 8–23)
BUN/CREAT SERPL: 16.1 (ref 7–25)
CALCIUM SPEC-SCNC: 8.9 MG/DL (ref 8.6–10.5)
CHLORIDE SERPL-SCNC: 106 MMOL/L (ref 98–107)
CO2 SERPL-SCNC: 25 MMOL/L (ref 22–29)
CREAT SERPL-MCNC: 0.93 MG/DL (ref 0.76–1.27)
DEPRECATED RDW RBC AUTO: 50.4 FL (ref 37–54)
EGFRCR SERPLBLD CKD-EPI 2021: 88.9 ML/MIN/1.73
EOSINOPHIL # BLD AUTO: 0.03 10*3/MM3 (ref 0–0.4)
EOSINOPHIL NFR BLD AUTO: 0.7 % (ref 0.3–6.2)
ERYTHROCYTE [DISTWIDTH] IN BLOOD BY AUTOMATED COUNT: 14.4 % (ref 12.3–15.4)
GLOBULIN UR ELPH-MCNC: 3 GM/DL
GLUCOSE SERPL-MCNC: 91 MG/DL (ref 65–99)
HCT VFR BLD AUTO: 41.8 % (ref 37.5–51)
HGB BLD-MCNC: 14 G/DL (ref 13–17.7)
IMM GRANULOCYTES # BLD AUTO: 0.01 10*3/MM3 (ref 0–0.05)
IMM GRANULOCYTES NFR BLD AUTO: 0.2 % (ref 0–0.5)
INR PPP: 1.9 (ref 0.9–1.1)
LYMPHOCYTES # BLD AUTO: 1.64 10*3/MM3 (ref 0.7–3.1)
LYMPHOCYTES NFR BLD AUTO: 38.7 % (ref 19.6–45.3)
MCH RBC QN AUTO: 31.9 PG (ref 26.6–33)
MCHC RBC AUTO-ENTMCNC: 33.5 G/DL (ref 31.5–35.7)
MCV RBC AUTO: 95.2 FL (ref 79–97)
MONOCYTES # BLD AUTO: 0.34 10*3/MM3 (ref 0.1–0.9)
MONOCYTES NFR BLD AUTO: 8 % (ref 5–12)
NEUTROPHILS NFR BLD AUTO: 2.21 10*3/MM3 (ref 1.7–7)
NEUTROPHILS NFR BLD AUTO: 52.2 % (ref 42.7–76)
NRBC BLD AUTO-RTO: 0 /100 WBC (ref 0–0.2)
NT-PROBNP SERPL-MCNC: 59.4 PG/ML (ref 0–900)
PLATELET # BLD AUTO: 166 10*3/MM3 (ref 140–450)
PMV BLD AUTO: 9.3 FL (ref 6–12)
POTASSIUM SERPL-SCNC: 3.8 MMOL/L (ref 3.5–5.2)
PROT SERPL-MCNC: 7.4 G/DL (ref 6–8.5)
PROTHROMBIN TIME: 21.3 SECONDS (ref 11.7–14.2)
QT INTERVAL: 350 MS
RBC # BLD AUTO: 4.39 10*6/MM3 (ref 4.14–5.8)
SARS-COV-2 RNA RESP QL NAA+PROBE: NOT DETECTED
SODIUM SERPL-SCNC: 141 MMOL/L (ref 136–145)
TROPONIN T SERPL-MCNC: <0.01 NG/ML (ref 0–0.03)
TROPONIN T SERPL-MCNC: <0.01 NG/ML (ref 0–0.03)
WBC NRBC COR # BLD: 4.24 10*3/MM3 (ref 3.4–10.8)

## 2022-08-22 PROCEDURE — 80053 COMPREHEN METABOLIC PANEL: CPT | Performed by: EMERGENCY MEDICINE

## 2022-08-22 PROCEDURE — 93010 ELECTROCARDIOGRAM REPORT: CPT | Performed by: INTERNAL MEDICINE

## 2022-08-22 PROCEDURE — G0378 HOSPITAL OBSERVATION PER HR: HCPCS

## 2022-08-22 PROCEDURE — 0 IOPAMIDOL PER 1 ML: Performed by: EMERGENCY MEDICINE

## 2022-08-22 PROCEDURE — 85730 THROMBOPLASTIN TIME PARTIAL: CPT | Performed by: EMERGENCY MEDICINE

## 2022-08-22 PROCEDURE — 71045 X-RAY EXAM CHEST 1 VIEW: CPT

## 2022-08-22 PROCEDURE — 25010000002 HEPARIN (PORCINE) 25000-0.45 UT/250ML-% SOLUTION: Performed by: EMERGENCY MEDICINE

## 2022-08-22 PROCEDURE — 96365 THER/PROPH/DIAG IV INF INIT: CPT

## 2022-08-22 PROCEDURE — 36415 COLL VENOUS BLD VENIPUNCTURE: CPT | Performed by: INTERNAL MEDICINE

## 2022-08-22 PROCEDURE — U0003 INFECTIOUS AGENT DETECTION BY NUCLEIC ACID (DNA OR RNA); SEVERE ACUTE RESPIRATORY SYNDROME CORONAVIRUS 2 (SARS-COV-2) (CORONAVIRUS DISEASE [COVID-19]), AMPLIFIED PROBE TECHNIQUE, MAKING USE OF HIGH THROUGHPUT TECHNOLOGIES AS DESCRIBED BY CMS-2020-01-R: HCPCS | Performed by: EMERGENCY MEDICINE

## 2022-08-22 PROCEDURE — 96375 TX/PRO/DX INJ NEW DRUG ADDON: CPT

## 2022-08-22 PROCEDURE — 96366 THER/PROPH/DIAG IV INF ADDON: CPT

## 2022-08-22 PROCEDURE — 96376 TX/PRO/DX INJ SAME DRUG ADON: CPT

## 2022-08-22 PROCEDURE — 25010000002 HEPARIN (PORCINE) PER 1000 UNITS: Performed by: EMERGENCY MEDICINE

## 2022-08-22 PROCEDURE — 85610 PROTHROMBIN TIME: CPT | Performed by: EMERGENCY MEDICINE

## 2022-08-22 PROCEDURE — 84484 ASSAY OF TROPONIN QUANT: CPT | Performed by: EMERGENCY MEDICINE

## 2022-08-22 PROCEDURE — 83880 ASSAY OF NATRIURETIC PEPTIDE: CPT | Performed by: EMERGENCY MEDICINE

## 2022-08-22 PROCEDURE — 99285 EMERGENCY DEPT VISIT HI MDM: CPT

## 2022-08-22 PROCEDURE — 85025 COMPLETE CBC W/AUTO DIFF WBC: CPT | Performed by: EMERGENCY MEDICINE

## 2022-08-22 PROCEDURE — 93005 ELECTROCARDIOGRAM TRACING: CPT

## 2022-08-22 PROCEDURE — 25010000002 MORPHINE PER 10 MG: Performed by: EMERGENCY MEDICINE

## 2022-08-22 PROCEDURE — 85730 THROMBOPLASTIN TIME PARTIAL: CPT | Performed by: INTERNAL MEDICINE

## 2022-08-22 PROCEDURE — 71275 CT ANGIOGRAPHY CHEST: CPT

## 2022-08-22 PROCEDURE — 93005 ELECTROCARDIOGRAM TRACING: CPT | Performed by: EMERGENCY MEDICINE

## 2022-08-22 PROCEDURE — C9803 HOPD COVID-19 SPEC COLLECT: HCPCS

## 2022-08-22 RX ORDER — ONDANSETRON 4 MG/1
4 TABLET, FILM COATED ORAL EVERY 6 HOURS PRN
Status: DISCONTINUED | OUTPATIENT
Start: 2022-08-22 | End: 2022-08-25 | Stop reason: HOSPADM

## 2022-08-22 RX ORDER — NITROGLYCERIN 0.4 MG/1
0.4 TABLET SUBLINGUAL
Status: DISCONTINUED | OUTPATIENT
Start: 2022-08-22 | End: 2022-08-22

## 2022-08-22 RX ORDER — UREA 10 %
3 LOTION (ML) TOPICAL NIGHTLY PRN
Status: DISCONTINUED | OUTPATIENT
Start: 2022-08-22 | End: 2022-08-25 | Stop reason: HOSPADM

## 2022-08-22 RX ORDER — ALBUTEROL SULFATE 2.5 MG/3ML
2.5 SOLUTION RESPIRATORY (INHALATION) EVERY 6 HOURS PRN
Refills: 3 | Status: DISCONTINUED | OUTPATIENT
Start: 2022-08-22 | End: 2022-08-25 | Stop reason: HOSPADM

## 2022-08-22 RX ORDER — ONDANSETRON 2 MG/ML
4 INJECTION INTRAMUSCULAR; INTRAVENOUS EVERY 6 HOURS PRN
Status: DISCONTINUED | OUTPATIENT
Start: 2022-08-22 | End: 2022-08-25 | Stop reason: HOSPADM

## 2022-08-22 RX ORDER — PREGABALIN 75 MG/1
75 CAPSULE ORAL EVERY 12 HOURS SCHEDULED
Status: DISCONTINUED | OUTPATIENT
Start: 2022-08-22 | End: 2022-08-25 | Stop reason: HOSPADM

## 2022-08-22 RX ORDER — FOLIC ACID 1 MG/1
1 TABLET ORAL DAILY
Status: DISCONTINUED | OUTPATIENT
Start: 2022-08-23 | End: 2022-08-25 | Stop reason: HOSPADM

## 2022-08-22 RX ORDER — MULTIPLE VITAMINS W/ MINERALS TAB 9MG-400MCG
1 TAB ORAL DAILY
Status: DISCONTINUED | OUTPATIENT
Start: 2022-08-23 | End: 2022-08-25 | Stop reason: HOSPADM

## 2022-08-22 RX ORDER — MORPHINE SULFATE 2 MG/ML
2 INJECTION, SOLUTION INTRAMUSCULAR; INTRAVENOUS ONCE
Status: COMPLETED | OUTPATIENT
Start: 2022-08-22 | End: 2022-08-22

## 2022-08-22 RX ORDER — NITROGLYCERIN 0.4 MG/1
0.4 TABLET SUBLINGUAL
Status: DISCONTINUED | OUTPATIENT
Start: 2022-08-22 | End: 2022-08-25 | Stop reason: HOSPADM

## 2022-08-22 RX ORDER — HEPARIN SODIUM 10000 [USP'U]/100ML
16.5 INJECTION, SOLUTION INTRAVENOUS
Status: DISCONTINUED | OUTPATIENT
Start: 2022-08-22 | End: 2022-08-25

## 2022-08-22 RX ORDER — HEPARIN SODIUM 5000 [USP'U]/ML
80 INJECTION, SOLUTION INTRAVENOUS; SUBCUTANEOUS ONCE
Status: COMPLETED | OUTPATIENT
Start: 2022-08-22 | End: 2022-08-22

## 2022-08-22 RX ORDER — ACETAMINOPHEN 325 MG/1
650 TABLET ORAL EVERY 4 HOURS PRN
Status: DISCONTINUED | OUTPATIENT
Start: 2022-08-22 | End: 2022-08-25 | Stop reason: HOSPADM

## 2022-08-22 RX ORDER — ATORVASTATIN CALCIUM 20 MG/1
40 TABLET, FILM COATED ORAL DAILY
Status: DISCONTINUED | OUTPATIENT
Start: 2022-08-23 | End: 2022-08-25 | Stop reason: HOSPADM

## 2022-08-22 RX ORDER — HEPARIN SODIUM 5000 [USP'U]/ML
40-80 INJECTION, SOLUTION INTRAVENOUS; SUBCUTANEOUS EVERY 6 HOURS PRN
Status: DISCONTINUED | OUTPATIENT
Start: 2022-08-22 | End: 2022-08-25

## 2022-08-22 RX ORDER — HYDRALAZINE HYDROCHLORIDE 20 MG/ML
10 INJECTION INTRAMUSCULAR; INTRAVENOUS EVERY 6 HOURS PRN
Status: DISCONTINUED | OUTPATIENT
Start: 2022-08-22 | End: 2022-08-25 | Stop reason: HOSPADM

## 2022-08-22 RX ORDER — HYDROCODONE BITARTRATE AND ACETAMINOPHEN 5; 325 MG/1; MG/1
1 TABLET ORAL EVERY 6 HOURS PRN
Status: DISCONTINUED | OUTPATIENT
Start: 2022-08-22 | End: 2022-08-25 | Stop reason: HOSPADM

## 2022-08-22 RX ORDER — SODIUM CHLORIDE 0.9 % (FLUSH) 0.9 %
10 SYRINGE (ML) INJECTION AS NEEDED
Status: DISCONTINUED | OUTPATIENT
Start: 2022-08-22 | End: 2022-08-25 | Stop reason: HOSPADM

## 2022-08-22 RX ADMIN — HEPARIN SODIUM 7300 UNITS: 5000 INJECTION INTRAVENOUS; SUBCUTANEOUS at 15:23

## 2022-08-22 RX ADMIN — MORPHINE SULFATE 2 MG: 2 INJECTION, SOLUTION INTRAMUSCULAR; INTRAVENOUS at 13:03

## 2022-08-22 RX ADMIN — HYDROCODONE BITARTRATE AND ACETAMINOPHEN 1 TABLET: 5; 325 TABLET ORAL at 21:07

## 2022-08-22 RX ADMIN — HEPARIN SODIUM 16.5 UNITS/KG/HR: 10000 INJECTION, SOLUTION INTRAVENOUS at 15:27

## 2022-08-22 RX ADMIN — PREGABALIN 75 MG: 75 CAPSULE ORAL at 21:07

## 2022-08-22 RX ADMIN — IOPAMIDOL 95 ML: 755 INJECTION, SOLUTION INTRAVENOUS at 14:00

## 2022-08-22 RX ADMIN — NITROGLYCERIN 0.4 MG: 0.4 TABLET SUBLINGUAL at 13:35

## 2022-08-22 RX ADMIN — NITROGLYCERIN 0.4 MG: 0.4 TABLET SUBLINGUAL at 13:48

## 2022-08-22 NOTE — ED NOTES
"Pt ambulatory to triage with c/o chest pain since 1115 today that radiates into left arm. Pt reports taking 2 nitro without relief. Hx of MI- pt says, \"this feels the same\"     Pt given mask in triage, staff in PPE.    "

## 2022-08-22 NOTE — ED PROVIDER NOTES
EMERGENCY DEPARTMENT ENCOUNTER    Room Number:  29/29  Date of encounter:  8/22/2022  PCP: Alteha Ochoa PA-C  Patient Care Team:  Aletha Ochoa PA-C as PCP - General (Family Medicine)  Jean Coles MD as Surgeon (Neurosurgery)  Erlin West MD as Surgeon (General Surgery)  Yaneth Vasquez Jr., MD as Consulting Physician (Vascular Surgery)  Artemio Dalton MD as Consulting Physician (Cardiology)  Jamie Manley MD as Consulting Physician (Gastroenterology)  Rodolfo Eugene III, MD as Surgeon (Thoracic Surgery)   Historian: Patient    HPI:  Chief Complaint: Chest pain  A complete HPI/ROS/PMH/PSH/SH/FH are unobtainable due to: Nothing    Context: Javy Reeves Sr. is a 71 y.o. male who presents to the ED c/o left-sided chest pain that started abruptly at 1030 this morning.  He reports that it feels like his prior heart attacks.  He states that it was associated with diaphoresis.  It did not have shortness of breath or nausea with it.  He reports a history of PEs in the past.  He has a IVC filter in place.  He states he is on Coumadin.  He reports that he gets his INRs checked on Wednesdays and his INR was within range last Wednesday.  He denies any new calf pain or swelling.  He reports he had a cardiac work-up about 2 years ago with Dr. Dalton who told him that he had a small blockage but nothing to do about it currently.  He took nitros this morning without improvement of his pain.  Nothing seems to make it worse or better.  It is not worse with deep breathing.  He states he is not allowed to have aspirin.    Prior record review: Cardiology note 12/1/2020 with a stress test that showed no evidence of ischemia with an EF of 70%.    PAST MEDICAL HISTORY  Active Ambulatory Problems     Diagnosis Date Noted   • Arthritis 11/16/2015   • Family history of early CAD 11/16/2015   • DDD (degenerative disc disease), cervical 11/16/2015   • DVT (deep venous thrombosis) ([facility]) 11/16/2015   •  Fibromyalgia 11/16/2015   • Past heart attack 11/16/2015   • Hyperlipidemia 11/16/2015   • DDD (degenerative disc disease), lumbosacral 11/16/2015   • History of pulmonary embolus (PE) 11/16/2015   • Reflux esophagitis 11/16/2015   • TIA (transient ischemic attack) 11/16/2015   • Left groin pain 02/21/2017   • Cervical radicular pain 02/12/2018   • Cervical disc disorder at C6-C7 level with radiculopathy 05/16/2018   • Wheezing 07/20/2018   • Colon polyps 07/27/2018   • Right lower quadrant abdominal pain 07/27/2018   • Diarrhea 07/27/2018   • Cervical spinal stenosis 08/20/2018   • Cervical disc disorder at C5-C6 level with radiculopathy 08/20/2018   • GERD (gastroesophageal reflux disease) 09/07/2018   • Diverticulosis 09/13/2018   • Old MI (myocardial infarction) 09/20/2018   • Herniated cervical disc 10/05/2018   • Encounter for therapeutic drug monitoring 10/10/2018   • Long term current use of anticoagulant therapy 10/10/2018   • ERRONEOUS ENCOUNTER--DISREGARD 03/27/2019   • Stable angina (HCC) 04/26/2019   • Low folic acid 09/22/2020   • Low serum vitamin B12 09/22/2020   • Precordial chest pain 12/01/2020   • Tobacco abuse 12/01/2020   • Tobacco abuse counseling 12/01/2020   • Interstitial lung disease (HCC) 12/01/2020   • History of transient ischemic attack (TIA) 02/22/2021   • Irritable bowel syndrome 04/26/2021   • Degeneration of intervertebral disc of lumbosacral region 04/26/2021   • Weight loss 09/08/2021   • Choking sensation 09/08/2021   • Failed back syndrome 11/03/2021   • Long term (current) use of opiate analgesic 11/03/2021   • Lumbar radiculopathy 11/03/2021   • Lumbar spondylosis 11/03/2021   • Chronic pain disorder 11/03/2021   • History of colon polyps 07/28/2022     Resolved Ambulatory Problems     Diagnosis Date Noted   • Hypertension 11/16/2015   • Mild atherosclerosis of right carotid artery 03/27/2018     Past Medical History:   Diagnosis Date   • Acute and subacute infective  endocarditis in diseases classified elsewhere    • Allergic    • Asthma 04/26/2021   • Chest pain    • Chronic low back pain    • Chronic pain    • Coronary artery disease    • Encounter for special screening examination for neoplasm of prostate 2012   • Eye exam, routine > 5 yrs ago   • Eye exam, routine 01/2015   • Fibromyositis    • HIV disease (HCC)    • Injury of back    • Injury of neck    • Irritable bowel    • Lumbar stenosis    • MI (myocardial infarction) (HCC)    • Neck pain    • Pain in limb    • Postlaminectomy syndrome of lumbar region    • Pulmonary embolism (HCC) 1996   • Shortness of breath    • Sleep apnea    • Stroke (HCC)        The patient has a COVID HM Topic on their chart, and they are fully vaccinated.    PAST SURGICAL HISTORY  Past Surgical History:   Procedure Laterality Date   • ANTERIOR CERVICAL DISCECTOMY W/ FUSION N/A 10/5/2018    Procedure: CERVICAL DISCECTOMY ANTERIOR FUSION WITH INSTRUMENTATION,ACDF C5/6, C6/7 with cages and plate;  Surgeon: Jaen Coles MD;  Location: [facility] MAIN OR;  Service: Neurosurgery   • APPENDECTOMY N/A    • BRONCHOSCOPY N/A 11/3/2020    Procedure: EBUS BRONCHOSCOPY WITH BAL & FLUOROSCOPY WITH MAC ANESTHESIA, BX & TBNA;  Surgeon: Jamil Willoughby MD;  Location: [facility] ENDOSCOPY;  Service: Pulmonary;  Laterality: N/A;  PRE/POST-ABNORMAL CT, LAD   • CARDIAC CATHETERIZATION Left 1952    Left Heart Cath Left Ventriculography, selective coronary angiography; iliofemoral angiography; angio seal closure, Dr. Brady Ramos   • COLONOSCOPY  09/2015    removed 2 polyps, Dr. Manley   • COLONOSCOPY N/A 02/12/2007    Left colonic diverticulosis, No evidence of polypoid neoplasia, Dr. Jarret Bloom   • COLONOSCOPY N/A 9/13/2018    Procedure: COLONOSCOPY TO CECUM WITH COLD BX'S POLYPECTOMY;  Surgeon: Berta Sin MD;  Location: [facility] ENDOSCOPY;  Service: General   • CYSTOSCOPY TRANSURETHRAL RESECTION OF PROSTATE N/A 11/01/2004    Dr. Rodolfo Arnold   •  HAND SURGERY Right    • KNEE ARTHROPLASTY Left    • LUMBAR DISC SURGERY  2006, 2007    L4-S1 pseudoarthroses - Dr Cervantes   • LUMBAR DISC SURGERY N/A 01/29/2010    Removal of Instrumentation w/ decompression, Instrumentaion loosening & pt tested positive for zinc allergy, Dr. Kvng Cervantes   • ROTATOR CUFF REPAIR Right 04/2012   • THORACIC OUTLET SURGERY Bilateral    • VENA CAVA FILTER PLACEMENT  1996   • WRIST SURGERY Right     x3 starting in 1984         FAMILY HISTORY  Family History   Problem Relation Age of Onset   • Diabetes Sister    • Cancer Sister    • Hypertension Sister    • Kidney disease Sister    • Stroke Sister    • Heart disease Brother    • Hypertension Brother    • Cerebral aneurysm Brother    • Sudden death Brother    • Bleeding Disorder Brother    • Cancer Mother    • Heart disease Father    • Cancer Father         malignant neoplasm   • Deep vein thrombosis Father    • Heart attack Father    • Malig Hyperthermia Neg Hx          SOCIAL HISTORY  Social History     Socioeconomic History   • Marital status:    Tobacco Use   • Smoking status: Current Every Day Smoker     Packs/day: 0.50     Years: 30.00     Pack years: 15.00     Types: Cigarettes   • Smokeless tobacco: Never Used   • Tobacco comment: caffeine use, smokes about 5 cigars/day   Vaping Use   • Vaping Use: Never used   Substance and Sexual Activity   • Alcohol use: Yes     Comment: social   • Drug use: No   • Sexual activity: Defer         ALLERGIES  Zinc and Chantix [varenicline]        REVIEW OF SYSTEMS  Review of Systems   Positive chest pain, positive diaphoresis, no nausea, no vomiting, no fever, no chills, no abdominal pain  All systems reviewed and negative except for those discussed in HPI.       PHYSICAL EXAM    I have reviewed the triage vital signs and nursing notes.    ED Triage Vitals   Temp Heart Rate Resp BP SpO2   08/22/22 1149 08/22/22 1148 08/22/22 1148 -- 08/22/22 1148   98.7 °F (37.1 °C) 92 22  97 %      Temp src  Heart Rate Source Patient Position BP Location FiO2 (%)   -- -- -- -- --              Physical Exam  GENERAL: Awake, alert, no acute distress  SKIN: Warm, dry  HENT: Normocephalic, atraumatic  EYES: no scleral icterus  CV: regular rhythm, regular rate  RESPIRATORY: normal effort, lungs clear  ABDOMEN: soft, nontender, nondistended  MUSCULOSKELETAL: no deformity, no calf tenderness or swelling  NEURO: alert, moves all extremities, follows commands          LAB RESULTS  Recent Results (from the past 24 hour(s))   ECG 12 Lead    Collection Time: 08/22/22 11:56 AM   Result Value Ref Range    QT Interval 350 ms   Comprehensive Metabolic Panel    Collection Time: 08/22/22 12:37 PM    Specimen: Blood   Result Value Ref Range    Glucose 91 65 - 99 mg/dL    BUN 15 8 - 23 mg/dL    Creatinine 0.93 0.76 - 1.27 mg/dL    Sodium 141 136 - 145 mmol/L    Potassium 3.8 3.5 - 5.2 mmol/L    Chloride 106 98 - 107 mmol/L    CO2 25.0 22.0 - 29.0 mmol/L    Calcium 8.9 8.6 - 10.5 mg/dL    Total Protein 7.4 6.0 - 8.5 g/dL    Albumin 4.40 3.50 - 5.20 g/dL    ALT (SGPT) 20 1 - 41 U/L    AST (SGOT) 26 1 - 40 U/L    Alkaline Phosphatase 60 39 - 117 U/L    Total Bilirubin 0.8 0.0 - 1.2 mg/dL    Globulin 3.0 gm/dL    A/G Ratio 1.5 g/dL    BUN/Creatinine Ratio 16.1 7.0 - 25.0    Anion Gap 10.0 5.0 - 15.0 mmol/L    eGFR 88.9 >60.0 mL/min/1.73   Troponin    Collection Time: 08/22/22 12:37 PM    Specimen: Blood   Result Value Ref Range    Troponin T <0.010 0.000 - 0.030 ng/mL   BNP    Collection Time: 08/22/22 12:37 PM    Specimen: Blood   Result Value Ref Range    proBNP 59.4 0.0 - 900.0 pg/mL   Protime-INR    Collection Time: 08/22/22 12:37 PM    Specimen: Blood   Result Value Ref Range    Protime 21.3 (H) 11.7 - 14.2 Seconds    INR 1.90 (H) 0.90 - 1.10   CBC Auto Differential    Collection Time: 08/22/22 12:37 PM    Specimen: Blood   Result Value Ref Range    WBC 4.24 3.40 - 10.80 10*3/mm3    RBC 4.39 4.14 - 5.80 10*6/mm3    Hemoglobin 14.0 13.0 -  17.7 g/dL    Hematocrit 41.8 37.5 - 51.0 %    MCV 95.2 79.0 - 97.0 fL    MCH 31.9 26.6 - 33.0 pg    MCHC 33.5 31.5 - 35.7 g/dL    RDW 14.4 12.3 - 15.4 %    RDW-SD 50.4 37.0 - 54.0 fl    MPV 9.3 6.0 - 12.0 fL    Platelets 166 140 - 450 10*3/mm3    Neutrophil % 52.2 42.7 - 76.0 %    Lymphocyte % 38.7 19.6 - 45.3 %    Monocyte % 8.0 5.0 - 12.0 %    Eosinophil % 0.7 0.3 - 6.2 %    Basophil % 0.2 0.0 - 1.5 %    Immature Grans % 0.2 0.0 - 0.5 %    Neutrophils, Absolute 2.21 1.70 - 7.00 10*3/mm3    Lymphocytes, Absolute 1.64 0.70 - 3.10 10*3/mm3    Monocytes, Absolute 0.34 0.10 - 0.90 10*3/mm3    Eosinophils, Absolute 0.03 0.00 - 0.40 10*3/mm3    Basophils, Absolute 0.01 0.00 - 0.20 10*3/mm3    Immature Grans, Absolute 0.01 0.00 - 0.05 10*3/mm3    nRBC 0.0 0.0 - 0.2 /100 WBC   COVID-19, [facility] IN-HOUSE CEPHEID/ANA NP SWAB IN TRANSPORT MEDIA 8-12 HR TAT - Swab, Nasopharynx    Collection Time: 08/22/22  1:06 PM    Specimen: Nasopharynx; Swab   Result Value Ref Range    COVID19 Not Detected Not Detected - Ref. Range   Troponin    Collection Time: 08/22/22  2:15 PM    Specimen: Blood   Result Value Ref Range    Troponin T <0.010 0.000 - 0.030 ng/mL       Ordered the above labs and independently reviewed the results.        RADIOLOGY  XR Chest 1 View    Result Date: 8/22/2022  XR CHEST 1 VW-  HISTORY: Male who is 71 years-old,  chest pain  TECHNIQUE: Frontal view of the chest  COMPARISON: 11/24/2020  FINDINGS: Heart, mediastinum and pulmonary vasculature are unremarkable. Mild chronic interstitial changes are apparent predominantly at the lower lungs. No focal pulmonary consolidation, pleural effusion, or pneumothorax. No acute osseous process.      No focal pulmonary consolidation. Chronic appearing changes. Follow-up as clinical indications persist.  This report was finalized on 8/22/2022 12:34 PM by Dr. Pedro Cunningham M.D.      CT Angiogram Chest    Result Date: 8/22/2022  CT ANGIOGRAM OF THE CHEST WITH CONTRAST  INCLUDING RECONSTRUCTION IMAGES 08/22/2022  HISTORY: Chest pain. Possible pulmonary embolus.  Following the intravenous contrast injection CT angiography was performed through the chest. Sagittal, coronal and 3D reconstruction images were reviewed.  The pulmonary arterial system is well opacified. There is a small filling defect in the distal left upper lobe pulmonary artery extending into a segmental left upper lobe pulmonary artery branch. No other filling defects are seen.  There is an approximately 1.9 cm right superior hilar lymph node. This is not well seen on the previous CT scan of the chest dated 04/16/2022 which was performed without contrast that was seen on the previous study of 09/28/2020. It appears similar in size. A few additional smaller mediastinal nodes are seen including a subcarinal calcified node.  There are emphysematous changes of the bilateral lungs. Mild bronchiectasis of the bilateral lower lobes is seen.  Some scattered ill-defined somewhat ground glass opacities in the lungs, particularly in the lower lobes. This finding appears largely stable when compared to the previous study.  Lower cervical fusion plate is seen.  Gallbladder has been removed. IVC filter is seen.      1. Small pulmonary embolus in the most distal aspect of the left upper lobe pulmonary artery extending into a segmental left upper lobe pulmonary artery branch. 2. Small amount of mediastinal and right hilar lymphadenopathy appears largely stable since the postcontrast CT scan of 04/20/2020. 3. Bronchiectasis, emphysematous change and chronic fibrosis of the lungs appears relatively stable since the most recent previous study of 04/16/2022.  Radiation dose reduction techniques were utilized, including automated exposure control and exposure modulation based on body size.         I ordered the above noted radiological studies. Reviewed by me and discussed with radiologist.  See dictation for official radiology  interpretation.      PROCEDURES    Critical Care  Performed by: Parminder Katz MD  Authorized by: Parminder Katz MD     Critical care provider statement:     Critical care time (minutes): 30-74.    Critical care time was exclusive of:  Separately billable procedures and treating other patients    Critical care was necessary to treat or prevent imminent or life-threatening deterioration of the following conditions:  Circulatory failure    Critical care was time spent personally by me on the following activities:  Development of treatment plan with patient or surrogate, discussions with consultants, evaluation of patient's response to treatment, examination of patient, obtaining history from patient or surrogate, ordering and performing treatments and interventions, ordering and review of laboratory studies, ordering and review of radiographic studies, pulse oximetry, re-evaluation of patient's condition and review of old charts          MEDICATIONS GIVEN IN ER    Medications   sodium chloride 0.9 % flush 10 mL (has no administration in time range)   nitroglycerin (NITROSTAT) SL tablet 0.4 mg (0.4 mg Sublingual Given 8/22/22 1348)   heparin (porcine) 5000 UNIT/ML injection 7,300 Units (has no administration in time range)   heparin 74948 units/250 mL (100 units/mL) in 0.45 % NaCl infusion (has no administration in time range)   heparin (porcine) 5000 UNIT/ML injection 3,600-7,300 Units (has no administration in time range)   morphine injection 2 mg (2 mg Intravenous Given 8/22/22 1303)   iopamidol (ISOVUE-370) 76 % injection 95 mL (95 mL Intravenous Given by Other 8/22/22 1400)         PROGRESS, DATA ANALYSIS, CONSULTS, AND MEDICAL DECISION MAKING    All labs have been independently reviewed by me.  All radiology studies have been reviewed by me and discussed with radiologist dictating the report.   EKG's independently viewed and interpreted by me.  Discussion below represents my analysis of pertinent findings  related to patient's condition, differential diagnosis, treatment plan and final disposition.    Differential diagnosis includes but is not limited to STEMI, non-STEMI, unstable angina, pneumothorax, PE, cuter syndrome.    ED Course as of 08/22/22 1511   Mon Aug 22, 2022   1255 EKG          EKG time: 1156  Rhythm/Rate: Normal sinus, rate 89  P waves and CA: Normal P, normal CA  QRS, axis: Narrow QRS, left axis  ST and T waves: No acute  Inferior Q waves present.    Interpreted Contemporaneously by me, independently viewed  New inferior Q waves changed compared to prior 11/23/2020   [TR]   1446 Speaking with Dr. Gillis with radiology.  CT shows an acute PE in the left upper lobe. [TR]   1452 I reviewed work-up and findings with the patient at the bedside.  He has a PE on anticoagulation.  He has no tachycardia or hypoxia.  He does have significant pain related to this PE.  Plan to admit him to the hospital for further evaluation.  Per his report he has never seen hematology or had a hypercoagulable work-up.  I would find this surprising given that he has a IVC filter in place. [TR]   1509 Speaking with Dr. Arellano with [facility].  Will admit. [TR]      ED Course User Index  [TR] Parminder Katz MD           PPE: The patient wore a mask and I wore an N95 mask throughout the entire patient encounter.       AS OF 15:11 EDT VITALS:    BP - 125/83  HR - 60  TEMP - 98.7 °F (37.1 °C)  O2 SATS - 98%        DIAGNOSIS  Final diagnoses:   Acute chest pain   Single subsegmental pulmonary embolism without acute cor pulmonale (HCC)   Anticoagulated on Coumadin         DISPOSITION  ED Disposition     ED Disposition   Decision to Admit    Condition   --    Comment   Level of Care: Telemetry [5]   Diagnosis: Acute chest pain [523380]   Admitting Physician: TONEY ARELLANO [[id number]]   Attending Physician: TONEY ARELLANO [[id number]]                     Parminder Katz MD  08/22/22 1511

## 2022-08-22 NOTE — PLAN OF CARE
Goal Outcome Evaluation:              Outcome Evaluation: Patient has no new complaints. Chest pressure but patient states that this is the better than what it was when he came into the hospital. On heparin drip. RA. Ad eileen. PTT to be redrawn and has been ordered for 2100. Will continue to monitor labs.

## 2022-08-22 NOTE — H&P
HISTORY AND PHYSICAL   [facility]        Date of Admission: 2022  Patient Identification:  Name: Javy Reeves Sr.  Age: 71 y.o.  Sex: male  :  1950  MRN: [id number]                     Primary Care Physician: Aletha Ochoa, MALINDA    Chief Complaint:  71 year old gentleman who presented to the emergency room with chest pain which started this morning while he was at work; he was also short of breath and had nausea and diaphoresis; he called his wife to pick him up and take him to the hospital; he has a past history of pulmonary emboli and dvt and is on coumadin; he checks his inr weekly and it has been therapeutic; he denies hemoptysis    History of Present Illness:   As above    Past Medical History:  Past Medical History:   Diagnosis Date   • Acute and subacute infective endocarditis in diseases classified elsewhere    • Allergic    • Arthritis    • Asthma 2021   • Chest pain    • Chronic low back pain    • Chronic pain    • Colon polyps    • Coronary artery disease    • DDD (degenerative disc disease), cervical    • DDD (degenerative disc disease), lumbosacral    • Diverticulosis    • DVT (deep venous thrombosis) (HCC)     Left Lower extremity following arthroscopic knee surgery in . IVC fliter placed at that time.   • Encounter for special screening examination for neoplasm of prostate    • Eye exam, routine > 5 yrs ago   • Eye exam, routine 2015   • Fibromyalgia    • Fibromyositis    • GERD (gastroesophageal reflux disease)    • HIV disease (HCC)    • Hyperlipidemia    • Injury of back    • Injury of neck    • Irritable bowel    • Lumbar stenosis    • MI (myocardial infarction) (HCC)    • Neck pain    • Pain in limb    • Past heart attack    • Postlaminectomy syndrome of lumbar region    • Pulmonary embolism (HCC)     Left Lower extremity following arthroscopic knee surgery in . IVC fliter placed at that time.   • Reflux esophagitis    • Shortness of  breath    • Sleep apnea     NO CPAP   • Stroke (HCC)    • TIA (transient ischemic attack)      Past Surgical History:  Past Surgical History:   Procedure Laterality Date   • ANTERIOR CERVICAL DISCECTOMY W/ FUSION N/A 10/5/2018    Procedure: CERVICAL DISCECTOMY ANTERIOR FUSION WITH INSTRUMENTATION,ACDF C5/6, C6/7 with cages and plate;  Surgeon: Jean Coles MD;  Location: [facility] MAIN OR;  Service: Neurosurgery   • APPENDECTOMY N/A    • BRONCHOSCOPY N/A 11/3/2020    Procedure: EBUS BRONCHOSCOPY WITH BAL & FLUOROSCOPY WITH MAC ANESTHESIA, BX & TBNA;  Surgeon: Jamil Willoughby MD;  Location: [facility] ENDOSCOPY;  Service: Pulmonary;  Laterality: N/A;  PRE/POST-ABNORMAL CT, LAD   • CARDIAC CATHETERIZATION Left 1950    Left Heart Cath Left Ventriculography, selective coronary angiography; iliofemoral angiography; angio seal closure, Dr. Brady Ramos   • COLONOSCOPY  09/2015    removed 2 polyps, Dr. Manley   • COLONOSCOPY N/A 02/12/2007    Left colonic diverticulosis, No evidence of polypoid neoplasia, Dr. Jarret Bloom   • COLONOSCOPY N/A 9/13/2018    Procedure: COLONOSCOPY TO CECUM WITH COLD BX'S POLYPECTOMY;  Surgeon: Berta Sin MD;  Location: [facility] ENDOSCOPY;  Service: General   • CYSTOSCOPY TRANSURETHRAL RESECTION OF PROSTATE N/A 11/01/2004    Dr. Rodolfo Arnold   • HAND SURGERY Right    • KNEE ARTHROPLASTY Left    • LUMBAR DISC SURGERY  2006, 2007    L4-S1 pseudoarthroses - Dr Cervantes   • LUMBAR DISC SURGERY N/A 01/29/2010    Removal of Instrumentation w/ decompression, Instrumentaion loosening & pt tested positive for zinc allergy, Dr. Kvng Cervantes   • ROTATOR CUFF REPAIR Right 04/2012   • THORACIC OUTLET SURGERY Bilateral    • VENA CAVA FILTER PLACEMENT  1996   • WRIST SURGERY Right     x3 starting in 1984      Home Meds:  Medications Prior to Admission   Medication Sig Dispense Refill Last Dose   • acetaminophen (TYLENOL) 500 MG tablet Take 650 mg by mouth Daily. Generic tylenol arthritis    8/22/2022 at Unknown time   • albuterol sulfate  (90 Base) MCG/ACT inhaler INHALE 2 PUFFS INTO THE LUNGS EVERY 4 HOURS AS NEEDED FOR WHEEZING OR SHORTNESS OF AIR. 54 g 3 8/21/2022 at Unknown time   • atorvastatin (LIPITOR) 40 MG tablet TAKE 1 TABLET BY MOUTH DAILY FOR CHOLESTEROL 90 tablet 3 8/22/2022 at Unknown time   • folic acid (FOLVITE) 1 MG tablet Take 1 tablet by mouth Daily. 90 tablet 3 8/22/2022 at Unknown time   • multivitamin with minerals tablet tablet Take 1 tablet by mouth Daily.   8/22/2022 at Unknown time   • nitroglycerin (NITROSTAT) 0.4 MG SL tablet Place 1 tablet under the tongue Every 5 (Five) Minutes As Needed for Chest Pain. Take no more than 3 doses in 15 minutes. 30 tablet 5 8/22/2022 at Unknown time   • pregabalin (LYRICA) 75 MG capsule TAKE 1 CAPSULE BY MOUTH TWICE DAILY FOR FIBROMYALGIA 180 capsule 1 8/22/2022 at Unknown time   • warfarin (COUMADIN) 1 MG tablet TAKE 3 TABLETS BY MOUTH DAILY 270 tablet 3 8/22/2022 at Unknown time   • warfarin (COUMADIN) 5 MG tablet TAKE 1 TABLET BY MOUTH DAILY 90 tablet 3 8/22/2022 at Unknown time   • cetirizine (zyrTEC) 10 MG tablet Take 10 mg by mouth Daily.      • Cyanocobalamin (Vitamin B-12) 1000 MCG sublingual tablet One SL daily 90 each 3    • Fluticasone-Umeclidin-Vilant (Trelegy Ellipta) 100-62.5-25 MCG/INH inhaler       • Turmeric 500 MG tablet Take 500 mg by mouth Daily.      • warfarin (COUMADIN) 5 MG tablet TAKE 2 TABLETS BY MOUTH EVERY  tablet 3        Allergies:  Allergies   Allergen Reactions   • Zinc Other (See Comments) and Diarrhea     Patient tested positive, had to have hardware removed from back.   • Chantix [Varenicline] Other (See Comments)     Headache     Immunizations:  Immunization History   Administered Date(s) Administered   • COVID-19 (MODERNA) 1st, 2nd, 3rd Dose Only 03/09/2021, 03/09/2021, 04/06/2021, 11/20/2021   • FLUAD TRI 65YR+ 10/08/2019   • Flu Vaccine Quad PF 6-35MO 10/05/2017, 10/05/2017   • Flu Vaccine  Quad PF >36MO 10/05/2017   • Fluad Quad 65+ 09/28/2020   • Fluzone High Dose =>65 Years (Vax[employer] ONLY) 10/16/2018, 10/08/2019   • Fluzone High-Dose 65+yrs 10/21/2021   • Fluzone Quad >6mos (Multi-dose) 11/16/2015   • Pneumococcal Conjugate 13-Valent (PCV13) 12/23/2015   • Pneumococcal Polysaccharide (PPSV23) 09/05/2017   • Tdap 08/27/2017     Social History:   Social History     Social History Narrative   • Not on file     Social History     Socioeconomic History   • Marital status:    Tobacco Use   • Smoking status: Current Every Day Smoker     Packs/day: 0.50     Years: 30.00     Pack years: 15.00     Types: Cigarettes   • Smokeless tobacco: Never Used   • Tobacco comment: caffeine use, smokes about 5 cigars/day   Vaping Use   • Vaping Use: Never used   Substance and Sexual Activity   • Alcohol use: Yes     Comment: social   • Drug use: No   • Sexual activity: Defer       Family History:  Family History   Problem Relation Age of Onset   • Diabetes Sister    • Cancer Sister    • Hypertension Sister    • Kidney disease Sister    • Stroke Sister    • Heart disease Brother    • Hypertension Brother    • Cerebral aneurysm Brother    • Sudden death Brother    • Bleeding Disorder Brother    • Cancer Mother    • Heart disease Father    • Cancer Father         malignant neoplasm   • Deep vein thrombosis Father    • Heart attack Father    • Malig Hyperthermia Neg Hx         Review of Systems  See history of present illness and past medical history.  Patient denies headache, dizziness, syncope, falls, trauma, change in vision, change in hearing, change in taste, changes in weight, changes in appetite, focal weakness, numbness, or paresthesia.  Patient denies chest pain, palpitations, dyspnea, orthopnea, PND, cough, sinus pressure, rhinorrhea, epistaxis, hemoptysis, nausea, vomiting,hematemesis, diarrhea, constipation or hematchezia.  Denies cold or heat intolerance, polydipsia, polyuria, polyphagia. Denies hematuria,  "pyuria, dysuria, hesitancy, frequency or urgency. Denies consumption of raw and under cooked meats foods or change in water source.  Denies fever, chills, sweats, night sweats.  Denies missing any routine medications. Remainder of ROS is negative.    Objective:  T Max 24 hrs: Temp (24hrs), Av.2 °F (36.8 °C), Min:97.7 °F (36.5 °C), Max:98.7 °F (37.1 °C)    Vitals Ranges:   Temp:  [97.7 °F (36.5 °C)-98.7 °F (37.1 °C)] 97.7 °F (36.5 °C)  Heart Rate:  [58-92] 71  Resp:  [18-22] 18  BP: (125-168)/() 168/105      Exam:  BP (!) 168/105 (BP Location: Right arm, Patient Position: Sitting)   Pulse 71   Temp 97.7 °F (36.5 °C) (Oral)   Resp 18   Ht 180.3 cm (71\")   Wt 90.7 kg (200 lb)   SpO2 98%   BMI 27.89 kg/m²     General Appearance:    Alert, cooperative, no distress, appears stated age   Head:    Normocephalic, without obvious abnormality, atraumatic   Eyes:    PERRL, conjunctivae/corneas clear, EOM's intact, both eyes   Ears:    Normal external ear canals, both ears   Nose:   Nares normal, septum midline, mucosa normal, no drainage    or sinus tenderness   Throat:   Lips, mucosa, and tongue normal   Neck:   Supple, symmetrical, trachea midline, no adenopathy;     thyroid:  no enlargement/tenderness/nodules; no carotid    bruit or JVD   Back:     Symmetric, no curvature, ROM normal, no CVA tenderness   Lungs:     Decreased breath sounds bilaterally, respirations unlabored   Chest Wall:    No tenderness or deformity    Heart:    Regular rate and rhythm, S1 and S2 normal, no murmur, rub   or gallop   Abdomen:     Soft, nontender, bowel sounds active all four quadrants,     no masses, no hepatomegaly, no splenomegaly   Extremities:   Extremities normal, atraumatic, no cyanosis or edema   Pulses:   2+ and symmetric all extremities   Skin:   Skin color, texture, turgor normal, no rashes or lesions   Lymph nodes:   Cervical, supraclavicular, and axillary nodes normal   Neurologic:   CNII-XII intact, normal " strength, sensation intact throughout      .    Data Review:  Labs in chart were reviewed.  WBC   Date Value Ref Range Status   08/22/2022 4.24 3.40 - 10.80 10*3/mm3 Final     Hemoglobin   Date Value Ref Range Status   08/22/2022 14.0 13.0 - 17.7 g/dL Final     Hematocrit   Date Value Ref Range Status   08/22/2022 41.8 37.5 - 51.0 % Final     Platelets   Date Value Ref Range Status   08/22/2022 166 140 - 450 10*3/mm3 Final     Sodium   Date Value Ref Range Status   08/22/2022 141 136 - 145 mmol/L Final     Potassium   Date Value Ref Range Status   08/22/2022 3.8 3.5 - 5.2 mmol/L Final     Chloride   Date Value Ref Range Status   08/22/2022 106 98 - 107 mmol/L Final     CO2   Date Value Ref Range Status   08/22/2022 25.0 22.0 - 29.0 mmol/L Final     BUN   Date Value Ref Range Status   08/22/2022 15 8 - 23 mg/dL Final     Creatinine   Date Value Ref Range Status   08/22/2022 0.93 0.76 - 1.27 mg/dL Final     Glucose   Date Value Ref Range Status   08/22/2022 91 65 - 99 mg/dL Final     Calcium   Date Value Ref Range Status   08/22/2022 8.9 8.6 - 10.5 mg/dL Final     AST (SGOT)   Date Value Ref Range Status   08/22/2022 26 1 - 40 U/L Final     ALT (SGPT)   Date Value Ref Range Status   08/22/2022 20 1 - 41 U/L Final     Alkaline Phosphatase   Date Value Ref Range Status   08/22/2022 60 39 - 117 U/L Final                Imaging Results (All)     Procedure Component Value Units Date/Time    CT Angiogram Chest [[id number]] Collected: 08/22/22 1452     Updated: 08/22/22 1452    Narrative:      CT ANGIOGRAM OF THE CHEST WITH CONTRAST INCLUDING RECONSTRUCTION IMAGES  08/22/2022     HISTORY: Chest pain. Possible pulmonary embolus.     Following the intravenous contrast injection CT angiography was  performed through the chest. Sagittal, coronal and 3D reconstruction  images were reviewed.     The pulmonary arterial system is well opacified. There is a small  filling defect in the distal left upper lobe pulmonary artery  extending  into a segmental left upper lobe pulmonary artery branch. No other  filling defects are seen.     There is an approximately 1.9 cm right superior hilar lymph node. This  is not well seen on the previous CT scan of the chest dated 04/16/2022  which was performed without contrast that was seen on the previous study  of 09/28/2020. It appears similar in size. A few additional smaller  mediastinal nodes are seen including a subcarinal calcified node.     There are emphysematous changes of the bilateral lungs. Mild  bronchiectasis of the bilateral lower lobes is seen.  Some scattered  ill-defined somewhat ground glass opacities in the lungs, particularly  in the lower lobes. This finding appears largely stable when compared to  the previous study.     Lower cervical fusion plate is seen.     Gallbladder has been removed. IVC filter is seen.       Impression:      1. Small pulmonary embolus in the most distal aspect of the left upper  lobe pulmonary artery extending into a segmental left upper lobe  pulmonary artery branch.  2. Small amount of mediastinal and right hilar lymphadenopathy appears  largely stable since the postcontrast CT scan of 04/20/2020.  3. Bronchiectasis, emphysematous change and chronic fibrosis of the  lungs appears relatively stable since the most recent previous study of  04/16/2022.     Radiation dose reduction techniques were utilized, including automated  exposure control and exposure modulation based on body size.          XR Chest 1 View [[id number]] Collected: 08/22/22 1231     Updated: 08/22/22 1238    Narrative:      XR CHEST 1 VW-     HISTORY: Male who is 71 years-old,  chest pain     TECHNIQUE: Frontal view of the chest     COMPARISON: 11/24/2020     FINDINGS: Heart, mediastinum and pulmonary vasculature are unremarkable.  Mild chronic interstitial changes are apparent predominantly at the  lower lungs. No focal pulmonary consolidation, pleural effusion, or  pneumothorax. No  acute osseous process.       Impression:      No focal pulmonary consolidation. Chronic appearing changes.  Follow-up as clinical indications persist.     This report was finalized on 8/22/2022 12:34 PM by Dr. Pedro Cunningham M.D.               Assessment:  Active Hospital Problems    Diagnosis  POA   • Acute chest pain [R07.9]  Yes      Resolved Hospital Problems   No resolved problems to display.   pulmonary embolus  Cad  Hypertension  hiv    Plan:  Will continue heparin  Ask hematology to see him  inr is not therapeutic today  Monitor on telemetry  Control bp  D.w patient and ED provider    Autumn Arellano MD  8/22/2022  19:03 EDT

## 2022-08-23 PROBLEM — J47.9 BRONCHIECTASIS WITHOUT COMPLICATION (HCC): Status: ACTIVE | Noted: 2022-08-23

## 2022-08-23 PROBLEM — R79.1 SUBTHERAPEUTIC INTERNATIONAL NORMALIZED RATIO (INR): Status: ACTIVE | Noted: 2022-08-23

## 2022-08-23 PROBLEM — I26.99 ACUTE PULMONARY EMBOLISM WITHOUT ACUTE COR PULMONALE (HCC): Status: ACTIVE | Noted: 2022-08-23

## 2022-08-23 LAB
ANION GAP SERPL CALCULATED.3IONS-SCNC: 7.6 MMOL/L (ref 5–15)
APTT PPP: 146.8 SECONDS (ref 22.7–35.4)
APTT PPP: 69.4 SECONDS (ref 22.7–35.4)
APTT PPP: 81 SECONDS (ref 22.7–35.4)
APTT PPP: >200 SECONDS (ref 22.7–35.4)
BASOPHILS # BLD AUTO: 0.02 10*3/MM3 (ref 0–0.2)
BASOPHILS NFR BLD AUTO: 0.5 % (ref 0–1.5)
BUN SERPL-MCNC: 12 MG/DL (ref 8–23)
BUN/CREAT SERPL: 13.6 (ref 7–25)
CALCIUM SPEC-SCNC: 8.7 MG/DL (ref 8.6–10.5)
CHLORIDE SERPL-SCNC: 106 MMOL/L (ref 98–107)
CO2 SERPL-SCNC: 26.4 MMOL/L (ref 22–29)
CREAT SERPL-MCNC: 0.88 MG/DL (ref 0.76–1.27)
DEPRECATED RDW RBC AUTO: 49.7 FL (ref 37–54)
EGFRCR SERPLBLD CKD-EPI 2021: 93.1 ML/MIN/1.73
EOSINOPHIL # BLD AUTO: 0.1 10*3/MM3 (ref 0–0.4)
EOSINOPHIL NFR BLD AUTO: 2.4 % (ref 0.3–6.2)
ERYTHROCYTE [DISTWIDTH] IN BLOOD BY AUTOMATED COUNT: 14.3 % (ref 12.3–15.4)
GLUCOSE SERPL-MCNC: 91 MG/DL (ref 65–99)
HCT VFR BLD AUTO: 41.8 % (ref 37.5–51)
HGB BLD-MCNC: 14 G/DL (ref 13–17.7)
IMM GRANULOCYTES # BLD AUTO: 0.02 10*3/MM3 (ref 0–0.05)
IMM GRANULOCYTES NFR BLD AUTO: 0.5 % (ref 0–0.5)
INR PPP: 1.7 (ref 0.9–1.1)
INR PPP: 1.86 (ref 0.9–1.1)
LYMPHOCYTES # BLD AUTO: 1.77 10*3/MM3 (ref 0.7–3.1)
LYMPHOCYTES NFR BLD AUTO: 42.5 % (ref 19.6–45.3)
MCH RBC QN AUTO: 31.9 PG (ref 26.6–33)
MCHC RBC AUTO-ENTMCNC: 33.5 G/DL (ref 31.5–35.7)
MCV RBC AUTO: 95.2 FL (ref 79–97)
MONOCYTES # BLD AUTO: 0.44 10*3/MM3 (ref 0.1–0.9)
MONOCYTES NFR BLD AUTO: 10.6 % (ref 5–12)
NEUTROPHILS NFR BLD AUTO: 1.81 10*3/MM3 (ref 1.7–7)
NEUTROPHILS NFR BLD AUTO: 43.5 % (ref 42.7–76)
NRBC BLD AUTO-RTO: 0 /100 WBC (ref 0–0.2)
PLATELET # BLD AUTO: 169 10*3/MM3 (ref 140–450)
PMV BLD AUTO: 9.4 FL (ref 6–12)
POTASSIUM SERPL-SCNC: 4.1 MMOL/L (ref 3.5–5.2)
PROTHROMBIN TIME: 19.6 SECONDS (ref 11.7–14.2)
PROTHROMBIN TIME: 21.1 SECONDS (ref 11.7–14.2)
RBC # BLD AUTO: 4.39 10*6/MM3 (ref 4.14–5.8)
SODIUM SERPL-SCNC: 140 MMOL/L (ref 136–145)
WBC NRBC COR # BLD: 4.16 10*3/MM3 (ref 3.4–10.8)

## 2022-08-23 PROCEDURE — G0378 HOSPITAL OBSERVATION PER HR: HCPCS

## 2022-08-23 PROCEDURE — 85730 THROMBOPLASTIN TIME PARTIAL: CPT | Performed by: INTERNAL MEDICINE

## 2022-08-23 PROCEDURE — 85610 PROTHROMBIN TIME: CPT | Performed by: INTERNAL MEDICINE

## 2022-08-23 PROCEDURE — 85025 COMPLETE CBC W/AUTO DIFF WBC: CPT | Performed by: EMERGENCY MEDICINE

## 2022-08-23 PROCEDURE — 96376 TX/PRO/DX INJ SAME DRUG ADON: CPT

## 2022-08-23 PROCEDURE — 25010000002 HEPARIN (PORCINE) 25000-0.45 UT/250ML-% SOLUTION: Performed by: EMERGENCY MEDICINE

## 2022-08-23 PROCEDURE — 99222 1ST HOSP IP/OBS MODERATE 55: CPT | Performed by: INTERNAL MEDICINE

## 2022-08-23 PROCEDURE — 25010000002 HEPARIN (PORCINE) PER 1000 UNITS: Performed by: EMERGENCY MEDICINE

## 2022-08-23 PROCEDURE — 80048 BASIC METABOLIC PNL TOTAL CA: CPT | Performed by: INTERNAL MEDICINE

## 2022-08-23 PROCEDURE — 96366 THER/PROPH/DIAG IV INF ADDON: CPT

## 2022-08-23 RX ORDER — ACETAMINOPHEN 325 MG/1
650 TABLET ORAL EVERY MORNING
COMMUNITY
End: 2022-10-04

## 2022-08-23 RX ORDER — LORATADINE 10 MG/1
10 TABLET ORAL DAILY
COMMUNITY

## 2022-08-23 RX ORDER — TRAMADOL HYDROCHLORIDE 50 MG/1
50 TABLET ORAL EVERY 6 HOURS PRN
Status: DISCONTINUED | OUTPATIENT
Start: 2022-08-23 | End: 2022-08-25 | Stop reason: HOSPADM

## 2022-08-23 RX ORDER — WARFARIN SODIUM 6 MG/1
12 TABLET ORAL ONCE
Status: COMPLETED | OUTPATIENT
Start: 2022-08-23 | End: 2022-08-23

## 2022-08-23 RX ADMIN — TRAMADOL HYDROCHLORIDE 50 MG: 50 TABLET, COATED ORAL at 09:36

## 2022-08-23 RX ADMIN — WARFARIN 12 MG: 6 TABLET ORAL at 16:25

## 2022-08-23 RX ADMIN — HEPARIN SODIUM 3600 UNITS: 5000 INJECTION INTRAVENOUS; SUBCUTANEOUS at 22:39

## 2022-08-23 RX ADMIN — MULTIPLE VITAMINS W/ MINERALS TAB 1 TABLET: TAB at 09:04

## 2022-08-23 RX ADMIN — HEPARIN SODIUM 10.5 UNITS/KG/HR: 10000 INJECTION, SOLUTION INTRAVENOUS at 14:32

## 2022-08-23 RX ADMIN — PREGABALIN 75 MG: 75 CAPSULE ORAL at 09:03

## 2022-08-23 RX ADMIN — NICOTINE 1 PATCH: 7 PATCH, EXTENDED RELEASE TRANSDERMAL at 11:48

## 2022-08-23 RX ADMIN — ATORVASTATIN CALCIUM 40 MG: 20 TABLET, FILM COATED ORAL at 09:03

## 2022-08-23 RX ADMIN — Medication 1 MG: at 09:03

## 2022-08-23 RX ADMIN — HEPARIN SODIUM 10.5 UNITS/KG/HR: 10000 INJECTION, SOLUTION INTRAVENOUS at 10:06

## 2022-08-23 RX ADMIN — PREGABALIN 75 MG: 75 CAPSULE ORAL at 20:41

## 2022-08-23 NOTE — CONSULTS
Subjective     REASON FOR CONSULTATION:     1. History of pulmonary embolism in the past and now with left upper lobe pulmonary embolism  Provide an opinion on any further workup or treatment                             REQUESTING PHYSICIAN: Dr. Raphael    RECORDS OBTAINED:  Records of the patients history including those obtained from the referring provider were reviewed and summarized in detail.    HISTORY OF PRESENT ILLNESS:  The patient is a 71 y.o. year old male who is here for an opinion about the above issue.    History of Present Illness     Patient is a 71-year-old gentleman who came to the ER with chest pain which started yesterday morning at work.  He had shortness of breath and had nausea and diaphoresis.  He then came to the ER with his wife.  He has had a past history of pulmonary embolism and DVT and was on Coumadin.  His INR was checked weekly and apparently was therapeutic.  He denied any hemoptysis.    He underwent CT angiogram of the chest August 22, 2022 which showed a small filling defect in the distal left upper lobe pulmonary artery extending into segmental left upper lobe pulmonary artery branch.  No other filling defects were seen.  There is also a 1.9 cm superior right hilar lymph node.  This is not well seen on the previous CT scan of the chest dated April 16, 2022 which was performed without contrast but this was seen on September 28, 2020 when it was similar in size.  A few additional mediastinal nodes are seen including a subcarinal calcified node.  There is emphysema in both lungs.    Their impression was that patient has a small pulmonary embolism in the most distal aspect of the left upper lobe artery extending into a segmental left upper lobe pulmonary artery branch.  And small amount of mediastinal or hilar lymphadenopathy which appears to be stable from April 28, 2020.  Patient was placed on heparin drip.  His INR in the ER was not therapeutic.  His INR is 1.9.  COVID-19 was  negative    CT angiogram April 18, 2022 had shown no change in bilateral bronchiectasis, groundglass opacity and subpleural reticulation consistent with fibrosis and no new focal consolidation.    Past Medical History:   Diagnosis Date   • Acute and subacute infective endocarditis in diseases classified elsewhere    • Allergic    • Arthritis    • Asthma 04/26/2021   • Chest pain    • Chronic low back pain    • Chronic pain    • Colon polyps    • Coronary artery disease    • DDD (degenerative disc disease), cervical    • DDD (degenerative disc disease), lumbosacral    • Diverticulosis    • DVT (deep venous thrombosis) (HCC) 1996    Left Lower extremity following arthroscopic knee surgery in 1996. IVC fliter placed at that time.   • Encounter for special screening examination for neoplasm of prostate 2012   • Eye exam, routine > 5 yrs ago   • Eye exam, routine 01/2015   • Fibromyalgia    • Fibromyositis    • GERD (gastroesophageal reflux disease)    • HIV disease (HCC)    • Hyperlipidemia    • Injury of back    • Injury of neck    • Irritable bowel    • Lumbar stenosis    • MI (myocardial infarction) ([facility])    • Neck pain    • Pain in limb    • Past heart attack    • Postlaminectomy syndrome of lumbar region    • Pulmonary embolism (HCC) 1996    Left Lower extremity following arthroscopic knee surgery in 1996. IVC fliter placed at that time.   • Reflux esophagitis    • Shortness of breath    • Sleep apnea     NO CPAP   • Stroke (HCC)    • TIA (transient ischemic attack)         Past Surgical History:   Procedure Laterality Date   • ANTERIOR CERVICAL DISCECTOMY W/ FUSION N/A 10/5/2018    Procedure: CERVICAL DISCECTOMY ANTERIOR FUSION WITH INSTRUMENTATION,ACDF C5/6, C6/7 with cages and plate;  Surgeon: Jean Coles MD;  Location: [facility];  Service: Neurosurgery   • APPENDECTOMY N/A    • BRONCHOSCOPY N/A 11/3/2020    Procedure: EBUS BRONCHOSCOPY WITH BAL & FLUOROSCOPY WITH MAC ANESTHESIA, BX & TBNA;  Surgeon:  Jamil Willoughby MD;  Location: [facility] ENDOSCOPY;  Service: Pulmonary;  Laterality: N/A;  PRE/POST-ABNORMAL CT, LAD   • CARDIAC CATHETERIZATION Left 1952    Left Heart Cath Left Ventriculography, selective coronary angiography; iliofemoral angiography; angio seal closure, Dr. Brady Ramos   • COLONOSCOPY  09/2015    removed 2 polyps, Dr. Manley   • COLONOSCOPY N/A 02/12/2007    Left colonic diverticulosis, No evidence of polypoid neoplasia, Dr. Jarret Bloom   • COLONOSCOPY N/A 9/13/2018    Procedure: COLONOSCOPY TO CECUM WITH COLD BX'S POLYPECTOMY;  Surgeon: Berta Sin MD;  Location: [facility] ENDOSCOPY;  Service: General   • CYSTOSCOPY TRANSURETHRAL RESECTION OF PROSTATE N/A 11/01/2004    Dr. Rodolfo Arnold   • HAND SURGERY Right    • KNEE ARTHROPLASTY Left    • LUMBAR DISC SURGERY  2006, 2007    L4-S1 pseudoarthroses - Dr Cervantes   • LUMBAR DISC SURGERY N/A 01/29/2010    Removal of Instrumentation w/ decompression, Instrumentaion loosening & pt tested positive for zinc allergy, Dr. Kvng Cervantes   • ROTATOR CUFF REPAIR Right 04/2012   • THORACIC OUTLET SURGERY Bilateral    • VENA CAVA FILTER PLACEMENT  1996   • WRIST SURGERY Right     x3 starting in 1984        No current facility-administered medications on file prior to encounter.     Current Outpatient Medications on File Prior to Encounter   Medication Sig Dispense Refill   • acetaminophen (TYLENOL) 500 MG tablet Take 650 mg by mouth Daily. Generic tylenol arthritis     • albuterol sulfate  (90 Base) MCG/ACT inhaler INHALE 2 PUFFS INTO THE LUNGS EVERY 4 HOURS AS NEEDED FOR WHEEZING OR SHORTNESS OF AIR. 54 g 3   • atorvastatin (LIPITOR) 40 MG tablet TAKE 1 TABLET BY MOUTH DAILY FOR CHOLESTEROL 90 tablet 3   • folic acid (FOLVITE) 1 MG tablet Take 1 tablet by mouth Daily. 90 tablet 3   • multivitamin with minerals tablet tablet Take 1 tablet by mouth Daily.     • nitroglycerin (NITROSTAT) 0.4 MG SL tablet Place 1 tablet under the tongue Every 5  (Five) Minutes As Needed for Chest Pain. Take no more than 3 doses in 15 minutes. 30 tablet 5   • pregabalin (LYRICA) 75 MG capsule TAKE 1 CAPSULE BY MOUTH TWICE DAILY FOR FIBROMYALGIA 180 capsule 1   • warfarin (COUMADIN) 1 MG tablet TAKE 3 TABLETS BY MOUTH DAILY 270 tablet 3   • warfarin (COUMADIN) 5 MG tablet TAKE 1 TABLET BY MOUTH DAILY 90 tablet 3   • cetirizine (zyrTEC) 10 MG tablet Take 10 mg by mouth Daily.     • Cyanocobalamin (Vitamin B-12) 1000 MCG sublingual tablet One SL daily 90 each 3   • Fluticasone-Umeclidin-Vilant (Trelegy Ellipta) 100-62.5-25 MCG/INH inhaler      • Turmeric 500 MG tablet Take 500 mg by mouth Daily.     • warfarin (COUMADIN) 5 MG tablet TAKE 2 TABLETS BY MOUTH EVERY  tablet 3        ALLERGIES:    Allergies   Allergen Reactions   • Zinc Other (See Comments) and Diarrhea     Patient tested positive, had to have hardware removed from back.   • Chantix [Varenicline] Other (See Comments)     Headache        Social History     Socioeconomic History   • Marital status:    Tobacco Use   • Smoking status: Current Every Day Smoker     Packs/day: 0.50     Years: 30.00     Pack years: 15.00     Types: Cigarettes   • Smokeless tobacco: Never Used   • Tobacco comment: caffeine use, smokes about 5 cigars/day   Vaping Use   • Vaping Use: Never used   Substance and Sexual Activity   • Alcohol use: Yes     Comment: social   • Drug use: No   • Sexual activity: Defer        Family History   Problem Relation Age of Onset   • Diabetes Sister    • Cancer Sister    • Hypertension Sister    • Kidney disease Sister    • Stroke Sister    • Heart disease Brother    • Hypertension Brother    • Cerebral aneurysm Brother    • Sudden death Brother    • Bleeding Disorder Brother    • Cancer Mother    • Heart disease Father    • Cancer Father         malignant neoplasm   • Deep vein thrombosis Father    • Heart attack Father    • Malig Hyperthermia Neg Hx         Review of Systems   Constitutional:  Negative for appetite change, chills, diaphoresis, fatigue, fever and unexpected weight change.   HENT: Negative for hearing loss, sore throat and trouble swallowing.    Respiratory: Negative for cough, chest tightness, shortness of breath and wheezing.    Cardiovascular: Positive for chest pain. Negative for palpitations and leg swelling.   Gastrointestinal: Negative for abdominal distention, abdominal pain, constipation, diarrhea, nausea and vomiting.   Genitourinary: Negative for dysuria, frequency, hematuria and urgency.   Musculoskeletal: Negative for joint swelling.        No muscle weakness.   Skin: Negative for rash and wound.   Neurological: Negative for seizures, syncope, speech difficulty, weakness, numbness and headaches.   Hematological: Negative for adenopathy. Does not bruise/bleed easily.   Psychiatric/Behavioral: Negative for behavioral problems, confusion and suicidal ideas.        Objective     Vitals:    08/22/22 1949 08/22/22 2315 08/23/22 0437 08/23/22 0824   BP: 146/86 115/66  124/77   BP Location: Left arm Left arm  Left arm   Patient Position: Lying Lying  Lying   Pulse: 62 57  97   Resp: 18 18  18   Temp: 97.5 °F (36.4 °C) 97.6 °F (36.4 °C)  97.4 °F (36.3 °C)   TempSrc: Oral Oral  Oral   SpO2: 95% 94%  96%   Weight:   92 kg (202 lb 12.8 oz)    Height:         No flowsheet data found.    Physical Exam    CONSTITUTIONAL:  Vital signs reviewed.  Alert and oriented x3  No distress, looks comfortable.  EYES:  Conjunctivae and lids unremarkable.  Extraocular eye movements intact.  HEENT: No evidence of lymphadenopathy, no thyromegaly  EARS,NOSE,MOUTH,THROAT:  Ears and nose appear unremarkable.  Lips, teeth, gums appear unremarkable.  RESPIRATORY:  Normal respiratory effort.  No rales  or wheezing, clear.   CARDIOVASCULAR:  Regular rate and rhythm, no murmur  No significant lower extremity edema.  Abdomen: Soft nontender positive bowel sounds no hepatosplenomegaly  SKIN: No wounds.  No  rashes.  MUSCULOSKELETAL/EXTREMITIES: No clubbing or cyanosis.  No apparent unilateral weakness.  NEURO: CN 2-12 appear intact. No focal neurological deficits noted.  PSYCHIATRIC:  Normal judgment and insight.  Normal mood and affect.      RECENT LABS:  Hematology WBC   Date Value Ref Range Status   08/23/2022 4.16 3.40 - 10.80 10*3/mm3 Final     RBC   Date Value Ref Range Status   08/23/2022 4.39 4.14 - 5.80 10*6/mm3 Final     Hemoglobin   Date Value Ref Range Status   08/23/2022 14.0 13.0 - 17.7 g/dL Final     Hematocrit   Date Value Ref Range Status   08/23/2022 41.8 37.5 - 51.0 % Final     Platelets   Date Value Ref Range Status   08/23/2022 169 140 - 450 10*3/mm3 Final          Assessment & Plan     *Small filling defect in the distal left pulmonary artery extending into segmental left upper lobe pulmonary artery branch consistent with small pulmonary embolism  · CT scan in addition showed 1.9 cm right superior hilar lymph node.  Small amount of mediastinal and right hilar lymphadenopathy appears largely stable compared to April 28, 2020.  Patient also has chronic fibrosis and bronchiectasis and emphysema.  · Patient has previous pulmonary embolism and was on Coumadin and he checked his INR every week and was therapeutic  · However on admission it was subtherapeutic with an INR of 1.9.  · Patient got admitted with chest pain and shortness of breath and hence concerning for a new pulmonary embolism on a subtherapeutic INR  · I will check with radiology to see if this small pulmonary embolism is residual from previous pulmonary embolism or is it a new 1.  · Either way patient will require to be continuing with heparin drip and keep his INR therapeutic.  He had radiology place.  On, will be ED radiology Marylin this is Dr. Madden can you connect me you know I am seeing a patient Javy Reeves's YOB: 1950 yesterday he had a CT angiogram of the chest I do not worry I did but I need to discuss  with them to see if this is a new pulmonary embolism or a residual of his old pulmonary embolism Leandro date of birth is [birth year] okay also mammogram 1 SEPTEMBER [birth year] right okay then after doing perfect thanks  · Since INR was subtherapeutic at 1.9 at admission it is possible he developed blood clot secondary to that however I have obtained hypercoagulable work-up and it is pending    Plan  · Continue heparin drip  · Reviewed his recent INRs at home it was always therapeutic between 2.3 and 2.6   · But his admission INR was low at 1.9  · I discussed CT chest results with Dr. Weinstein, radiologist and according to him there was last CT scan from April 2020 which did not show this pulmonary embolism unless patient has had a previous CT in the last year that is not available at [facility].  We will check with patient tomorrow  · Patient states that he had pulmonary embolism 20 years ago when he had left knee surgery.  And subsequently he has had questionable PE.  · Will obtain hypercoagulable work-up with antiphospholipid antibody in order to determine if that is positive and since I do not see any previous hypercoagulable work-up done we will obtain that.  In the meantime continue heparin linda Madden MD

## 2022-08-23 NOTE — PROGRESS NOTES
[facility] Clinical Pharmacy Services: Medication Reconciliation     Javy Reeves Sr. is a 71 y.o. male presenting to [facility] for Acute chest pain [R07.9]  Anticoagulated on Coumadin [Z79.01]  Single subsegmental pulmonary embolism without acute cor pulmonale (HCC) [I26.93]       He  has a past medical history of Acute and subacute infective endocarditis in diseases classified elsewhere, Allergic, Arthritis, Asthma (04/26/2021), Chest pain, Chronic low back pain, Chronic pain, Colon polyps, Coronary artery disease, DDD (degenerative disc disease), cervical, DDD (degenerative disc disease), lumbosacral, Diverticulosis, DVT (deep venous thrombosis) ([facility]) (1996), Encounter for special screening examination for neoplasm of prostate (2012), Eye exam, routine (> 5 yrs ago), Eye exam, routine (01/2015), Fibromyalgia, Fibromyositis, GERD (gastroesophageal reflux disease), HIV disease ([facility]), Hyperlipidemia, Injury of back, Injury of neck, Irritable bowel, Lumbar stenosis, MI (myocardial infarction) ([facility]), Neck pain, Pain in limb, Past heart attack, Postlaminectomy syndrome of lumbar region, Pulmonary embolism (HCC) (1996), Reflux esophagitis, Shortness of breath, Sleep apnea, Stroke ([facility]), and TIA (transient ischemic attack).       Allergies as of 08/22/2022 - Reviewed 08/22/2022   Allergen Reaction Noted   • Zinc Other (See Comments) and Diarrhea 07/12/2016   • Chantix [varenicline] Other (See Comments) 07/26/2018          Medication information was obtained from: Patient / Pharmacy   Pharmacy and Phone Number:   Preferred Pharamcy:  [id number] - [place], [place] - [address] AT [facility] - [phone] [facility] [phone]   [address] [facility] [address]  Phone: [phone] Fax: [phone]       Prior to Admission Medications     Prescriptions Last Dose Informant Patient Reported? Taking?    acetaminophen (TYLENOL) 325 MG tablet 8/22/2022  Self Yes Yes    Take 650 mg by mouth Every Morning.    albuterol sulfate  (90 Base) MCG/ACT inhaler Past Week Self No Yes    INHALE 2 PUFFS INTO THE LUNGS EVERY 4 HOURS AS NEEDED FOR WHEEZING OR SHORTNESS OF AIR.    atorvastatin (LIPITOR) 40 MG tablet 8/22/2022 Pharmacy No Yes    TAKE 1 TABLET BY MOUTH DAILY FOR CHOLESTEROL    folic acid (FOLVITE) 1 MG tablet 8/22/2022 Self No Yes    Take 1 tablet by mouth Daily.    loratadine (CLARITIN) 10 MG tablet 8/22/2022 Self Yes Yes    Take 10 mg by mouth Daily.    multivitamin with minerals tablet tablet 8/22/2022 Self Yes Yes    Take 1 tablet by mouth Daily.    nitroglycerin (NITROSTAT) 0.4 MG SL tablet 8/22/2022 Self No Yes    Place 1 tablet under the tongue Every 5 (Five) Minutes As Needed for Chest Pain. Take no more than 3 doses in 15 minutes.    pregabalin (LYRICA) 75 MG capsule 8/22/2022 Pharmacy No Yes    TAKE 1 CAPSULE BY MOUTH TWICE DAILY FOR FIBROMYALGIA    warfarin (COUMADIN) 1 MG tablet 8/22/2022 Self No Yes    TAKE 3 TABLETS BY MOUTH DAILY    Patient taking differently:  Take 3 mg by mouth Daily. Take with the 5 mg tablet for a total daily dose of 8 mg.    warfarin (COUMADIN) 5 MG tablet 8/22/2022 Self No Yes    TAKE 1 TABLET BY MOUTH DAILY    Patient taking differently:  Take 5 mg by mouth Daily. Take with the 3 mg tablet for a total daily dose of 8 mg.         Medication notes: Patient was unsure of his pregabalin and atorvastatin dose, verified with pharmacy.      This medication list is complete to the best of my knowledge as of 8/23/2022       Please call if questions.     Matthew Chen  Pharmacy Resident

## 2022-08-23 NOTE — PROGRESS NOTES
[facility] Medicine Services  PROGRESS NOTE    Patient Name: Javy Reeves Sr.  : 1950  MRN: [id number]    Date of Admission: 2022  Primary Care Physician: Aletha Ochoa PA-C    Subjective   Subjective     CC:  Follow-up pleuritic pain    HPI:  Now improving chest pain that is worse with deep inspiration and cough.  Patient denies any bruising or bleeding.  He is frustrated that he had this clot.  He notes he is still smoking.    Review of Systems  No current fevers or chills  No current shortness of breath or cough  No current nausea, vomiting, or diarrhea  No current chest pain or palpitations      Objective   Objective     Vital Signs:   Temp:  [97.4 °F (36.3 °C)-98.7 °F (37.1 °C)] 97.4 °F (36.3 °C)  Heart Rate:  [57-97] 97  Resp:  [18-22] 18  BP: (115-168)/() 124/77        Physical Exam:  Constitutional:Awake, alert  HENT: NCAT, mucous membranes moist, neck supple  Respiratory: Occasional cough, occasional wheezes, nonlabored breathing  Cardiovascular: RRR, normal radial pulses  Gastrointestinal: Positive bowel sounds, soft, nontender, nondistended  Musculoskeletal: Spooning of the fingernails, normal musculature for age, no lower extremity edema, BMI 28  Psychiatric: Appropriate affect, cooperative, conversational  Neurologic: No slurred speech or facial droop, follows commands  Skin: No rashes or jaundice, warm      Results Reviewed:  Results from last 7 days   Lab Units 22  0744 22  1237   WBC 10*3/mm3 4.16 4.24   HEMOGLOBIN g/dL 14.0 14.0   HEMATOCRIT % 41.8 41.8   PLATELETS 10*3/mm3 169 166   INR   --  1.90*     Results from last 7 days   Lab Units 22  0744 22  1415 22  1237   SODIUM mmol/L 140  --  141   POTASSIUM mmol/L 4.1  --  3.8   CHLORIDE mmol/L 106  --  106   CO2 mmol/L 26.4  --  25.0   BUN mg/dL 12  --  15   CREATININE mg/dL 0.88  --  0.93   GLUCOSE mg/dL 91  --  91   CALCIUM mg/dL 8.7  --  8.9   ALT (SGPT) U/L  --   --  20   AST (SGOT) U/L   --   --  26   TROPONIN T ng/mL  --  <0.010 <0.010   PROBNP pg/mL  --   --  59.4     Estimated Creatinine Clearance: 91.9 mL/min (by C-G formula based on SCr of 0.88 mg/dL).    Microbiology Results Abnormal     Procedure Component Value - Date/Time    COVID PRE-OP / PRE-PROCEDURE SCREENING ORDER (NO ISOLATION) - Swab, Nasopharynx [[id number]]  (Normal) Collected: 08/22/22 1306    Lab Status: Final result Specimen: Swab from Nasopharynx Updated: 08/22/22 1353    Narrative:      The following orders were created for panel order COVID PRE-OP / PRE-PROCEDURE SCREENING ORDER (NO ISOLATION) - Swab, Nasopharynx.  Procedure                               Abnormality         Status                     ---------                               -----------         ------                     COVID-19,[facility] IN-HOUSE...[[id number]]  Normal              Final result                 Please view results for these tests on the individual orders.    COVID-19,[facility] IN-HOUSE CEPHEID/ANA NP SWAB IN TRANSPORT MEDIA 8-12 HR TAT - Swab, Nasopharynx [[id number]]  (Normal) Collected: 08/22/22 1306    Lab Status: Final result Specimen: Swab from Nasopharynx Updated: 08/22/22 1353     COVID19 Not Detected    Narrative:      Fact sheet for providers: https://www.fda.gov/media/627433/download     Fact sheet for patients: https://www.fda.gov/media/427962/download          Imaging Results (Last 24 Hours)     Procedure Component Value Units Date/Time    CT Angiogram Chest [[id number]] Collected: 08/22/22 1452     Updated: 08/22/22 1452    Narrative:      CT ANGIOGRAM OF THE CHEST WITH CONTRAST INCLUDING RECONSTRUCTION IMAGES  08/22/2022     HISTORY: Chest pain. Possible pulmonary embolus.     Following the intravenous contrast injection CT angiography was  performed through the chest. Sagittal, coronal and 3D reconstruction  images were reviewed.     The pulmonary arterial system is well opacified. There is a small  filling defect in the distal left  upper lobe pulmonary artery extending  into a segmental left upper lobe pulmonary artery branch. No other  filling defects are seen.     There is an approximately 1.9 cm right superior hilar lymph node. This  is not well seen on the previous CT scan of the chest dated 04/16/2022  which was performed without contrast that was seen on the previous study  of 09/28/2020. It appears similar in size. A few additional smaller  mediastinal nodes are seen including a subcarinal calcified node.     There are emphysematous changes of the bilateral lungs. Mild  bronchiectasis of the bilateral lower lobes is seen.  Some scattered  ill-defined somewhat ground glass opacities in the lungs, particularly  in the lower lobes. This finding appears largely stable when compared to  the previous study.     Lower cervical fusion plate is seen.     Gallbladder has been removed. IVC filter is seen.       Impression:      1. Small pulmonary embolus in the most distal aspect of the left upper  lobe pulmonary artery extending into a segmental left upper lobe  pulmonary artery branch.  2. Small amount of mediastinal and right hilar lymphadenopathy appears  largely stable since the postcontrast CT scan of 04/20/2020.  3. Bronchiectasis, emphysematous change and chronic fibrosis of the  lungs appears relatively stable since the most recent previous study of  04/16/2022.     Radiation dose reduction techniques were utilized, including automated  exposure control and exposure modulation based on body size.          XR Chest 1 View [[id number]] Collected: 08/22/22 1231     Updated: 08/22/22 1238    Narrative:      XR CHEST 1 VW-     HISTORY: Male who is 71 years-old,  chest pain     TECHNIQUE: Frontal view of the chest     COMPARISON: 11/24/2020     FINDINGS: Heart, mediastinum and pulmonary vasculature are unremarkable.  Mild chronic interstitial changes are apparent predominantly at the  lower lungs. No focal pulmonary consolidation, pleural  effusion, or  pneumothorax. No acute osseous process.       Impression:      No focal pulmonary consolidation. Chronic appearing changes.  Follow-up as clinical indications persist.     This report was finalized on 8/22/2022 12:34 PM by Dr. Pedro Cunningham M.D.             Results for orders placed during the hospital encounter of 04/27/20    Adult Transthoracic Echo Complete W/ Cont if Necessary Per Protocol    Interpretation Summary  · Calculated EF = 65%.  · Left ventricular systolic function is normal.  · Normal valular structure and function      I have reviewed the medications:  Scheduled Meds:atorvastatin, 40 mg, Oral, Daily  folic acid, 1 mg, Oral, Daily  multivitamin with minerals, 1 tablet, Oral, Daily  nicotine, 1 patch, Transdermal, Q24H  pregabalin, 75 mg, Oral, Q12H      Continuous Infusions:heparin, 16.5 Units/kg/hr, Last Rate: Stopped (08/23/22 0903)  Pharmacy to dose warfarin,       PRN Meds:.•  acetaminophen  •  albuterol  •  heparin (porcine)  •  hydrALAZINE  •  HYDROcodone-acetaminophen  •  melatonin  •  nitroglycerin  •  ondansetron **OR** ondansetron  •  Pharmacy to dose warfarin  •  [COMPLETED] Insert peripheral IV **AND** sodium chloride  •  traMADol    Assessment & Plan   Assessment & Plan     Active Hospital Problems    Diagnosis  POA   • **Acute pulmonary embolism without acute cor pulmonale (HCC) [I26.99]  Yes   • Subtherapeutic international normalized ratio (INR) [R79.1]  Yes   • Bronchiectasis without complication (HCC) [J47.9]  Yes   • Acute chest pain [R07.9]  Yes   • Interstitial lung disease (HCC) [J84.9]  Yes   • Tobacco abuse [Z72.0]  Yes   • Long term current use of anticoagulant therapy [Z79.01]  Not Applicable   • GERD (gastroesophageal reflux disease) [K21.9]  Yes   • Hyperlipidemia [E78.5]  Yes   • History of pulmonary embolus (PE) [Z86.711]  Yes   • Fibromyalgia [M79.7]  Yes      Resolved Hospital Problems   No resolved problems to display.        Brief Hospital Course  to date:  Javy Reeves Sr. is a 71 y.o. male presents the hospital with acute pulmonary embolus in the setting of subtherapeutic INR.    Discussion/plan for today:  Admitting physician has consulted oncology to weigh in.  From my standpoint I do not feel patient has failed warfarin anticoagulation as his INR was subtherapeutic at admission.  I think continuing warfarin or transitioning to NOAC are both reasonable.  Continue heparin drip until INR is therapeutic unless hematology recommends transitioning anticoagulation.  Nebulizers as needed for chronic lung disease.  Smoking cessation counseled.  Nicotine patch added.  Discussed treatment plans with patient at great length and explained clinical findings to him at great length.  40 minutes spent in chart review, coordination of care, and counseling.  20 minutes spent at the bedside in direct counseling regarding anticoagulation and PE.        CODE STATUS:   Code Status and Medical Interventions:   Ordered at: 08/22/22 1706     Code Status (Patient has no pulse and is not breathing):    CPR (Attempt to Resuscitate)     Medical Interventions (Patient has pulse or is breathing):    Full       Dg So MD  08/23/22

## 2022-08-23 NOTE — CASE MANAGEMENT/SOCIAL WORK
Discharge Planning Assessment  [facility]     Patient Name: Javy Reeves Sr.  MRN: [id number]  Today's Date: 8/23/2022    Admit Date: 8/22/2022     Discharge Needs Assessment     Row Name 08/23/22 1024       Living Environment    People in Home spouse    Name(s) of People in Home Jed    Current Living Arrangements home    Primary Care Provided by self    Provides Primary Care For no one    Family Caregiver if Needed spouse    Family Caregiver Names Jed [phone]    Quality of Family Relationships helpful;involved    Able to Return to Prior Arrangements yes       Resource/Environmental Concerns    Resource/Environmental Concerns none    Transportation Concerns none       Transition Planning    Patient/Family Anticipates Transition to home with family    Patient/Family Anticipated Services at Transition none    Transportation Anticipated family or friend will provide       Discharge Needs Assessment    Readmission Within the Last 30 Days no previous admission in last 30 days    Equipment Currently Used at Home none    Concerns to be Addressed no discharge needs identified;denies needs/concerns at this time    Anticipated Changes Related to Illness none    Equipment Needed After Discharge none               Discharge Plan     Row Name 08/23/22 2452       Plan    Plan Return home with spouse.    Patient/Family in Agreement with Plan yes    Plan Comments MOON letter checked. Met with patient at bedside, introduced self and explained CCP role. Verified facesheet and PCP- Aletha Ochoa. Patient lives at home with spouse- Jed [phone]. Patient stated he was Ind prior to admission. Patient reports family can transport at DC. Patient denies h/o HH/SNF and has no DME. Patient uses [facility] on [address] [facility] [address] [facility] [place] and reports no difficulty affording medications.  Patient stated he anticipates no DC needs at this time. CCP to follow. Rome MAYER              Continued Care and Services -  Admitted Since 8/22/2022    Coordination has not been started for this encounter.          Demographic Summary     Row Name 08/23/22 1023       General Information    Admission Type observation    Arrived From home    Referral Source admission list    Reason for Consult discharge planning    Preferred Language English               Functional Status     Row Name 08/23/22 1023       Functional Status    Usual Activity Tolerance good    Current Activity Tolerance good       Functional Status, IADL    Medications independent    Meal Preparation independent    Housekeeping independent    Laundry independent    Shopping independent       Mental Status    General Appearance WDL WDL       Mental Status Summary    Recent Changes in Mental Status/Cognitive Functioning no changes               Psychosocial    No documentation.                Abuse/Neglect    No documentation.                Legal    No documentation.                Substance Abuse    No documentation.                Patient Forms    No documentation.                   Rome Alejandra RN

## 2022-08-23 NOTE — PLAN OF CARE
Goal Outcome Evaluation:  Plan of Care Reviewed With: patient           Outcome Evaluation: Patient ao x 4 on RA, c/o chest pain new order for PRN norco given and effective. Remains on hep gtt, held for 1 hour r/t aPTT of >200. VSS all needs met.

## 2022-08-23 NOTE — PROGRESS NOTES
[facility] Clinical Pharmacy Services: Warfarin Dosing/Monitoring Consult    Javy Reeves Sr. is a 71 y.o. male, estimated creatinine clearance is 91.9 mL/min (by C-G formula based on SCr of 0.88 mg/dL). weighing 92 kg (202 lb 12.8 oz).    Results from last 7 days   Lab Units 08/23/22  0755 08/23/22  0744 08/22/22  2342 08/22/22  1237   INR  1.86*  --   --  1.90*   APTT seconds  --  146.8* >200.0* 38.9*   HEMOGLOBIN g/dL  --  14.0  --  14.0   HEMATOCRIT %  --  41.8  --  41.8   PLATELETS 10*3/mm3  --  169  --  166     Prior to admission anticoagulation: Warfarin 8 mg daily    Hospital Anticoagulation:  Consulting provider: Dr. Dg So  Start date: 08/23/2022  Indication: DVT/PE (active thrombosis); patient also has history of IVC Filter, placed in 1996  Target INR: 2 - 3  Expected duration: TBD  Bridge Therapy: Yes , with Heparin    Potential food or drug interactions:   Acetaminophen: Acetaminophen may enhance the anticoagulant effect of Vitamin K Antagonists. This appears with daily acetaminophen doses exceeding 1.3 or 2 g/day for multiple consecutive days.  Multivitamins/Minerals: Multivitamins/Minerals (with ADEK, Folate, Iron) may enhance the anticoagulant effect of Vitamin K Antagonists.   Tramadol: Tramadol may enhance the anticoagulant effect of Vitamin K Antagonists    Patient is eating normally.    Education complete?/Date: No; plan for follow up TBD    Assessment/Plan:  Will give a one time dose of Warfarin 12 mg. INR is still subtherapeutic, however the patient is on a therapeutic-dose heparin infusion as a bridge to be continued until INR becomes therapeutic. We will continue to monitor and adjust based on both agents  Monitor for any signs or symptoms of bleeding   Follow up daily INRs and dose adjustments    Pharmacy will continue to follow until discharge or discontinuation of warfarin.     Matthew Chen  Pharmacy Resident

## 2022-08-24 LAB
ALBUMIN SERPL-MCNC: 3.9 G/DL (ref 3.5–5.2)
ALBUMIN/GLOB SERPL: 1.4 G/DL
ALP SERPL-CCNC: 59 U/L (ref 39–117)
ALT SERPL W P-5'-P-CCNC: 25 U/L (ref 1–41)
ANION GAP SERPL CALCULATED.3IONS-SCNC: 11 MMOL/L (ref 5–15)
APTT PPP: 47.8 SECONDS (ref 22.7–35.4)
APTT PPP: 87.4 SECONDS (ref 22.7–35.4)
AST SERPL-CCNC: 26 U/L (ref 1–40)
BASOPHILS # BLD AUTO: 0.03 10*3/MM3 (ref 0–0.2)
BASOPHILS NFR BLD AUTO: 0.6 % (ref 0–1.5)
BILIRUB SERPL-MCNC: 0.6 MG/DL (ref 0–1.2)
BUN SERPL-MCNC: 14 MG/DL (ref 8–23)
BUN/CREAT SERPL: 15.4 (ref 7–25)
CALCIUM SPEC-SCNC: 9 MG/DL (ref 8.6–10.5)
CHLORIDE SERPL-SCNC: 103 MMOL/L (ref 98–107)
CO2 SERPL-SCNC: 25 MMOL/L (ref 22–29)
CREAT SERPL-MCNC: 0.91 MG/DL (ref 0.76–1.27)
DEPRECATED RDW RBC AUTO: 49.7 FL (ref 37–54)
EGFRCR SERPLBLD CKD-EPI 2021: 91.2 ML/MIN/1.73
EOSINOPHIL # BLD AUTO: 0.12 10*3/MM3 (ref 0–0.4)
EOSINOPHIL NFR BLD AUTO: 2.5 % (ref 0.3–6.2)
ERYTHROCYTE [DISTWIDTH] IN BLOOD BY AUTOMATED COUNT: 14.3 % (ref 12.3–15.4)
F5 GENE MUT ANL BLD/T: NORMAL
FACTOR II, DNA ANALYSIS: NORMAL
GLOBULIN UR ELPH-MCNC: 2.7 GM/DL
GLUCOSE SERPL-MCNC: 92 MG/DL (ref 65–99)
HCT VFR BLD AUTO: 43.3 % (ref 37.5–51)
HGB BLD-MCNC: 14.5 G/DL (ref 13–17.7)
IMM GRANULOCYTES # BLD AUTO: 0.01 10*3/MM3 (ref 0–0.05)
IMM GRANULOCYTES NFR BLD AUTO: 0.2 % (ref 0–0.5)
INR PPP: 1.63 (ref 0.9–1.1)
LYMPHOCYTES # BLD AUTO: 2.09 10*3/MM3 (ref 0.7–3.1)
LYMPHOCYTES NFR BLD AUTO: 44.3 % (ref 19.6–45.3)
MCH RBC QN AUTO: 31.9 PG (ref 26.6–33)
MCHC RBC AUTO-ENTMCNC: 33.5 G/DL (ref 31.5–35.7)
MCV RBC AUTO: 95.4 FL (ref 79–97)
MONOCYTES # BLD AUTO: 0.45 10*3/MM3 (ref 0.1–0.9)
MONOCYTES NFR BLD AUTO: 9.5 % (ref 5–12)
NEUTROPHILS NFR BLD AUTO: 2.02 10*3/MM3 (ref 1.7–7)
NEUTROPHILS NFR BLD AUTO: 42.9 % (ref 42.7–76)
NRBC BLD AUTO-RTO: 0 /100 WBC (ref 0–0.2)
PLATELET # BLD AUTO: 197 10*3/MM3 (ref 140–450)
PMV BLD AUTO: 9.8 FL (ref 6–12)
POTASSIUM SERPL-SCNC: 3.9 MMOL/L (ref 3.5–5.2)
PROT SERPL-MCNC: 6.6 G/DL (ref 6–8.5)
PROTHROMBIN TIME: 19 SECONDS (ref 11.7–14.2)
RBC # BLD AUTO: 4.54 10*6/MM3 (ref 4.14–5.8)
SODIUM SERPL-SCNC: 139 MMOL/L (ref 136–145)
WBC NRBC COR # BLD: 4.72 10*3/MM3 (ref 3.4–10.8)

## 2022-08-24 PROCEDURE — 96376 TX/PRO/DX INJ SAME DRUG ADON: CPT

## 2022-08-24 PROCEDURE — 85613 RUSSELL VIPER VENOM DILUTED: CPT | Performed by: INTERNAL MEDICINE

## 2022-08-24 PROCEDURE — 85705 THROMBOPLASTIN INHIBITION: CPT | Performed by: INTERNAL MEDICINE

## 2022-08-24 PROCEDURE — 85730 THROMBOPLASTIN TIME PARTIAL: CPT | Performed by: INTERNAL MEDICINE

## 2022-08-24 PROCEDURE — 25010000002 HEPARIN (PORCINE) PER 1000 UNITS: Performed by: EMERGENCY MEDICINE

## 2022-08-24 PROCEDURE — 85732 THROMBOPLASTIN TIME PARTIAL: CPT | Performed by: INTERNAL MEDICINE

## 2022-08-24 PROCEDURE — 85025 COMPLETE CBC W/AUTO DIFF WBC: CPT | Performed by: EMERGENCY MEDICINE

## 2022-08-24 PROCEDURE — 85300 ANTITHROMBIN III ACTIVITY: CPT | Performed by: INTERNAL MEDICINE

## 2022-08-24 PROCEDURE — 86146 BETA-2 GLYCOPROTEIN ANTIBODY: CPT | Performed by: INTERNAL MEDICINE

## 2022-08-24 PROCEDURE — 86147 CARDIOLIPIN ANTIBODY EA IG: CPT | Performed by: INTERNAL MEDICINE

## 2022-08-24 PROCEDURE — 25010000002 HEPARIN (PORCINE) 25000-0.45 UT/250ML-% SOLUTION: Performed by: EMERGENCY MEDICINE

## 2022-08-24 PROCEDURE — 81240 F2 GENE: CPT | Performed by: INTERNAL MEDICINE

## 2022-08-24 PROCEDURE — 85610 PROTHROMBIN TIME: CPT | Performed by: INTERNAL MEDICINE

## 2022-08-24 PROCEDURE — 99231 SBSQ HOSP IP/OBS SF/LOW 25: CPT | Performed by: INTERNAL MEDICINE

## 2022-08-24 PROCEDURE — 81241 F5 GENE: CPT | Performed by: INTERNAL MEDICINE

## 2022-08-24 PROCEDURE — 85670 THROMBIN TIME PLASMA: CPT | Performed by: INTERNAL MEDICINE

## 2022-08-24 PROCEDURE — 80053 COMPREHEN METABOLIC PANEL: CPT | Performed by: INTERNAL MEDICINE

## 2022-08-24 PROCEDURE — 96366 THER/PROPH/DIAG IV INF ADDON: CPT

## 2022-08-24 PROCEDURE — G0378 HOSPITAL OBSERVATION PER HR: HCPCS

## 2022-08-24 RX ORDER — WARFARIN SODIUM 6 MG/1
12 TABLET ORAL
Status: COMPLETED | OUTPATIENT
Start: 2022-08-24 | End: 2022-08-24

## 2022-08-24 RX ADMIN — HEPARIN SODIUM 12.5 UNITS/KG/HR: 10000 INJECTION, SOLUTION INTRAVENOUS at 15:54

## 2022-08-24 RX ADMIN — NICOTINE 1 PATCH: 7 PATCH, EXTENDED RELEASE TRANSDERMAL at 09:21

## 2022-08-24 RX ADMIN — PREGABALIN 75 MG: 75 CAPSULE ORAL at 21:03

## 2022-08-24 RX ADMIN — TRAMADOL HYDROCHLORIDE 50 MG: 50 TABLET, COATED ORAL at 21:03

## 2022-08-24 RX ADMIN — MULTIPLE VITAMINS W/ MINERALS TAB 1 TABLET: TAB at 09:21

## 2022-08-24 RX ADMIN — PREGABALIN 75 MG: 75 CAPSULE ORAL at 09:21

## 2022-08-24 RX ADMIN — Medication 1 MG: at 09:21

## 2022-08-24 RX ADMIN — WARFARIN 12 MG: 6 TABLET ORAL at 17:29

## 2022-08-24 RX ADMIN — HEPARIN SODIUM 7300 UNITS: 5000 INJECTION INTRAVENOUS; SUBCUTANEOUS at 21:01

## 2022-08-24 RX ADMIN — ATORVASTATIN CALCIUM 40 MG: 20 TABLET, FILM COATED ORAL at 09:21

## 2022-08-24 NOTE — CASE MANAGEMENT/SOCIAL WORK
Continued Stay Note  [facility]     Patient Name: Javy Reeves Sr.  MRN: [id number]  Today's Date: 8/24/2022    Admit Date: 8/22/2022     Discharge Plan     Row Name 08/24/22 6271       Plan    Plan Return home with spouse.    Plan Comments [facility] reviewed Lovenox cost- will pay for 1.5 mg/ml q24 -- its 10$ . CCP to follow. Rome MAYER               Discharge Codes    No documentation.               Expected Discharge Date and Time     Expected Discharge Date Expected Discharge Time    Aug 26, 2022             Rome Alejandra RN

## 2022-08-24 NOTE — PLAN OF CARE
Goal Outcome Evaluation:  Plan of Care Reviewed With: patient        Progress: improving  Outcome Evaluation: Patient denied pain this shift, remains on RA. Continues hep gtt, titrated per MD order r/t aPTT results. VSS all needs met.

## 2022-08-24 NOTE — CASE MANAGEMENT/SOCIAL WORK
Continued Stay Note  [facility]     Patient Name: Javy Reeves Sr.  MRN: [id number]  Today's Date: 8/24/2022    Admit Date: 8/22/2022     Discharge Plan     Row Name 08/24/22 1525       Plan    Plan Return home with spouse    Patient/Family in Agreement with Plan yes    Plan Comments Spoke with patient at bedside to notify of possible DC tomorrow. Patient stated he would notify his spouse and she can transport him home at DC. CCP to follow. Rome MAYER    Row Name 08/24/22 8640       Plan    Plan Return home with spouse.    Plan Comments [facility] reviewed Lovenox cost- will pay for 1.5 mg/ml q24 -- its 10$ . CCP to follow. Rome MAYER               Discharge Codes    No documentation.               Expected Discharge Date and Time     Expected Discharge Date Expected Discharge Time    Aug 26, 2022             Rome Alejandra RN

## 2022-08-24 NOTE — CASE MANAGEMENT/SOCIAL WORK
Continued Stay Note  [facility]     Patient Name: Javy Reeves Sr.  MRN: [id number]  Today's Date: 8/24/2022    Admit Date: 8/22/2022     Discharge Plan     Row Name 08/24/22 1048       Plan    Plan Comments Currently on Heparin gtt and Coumadin.  Plans to dc home once INR therapeutic. Hanane Villanueva RN               Discharge Codes    No documentation.               Expected Discharge Date and Time     Expected Discharge Date Expected Discharge Time    Aug 26, 2022             Hanane Villanueva RN

## 2022-08-24 NOTE — PROGRESS NOTES
[facility] Medicine Services  PROGRESS NOTE    Patient Name: Javy Reeves Sr.  : 1950  MRN: [id number]    Date of Admission: 2022  Primary Care Physician: Aletha Ochoa PA-C    Subjective   Subjective     CC:  Follow-up pleuritic pain    HPI:  Chest pain further improved.  No current bleeding on heparin drip.  Overall feels much better     Review of Systems  No current fevers or chills  No current shortness of breath or cough  No current nausea, vomiting, or diarrhea  No current chest pain or palpitations      Objective   Objective     Vital Signs:   Temp:  [97.5 °F (36.4 °C)-98 °F (36.7 °C)] 97.5 °F (36.4 °C)  Heart Rate:  [55-65] 64  Resp:  [18] 18  BP: (126-146)/(77-86) 126/77        Physical Exam:  Constitutional:Awake, alert  HENT: NCAT, mucous membranes moist, neck supple  Respiratory: Occasional cough, occasional wheezes, nonlabored breathing  Cardiovascular: RRR, normal radial pulses  Gastrointestinal: Positive bowel sounds, soft, nontender, nondistended  Musculoskeletal: Spooning of the fingernails, normal musculature for age, no lower extremity edema, BMI 28  Psychiatric: Appropriate affect, cooperative, conversational  Neurologic: No slurred speech or facial droop, follows commands  Skin: No rashes or jaundice, warm      Results Reviewed:  Results from last 7 days   Lab Units 22  0455 22  1409 22  0755 22  0744 22  1237   WBC 10*3/mm3 4.72  --   --  4.16 4.24   HEMOGLOBIN g/dL 14.5  --   --  14.0 14.0   HEMATOCRIT % 43.3  --   --  41.8 41.8   PLATELETS 10*3/mm3 197  --   --  169 166   INR  1.63* 1.70* 1.86*  --  1.90*     Results from last 7 days   Lab Units 22  0455 22  0744 22  1415 22  1237   SODIUM mmol/L 139 140  --  141   POTASSIUM mmol/L 3.9 4.1  --  3.8   CHLORIDE mmol/L 103 106  --  106   CO2 mmol/L 25.0 26.4  --  25.0   BUN mg/dL 14 12  --  15   CREATININE mg/dL 0.91 0.88  --  0.93   GLUCOSE mg/dL 92 91  --  91    CALCIUM mg/dL 9.0 8.7  --  8.9   ALT (SGPT) U/L 25  --   --  20   AST (SGOT) U/L 26  --   --  26   TROPONIN T ng/mL  --   --  <0.010 <0.010   PROBNP pg/mL  --   --   --  59.4     Estimated Creatinine Clearance: 88.8 mL/min (by C-G formula based on SCr of 0.91 mg/dL).    Microbiology Results Abnormal     Procedure Component Value - Date/Time    COVID PRE-OP / PRE-PROCEDURE SCREENING ORDER (NO ISOLATION) - Swab, Nasopharynx [[id number]]  (Normal) Collected: 08/22/22 1306    Lab Status: Final result Specimen: Swab from Nasopharynx Updated: 08/22/22 1353    Narrative:      The following orders were created for panel order COVID PRE-OP / PRE-PROCEDURE SCREENING ORDER (NO ISOLATION) - Swab, Nasopharynx.  Procedure                               Abnormality         Status                     ---------                               -----------         ------                     COVID-19,[facility] IN-HOUSE...[[id number]]  Normal              Final result                 Please view results for these tests on the individual orders.    COVID-19,[facility] IN-HOUSE CEPHEID/ANA NP SWAB IN TRANSPORT MEDIA 8-12 HR TAT - Swab, Nasopharynx [[id number]]  (Normal) Collected: 08/22/22 1306    Lab Status: Final result Specimen: Swab from Nasopharynx Updated: 08/22/22 1353     COVID19 Not Detected    Narrative:      Fact sheet for providers: https://www.fda.gov/media/572160/download     Fact sheet for patients: https://www.fda.gov/media/157864/download          Imaging Results (Last 24 Hours)     ** No results found for the last 24 hours. **          Results for orders placed during the hospital encounter of 04/27/20    Adult Transthoracic Echo Complete W/ Cont if Necessary Per Protocol    Interpretation Summary  · Calculated EF = 65%.  · Left ventricular systolic function is normal.  · Normal valular structure and function      I have reviewed the medications:  Scheduled Meds:atorvastatin, 40 mg, Oral, Daily  folic acid, 1 mg, Oral,  Daily  multivitamin with minerals, 1 tablet, Oral, Daily  nicotine, 1 patch, Transdermal, Q24H  pregabalin, 75 mg, Oral, Q12H  warfarin (COUMADIN) (dosing per levels), , Does not apply, Daily      Continuous Infusions:heparin, 16.5 Units/kg/hr, Last Rate: 12.5 Units/kg/hr (08/23/22 5108)  Pharmacy to dose warfarin,       PRN Meds:.•  acetaminophen  •  albuterol  •  heparin (porcine)  •  hydrALAZINE  •  HYDROcodone-acetaminophen  •  melatonin  •  nitroglycerin  •  ondansetron **OR** ondansetron  •  Pharmacy to dose warfarin  •  [COMPLETED] Insert peripheral IV **AND** sodium chloride  •  traMADol    Assessment & Plan   Assessment & Plan     Active Hospital Problems    Diagnosis  POA   • **Acute pulmonary embolism without acute cor pulmonale (HCC) [I26.99]  Yes   • Subtherapeutic international normalized ratio (INR) [R79.1]  Yes   • Bronchiectasis without complication (HCC) [J47.9]  Yes   • Acute chest pain [R07.9]  Yes   • Interstitial lung disease (HCC) [J84.9]  Yes   • Tobacco abuse [Z72.0]  Yes   • Long term current use of anticoagulant therapy [Z79.01]  Not Applicable   • GERD (gastroesophageal reflux disease) [K21.9]  Yes   • Hyperlipidemia [E78.5]  Yes   • History of pulmonary embolus (PE) [Z86.711]  Yes   • Fibromyalgia [M79.7]  Yes      Resolved Hospital Problems   No resolved problems to display.        Brief Hospital Course to date:  Javy Reeves . is a 71 y.o. male presents the hospital with acute pulmonary embolus in the setting of subtherapeutic INR.    Discussion/plan for today:  Case discussed with pharmacist, nurse, and case management on multidisciplinary rounds.  We will try another dose of increased warfarin today and continue to monitor INR.  Continue heparin drip at current dose.  PTT reviewed.  Continue with adjustment per protocol.  Patient does not want to transition to a NOAC.  He prefers to continue warfarin at this time.  Likely will need very slightly increase in warfarin dosing at  discharge.  Possible home tomorrow if INR is therapeutic.  Continue heparin drip until INR is therapeutic unless hematology recommends transitioning anticoagulation.  Nebulizers as needed for chronic lung disease.  Smoking cessation counseled.  Nicotine patch added.  Discussed treatment plans with patient at great length and explained clinical findings to him at great length.           CODE STATUS:   Code Status and Medical Interventions:   Ordered at: 08/22/22 9087     Code Status (Patient has no pulse and is not breathing):    CPR (Attempt to Resuscitate)     Medical Interventions (Patient has pulse or is breathing):    Full       Dg So MD  08/24/22

## 2022-08-24 NOTE — PROGRESS NOTES
[facility] Clinical Pharmacy Services: Warfarin Dosing/Monitoring Consult    Javy Reeves Sr. is a 71 y.o. male, estimated creatinine clearance is 88.8 mL/min (by C-G formula based on SCr of 0.91 mg/dL). weighing 91.8 kg (202 lb 6.4 oz).    Results from last 7 days   Lab Units 08/24/22  0455 08/23/22  2132 08/23/22  1409 08/23/22  0755 08/23/22  0744 08/22/22  2342 08/22/22  1237   INR  1.63*  --  1.70* 1.86*  --   --  1.90*   APTT seconds 87.4* 69.4* 81.0*  --  146.8* >200.0* 38.9*   HEMOGLOBIN g/dL 14.5  --   --   --  14.0  --  14.0   HEMATOCRIT % 43.3  --   --   --  41.8  --  41.8   PLATELETS 10*3/mm3 197  --   --   --  169  --  166     Prior to admission anticoagulation: Warfarin 8 mg daily     Hospital Anticoagulation:  Consulting provider: Dr. Dg So  Start date: 08/23/2022  Indication: DVT/PE (active thrombosis); patient also has history of IVC Filter, placed in 1996  Target INR: 2 - 3  Expected duration: TBD  Bridge Therapy: Yes , with Heparin     Potential food or drug interactions:   Acetaminophen: Acetaminophen may enhance the anticoagulant effect of Vitamin K Antagonists. This appears with daily acetaminophen doses exceeding 1.3 or 2 g/day for multiple consecutive days.  Multivitamins/Minerals: Multivitamins/Minerals (with ADEK, Folate, Iron) may enhance the anticoagulant effect of Vitamin K Antagonists.   Tramadol: Tramadol may enhance the anticoagulant effect of Vitamin K Antagonists     Patient is eating normally.     Education complete?/Date: No; plan for follow up TBD     Assessment/Plan:  Will give another one time dose of Warfarin 12 mg. INR is still subtherapeutic, however the patient is on a therapeutic-dose heparin infusion as a bridge to be continued until INR becomes therapeutic. We will continue to monitor and adjust based on both agents  Monitor for any signs or symptoms of bleeding   Follow up daily INRs and dose adjustments     Pharmacy will continue to follow until  discharge or discontinuation of warfarin.     Matthew Chen  Pharmacy Resident

## 2022-08-25 ENCOUNTER — READMISSION MANAGEMENT (OUTPATIENT)
Dept: CALL CENTER | Facility: HOSPITAL | Age: 72
End: 2022-08-25

## 2022-08-25 ENCOUNTER — ANTICOAGULATION VISIT (OUTPATIENT)
Dept: FAMILY MEDICINE CLINIC | Facility: CLINIC | Age: 72
End: 2022-08-25

## 2022-08-25 VITALS
WEIGHT: 205.1 LBS | RESPIRATION RATE: 18 BRPM | DIASTOLIC BLOOD PRESSURE: 68 MMHG | TEMPERATURE: 97.5 F | BODY MASS INDEX: 28.71 KG/M2 | OXYGEN SATURATION: 96 % | HEART RATE: 59 BPM | HEIGHT: 71 IN | SYSTOLIC BLOOD PRESSURE: 116 MMHG

## 2022-08-25 LAB
ANION GAP SERPL CALCULATED.3IONS-SCNC: 7.4 MMOL/L (ref 5–15)
APTT PPP: 97.2 SECONDS (ref 22.7–35.4)
APTT PPP: >200 SECONDS (ref 22.7–35.4)
BASOPHILS # BLD AUTO: 0.01 10*3/MM3 (ref 0–0.2)
BASOPHILS NFR BLD AUTO: 0.2 % (ref 0–1.5)
BUN SERPL-MCNC: 12 MG/DL (ref 8–23)
BUN/CREAT SERPL: 12.4 (ref 7–25)
CALCIUM SPEC-SCNC: 9 MG/DL (ref 8.6–10.5)
CARDIOLIPIN IGG SER IA-ACNC: 91 GPL U/ML (ref 0–14)
CARDIOLIPIN IGM SER IA-ACNC: <9 MPL U/ML (ref 0–12)
CHLORIDE SERPL-SCNC: 104 MMOL/L (ref 98–107)
CO2 SERPL-SCNC: 27.6 MMOL/L (ref 22–29)
CREAT SERPL-MCNC: 0.97 MG/DL (ref 0.76–1.27)
DEPRECATED RDW RBC AUTO: 51.3 FL (ref 37–54)
EGFRCR SERPLBLD CKD-EPI 2021: 84.5 ML/MIN/1.73
EOSINOPHIL # BLD AUTO: 0.09 10*3/MM3 (ref 0–0.4)
EOSINOPHIL NFR BLD AUTO: 1.7 % (ref 0.3–6.2)
ERYTHROCYTE [DISTWIDTH] IN BLOOD BY AUTOMATED COUNT: 14.5 % (ref 12.3–15.4)
GLUCOSE SERPL-MCNC: 104 MG/DL (ref 65–99)
HCT VFR BLD AUTO: 43.2 % (ref 37.5–51)
HGB BLD-MCNC: 14.1 G/DL (ref 13–17.7)
IMM GRANULOCYTES # BLD AUTO: 0.01 10*3/MM3 (ref 0–0.05)
IMM GRANULOCYTES NFR BLD AUTO: 0.2 % (ref 0–0.5)
INR PPP: 1.87 (ref 0.9–1.1)
LYMPHOCYTES # BLD AUTO: 2.18 10*3/MM3 (ref 0.7–3.1)
LYMPHOCYTES NFR BLD AUTO: 41.1 % (ref 19.6–45.3)
MCH RBC QN AUTO: 31.7 PG (ref 26.6–33)
MCHC RBC AUTO-ENTMCNC: 32.6 G/DL (ref 31.5–35.7)
MCV RBC AUTO: 97.1 FL (ref 79–97)
MONOCYTES # BLD AUTO: 0.66 10*3/MM3 (ref 0.1–0.9)
MONOCYTES NFR BLD AUTO: 12.5 % (ref 5–12)
NEUTROPHILS NFR BLD AUTO: 2.35 10*3/MM3 (ref 1.7–7)
NEUTROPHILS NFR BLD AUTO: 44.3 % (ref 42.7–76)
NRBC BLD AUTO-RTO: 0 /100 WBC (ref 0–0.2)
PLATELET # BLD AUTO: 169 10*3/MM3 (ref 140–450)
PMV BLD AUTO: 9.2 FL (ref 6–12)
POTASSIUM SERPL-SCNC: 4.1 MMOL/L (ref 3.5–5.2)
PROTHROMBIN TIME: 21.2 SECONDS (ref 11.7–14.2)
RBC # BLD AUTO: 4.45 10*6/MM3 (ref 4.14–5.8)
SODIUM SERPL-SCNC: 139 MMOL/L (ref 136–145)
WBC NRBC COR # BLD: 5.3 10*3/MM3 (ref 3.4–10.8)

## 2022-08-25 PROCEDURE — G0378 HOSPITAL OBSERVATION PER HR: HCPCS

## 2022-08-25 PROCEDURE — 85730 THROMBOPLASTIN TIME PARTIAL: CPT | Performed by: INTERNAL MEDICINE

## 2022-08-25 PROCEDURE — 96366 THER/PROPH/DIAG IV INF ADDON: CPT

## 2022-08-25 PROCEDURE — 85025 COMPLETE CBC W/AUTO DIFF WBC: CPT | Performed by: EMERGENCY MEDICINE

## 2022-08-25 PROCEDURE — 25010000002 ENOXAPARIN PER 10 MG: Performed by: INTERNAL MEDICINE

## 2022-08-25 PROCEDURE — 96372 THER/PROPH/DIAG INJ SC/IM: CPT

## 2022-08-25 PROCEDURE — 85610 PROTHROMBIN TIME: CPT | Performed by: INTERNAL MEDICINE

## 2022-08-25 PROCEDURE — 80048 BASIC METABOLIC PNL TOTAL CA: CPT | Performed by: INTERNAL MEDICINE

## 2022-08-25 RX ORDER — WARFARIN SODIUM 5 MG/1
5 TABLET ORAL DAILY
Start: 2022-08-25 | End: 2022-09-21 | Stop reason: SDUPTHER

## 2022-08-25 RX ORDER — WARFARIN SODIUM 1 MG/1
4 TABLET ORAL DAILY
Start: 2022-08-25 | End: 2023-03-24

## 2022-08-25 RX ORDER — ENOXAPARIN SODIUM 100 MG/ML
90 INJECTION SUBCUTANEOUS EVERY 12 HOURS
Qty: 10 ML | Refills: 0 | Status: SHIPPED | OUTPATIENT
Start: 2022-08-25 | End: 2022-08-31 | Stop reason: SDUPTHER

## 2022-08-25 RX ORDER — ENOXAPARIN SODIUM 100 MG/ML
90 INJECTION SUBCUTANEOUS EVERY 12 HOURS
Status: DISCONTINUED | OUTPATIENT
Start: 2022-08-25 | End: 2022-08-25 | Stop reason: HOSPADM

## 2022-08-25 RX ADMIN — PREGABALIN 75 MG: 75 CAPSULE ORAL at 09:03

## 2022-08-25 RX ADMIN — Medication 1 MG: at 08:59

## 2022-08-25 RX ADMIN — NICOTINE 1 PATCH: 7 PATCH, EXTENDED RELEASE TRANSDERMAL at 09:00

## 2022-08-25 RX ADMIN — ATORVASTATIN CALCIUM 40 MG: 20 TABLET, FILM COATED ORAL at 08:59

## 2022-08-25 RX ADMIN — ENOXAPARIN SODIUM 90 MG: 100 INJECTION SUBCUTANEOUS at 12:13

## 2022-08-25 RX ADMIN — MULTIPLE VITAMINS W/ MINERALS TAB 1 TABLET: TAB at 09:00

## 2022-08-25 NOTE — PROGRESS NOTES
Subjective .     REASONS FOR FOLLOWUP:  . History of pulmonary embolism in the past and now with left upper lobe pulmonary embolism    Interval history:    August 24, 2022: Patient has been started back on Coumadin.  He was subtherapeutic when he came in with an INR of 1.9.  However would suggest to keep INR between 2.5 and 3.  He is currently on the heparin drip.  He is not having any active bleeding    HISTORY OF PRESENT ILLNESS:  The patient is a 71 y.o. year old male who is here for follow-up with the above-mentioned history.    History of Present Illness     Patient is a 71-year-old gentleman who came to the ER with chest pain which started yesterday morning at work.  He had shortness of breath and had nausea and diaphoresis.  He then came to the ER with his wife.  He has had a past history of pulmonary embolism and DVT and was on Coumadin.  His INR was checked weekly and apparently was therapeutic.  He denied any hemoptysis.     He underwent CT angiogram of the chest August 22, 2022 which showed a small filling defect in the distal left upper lobe pulmonary artery extending into segmental left upper lobe pulmonary artery branch.  No other filling defects were seen.  There is also a 1.9 cm superior right hilar lymph node.  This is not well seen on the previous CT scan of the chest dated April 16, 2022 which was performed without contrast but this was seen on September 28, 2020 when it was similar in size.  A few additional mediastinal nodes are seen including a subcarinal calcified node.  There is emphysema in both lungs.     Their impression was that patient has a small pulmonary embolism in the most distal aspect of the left upper lobe artery extending into a segmental left upper lobe pulmonary artery branch.  And small amount of mediastinal or hilar lymphadenopathy which appears to be stable from April 28, 2020.  Patient was placed on heparin drip.  His INR in the ER was not therapeutic.  His INR is 1.9.   COVID-19 was negative     CT angiogram April 18, 2022 had shown no change in bilateral bronchiectasis, groundglass opacity and subpleural reticulation consistent with fibrosis and no new focal consolidation.    Past Medical History:   Diagnosis Date   • Acute and subacute infective endocarditis in diseases classified elsewhere    • Allergic    • Arthritis    • Asthma 04/26/2021   • Chest pain    • Chronic low back pain    • Chronic pain    • Colon polyps    • Coronary artery disease    • DDD (degenerative disc disease), cervical    • DDD (degenerative disc disease), lumbosacral    • Diverticulosis    • DVT (deep venous thrombosis) ([facility]) 1996    Left Lower extremity following arthroscopic knee surgery in 1996. IVC fliter placed at that time.   • Encounter for special screening examination for neoplasm of prostate 2012   • Eye exam, routine > 5 yrs ago   • Eye exam, routine 01/2015   • Fibromyalgia    • Fibromyositis    • GERD (gastroesophageal reflux disease)    • HIV disease ([facility])    • Hyperlipidemia    • Injury of back    • Injury of neck    • Irritable bowel    • Lumbar stenosis    • MI (myocardial infarction) ([facility])    • Neck pain    • Pain in limb    • Past heart attack    • Postlaminectomy syndrome of lumbar region    • Pulmonary embolism ([facility]) 1996    Left Lower extremity following arthroscopic knee surgery in 1996. IVC fliter placed at that time.   • Reflux esophagitis    • Shortness of breath    • Sleep apnea     NO CPAP   • Stroke ([facility])    • TIA (transient ischemic attack)        ONCOLOGIC HISTORY:  (History from previous dates can be found in the separate document.)    No current facility-administered medications on file prior to encounter.     Current Outpatient Medications on File Prior to Encounter   Medication Sig Dispense Refill   • acetaminophen (TYLENOL) 325 MG tablet Take 650 mg by mouth Every Morning.     • albuterol sulfate  (90 Base) MCG/ACT inhaler INHALE 2 PUFFS INTO THE LUNGS EVERY 4  HOURS AS NEEDED FOR WHEEZING OR SHORTNESS OF AIR. 54 g 3   • atorvastatin (LIPITOR) 40 MG tablet TAKE 1 TABLET BY MOUTH DAILY FOR CHOLESTEROL 90 tablet 3   • folic acid (FOLVITE) 1 MG tablet Take 1 tablet by mouth Daily. 90 tablet 3   • loratadine (CLARITIN) 10 MG tablet Take 10 mg by mouth Daily.     • multivitamin with minerals tablet tablet Take 1 tablet by mouth Daily.     • nitroglycerin (NITROSTAT) 0.4 MG SL tablet Place 1 tablet under the tongue Every 5 (Five) Minutes As Needed for Chest Pain. Take no more than 3 doses in 15 minutes. 30 tablet 5   • pregabalin (LYRICA) 75 MG capsule TAKE 1 CAPSULE BY MOUTH TWICE DAILY FOR FIBROMYALGIA 180 capsule 1   • warfarin (COUMADIN) 1 MG tablet TAKE 3 TABLETS BY MOUTH DAILY (Patient taking differently: Take 3 mg by mouth Daily. Take with the 5 mg tablet for a total daily dose of 8 mg.) 270 tablet 3   • warfarin (COUMADIN) 5 MG tablet TAKE 1 TABLET BY MOUTH DAILY (Patient taking differently: Take 5 mg by mouth Daily. Take with the 3 mg tablet for a total daily dose of 8 mg.) 90 tablet 3       ALLERGIES:     Allergies   Allergen Reactions   • Zinc Other (See Comments) and Diarrhea     Patient tested positive, had to have hardware removed from back.   • Chantix [Varenicline] Other (See Comments)     Headache   • Hydrocodone-Acetaminophen GI Intolerance       Social History     Socioeconomic History   • Marital status:    Tobacco Use   • Smoking status: Current Every Day Smoker     Packs/day: 0.50     Years: 30.00     Pack years: 15.00     Types: Cigarettes   • Smokeless tobacco: Never Used   • Tobacco comment: caffeine use, smokes about 5 cigars/day   Vaping Use   • Vaping Use: Never used   Substance and Sexual Activity   • Alcohol use: Yes     Comment: social   • Drug use: No   • Sexual activity: Defer         Cancer-related family history includes Cancer in his father, mother, and sister.     Review of Systems  A comprehensive 14 point review of systems was  performed and was negative except as mentioned.    Objective      Vitals:    08/24/22 0545 08/24/22 0733 08/24/22 1314 08/24/22 1937   BP:  126/77 140/81 161/82   BP Location:  Left arm Left arm Left arm   Patient Position:  Lying Lying Lying   Pulse:  64 56 74   Resp:  18 18 18   Temp:  97.5 °F (36.4 °C) 97.8 °F (36.6 °C) 98.3 °F (36.8 °C)   TempSrc:  Oral Oral Oral   SpO2:  97% 96% 96%   Weight: 91.8 kg (202 lb 6.4 oz)      Height:         No flowsheet data found.    Physical Exam      CONSTITUTIONAL:  Vital signs reviewed.  Alert and oriented x3  No distress, looks comfortable.    RESPIRATORY:  Normal respiratory effort.  No rales  or wheezing, clear.   CARDIOVASCULAR:  Regular rate and rhythm, no murmur  No significant lower extremity edema.  Abdomen: Soft nontender positive bowel sounds no hepatosplenomegaly  SKIN: No wounds.  No rashes.  MUSCULOSKELETAL/EXTREMITIES: No clubbing or cyanosis.  No apparent unilateral weakness.  NEURO: CN 2-12 appear intact. No focal neurological deficits noted.  PSYCHIATRIC:  Normal judgment and insight.  Normal mood and affect.      RECENT LABS:  Hematology WBC   Date Value Ref Range Status   08/24/2022 4.72 3.40 - 10.80 10*3/mm3 Final     RBC   Date Value Ref Range Status   08/24/2022 4.54 4.14 - 5.80 10*6/mm3 Final     Hemoglobin   Date Value Ref Range Status   08/24/2022 14.5 13.0 - 17.7 g/dL Final     Hematocrit   Date Value Ref Range Status   08/24/2022 43.3 37.5 - 51.0 % Final     Platelets   Date Value Ref Range Status   08/24/2022 197 140 - 450 10*3/mm3 Final        Assessment & Plan     *Small filling defect in the distal left pulmonary artery extending into segmental left upper lobe pulmonary artery branch consistent with small pulmonary embolism  · CT scan in addition showed 1.9 cm right superior hilar lymph node.  Small amount of mediastinal and right hilar lymphadenopathy appears largely stable compared to April 28, 2020.  Patient also has chronic fibrosis and  bronchiectasis and emphysema.  · Patient has previous pulmonary embolism and was on Coumadin and he checked his INR every week and was therapeutic  · However on admission it was subtherapeutic with an INR of 1.9.  · Patient got admitted with chest pain and shortness of breath and hence concerning for a new pulmonary embolism on a subtherapeutic INR  · I will check with radiology to see if this small pulmonary embolism is residual from previous pulmonary embolism or is it a new 1.  · Either way patient will require to be continuing with heparin drip and keep his INR therapeutic.  He had radiology place.  On, will be ED radiology Marylin this is Dr. Madden can you connect me you know I am seeing a patient Javy Reeves's YOB: 1950 yesterday he had a CT angiogram of the chest I do not worry I did but I need to discuss with them to see if this is a new pulmonary embolism or a residual of his old pulmonary embolism Leandro date of birth is [birth year] okay also mammogram 1 SEPTEMBER [birth year] right okay then after doing perfect thanks  · Since INR was subtherapeutic at 1.9 at admission it is possible he developed blood clot secondary to that however I have obtained hypercoagulable work-up and it is pending  · Factor V Leiden normal prothrombin gene mutation negative rest of the hypercoagulable work-up pending  · Would suggest to keep patient's INR between 2.5 and 3  · Hospitalist team and pharmacy to follow INR levels     Plan  · Continue heparin drip  · Reviewed his recent INRs at home it was always therapeutic between 2.3 and 2.6   · But his admission INR was low at 1.9  · I discussed CT chest results with Dr. Weinstein, radiologist and according to him there was last CT scan from April 2020 which did not show this pulmonary embolism unless patient has had a previous CT in the last year that is not available at [facility].  We will check with patient tomorrow  · Patient states that he had pulmonary embolism 20 years ago  when he had left knee surgery.  And subsequently he has had questionable PE.  · Hypercoagulable work-up pending, factor V Leiden and prothrombin gene mutation negative so far  · We will suggest to keep INR between 2.5 and 3     We will sign off and follow him in the office for the hypercoagulable work-up.    Harriett Madden MD                       Cc:  No ref. provider found

## 2022-08-25 NOTE — OUTREACH NOTE
Prep Survey    Flowsheet Row Responses   Gnosticism facility patient discharged from? [facility]   Is LACE score < 7 ? No   Emergency Room discharge w/ pulse ox? No   Eligibility [facility]   Date of Admission 08/22/22   Date of Discharge 08/25/22   Discharge Disposition Home or Self Care   Discharge diagnosis Acute pulmonary embolism, subtherapeutic INR, bronchiectasis, tobacco abuse   Does the patient have one of the following disease processes/diagnoses(primary or secondary)? Other   Does the patient have Home health ordered? No   Is there a DME ordered? No   Prep survey completed? Yes          JOSTIN TORRES - Registered Nurse

## 2022-08-25 NOTE — DISCHARGE SUMMARY
[facility] Medicine Services  DISCHARGE SUMMARY    Patient Name: Javy Reeves Sr.  : 1950  MRN: [id number]    Date of Admission: 2022 11:48 AM  Date of Discharge: 2022  Primary Care Physician: Aletha Ochoa PA-C    Consults     Date and Time Order Name Status Description    2022  5:06 PM Hematology & Oncology Inpatient Consult      2022  2:47 PM A (on-call MD unless specified) Details            Hospital Course       Active Hospital Problems    Diagnosis  POA   • **Acute pulmonary embolism without acute cor pulmonale (HCC) [I26.99]  Yes   • Subtherapeutic international normalized ratio (INR) [R79.1]  Yes   • Bronchiectasis without complication (HCC) [J47.9]  Yes   • Acute chest pain [R07.9]  Yes   • Interstitial lung disease (HCC) [J84.9]  Yes   • Tobacco abuse [Z72.0]  Yes   • Long term current use of anticoagulant therapy [Z79.01]  Not Applicable   • GERD (gastroesophageal reflux disease) [K21.9]  Yes   • Hyperlipidemia [E78.5]  Yes   • History of pulmonary embolus (PE) [Z86.711]  Yes   • Fibromyalgia [M79.7]  Yes      Resolved Hospital Problems   No resolved problems to display.          Hospital Course:  Javy Reeves Sr. is a 71 y.o. male who presents to the hospital with pulmonary embolus and subtherapeutic INR.  Patient was initially monitored on a heparin drip and did well.  His pleuritic chest pain resolved and he was seen by oncology who has sent a hypercoagulable work-up and plans to see him back in clinic.  Patient will now be discharged on Lovenox until his INR is therapeutic between 2.0 and 3.0.  I have told him preferably we would like long-term to try and keep his INR a little closer to 2.5-3.0 for more protection.  Patient plans to follow-up with primary care provider and with oncology as instructed.  He checks his INR at home and plans to check in on Saturday and will stop Lovenox if his INR is therapeutic at that point.  He is doing very well and is he  medically stable and is eager to discharge home today.    At the time of discharge patient was told to take all medications as prescribed, keep all follow-up appointments, and call their doctor or return to the hospital with any worsening or concerning symptoms.    Please note that this note was made using Dragon voice recognition software            Day of Discharge     HPI:   Feels well.  Wants discharge.  Agrees to follow-up closely with providers and check his INR on Saturday.  He agrees to call his doctors if he has any questions.  He denies any bruising or bleeding.  No further pleuritic chest pain    ROS:  No current fevers or chills  No current shortness of breath or cough  No current nausea, vomiting, or diarrhea  No current chest pain or palpitations    Vital Signs:   Temp:  [97.5 °F (36.4 °C)-98.3 °F (36.8 °C)] 97.5 °F (36.4 °C)  Heart Rate:  [56-74] 59  Resp:  [18] 18  BP: (116-161)/(68-87) 116/68     Physical Exam:  Constitutional:Awake, alert  HENT: NCAT, mucous membranes moist, neck supple  Respiratory: Clear to auscultation bilaterally, respiratory effort normal, nonlabored breathing   Cardiovascular: RRR, normal radial pulses  Gastrointestinal: Positive bowel sounds, soft, nontender, nondistended  Psychiatric: Appropriate affect, cooperative, conversational  Neurologic: No slurred speech or facial droop, follows commands  Skin: No rashes or jaundice, warm      Pertinent  and/or Most Recent Results     Results from last 7 days   Lab Units 08/25/22  0116 08/24/22  0455 08/23/22  0744 08/22/22  1237   WBC 10*3/mm3 5.30 4.72 4.16 4.24   HEMOGLOBIN g/dL 14.1 14.5 14.0 14.0   HEMATOCRIT % 43.2 43.3 41.8 41.8   PLATELETS 10*3/mm3 169 197 169 166   SODIUM mmol/L 139 139 140 141   POTASSIUM mmol/L 4.1 3.9 4.1 3.8   CHLORIDE mmol/L 104 103 106 106   CO2 mmol/L 27.6 25.0 26.4 25.0   BUN mg/dL 12 14 12 15   CREATININE mg/dL 0.97 0.91 0.88 0.93   GLUCOSE mg/dL 104* 92 91 91   CALCIUM mg/dL 9.0 9.0 8.7 8.9      Results from last 7 days   Lab Units 08/25/22  0400 08/24/22  1905 08/24/22  0455 08/23/22  2132 08/23/22  1409 08/23/22  0755 08/23/22  0744 08/22/22  2342 08/22/22  1237   BILIRUBIN mg/dL  --   --  0.6  --   --   --   --   --  0.8   ALK PHOS U/L  --   --  59  --   --   --   --   --  60   ALT (SGPT) U/L  --   --  25  --   --   --   --   --  20   AST (SGOT) U/L  --   --  26  --   --   --   --   --  26   PROTIME Seconds 21.2*  --  19.0*  --  19.6* 21.1*  --   --  21.3*   INR  1.87*  --  1.63*  --  1.70* 1.86*  --   --  1.90*   APTT seconds >200.0* 47.8* 87.4* 69.4* 81.0*  --  146.8* >200.0* 38.9*           Invalid input(s): TG, LDLCALC, LDLREALC  Results from last 7 days   Lab Units 08/22/22  1415 08/22/22  1237   PROBNP pg/mL  --  59.4   TROPONIN T ng/mL <0.010 <0.010       Brief Urine Lab Results  (Last result in the past 365 days)      Color   Clarity   Blood   Leuk Est   Nitrite   Protein   CREAT   Urine HCG        11/23/21 0828 Yellow   Clear   Negative   Negative   Negative   Trace                 Microbiology Results Abnormal     Procedure Component Value - Date/Time    COVID PRE-OP / PRE-PROCEDURE SCREENING ORDER (NO ISOLATION) - Swab, Nasopharynx [[id number]]  (Normal) Collected: 08/22/22 1306    Lab Status: Final result Specimen: Swab from Nasopharynx Updated: 08/22/22 1353    Narrative:      The following orders were created for panel order COVID PRE-OP / PRE-PROCEDURE SCREENING ORDER (NO ISOLATION) - Swab, Nasopharynx.  Procedure                               Abnormality         Status                     ---------                               -----------         ------                     COVID-[facility] IN-HOUSE...[[id number]]  Normal              Final result                 Please view results for these tests on the individual orders.    COVID-19,[facility] IN-HOUSE CEPHEID/ANA NP SWAB IN TRANSPORT MEDIA 8-12 HR TAT - Swab, Nasopharynx [[id number]]  (Normal) Collected: 08/22/22 1306    Lab Status:  Final result Specimen: Swab from Nasopharynx Updated: 08/22/22 1353     COVID19 Not Detected    Narrative:      Fact sheet for providers: https://www.fda.gov/media/072268/download     Fact sheet for patients: https://www.fda.gov/media/416227/download          Imaging Results (All)     Procedure Component Value Units Date/Time    CT Angiogram Chest [[id number]] Collected: 08/22/22 1452     Updated: 08/23/22 1030    Narrative:      CT ANGIOGRAM OF THE CHEST WITH CONTRAST INCLUDING RECONSTRUCTION IMAGES  08/22/2022     HISTORY: Chest pain. Possible pulmonary embolus.     Following the intravenous contrast injection CT angiography was  performed through the chest. Sagittal, coronal and 3D reconstruction  images were reviewed.     The pulmonary arterial system is well opacified. There is a small  filling defect in the distal left upper lobe pulmonary artery extending  into a segmental left upper lobe pulmonary artery branch. No other  filling defects are seen.     There is mild dilatation of the ascending aorta measuring approximately  3.8 cm and similar to the previous study of 04/16/2022.     There is an approximately 1.9 cm right superior hilar lymph node. This  is not well seen on the previous CT scan of the chest dated 04/16/2022  which was performed without contrast that was seen on the previous study  of 09/28/2020. It appears similar in size. A few additional smaller  mediastinal nodes are seen including a subcarinal calcified node.     There are emphysematous changes of the bilateral lungs. Mild  bronchiectasis of the bilateral lower lobes is seen.  Some scattered  ill-defined somewhat ground glass opacities in the lungs, particularly  in the lower lobes. This finding appears largely stable when compared to  the previous study.     Lower cervical fusion plate is seen.     Gallbladder has been removed. IVC filter is seen.       Impression:      1. Small pulmonary embolus in the most distal aspect of the left  upper  lobe pulmonary artery extending into a segmental left upper lobe  pulmonary artery branch. This finding is not seen on the previous CT  angiogram of the chest dated 04/16/2022.  2. Small amount of mediastinal and right hilar lymphadenopathy appears  largely stable since the postcontrast CT scan of 04/20/2020.  3. Bronchiectasis, emphysematous change and chronic fibrosis of the  lungs appears relatively stable since the most recent previous study of  04/16/2022.     Radiation dose reduction techniques were utilized, including automated  exposure control and exposure modulation based on body size.     This report was finalized on 8/23/2022 10:26 AM by Dr. Guero Gillis M.D.       XR Chest 1 View [[id number]] Collected: 08/22/22 1231     Updated: 08/22/22 1238    Narrative:      XR CHEST 1 VW-     HISTORY: Male who is 71 years-old,  chest pain     TECHNIQUE: Frontal view of the chest     COMPARISON: 11/24/2020     FINDINGS: Heart, mediastinum and pulmonary vasculature are unremarkable.  Mild chronic interstitial changes are apparent predominantly at the  lower lungs. No focal pulmonary consolidation, pleural effusion, or  pneumothorax. No acute osseous process.       Impression:      No focal pulmonary consolidation. Chronic appearing changes.  Follow-up as clinical indications persist.     This report was finalized on 8/22/2022 12:34 PM by Dr. Pedro Cunningham M.D.             Results for orders placed during the hospital encounter of 04/20/20    Duplex Venous Lower Extremity LEFT    Interpretation Summary  · Chronic left lower extremity deep vein thrombosis noted in the distal femoral and popliteal.  · All other left sided veins appeared normal.      Results for orders placed during the hospital encounter of 04/20/20    Duplex Venous Lower Extremity LEFT    Interpretation Summary  · Chronic left lower extremity deep vein thrombosis noted in the distal femoral and popliteal.  · All other left sided veins  appeared normal.      Results for orders placed during the hospital encounter of 04/27/20    Adult Transthoracic Echo Complete W/ Cont if Necessary Per Protocol    Interpretation Summary  · Calculated EF = 65%.  · Left ventricular systolic function is normal.  · Normal valular structure and function      Plan for Follow-up of Pending Labs/Results: Hematology oncology  Pending Labs     Order Current Status    Anticardiolipin Antibody, IgG / M, Qn In process    Antithrombin III In process    Beta-2 Glycoprotein Antibodies In process    Lupus Anticoagulant In process        Discharge Details        Discharge Medications      New Medications      Instructions Start Date   Enoxaparin Sodium 100 MG/ML solution prefilled syringe syringe  Commonly known as: LOVENOX   90 mg, Subcutaneous, Every 12 Hours, Stop Lovenox when your INR is greater than 2.0 as discussed.         Changes to Medications      Instructions Start Date   warfarin 5 MG tablet  Commonly known as: COUMADIN  What changed: additional instructions   5 mg, Oral, Daily, Take with the 4 mg tablet for a total daily dose of 9 mg.      warfarin 1 MG tablet  Commonly known as: COUMADIN  What changed:   · how much to take  · additional instructions   4 mg, Oral, Daily, Take with the 5 mg tablet for a total daily dose of 9 mg.         Continue These Medications      Instructions Start Date   acetaminophen 325 MG tablet  Commonly known as: TYLENOL   650 mg, Oral, Every Morning      albuterol sulfate  (90 Base) MCG/ACT inhaler  Commonly known as: PROVENTIL HFA;VENTOLIN HFA;PROAIR HFA   INHALE 2 PUFFS INTO THE LUNGS EVERY 4 HOURS AS NEEDED FOR WHEEZING OR SHORTNESS OF AIR.      atorvastatin 40 MG tablet  Commonly known as: LIPITOR   40 mg, Oral, Daily, For cholesterol      folic acid 1 MG tablet  Commonly known as: FOLVITE   1 mg, Oral, Daily      loratadine 10 MG tablet  Commonly known as: CLARITIN   10 mg, Oral, Daily      multivitamin with minerals tablet  tablet   1 tablet, Oral, Daily      nitroglycerin 0.4 MG SL tablet  Commonly known as: NITROSTAT   0.4 mg, Sublingual, Every 5 Minutes PRN, Take no more than 3 doses in 15 minutes.       pregabalin 75 MG capsule  Commonly known as: LYRICA   TAKE 1 CAPSULE BY MOUTH TWICE DAILY FOR FIBROMYALGIA             Allergies   Allergen Reactions   • Zinc Other (See Comments) and Diarrhea     Patient tested positive, had to have hardware removed from back.   • Chantix [Varenicline] Other (See Comments)     Headache   • Hydrocodone-Acetaminophen GI Intolerance         Discharge Disposition:  Home or Self Care    Diet:  Hospital:  Diet Order   Procedures   • Diet Regular; Cardiac       Activity:  Activity Instructions     Activity as Tolerated                 CODE STATUS:    Code Status and Medical Interventions:   Ordered at: 08/22/22 9717     Code Status (Patient has no pulse and is not breathing):    CPR (Attempt to Resuscitate)     Medical Interventions (Patient has pulse or is breathing):    Full       Future Appointments   Date Time Provider Department Center   9/13/2022  9:10 AM [facility]IR [facility] [place]   9/13/2022  9:40 AM Lindsay Shaw APRN [facility] [place]   9/22/2022  7:45 AM Aletha Ochoa PA-C [id number]   10/4/2022  8:30 AM LAB CHAIR 3 [facility]  LAB [facility] [place]   10/4/2022  9:00 AM Harriett Madden MD [facility] [place]   7/18/2023  3:30 PM Cristina Faulkner MD [facility]       Additional Instructions for the Follow-ups that You Need to Schedule     Discharge Follow-up with PCP   As directed       Currently Documented PCP:    Aletha Ochoa PA-C    PCP Phone Number:    [phone]     Follow Up Details: Recommend primary care follow-up within 1 to 2 weeks.         Discharge Follow-up with Specified Provider: Oncology has sent a hypercoagulable work-up.  Please follow-up in their clinic as instructed for the results of laboratory studies that are pending.  Please call  the clinic with any questions   As directed      To: Oncology has sent a hypercoagulable work-up.  Please follow-up in their clinic as instructed for the results of laboratory studies that are pending.  Please call the clinic with any questions         Discharge Follow-up with Specified Provider: Please check your warfarin level on Saturday and stop Lovenox if level greater than 2.0.  Please notify your managing physician of the warfarin INR level.   As directed      To: Please check your warfarin level on Saturday and stop Lovenox if level greater than 2.0.  Please notify your managing physician of the warfarin INR level.            Follow-up Information     Aletha Ochoa PA-C .    Specialty: Family Medicine  Why: Recommend primary care follow-up within 1 to 2 weeks.  Contact information:  [address] [id number] [phone]                             Dg So MD  08/25/22      Time Spent on Discharge:  I spent greater than 40 minutes on this discharge activity which included: face-to-face encounter with the patient, reviewing the data in the system, coordination of the care with the nursing staff as well as consultants, documentation, and entering orders.

## 2022-08-26 ENCOUNTER — TRANSITIONAL CARE MANAGEMENT TELEPHONE ENCOUNTER (OUTPATIENT)
Dept: CALL CENTER | Facility: HOSPITAL | Age: 72
End: 2022-08-26

## 2022-08-26 LAB
AT III PPP CHRO-ACNC: 80 % (ref 90–134)
B2 GLYCOPROT1 IGA SER-ACNC: <9 GPI IGA UNITS (ref 0–25)
B2 GLYCOPROT1 IGG SER-ACNC: <9 GPI IGG UNITS (ref 0–20)
B2 GLYCOPROT1 IGM SER-ACNC: <9 GPI IGM UNITS (ref 0–32)

## 2022-08-26 NOTE — OUTREACH NOTE
Call Center TCM Note    Flowsheet Row Responses   [facility] patient discharged from? [place]   Does the patient have one of the following disease processes/diagnoses(primary or secondary)? Other   TCM attempt successful? Yes   Call start time 1400   Call end time 1402   Discharge diagnosis Acute pulmonary embolism, subtherapeutic INR, bronchiectasis, tobacco abuse   Does the patient have all medications ordered at discharge? Yes   Is the patient taking all medications as directed (includes completed medication regime)? Yes   Comments regarding appointments f/u with hem/onc on 9/13   Does the patient have a primary care provider?  Yes   Does the patient have an appointment with their PCP within 7 days of discharge? Greater than 7 days   Comments regarding PCP appt with PCP on 9/22   Nursing Interventions Verified appointment date/time/provider   Has the patient kept scheduled appointments due by today? N/A   Has home health visited the patient within 72 hours of discharge? N/A   Psychosocial issues? No   Did the patient receive a copy of their discharge instructions? Yes   What is the patient's perception of their health status since discharge? Improving   Is the patient/caregiver able to teach back the hierarchy of who to call/visit for symptoms/problems? PCP, Specialist, Home health nurse, Urgent Care, ED, 911 Yes   TCM call completed? Yes   Wrap up additional comments Doing well, no questions, says he is going to [facility] on Monday, confirmed appt with PCP for 9/22- patient would like to keep appt due to other appts.          Dianna Yan RN    8/26/2022, 14:02 EDT

## 2022-08-26 NOTE — CASE MANAGEMENT/SOCIAL WORK
Case Management Discharge Note      Final Note: Discharged home with family via private car. [place] TORRES RN         Selected Continued Care - Discharged on 8/25/2022 Admission date: 8/22/2022 - Discharge disposition: Home or Self Care    Destination    No services have been selected for the patient.              Durable Medical Equipment    No services have been selected for the patient.              Dialysis/Infusion    No services have been selected for the patient.              Home Medical Care    No services have been selected for the patient.              Therapy    No services have been selected for the patient.              Community Resources    No services have been selected for the patient.              Community & DME    No services have been selected for the patient.                  Transportation Services  Private: Car    Final Discharge Disposition Code: 01 - home or self-care

## 2022-08-29 LAB
APTT SCREEN TO CONFIRM RATIO: 0.83 RATIO (ref 0–1.34)
CONFIRM APTT/NORMAL: 56.8 SEC (ref 0–47.6)
LA 2 SCREEN W REFLEX-IMP: ABNORMAL
MIXING DRVVT: 40 SEC (ref 0–40.4)
SCREEN APTT: 43.8 SEC (ref 0–51.9)
SCREEN DRVVT: 49.7 SEC (ref 0–47)
THROMBIN TIME: >150 SEC (ref 0–23)
TT IMM NP PPP: 38 SEC (ref 0–23)
TT P HPASE PPP: 21.8 SEC (ref 0–23)

## 2022-08-31 ENCOUNTER — TELEPHONE (OUTPATIENT)
Dept: FAMILY MEDICINE CLINIC | Facility: CLINIC | Age: 72
End: 2022-08-31

## 2022-08-31 ENCOUNTER — ANTICOAGULATION VISIT (OUTPATIENT)
Dept: FAMILY MEDICINE CLINIC | Facility: CLINIC | Age: 72
End: 2022-08-31

## 2022-08-31 LAB — INR PPP: 1.8 (ref 2.5–3)

## 2022-08-31 RX ORDER — ENOXAPARIN SODIUM 100 MG/ML
90 INJECTION SUBCUTANEOUS EVERY 12 HOURS
Qty: 10 ML | Refills: 0 | Status: SHIPPED | OUTPATIENT
Start: 2022-08-31 | End: 2022-09-07

## 2022-08-31 NOTE — TELEPHONE ENCOUNTER
Patient will need to bridge with Lovenox and need to know what he is taking in regards to the Coumadin dose noting he just had a PE was in the hospital.  New goal of INR is between 2.5-3

## 2022-08-31 NOTE — TELEPHONE ENCOUNTER
take 18 mg today then 9 mg tomorrow rechk on friday  He is not on the lovenox shots he stopped them on Sunday

## 2022-08-31 NOTE — TELEPHONE ENCOUNTER
Caller: EDUARDO    Relationship to patient: Other    Best call back number: [phone]    Patient is needing: CALLING IN INR READING     PATIENT READING IS 1.8 TODAY 08.31.22

## 2022-09-02 ENCOUNTER — ANTICOAGULATION VISIT (OUTPATIENT)
Dept: FAMILY MEDICINE CLINIC | Facility: CLINIC | Age: 72
End: 2022-09-02

## 2022-09-02 LAB — INR PPP: 2.9 (ref 2–3)

## 2022-09-02 NOTE — TELEPHONE ENCOUNTER
Pts INR today is 2.9. Take 9 mg daily and will rechk on Wed. I told him to stop the injections.

## 2022-09-06 ENCOUNTER — TELEPHONE (OUTPATIENT)
Dept: FAMILY MEDICINE CLINIC | Facility: CLINIC | Age: 72
End: 2022-09-06

## 2022-09-06 NOTE — TELEPHONE ENCOUNTER
I am seeing patient tomorrow for hospital follow-up.  You ordered several labs inpatient for hypercoagulation work-up.  Several results are positive.  Does he need an appointment with you???

## 2022-09-06 NOTE — PROGRESS NOTES
Subjective   Javy Reeves Sr. is a 71 y.o. male.     History of Present Illness   Javy Reeves Sr. 71 y.o. male presents today for hosptial follow up.  he was treated 8-22 to 8-25-22 for chest pain  .  I reviewed all of the labs and diagnostic testing and noted: Concern with labs per hematology for anticoagulation work-up with some positive results.  I sent message to hematologist and just noted patient has appointment next month.  The patient's medications were changed:  Current outpatient and discharge medications have been reconciled for the patient.  Reviewed by: Aletha Ochoa PA-C    he does have a follow up appointment with a specialist:       Noting that patient saw GI through Dr. Faulkner office for evaluation of diarrhea last on 7/28/2022.  Patient was having chronic diarrhea and at this visit noted that it stopped.  Noted that his GERD was controlled with diet only no longer on pantoprazole.-----NOTE restart today; GERD again and did work    Patient was seen inpatient by hematology Dr. Madden, 8/24/2022.  And reviewed her notes and orders for hypercoagulation work-up.  Also advised that I keep his INR between 2.5 and 3.  And did discuss this with our nurse manager upon reading discharge summary and have been implementing this every time patient calls in INR result.  I am concerned because he had several results positive noting his Antithrombin activity was low at 80, anticardiolipin IgG elevated at 91, elevated thrombin time mix at 38.  Noting his lupus anticoagulation test was negative but concern for specific inhibition of 1 or more extrinsic pathway factors 7, 10, 5, 2 or fibrinogen.  I sent message 9/6/2022 to  about follow-up appointment.  Concerned that patient has had multiple pulmonary embolisms.  Has vena cava filter that was placed 1996.  Patient sees Dr. Dalton regularly for follow-up of CAD.  Noted last colonoscopy 2018 Dr. Sin with benign polyp.  And Dr. Faulkner GI did  work-up and noted normal esophagogram and CT abdomen and pelvis 10/20/2021.  Patient is a smoker and have been checking low-dose CT of the chest evaluate for lung nodules.  Concern of finding interstitial lung disease and bronchoscopy was performed by Dr. Willoughby 11-3-2020.  Concern with CTA of the chest performed for PE noting per radiologist small amount of mediastinal and right hilar lymphadenopathy appears largely stable since postcontrast CT scan 4/20/2020, bronchiectasis, emphysematous change and chronic fibrosis of the lungs appears relatively stable since the most recent previous study of 4/16/2022.    I am also reviewing notes from [facility] from 8/31/2022 from pulmonary medicine Dr.Maeve Gillian MD. and he was seen in [facility] for interstitial lung disease clinic.  Noting his history of tobacco abuse and recent PE.  Also has progressive shortness of breath and joint pain primarily in the small joints of the hands and knees along with the scalp and tenderness.  Episodes of unintentional weight loss over the past year and noticed diarrhea and difficulty swallowing at times.  No history of autoimmune diseases noted in the progress note.  Interstitial lung disease diagnosis remains undetermined.  Discussed his past medical history and work history along with environmental information.  Noted lungs were clear on exam without crackles..  Made plan for serological evaluation of autoimmune disease and I did review these labs in outside records and were negative.  Also ordered repeat echocardiogram to evaluate for pulmonary hypertension before next visit.  Discussion for consideration of surgical lung biopsy once serologies return.  Also told to quit smoking in addition to reviewing the serologies also want a note he had labs 8/31/2021 for a CMP noting his ALT was 27, AST 24, glucose 79, creatinine 1.06, sodium 141, potassium 4.4, CO2 26.  Also CBC with normal hemoglobin at 15.4 hematocrit 45.   Platelet count normal at 193 aldolase normal at 4.0.     Also reviewed notes from vascular surgeon Dr. Ashley--today from 4/22/2022 in regards to patient's complaint of abdominal pain, increased smelly stools, upper abdominal discomfort, fluctuations in weight.  Wanted evaluation if IVC filter was possibly causing?  Plan note suspects her vascular surgeon is related to intestinal GI cause and not related to his [place] filter.  Also noted his filter is permanent.  Did order CTA of the abdomen and pelvis for further evaluation--this was performed on 5/19/2022 noting impression of small infrarenal abdominal aortic 3.0 cm aneurysm, Yudelka type IVC filter in good position, with no complicating features identified.  Last notes from pulmonologist Dr. Jamil Willoughby 4/22/2022 visit with continuing concern of interstitial lung disease and referred him to [facility].    Weight is steady    Stop smoking  Pt is not SOA or feel poorly  Some cough when lay down--clear mucus; cough again in AM -------<30 min;    Has Fibromyalgia pain    Intol to cpap  The following portions of the patient's history were reviewed and updated as appropriate: allergies, current medications, past family history, past medical history, past social history, past surgical history and problem list.    Review of Systems   Constitutional: Negative for activity change, appetite change and unexpected weight change.   HENT: Negative for nosebleeds and trouble swallowing.    Eyes: Negative for pain and visual disturbance.   Respiratory: Positive for cough. Negative for chest tightness, shortness of breath and wheezing.    Cardiovascular: Negative for chest pain and palpitations.   Gastrointestinal: Negative for abdominal pain and blood in stool.   Endocrine: Negative.    Genitourinary: Negative for difficulty urinating and hematuria.   Musculoskeletal: Negative for joint swelling.   Skin: Negative for color change and rash.   Allergic/Immunologic:  Negative.    Neurological: Negative for syncope and speech difficulty.   Hematological: Negative for adenopathy.   Psychiatric/Behavioral: Negative for agitation and confusion.   All other systems reviewed and are negative.      Objective   Physical Exam  Vitals and nursing note reviewed.   Constitutional:       General: He is not in acute distress.     Appearance: He is well-developed. He is not diaphoretic.      Comments: Finger clubbing   HENT:      Head: Normocephalic.      Right Ear: External ear normal.      Left Ear: External ear normal.   Eyes:      General: No scleral icterus.     Conjunctiva/sclera: Conjunctivae normal.      Pupils: Pupils are equal, round, and reactive to light.   Cardiovascular:      Rate and Rhythm: Normal rate and regular rhythm.      Heart sounds: Normal heart sounds. No murmur heard.  Pulmonary:      Effort: Pulmonary effort is normal.      Breath sounds: Normal breath sounds.   Musculoskeletal:         General: Normal range of motion.      Cervical back: Normal range of motion and neck supple.   Skin:     General: Skin is warm and dry.      Findings: No rash.   Neurological:      Mental Status: He is alert and oriented to person, place, and time.   Psychiatric:         Mood and Affect: Affect is not inappropriate.         Behavior: Behavior normal.         Thought Content: Thought content normal.         Judgment: Judgment normal.         Assessment & Plan   Diagnoses and all orders for this visit:    1. Interstitial lung disease (HCC) (Primary)    2. Hospital discharge follow-up    3. Recurrent pulmonary embolism (HCC)      Has appt hematologist ----  Will get AAA screen in April -3cm aneurysm   Wants to see Dr Eugene or Pacheco for interstitial lung disease  Stop smoking  Ok restart Protonix for GERD  Patient does not feel bad he still has the fibromyalgia pain and does have coughing with clear mucus when he lays down at night less than 30 minutes and then upon awakening in the  morning less than 30 minutes and once he clears the mucus out of his throat he feels great.  Not having shortness of breath.    I am concerned because he had several results positive noting his Antithrombin activity was low at 80, anticardiolipin IgG elevated at 91, elevated thrombin time mix at 38.  Noting his lupus anticoagulation test was negative but concern for specific inhibition of 1 or more extrinsic pathway factors 7, 10, 5, 2 or fibrinogen  Will need Prevnar 20

## 2022-09-07 ENCOUNTER — OFFICE VISIT (OUTPATIENT)
Dept: FAMILY MEDICINE CLINIC | Facility: CLINIC | Age: 72
End: 2022-09-07

## 2022-09-07 ENCOUNTER — ANTICOAGULATION VISIT (OUTPATIENT)
Dept: FAMILY MEDICINE CLINIC | Facility: CLINIC | Age: 72
End: 2022-09-07

## 2022-09-07 VITALS
OXYGEN SATURATION: 99 % | BODY MASS INDEX: 29.26 KG/M2 | SYSTOLIC BLOOD PRESSURE: 115 MMHG | WEIGHT: 209 LBS | HEIGHT: 71 IN | HEART RATE: 92 BPM | DIASTOLIC BLOOD PRESSURE: 62 MMHG | TEMPERATURE: 97.5 F | RESPIRATION RATE: 16 BRPM

## 2022-09-07 DIAGNOSIS — Z09 HOSPITAL DISCHARGE FOLLOW-UP: ICD-10-CM

## 2022-09-07 DIAGNOSIS — I26.99 RECURRENT PULMONARY EMBOLISM: ICD-10-CM

## 2022-09-07 DIAGNOSIS — J84.9 INTERSTITIAL LUNG DISEASE: Primary | ICD-10-CM

## 2022-09-07 DIAGNOSIS — K21.00 GASTROESOPHAGEAL REFLUX DISEASE WITH ESOPHAGITIS WITHOUT HEMORRHAGE: ICD-10-CM

## 2022-09-07 PROBLEM — R10.9 ABDOMINAL PAIN: Status: ACTIVE | Noted: 2022-04-22

## 2022-09-07 PROBLEM — R63.4 WEIGHT LOSS, UNINTENTIONAL: Status: ACTIVE | Noted: 2022-04-22

## 2022-09-07 PROBLEM — F17.200 TOBACCO USE DISORDER: Status: ACTIVE | Noted: 2022-08-31

## 2022-09-07 PROBLEM — Z95.828 PRESENCE OF IVC FILTER: Status: ACTIVE | Noted: 2022-04-22

## 2022-09-07 LAB — INR PPP: 2 (ref 2.5–3)

## 2022-09-07 PROCEDURE — 99214 OFFICE O/P EST MOD 30 MIN: CPT | Performed by: PHYSICIAN ASSISTANT

## 2022-09-07 RX ORDER — PANTOPRAZOLE SODIUM 40 MG/1
40 TABLET, DELAYED RELEASE ORAL DAILY
Qty: 90 TABLET | Refills: 3 | Status: SHIPPED | OUTPATIENT
Start: 2022-09-07 | End: 2022-12-05 | Stop reason: SDUPTHER

## 2022-09-07 RX ORDER — NICOTINE 21 MG/24HR
1 PATCH, TRANSDERMAL 24 HOURS TRANSDERMAL DAILY
COMMUNITY
Start: 2022-08-31 | End: 2022-11-22

## 2022-09-07 RX ORDER — LORATADINE 10 MG/1
CAPSULE, LIQUID FILLED ORAL
COMMUNITY
End: 2022-11-22

## 2022-09-12 ENCOUNTER — TELEPHONE (OUTPATIENT)
Dept: SURGERY | Facility: CLINIC | Age: 72
End: 2022-09-12

## 2022-09-12 ENCOUNTER — TELEPHONE (OUTPATIENT)
Dept: ONCOLOGY | Facility: CLINIC | Age: 72
End: 2022-09-12

## 2022-09-12 NOTE — TELEPHONE ENCOUNTER
Caller: Javy Reeves Sr.    Relationship: Self    Best call back number: [phone] - CAN LEAVE DETAILED VM IF NO ANSWER.    What is the best time to reach you: ANYTIME    Who are you requesting to speak with (clinical staff, provider,  specific staff member): SCHEDULING    What was the call regarding: PT NEEDS A F/U APPT FOR 2:15 PM OR LATER. HE GETS OFF WORK AT 2 PM. PLEASE CALL AFTER 2 PM TO R/S OR CAN LEAVE DETAILED VM.     Do you require a callback: YES

## 2022-09-13 ENCOUNTER — APPOINTMENT (OUTPATIENT)
Dept: LAB | Facility: HOSPITAL | Age: 72
End: 2022-09-13

## 2022-09-14 ENCOUNTER — ANTICOAGULATION VISIT (OUTPATIENT)
Dept: FAMILY MEDICINE CLINIC | Facility: CLINIC | Age: 72
End: 2022-09-14

## 2022-09-14 LAB — INR PPP: 3 (ref 2.5–3)

## 2022-09-21 ENCOUNTER — ANTICOAGULATION VISIT (OUTPATIENT)
Dept: FAMILY MEDICINE CLINIC | Facility: CLINIC | Age: 72
End: 2022-09-21

## 2022-09-21 LAB — INR PPP: 2.3 (ref 2.5–3.5)

## 2022-09-21 RX ORDER — WARFARIN SODIUM 5 MG/1
10 TABLET ORAL DAILY
Qty: 180 TABLET | Refills: 5 | Status: SHIPPED | OUTPATIENT
Start: 2022-09-21 | End: 2023-03-24

## 2022-09-22 ENCOUNTER — OFFICE VISIT (OUTPATIENT)
Dept: FAMILY MEDICINE CLINIC | Facility: CLINIC | Age: 72
End: 2022-09-22

## 2022-09-22 VITALS
RESPIRATION RATE: 16 BRPM | SYSTOLIC BLOOD PRESSURE: 120 MMHG | TEMPERATURE: 97.5 F | OXYGEN SATURATION: 99 % | HEART RATE: 82 BPM | HEIGHT: 71 IN | BODY MASS INDEX: 29.54 KG/M2 | WEIGHT: 211 LBS | DIASTOLIC BLOOD PRESSURE: 70 MMHG

## 2022-09-22 DIAGNOSIS — Z86.711 HISTORY OF PULMONARY EMBOLUS (PE): ICD-10-CM

## 2022-09-22 DIAGNOSIS — K21.00 GASTROESOPHAGEAL REFLUX DISEASE WITH ESOPHAGITIS WITHOUT HEMORRHAGE: ICD-10-CM

## 2022-09-22 DIAGNOSIS — E53.8 LOW SERUM VITAMIN B12: ICD-10-CM

## 2022-09-22 DIAGNOSIS — I25.2 OLD MI (MYOCARDIAL INFARCTION): ICD-10-CM

## 2022-09-22 DIAGNOSIS — E55.9 VITAMIN D DEFICIENCY: ICD-10-CM

## 2022-09-22 DIAGNOSIS — E78.2 MIXED HYPERLIPIDEMIA: Primary | ICD-10-CM

## 2022-09-22 DIAGNOSIS — Z12.5 SCREENING PSA (PROSTATE SPECIFIC ANTIGEN): ICD-10-CM

## 2022-09-22 DIAGNOSIS — E53.8 LOW FOLIC ACID: ICD-10-CM

## 2022-09-22 DIAGNOSIS — R73.01 IMPAIRED FASTING GLUCOSE: ICD-10-CM

## 2022-09-22 DIAGNOSIS — M79.7 FIBROMYALGIA: ICD-10-CM

## 2022-09-22 PROCEDURE — G0009 ADMIN PNEUMOCOCCAL VACCINE: HCPCS | Performed by: PHYSICIAN ASSISTANT

## 2022-09-22 PROCEDURE — 90677 PCV20 VACCINE IM: CPT | Performed by: PHYSICIAN ASSISTANT

## 2022-09-22 PROCEDURE — 99214 OFFICE O/P EST MOD 30 MIN: CPT | Performed by: PHYSICIAN ASSISTANT

## 2022-09-22 RX ORDER — PREGABALIN 75 MG/1
75 CAPSULE ORAL 2 TIMES DAILY
Qty: 180 CAPSULE | Refills: 1 | Status: SHIPPED | OUTPATIENT
Start: 2022-09-22 | End: 2023-03-10

## 2022-09-22 NOTE — PROGRESS NOTES
"Subjective   Javy Reeves Sr. is a 72 y.o. male.     History of Present Illness      Since the last visit, he has overall felt well.  He has Impaired fasting glucose and will monitor labs to watch for DMII, GERD controlled on PPI Rx, Hyperlipidemia with goals met with current Rx, CAD and remains under care of their Cardiologist for mangement, CAD and remains on medication regimen and Vitamin D deficiency and will update labs for continued management.  he has been compliant with current medications have reviewed them.  The patient denies medication side effects.  Will refill medications. /65 (BP Location: Left arm, Patient Position: Sitting, Cuff Size: Adult)   Pulse 82   Temp 97.5 °F (36.4 °C)   Resp 16   Ht 180.3 cm (70.98\")   Wt 95.7 kg (211 lb)   SpO2 99%   BMI 29.44 kg/m²     Results for orders placed or performed in visit on 09/21/22   Protime-INR    Specimen: Blood   Result Value Ref Range    INR 2.3 (A) 2.5 - 3.5             My prior notes:    Noting that patient saw GI through Dr. Faulkner office for evaluation of diarrhea last on 7/28/2022.  Patient was having chronic diarrhea and at this visit noted that it stopped.  Noted that his GERD was controlled with diet only no longer on pantoprazole.-----NOTE restart today; GERD again and did work    Patient was seen inpatient by hematology Dr. Madden, 8/24/2022.  And reviewed her notes and orders for hypercoagulation work-up.  Also advised that I keep his INR between 2.5 and 3.  And did discuss this with our nurse manager upon reading discharge summary and have been implementing this every time patient calls in INR result.  I am concerned because he had several results positive noting his Antithrombin activity was low at 80, anticardiolipin IgG elevated at 91, elevated thrombin time mix at 38.  Noting his lupus anticoagulation test was negative but concern for specific inhibition of 1 or more extrinsic pathway factors 7, 10, 5, 2 or fibrinogen.  I " sent message 9/6/2022 to  about follow-up appointment.  Concerned that patient has had multiple pulmonary embolisms.  Has vena cava filter that was placed 1996.  Patient sees Dr. Dalton regularly for follow-up of CAD.  Noted last colonoscopy 2018 Dr. Sin with benign polyp.  And Dr. Lucie GAITAN did work-up and noted normal esophagogram and CT abdomen and pelvis 10/20/2021.  Patient is a smoker and have been checking low-dose CT of the chest evaluate for lung nodules.  Concern of finding interstitial lung disease and bronchoscopy was performed by Dr. Willoughby 11-3-2020.  Concern with CTA of the chest performed for PE noting per radiologist small amount of mediastinal and right hilar lymphadenopathy appears largely stable since postcontrast CT scan 4/20/2020, bronchiectasis, emphysematous change and chronic fibrosis of the lungs appears relatively stable since the most recent previous study of 4/16/2022.     I am also reviewing notes from [facility] from 8/31/2022 from pulmonary medicine Dr.Maeve Gillian MD. and he was seen in [facility] for interstitial lung disease clinic.  Noting his history of tobacco abuse and recent PE.  Also has progressive shortness of breath and joint pain primarily in the small joints of the hands and knees along with the scalp and tenderness.  Episodes of unintentional weight loss over the past year and noticed diarrhea and difficulty swallowing at times.  No history of autoimmune diseases noted in the progress note.  Interstitial lung disease diagnosis remains undetermined.  Discussed his past medical history and work history along with environmental information.  Noted lungs were clear on exam without crackles..  Made plan for serological evaluation of autoimmune disease and I did review these labs in outside records and were negative.  Also ordered repeat echocardiogram to evaluate for pulmonary hypertension before next visit.  Discussion for consideration of  surgical lung biopsy once serologies return.  Also told to quit smoking in addition to reviewing the serologies also want a note he had labs 8/31/2021 for a CMP noting his ALT was 27, AST 24, glucose 79, creatinine 1.06, sodium 141, potassium 4.4, CO2 26.  Also CBC with normal hemoglobin at 15.4 hematocrit 45.  Platelet count normal at 193 aldolase normal at 4.0.      Also reviewed notes from vascular surgeon Dr. Ashley--today from 4/22/2022 in regards to patient's complaint of abdominal pain, increased smelly stools, upper abdominal discomfort, fluctuations in weight.  Wanted evaluation if IVC filter was possibly causing?  Plan note suspects her vascular surgeon is related to intestinal GI cause and not related to his [place] filter.  Also noted his filter is permanent.  Did order CTA of the abdomen and pelvis for further evaluation--this was performed on 5/19/2022 noting impression of small infrarenal abdominal aortic 3.0 cm aneurysm, Yudelka type IVC filter in good position, with no complicating features identified.  Last notes from pulmonologist Dr. Jamil Willoughby 4/22/2022 visit with continuing concern of interstitial lung disease and referred him to [facility].  Weight is steady     Stop smoking  Pt is not SOA or feel poorly  Some cough when lay down--clear mucus; cough again in AM -------<30 min;    Has Fibromyalgia pain     Intol to cpap    Cont Lyrica for fibro pain  Feels great and walking; better diet  He is on nicotine patch    The following portions of the patient's history were reviewed and updated as appropriate: allergies, current medications, past family history, past medical history, past social history, past surgical history and problem list.    Review of Systems   Constitutional: Negative for activity change, appetite change and unexpected weight change.   HENT: Negative for nosebleeds and trouble swallowing.    Eyes: Negative for pain and visual disturbance.   Respiratory: Negative for  chest tightness, shortness of breath and wheezing.    Cardiovascular: Negative for chest pain and palpitations.   Gastrointestinal: Negative for abdominal pain and blood in stool.   Endocrine: Negative.    Genitourinary: Negative for difficulty urinating and hematuria.   Musculoskeletal: Negative for joint swelling.   Skin: Negative for color change and rash.   Allergic/Immunologic: Negative.    Neurological: Negative for syncope and speech difficulty.   Hematological: Negative for adenopathy.   Psychiatric/Behavioral: Negative for agitation and confusion.   All other systems reviewed and are negative.      Objective   Physical Exam  Vitals and nursing note reviewed.   Constitutional:       General: He is not in acute distress.     Appearance: He is well-developed. He is not diaphoretic.      Comments: Finger clubbing   HENT:      Head: Normocephalic.      Right Ear: External ear normal.      Left Ear: External ear normal.   Eyes:      General: No scleral icterus.     Conjunctiva/sclera: Conjunctivae normal.      Pupils: Pupils are equal, round, and reactive to light.   Cardiovascular:      Rate and Rhythm: Normal rate and regular rhythm.      Heart sounds: Normal heart sounds. No murmur heard.  Pulmonary:      Effort: Pulmonary effort is normal.      Breath sounds: Normal breath sounds.   Musculoskeletal:         General: Normal range of motion.      Cervical back: Normal range of motion and neck supple.   Skin:     General: Skin is warm and dry.      Findings: No rash.   Neurological:      Mental Status: He is alert and oriented to person, place, and time.   Psychiatric:         Mood and Affect: Mood normal. Affect is not inappropriate.         Behavior: Behavior normal.         Thought Content: Thought content normal.         Judgment: Judgment normal.         Assessment & Plan   Diagnoses and all orders for this visit:    1. Mixed hyperlipidemia (Primary)  -     Comprehensive metabolic panel; Future  -     Lipid  panel; Future  -     CBC and Differential; Future  -     TSH; Future  -     Hemoglobin A1c; Future  -     T3, Free; Future  -     T4, Free; Future  -     Magnesium; Future  -     Urinalysis With Microscopic - Urine, Clean Catch; Future  -     Vitamin B12; Future  -     Folate; Future  -     Vitamin D 25 Hydroxy; Future  -     PSA Screen; Future    2. Low folic acid  -     Comprehensive metabolic panel; Future  -     Lipid panel; Future  -     CBC and Differential; Future  -     TSH; Future  -     Hemoglobin A1c; Future  -     T3, Free; Future  -     T4, Free; Future  -     Magnesium; Future  -     Urinalysis With Microscopic - Urine, Clean Catch; Future  -     Vitamin B12; Future  -     Folate; Future  -     Vitamin D 25 Hydroxy; Future  -     PSA Screen; Future    3. Low serum vitamin B12  -     Comprehensive metabolic panel; Future  -     Lipid panel; Future  -     CBC and Differential; Future  -     TSH; Future  -     Hemoglobin A1c; Future  -     T3, Free; Future  -     T4, Free; Future  -     Magnesium; Future  -     Urinalysis With Microscopic - Urine, Clean Catch; Future  -     Vitamin B12; Future  -     Folate; Future  -     Vitamin D 25 Hydroxy; Future  -     PSA Screen; Future    4. History of pulmonary embolus (PE)  -     Comprehensive metabolic panel; Future  -     Lipid panel; Future  -     CBC and Differential; Future  -     TSH; Future  -     Hemoglobin A1c; Future  -     T3, Free; Future  -     T4, Free; Future  -     Magnesium; Future  -     Urinalysis With Microscopic - Urine, Clean Catch; Future  -     Vitamin B12; Future  -     Folate; Future  -     Vitamin D 25 Hydroxy; Future  -     PSA Screen; Future    5. Gastroesophageal reflux disease with esophagitis without hemorrhage  -     Comprehensive metabolic panel; Future  -     Lipid panel; Future  -     CBC and Differential; Future  -     TSH; Future  -     Hemoglobin A1c; Future  -     T3, Free; Future  -     T4, Free; Future  -     Magnesium;  Future  -     Urinalysis With Microscopic - Urine, Clean Catch; Future  -     Vitamin B12; Future  -     Folate; Future  -     Vitamin D 25 Hydroxy; Future  -     PSA Screen; Future    6. Fibromyalgia  -     Comprehensive metabolic panel; Future  -     Lipid panel; Future  -     CBC and Differential; Future  -     TSH; Future  -     Hemoglobin A1c; Future  -     T3, Free; Future  -     T4, Free; Future  -     Magnesium; Future  -     Urinalysis With Microscopic - Urine, Clean Catch; Future  -     Vitamin B12; Future  -     Folate; Future  -     Vitamin D 25 Hydroxy; Future  -     PSA Screen; Future    7. Old MI (myocardial infarction)  -     Comprehensive metabolic panel; Future  -     Lipid panel; Future  -     CBC and Differential; Future  -     TSH; Future  -     Hemoglobin A1c; Future  -     T3, Free; Future  -     T4, Free; Future  -     Magnesium; Future  -     Urinalysis With Microscopic - Urine, Clean Catch; Future  -     Vitamin B12; Future  -     Folate; Future  -     Vitamin D 25 Hydroxy; Future  -     PSA Screen; Future    8. Vitamin D deficiency  -     Comprehensive metabolic panel; Future  -     Lipid panel; Future  -     CBC and Differential; Future  -     TSH; Future  -     Hemoglobin A1c; Future  -     T3, Free; Future  -     T4, Free; Future  -     Magnesium; Future  -     Urinalysis With Microscopic - Urine, Clean Catch; Future  -     Vitamin B12; Future  -     Folate; Future  -     Vitamin D 25 Hydroxy; Future  -     PSA Screen; Future    9. Impaired fasting glucose  -     Comprehensive metabolic panel; Future  -     Lipid panel; Future  -     CBC and Differential; Future  -     TSH; Future  -     Hemoglobin A1c; Future  -     T3, Free; Future  -     T4, Free; Future  -     Magnesium; Future  -     Urinalysis With Microscopic - Urine, Clean Catch; Future  -     Vitamin B12; Future  -     Folate; Future  -     Vitamin D 25 Hydroxy; Future  -     PSA Screen; Future    10. Screening PSA (prostate  specific antigen)  -     Comprehensive metabolic panel; Future  -     Lipid panel; Future  -     CBC and Differential; Future  -     TSH; Future  -     Hemoglobin A1c; Future  -     T3, Free; Future  -     T4, Free; Future  -     Magnesium; Future  -     Urinalysis With Microscopic - Urine, Clean Catch; Future  -     Vitamin B12; Future  -     Folate; Future  -     Vitamin D 25 Hydroxy; Future  -     PSA Screen; Future    Other orders  -     Pneumococcal Conjugate Vaccine 20-Valent All        Labs due Nov  Has appt Dr Bergman---thoracic surgeon  Plan, Javy Reeves Sr., was seen today.  he was seen for Imparied fasting glucose and plan follow up labs, diet, and exercise, GERD and will continue on PPI medication, Hyperlipidemia and will continue current medication, CAD and is currently stable on medication management and stable, CAD and is under care of their Cardiologist for medical management and stable and Vitamin D deficiency and will update labs .  Cont Lyrica for Fibro--helping  On patch to stop smoking

## 2022-09-26 ENCOUNTER — OFFICE VISIT (OUTPATIENT)
Dept: SURGERY | Facility: CLINIC | Age: 72
End: 2022-09-26

## 2022-09-26 VITALS
SYSTOLIC BLOOD PRESSURE: 130 MMHG | DIASTOLIC BLOOD PRESSURE: 84 MMHG | WEIGHT: 212 LBS | OXYGEN SATURATION: 99 % | HEART RATE: 51 BPM | BODY MASS INDEX: 31.4 KG/M2 | HEIGHT: 69 IN

## 2022-09-26 DIAGNOSIS — J84.9 ILD (INTERSTITIAL LUNG DISEASE): Primary | ICD-10-CM

## 2022-09-26 DIAGNOSIS — R79.1 ABNORMAL COAGULATION PROFILE: ICD-10-CM

## 2022-09-26 PROCEDURE — 99204 OFFICE O/P NEW MOD 45 MIN: CPT | Performed by: THORACIC SURGERY (CARDIOTHORACIC VASCULAR SURGERY)

## 2022-09-26 RX ORDER — HEPARIN SODIUM 5000 [USP'U]/ML
5000 INJECTION, SOLUTION INTRAVENOUS; SUBCUTANEOUS ONCE
Status: CANCELLED | OUTPATIENT
Start: 2022-11-29

## 2022-09-26 RX ORDER — SODIUM CHLORIDE 0.9 % (FLUSH) 0.9 %
3 SYRINGE (ML) INJECTION EVERY 12 HOURS SCHEDULED
Status: CANCELLED | OUTPATIENT
Start: 2022-11-29

## 2022-09-26 RX ORDER — CEFAZOLIN SODIUM 2 G/100ML
2 INJECTION, SOLUTION INTRAVENOUS ONCE
Status: CANCELLED | OUTPATIENT
Start: 2022-11-29 | End: 2022-09-26

## 2022-09-26 RX ORDER — GABAPENTIN 300 MG/1
300 CAPSULE ORAL ONCE
Status: CANCELLED | OUTPATIENT
Start: 2022-11-29 | End: 2022-09-26

## 2022-09-26 RX ORDER — SODIUM CHLORIDE 0.9 % (FLUSH) 0.9 %
3-10 SYRINGE (ML) INJECTION AS NEEDED
Status: CANCELLED | OUTPATIENT
Start: 2022-11-29

## 2022-09-26 RX ORDER — ACETAMINOPHEN 500 MG
1000 TABLET ORAL ONCE
Status: CANCELLED | OUTPATIENT
Start: 2022-11-29 | End: 2022-09-26

## 2022-09-28 ENCOUNTER — ANTICOAGULATION VISIT (OUTPATIENT)
Dept: FAMILY MEDICINE CLINIC | Facility: CLINIC | Age: 72
End: 2022-09-28

## 2022-09-28 ENCOUNTER — TELEPHONE (OUTPATIENT)
Dept: SURGERY | Facility: CLINIC | Age: 72
End: 2022-09-28

## 2022-09-28 LAB — INR PPP: 3.1 (ref 2.5–3)

## 2022-09-28 NOTE — TELEPHONE ENCOUNTER
----- Message from Nella Bergman MD sent at 9/28/2022  7:41 AM EDT -----  Regarding: RE: SURGERY  Thank you.  I tried to call him twice and he did not answer.    ----- Message -----  From: Allison Gil  Sent: 9/27/2022   3:40 PM EDT  To: Nella Bergman MD  Subject: SURGERY                                          I called Mr. Reeves today to go over his surgery date and time.  You wanted to do his surgery 2 months from now.  He said he wants to hold off for now and will call back if he wants the surgery.    I have it scheduled for 11-29, I think I will leave it scheduled and check back with him in a few weeks before I cancel it if that is okay?    Thanks.

## 2022-10-04 ENCOUNTER — OFFICE VISIT (OUTPATIENT)
Dept: ONCOLOGY | Facility: CLINIC | Age: 72
End: 2022-10-04

## 2022-10-04 ENCOUNTER — LAB (OUTPATIENT)
Dept: LAB | Facility: HOSPITAL | Age: 72
End: 2022-10-04

## 2022-10-04 VITALS
WEIGHT: 212.4 LBS | DIASTOLIC BLOOD PRESSURE: 82 MMHG | HEIGHT: 69 IN | HEART RATE: 73 BPM | SYSTOLIC BLOOD PRESSURE: 145 MMHG | OXYGEN SATURATION: 99 % | BODY MASS INDEX: 31.46 KG/M2 | RESPIRATION RATE: 18 BRPM | TEMPERATURE: 97.5 F

## 2022-10-04 DIAGNOSIS — I26.99 RECURRENT PULMONARY EMBOLISM: ICD-10-CM

## 2022-10-04 DIAGNOSIS — I82.5Y3 CHRONIC DEEP VEIN THROMBOSIS (DVT) OF PROXIMAL VEIN OF BOTH LOWER EXTREMITIES: Primary | ICD-10-CM

## 2022-10-04 DIAGNOSIS — I82.5Y3 CHRONIC DEEP VEIN THROMBOSIS (DVT) OF PROXIMAL VEIN OF BOTH LOWER EXTREMITIES: ICD-10-CM

## 2022-10-04 LAB
ALBUMIN SERPL-MCNC: 4 G/DL (ref 3.5–5.2)
ALBUMIN/GLOB SERPL: 1.1 G/DL (ref 1.1–2.4)
ALP SERPL-CCNC: 61 U/L (ref 38–116)
ALT SERPL W P-5'-P-CCNC: 19 U/L (ref 0–41)
ANION GAP SERPL CALCULATED.3IONS-SCNC: 10.1 MMOL/L (ref 5–15)
AST SERPL-CCNC: 26 U/L (ref 0–40)
BASOPHILS # BLD AUTO: 0.01 10*3/MM3 (ref 0–0.2)
BASOPHILS NFR BLD AUTO: 0.2 % (ref 0–1.5)
BILIRUB SERPL-MCNC: 0.6 MG/DL (ref 0.2–1.2)
BUN SERPL-MCNC: 13 MG/DL (ref 6–20)
BUN/CREAT SERPL: 11.4 (ref 7.3–30)
CALCIUM SPEC-SCNC: 9.3 MG/DL (ref 8.5–10.2)
CHLORIDE SERPL-SCNC: 106 MMOL/L (ref 98–107)
CO2 SERPL-SCNC: 25.9 MMOL/L (ref 22–29)
CREAT SERPL-MCNC: 1.14 MG/DL (ref 0.7–1.3)
DEPRECATED RDW RBC AUTO: 49.1 FL (ref 37–54)
EGFRCR SERPLBLD CKD-EPI 2021: 69.2 ML/MIN/1.73
EOSINOPHIL # BLD AUTO: 0.07 10*3/MM3 (ref 0–0.4)
EOSINOPHIL NFR BLD AUTO: 1.7 % (ref 0.3–6.2)
ERYTHROCYTE [DISTWIDTH] IN BLOOD BY AUTOMATED COUNT: 13.8 % (ref 12.3–15.4)
GLOBULIN UR ELPH-MCNC: 3.6 GM/DL (ref 1.8–3.5)
GLUCOSE SERPL-MCNC: 98 MG/DL (ref 74–124)
HCT VFR BLD AUTO: 41.3 % (ref 37.5–51)
HGB BLD-MCNC: 13.9 G/DL (ref 13–17.7)
IMM GRANULOCYTES # BLD AUTO: 0.01 10*3/MM3 (ref 0–0.05)
IMM GRANULOCYTES NFR BLD AUTO: 0.2 % (ref 0–0.5)
LYMPHOCYTES # BLD AUTO: 1.58 10*3/MM3 (ref 0.7–3.1)
LYMPHOCYTES NFR BLD AUTO: 38.4 % (ref 19.6–45.3)
MCH RBC QN AUTO: 32.5 PG (ref 26.6–33)
MCHC RBC AUTO-ENTMCNC: 33.7 G/DL (ref 31.5–35.7)
MCV RBC AUTO: 96.5 FL (ref 79–97)
MONOCYTES # BLD AUTO: 0.43 10*3/MM3 (ref 0.1–0.9)
MONOCYTES NFR BLD AUTO: 10.5 % (ref 5–12)
NEUTROPHILS NFR BLD AUTO: 2.01 10*3/MM3 (ref 1.7–7)
NEUTROPHILS NFR BLD AUTO: 49 % (ref 42.7–76)
NRBC BLD AUTO-RTO: 0 /100 WBC (ref 0–0.2)
PLATELET # BLD AUTO: 177 10*3/MM3 (ref 140–450)
PMV BLD AUTO: 8.8 FL (ref 6–12)
POTASSIUM SERPL-SCNC: 4.2 MMOL/L (ref 3.5–4.7)
PROT SERPL-MCNC: 7.6 G/DL (ref 6.3–8)
RBC # BLD AUTO: 4.28 10*6/MM3 (ref 4.14–5.8)
SODIUM SERPL-SCNC: 142 MMOL/L (ref 134–145)
WBC NRBC COR # BLD: 4.11 10*3/MM3 (ref 3.4–10.8)

## 2022-10-04 PROCEDURE — 85025 COMPLETE CBC W/AUTO DIFF WBC: CPT

## 2022-10-04 PROCEDURE — 36415 COLL VENOUS BLD VENIPUNCTURE: CPT

## 2022-10-04 PROCEDURE — 80053 COMPREHEN METABOLIC PANEL: CPT

## 2022-10-04 PROCEDURE — 99214 OFFICE O/P EST MOD 30 MIN: CPT | Performed by: INTERNAL MEDICINE

## 2022-10-04 NOTE — PROGRESS NOTES
Subjective .     REASONS FOR FOLLOWUP:      1.  Recurrent pulmonary embolism, has been on Coumadin for almost 20 years  · Initial DVT pulmonary embolism 20 years ago after knee surgery  · More recent pulmonary embolism August 23, 2022  · Hypercoagulable work-up done August 24, 2022 shows lupus anticoagulant negative beta-2 glycoprotein antibody negative, anticardiolipin antibody IgG is 91And IgM is less than 9, factor II prothrombin gene mutation negative factor V Leiden negative, Antithrombin III 80% as patient was on heparin due to slightly low.  Patient protein C&S was not done as he was on Coumadin and we can recheck anticardiolipin antibody in 3 months.  · Currently being followed by Aletha Ochoa for his INR    2.  Interstitial lung disease, seen by [facility] with plans to do biopsy of the lung.  He is seeing Dr. Salinas with plans to do lung biopsy here on November 29, 2022    HISTORY OF PRESENT ILLNESS:  The patient is a 72 y.o. year old male who is here for follow-up with the above-mentioned history.    History of Present Illness     Patient is a 72-year-old gentleman with history of DVT pulmonary embolism with DVT in the left lower extremity following the surgery about 20 years ago.  He was placed on Coumadin for all these years with a INR goal of 2-3.  More recently got admitted to the hospital from August 22, 2022-August 25, 2022.    He had a subtherapeutic INR at 1.9.  He had pleuritic chest pain which subsequently resolved.  So far his INR has been 2.5-3.0 but he can have it between 2.5 and 3.5.  Last week it was 3.1 at Aletha Ochoa's office.  Given that there is questionable anticardiolipin antibody syndrome with anticardiolipin antibody being elevated at 91 though it is only 1 out of 3 antibodies that is positive it is reasonable to have a goal between 2.5 and 3.5.    He went to [facility] recently because of interstitial lung disease.  They wanted a lung biopsy as all of his autoimmune  work-up had been negative.  He has been seen by Dr. Salinas who wanted to do a lung biopsy and November 29, 2022.  Patient was hesitant for the lung biopsy.  But he is smoking and likely best to go ahead and get the biopsy done.  He says he likely will get it done but he wanted to talk to Dr. Salinas.      Hematology history:  Patient is a 71-year-old gentleman who came to the ER with chest pain which started yesterday morning at work.  He had shortness of breath and had nausea and diaphoresis.  He then came to the ER with his wife.  He has had a past history of pulmonary embolism and DVT and was on Coumadin.  His INR was checked weekly and apparently was therapeutic.  He denied any hemoptysis.     He underwent CT angiogram of the chest August 22, 2022 which showed a small filling defect in the distal left upper lobe pulmonary artery extending into segmental left upper lobe pulmonary artery branch.  No other filling defects were seen.  There is also a 1.9 cm superior right hilar lymph node.  This is not well seen on the previous CT scan of the chest dated April 16, 2022 which was performed without contrast but this was seen on September 28, 2020 when it was similar in size.  A few additional mediastinal nodes are seen including a subcarinal calcified node.  There is emphysema in both lungs.     Their impression was that patient has a small pulmonary embolism in the most distal aspect of the left upper lobe artery extending into a segmental left upper lobe pulmonary artery branch.  And small amount of mediastinal or hilar lymphadenopathy which appears to be stable from April 28, 2020.  Patient was placed on heparin drip.  His INR in the ER was not therapeutic.  His INR is 1.9.  COVID-19 was negative     CT angiogram April 18, 2022 had shown no change in bilateral bronchiectasis, groundglass opacity and subpleural reticulation consistent with fibrosis and no new focal consolidation.         Past Medical History:    Diagnosis Date   • Acute and subacute infective endocarditis in diseases classified elsewhere    • Allergic    • Arthritis    • Asthma 04/26/2021   • Chest pain    • Chronic low back pain    • Chronic pain    • Colon polyps    • Coronary artery disease    • DDD (degenerative disc disease), cervical    • DDD (degenerative disc disease), lumbosacral    • Diverticulosis    • DVT (deep venous thrombosis) ([facility]) 1996    Left Lower extremity following arthroscopic knee surgery in 1996. IVC fliter placed at that time.   • Encounter for special screening examination for neoplasm of prostate 2012   • Eye exam, routine > 5 yrs ago   • Eye exam, routine 01/2015   • Fibromyalgia    • Fibromyositis    • GERD (gastroesophageal reflux disease)    • HIV disease ([facility])    • Hyperlipidemia    • Injury of back    • Injury of neck    • Irritable bowel    • Lumbar stenosis    • MI (myocardial infarction) ([facility])    • Neck pain    • Pain in limb    • Past heart attack    • Postlaminectomy syndrome of lumbar region    • Pulmonary embolism ([facility]) 1996    Left Lower extremity following arthroscopic knee surgery in 1996. IVC fliter placed at that time.   • Reflux esophagitis    • Shortness of breath    • Sleep apnea     NO CPAP   • Stroke ([facility])    • TIA (transient ischemic attack)        ONCOLOGIC HISTORY:  (History from previous dates can be found in the separate document.)    Current Outpatient Medications on File Prior to Visit   Medication Sig Dispense Refill   • albuterol sulfate  (90 Base) MCG/ACT inhaler INHALE 2 PUFFS INTO THE LUNGS EVERY 4 HOURS AS NEEDED FOR WHEEZING OR SHORTNESS OF AIR. 54 g 3   • atorvastatin (LIPITOR) 40 MG tablet TAKE 1 TABLET BY MOUTH DAILY FOR CHOLESTEROL 90 tablet 3   • folic acid (FOLVITE) 1 MG tablet Take 1 tablet by mouth Daily. 90 tablet 3   • loratadine (CLARITIN) 10 MG tablet Take 10 mg by mouth Daily.     • Loratadine 10 MG capsule Take  by mouth.     • multivitamin with minerals tablet tablet  Take 1 tablet by mouth Daily.     • nicotine (NICODERM CQ) 14 MG/24HR patch Place 1 patch on the skin as directed by provider Daily.     • nitroglycerin (NITROSTAT) 0.4 MG SL tablet Place 1 tablet under the tongue Every 5 (Five) Minutes As Needed for Chest Pain. Take no more than 3 doses in 15 minutes. 30 tablet 5   • pantoprazole (PROTONIX) 40 MG EC tablet Take 1 tablet by mouth Daily. For GERD 90 tablet 3   • pregabalin (LYRICA) 75 MG capsule Take 1 capsule by mouth 2 (Two) Times a Day. For Fibromyalgia 180 capsule 1   • warfarin (COUMADIN) 1 MG tablet Take 4 tablets by mouth Daily. Take with the 5 mg tablet for a total daily dose of 9 mg.     • warfarin (COUMADIN) 5 MG tablet Take 2 tablets by mouth Daily. Take with the 4 mg tablet for a total daily dose of 9 mg. 180 tablet 5   • [DISCONTINUED] acetaminophen (TYLENOL) 325 MG tablet Take 650 mg by mouth Every Morning.       No current facility-administered medications on file prior to visit.       ALLERGIES:     Allergies   Allergen Reactions   • Zinc Other (See Comments) and Diarrhea     Patient tested positive, had to have hardware removed from back.   • Chantix [Varenicline] Other (See Comments)     Headache   • Hydrocodone-Acetaminophen GI Intolerance       Social History     Socioeconomic History   • Marital status:    Tobacco Use   • Smoking status: Current Every Day Smoker     Packs/day: 0.50     Years: 30.00     Pack years: 15.00     Types: Cigarettes   • Smokeless tobacco: Never Used   • Tobacco comment: caffeine use, smokes about 5 cigars/day   Vaping Use   • Vaping Use: Never used   Substance and Sexual Activity   • Alcohol use: Yes     Comment: social   • Drug use: No   • Sexual activity: Defer         Cancer-related family history includes Cancer in his father, mother, and sister.     Review of Systems   Constitutional: Negative for appetite change, chills, diaphoresis, fatigue, fever and unexpected weight change.   HENT: Negative for hearing  "loss, sore throat and trouble swallowing.    Respiratory: Positive for shortness of breath. Negative for cough, chest tightness and wheezing.    Cardiovascular: Negative for chest pain, palpitations and leg swelling.   Gastrointestinal: Negative for abdominal distention, abdominal pain, constipation, diarrhea, nausea and vomiting.   Genitourinary: Negative for dysuria, frequency, hematuria and urgency.   Musculoskeletal: Negative for joint swelling.        No muscle weakness.   Skin: Negative for rash and wound.   Neurological: Negative for seizures, syncope, speech difficulty, weakness, numbness and headaches.   Hematological: Negative for adenopathy. Does not bruise/bleed easily.   Psychiatric/Behavioral: Negative for behavioral problems, confusion and suicidal ideas.     I have reviewed and confirmed the accuracy of the ROS as documented by the MA/LPN/RN Harriett Madden MD      Objective      Vitals:    10/04/22 1535   BP: 145/82   Pulse: 73   Resp: 18   Temp: 97.5 °F (36.4 °C)   TempSrc: Temporal   SpO2: 99%   Weight: 96.3 kg (212 lb 6.4 oz)   Height: 175.3 cm (69.02\")   PainSc:   5   PainLoc: Head  Comment: Left temple on head     Current Status 10/4/2022   ECOG score 0       Physical Exam      This patient's ACP documentation is up to date, and there's nothing further left to document.      CONSTITUTIONAL:  Vital signs reviewed.  No distress, looks comfortable.  EYES:  Conjunctivae and lids unremarkable.  PERRLA  EARS,NOSE,MOUTH,THROAT:  Ears and nose appear unremarkable.  Lips, teeth, gums appear unremarkable.  RESPIRATORY:  Normal respiratory effort.  Lungs clear to auscultation bilaterally.  CARDIOVASCULAR:  Normal S1, S2.  No murmurs rubs or gallops.  No significant lower extremity edema.  GASTROINTESTINAL: Abdomen appears unremarkable.  Nontender.  No hepatomegaly.  No splenomegaly.  LYMPHATIC:  No cervical, supraclavicular, axillary lymphadenopathy.  SKIN:  Warm.  No rashes.  PSYCHIATRIC:  Normal " judgment and insight.  Normal mood and affect.    I have reexamined the patient and the results are consistent with the previously documented exam. Harriett Madden MD     RECENT LABS:  Hematology WBC   Date Value Ref Range Status   10/04/2022 4.11 3.40 - 10.80 10*3/mm3 Final   11/23/2021 3.9 3.4 - 10.8 x10E3/uL Final     RBC   Date Value Ref Range Status   10/04/2022 4.28 4.14 - 5.80 10*6/mm3 Final   11/23/2021 4.44 4.14 - 5.80 x10E6/uL Final     Hemoglobin   Date Value Ref Range Status   10/04/2022 13.9 13.0 - 17.7 g/dL Final     Hematocrit   Date Value Ref Range Status   10/04/2022 41.3 37.5 - 51.0 % Final     Platelets   Date Value Ref Range Status   10/04/2022 177 140 - 450 10*3/mm3 Final        Assessment & Plan   *Small filling defect in the distal left pulmonary artery extending into segmental left upper lobe pulmonary artery branch consistent with small pulmonary embolism  · CT scan in addition showed 1.9 cm right superior hilar lymph node.  Small amount of mediastinal and right hilar lymphadenopathy appears largely stable compared to April 28, 2020.  Patient also has chronic fibrosis and bronchiectasis and emphysema.  · Patient has previous pulmonary embolism and was on Coumadin and he checked his INR every week and was therapeutic  · However on admission it was subtherapeutic with an INR of 1.9.  · Patient got admitted with chest pain and shortness of breath and hence concerning for a new pulmonary embolism on a subtherapeutic INR  · I will check with radiology to see if this small pulmonary embolism is residual from previous pulmonary embolism or is it a new 1.  · Either way patient will require to be continuing with heparin drip and keep his INR therapeutic.  He had radiology place.  On, will be ED radiology Marylin this is Dr. Madden can you connect me you know I am seeing a patient Javy Reeves's YOB: 1950 yesterday he had a CT angiogram of the chest I do not worry I did but I  need to discuss with them to see if this is a new pulmonary embolism or a residual of his old pulmonary embolism Leandro date of birth is [birth year] okay also mammogram 1 SEPTEMBER [birth year] right okay then after doing perfect thanks  · Since INR was subtherapeutic at 1.9 at admission it is possible he developed blood clot secondary to that however I have obtained hypercoagulable work-up and it is pending  · Factor V Leiden normal prothrombin gene mutation negative rest of the hypercoagulable work-up pending  · Would suggest to keep patient's INR between 2.5 and 3.5  · Hospitalist team and pharmacy to follow INR levels  · Patient's last INR was 3.1.  He is closely watched at Aletha Ochoa's office for weekly INR.    2.  Lung biopsy: Patient went to [facility] and had some interstitial changes in the lung which they wanted to do surgical biopsy but patient was reluctant.  He now has seen Dr. Salinas who has scheduled him for lung biopsy on November 29.    3.  We will follow him in the second week of November 2 be sure.  He is currently taking Coumadin with an INR of 3.1.  He tells me Dr. Salinas had asked him to hold the Coumadin prior to the surgical procedure.  By November he will be completed 3 months and he may be reasonable to hold anticoagulation.  We will check his D-dimer at his next appointment     Plan  · Continue Coumadin, INR goal between 2.5 and 3.5  · Reviewed hypercoagulable work-up from October 24, 2022 which is negative for factor V Leiden, prothrombin gene mutation, Antithrombin III but he had a positive anticardiolipin antibody with a IgG level of 91 and a normal IgM level.  But his lupus anticoagulant and beta-2 glycoprotein antibody were negative.  · Patient scheduled for lung biopsy by Dr. Salinas on November 29  · We will see him second week of November  · We will check a D-dimer and at that time may consider obtaining CT angiogram of the chest and lower extremity Dopplers to be sure that the pulmonary embolism  is resolved  · Follow-up with me second week of November with labs    Harriett Madden MD                  Cc:  No ref. provider found

## 2022-10-05 ENCOUNTER — ANTICOAGULATION VISIT (OUTPATIENT)
Dept: FAMILY MEDICINE CLINIC | Facility: CLINIC | Age: 72
End: 2022-10-05

## 2022-10-05 LAB — INR PPP: 3.8 (ref 2.5–3)

## 2022-10-08 LAB
LAB AP CASE REPORT: NORMAL
LAB AP DIAGNOSIS COMMENT: NORMAL
PATH REPORT.ADDENDUM SPEC: NORMAL
PATH REPORT.FINAL DX SPEC: NORMAL
PATH REPORT.GROSS SPEC: NORMAL

## 2022-10-12 LAB — INR PPP: 3.7 (ref 2.5–3)

## 2022-10-13 ENCOUNTER — ANTICOAGULATION VISIT (OUTPATIENT)
Dept: FAMILY MEDICINE CLINIC | Facility: CLINIC | Age: 72
End: 2022-10-13

## 2022-10-19 ENCOUNTER — ANTICOAGULATION VISIT (OUTPATIENT)
Dept: FAMILY MEDICINE CLINIC | Facility: CLINIC | Age: 72
End: 2022-10-19

## 2022-10-19 LAB — INR PPP: 2.9 (ref 2.5–3)

## 2022-10-26 ENCOUNTER — ANTICOAGULATION VISIT (OUTPATIENT)
Dept: FAMILY MEDICINE CLINIC | Facility: CLINIC | Age: 72
End: 2022-10-26

## 2022-10-26 LAB — INR PPP: 2.8 (ref 2–3)

## 2022-11-02 ENCOUNTER — ANTICOAGULATION VISIT (OUTPATIENT)
Dept: FAMILY MEDICINE CLINIC | Facility: CLINIC | Age: 72
End: 2022-11-02

## 2022-11-02 LAB — INR PPP: 3 (ref 2.5–3)

## 2022-11-09 ENCOUNTER — ANTICOAGULATION VISIT (OUTPATIENT)
Dept: FAMILY MEDICINE CLINIC | Facility: CLINIC | Age: 72
End: 2022-11-09

## 2022-11-09 LAB — INR PPP: 2.4 (ref 2.5–3)

## 2022-11-14 ENCOUNTER — OFFICE VISIT (OUTPATIENT)
Dept: ONCOLOGY | Facility: CLINIC | Age: 72
End: 2022-11-14

## 2022-11-14 ENCOUNTER — LAB (OUTPATIENT)
Dept: LAB | Facility: HOSPITAL | Age: 72
End: 2022-11-14

## 2022-11-14 VITALS
SYSTOLIC BLOOD PRESSURE: 188 MMHG | WEIGHT: 217 LBS | BODY MASS INDEX: 32.14 KG/M2 | DIASTOLIC BLOOD PRESSURE: 91 MMHG | HEIGHT: 69 IN | TEMPERATURE: 97.7 F | HEART RATE: 72 BPM | OXYGEN SATURATION: 98 % | RESPIRATION RATE: 16 BRPM

## 2022-11-14 DIAGNOSIS — I82.5Y3 CHRONIC DEEP VEIN THROMBOSIS (DVT) OF PROXIMAL VEIN OF BOTH LOWER EXTREMITIES: ICD-10-CM

## 2022-11-14 DIAGNOSIS — I26.99 RECURRENT PULMONARY EMBOLISM: ICD-10-CM

## 2022-11-14 DIAGNOSIS — I82.5Y3 CHRONIC DEEP VEIN THROMBOSIS (DVT) OF PROXIMAL VEIN OF BOTH LOWER EXTREMITIES: Primary | ICD-10-CM

## 2022-11-14 LAB
ALBUMIN SERPL-MCNC: 4.2 G/DL (ref 3.5–5.2)
ALBUMIN/GLOB SERPL: 1.2 G/DL (ref 1.1–2.4)
ALP SERPL-CCNC: 57 U/L (ref 38–116)
ALT SERPL W P-5'-P-CCNC: 19 U/L (ref 0–41)
ANION GAP SERPL CALCULATED.3IONS-SCNC: 9.8 MMOL/L (ref 5–15)
AST SERPL-CCNC: 27 U/L (ref 0–40)
BASOPHILS # BLD AUTO: 0.02 10*3/MM3 (ref 0–0.2)
BASOPHILS NFR BLD AUTO: 0.4 % (ref 0–1.5)
BILIRUB SERPL-MCNC: 0.6 MG/DL (ref 0.2–1.2)
BUN SERPL-MCNC: 13 MG/DL (ref 6–20)
BUN/CREAT SERPL: 11.9 (ref 7.3–30)
CALCIUM SPEC-SCNC: 9.5 MG/DL (ref 8.5–10.2)
CHLORIDE SERPL-SCNC: 104 MMOL/L (ref 98–107)
CO2 SERPL-SCNC: 27.2 MMOL/L (ref 22–29)
CREAT SERPL-MCNC: 1.09 MG/DL (ref 0.7–1.3)
DEPRECATED RDW RBC AUTO: 49.1 FL (ref 37–54)
EGFRCR SERPLBLD CKD-EPI 2021: 73 ML/MIN/1.73
EOSINOPHIL # BLD AUTO: 0.04 10*3/MM3 (ref 0–0.4)
EOSINOPHIL NFR BLD AUTO: 0.7 % (ref 0.3–6.2)
ERYTHROCYTE [DISTWIDTH] IN BLOOD BY AUTOMATED COUNT: 13.8 % (ref 12.3–15.4)
GLOBULIN UR ELPH-MCNC: 3.5 GM/DL (ref 1.8–3.5)
GLUCOSE SERPL-MCNC: 94 MG/DL (ref 74–124)
HCT VFR BLD AUTO: 42.8 % (ref 37.5–51)
HGB BLD-MCNC: 14.2 G/DL (ref 13–17.7)
IMM GRANULOCYTES # BLD AUTO: 0.01 10*3/MM3 (ref 0–0.05)
IMM GRANULOCYTES NFR BLD AUTO: 0.2 % (ref 0–0.5)
LYMPHOCYTES # BLD AUTO: 1.82 10*3/MM3 (ref 0.7–3.1)
LYMPHOCYTES NFR BLD AUTO: 32.6 % (ref 19.6–45.3)
MCH RBC QN AUTO: 31.8 PG (ref 26.6–33)
MCHC RBC AUTO-ENTMCNC: 33.2 G/DL (ref 31.5–35.7)
MCV RBC AUTO: 96 FL (ref 79–97)
MONOCYTES # BLD AUTO: 0.56 10*3/MM3 (ref 0.1–0.9)
MONOCYTES NFR BLD AUTO: 10 % (ref 5–12)
NEUTROPHILS NFR BLD AUTO: 3.14 10*3/MM3 (ref 1.7–7)
NEUTROPHILS NFR BLD AUTO: 56.1 % (ref 42.7–76)
NRBC BLD AUTO-RTO: 0 /100 WBC (ref 0–0.2)
PLATELET # BLD AUTO: 172 10*3/MM3 (ref 140–450)
PMV BLD AUTO: 8.7 FL (ref 6–12)
POTASSIUM SERPL-SCNC: 4.3 MMOL/L (ref 3.5–4.7)
PROT SERPL-MCNC: 7.7 G/DL (ref 6.3–8)
RBC # BLD AUTO: 4.46 10*6/MM3 (ref 4.14–5.8)
SODIUM SERPL-SCNC: 141 MMOL/L (ref 134–145)
WBC NRBC COR # BLD: 5.59 10*3/MM3 (ref 3.4–10.8)

## 2022-11-14 PROCEDURE — 99214 OFFICE O/P EST MOD 30 MIN: CPT | Performed by: INTERNAL MEDICINE

## 2022-11-14 PROCEDURE — 80053 COMPREHEN METABOLIC PANEL: CPT

## 2022-11-14 PROCEDURE — 85025 COMPLETE CBC W/AUTO DIFF WBC: CPT

## 2022-11-14 PROCEDURE — 36415 COLL VENOUS BLD VENIPUNCTURE: CPT

## 2022-11-14 NOTE — PROGRESS NOTES
Subjective .     REASONS FOR FOLLOWUP:      1.  Recurrent pulmonary embolism, has been on Coumadin for almost 20 years  · Initial DVT pulmonary embolism 20 years ago after knee surgery  · More recent pulmonary embolism August 23, 2022  · Hypercoagulable work-up done August 24, 2022 shows lupus anticoagulant negative beta-2 glycoprotein antibody negative, anticardiolipin antibody IgG is 91And IgM is less than 9, factor II prothrombin gene mutation negative factor V Leiden negative, Antithrombin III 80% as patient was on heparin due to slightly low.  Patient protein C&S was not done as he was on Coumadin and we can recheck anticardiolipin antibody in 3 months.  · Currently being followed by Aletha Ochoa for his INR    2.  Interstitial lung disease, seen by [facility] with plans to do biopsy of the lung.  He is seeing Dr. Salinas with plans to do lung biopsy here on November 29, 2022    HISTORY OF PRESENT ILLNESS:  The patient is a 72 y.o. year old male who is here for follow-up with the above-mentioned history.    History of Present Illness     Patient is a 72-year-old gentleman with history of DVT pulmonary embolism with DVT in the left lower extremity following the surgery about 20 years ago.  He was placed on Coumadin for all these years with a INR goal of 2-3.  More recently got admitted to the hospital from August 22, 2022-August 25, 2022.    He had a subtherapeutic INR at 1.9.  He had pleuritic chest pain which subsequently resolved.  So far his INR has been 2.5-3.0 but he can have it between 2.5 and 3.5.  Last week it was 3.1 at Aletha Ochoa's office.  Given that there is questionable anticardiolipin antibody syndrome with anticardiolipin antibody being elevated at 91 though it is only 1 out of 3 antibodies that is positive it is reasonable to have a goal between 2.5 and 3.5.    He went to [facility] recently because of interstitial lung disease.  They wanted a lung biopsy as all of his autoimmune  work-up had been negative.  He has been seen by Dr. Salinas who wanted to do a lung biopsy and November 29, 2022.  Patient was hesitant for the lung biopsy.  But he is smoking and likely best to go ahead and get the biopsy done.  He says he likely will get it done but he wanted to talk to Dr. Salinas.    Interval history:    Patient is s/p lung surgery, pathology is consistent with metastatic adenocarcinoma of the lung.   Hematology history:  Patient is a 71-year-old gentleman who came to the ER with chest pain which started yesterday morning at work.  He had shortness of breath and had nausea and diaphoresis.  He then came to the ER with his wife.  He has had a past history of pulmonary embolism and DVT and was on Coumadin.  His INR was checked weekly and apparently was therapeutic.  He denied any hemoptysis.     He underwent CT angiogram of the chest August 22, 2022 which showed a small filling defect in the distal left upper lobe pulmonary artery extending into segmental left upper lobe pulmonary artery branch.  No other filling defects were seen.  There is also a 1.9 cm superior right hilar lymph node.  This is not well seen on the previous CT scan of the chest dated April 16, 2022 which was performed without contrast but this was seen on September 28, 2020 when it was similar in size.  A few additional mediastinal nodes are seen including a subcarinal calcified node.  There is emphysema in both lungs.     Their impression was that patient has a small pulmonary embolism in the most distal aspect of the left upper lobe artery extending into a segmental left upper lobe pulmonary artery branch.  And small amount of mediastinal or hilar lymphadenopathy which appears to be stable from April 28, 2020.  Patient was placed on heparin drip.  His INR in the ER was not therapeutic.  His INR is 1.9.  COVID-19 was negative     CT angiogram April 18, 2022 had shown no change in bilateral bronchiectasis, groundglass opacity and  subpleural reticulation consistent with fibrosis and no new focal consolidation.         Past Medical History:   Diagnosis Date   • Acute and subacute infective endocarditis in diseases classified elsewhere    • Allergic    • Arthritis    • Asthma 04/26/2021   • Chest pain    • Chronic low back pain    • Chronic pain    • Colon polyps    • Coronary artery disease    • DDD (degenerative disc disease), cervical    • DDD (degenerative disc disease), lumbosacral    • Diverticulosis    • DVT (deep venous thrombosis) ([facility]) 1996    Left Lower extremity following arthroscopic knee surgery in 1996. IVC fliter placed at that time.   • Encounter for special screening examination for neoplasm of prostate 2012   • Eye exam, routine > 5 yrs ago   • Eye exam, routine 01/2015   • Fibromyalgia    • Fibromyositis    • GERD (gastroesophageal reflux disease)    • HIV disease ([facility])    • Hyperlipidemia    • Injury of back    • Injury of neck    • Irritable bowel    • Lumbar stenosis    • MI (myocardial infarction) ([facility])    • Neck pain    • Pain in limb    • Past heart attack    • Postlaminectomy syndrome of lumbar region    • Pulmonary embolism ([facility]) 1996    Left Lower extremity following arthroscopic knee surgery in 1996. IVC fliter placed at that time.   • Reflux esophagitis    • Shortness of breath    • Sleep apnea     NO CPAP   • Stroke (HCC)    • TIA (transient ischemic attack)        ONCOLOGIC HISTORY:  (History from previous dates can be found in the separate document.)    Current Outpatient Medications on File Prior to Visit   Medication Sig Dispense Refill   • albuterol sulfate  (90 Base) MCG/ACT inhaler INHALE 2 PUFFS INTO THE LUNGS EVERY 4 HOURS AS NEEDED FOR WHEEZING OR SHORTNESS OF AIR. 54 g 3   • atorvastatin (LIPITOR) 40 MG tablet TAKE 1 TABLET BY MOUTH DAILY FOR CHOLESTEROL 90 tablet 3   • folic acid (FOLVITE) 1 MG tablet Take 1 tablet by mouth Daily. 90 tablet 3   • loratadine (CLARITIN) 10 MG tablet Take 10 mg by  mouth Daily.     • multivitamin with minerals tablet tablet Take 1 tablet by mouth Daily.     • nicotine (NICODERM CQ) 14 MG/24HR patch Place 1 patch on the skin as directed by provider Daily.     • pantoprazole (PROTONIX) 40 MG EC tablet Take 1 tablet by mouth Daily. For GERD 90 tablet 3   • pregabalin (LYRICA) 75 MG capsule Take 1 capsule by mouth 2 (Two) Times a Day. For Fibromyalgia 180 capsule 1   • warfarin (COUMADIN) 1 MG tablet Take 4 tablets by mouth Daily. Take with the 5 mg tablet for a total daily dose of 9 mg.     • warfarin (COUMADIN) 5 MG tablet Take 2 tablets by mouth Daily. Take with the 4 mg tablet for a total daily dose of 9 mg. 180 tablet 5   • Loratadine 10 MG capsule Take  by mouth.     • nitroglycerin (NITROSTAT) 0.4 MG SL tablet Place 1 tablet under the tongue Every 5 (Five) Minutes As Needed for Chest Pain. Take no more than 3 doses in 15 minutes. 30 tablet 5     No current facility-administered medications on file prior to visit.       ALLERGIES:     Allergies   Allergen Reactions   • Zinc Other (See Comments) and Diarrhea     Patient tested positive, had to have hardware removed from back.   • Chantix [Varenicline] Other (See Comments)     Headache   • Hydrocodone-Acetaminophen GI Intolerance       Social History     Socioeconomic History   • Marital status:    Tobacco Use   • Smoking status: Every Day     Packs/day: 0.50     Years: 30.00     Pack years: 15.00     Types: Cigarettes   • Smokeless tobacco: Never   • Tobacco comments:     caffeine use, smokes about 5 cigarettes/day   Vaping Use   • Vaping Use: Never used   Substance and Sexual Activity   • Alcohol use: Yes     Comment: social   • Drug use: No   • Sexual activity: Defer         Cancer-related family history includes Cancer in his father, mother, and sister.     Review of Systems   Constitutional: Negative for appetite change, chills, diaphoresis, fatigue, fever and unexpected weight change.   HENT: Negative for hearing  "loss, sore throat and trouble swallowing.    Respiratory: Positive for shortness of breath. Negative for cough, chest tightness and wheezing.    Cardiovascular: Negative for chest pain, palpitations and leg swelling.   Gastrointestinal: Negative for abdominal distention, abdominal pain, constipation, diarrhea, nausea and vomiting.   Genitourinary: Negative for dysuria, frequency, hematuria and urgency.   Musculoskeletal: Negative for joint swelling.        No muscle weakness.   Skin: Negative for rash and wound.   Neurological: Negative for seizures, syncope, speech difficulty, weakness, numbness and headaches.   Hematological: Negative for adenopathy. Does not bruise/bleed easily.   Psychiatric/Behavioral: Negative for behavioral problems, confusion and suicidal ideas.     I have reviewed and confirmed the accuracy of the ROS as documented by the MA/LPN/RN Harriett Madden MD        Objective      Vitals:    11/14/22 1553 11/14/22 1600   BP: 171/95 (!) 188/91   Pulse: 72    Resp: 16    Temp: 97.7 °F (36.5 °C)    TempSrc: Temporal    SpO2: 98%    Weight: 98.4 kg (217 lb)    Height: 175.3 cm (69.02\")    PainSc:   6    PainLoc: Back      Current Status 11/14/2022   ECOG score 0       Physical Exam      This patient's ACP documentation is up to date, and there's nothing further left to document.      CONSTITUTIONAL:  Vital signs reviewed.  No distress, looks comfortable.  EYES:  Conjunctivae and lids unremarkable.  PERRLA  EARS,NOSE,MOUTH,THROAT:  Ears and nose appear unremarkable.  Lips, teeth, gums appear unremarkable.  RESPIRATORY:  Normal respiratory effort.  Lungs clear to auscultation bilaterally.  CARDIOVASCULAR:  Normal S1, S2.  No murmurs rubs or gallops.  No significant lower extremity edema.  GASTROINTESTINAL: Abdomen appears unremarkable.  Nontender.  No hepatomegaly.  No splenomegaly.  LYMPHATIC:  No cervical, supraclavicular, axillary lymphadenopathy.  SKIN:  Warm.  No rashes.  PSYCHIATRIC:  Normal " judgment and insight.  Normal mood and affect.    I have reexamined the patient and the results are consistent with the previously documented exam. Harriett Madden MD     RECENT LABS:  Hematology WBC   Date Value Ref Range Status   11/14/2022 5.59 3.40 - 10.80 10*3/mm3 Final   11/23/2021 3.9 3.4 - 10.8 x10E3/uL Final     RBC   Date Value Ref Range Status   11/14/2022 4.46 4.14 - 5.80 10*6/mm3 Final   11/23/2021 4.44 4.14 - 5.80 x10E6/uL Final     Hemoglobin   Date Value Ref Range Status   11/14/2022 14.2 13.0 - 17.7 g/dL Final     Hematocrit   Date Value Ref Range Status   11/14/2022 42.8 37.5 - 51.0 % Final     Platelets   Date Value Ref Range Status   11/14/2022 172 140 - 450 10*3/mm3 Final        Assessment & Plan   *Small filling defect in the distal left pulmonary artery extending into segmental left upper lobe pulmonary artery branch consistent with small pulmonary embolism  · CT scan in addition showed 1.9 cm right superior hilar lymph node.  Small amount of mediastinal and right hilar lymphadenopathy appears largely stable compared to April 28, 2020.  Patient also has chronic fibrosis and bronchiectasis and emphysema.  · Patient has previous pulmonary embolism and was on Coumadin and he checked his INR every week and was therapeutic  · However on admission it was subtherapeutic with an INR of 1.9.  · Patient got admitted with chest pain and shortness of breath and hence concerning for a new pulmonary embolism on a subtherapeutic INR  · I will check with radiology to see if this small pulmonary embolism is residual from previous pulmonary embolism or is it a new 1.  · Either way patient will require to be continuing with heparin drip and keep his INR therapeutic.  He had radiology place.  On, will be ED radiology Marylin this is Dr. Madden can you connect me you know I am seeing a patient Javy Reeves's YOB: 1950 yesterday he had a CT angiogram of the chest I do not worry I did but I  need to discuss with them to see if this is a new pulmonary embolism or a residual of his old pulmonary embolism Leandro date of birth is [birth year] okay also mammogram 1 SEPTEMBER [birth year] right okay then after doing perfect thanks  · Since INR was subtherapeutic at 1.9 at admission it is possible he developed blood clot secondary to that however I have obtained hypercoagulable work-up and it is pending  · Factor V Leiden normal prothrombin gene mutation negative rest of the hypercoagulable work-up pending  · Would suggest to keep patient's INR between 2.5 and 3.5  · Hospitalist team and pharmacy to follow INR levels  · Patient's last INR was 3.1.  He is closely watched at Aletha Ochoa's office for weekly INR.  · Patient is on warfarin and INR being is being followed at Dr. Aletha Ochoa's office    2.  Lung biopsy: Patient went to [facility] and had some interstitial changes in the lung which they wanted to do surgical biopsy but patient was reluctant.  · He now has seen Dr. Salinas who has scheduled him for lung biopsy on November 29.  · Given patient will have a lung biopsy on November 29 we will see him back December 16  · Patient is scheduled for lung biopsy for interstitial lung disease in end of November 2022    3.  We will follow him in the second week of November 2 be sure.  He is currently taking Coumadin with an INR of 3.1.  He tells me Dr. Salinas had asked him to hold the Coumadin prior to the surgical procedure.  By November he will be completed 3 months and he may be reasonable to hold anticoagulation.  We will check his D-dimer at his next appointment     Plan  · Continue Coumadin, INR goal between 2.5 and 3.5  · Reviewed hypercoagulable work-up from October 24, 2022 which is negative for factor V Leiden, prothrombin gene mutation, Antithrombin III but he had a positive anticardiolipin antibody with a IgG level of 91 and a normal IgM level.  But his lupus anticoagulant and beta-2 glycoprotein antibody were  negative.  · Patient scheduled for lung biopsy by Dr. Salinas on November 29  · We will see him second week of November  · We will check a D-dimer and at that time may consider obtaining CT angiogram of the chest and lower extremity Dopplers to be sure that the pulmonary embolism is resolved  · Follow-up with me second week of November with labs    Harriett Madden MD                  Cc:  No ref. provider found

## 2022-11-15 ENCOUNTER — TELEPHONE (OUTPATIENT)
Dept: ONCOLOGY | Facility: CLINIC | Age: 72
End: 2022-11-15

## 2022-11-15 NOTE — TELEPHONE ENCOUNTER
Call to Baldemar Leandro to let him know that Dr. Madden has ordered some labs to be done on Friday, when he is at [facility] for the CT angiogram and doppler studies.  Let him know to ask for them to be drawn while he is there, as Dr. Madden will need those results.  Patient verbalized understanding.

## 2022-11-16 ENCOUNTER — ANTICOAGULATION VISIT (OUTPATIENT)
Dept: FAMILY MEDICINE CLINIC | Facility: CLINIC | Age: 72
End: 2022-11-16

## 2022-11-16 LAB — INR PPP: 3.3 (ref 2.5–3)

## 2022-11-17 ENCOUNTER — TELEPHONE (OUTPATIENT)
Dept: FAMILY MEDICINE CLINIC | Facility: CLINIC | Age: 72
End: 2022-11-17

## 2022-11-17 NOTE — TELEPHONE ENCOUNTER
Caller: SHREYAS    Relationship: Other    Best call back number: [phone]    What is the best time to reach you: ANY     Who are you requesting to speak with (clinical staff, provider,  specific staff member): CHONG ALBRIGHT PA-C    What was the call regarding: CALLED TO REPORT INR RESULTS OF 3.3    Do you require a callback: NO

## 2022-11-18 ENCOUNTER — HOSPITAL ENCOUNTER (OUTPATIENT)
Dept: CT IMAGING | Facility: HOSPITAL | Age: 72
Discharge: HOME | End: 2022-11-18

## 2022-11-18 ENCOUNTER — HOSPITAL ENCOUNTER (OUTPATIENT)
Dept: CARDIOLOGY | Facility: HOSPITAL | Age: 72
Discharge: HOME | End: 2022-11-18

## 2022-11-18 DIAGNOSIS — I82.5Y3 CHRONIC DEEP VEIN THROMBOSIS (DVT) OF PROXIMAL VEIN OF BOTH LOWER EXTREMITIES: ICD-10-CM

## 2022-11-18 DIAGNOSIS — I26.99 RECURRENT PULMONARY EMBOLISM: ICD-10-CM

## 2022-11-18 LAB
BH CV LOW VAS LEFT GASTRONEMIUS VESSEL: 1
BH CV LOW VAS LEFT POPLITEAL SPONT: 1
BH CV LOW VAS RIGHT GASTRONEMIUS VESSEL: 1
BH CV LOW VAS RIGHT POPLITEAL SPONT: 1
BH CV LOWER VASCULAR LEFT COMMON FEMORAL AUGMENT: NORMAL
BH CV LOWER VASCULAR LEFT COMMON FEMORAL COMPETENT: NORMAL
BH CV LOWER VASCULAR LEFT COMMON FEMORAL COMPRESS: NORMAL
BH CV LOWER VASCULAR LEFT COMMON FEMORAL PHASIC: NORMAL
BH CV LOWER VASCULAR LEFT COMMON FEMORAL SPONT: NORMAL
BH CV LOWER VASCULAR LEFT DISTAL FEMORAL COMPRESS: NORMAL
BH CV LOWER VASCULAR LEFT GASTRONEMIUS COMPRESS: NORMAL
BH CV LOWER VASCULAR LEFT GASTRONEMIUS THROMBUS: NORMAL
BH CV LOWER VASCULAR LEFT GREATER SAPH AK COMPRESS: NORMAL
BH CV LOWER VASCULAR LEFT GREATER SAPH BK COMPRESS: NORMAL
BH CV LOWER VASCULAR LEFT LESSER SAPH COMPRESS: NORMAL
BH CV LOWER VASCULAR LEFT MID FEMORAL AUGMENT: NORMAL
BH CV LOWER VASCULAR LEFT MID FEMORAL COMPETENT: NORMAL
BH CV LOWER VASCULAR LEFT MID FEMORAL COMPRESS: NORMAL
BH CV LOWER VASCULAR LEFT MID FEMORAL PHASIC: NORMAL
BH CV LOWER VASCULAR LEFT MID FEMORAL SPONT: NORMAL
BH CV LOWER VASCULAR LEFT PERONEAL COMPRESS: NORMAL
BH CV LOWER VASCULAR LEFT POPLITEAL AUGMENT: NORMAL
BH CV LOWER VASCULAR LEFT POPLITEAL COMPETENT: NORMAL
BH CV LOWER VASCULAR LEFT POPLITEAL COMPRESS: NORMAL
BH CV LOWER VASCULAR LEFT POPLITEAL PHASIC: NORMAL
BH CV LOWER VASCULAR LEFT POPLITEAL SPONT: NORMAL
BH CV LOWER VASCULAR LEFT POPLITEAL THROMBUS: NORMAL
BH CV LOWER VASCULAR LEFT POSTERIOR TIBIAL COMPRESS: NORMAL
BH CV LOWER VASCULAR LEFT PROXIMAL FEMORAL COMPRESS: NORMAL
BH CV LOWER VASCULAR LEFT SAPHENOFEMORAL JUNCTION COMPRESS: NORMAL
BH CV LOWER VASCULAR RIGHT COMMON FEMORAL AUGMENT: NORMAL
BH CV LOWER VASCULAR RIGHT COMMON FEMORAL COMPETENT: NORMAL
BH CV LOWER VASCULAR RIGHT COMMON FEMORAL COMPRESS: NORMAL
BH CV LOWER VASCULAR RIGHT COMMON FEMORAL PHASIC: NORMAL
BH CV LOWER VASCULAR RIGHT COMMON FEMORAL SPONT: NORMAL
BH CV LOWER VASCULAR RIGHT DISTAL FEMORAL COMPRESS: NORMAL
BH CV LOWER VASCULAR RIGHT GASTRONEMIUS COMPRESS: NORMAL
BH CV LOWER VASCULAR RIGHT GASTRONEMIUS THROMBUS: NORMAL
BH CV LOWER VASCULAR RIGHT GREATER SAPH AK COMPRESS: NORMAL
BH CV LOWER VASCULAR RIGHT GREATER SAPH BK COMPRESS: NORMAL
BH CV LOWER VASCULAR RIGHT LESSER SAPH COMPRESS: NORMAL
BH CV LOWER VASCULAR RIGHT MID FEMORAL AUGMENT: NORMAL
BH CV LOWER VASCULAR RIGHT MID FEMORAL COMPETENT: NORMAL
BH CV LOWER VASCULAR RIGHT MID FEMORAL COMPRESS: NORMAL
BH CV LOWER VASCULAR RIGHT MID FEMORAL PHASIC: NORMAL
BH CV LOWER VASCULAR RIGHT MID FEMORAL SPONT: NORMAL
BH CV LOWER VASCULAR RIGHT PERONEAL COMPRESS: NORMAL
BH CV LOWER VASCULAR RIGHT POPLITEAL AUGMENT: NORMAL
BH CV LOWER VASCULAR RIGHT POPLITEAL COMPETENT: NORMAL
BH CV LOWER VASCULAR RIGHT POPLITEAL COMPRESS: NORMAL
BH CV LOWER VASCULAR RIGHT POPLITEAL PHASIC: NORMAL
BH CV LOWER VASCULAR RIGHT POPLITEAL SPONT: NORMAL
BH CV LOWER VASCULAR RIGHT POPLITEAL THROMBUS: NORMAL
BH CV LOWER VASCULAR RIGHT POSTERIOR TIBIAL COMPRESS: NORMAL
BH CV LOWER VASCULAR RIGHT PROXIMAL FEMORAL COMPRESS: NORMAL
BH CV LOWER VASCULAR RIGHT SAPHENOFEMORAL JUNCTION COMPRESS: NORMAL
CREAT BLDA-MCNC: 1.1 MG/DL (ref 0.6–1.3)
EGFRCR SERPLBLD CKD-EPI 2021: 72.2 ML/MIN/1.73
MAXIMAL PREDICTED HEART RATE: 150 BPM
STRESS TARGET HR: 128 BPM

## 2022-11-18 PROCEDURE — 0 IOPAMIDOL PER 1 ML: Performed by: INTERNAL MEDICINE

## 2022-11-18 PROCEDURE — 93970 EXTREMITY STUDY: CPT

## 2022-11-18 PROCEDURE — 71275 CT ANGIOGRAPHY CHEST: CPT

## 2022-11-18 PROCEDURE — 82565 ASSAY OF CREATININE: CPT

## 2022-11-18 RX ADMIN — IOPAMIDOL 100 ML: 755 INJECTION, SOLUTION INTRAVENOUS at 09:25

## 2022-11-22 ENCOUNTER — PRE-ADMISSION TESTING (OUTPATIENT)
Dept: PREADMISSION TESTING | Facility: HOSPITAL | Age: 72
End: 2022-11-22

## 2022-11-22 ENCOUNTER — LAB (OUTPATIENT)
Dept: LAB | Facility: HOSPITAL | Age: 72
End: 2022-11-22

## 2022-11-22 ENCOUNTER — TELEPHONE (OUTPATIENT)
Dept: FAMILY MEDICINE CLINIC | Facility: CLINIC | Age: 72
End: 2022-11-22

## 2022-11-22 VITALS
DIASTOLIC BLOOD PRESSURE: 85 MMHG | BODY MASS INDEX: 29.96 KG/M2 | TEMPERATURE: 97.7 F | OXYGEN SATURATION: 99 % | SYSTOLIC BLOOD PRESSURE: 144 MMHG | WEIGHT: 214 LBS | HEART RATE: 89 BPM | HEIGHT: 71 IN | RESPIRATION RATE: 16 BRPM

## 2022-11-22 DIAGNOSIS — I26.99 RECURRENT PULMONARY EMBOLISM: ICD-10-CM

## 2022-11-22 DIAGNOSIS — I82.5Y3 CHRONIC DEEP VEIN THROMBOSIS (DVT) OF PROXIMAL VEIN OF BOTH LOWER EXTREMITIES: ICD-10-CM

## 2022-11-22 DIAGNOSIS — J84.9 ILD (INTERSTITIAL LUNG DISEASE): ICD-10-CM

## 2022-11-22 LAB
ABO GROUP BLD: NORMAL
BLD GP AB SCN SERPL QL: NEGATIVE
D DIMER PPP FEU-MCNC: <0.27 MCGFEU/ML (ref 0–0.49)
RH BLD: POSITIVE
T&S EXPIRATION DATE: NORMAL

## 2022-11-22 PROCEDURE — 86900 BLOOD TYPING SEROLOGIC ABO: CPT

## 2022-11-22 PROCEDURE — 85610 PROTHROMBIN TIME: CPT | Performed by: THORACIC SURGERY (CARDIOTHORACIC VASCULAR SURGERY)

## 2022-11-22 PROCEDURE — 85730 THROMBOPLASTIN TIME PARTIAL: CPT | Performed by: THORACIC SURGERY (CARDIOTHORACIC VASCULAR SURGERY)

## 2022-11-22 PROCEDURE — 86850 RBC ANTIBODY SCREEN: CPT

## 2022-11-22 PROCEDURE — 86901 BLOOD TYPING SEROLOGIC RH(D): CPT

## 2022-11-22 PROCEDURE — 85379 FIBRIN DEGRADATION QUANT: CPT | Performed by: INTERNAL MEDICINE

## 2022-11-22 PROCEDURE — 80048 BASIC METABOLIC PNL TOTAL CA: CPT | Performed by: THORACIC SURGERY (CARDIOTHORACIC VASCULAR SURGERY)

## 2022-11-22 PROCEDURE — 85025 COMPLETE CBC W/AUTO DIFF WBC: CPT | Performed by: THORACIC SURGERY (CARDIOTHORACIC VASCULAR SURGERY)

## 2022-11-22 RX ORDER — SENNOSIDES 8.6 MG
650 CAPSULE ORAL DAILY
COMMUNITY

## 2022-11-22 NOTE — DISCHARGE INSTRUCTIONS
Take the following medications the morning of surgery:    PREGABALIN AND BRING YOUR ALBUTEROL INHALER WITH YOU.    ARRIVE AT 5:30    If you are on prescription narcotic pain medication to control your pain you may also take that medication the morning of surgery.    General Instructions:  Do not eat solid food after midnight the night before surgery.  You may drink clear liquids day of surgery but must stop at least one hour before your hospital arrival time.  It is beneficial for you to have a clear drink that contains carbohydrates the day of surgery.  We suggest a 12 to 20 ounce bottle of Gatorade or Powerade for non-diabetic patients or a 12 to 20 ounce bottle of G2 or Powerade Zero for diabetic patients. (Pediatric patients, are not advised to drink a 12 to 20 ounce carbohydrate drink)    Clear liquids are liquids you can see through.  Nothing red in color.     Plain water                               Sports drinks  Sodas                                   Gelatin (Jell-O)  Fruit juices without pulp such as white grape juice and apple juice  Popsicles that contain no fruit or yogurt  Tea or coffee (no cream or milk added)  Gatorade / Powerade  G2 / Powerade Zero        Patients who avoid smoking, chewing tobacco and alcohol for 4 weeks prior to surgery have a reduced risk of post-operative complications.  Quit smoking as many days before surgery as you can.  Do not smoke, use chewing tobacco or drink alcohol the day of surgery.   If applicable bring your C-PAP/ BI-PAP machine.  Bring any papers given to you in the doctor’s office.  Wear clean comfortable clothes.  Do not wear contact lenses, false eyelashes or make-up.  Bring a case for your glasses.   Bring crutches or walker if applicable.  Remove all piercings.  Leave jewelry and any other valuables at home.  Hair extensions with metal clips must be removed prior to surgery.  The Pre-Admission Testing nurse will instruct you to bring medications if unable to  obtain an accurate list in Pre-Admission Testing.        If you were given a blood bank ID arm band remember to bring it with you the day of surgery.    Preventing a Surgical Site Infection:  For 2 to 3 days before surgery, avoid shaving with a razor because the razor can irritate skin and make it easier to develop an infection.    Any areas of open skin can increase the risk of a post-operative wound infection by allowing bacteria to enter and travel throughout the body.  Notify your surgeon if you have any skin wounds / rashes even if it is not near the expected surgical site.  The area will need assessed to determine if surgery should be delayed until it is healed.  The night prior to surgery shower using a fresh bar of anti-bacterial soap (such as Dial) and clean washcloth.  Sleep in a clean bed with clean clothing.  Do not allow pets to sleep with you.  Shower on the morning of surgery using a fresh bar of anti-bacterial soap (such as Dial) and clean washcloth.  Dry with a clean towel and dress in clean clothing.  Ask your surgeon if you will be receiving antibiotics prior to surgery.  Make sure you, your family, and all healthcare providers clean their hands with soap and water or an alcohol based hand  before caring for you or your wound.    Day of surgery:  Your arrival time is approximately two hours before your scheduled surgery time.  Upon arrival, a Pre-op nurse and Anesthesiologist will review your health history, obtain vital signs, and answer questions you may have.  The only belongings needed at this time will be a list of your home medications and if applicable your C-PAP/BI-PAP machine.  A Pre-op nurse will start an IV and you may receive medication in preparation for surgery, including something to help you relax.     Please be aware that surgery does come with discomfort.  We want to make every effort to control your discomfort so please discuss any uncontrolled symptoms with your nurse.    Your doctor will most likely have prescribed pain medications.      If you are going home after surgery you will receive individualized written care instructions before being discharged.  A responsible adult must drive you to and from the hospital on the day of your surgery and stay with you for 24 hours.  Discharge prescriptions can be filled by the hospital pharmacy during regular pharmacy hours.  If you are having surgery late in the day/evening your prescription may be e-prescribed to your pharmacy.  Please verify your pharmacy hours or chose a 24 hour pharmacy to avoid not having access to your prescription because your pharmacy has closed for the day.    If you are staying overnight following surgery, you will be transported to your hospital room following the recovery period.  [facility] has all private rooms.    If you have any questions please call Pre-Admission Testing at [phone].  Deductibles and co-payments are collected on the day of service. Please be prepared to pay the required co-pay, deductible or deposit on the day of service as defined by your plan.    Call your surgeon immediately if you experience any of the following symptoms:  Sore Throat  Shortness of Breath or difficulty breathing  Cough  Chills  Body soreness or muscle pain  Headache  Fever  New loss of taste or smell  Do not arrive for your surgery ill.  Your procedure will need to be rescheduled to another time.  You will need to call your physician before the day of surgery to avoid any unnecessary exposure to hospital staff as well as other patients.       CHLORHEXIDINE CLOTH INSTRUCTIONS  The morning of surgery follow these instructions using the Chlorhexidine cloths you've been given.  These steps reduce bacteria on the body.  Do not use the cloths near your eyes, ears mouth, genitalia or on open wounds.  Throw the cloths away after use but do not try to flush them down a toilet.      Open and remove one cloth  at a time from the package.    Leave the cloth unfolded and begin the bathing.  Massage the skin with the cloths using gentle pressure to remove bacteria.  Do not scrub harshly.   Follow the steps below with one 2% CHG cloth per area (6 total cloths).  One cloth for neck, shoulders and chest.  One cloth for both arms, hands, fingers and underarms (do underarms last).  One cloth for the abdomen followed by groin.  One cloth for right leg and foot including between the toes.  One cloth for left leg and foot including between the toes.  The last cloth is to be used for the back of the neck, back and buttocks.    Allow the CHG to air dry 3 minutes on the skin which will give it time to work and decrease the chance of irritation.  The skin may feel sticky until it is dry.  Do not rinse with water or any other liquid or you will lose the beneficial effects of the CHG.  If mild skin irritation occurs, do rinse the skin to remove the CHG.  Report this to the nurse at time of admission.  Do not apply lotions, creams, ointments, deodorants or perfumes after using the clothes. Dress in clean clothes before coming to the hospital.

## 2022-11-22 NOTE — TELEPHONE ENCOUNTER
Pt is wanting you to look at the test that was done last week and let him know if there is any concern

## 2022-11-23 ENCOUNTER — ANTICOAGULATION VISIT (OUTPATIENT)
Dept: FAMILY MEDICINE CLINIC | Facility: CLINIC | Age: 72
End: 2022-11-23

## 2022-11-23 LAB
CARDIOLIPIN IGG SER IA-ACNC: 48 GPL U/ML (ref 0–14)
CARDIOLIPIN IGM SER IA-ACNC: <9 MPL U/ML (ref 0–12)
INR PPP: 1.8 (ref 2–3)

## 2022-11-24 LAB
APTT SCREEN TO CONFIRM RATIO: 1.11 RATIO (ref 0–1.34)
CONFIRM APTT/NORMAL: 90.1 SEC (ref 0–47.6)
DRVVT SCREEN TO CONFIRM RATIO: 1.4 RATIO (ref 0.8–1.2)
DRVVT SCREEN TO CONFIRM RATIO: 1.4 RATIO (ref 0.8–1.2)
LA 2 SCREEN W REFLEX-IMP: ABNORMAL
LA 2 SCREEN W REFLEX-IMP: ABNORMAL
MIXING DRVVT: 44.5 SEC (ref 0–40.4)
MIXING DRVVT: 44.8 SEC (ref 0–40.4)
SCREEN APTT: 50.6 SEC (ref 0–51.9)
SCREEN APTT: 51.4 SEC (ref 0–51.9)
SCREEN DRVVT: 71.7 SEC (ref 0–47)
SCREEN DRVVT: 73.6 SEC (ref 0–47)
THROMBIN TIME: 19.3 SEC (ref 0–23)

## 2022-11-26 LAB
B2 GLYCOPROT1 IGA SER-ACNC: <9 GPI IGA UNITS (ref 0–25)
B2 GLYCOPROT1 IGG SER-ACNC: <9 GPI IGG UNITS (ref 0–20)
B2 GLYCOPROT1 IGM SER-ACNC: <9 GPI IGM UNITS (ref 0–32)

## 2022-11-28 ENCOUNTER — ANESTHESIA EVENT (OUTPATIENT)
Dept: PERIOP | Facility: HOSPITAL | Age: 72
End: 2022-11-28

## 2022-11-29 ENCOUNTER — HOSPITAL ENCOUNTER (INPATIENT)
Facility: HOSPITAL | Age: 72
LOS: 1 days | Discharge: HOME | End: 2022-11-30
Attending: THORACIC SURGERY (CARDIOTHORACIC VASCULAR SURGERY) | Admitting: THORACIC SURGERY (CARDIOTHORACIC VASCULAR SURGERY)

## 2022-11-29 ENCOUNTER — APPOINTMENT (OUTPATIENT)
Dept: GENERAL RADIOLOGY | Facility: HOSPITAL | Age: 72
End: 2022-11-29

## 2022-11-29 ENCOUNTER — ANESTHESIA (OUTPATIENT)
Dept: PERIOP | Facility: HOSPITAL | Age: 72
End: 2022-11-29

## 2022-11-29 DIAGNOSIS — J84.9 ILD (INTERSTITIAL LUNG DISEASE): ICD-10-CM

## 2022-11-29 LAB
INR PPP: 0.98 (ref 0.9–1.1)
PROTHROMBIN TIME: 13.1 SECONDS (ref 11.7–14.2)

## 2022-11-29 PROCEDURE — 0BNC4ZZ RELEASE RIGHT UPPER LUNG LOBE, PERCUTANEOUS ENDOSCOPIC APPROACH: ICD-10-PCS | Performed by: THORACIC SURGERY (CARDIOTHORACIC VASCULAR SURGERY)

## 2022-11-29 PROCEDURE — 0 BUPIVACAINE LIPOSOME 1.3 % SUSPENSION 20 ML VIAL: Performed by: THORACIC SURGERY (CARDIOTHORACIC VASCULAR SURGERY)

## 2022-11-29 PROCEDURE — 25010000002 FENTANYL CITRATE (PF) 50 MCG/ML SOLUTION: Performed by: ANESTHESIOLOGY

## 2022-11-29 PROCEDURE — 87116 MYCOBACTERIA CULTURE: CPT | Performed by: THORACIC SURGERY (CARDIOTHORACIC VASCULAR SURGERY)

## 2022-11-29 PROCEDURE — 25010000002 FENTANYL CITRATE (PF) 50 MCG/ML SOLUTION: Performed by: NURSE ANESTHETIST, CERTIFIED REGISTERED

## 2022-11-29 PROCEDURE — 87205 SMEAR GRAM STAIN: CPT | Performed by: THORACIC SURGERY (CARDIOTHORACIC VASCULAR SURGERY)

## 2022-11-29 PROCEDURE — 25010000002 FENTANYL CITRATE (PF) 50 MCG/ML SOLUTION

## 2022-11-29 PROCEDURE — 25010000002 MIDAZOLAM PER 1 MG

## 2022-11-29 PROCEDURE — C9290 INJ, BUPIVACAINE LIPOSOME: HCPCS | Performed by: THORACIC SURGERY (CARDIOTHORACIC VASCULAR SURGERY)

## 2022-11-29 PROCEDURE — C9290 INJ, BUPIVACAINE LIPOSOME: HCPCS | Performed by: ANESTHESIOLOGY

## 2022-11-29 PROCEDURE — 0 BUPIVACAINE LIPOSOME 1.3 % SUSPENSION: Performed by: ANESTHESIOLOGY

## 2022-11-29 PROCEDURE — 25010000002 NEOSTIGMINE 5 MG/10ML SOLUTION: Performed by: NURSE ANESTHETIST, CERTIFIED REGISTERED

## 2022-11-29 PROCEDURE — 25010000002 CEFAZOLIN IN DEXTROSE 2-4 GM/100ML-% SOLUTION: Performed by: THORACIC SURGERY (CARDIOTHORACIC VASCULAR SURGERY)

## 2022-11-29 PROCEDURE — 87075 CULTR BACTERIA EXCEPT BLOOD: CPT | Performed by: THORACIC SURGERY (CARDIOTHORACIC VASCULAR SURGERY)

## 2022-11-29 PROCEDURE — 87176 TISSUE HOMOGENIZATION CULTR: CPT | Performed by: THORACIC SURGERY (CARDIOTHORACIC VASCULAR SURGERY)

## 2022-11-29 PROCEDURE — S0260 H&P FOR SURGERY: HCPCS | Performed by: THORACIC SURGERY (CARDIOTHORACIC VASCULAR SURGERY)

## 2022-11-29 PROCEDURE — 32651 THORACOSCOPY REMOVE CORTEX: CPT | Performed by: THORACIC SURGERY (CARDIOTHORACIC VASCULAR SURGERY)

## 2022-11-29 PROCEDURE — 94640 AIRWAY INHALATION TREATMENT: CPT

## 2022-11-29 PROCEDURE — 0BBC4ZZ EXCISION OF RIGHT UPPER LUNG LOBE, PERCUTANEOUS ENDOSCOPIC APPROACH: ICD-10-PCS | Performed by: THORACIC SURGERY (CARDIOTHORACIC VASCULAR SURGERY)

## 2022-11-29 PROCEDURE — 25010000002 MIDAZOLAM PER 1 MG: Performed by: ANESTHESIOLOGY

## 2022-11-29 PROCEDURE — 32651 THORACOSCOPY REMOVE CORTEX: CPT | Performed by: REGISTERED NURSE

## 2022-11-29 PROCEDURE — 88321 CONSLTJ&REPRT SLD PREP ELSWR: CPT

## 2022-11-29 PROCEDURE — 85610 PROTHROMBIN TIME: CPT | Performed by: THORACIC SURGERY (CARDIOTHORACIC VASCULAR SURGERY)

## 2022-11-29 PROCEDURE — 87206 SMEAR FLUORESCENT/ACID STAI: CPT | Performed by: THORACIC SURGERY (CARDIOTHORACIC VASCULAR SURGERY)

## 2022-11-29 PROCEDURE — 25010000002 HYDRALAZINE PER 20 MG: Performed by: NURSE PRACTITIONER

## 2022-11-29 PROCEDURE — 25010000002 ONDANSETRON PER 1 MG: Performed by: NURSE ANESTHETIST, CERTIFIED REGISTERED

## 2022-11-29 PROCEDURE — 0BBF4ZZ EXCISION OF RIGHT LOWER LUNG LOBE, PERCUTANEOUS ENDOSCOPIC APPROACH: ICD-10-PCS | Performed by: THORACIC SURGERY (CARDIOTHORACIC VASCULAR SURGERY)

## 2022-11-29 PROCEDURE — 76942 ECHO GUIDE FOR BIOPSY: CPT | Performed by: THORACIC SURGERY (CARDIOTHORACIC VASCULAR SURGERY)

## 2022-11-29 PROCEDURE — 25010000002 HYDROMORPHONE PER 4 MG: Performed by: THORACIC SURGERY (CARDIOTHORACIC VASCULAR SURGERY)

## 2022-11-29 PROCEDURE — 88307 TISSUE EXAM BY PATHOLOGIST: CPT | Performed by: THORACIC SURGERY (CARDIOTHORACIC VASCULAR SURGERY)

## 2022-11-29 PROCEDURE — 25010000002 DEXAMETHASONE SODIUM PHOSPHATE 20 MG/5ML SOLUTION: Performed by: NURSE ANESTHETIST, CERTIFIED REGISTERED

## 2022-11-29 PROCEDURE — 0BJ08ZZ INSPECTION OF TRACHEOBRONCHIAL TREE, VIA NATURAL OR ARTIFICIAL OPENING ENDOSCOPIC: ICD-10-PCS | Performed by: THORACIC SURGERY (CARDIOTHORACIC VASCULAR SURGERY)

## 2022-11-29 PROCEDURE — 25010000002 MAGNESIUM SULFATE PER 500 MG OF MAGNESIUM: Performed by: NURSE ANESTHETIST, CERTIFIED REGISTERED

## 2022-11-29 PROCEDURE — 94799 UNLISTED PULMONARY SVC/PX: CPT

## 2022-11-29 PROCEDURE — 25010000002 PROPOFOL 10 MG/ML EMULSION: Performed by: NURSE ANESTHETIST, CERTIFIED REGISTERED

## 2022-11-29 PROCEDURE — 71045 X-RAY EXAM CHEST 1 VIEW: CPT

## 2022-11-29 PROCEDURE — 0BBD4ZZ EXCISION OF RIGHT MIDDLE LUNG LOBE, PERCUTANEOUS ENDOSCOPIC APPROACH: ICD-10-PCS | Performed by: THORACIC SURGERY (CARDIOTHORACIC VASCULAR SURGERY)

## 2022-11-29 PROCEDURE — 25010000002 HEPARIN (PORCINE) PER 1000 UNITS: Performed by: THORACIC SURGERY (CARDIOTHORACIC VASCULAR SURGERY)

## 2022-11-29 PROCEDURE — 87102 FUNGUS ISOLATION CULTURE: CPT | Performed by: THORACIC SURGERY (CARDIOTHORACIC VASCULAR SURGERY)

## 2022-11-29 PROCEDURE — 25010000002 HYDROMORPHONE PER 4 MG: Performed by: NURSE ANESTHETIST, CERTIFIED REGISTERED

## 2022-11-29 PROCEDURE — 87070 CULTURE OTHR SPECIMN AEROBIC: CPT | Performed by: THORACIC SURGERY (CARDIOTHORACIC VASCULAR SURGERY)

## 2022-11-29 DEVICE — ARTICULATION RELOAD WITH TRI-STAPLE TECHNOLOGY
Type: IMPLANTABLE DEVICE | Site: CHEST | Status: FUNCTIONAL
Brand: ENDO GIA

## 2022-11-29 RX ORDER — KETAMINE HCL IN NACL, ISO-OSM 100MG/10ML
SYRINGE (ML) INJECTION AS NEEDED
Status: DISCONTINUED | OUTPATIENT
Start: 2022-11-29 | End: 2022-11-29 | Stop reason: SURG

## 2022-11-29 RX ORDER — AMLODIPINE BESYLATE 5 MG/1
5 TABLET ORAL
Status: DISCONTINUED | OUTPATIENT
Start: 2022-11-29 | End: 2022-11-29

## 2022-11-29 RX ORDER — SODIUM CHLORIDE 9 MG/ML
40 INJECTION, SOLUTION INTRAVENOUS AS NEEDED
Status: DISCONTINUED | OUTPATIENT
Start: 2022-11-29 | End: 2022-11-29 | Stop reason: HOSPADM

## 2022-11-29 RX ORDER — ONDANSETRON 4 MG/1
4 TABLET, FILM COATED ORAL EVERY 6 HOURS PRN
Status: DISCONTINUED | OUTPATIENT
Start: 2022-11-29 | End: 2022-11-30 | Stop reason: HOSPADM

## 2022-11-29 RX ORDER — ONDANSETRON 2 MG/ML
4 INJECTION INTRAMUSCULAR; INTRAVENOUS EVERY 6 HOURS PRN
Status: DISCONTINUED | OUTPATIENT
Start: 2022-11-29 | End: 2022-11-30 | Stop reason: HOSPADM

## 2022-11-29 RX ORDER — ONDANSETRON 2 MG/ML
4 INJECTION INTRAMUSCULAR; INTRAVENOUS ONCE AS NEEDED
Status: COMPLETED | OUTPATIENT
Start: 2022-11-29 | End: 2022-11-29

## 2022-11-29 RX ORDER — PANTOPRAZOLE SODIUM 40 MG/1
40 TABLET, DELAYED RELEASE ORAL EVERY EVENING
Status: DISCONTINUED | OUTPATIENT
Start: 2022-11-29 | End: 2022-11-30 | Stop reason: HOSPADM

## 2022-11-29 RX ORDER — FAMOTIDINE 10 MG/ML
20 INJECTION, SOLUTION INTRAVENOUS ONCE
Status: COMPLETED | OUTPATIENT
Start: 2022-11-29 | End: 2022-11-29

## 2022-11-29 RX ORDER — CEFAZOLIN SODIUM 2 G/100ML
2 INJECTION, SOLUTION INTRAVENOUS ONCE
Status: COMPLETED | OUTPATIENT
Start: 2022-11-29 | End: 2022-11-29

## 2022-11-29 RX ORDER — ONDANSETRON 2 MG/ML
INJECTION INTRAMUSCULAR; INTRAVENOUS AS NEEDED
Status: DISCONTINUED | OUTPATIENT
Start: 2022-11-29 | End: 2022-11-29 | Stop reason: SURG

## 2022-11-29 RX ORDER — HYDRALAZINE HYDROCHLORIDE 20 MG/ML
10 INJECTION INTRAMUSCULAR; INTRAVENOUS EVERY 6 HOURS PRN
Status: DISCONTINUED | OUTPATIENT
Start: 2022-11-29 | End: 2022-11-30 | Stop reason: HOSPADM

## 2022-11-29 RX ORDER — PROPOFOL 10 MG/ML
VIAL (ML) INTRAVENOUS AS NEEDED
Status: DISCONTINUED | OUTPATIENT
Start: 2022-11-29 | End: 2022-11-29 | Stop reason: SURG

## 2022-11-29 RX ORDER — DEXAMETHASONE SODIUM PHOSPHATE 4 MG/ML
INJECTION, SOLUTION INTRA-ARTICULAR; INTRALESIONAL; INTRAMUSCULAR; INTRAVENOUS; SOFT TISSUE AS NEEDED
Status: DISCONTINUED | OUTPATIENT
Start: 2022-11-29 | End: 2022-11-29 | Stop reason: SURG

## 2022-11-29 RX ORDER — SODIUM CHLORIDE 0.9 % (FLUSH) 0.9 %
3-10 SYRINGE (ML) INJECTION AS NEEDED
Status: DISCONTINUED | OUTPATIENT
Start: 2022-11-29 | End: 2022-11-29 | Stop reason: HOSPADM

## 2022-11-29 RX ORDER — HEPARIN SODIUM 5000 [USP'U]/ML
5000 INJECTION, SOLUTION INTRAVENOUS; SUBCUTANEOUS EVERY 8 HOURS SCHEDULED
Status: DISCONTINUED | OUTPATIENT
Start: 2022-11-30 | End: 2022-11-30 | Stop reason: HOSPADM

## 2022-11-29 RX ORDER — MIDAZOLAM HYDROCHLORIDE 1 MG/ML
INJECTION INTRAMUSCULAR; INTRAVENOUS
Status: COMPLETED
Start: 2022-11-29 | End: 2022-11-29

## 2022-11-29 RX ORDER — TAMSULOSIN HYDROCHLORIDE 0.4 MG/1
0.4 CAPSULE ORAL NIGHTLY
Status: DISCONTINUED | OUTPATIENT
Start: 2022-11-29 | End: 2022-11-30 | Stop reason: HOSPADM

## 2022-11-29 RX ORDER — BISACODYL 10 MG
10 SUPPOSITORY, RECTAL RECTAL DAILY PRN
Status: DISCONTINUED | OUTPATIENT
Start: 2022-11-29 | End: 2022-11-30 | Stop reason: HOSPADM

## 2022-11-29 RX ORDER — FENTANYL CITRATE 50 UG/ML
INJECTION, SOLUTION INTRAMUSCULAR; INTRAVENOUS AS NEEDED
Status: DISCONTINUED | OUTPATIENT
Start: 2022-11-29 | End: 2022-11-29 | Stop reason: SURG

## 2022-11-29 RX ORDER — SODIUM CHLORIDE, SODIUM LACTATE, POTASSIUM CHLORIDE, CALCIUM CHLORIDE 600; 310; 30; 20 MG/100ML; MG/100ML; MG/100ML; MG/100ML
9 INJECTION, SOLUTION INTRAVENOUS CONTINUOUS
Status: DISCONTINUED | OUTPATIENT
Start: 2022-11-29 | End: 2022-11-30 | Stop reason: HOSPADM

## 2022-11-29 RX ORDER — FENTANYL CITRATE 50 UG/ML
50 INJECTION, SOLUTION INTRAMUSCULAR; INTRAVENOUS
Status: DISCONTINUED | OUTPATIENT
Start: 2022-11-29 | End: 2022-11-29

## 2022-11-29 RX ORDER — MIDAZOLAM HYDROCHLORIDE 1 MG/ML
2 INJECTION INTRAMUSCULAR; INTRAVENOUS ONCE
Status: COMPLETED | OUTPATIENT
Start: 2022-11-29 | End: 2022-11-29

## 2022-11-29 RX ORDER — METOCLOPRAMIDE HYDROCHLORIDE 5 MG/ML
10 INJECTION INTRAMUSCULAR; INTRAVENOUS ONCE AS NEEDED
Status: DISCONTINUED | OUTPATIENT
Start: 2022-11-29 | End: 2022-11-29 | Stop reason: HOSPADM

## 2022-11-29 RX ORDER — IPRATROPIUM BROMIDE AND ALBUTEROL SULFATE 2.5; .5 MG/3ML; MG/3ML
3 SOLUTION RESPIRATORY (INHALATION)
Status: DISCONTINUED | OUTPATIENT
Start: 2022-11-29 | End: 2022-11-30 | Stop reason: HOSPADM

## 2022-11-29 RX ORDER — CETIRIZINE HYDROCHLORIDE 10 MG/1
10 TABLET ORAL DAILY
Status: DISCONTINUED | OUTPATIENT
Start: 2022-11-29 | End: 2022-11-30 | Stop reason: HOSPADM

## 2022-11-29 RX ORDER — NITROGLYCERIN 0.4 MG/1
0.4 TABLET SUBLINGUAL
Status: DISCONTINUED | OUTPATIENT
Start: 2022-11-29 | End: 2022-11-30 | Stop reason: HOSPADM

## 2022-11-29 RX ORDER — SODIUM CHLORIDE 0.9 % (FLUSH) 0.9 %
10 SYRINGE (ML) INJECTION AS NEEDED
Status: DISCONTINUED | OUTPATIENT
Start: 2022-11-29 | End: 2022-11-29 | Stop reason: HOSPADM

## 2022-11-29 RX ORDER — SODIUM CHLORIDE 0.9 % (FLUSH) 0.9 %
3 SYRINGE (ML) INJECTION EVERY 12 HOURS SCHEDULED
Status: DISCONTINUED | OUTPATIENT
Start: 2022-11-29 | End: 2022-11-29 | Stop reason: HOSPADM

## 2022-11-29 RX ORDER — MAGNESIUM HYDROXIDE 1200 MG/15ML
LIQUID ORAL AS NEEDED
Status: DISCONTINUED | OUTPATIENT
Start: 2022-11-29 | End: 2022-11-29 | Stop reason: HOSPADM

## 2022-11-29 RX ORDER — DEXTROSE, SODIUM CHLORIDE, AND POTASSIUM CHLORIDE 5; .45; .15 G/100ML; G/100ML; G/100ML
75 INJECTION INTRAVENOUS CONTINUOUS
Status: DISPENSED | OUTPATIENT
Start: 2022-11-29 | End: 2022-11-29

## 2022-11-29 RX ORDER — ACETAMINOPHEN 500 MG
1000 TABLET ORAL ONCE
Status: COMPLETED | OUTPATIENT
Start: 2022-11-29 | End: 2022-11-29

## 2022-11-29 RX ORDER — NEOSTIGMINE METHYLSULFATE 0.5 MG/ML
INJECTION, SOLUTION INTRAVENOUS AS NEEDED
Status: DISCONTINUED | OUTPATIENT
Start: 2022-11-29 | End: 2022-11-29 | Stop reason: SURG

## 2022-11-29 RX ORDER — BUPIVACAINE HYDROCHLORIDE 5 MG/ML
INJECTION, SOLUTION EPIDURAL; INTRACAUDAL
Status: COMPLETED
Start: 2022-11-29 | End: 2022-11-29

## 2022-11-29 RX ORDER — OXYCODONE HYDROCHLORIDE 5 MG/1
5 TABLET ORAL EVERY 4 HOURS PRN
Status: DISCONTINUED | OUTPATIENT
Start: 2022-11-29 | End: 2022-11-30 | Stop reason: HOSPADM

## 2022-11-29 RX ORDER — OXYCODONE HYDROCHLORIDE 5 MG/1
5 TABLET ORAL ONCE AS NEEDED
Status: COMPLETED | OUTPATIENT
Start: 2022-11-29 | End: 2022-11-29

## 2022-11-29 RX ORDER — DIPHENHYDRAMINE HYDROCHLORIDE 50 MG/ML
25 INJECTION INTRAMUSCULAR; INTRAVENOUS EVERY 4 HOURS PRN
Status: DISCONTINUED | OUTPATIENT
Start: 2022-11-29 | End: 2022-11-29 | Stop reason: HOSPADM

## 2022-11-29 RX ORDER — KETAMINE HCL IN NACL, ISO-OSM 100MG/10ML
10 SYRINGE (ML) INJECTION
Status: DISCONTINUED | OUTPATIENT
Start: 2022-11-29 | End: 2022-11-30 | Stop reason: HOSPADM

## 2022-11-29 RX ORDER — SODIUM CHLORIDE, SODIUM LACTATE, POTASSIUM CHLORIDE, CALCIUM CHLORIDE 600; 310; 30; 20 MG/100ML; MG/100ML; MG/100ML; MG/100ML
INJECTION, SOLUTION INTRAVENOUS CONTINUOUS PRN
Status: DISCONTINUED | OUTPATIENT
Start: 2022-11-29 | End: 2022-11-29 | Stop reason: SURG

## 2022-11-29 RX ORDER — DIPHENHYDRAMINE HCL 25 MG
25 CAPSULE ORAL EVERY 4 HOURS PRN
Status: DISCONTINUED | OUTPATIENT
Start: 2022-11-29 | End: 2022-11-29 | Stop reason: HOSPADM

## 2022-11-29 RX ORDER — MIDAZOLAM HYDROCHLORIDE 1 MG/ML
INJECTION INTRAMUSCULAR; INTRAVENOUS
Status: COMPLETED | OUTPATIENT
Start: 2022-11-29 | End: 2022-11-29

## 2022-11-29 RX ORDER — LABETALOL HYDROCHLORIDE 5 MG/ML
INJECTION, SOLUTION INTRAVENOUS AS NEEDED
Status: DISCONTINUED | OUTPATIENT
Start: 2022-11-29 | End: 2022-11-29 | Stop reason: SURG

## 2022-11-29 RX ORDER — GABAPENTIN 300 MG/1
300 CAPSULE ORAL ONCE
Status: COMPLETED | OUTPATIENT
Start: 2022-11-29 | End: 2022-11-29

## 2022-11-29 RX ORDER — GLYCOPYRROLATE 0.2 MG/ML
INJECTION INTRAMUSCULAR; INTRAVENOUS AS NEEDED
Status: DISCONTINUED | OUTPATIENT
Start: 2022-11-29 | End: 2022-11-29 | Stop reason: SURG

## 2022-11-29 RX ORDER — BUPIVACAINE HCL/0.9 % NACL/PF 0.125 %
PLASTIC BAG, INJECTION (ML) EPIDURAL AS NEEDED
Status: DISCONTINUED | OUTPATIENT
Start: 2022-11-29 | End: 2022-11-29 | Stop reason: SURG

## 2022-11-29 RX ORDER — ROCURONIUM BROMIDE 10 MG/ML
INJECTION, SOLUTION INTRAVENOUS AS NEEDED
Status: DISCONTINUED | OUTPATIENT
Start: 2022-11-29 | End: 2022-11-29 | Stop reason: SURG

## 2022-11-29 RX ORDER — BUPIVACAINE HYDROCHLORIDE 5 MG/ML
INJECTION, SOLUTION EPIDURAL; INTRACAUDAL
Status: COMPLETED | OUTPATIENT
Start: 2022-11-29 | End: 2022-11-29

## 2022-11-29 RX ORDER — FOLIC ACID 1 MG/1
1 TABLET ORAL EVERY EVENING
Status: DISCONTINUED | OUTPATIENT
Start: 2022-11-29 | End: 2022-11-30 | Stop reason: HOSPADM

## 2022-11-29 RX ORDER — HYDROMORPHONE HYDROCHLORIDE 1 MG/ML
0.5 INJECTION, SOLUTION INTRAMUSCULAR; INTRAVENOUS; SUBCUTANEOUS
Status: DISCONTINUED | OUTPATIENT
Start: 2022-11-29 | End: 2022-11-30 | Stop reason: HOSPADM

## 2022-11-29 RX ORDER — HYDROMORPHONE HYDROCHLORIDE 1 MG/ML
0.25 INJECTION, SOLUTION INTRAMUSCULAR; INTRAVENOUS; SUBCUTANEOUS
Status: DISCONTINUED | OUTPATIENT
Start: 2022-11-29 | End: 2022-11-29 | Stop reason: HOSPADM

## 2022-11-29 RX ORDER — ACETAMINOPHEN 500 MG
1000 TABLET ORAL 3 TIMES DAILY
Status: DISCONTINUED | OUTPATIENT
Start: 2022-11-29 | End: 2022-11-30 | Stop reason: HOSPADM

## 2022-11-29 RX ORDER — ATORVASTATIN CALCIUM 40 MG/1
TABLET, FILM COATED ORAL
Status: ON HOLD | COMMUNITY
Start: 2022-10-13 | End: 2022-11-29

## 2022-11-29 RX ORDER — ATORVASTATIN CALCIUM 20 MG/1
40 TABLET, FILM COATED ORAL NIGHTLY
Status: DISCONTINUED | OUTPATIENT
Start: 2022-11-29 | End: 2022-11-30 | Stop reason: HOSPADM

## 2022-11-29 RX ORDER — LIDOCAINE HYDROCHLORIDE 20 MG/ML
INJECTION, SOLUTION EPIDURAL; INFILTRATION; INTRACAUDAL; PERINEURAL AS NEEDED
Status: DISCONTINUED | OUTPATIENT
Start: 2022-11-29 | End: 2022-11-29 | Stop reason: SURG

## 2022-11-29 RX ORDER — HYDRALAZINE HYDROCHLORIDE 20 MG/ML
5 INJECTION INTRAMUSCULAR; INTRAVENOUS
Status: DISCONTINUED | OUTPATIENT
Start: 2022-11-29 | End: 2022-11-29 | Stop reason: HOSPADM

## 2022-11-29 RX ORDER — ALBUTEROL SULFATE 2.5 MG/3ML
2.5 SOLUTION RESPIRATORY (INHALATION) EVERY 4 HOURS PRN
Status: DISCONTINUED | OUTPATIENT
Start: 2022-11-29 | End: 2022-11-30 | Stop reason: HOSPADM

## 2022-11-29 RX ORDER — GABAPENTIN 300 MG/1
300 CAPSULE ORAL EVERY 8 HOURS SCHEDULED
Status: DISCONTINUED | OUTPATIENT
Start: 2022-11-29 | End: 2022-11-30 | Stop reason: HOSPADM

## 2022-11-29 RX ORDER — HEPARIN SODIUM 5000 [USP'U]/ML
5000 INJECTION, SOLUTION INTRAVENOUS; SUBCUTANEOUS ONCE
Status: COMPLETED | OUTPATIENT
Start: 2022-11-29 | End: 2022-11-29

## 2022-11-29 RX ORDER — AMOXICILLIN 250 MG
2 CAPSULE ORAL NIGHTLY
Status: DISCONTINUED | OUTPATIENT
Start: 2022-11-29 | End: 2022-11-30 | Stop reason: HOSPADM

## 2022-11-29 RX ORDER — MIDAZOLAM HYDROCHLORIDE 1 MG/ML
0.5 INJECTION INTRAMUSCULAR; INTRAVENOUS
Status: DISCONTINUED | OUTPATIENT
Start: 2022-11-29 | End: 2022-11-29 | Stop reason: HOSPADM

## 2022-11-29 RX ORDER — FENTANYL CITRATE 50 UG/ML
50 INJECTION, SOLUTION INTRAMUSCULAR; INTRAVENOUS
Status: DISCONTINUED | OUTPATIENT
Start: 2022-11-29 | End: 2022-11-29 | Stop reason: HOSPADM

## 2022-11-29 RX ORDER — CELECOXIB 100 MG/1
100 CAPSULE ORAL EVERY 12 HOURS SCHEDULED
Status: DISCONTINUED | OUTPATIENT
Start: 2022-11-29 | End: 2022-11-30 | Stop reason: HOSPADM

## 2022-11-29 RX ORDER — SODIUM CHLORIDE 0.9 % (FLUSH) 0.9 %
10 SYRINGE (ML) INJECTION EVERY 12 HOURS SCHEDULED
Status: DISCONTINUED | OUTPATIENT
Start: 2022-11-29 | End: 2022-11-29 | Stop reason: HOSPADM

## 2022-11-29 RX ORDER — NALOXONE HCL 0.4 MG/ML
0.4 VIAL (ML) INJECTION
Status: DISCONTINUED | OUTPATIENT
Start: 2022-11-29 | End: 2022-11-29 | Stop reason: HOSPADM

## 2022-11-29 RX ORDER — MAGNESIUM SULFATE HEPTAHYDRATE 500 MG/ML
INJECTION, SOLUTION INTRAMUSCULAR; INTRAVENOUS AS NEEDED
Status: DISCONTINUED | OUTPATIENT
Start: 2022-11-29 | End: 2022-11-29 | Stop reason: SURG

## 2022-11-29 RX ORDER — EPHEDRINE SULFATE 50 MG/ML
INJECTION INTRAVENOUS AS NEEDED
Status: DISCONTINUED | OUTPATIENT
Start: 2022-11-29 | End: 2022-11-29 | Stop reason: SURG

## 2022-11-29 RX ORDER — PREGABALIN 75 MG/1
75 CAPSULE ORAL 2 TIMES DAILY
Status: DISCONTINUED | OUTPATIENT
Start: 2022-11-29 | End: 2022-11-30 | Stop reason: HOSPADM

## 2022-11-29 RX ORDER — LABETALOL HYDROCHLORIDE 5 MG/ML
5 INJECTION, SOLUTION INTRAVENOUS
Status: DISCONTINUED | OUTPATIENT
Start: 2022-11-29 | End: 2022-11-29 | Stop reason: HOSPADM

## 2022-11-29 RX ORDER — FENTANYL CITRATE 50 UG/ML
INJECTION, SOLUTION INTRAMUSCULAR; INTRAVENOUS
Status: COMPLETED | OUTPATIENT
Start: 2022-11-29 | End: 2022-11-29

## 2022-11-29 RX ORDER — FENTANYL CITRATE 50 UG/ML
INJECTION, SOLUTION INTRAMUSCULAR; INTRAVENOUS
Status: COMPLETED
Start: 2022-11-29 | End: 2022-11-29

## 2022-11-29 RX ADMIN — GABAPENTIN 300 MG: 300 CAPSULE ORAL at 12:41

## 2022-11-29 RX ADMIN — NEOSTIGMINE METHYLSULFATE 3 MG: 0.5 INJECTION INTRAVENOUS at 08:46

## 2022-11-29 RX ADMIN — HYDROMORPHONE HYDROCHLORIDE 0.25 MG: 1 INJECTION, SOLUTION INTRAMUSCULAR; INTRAVENOUS; SUBCUTANEOUS at 10:49

## 2022-11-29 RX ADMIN — HYDRALAZINE HYDROCHLORIDE 10 MG: 20 INJECTION INTRAMUSCULAR; INTRAVENOUS at 16:42

## 2022-11-29 RX ADMIN — ACETAMINOPHEN 1000 MG: 500 TABLET ORAL at 06:23

## 2022-11-29 RX ADMIN — MIDAZOLAM 1 MG: 1 INJECTION INTRAMUSCULAR; INTRAVENOUS at 06:55

## 2022-11-29 RX ADMIN — SODIUM CHLORIDE, POTASSIUM CHLORIDE, SODIUM LACTATE AND CALCIUM CHLORIDE 9 ML/HR: 600; 310; 30; 20 INJECTION, SOLUTION INTRAVENOUS at 06:34

## 2022-11-29 RX ADMIN — LABETALOL HYDROCHLORIDE 5 MG: 5 INJECTION, SOLUTION INTRAVENOUS at 11:03

## 2022-11-29 RX ADMIN — DOCUSATE SODIUM 50MG AND SENNOSIDES 8.6MG 2 TABLET: 8.6; 5 TABLET, FILM COATED ORAL at 20:41

## 2022-11-29 RX ADMIN — ONDANSETRON 4 MG: 2 INJECTION INTRAMUSCULAR; INTRAVENOUS at 08:40

## 2022-11-29 RX ADMIN — HYDROMORPHONE HYDROCHLORIDE 0.5 MG: 1 INJECTION, SOLUTION INTRAMUSCULAR; INTRAVENOUS; SUBCUTANEOUS at 12:42

## 2022-11-29 RX ADMIN — FENTANYL CITRATE 50 MCG: 50 INJECTION INTRAMUSCULAR; INTRAVENOUS at 06:43

## 2022-11-29 RX ADMIN — OXYCODONE HYDROCHLORIDE 5 MG: 5 TABLET ORAL at 16:37

## 2022-11-29 RX ADMIN — ROCURONIUM BROMIDE 50 MG: 10 INJECTION, SOLUTION INTRAVENOUS at 07:38

## 2022-11-29 RX ADMIN — Medication 40 MG: at 07:50

## 2022-11-29 RX ADMIN — ATORVASTATIN CALCIUM 40 MG: 20 TABLET, FILM COATED ORAL at 20:41

## 2022-11-29 RX ADMIN — BUPIVACAINE HYDROCHLORIDE 10 ML: 5 INJECTION, SOLUTION EPIDURAL; INTRACAUDAL; PERINEURAL at 06:50

## 2022-11-29 RX ADMIN — DEXAMETHASONE SODIUM PHOSPHATE 6 MG: 4 INJECTION, SOLUTION INTRAMUSCULAR; INTRAVENOUS at 08:05

## 2022-11-29 RX ADMIN — Medication 10 MG: at 10:20

## 2022-11-29 RX ADMIN — MAGNESIUM SULFATE HEPTAHYDRATE 1 G: 500 INJECTION, SOLUTION INTRAMUSCULAR; INTRAVENOUS at 07:50

## 2022-11-29 RX ADMIN — FENTANYL CITRATE 50 MCG: 50 INJECTION, SOLUTION INTRAMUSCULAR; INTRAVENOUS at 10:18

## 2022-11-29 RX ADMIN — MIDAZOLAM 1 MG: 1 INJECTION INTRAMUSCULAR; INTRAVENOUS at 10:20

## 2022-11-29 RX ADMIN — PREGABALIN 75 MG: 75 CAPSULE ORAL at 12:41

## 2022-11-29 RX ADMIN — MAGNESIUM SULFATE HEPTAHYDRATE 1 G: 500 INJECTION, SOLUTION INTRAMUSCULAR; INTRAVENOUS at 08:05

## 2022-11-29 RX ADMIN — MIDAZOLAM 1 MG: 1 INJECTION INTRAMUSCULAR; INTRAVENOUS at 10:59

## 2022-11-29 RX ADMIN — PREGABALIN 75 MG: 75 CAPSULE ORAL at 20:42

## 2022-11-29 RX ADMIN — POTASSIUM CHLORIDE, DEXTROSE MONOHYDRATE AND SODIUM CHLORIDE 75 ML/HR: 150; 5; 450 INJECTION, SOLUTION INTRAVENOUS at 11:29

## 2022-11-29 RX ADMIN — IPRATROPIUM BROMIDE AND ALBUTEROL SULFATE 3 ML: 2.5; .5 SOLUTION RESPIRATORY (INHALATION) at 21:11

## 2022-11-29 RX ADMIN — CELECOXIB 100 MG: 100 CAPSULE ORAL at 20:42

## 2022-11-29 RX ADMIN — CELECOXIB 100 MG: 100 CAPSULE ORAL at 12:41

## 2022-11-29 RX ADMIN — ACETAMINOPHEN 1000 MG: 500 TABLET ORAL at 16:37

## 2022-11-29 RX ADMIN — GABAPENTIN 300 MG: 300 CAPSULE ORAL at 06:23

## 2022-11-29 RX ADMIN — FENTANYL CITRATE 50 MCG: 50 INJECTION, SOLUTION INTRAMUSCULAR; INTRAVENOUS at 07:38

## 2022-11-29 RX ADMIN — LIDOCAINE HYDROCHLORIDE 100 MG: 20 INJECTION, SOLUTION EPIDURAL; INFILTRATION; INTRACAUDAL; PERINEURAL at 07:38

## 2022-11-29 RX ADMIN — OXYCODONE HYDROCHLORIDE 5 MG: 5 TABLET ORAL at 22:46

## 2022-11-29 RX ADMIN — HYDROMORPHONE HYDROCHLORIDE 0.25 MG: 1 INJECTION, SOLUTION INTRAMUSCULAR; INTRAVENOUS; SUBCUTANEOUS at 11:19

## 2022-11-29 RX ADMIN — MIDAZOLAM 1 MG: 1 INJECTION INTRAMUSCULAR; INTRAVENOUS at 06:43

## 2022-11-29 RX ADMIN — PROPOFOL 150 MG: 10 INJECTION, EMULSION INTRAVENOUS at 07:38

## 2022-11-29 RX ADMIN — HYDROMORPHONE HYDROCHLORIDE 0.25 MG: 1 INJECTION, SOLUTION INTRAMUSCULAR; INTRAVENOUS; SUBCUTANEOUS at 10:08

## 2022-11-29 RX ADMIN — OXYCODONE HYDROCHLORIDE 5 MG: 5 TABLET ORAL at 10:46

## 2022-11-29 RX ADMIN — LABETALOL HYDROCHLORIDE 5 MG: 5 INJECTION, SOLUTION INTRAVENOUS at 09:10

## 2022-11-29 RX ADMIN — Medication 10 MG: at 09:00

## 2022-11-29 RX ADMIN — GABAPENTIN 300 MG: 300 CAPSULE ORAL at 20:42

## 2022-11-29 RX ADMIN — METOPROLOL TARTRATE 25 MG: 25 TABLET, FILM COATED ORAL at 12:41

## 2022-11-29 RX ADMIN — CEFAZOLIN SODIUM 2 G: 2 INJECTION, SOLUTION INTRAVENOUS at 07:29

## 2022-11-29 RX ADMIN — METOPROLOL TARTRATE 25 MG: 25 TABLET, FILM COATED ORAL at 20:42

## 2022-11-29 RX ADMIN — FAMOTIDINE 20 MG: 10 INJECTION INTRAVENOUS at 06:33

## 2022-11-29 RX ADMIN — HYDROMORPHONE HYDROCHLORIDE 0.5 MG: 1 INJECTION, SOLUTION INTRAMUSCULAR; INTRAVENOUS; SUBCUTANEOUS at 18:03

## 2022-11-29 RX ADMIN — EPHEDRINE SULFATE 10 MG: 50 INJECTION INTRAVENOUS at 08:32

## 2022-11-29 RX ADMIN — FOLIC ACID 1 MG: 1 TABLET ORAL at 16:37

## 2022-11-29 RX ADMIN — BUPIVACAINE 10 ML: 13.3 INJECTION, SUSPENSION, LIPOSOMAL INFILTRATION at 06:50

## 2022-11-29 RX ADMIN — HYDROMORPHONE HYDROCHLORIDE 0.25 MG: 1 INJECTION, SOLUTION INTRAMUSCULAR; INTRAVENOUS; SUBCUTANEOUS at 09:38

## 2022-11-29 RX ADMIN — ACETAMINOPHEN 1000 MG: 500 TABLET ORAL at 20:42

## 2022-11-29 RX ADMIN — Medication 100 MCG: at 08:32

## 2022-11-29 RX ADMIN — CETIRIZINE HYDROCHLORIDE 10 MG: 10 TABLET ORAL at 16:37

## 2022-11-29 RX ADMIN — TAMSULOSIN HYDROCHLORIDE 0.4 MG: 0.4 CAPSULE ORAL at 23:48

## 2022-11-29 RX ADMIN — Medication 10 MG: at 10:05

## 2022-11-29 RX ADMIN — FENTANYL CITRATE 50 MCG: 0.05 INJECTION, SOLUTION INTRAMUSCULAR; INTRAVENOUS at 10:18

## 2022-11-29 RX ADMIN — PANTOPRAZOLE SODIUM 40 MG: 40 TABLET, DELAYED RELEASE ORAL at 16:37

## 2022-11-29 RX ADMIN — Medication 10 MG: at 10:44

## 2022-11-29 RX ADMIN — HEPARIN SODIUM 5000 UNITS: 5000 INJECTION INTRAVENOUS; SUBCUTANEOUS at 06:25

## 2022-11-29 RX ADMIN — GLYCOPYRROLATE 0.5 MCG: 1 INJECTION INTRAMUSCULAR; INTRAVENOUS at 08:46

## 2022-11-29 RX ADMIN — SODIUM CHLORIDE, POTASSIUM CHLORIDE, SODIUM LACTATE AND CALCIUM CHLORIDE: 600; 310; 30; 20 INJECTION, SOLUTION INTRAVENOUS at 06:26

## 2022-11-29 RX ADMIN — ONDANSETRON 4 MG: 2 INJECTION INTRAMUSCULAR; INTRAVENOUS at 11:03

## 2022-11-29 NOTE — ANESTHESIA PROCEDURE NOTES
Peripheral Block      Patient reassessed immediately prior to procedure    Patient location during procedure: pre-op  Start time: 11/29/2022 6:45 AM  Stop time: 11/29/2022 6:50 AM  Reason for block: at surgeon's request and post-op pain management  Performed by  Anesthesiologist: Ron Flores MD  Preanesthetic Checklist  Completed: patient identified, IV checked, site marked, risks and benefits discussed, surgical consent, monitors and equipment checked, pre-op evaluation and timeout performed  Prep:  Pt Position: sitting  Sterile barriers:cap, gloves and mask  Prep: ChloraPrep  Patient monitoring: blood pressure monitoring, continuous pulse oximetry and EKG  Procedure    Sedation: yes  Performed under: local infiltration  Guidance:ultrasound guided    ULTRASOUND INTERPRETATION.  Using ultrasound guidance a 22 G gauge needle was placed in close proximity to the nerve, at which point, under ultrasound guidance anesthetic was injected in the area of the nerve and spread of the anesthesia was seen on ultrasound in close proximity thereto.  There were no abnormalities seen on ultrasound; a digital image was taken; and the patient tolerated the procedure with no complications. Images:still images obtained, printed/placed on chart    Laterality:right  Block Type:erector spinae block  Injection Technique:single-shot  Needle Type:echogenic  Needle Gauge:22 G      Medications Used: bupivacaine liposome (EXPAREL) 1.3 % injection - Infiltration   10 mL - 11/29/2022 6:50:00 AM  bupivacaine (PF) (MARCAINE) 0.5 % injection - Injection   10 mL - 11/29/2022 6:50:00 AM      Post Assessment  Injection Assessment: negative aspiration for heme, no paresthesia on injection and incremental injection  Patient Tolerance:comfortable throughout block  Complications:no  Additional Notes  Ultrasound guidance was used for needle placement and verify medication disbursement.

## 2022-11-29 NOTE — ANESTHESIA PROCEDURE NOTES
Airway  Date/Time: 11/29/2022 7:39 AM    General Information and Staff    Anesthesiologist: Ron Flores MD  CRNA/CAA: Erlin Escalona CRNA    Indications and Patient Condition  Indications for airway management: airway protection    Preoxygenated: yes  MILS maintained throughout  Mask difficulty assessment: 2 - vent by mask + OA or adjuvant +/- NMBA    Final Airway Details  Final airway type: endotracheal airway      Successful airway: EBT - double lumen left  Cuffed: yes   Successful intubation technique: direct laryngoscopy  Endotracheal tube insertion site: oral  Blade: Will  Blade size: 4  EBT DL size (fr): 39  Cormack-Lehane Classification: grade I - full view of glottis  Placement verified by: chest auscultation, bronchoscopy and capnometry   Measured from: gums  ETT/EBT to gums (cm): 29  Number of attempts at approach: 1  Assessment: lips, teeth, and gum same as pre-op and atraumatic intubation    Additional Comments  FOB placement verification of ISRRAEL

## 2022-11-29 NOTE — ANESTHESIA POSTPROCEDURE EVALUATION
Patient: Javy Reeves Sr.    Procedure Summary     Date: 11/29/22 Room / Location: [facility] OR [address] [place] [address] MAIN OR    Anesthesia Start: 0729 Anesthesia Stop: 0917    Procedure: BRONCHOSCOPY, RIGHT THORACOSCOPY VIDEO ASSISTED WEDGE RESECTION, INTERCOSTAL NERVE BLOCK (Right: Chest) Diagnosis:       ILD (interstitial lung disease) (HCC)      (ILD (interstitial lung disease) (HCC) [J84.9])    Surgeons: Nella Bergman MD Provider: Ron Flores MD    Anesthesia Type: general with block ASA Status: 3          Anesthesia Type: general with block    Vitals  Vitals Value Taken Time   /89 11/29/22 1131   Temp 36.4 °C (97.5 °F) 11/29/22 0915   Pulse 62 11/29/22 1138   Resp 16 11/29/22 1100   SpO2 96 % 11/29/22 1138   Vitals shown include unvalidated device data.        Post Anesthesia Care and Evaluation    Patient location during evaluation: bedside  Patient participation: complete - patient participated  Level of consciousness: awake and alert  Pain management: adequate (pt with Regional block, ketamine/midaz)    Airway patency: patent  Anesthetic complications: No anesthetic complications  PONV Status: controlled  Cardiovascular status: acceptable and hemodynamically stable  Respiratory status: acceptable, spontaneous ventilation and nonlabored ventilation  Hydration status: acceptable    Comments: /86   Pulse 76   Temp 36.4 °C (97.5 °F) (Oral)   Resp 16   SpO2 95%

## 2022-11-29 NOTE — ANESTHESIA PROCEDURE NOTES
Arterial Line      Patient reassessed immediately prior to procedure    Patient location during procedure: pre-op  Start time: 11/29/2022 6:54 AM  Stop Time:11/29/2022 6:57 AM       Line placed for hemodynamic monitoring.  Performed By   Anesthesiologist: Ron Flores MD   Preanesthetic Checklist  Completed: patient identified, IV checked, site marked, risks and benefits discussed, surgical consent, monitors and equipment checked, pre-op evaluation and timeout performed  Arterial Line Prep    Sterile Tech: gloves and cap  Prep: ChloraPrep  Patient monitoring: blood pressure monitoring, continuous pulse oximetry and EKG  Arterial Line Procedure   Laterality:left  Location:  radial artery  Catheter size: 20 G   Guidance: ultrasound guided and landmark technique  Number of attempts: 1  Successful placement: yes   Post Assessment   Dressing Type: occlusive dressing applied, secured with tape and wrist guard applied.   Complications no  Circ/Move/Sens Assessment: normal.   Patient Tolerance: patient tolerated the procedure well with no apparent complications  Additional Notes  Ultrasound guided needle acces to artery

## 2022-11-29 NOTE — ANESTHESIA PREPROCEDURE EVALUATION
Anesthesia Evaluation     Patient summary reviewed and Nursing notes reviewed   no history of anesthetic complications:  NPO Solid Status: > 6 hours  NPO Liquid Status: > 6 hours           Airway   Mallampati: II  TM distance: >3 FB  Neck ROM: full  no difficulty expected and No difficulty expected  Dental - normal exam   (+) edentulous, lower dentures and upper dentures    Pulmonary - normal exam    breath sounds clear to auscultation  (+) pulmonary embolism, a smoker Current, asthma,sleep apnea,   (-) rhonchi, decreased breath sounds, wheezes, rales, stridor  Cardiovascular - normal exam    NYHA Classification: I  Rhythm: regular  Rate: normal    (+) past MI , DVT, hyperlipidemia,   (-) murmur, weak pulses, friction rub, systolic click, carotid bruits, JVD, peripheral edema      Neuro/Psych  (+) TIA,    GI/Hepatic/Renal/Endo    (+)  GERD,      Musculoskeletal     (+) back pain, neck pain, radiculopathy  Abdominal  - normal exam    Abdomen: soft.   Substance History - negative use     OB/GYN negative ob/gyn ROS         Other   arthritis,                      Anesthesia Plan    ASA 3     general with block     intravenous induction     Anesthetic plan, risks, benefits, and alternatives have been provided, discussed and informed consent has been obtained with: patient.        CODE STATUS:

## 2022-11-30 ENCOUNTER — APPOINTMENT (OUTPATIENT)
Dept: GENERAL RADIOLOGY | Facility: HOSPITAL | Age: 72
End: 2022-11-30

## 2022-11-30 ENCOUNTER — READMISSION MANAGEMENT (OUTPATIENT)
Dept: CALL CENTER | Facility: HOSPITAL | Age: 72
End: 2022-11-30

## 2022-11-30 VITALS
RESPIRATION RATE: 16 BRPM | BODY MASS INDEX: 29.96 KG/M2 | WEIGHT: 214 LBS | TEMPERATURE: 97.8 F | SYSTOLIC BLOOD PRESSURE: 144 MMHG | HEIGHT: 71 IN | OXYGEN SATURATION: 94 % | HEART RATE: 73 BPM | DIASTOLIC BLOOD PRESSURE: 80 MMHG

## 2022-11-30 LAB
ANION GAP SERPL CALCULATED.3IONS-SCNC: 10.1 MMOL/L (ref 5–15)
BASOPHILS # BLD AUTO: 0.01 10*3/MM3 (ref 0–0.2)
BASOPHILS NFR BLD AUTO: 0.1 % (ref 0–1.5)
BUN SERPL-MCNC: 14 MG/DL (ref 8–23)
BUN/CREAT SERPL: 14.4 (ref 7–25)
CALCIUM SPEC-SCNC: 8.6 MG/DL (ref 8.6–10.5)
CHLORIDE SERPL-SCNC: 105 MMOL/L (ref 98–107)
CO2 SERPL-SCNC: 22.9 MMOL/L (ref 22–29)
CREAT SERPL-MCNC: 0.97 MG/DL (ref 0.76–1.27)
DEPRECATED RDW RBC AUTO: 49 FL (ref 37–54)
EGFRCR SERPLBLD CKD-EPI 2021: 84 ML/MIN/1.73
EOSINOPHIL # BLD AUTO: 0 10*3/MM3 (ref 0–0.4)
EOSINOPHIL NFR BLD AUTO: 0 % (ref 0.3–6.2)
ERYTHROCYTE [DISTWIDTH] IN BLOOD BY AUTOMATED COUNT: 14.2 % (ref 12.3–15.4)
GLUCOSE SERPL-MCNC: 130 MG/DL (ref 65–99)
HCT VFR BLD AUTO: 37.8 % (ref 37.5–51)
HGB BLD-MCNC: 12.6 G/DL (ref 13–17.7)
IMM GRANULOCYTES # BLD AUTO: 0.05 10*3/MM3 (ref 0–0.05)
IMM GRANULOCYTES NFR BLD AUTO: 0.5 % (ref 0–0.5)
LYMPHOCYTES # BLD AUTO: 0.93 10*3/MM3 (ref 0.7–3.1)
LYMPHOCYTES NFR BLD AUTO: 9.1 % (ref 19.6–45.3)
MCH RBC QN AUTO: 31.5 PG (ref 26.6–33)
MCHC RBC AUTO-ENTMCNC: 33.3 G/DL (ref 31.5–35.7)
MCV RBC AUTO: 94.5 FL (ref 79–97)
MONOCYTES # BLD AUTO: 0.9 10*3/MM3 (ref 0.1–0.9)
MONOCYTES NFR BLD AUTO: 8.8 % (ref 5–12)
NEUTROPHILS NFR BLD AUTO: 8.29 10*3/MM3 (ref 1.7–7)
NEUTROPHILS NFR BLD AUTO: 81.5 % (ref 42.7–76)
NRBC BLD AUTO-RTO: 0 /100 WBC (ref 0–0.2)
PLATELET # BLD AUTO: 165 10*3/MM3 (ref 140–450)
PMV BLD AUTO: 8.9 FL (ref 6–12)
POTASSIUM SERPL-SCNC: 4.2 MMOL/L (ref 3.5–5.2)
RBC # BLD AUTO: 4 10*6/MM3 (ref 4.14–5.8)
SODIUM SERPL-SCNC: 138 MMOL/L (ref 136–145)
WBC NRBC COR # BLD: 10.18 10*3/MM3 (ref 3.4–10.8)

## 2022-11-30 PROCEDURE — 71045 X-RAY EXAM CHEST 1 VIEW: CPT

## 2022-11-30 PROCEDURE — 85025 COMPLETE CBC W/AUTO DIFF WBC: CPT | Performed by: THORACIC SURGERY (CARDIOTHORACIC VASCULAR SURGERY)

## 2022-11-30 PROCEDURE — 94799 UNLISTED PULMONARY SVC/PX: CPT

## 2022-11-30 PROCEDURE — 94761 N-INVAS EAR/PLS OXIMETRY MLT: CPT

## 2022-11-30 PROCEDURE — 94760 N-INVAS EAR/PLS OXIMETRY 1: CPT

## 2022-11-30 PROCEDURE — 25010000002 HEPARIN (PORCINE) PER 1000 UNITS: Performed by: THORACIC SURGERY (CARDIOTHORACIC VASCULAR SURGERY)

## 2022-11-30 PROCEDURE — 99024 POSTOP FOLLOW-UP VISIT: CPT | Performed by: NURSE PRACTITIONER

## 2022-11-30 PROCEDURE — 94664 DEMO&/EVAL PT USE INHALER: CPT

## 2022-11-30 PROCEDURE — 80048 BASIC METABOLIC PNL TOTAL CA: CPT | Performed by: THORACIC SURGERY (CARDIOTHORACIC VASCULAR SURGERY)

## 2022-11-30 RX ORDER — TAMSULOSIN HYDROCHLORIDE 0.4 MG/1
0.4 CAPSULE ORAL NIGHTLY
Qty: 30 CAPSULE | Refills: 0 | Status: SHIPPED | OUTPATIENT
Start: 2022-11-30 | End: 2022-11-30 | Stop reason: SDUPTHER

## 2022-11-30 RX ORDER — OXYCODONE HYDROCHLORIDE 5 MG/1
5 TABLET ORAL EVERY 4 HOURS PRN
Qty: 18 TABLET | Refills: 0 | Status: SHIPPED | OUTPATIENT
Start: 2022-11-30 | End: 2022-11-30 | Stop reason: SDUPTHER

## 2022-11-30 RX ORDER — OXYCODONE HYDROCHLORIDE 5 MG/1
5 TABLET ORAL EVERY 4 HOURS PRN
Qty: 18 TABLET | Refills: 0 | Status: SHIPPED | OUTPATIENT
Start: 2022-11-30 | End: 2022-12-05

## 2022-11-30 RX ORDER — TAMSULOSIN HYDROCHLORIDE 0.4 MG/1
0.4 CAPSULE ORAL NIGHTLY
Qty: 30 CAPSULE | Refills: 0 | Status: SHIPPED | OUTPATIENT
Start: 2022-11-30

## 2022-11-30 RX ADMIN — HEPARIN SODIUM 5000 UNITS: 5000 INJECTION INTRAVENOUS; SUBCUTANEOUS at 06:17

## 2022-11-30 RX ADMIN — HEPARIN SODIUM 5000 UNITS: 5000 INJECTION INTRAVENOUS; SUBCUTANEOUS at 13:56

## 2022-11-30 RX ADMIN — ACETAMINOPHEN 1000 MG: 500 TABLET ORAL at 08:06

## 2022-11-30 RX ADMIN — OXYCODONE HYDROCHLORIDE 5 MG: 5 TABLET ORAL at 02:54

## 2022-11-30 RX ADMIN — OXYCODONE HYDROCHLORIDE 5 MG: 5 TABLET ORAL at 08:06

## 2022-11-30 RX ADMIN — CETIRIZINE HYDROCHLORIDE 10 MG: 10 TABLET ORAL at 08:06

## 2022-11-30 RX ADMIN — GABAPENTIN 300 MG: 300 CAPSULE ORAL at 06:16

## 2022-11-30 RX ADMIN — PREGABALIN 75 MG: 75 CAPSULE ORAL at 08:06

## 2022-11-30 RX ADMIN — IPRATROPIUM BROMIDE AND ALBUTEROL SULFATE 3 ML: 2.5; .5 SOLUTION RESPIRATORY (INHALATION) at 14:44

## 2022-11-30 RX ADMIN — METOPROLOL TARTRATE 25 MG: 25 TABLET, FILM COATED ORAL at 08:06

## 2022-11-30 RX ADMIN — CELECOXIB 100 MG: 100 CAPSULE ORAL at 08:06

## 2022-11-30 RX ADMIN — GABAPENTIN 300 MG: 300 CAPSULE ORAL at 13:56

## 2022-11-30 RX ADMIN — IPRATROPIUM BROMIDE AND ALBUTEROL SULFATE 3 ML: 2.5; .5 SOLUTION RESPIRATORY (INHALATION) at 07:30

## 2022-12-01 ENCOUNTER — TRANSITIONAL CARE MANAGEMENT TELEPHONE ENCOUNTER (OUTPATIENT)
Dept: CALL CENTER | Facility: HOSPITAL | Age: 72
End: 2022-12-01

## 2022-12-01 ENCOUNTER — ANTICOAGULATION VISIT (OUTPATIENT)
Dept: FAMILY MEDICINE CLINIC | Facility: CLINIC | Age: 72
End: 2022-12-01

## 2022-12-01 DIAGNOSIS — I82.5Y3 CHRONIC DEEP VEIN THROMBOSIS (DVT) OF PROXIMAL VEIN OF BOTH LOWER EXTREMITIES: Primary | ICD-10-CM

## 2022-12-01 DIAGNOSIS — J84.9 ILD (INTERSTITIAL LUNG DISEASE): Primary | ICD-10-CM

## 2022-12-01 LAB — INR PPP: 1 (ref 2.5–3)

## 2022-12-01 RX ORDER — TRAMADOL HYDROCHLORIDE 50 MG/1
50 TABLET ORAL EVERY 6 HOURS PRN
Qty: 20 TABLET | Refills: 0 | Status: SHIPPED | OUTPATIENT
Start: 2022-12-01 | End: 2023-02-14

## 2022-12-01 NOTE — CASE MANAGEMENT/SOCIAL WORK
Case Management Discharge Note      Final Note: Discharged home, no known needs. RANDY Padilla RN         Selected Continued Care - Discharged on 11/30/2022 Admission date: 11/29/2022 - Discharge disposition: Home or Self Care    Destination    No services have been selected for the patient.              Durable Medical Equipment    No services have been selected for the patient.              Dialysis/Infusion    No services have been selected for the patient.              Home Medical Care    No services have been selected for the patient.              Therapy    No services have been selected for the patient.              Community Resources    No services have been selected for the patient.              Community & DME    No services have been selected for the patient.                  Transportation Services  Private: Car    Final Discharge Disposition Code: 01 - home or self-care

## 2022-12-01 NOTE — OUTREACH NOTE
Call Center TCM Note    Flowsheet Row Responses   List of hospitals in [place] patient discharged from? [facility]   Does the patient have one of the following disease processes/diagnoses(primary or secondary)? Cardiothoracic surgery   TCM attempt successful? Yes   Call start time 1215   Call end time 1228   Discharge diagnosis Interstitial lung disease,    BRONCHOSCOPY, RIGHT THORACOSCOPY VIDEO ASSISTED WEDGE RESECTION   Person spoke with today (if not patient) and relationship Patient   Meds reviewed with patient/caregiver? Yes   Does the patient have all medications related to this admission filled (includes all antibiotics, pain medications, cardiac medications, etc.) No   What is keeping the patient from filling the prescriptions? Prior authorization Issues, Financial   Prescription comments Prescribed Oxycodone and flomax. However when spouse when to get medication she was told that oxycodone was called into another pharmacy as well and had been filled and insurance wont pay for it. Of note oxycodone only went to [email or web address] [id number].   Comments Spouse states she will call and set up appt   Does the patient have an appointment with their PCP within 7 days of discharge? No   Nursing Interventions Assisted patient with making appointment per protocol, Patient desires to follow up with specialty only   Has home health visited the patient within 72 hours of discharge? N/A   Psychosocial issues? No   Did the patient receive a copy of their discharge instructions? Yes   Nursing interventions Reviewed instructions with patient   What is the patient's perception of their health status since discharge? Improving   Nursing interventions Nurse provided patient education   Is the patient/caregiver able to teach back normal signs of recovery? Pain or discomfort at incisional site   Nursing interventions Reassured on normal signs of recovery  [Some drainage at the site- continue to monitor if more drainage is noted they are to  call surgeon immediately. Unable to tell if there was color of it it was blood noted]   Is the patient /caregiver able to teach back basic post-op care? Continue use of incentive spirometry at least 1 week post discharge, Drive as instructed by MD in discharge instructions, Lifting as instructed by MD in discharge instructions, Use a clean wash cloth and antibacterial bar or liquid soap to clean incisions  [No heavy lifting, pushing, pulling greater than 10 pounds]   Is the patient/caregiver able to teach back signs and symptoms of incisional infection? Increased redness, swelling or pain at the incisonal site, Increased drainage or bleeding, Incisional warmth, Pus or odor from incision, Fever   Is the patient/caregiver able to teach back steps to recovery at home? Set small, achievable goals for return to baseline health, Rest and rebuild strength, gradually increase activity   Is the patient/caregiver able to teach back the hierarchy of who to call/visit for symptoms/problems? PCP, Specialist, Home health nurse, Urgent Care, ED, 911 Yes   Additional teach back comments Remove dressing from post chest tube site after 48 hours, may shower and clean surgical sites with antibacterial soap or hydrogen peroxide, and apply gauze dressing or band-aid as needed for any drainage.  No dressing needed once no longer draining.       TCM call completed? Yes   Wrap up additional comments Patient states he is doing well but in pain due to some issues with pain medication. Pharmacy states- the script went somewhere and filled at another location- yesterday ( pharmacy told me MD office needs to be called/ pharmacy to see where that medication was filled at)    Call end time 1228   Would this patient benefit from a Referral to [facility] Social Work? No   Is the patient interested in additional calls from an ambulatory ?  NOTE:  applies to high risk patients requiring additional follow-up. No          Myriam Vicente,  RN    12/1/2022, 12:30 EST

## 2022-12-02 ENCOUNTER — TELEPHONE (OUTPATIENT)
Dept: FAMILY MEDICINE CLINIC | Facility: CLINIC | Age: 72
End: 2022-12-02

## 2022-12-02 LAB
BACTERIA SPEC AEROBE CULT: NORMAL
GRAM STN SPEC: NORMAL

## 2022-12-02 RX ORDER — ENOXAPARIN SODIUM 100 MG/ML
90 INJECTION SUBCUTANEOUS EVERY 12 HOURS
Qty: 10 ML | Refills: 0 | Status: SHIPPED | OUTPATIENT
Start: 2022-12-02 | End: 2022-12-05 | Stop reason: SDUPTHER

## 2022-12-02 NOTE — TELEPHONE ENCOUNTER
Caller: [facility]- [facility]    Relationship: Lab    Best call back number: [phone]    What was the call regarding: NEELA WITH [facility] IS CALLING WITH PATIENT'S INR RESULT, WHICH IS 1.0 FROM YESTERDAY AFTERNOON. NEELA STATED THIS WAS FAXED IN AS WELL.

## 2022-12-04 LAB
BACTERIA SPEC ANAEROBE CULT: NORMAL

## 2022-12-05 ENCOUNTER — PRIOR AUTHORIZATION (OUTPATIENT)
Dept: FAMILY MEDICINE CLINIC | Facility: CLINIC | Age: 72
End: 2022-12-05

## 2022-12-05 ENCOUNTER — OFFICE VISIT (OUTPATIENT)
Dept: FAMILY MEDICINE CLINIC | Facility: CLINIC | Age: 72
End: 2022-12-05

## 2022-12-05 VITALS
BODY MASS INDEX: 29.68 KG/M2 | DIASTOLIC BLOOD PRESSURE: 66 MMHG | RESPIRATION RATE: 16 BRPM | HEIGHT: 71 IN | TEMPERATURE: 97.5 F | OXYGEN SATURATION: 98 % | HEART RATE: 81 BPM | WEIGHT: 212 LBS | SYSTOLIC BLOOD PRESSURE: 110 MMHG

## 2022-12-05 DIAGNOSIS — K21.9 GASTROESOPHAGEAL REFLUX DISEASE WITHOUT ESOPHAGITIS: ICD-10-CM

## 2022-12-05 DIAGNOSIS — M79.7 FIBROMYALGIA: ICD-10-CM

## 2022-12-05 DIAGNOSIS — I25.2 OLD MI (MYOCARDIAL INFARCTION): ICD-10-CM

## 2022-12-05 DIAGNOSIS — I82.5Y3 CHRONIC DEEP VEIN THROMBOSIS (DVT) OF PROXIMAL VEIN OF BOTH LOWER EXTREMITIES: ICD-10-CM

## 2022-12-05 DIAGNOSIS — Z23 NEED FOR INFLUENZA VACCINATION: Primary | ICD-10-CM

## 2022-12-05 DIAGNOSIS — E78.2 MIXED HYPERLIPIDEMIA: ICD-10-CM

## 2022-12-05 DIAGNOSIS — Z86.711 HISTORY OF PULMONARY EMBOLUS (PE): ICD-10-CM

## 2022-12-05 PROCEDURE — 1111F DSCHRG MED/CURRENT MED MERGE: CPT | Performed by: PHYSICIAN ASSISTANT

## 2022-12-05 PROCEDURE — 99495 TRANSJ CARE MGMT MOD F2F 14D: CPT | Performed by: PHYSICIAN ASSISTANT

## 2022-12-05 PROCEDURE — G0008 ADMIN INFLUENZA VIRUS VAC: HCPCS | Performed by: PHYSICIAN ASSISTANT

## 2022-12-05 PROCEDURE — 90662 IIV NO PRSV INCREASED AG IM: CPT | Performed by: PHYSICIAN ASSISTANT

## 2022-12-05 RX ORDER — ENOXAPARIN SODIUM 100 MG/ML
90 INJECTION SUBCUTANEOUS EVERY 12 HOURS
Qty: 10 ML | Refills: 0 | Status: SHIPPED | OUTPATIENT
Start: 2022-12-05

## 2022-12-05 RX ORDER — ALBUTEROL SULFATE 90 UG/1
2 AEROSOL, METERED RESPIRATORY (INHALATION) EVERY 4 HOURS PRN
Qty: 24 G | Refills: 3 | Status: SHIPPED | OUTPATIENT
Start: 2022-12-05

## 2022-12-05 RX ORDER — PANTOPRAZOLE SODIUM 40 MG/1
40 TABLET, DELAYED RELEASE ORAL DAILY
Qty: 90 TABLET | Refills: 3 | Status: SHIPPED | OUTPATIENT
Start: 2022-12-05

## 2022-12-05 NOTE — PROGRESS NOTES
Subjective   Javy Reeves Sr. is a 72 y.o. male.     History of Present Illness   Javy Reeves Sr. 72 y.o. male presents today for hosptial follow up.  he was treated 11-29-22 to 11-30-22 for VATS  .  I reviewed all of the labs and diagnostic testing and noted:  CXR. Labs. path  The patient's medications were not changed:  Current outpatient and discharge medications have been reconciled for the patient.  Reviewed by: Aletha Ochoa PA-C    he does have a follow up appointment with a specialist:       Had VATS     Procedures Performed  Procedure(s):  BRONCHOSCOPY, RIGHT THORACOSCOPY VIDEO ASSISTED WEDGE RESECTION, INTERCOSTAL NERVE BLOCK    Work up Interstitial lung disease.   Hospital Course  Mr. Reeves was admitted postoperatively on 11/29/2022 status post right VATS with right upper lobe wedge resection.  For further details, please refer to Dr. Bergman's operative note.  He recovered in the postanesthesia care unit was transferred to [facility] for remainder of his stay.  Chest tube was removed without difficulty on POD 1 and follow-up imaging is stable.  He is hemodynamically stable on room air with pain adequately controlled.  He experienced some urinary retention postoperatively and urology was consulted.  He has been initiated on Flomax.  They recommended to discharge home without a [facility] and follow-up as an outpatient.  Postoperative care instructions have been discussed with him and medications have been sent to his pharmacy.  He will follow-up in our office in 2 weeks.   post op--post chest tube CXR negative    Hematology notes 11-14-22  • Hypercoagulable work-up done August 24, 2022 shows lupus anticoagulant negative beta-2 glycoprotein antibody negative, anticardiolipin antibody IgG is 91And IgM is less than 9, factor II prothrombin gene mutation negative factor V Leiden negative, Antithrombin III 80% as patient was on heparin due to slightly low.  Patient protein C&S was not done as he was on Coumadin and  we can recheck anticardiolipin antibody in 3 months.    Has f/u with Dr Bergman  Has f/u hematologist    The following portions of the patient's history were reviewed and updated as appropriate: allergies, current medications, past family history, past medical history, past social history, past surgical history and problem list.    Review of Systems   Constitutional: Negative for activity change, appetite change and unexpected weight change.   HENT: Negative for nosebleeds and trouble swallowing.    Eyes: Negative for pain and visual disturbance.   Respiratory: Negative for chest tightness, shortness of breath and wheezing.    Cardiovascular: Negative for chest pain and palpitations.   Gastrointestinal: Negative for abdominal pain and blood in stool.   Endocrine: Negative.    Genitourinary: Negative for difficulty urinating and hematuria.   Musculoskeletal: Negative for joint swelling.   Skin: Negative for color change and rash.   Allergic/Immunologic: Negative.    Neurological: Negative for syncope and speech difficulty.   Hematological: Negative for adenopathy.   Psychiatric/Behavioral: Negative for agitation and confusion.   All other systems reviewed and are negative.      Objective   Physical Exam  Vitals and nursing note reviewed.   Constitutional:       General: He is not in acute distress.     Appearance: He is well-developed. He is not diaphoretic.   HENT:      Head: Normocephalic.   Eyes:      Conjunctiva/sclera: Conjunctivae normal.      Pupils: Pupils are equal, round, and reactive to light.   Cardiovascular:      Rate and Rhythm: Normal rate and regular rhythm.      Pulses: Normal pulses.      Heart sounds: Normal heart sounds. No murmur heard.  Pulmonary:      Effort: Pulmonary effort is normal.      Breath sounds: Normal breath sounds. No rales.   Musculoskeletal:         General: Normal range of motion.      Cervical back: Normal range of motion and neck supple.      Right lower leg: No edema.       Left lower leg: No edema.   Skin:     General: Skin is warm and dry.      Findings: No rash.      Comments: Incision healing----see photo   Neurological:      Mental Status: He is alert and oriented to person, place, and time.   Psychiatric:         Mood and Affect: Mood normal. Affect is not inappropriate.         Behavior: Behavior normal.         Thought Content: Thought content normal.         Judgment: Judgment normal.         Assessment & Plan   Diagnoses and all orders for this visit:    1. Need for influenza vaccination (Primary)  -     Fluzone High-Dose 65+yrs    2. Chronic deep vein thrombosis (DVT) of proximal vein of both lower extremities (HCC)  -     Enoxaparin Sodium (LOVENOX) 100 MG/ML solution prefilled syringe syringe; Inject 0.9 mL under the skin into the appropriate area as directed Every 12 (Twelve) Hours. Stop Lovenox when your INR is greater than 2.5 as discussed.  Indications: DVT/PE (active thrombosis)  Dispense: 10 mL; Refill: 0    3. History of pulmonary embolus (PE)    4. Old MI (myocardial infarction)    5. Mixed hyperlipidemia    6. Gastroesophageal reflux disease without esophagitis    7. Fibromyalgia      No new SOA; has some soreness from procedure --VATS----eating and drinking ok. Has f/u Dr Bergman  Needs to be on Lovenox until INR 2.5----recurrent DVT/PE  Why ins no pay  Labs due Kenan  Continue PPI working well and sent refill Protonix to pharmacy  Continue Lyrica helps the fibromyalgia pain  Continue albuterol as needed working well  Continues on folic acid and plan to update labs in January  Multivitamin on hold for now from surgery  On tramadol and note that in his [facility] report for pain from the surgery.

## 2022-12-05 NOTE — PROGRESS NOTES
Immunization  Immunization performed in LD by Neela Patricio MA. Patient tolerated the procedure well without complications.  12/05/22   Neela Patricio MA

## 2022-12-06 LAB
DX PRELIMINARY: NORMAL
LAB AP CASE REPORT: NORMAL
LAB AP DIAGNOSIS COMMENT: NORMAL
PATH REPORT.FINAL DX SPEC: NORMAL
PATH REPORT.GROSS SPEC: NORMAL

## 2022-12-07 ENCOUNTER — ANTICOAGULATION VISIT (OUTPATIENT)
Dept: FAMILY MEDICINE CLINIC | Facility: CLINIC | Age: 72
End: 2022-12-07

## 2022-12-07 LAB — INR PPP: 1.8

## 2022-12-07 NOTE — PROGRESS NOTES
"Enter Query Response Below      Query Response:       - Yes, usual interstitial pneumonia, smoking-related changes and chronic pleuritis of right upper, middle, lower lobes, wedge biopsies of lung       If applicable, please update the problem list.         Patient: Javy Reeves Sr.        : 1950  Account: [id number]           Admit Date: 2022        How to Respond to this query:       a. Click New Note     b. Answer query within the yellow box.                c. Update the Problem List, if applicable.      If you have any questions about this query contact me at: [email or web address]     Dr. Bergman,    Based on Medicare billing guidelines [facility] are not allowed to use diagnoses from pathology reports as the sole basis for billing. Any findings noted on a pathology report must be affirmed by the treating physician before billing. The pathologist reported that the specimen shows \"right upper, middle, lower lobes, wedge biopsies of lung : Usual interstitial pneumonia, smoking-related changes and chronic pleuritis.\" The patient was treated with bronchoscopy, right thoracoscopy video assisted wedge resection, partial decortication and intercostal nerve block.    Is this finding consistent with your clinical impression and treatment plan for this patient?    - Yes, usual interstitial pneumonia, smoking-related changes and chronic pleuritis of right upper, middle, lower lobes, wedge biopsies of lung  - No, (please specify) ______________  - Other explanation for clinical impression and treatment plan _____________  - Clinically indeterminable    By submitting this query, we are merely seeking further clarification of documentation to accurately reflect all conditions that you are monitoring, evaluating, treating or that extend the hospitalization or utilize additional resources of care. Please utilize your independent clinical judgment when addressing the question(s) above.     This " query and your response, once completed, will be entered into the legal medical record.    Sincerely,  Lesly Martini RN  Clinical Documentation Integrity Program

## 2022-12-09 ENCOUNTER — TELEPHONE (OUTPATIENT)
Dept: FAMILY MEDICINE CLINIC | Facility: CLINIC | Age: 72
End: 2022-12-09

## 2022-12-09 NOTE — TELEPHONE ENCOUNTER
Caller: MD INR    Relationship: LAB    Best call back number: [phone]    CALLING TO REPORT OUT OF RANGE INR ON 12/7/2022 RESULTS WAS 1.8

## 2022-12-12 ENCOUNTER — HOSPITAL ENCOUNTER (OUTPATIENT)
Dept: GENERAL RADIOLOGY | Facility: HOSPITAL | Age: 72
Discharge: HOME | End: 2022-12-12
Admitting: NURSE PRACTITIONER

## 2022-12-12 ENCOUNTER — OFFICE VISIT (OUTPATIENT)
Dept: SURGERY | Facility: CLINIC | Age: 72
End: 2022-12-12

## 2022-12-12 ENCOUNTER — ANTICOAGULATION VISIT (OUTPATIENT)
Dept: FAMILY MEDICINE CLINIC | Facility: CLINIC | Age: 72
End: 2022-12-12

## 2022-12-12 VITALS
HEIGHT: 71 IN | WEIGHT: 211 LBS | SYSTOLIC BLOOD PRESSURE: 122 MMHG | BODY MASS INDEX: 29.54 KG/M2 | DIASTOLIC BLOOD PRESSURE: 72 MMHG | OXYGEN SATURATION: 100 % | HEART RATE: 88 BPM

## 2022-12-12 DIAGNOSIS — J84.112 UIP (USUAL INTERSTITIAL PNEUMONITIS): Primary | ICD-10-CM

## 2022-12-12 DIAGNOSIS — J84.9 ILD (INTERSTITIAL LUNG DISEASE): ICD-10-CM

## 2022-12-12 LAB — INR PPP: 2.3 (ref 2.5–3)

## 2022-12-12 PROCEDURE — 71046 X-RAY EXAM CHEST 2 VIEWS: CPT

## 2022-12-12 PROCEDURE — 99024 POSTOP FOLLOW-UP VISIT: CPT | Performed by: THORACIC SURGERY (CARDIOTHORACIC VASCULAR SURGERY)

## 2022-12-12 NOTE — PROGRESS NOTES
"Chief Complaint  Interstitial lung disease  Subjective          Javy Reeves Sr. presents to [facility] THORACIC SURGERY  History of Present Illness    Javy Reeves is a pleasant 72-year-old gentleman who is status post VATS wedge resection for ILD lung biopsy.    Mr. Reeves reports that he is doing well. He states that he is worried about his wife, who passed out this morning. He states he has seen Dr. Miguel, and has seen a pulmonologist at the [facility].     Objective   Vital Signs:   /72 (BP Location: Left arm, Patient Position: Sitting, Cuff Size: Adult)   Pulse 88   Ht 180.3 cm (71\")   Wt 95.7 kg (211 lb)   SpO2 100%   BMI 29.43 kg/m²     Physical Exam  Vitals and nursing note reviewed.   Constitutional:       Appearance: He is well-developed.   HENT:      Head: Normocephalic and atraumatic.      Nose: Nose normal.   Eyes:      Conjunctiva/sclera: Conjunctivae normal.   Pulmonary:      Effort: Pulmonary effort is normal.   Musculoskeletal:         General: Normal range of motion.      Cervical back: Normal range of motion and neck supple.   Skin:     General: Skin is warm and dry.   Neurological:      Mental Status: He is alert and oriented to person, place, and time.   Psychiatric:         Behavior: Behavior normal.         Thought Content: Thought content normal.         Judgment: Judgment normal.          Result Review :     Pathology consistent with usual interstitial pneumonia and smoking related changes, chronic pleuritis. All cultures are negative.    I have independently reviewed the x-ray performed today which demonstrates good expansion bilateral lungs with interstitial thickening in the lung bases. No pneumothorax. No effusion.                    Assessment and Plan    Diagnoses and all orders for this visit:    1. UIP (usual interstitial pneumonitis) (HCC) (Primary)         Javy Reeves is a pleasant 72-year-old gentleman with ILD now diagnosed usual " interstitial pneumonia.  His wounds are healing well.  His chest x-ray looks good.  He will plan to follow up with his pulmonologist to discuss any further treatments for his new diagnosis of UIP.      Follow Up   No follow-ups on file.  Patient was given instructions and counseling regarding his condition or for health maintenance advice. Please see specific information pulled into the AVS if appropriate.       Transcribed from ambient dictation for Nella Bergman MD by Michaelle Francis.  12/12/22   16:11 EST    Patient or patient representative verbalized consent to the visit recording.  I have personally performed the services described in this document as transcribed by the above individual, and it is both accurate and complete.  Answers for HPI/ROS submitted by the patient on 12/10/2022  What is the primary reason for your visit?: Other  Please describe your symptoms.: Right side surgery area extremely tender throughout the day causing pain to shoot from right side of chest to middle of chest. Don’t know if this pain is part of my healing process.  Have you had these symptoms before?: No  How long have you been having these symptoms?: 5-7 days  Please list any medications you are currently taking for this condition.: Tramadol 50mg Tablets/ one Tablet by mouth every 6 hours as needed for moderate pain.  Please describe any probable cause for these symptoms. : Symptoms happen at anytime while sitting, laying down sleeping or awake and walking from room to room. This pain is not a constant all day pain but it flares up several times in a 24 hour period.

## 2022-12-16 ENCOUNTER — LAB (OUTPATIENT)
Dept: LAB | Facility: HOSPITAL | Age: 72
End: 2022-12-16

## 2022-12-16 ENCOUNTER — ANTICOAGULATION VISIT (OUTPATIENT)
Dept: FAMILY MEDICINE CLINIC | Facility: CLINIC | Age: 72
End: 2022-12-16

## 2022-12-16 ENCOUNTER — OFFICE VISIT (OUTPATIENT)
Dept: ONCOLOGY | Facility: CLINIC | Age: 72
End: 2022-12-16

## 2022-12-16 VITALS
HEIGHT: 71 IN | SYSTOLIC BLOOD PRESSURE: 144 MMHG | OXYGEN SATURATION: 98 % | RESPIRATION RATE: 16 BRPM | HEART RATE: 73 BPM | DIASTOLIC BLOOD PRESSURE: 84 MMHG | TEMPERATURE: 97.7 F | BODY MASS INDEX: 29.48 KG/M2 | WEIGHT: 210.6 LBS

## 2022-12-16 DIAGNOSIS — I82.5Y3 CHRONIC DEEP VEIN THROMBOSIS (DVT) OF PROXIMAL VEIN OF BOTH LOWER EXTREMITIES: Primary | ICD-10-CM

## 2022-12-16 DIAGNOSIS — I82.5Y3 CHRONIC DEEP VEIN THROMBOSIS (DVT) OF PROXIMAL VEIN OF BOTH LOWER EXTREMITIES: ICD-10-CM

## 2022-12-16 DIAGNOSIS — I26.99 RECURRENT PULMONARY EMBOLISM: ICD-10-CM

## 2022-12-16 LAB
ALBUMIN SERPL-MCNC: 4 G/DL (ref 3.5–5.2)
ALBUMIN/GLOB SERPL: 1.1 G/DL (ref 1.1–2.4)
ALP SERPL-CCNC: 62 U/L (ref 38–116)
ALT SERPL W P-5'-P-CCNC: 51 U/L (ref 0–41)
ANION GAP SERPL CALCULATED.3IONS-SCNC: 9.9 MMOL/L (ref 5–15)
AST SERPL-CCNC: 45 U/L (ref 0–40)
BASOPHILS # BLD AUTO: 0.04 10*3/MM3 (ref 0–0.2)
BASOPHILS NFR BLD AUTO: 0.9 % (ref 0–1.5)
BILIRUB SERPL-MCNC: 0.6 MG/DL (ref 0.2–1.2)
BUN SERPL-MCNC: 13 MG/DL (ref 6–20)
BUN/CREAT SERPL: 12.6 (ref 7.3–30)
CALCIUM SPEC-SCNC: 9.5 MG/DL (ref 8.5–10.2)
CHLORIDE SERPL-SCNC: 106 MMOL/L (ref 98–107)
CO2 SERPL-SCNC: 26.1 MMOL/L (ref 22–29)
CREAT SERPL-MCNC: 1.03 MG/DL (ref 0.7–1.3)
DEPRECATED RDW RBC AUTO: 46.5 FL (ref 37–54)
EGFRCR SERPLBLD CKD-EPI 2021: 78.1 ML/MIN/1.73
EOSINOPHIL # BLD AUTO: 0.33 10*3/MM3 (ref 0–0.4)
EOSINOPHIL NFR BLD AUTO: 7.1 % (ref 0.3–6.2)
ERYTHROCYTE [DISTWIDTH] IN BLOOD BY AUTOMATED COUNT: 13.2 % (ref 12.3–15.4)
GLOBULIN UR ELPH-MCNC: 3.7 GM/DL (ref 1.8–3.5)
GLUCOSE SERPL-MCNC: 100 MG/DL (ref 74–124)
HCT VFR BLD AUTO: 39.6 % (ref 37.5–51)
HGB BLD-MCNC: 13.6 G/DL (ref 13–17.7)
IMM GRANULOCYTES # BLD AUTO: 0.03 10*3/MM3 (ref 0–0.05)
IMM GRANULOCYTES NFR BLD AUTO: 0.6 % (ref 0–0.5)
INR PPP: 1.7 (ref 2.5–3.5)
LYMPHOCYTES # BLD AUTO: 1.83 10*3/MM3 (ref 0.7–3.1)
LYMPHOCYTES NFR BLD AUTO: 39.4 % (ref 19.6–45.3)
MCH RBC QN AUTO: 32.7 PG (ref 26.6–33)
MCHC RBC AUTO-ENTMCNC: 34.3 G/DL (ref 31.5–35.7)
MCV RBC AUTO: 95.2 FL (ref 79–97)
MONOCYTES # BLD AUTO: 0.44 10*3/MM3 (ref 0.1–0.9)
MONOCYTES NFR BLD AUTO: 9.5 % (ref 5–12)
NEUTROPHILS NFR BLD AUTO: 1.98 10*3/MM3 (ref 1.7–7)
NEUTROPHILS NFR BLD AUTO: 42.5 % (ref 42.7–76)
NRBC BLD AUTO-RTO: 0 /100 WBC (ref 0–0.2)
PLATELET # BLD AUTO: 206 10*3/MM3 (ref 140–450)
PMV BLD AUTO: 8.7 FL (ref 6–12)
POTASSIUM SERPL-SCNC: 4.6 MMOL/L (ref 3.5–4.7)
PROT SERPL-MCNC: 7.7 G/DL (ref 6.3–8)
RBC # BLD AUTO: 4.16 10*6/MM3 (ref 4.14–5.8)
SODIUM SERPL-SCNC: 142 MMOL/L (ref 134–145)
WBC NRBC COR # BLD: 4.65 10*3/MM3 (ref 3.4–10.8)

## 2022-12-16 PROCEDURE — 85025 COMPLETE CBC W/AUTO DIFF WBC: CPT

## 2022-12-16 PROCEDURE — 36415 COLL VENOUS BLD VENIPUNCTURE: CPT

## 2022-12-16 PROCEDURE — 80053 COMPREHEN METABOLIC PANEL: CPT

## 2022-12-16 PROCEDURE — 99214 OFFICE O/P EST MOD 30 MIN: CPT | Performed by: INTERNAL MEDICINE

## 2022-12-16 NOTE — PROGRESS NOTES
Subjective .     REASONS FOR FOLLOWUP:      1.  Recurrent pulmonary embolism, has been on Coumadin for almost 20 years  · Initial DVT pulmonary embolism 20 years ago after knee surgery  · More recent pulmonary embolism August 23, 2022  · Hypercoagulable work-up done August 24, 2022 shows lupus anticoagulant negative beta-2 glycoprotein antibody negative, anticardiolipin antibody IgG is 91And IgM is less than 9, factor II prothrombin gene mutation negative factor V Leiden negative, Antithrombin III 80% as patient was on heparin due to slightly low.  Patient protein C&S was not done as he was on Coumadin and we can recheck anticardiolipin antibody in 3 months.  · Currently being followed by Aletha Ochoa for his INR    2.  Interstitial lung disease, seen by [facility] with plans to do biopsy of the lung.  He is seeing Dr. Salinas with plans to do lung biopsy here on November 29, 2022    3.  S/p Yudelka IVC filter placed many years ago seen on CT angiogram of the abdomen pelvis May 19, 2022.    4.  Small abdominal arctic aneurysm 3 cm    HISTORY OF PRESENT ILLNESS:  The patient is a 72 y.o. year old male who is here for follow-up with the above-mentioned history.    History of Present Illness     Patient is a 72-year-old gentleman with history of DVT pulmonary embolism with DVT in the left lower extremity following the surgery about 20 years ago.  He was placed on Coumadin for all these years with a INR goal of 2-3.  More recently got admitted to the hospital from August 22, 2022-August 25, 2022.    He had a subtherapeutic INR at 1.9.  He had pleuritic chest pain which subsequently resolved.  So far his INR has been 2.5-3.0 but he can have it between 2.5 and 3.5.  Last week it was 3.1 at Aletha Ochoa's office.  Given that there is questionable anticardiolipin antibody syndrome with anticardiolipin antibody being elevated at 91 though it is only 1 out of 3 antibodies that is positive it is reasonable to have a goal  between 2.5 and 3.5.    He went to [facility] recently because of interstitial lung disease.  They wanted a lung biopsy as all of his autoimmune work-up had been negative.  He has been seen by Dr. Salinas who wanted to do a lung biopsy and November 29, 2022.  Patient was hesitant for the lung biopsy.  But he is smoking and likely best to go ahead and get the biopsy done.  He says he likely will get it done but he wanted to talk to Dr. Salinas.    Interval history:    Patient is s/p lung surgery, pathology is consistent with usual interstitial pneumonia.  And chronic pleuritis and smoking-related changes.  The pathology was sent for second opinion to [facility].  There were focal fibromyxoid plugs.  This raises the possibility of interstitial pneumonitis.  Patient is on chronic Coumadin therapy for which she follows up with Aletha Ochoa           Hematology history:  Patient is a 71-year-old gentleman who came to the ER with chest pain which started yesterday morning at work.  He had shortness of breath and had nausea and diaphoresis.  He then came to the ER with his wife.  He has had a past history of pulmonary embolism and DVT and was on Coumadin.  His INR was checked weekly and apparently was therapeutic.  He denied any hemoptysis.     He underwent CT angiogram of the chest August 22, 2022 which showed a small filling defect in the distal left upper lobe pulmonary artery extending into segmental left upper lobe pulmonary artery branch.  No other filling defects were seen.  There is also a 1.9 cm superior right hilar lymph node.  This is not well seen on the previous CT scan of the chest dated April 16, 2022 which was performed without contrast but this was seen on September 28, 2020 when it was similar in size.  A few additional mediastinal nodes are seen including a subcarinal calcified node.  There is emphysema in both lungs.     Their impression was that patient has a small pulmonary embolism in  "the most distal aspect of the left upper lobe artery extending into a segmental left upper lobe pulmonary artery branch.  And small amount of mediastinal or hilar lymphadenopathy which appears to be stable from April 28, 2020.  Patient was placed on heparin drip.  His INR in the ER was not therapeutic.  His INR is 1.9.  COVID-19 was negative     CT angiogram April 18, 2022 had shown no change in bilateral bronchiectasis, groundglass opacity and subpleural reticulation consistent with fibrosis and no new focal consolidation.         Past Medical History:   Diagnosis Date   • Acute and subacute infective endocarditis in diseases classified elsewhere    • Allergic    • Arthritis    • Asthma 04/26/2021   • Chest pain    • Chronic low back pain    • Chronic pain    • Colon polyps    • Coronary artery disease    • DDD (degenerative disc disease), cervical    • DDD (degenerative disc disease), lumbosacral    • Diverticulosis    • DVT (deep venous thrombosis) ([facility]) 1996    Left Lower extremity following arthroscopic knee surgery in 1996. IVC fliter placed at that time.   • Encounter for special screening examination for neoplasm of prostate 2012   • Eye exam, routine > 5 yrs ago   • Eye exam, routine 01/2015   • Fibromyalgia    • Fibromyositis    • GERD (gastroesophageal reflux disease)    • HIV disease ([facility])     PT STATES \"NEVER DIAGNOSED\"   • Hyperlipidemia    • Injury of back    • Injury of neck    • Irritable bowel    • Limited joint range of motion     NECK   • Lumbar stenosis    • MI (myocardial infarction) ([facility])    • Neck pain    • Pain in limb    • Past heart attack    • Postlaminectomy syndrome of lumbar region    • Pulmonary embolism (HCC) 1996    Left Lower extremity following arthroscopic knee surgery in 1996. IVC fliter placed at that time.   • Reflux esophagitis    • Shortness of breath     WITH EXCERTION   • Sleep apnea     NO CPAP   • Stroke (HCC)    • TIA (transient ischemic attack)        ONCOLOGIC " HISTORY:  (History from previous dates can be found in the separate document.)    Current Outpatient Medications on File Prior to Visit   Medication Sig Dispense Refill   • acetaminophen (TYLENOL) 650 MG 8 hr tablet Take 650 mg by mouth Daily.     • albuterol sulfate  (90 Base) MCG/ACT inhaler Inhale 2 puffs Every 4 (Four) Hours As Needed for Wheezing or Shortness of Air. 24 g 3   • atorvastatin (LIPITOR) 40 MG tablet TAKE 1 TABLET BY MOUTH DAILY FOR CHOLESTEROL (Patient taking differently: Take 40 mg by mouth Every Night. For cholesterol) 90 tablet 3   • Enoxaparin Sodium (LOVENOX) 100 MG/ML solution prefilled syringe syringe Inject 0.9 mL under the skin into the appropriate area as directed Every 12 (Twelve) Hours. Stop Lovenox when your INR is greater than 2.5 as discussed.  Indications: DVT/PE (active thrombosis) 10 mL 0   • folic acid (FOLVITE) 1 MG tablet Take 1 tablet by mouth Daily. (Patient taking differently: Take 1 mg by mouth Every Evening.) 90 tablet 3   • loratadine (CLARITIN) 10 MG tablet Take 10 mg by mouth Daily.     • multivitamin with minerals tablet tablet Take 1 tablet by mouth Daily. PT HOLDING FOR SURGERY     • nitroglycerin (NITROSTAT) 0.4 MG SL tablet Place 1 tablet under the tongue Every 5 (Five) Minutes As Needed for Chest Pain. Take no more than 3 doses in 15 minutes. 30 tablet 5   • pantoprazole (PROTONIX) 40 MG EC tablet Take 1 tablet by mouth Daily. For GERD 90 tablet 3   • pregabalin (LYRICA) 75 MG capsule Take 1 capsule by mouth 2 (Two) Times a Day. For Fibromyalgia 180 capsule 1   • tamsulosin (FLOMAX) 0.4 MG capsule 24 hr capsule Take 1 capsule by mouth Every Night. 30 capsule 0   • traMADol (Ultram) 50 MG tablet Take 1 tablet by mouth Every 6 (Six) Hours As Needed for Moderate Pain. 20 tablet 0   • warfarin (COUMADIN) 1 MG tablet Take 4 tablets by mouth Daily. Take with the 5 mg tablet for a total daily dose of 9 mg. (Patient taking differently: Take 4.5 mg by mouth Daily.  PT HOLDING FOR SURGERY)     • warfarin (COUMADIN) 5 MG tablet Take 2 tablets by mouth Daily. Take with the 4 mg tablet for a total daily dose of 9 mg. (Patient taking differently: Take 5 mg by mouth Daily. PT HOLDING FOR SURGERY) 180 tablet 5     No current facility-administered medications on file prior to visit.       ALLERGIES:     Allergies   Allergen Reactions   • Zinc Other (See Comments) and Diarrhea     Patient tested positive, had to have hardware removed from back.   • Chantix [Varenicline] Other (See Comments)     Headache   • Hydrocodone-Acetaminophen GI Intolerance       Social History     Socioeconomic History   • Marital status:    Tobacco Use   • Smoking status: Every Day     Packs/day: 0.50     Years: 30.00     Pack years: 15.00     Types: Cigarettes   • Smokeless tobacco: Never   • Tobacco comments:     caffeine use, smokes about 5 cigarettes/day   Vaping Use   • Vaping Use: Never used   Substance and Sexual Activity   • Alcohol use: Yes     Comment: RARELY   • Drug use: No   • Sexual activity: Defer         Cancer-related family history includes Cancer in his father, mother, and sister.     Review of Systems   Constitutional: Negative for appetite change, chills, diaphoresis, fatigue, fever and unexpected weight change.   HENT: Negative for hearing loss, sore throat and trouble swallowing.    Respiratory: Positive for shortness of breath. Negative for cough, chest tightness and wheezing.    Cardiovascular: Negative for chest pain, palpitations and leg swelling.   Gastrointestinal: Negative for abdominal distention, abdominal pain, constipation, diarrhea, nausea and vomiting.   Genitourinary: Negative for dysuria, frequency, hematuria and urgency.   Musculoskeletal: Negative for joint swelling.        No muscle weakness.   Skin: Negative for rash and wound.   Neurological: Negative for seizures, syncope, speech difficulty, weakness, numbness and headaches.   Hematological: Negative for  "adenopathy. Does not bruise/bleed easily.   Psychiatric/Behavioral: Negative for behavioral problems, confusion and suicidal ideas.     I have reviewed and confirmed the accuracy of the ROS as documented by the MA/LPN/RN Harriett Madden MD        Objective      Vitals:    12/16/22 0802   BP: 144/84   Pulse: 73   Resp: 16   Temp: 97.7 °F (36.5 °C)   TempSrc: Temporal   SpO2: 98%   Weight: 95.5 kg (210 lb 9.6 oz)   Height: 180.3 cm (70.98\")   PainSc:   3   PainLoc: Rib Cage     Current Status 12/16/2022   ECOG score 0       Physical Exam      This patient's ACP documentation is up to date, and there's nothing further left to document.      CONSTITUTIONAL:  Vital signs reviewed.  No distress, looks comfortable.  EYES:  Conjunctivae and lids unremarkable.  PERRLA  EARS,NOSE,MOUTH,THROAT:  Ears and nose appear unremarkable.  Lips, teeth, gums appear unremarkable.  RESPIRATORY:  Normal respiratory effort.  Lungs clear to auscultation bilaterally.  CARDIOVASCULAR:  Normal S1, S2.  No murmurs rubs or gallops.  No significant lower extremity edema.  GASTROINTESTINAL: Abdomen appears unremarkable.  Nontender.  No hepatomegaly.  No splenomegaly.  LYMPHATIC:  No cervical, supraclavicular, axillary lymphadenopathy.  SKIN:  Warm.  No rashes.  PSYCHIATRIC:  Normal judgment and insight.  Normal mood and affect.    I have reexamined the patient and the results are consistent with the previously documented exam. Harriett Madden MD     RECENT LABS:  Hematology WBC   Date Value Ref Range Status   12/16/2022 4.65 3.40 - 10.80 10*3/mm3 Final   11/23/2021 3.9 3.4 - 10.8 x10E3/uL Final     RBC   Date Value Ref Range Status   12/16/2022 4.16 4.14 - 5.80 10*6/mm3 Final   11/23/2021 4.44 4.14 - 5.80 x10E6/uL Final     Hemoglobin   Date Value Ref Range Status   12/16/2022 13.6 13.0 - 17.7 g/dL Final     Hematocrit   Date Value Ref Range Status   12/16/2022 39.6 37.5 - 51.0 % Final     Platelets   Date Value Ref Range Status   12/16/2022 206 " 140 - 450 10*3/mm3 Final        Assessment & Plan   *Small filling defect in the distal left pulmonary artery extending into segmental left upper lobe pulmonary artery branch consistent with small pulmonary embolism  · CT scan in addition showed 1.9 cm right superior hilar lymph node.  Small amount of mediastinal and right hilar lymphadenopathy appears largely stable compared to April 28, 2020.  Patient also has chronic fibrosis and bronchiectasis and emphysema.  · Patient has previous pulmonary embolism and was on Coumadin and he checked his INR every week and was therapeutic  · However on admission it was subtherapeutic with an INR of 1.9.  · Patient got admitted with chest pain and shortness of breath and hence concerning for a new pulmonary embolism on a subtherapeutic INR  · I will check with radiology to see if this small pulmonary embolism is residual from previous pulmonary embolism or is it a new 1.  · Either way patient will require to be continuing with heparin drip and keep his INR therapeutic.    · Since INR was subtherapeutic at 1.9 at admission it is possible he developed blood clot secondary to that however I have obtained hypercoagulable work-up and it is pending  · Factor V Leiden normal prothrombin gene mutation negative rest of the hypercoagulable work-up pending  · Hypercoagulable work-up done August 24, 2022 shows lupus anticoagulant negative beta-2 glycoprotein antibody negative, anticardiolipin antibody IgG is 91And IgM is less than 9, factor II prothrombin gene mutation negative factor V Leiden negative, Antithrombin III 80% as patient was on heparin due to slightly low.  Patient protein C&S was not done as he was on Coumadin and we can recheck anticardiolipin antibody in 3 months.  · Last CT scan November 18, 2022 showed it was negative for pulmonary embolism and he had emphysema with superimposed chronic interstitial lung disease.  · November 22, 2022: Consistent with presence of lupus  anticoagulant, beta-2 glycoprotein antibody negative, anticardiolipin antibody IgG still elevated at 48 previously it was 91.  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  · Given the risk versus the benefits, patient does understand about anticoagulation and given the above history of anticardiolipin antibody syndrome, patient is on long-term anticoagulation  · Would suggest to keep patient's INR between 2.5 and 3.5  · Hospitalist team and pharmacy to follow INR levels  · Patient's last INR was 3.1.  He is closely watched at Aletha Ochoa's office for weekly INR.  · Patient is on warfarin and INR being is being followed at Dr. Aletha Ochoa's office  ·     2.  Lung biopsy: Patient went to [facility] and had some interstitial changes in the lung which they wanted to do surgical biopsy but patient was reluctant.  · He now has seen Dr. Salinas who has scheduled him for lung biopsy on November 29.  · Given patient will have a lung biopsy on November 29 we will see him back December 16  · Patient is scheduled for lung biopsy for interstitial lung disease in end of November 2022  · Patient underwent lung biopsy on November 29, 2022 which is consistent with chronic interstitial pneumonitis.  Patient has follow-up with pulmonary Dr. Jamil Miguel    3.  We will follow him in the second week of November 2 be sure.  He is currently taking Coumadin with an INR of 3.1.  He tells me Dr. Salinas had asked him to hold the Coumadin prior to the surgical procedure.  By November he will be completed 3 months and he may be reasonable to hold anticoagulation.  We will check his D-dimer at his next appointment    4. May 2022: CT angiogram of the abdomen and pelvis showed small infrarenal aortic aneurysm 3 cm and [place] IVC filter in good position with no complicating features present     Plan  · Continue Coumadin, INR  goal between 2.5 and 3.5  · Patient follows up with Aletha Ochoa to check INR  · S/p Yudelka filter placement many years ago  · Reviewed hypercoagulable work-up from October 24, 2022 which is negative for factor V Leiden, prothrombin gene mutation, Antithrombin III but he had a positive anticardiolipin antibody with a IgG level of 91 and a normal IgM level.  But his lupus anticoagulant and beta-2 glycoprotein antibody were negative.  · S/p lung biopsy by Dr. Salinas as of November 9, 2029 2022 it is consistent with chronic interstitial pneumonitis.  Likely interstitial lung disease  · Patient will be scheduled with Dr. Jamil Miguel pulmonary in order to see if treatment of interstitial lung disease needs to be done.  There is no evidence of malignancy  · We will release him from our office but patient will be followed by Dr. Jamil Miguel    Monitoring high risk medication    MD Dr. Nella Hernandez Dr.

## 2022-12-19 ENCOUNTER — ANTICOAGULATION VISIT (OUTPATIENT)
Dept: FAMILY MEDICINE CLINIC | Facility: CLINIC | Age: 72
End: 2022-12-19

## 2022-12-19 LAB — INR PPP: 2.9 (ref 2.5–3)

## 2022-12-20 ENCOUNTER — TELEPHONE (OUTPATIENT)
Dept: FAMILY MEDICINE CLINIC | Facility: CLINIC | Age: 72
End: 2022-12-20

## 2022-12-20 DIAGNOSIS — R82.90 ABNORMAL URINALYSIS: Primary | ICD-10-CM

## 2022-12-20 RX ORDER — CYCLOBENZAPRINE HCL 5 MG
TABLET ORAL
Qty: 30 TABLET | Refills: 1 | Status: SHIPPED | OUTPATIENT
Start: 2022-12-20

## 2022-12-20 NOTE — TELEPHONE ENCOUNTER
Pt is having muscle spasms in his back and legs. He is wanting to know if Aletha can give him something for this

## 2022-12-27 LAB
FUNGUS WND CULT: NORMAL

## 2022-12-28 ENCOUNTER — TELEPHONE (OUTPATIENT)
Dept: FAMILY MEDICINE CLINIC | Facility: CLINIC | Age: 72
End: 2022-12-28

## 2022-12-28 ENCOUNTER — ANTICOAGULATION VISIT (OUTPATIENT)
Dept: FAMILY MEDICINE CLINIC | Facility: CLINIC | Age: 72
End: 2022-12-28

## 2022-12-28 LAB — INR PPP: 3.3 (ref 2.5–3)

## 2023-01-03 LAB
APPEARANCE UR: CLEAR
BACTERIA #/AREA URNS HPF: ABNORMAL /HPF
BACTERIA UR CULT: NORMAL
BACTERIA UR CULT: NORMAL
BILIRUB UR QL STRIP: NEGATIVE
CASTS URNS MICRO: ABNORMAL
COLOR UR: YELLOW
EPI CELLS #/AREA URNS HPF: ABNORMAL /HPF
GLUCOSE UR QL STRIP: NEGATIVE
HGB UR QL STRIP: NEGATIVE
KETONES UR QL STRIP: NEGATIVE
LEUKOCYTE ESTERASE UR QL STRIP: NEGATIVE
MUCOUS THREADS URNS QL MICRO: ABNORMAL /HPF
NITRITE UR QL STRIP: NEGATIVE
PH UR STRIP: 6 [PH] (ref 5–8)
PROT UR QL STRIP: ABNORMAL
RBC #/AREA URNS HPF: ABNORMAL /HPF
SP GR UR STRIP: 1.02 (ref 1–1.03)
URNS CMNT MICRO: ABNORMAL
UROBILINOGEN UR STRIP-MCNC: ABNORMAL MG/DL
WBC #/AREA URNS HPF: ABNORMAL /HPF

## 2023-01-10 LAB
MYCOBACTERIUM SPEC CULT: NORMAL
NIGHT BLUE STAIN TISS: NORMAL

## 2023-01-13 ENCOUNTER — TRANSCRIBE ORDERS (OUTPATIENT)
Dept: ADMINISTRATIVE | Facility: HOSPITAL | Age: 73
End: 2023-01-13
Payer: MEDICARE

## 2023-01-13 DIAGNOSIS — J84.9 ILD (INTERSTITIAL LUNG DISEASE): Primary | ICD-10-CM

## 2023-01-25 ENCOUNTER — ANTICOAGULATION VISIT (OUTPATIENT)
Dept: FAMILY MEDICINE CLINIC | Facility: CLINIC | Age: 73
End: 2023-01-25
Payer: MEDICARE

## 2023-01-25 LAB — INR PPP: 2.8 (ref 2.5–3)

## 2023-02-01 ENCOUNTER — ANTICOAGULATION VISIT (OUTPATIENT)
Dept: FAMILY MEDICINE CLINIC | Facility: CLINIC | Age: 73
End: 2023-02-01
Payer: MEDICARE

## 2023-02-01 LAB — INR PPP: 2.8 (ref 2–3)

## 2023-02-08 ENCOUNTER — ANTICOAGULATION VISIT (OUTPATIENT)
Dept: FAMILY MEDICINE CLINIC | Facility: CLINIC | Age: 73
End: 2023-02-08
Payer: MEDICARE

## 2023-02-08 LAB — INR PPP: 1.7 (ref 2.5–3)

## 2023-02-10 ENCOUNTER — ANTICOAGULATION VISIT (OUTPATIENT)
Dept: FAMILY MEDICINE CLINIC | Facility: CLINIC | Age: 73
End: 2023-02-10
Payer: MEDICARE

## 2023-02-10 ENCOUNTER — TELEPHONE (OUTPATIENT)
Dept: FAMILY MEDICINE CLINIC | Facility: CLINIC | Age: 73
End: 2023-02-10

## 2023-02-10 LAB — INR PPP: 2.4 (ref 2.5–3.5)

## 2023-02-13 ENCOUNTER — TELEPHONE (OUTPATIENT)
Dept: FAMILY MEDICINE CLINIC | Facility: CLINIC | Age: 73
End: 2023-02-13

## 2023-02-13 ENCOUNTER — TELEPHONE (OUTPATIENT)
Dept: FAMILY MEDICINE CLINIC | Facility: CLINIC | Age: 73
End: 2023-02-13
Payer: MEDICARE

## 2023-02-13 ENCOUNTER — ANTICOAGULATION VISIT (OUTPATIENT)
Dept: FAMILY MEDICINE CLINIC | Facility: CLINIC | Age: 73
End: 2023-02-13
Payer: MEDICARE

## 2023-02-13 LAB — INR PPP: 3.3 (ref 2.5–3)

## 2023-02-13 NOTE — TELEPHONE ENCOUNTER
I noted this was perfect the patient's goal is to be between 2.5 and 3.5 and it was okay to stop Lovenox if doing injections.  Continue same and check INR 1 week

## 2023-02-13 NOTE — TELEPHONE ENCOUNTER
Caller: AKIKO WITH [employer]     Relationship: [unfilled]     Best call back number: [phone]    What is your medical concern? OUT OF RANGE INR READING: 3.3     How long has this issue been going on? TODAY

## 2023-02-14 ENCOUNTER — OFFICE VISIT (OUTPATIENT)
Dept: FAMILY MEDICINE CLINIC | Facility: CLINIC | Age: 73
End: 2023-02-14
Payer: MEDICARE

## 2023-02-14 VITALS
SYSTOLIC BLOOD PRESSURE: 130 MMHG | HEIGHT: 71 IN | RESPIRATION RATE: 16 BRPM | BODY MASS INDEX: 31.08 KG/M2 | TEMPERATURE: 97.3 F | WEIGHT: 222 LBS | HEART RATE: 84 BPM | OXYGEN SATURATION: 96 % | DIASTOLIC BLOOD PRESSURE: 76 MMHG

## 2023-02-14 DIAGNOSIS — J43.8 OTHER EMPHYSEMA: Primary | ICD-10-CM

## 2023-02-14 DIAGNOSIS — J84.9 ILD (INTERSTITIAL LUNG DISEASE): ICD-10-CM

## 2023-02-14 DIAGNOSIS — M51.36 DDD (DEGENERATIVE DISC DISEASE), LUMBAR: ICD-10-CM

## 2023-02-14 PROBLEM — M51.369 DDD (DEGENERATIVE DISC DISEASE), LUMBAR: Status: ACTIVE | Noted: 2021-04-26

## 2023-02-14 PROCEDURE — 99214 OFFICE O/P EST MOD 30 MIN: CPT | Performed by: PHYSICIAN ASSISTANT

## 2023-02-14 RX ORDER — TRAMADOL HYDROCHLORIDE 50 MG/1
50 TABLET ORAL EVERY 6 HOURS PRN
Qty: 90 TABLET | Refills: 2 | Status: SHIPPED | OUTPATIENT
Start: 2023-02-14 | End: 2024-02-14

## 2023-02-14 RX ORDER — PIRFENIDONE 267 MG/1
TABLET, FILM COATED ORAL
COMMUNITY
Start: 2023-01-25

## 2023-02-14 RX ORDER — TIOTROPIUM BROMIDE AND OLODATEROL 3.124; 2.736 UG/1; UG/1
SPRAY, METERED RESPIRATORY (INHALATION)
COMMUNITY
Start: 2023-01-16

## 2023-02-14 RX ORDER — BUPROPION HYDROCHLORIDE 100 MG/1
TABLET, EXTENDED RELEASE ORAL
COMMUNITY
Start: 2023-01-13 | End: 2023-03-10

## 2023-02-14 NOTE — PROGRESS NOTES
"Subjective   Javy Reeves Sr. is a 72 y.o. male.     History of Present Illness    Since the last visit, he has overall felt fairly well.  He has Hyperlipidemia with goals met with current Rx, CAD and remains under care of their Cardiologist for mangement and CAD and remains on medication regimen.  he has been compliant with current medications have reviewed them.  The patient denies medication side effects.  Will refill medications. /76   Pulse 84   Temp 97.3 °F (36.3 °C)   Resp 16   Ht 180.3 cm (70.98\")   Wt 101 kg (222 lb)   SpO2 96%   BMI 30.98 kg/m²     Results for orders placed or performed in visit on 02/13/23   Protime-INR    Specimen: Blood   Result Value Ref Range    INR 3.30 (A) 2.50 - 3.00   hematology notes  • Factor V Leiden normal prothrombin gene mutation negative rest of the hypercoagulable work-up pending  • Hypercoagulable work-up done August 24, 2022 shows lupus anticoagulant negative beta-2 glycoprotein antibody negative, anticardiolipin antibody IgG is 91And IgM is less than 9, factor II prothrombin gene mutation negative factor V Leiden negative, Antithrombin III 80% as patient was on heparin due to slightly low.  Patient protein C&S was not done as he was on Coumadin and we can recheck anticardiolipin antibody in 3 months.  • Last CT scan November 18, 2022 showed it was negative for pulmonary embolism     Saw DR Bergman last 12-12-22 interstitial pneumonitis---to see pulm  Saw DR Jamil Willoughby, 1-13-23---to start Pirfenidone for Tx--Idiopathic pulmonary fibrosis.  Do want him to start Stiolto inhaler      Having more back pain--lumbar---on Lyrica ---ok add back Ultram  The following portions of the patient's history were reviewed and updated as appropriate: allergies, current medications, past family history, past medical history, past social history, past surgical history and problem list.    Review of Systems   Constitutional: Negative for activity change, appetite change and " unexpected weight change.   HENT: Negative for nosebleeds and trouble swallowing.    Eyes: Negative for pain and visual disturbance.   Respiratory: Negative for chest tightness, shortness of breath and wheezing.    Cardiovascular: Negative for chest pain and palpitations.   Gastrointestinal: Negative for abdominal pain and blood in stool.   Endocrine: Negative.    Genitourinary: Negative for difficulty urinating and hematuria.   Musculoskeletal: Positive for back pain, joint swelling and myalgias.   Skin: Negative for color change and rash.   Allergic/Immunologic: Negative.    Neurological: Negative for syncope and speech difficulty.   Hematological: Negative for adenopathy.   Psychiatric/Behavioral: Negative for agitation and confusion.   All other systems reviewed and are negative.      Objective   Physical Exam  Vitals and nursing note reviewed.   Constitutional:       General: He is not in acute distress.     Appearance: He is well-developed. He is not diaphoretic.   HENT:      Head: Normocephalic.   Eyes:      Conjunctiva/sclera: Conjunctivae normal.      Pupils: Pupils are equal, round, and reactive to light.   Cardiovascular:      Rate and Rhythm: Normal rate and regular rhythm.      Heart sounds: Normal heart sounds. No murmur heard.  Pulmonary:      Effort: Pulmonary effort is normal.      Breath sounds: Rales present.   Musculoskeletal:         General: Normal range of motion.      Cervical back: Normal range of motion and neck supple.   Skin:     General: Skin is warm and dry.      Findings: No rash.      Comments: Right 3rd toenail base purple--suspect trauma; no pain    Neurological:      Mental Status: He is alert and oriented to person, place, and time.   Psychiatric:         Mood and Affect: Mood normal. Affect is not inappropriate.         Behavior: Behavior normal.         Thought Content: Thought content normal.         Judgment: Judgment normal.         Assessment & Plan   Diagnoses and all  orders for this visit:    1. Other emphysema (HCC) (Primary)    2. ILD (interstitial lung disease) (HCC)  -     traMADol (Ultram) 50 MG tablet; Take 1 tablet by mouth Every 6 (Six) Hours As Needed for Moderate Pain.  Dispense: 90 tablet; Refill: 2    3. DDD (degenerative disc disease), lumbar      Ok start back Ultram for lumbar pain.  On Lyrica  Has Flexeril PRN  With restart Ultram---want INR Q 3 days for 2 weeks  F/u 1-2 mos  Take pulm meds  Stop smoking  Use patches to stop smoking  Ok not take Wellbutrin       Answers for HPI/ROS submitted by the patient on 2/14/2023  What is the primary reason for your visit?: Other  Please describe your symptoms.: Have been prescribed new medications and according to the information on the new medications very unsure if I should be taking it. Need Aletha’s professional opinion regarding this new medication.  Have you had these symptoms before?: No  How long have you been having these symptoms?: Greater than 2 weeks

## 2023-02-24 ENCOUNTER — ANTICOAGULATION VISIT (OUTPATIENT)
Dept: FAMILY MEDICINE CLINIC | Facility: CLINIC | Age: 73
End: 2023-02-24
Payer: MEDICARE

## 2023-02-24 LAB — INR PPP: 2.8 (ref 2.5–3)

## 2023-02-27 ENCOUNTER — ANTICOAGULATION VISIT (OUTPATIENT)
Dept: FAMILY MEDICINE CLINIC | Facility: CLINIC | Age: 73
End: 2023-02-27
Payer: MEDICARE

## 2023-02-27 LAB — INR PPP: 2.8 (ref 2.5–3)

## 2023-03-02 ENCOUNTER — TELEPHONE (OUTPATIENT)
Dept: FAMILY MEDICINE CLINIC | Facility: CLINIC | Age: 73
End: 2023-03-02

## 2023-03-02 ENCOUNTER — ANTICOAGULATION VISIT (OUTPATIENT)
Dept: FAMILY MEDICINE CLINIC | Facility: CLINIC | Age: 73
End: 2023-03-02
Payer: MEDICARE

## 2023-03-02 LAB — INR PPP: 5.9 (ref 2.5–3)

## 2023-03-02 NOTE — TELEPHONE ENCOUNTER
Caller: MD INR    Relationship: [place]    Best call back number: [phone]*    What was the call regarding: IRN READING 5.9, TODAY (3/2/23)    Do you require a callback: NO

## 2023-03-04 ENCOUNTER — ANTICOAGULATION VISIT (OUTPATIENT)
Dept: FAMILY MEDICINE CLINIC | Facility: CLINIC | Age: 73
End: 2023-03-04
Payer: MEDICARE

## 2023-03-04 LAB — INR PPP: 2 (ref 2.5–3.5)

## 2023-03-06 ENCOUNTER — ANTICOAGULATION VISIT (OUTPATIENT)
Dept: FAMILY MEDICINE CLINIC | Facility: CLINIC | Age: 73
End: 2023-03-06
Payer: MEDICARE

## 2023-03-06 LAB — INR PPP: 1.7 (ref 2.5–3)

## 2023-03-07 ENCOUNTER — TELEPHONE (OUTPATIENT)
Dept: FAMILY MEDICINE CLINIC | Facility: CLINIC | Age: 73
End: 2023-03-07

## 2023-03-07 NOTE — TELEPHONE ENCOUNTER
I noted that patient is taking Lovenox and to increase his Coumadin to 15 mg today and tomorrow and then resume at 10.5 mg daily

## 2023-03-09 ENCOUNTER — ANTICOAGULATION VISIT (OUTPATIENT)
Dept: FAMILY MEDICINE CLINIC | Facility: CLINIC | Age: 73
End: 2023-03-09
Payer: MEDICARE

## 2023-03-09 LAB — INR PPP: 2.3 (ref 2.5–3)

## 2023-03-10 ENCOUNTER — OFFICE VISIT (OUTPATIENT)
Dept: FAMILY MEDICINE CLINIC | Facility: CLINIC | Age: 73
End: 2023-03-10
Payer: MEDICARE

## 2023-03-10 VITALS
HEIGHT: 71 IN | DIASTOLIC BLOOD PRESSURE: 68 MMHG | BODY MASS INDEX: 30.1 KG/M2 | TEMPERATURE: 97.4 F | OXYGEN SATURATION: 95 % | RESPIRATION RATE: 16 BRPM | SYSTOLIC BLOOD PRESSURE: 110 MMHG | HEART RATE: 87 BPM | WEIGHT: 215 LBS

## 2023-03-10 DIAGNOSIS — G89.4 CHRONIC PAIN DISORDER: ICD-10-CM

## 2023-03-10 DIAGNOSIS — J84.9 ILD (INTERSTITIAL LUNG DISEASE): ICD-10-CM

## 2023-03-10 DIAGNOSIS — I25.2 OLD MI (MYOCARDIAL INFARCTION): ICD-10-CM

## 2023-03-10 DIAGNOSIS — Z86.711 HISTORY OF PULMONARY EMBOLUS (PE): ICD-10-CM

## 2023-03-10 DIAGNOSIS — E53.8 LOW FOLIC ACID: ICD-10-CM

## 2023-03-10 DIAGNOSIS — M79.7 FIBROMYALGIA: Primary | ICD-10-CM

## 2023-03-10 PROCEDURE — 1160F RVW MEDS BY RX/DR IN RCRD: CPT | Performed by: PHYSICIAN ASSISTANT

## 2023-03-10 PROCEDURE — 99214 OFFICE O/P EST MOD 30 MIN: CPT | Performed by: PHYSICIAN ASSISTANT

## 2023-03-10 PROCEDURE — 1159F MED LIST DOCD IN RCRD: CPT | Performed by: PHYSICIAN ASSISTANT

## 2023-03-10 RX ORDER — PREGABALIN 150 MG/1
150 CAPSULE ORAL 2 TIMES DAILY
Qty: 180 CAPSULE | Refills: 1 | Status: SHIPPED | OUTPATIENT
Start: 2023-03-10

## 2023-03-10 RX ORDER — FOLIC ACID 1 MG/1
1 TABLET ORAL DAILY
Qty: 90 TABLET | Refills: 3 | Status: SHIPPED | OUTPATIENT
Start: 2023-03-10

## 2023-03-10 NOTE — PROGRESS NOTES
"    Subjective   Javy Reeves Sr. is a 72 y.o. male.     History of Present Illness   Javy Reeves Sr. 72 y.o. male /68   Pulse 87   Temp 97.4 °F (36.3 °C)   Resp 16   Ht 180.3 cm (70.98\")   Wt 97.5 kg (215 lb)   SpO2 95%   BMI 30.00 kg/m²  who presents today for chronic pain. Added Ultram and some help with fibromyalgia pain and spine pain. Still hurts. I will raise dose of Lyrica and tolerated this in the past.  We will slowly increase dose  he has a history of   Patient Active Problem List   Diagnosis   • Arthritis   • Family history of early CAD   • DDD (degenerative disc disease), cervical   • DVT (deep venous thrombosis) ([facility])   • Fibromyalgia   • Past heart attack   • Hyperlipidemia   • DDD (degenerative disc disease), lumbosacral   • History of pulmonary embolus (PE)   • Reflux esophagitis   • TIA (transient ischemic attack)   • Left groin pain   • Cervical radicular pain   • Cervical disc disorder at C6-C7 level with radiculopathy   • Wheezing   • Colon polyps   • Right lower quadrant abdominal pain   • Diarrhea   • Cervical spinal stenosis   • Cervical disc disorder at C5-C6 level with radiculopathy   • GERD (gastroesophageal reflux disease)   • Diverticulosis   • Old MI (myocardial infarction)   • Herniated cervical disc   • Encounter for therapeutic drug monitoring   • Long term current use of anticoagulant therapy   • ERRONEOUS ENCOUNTER--DISREGARD   • Stable angina ([facility])   • Low folic acid   • Low serum vitamin B12   • Precordial chest pain   • Tobacco abuse   • Tobacco abuse counseling   • Interstitial lung disease (HCC)   • History of transient ischemic attack (TIA)   • Irritable bowel syndrome   • DDD (degenerative disc disease), lumbar   • Weight loss   • Choking sensation   • Failed back syndrome   • Long term (current) use of opiate analgesic   • Lumbar radiculopathy   • Lumbar spondylosis   • Chronic pain disorder   • History of colon polyps   • Acute chest pain   • Recurrent " "pulmonary embolism (HCC)   • Subtherapeutic international normalized ratio (INR)   • Bronchiectasis without complication (HCC)   • Abdominal pain   • Presence of IVC filter   • Weight loss, unintentional   • ILD (interstitial lung disease) (HCC)   • Tobacco use disorder   .      . /68   Pulse 87   Temp 97.4 °F (36.3 °C)   Resp 16   Ht 180.3 cm (70.98\")   Wt 97.5 kg (215 lb)   SpO2 95%   BMI 30.00 kg/m²   Very active  Walking more  Still works part time  Results for orders placed or performed in visit on 03/06/23   Protime-INR    Specimen: Blood   Result Value Ref Range    INR 1.70 (A) 2.5 - 3   Flexeril helps spasms  Not on cpap  hematology notes  • Factor V Leiden normal prothrombin gene mutation negative rest of the hypercoagulable work-up pending  • Hypercoagulable work-up done August 24, 2022 shows lupus anticoagulant negative beta-2 glycoprotein antibody negative, anticardiolipin antibody IgG is 91And IgM is less than 9, factor II prothrombin gene mutation negative factor V Leiden negative, Antithrombin III 80% as patient was on heparin due to slightly low.  Patient protein C&S was not done as he was on Coumadin and we can recheck anticardiolipin antibody in 3 months.  • Last CT scan November 18, 2022 showed it was negative for pulmonary embolism   Saw DR Bergman last 12-12-22 interstitial pneumonitis---to see pulm  Saw DR Jamil Willoughby, 1-13-23---to start Pirfenidone for Tx--Idiopathic pulmonary fibrosis.  He is taking new meds for lungs.    He is taking Ultram also-----INR went high and had to hold Warfarin and then too low and back on Lovenox.  Reviewed INR today and was done last night---plan    Has appt cardio --f/u CAD--no prior stents. appt is 3-24-23  Labs done 12-19-22--LDL at goal    The following portions of the patient's history were reviewed and updated as appropriate: allergies, current medications, past family history, past medical history, past social history, past surgical history and " problem list.    Review of Systems   Constitutional: Negative for activity change, appetite change and unexpected weight change.   HENT: Negative for nosebleeds and trouble swallowing.    Eyes: Negative for pain and visual disturbance.   Respiratory: Negative for chest tightness, shortness of breath and wheezing.    Cardiovascular: Negative for chest pain and palpitations.   Gastrointestinal: Negative for abdominal pain and blood in stool.   Endocrine: Negative.    Genitourinary: Negative for difficulty urinating and hematuria.   Musculoskeletal: Negative for joint swelling.   Skin: Negative for color change and rash.   Allergic/Immunologic: Negative.    Neurological: Negative for syncope and speech difficulty.   Hematological: Negative for adenopathy.   Psychiatric/Behavioral: Negative for agitation and confusion.   All other systems reviewed and are negative.      Objective   Physical Exam  Vitals and nursing note reviewed.   Constitutional:       General: He is not in acute distress.     Appearance: He is well-developed. He is not diaphoretic.   HENT:      Head: Normocephalic.   Eyes:      Conjunctiva/sclera: Conjunctivae normal.      Pupils: Pupils are equal, round, and reactive to light.   Neck:      Vascular: No carotid bruit.   Cardiovascular:      Rate and Rhythm: Normal rate and regular rhythm.      Heart sounds: Normal heart sounds. No murmur heard.  Pulmonary:      Effort: Pulmonary effort is normal.      Breath sounds: Normal breath sounds.   Musculoskeletal:         General: Normal range of motion.      Cervical back: Normal range of motion and neck supple.   Skin:     General: Skin is warm and dry.      Findings: No rash.   Neurological:      Mental Status: He is alert and oriented to person, place, and time.   Psychiatric:         Mood and Affect: Mood normal. Affect is not inappropriate.         Behavior: Behavior normal.         Thought Content: Thought content normal.         Judgment: Judgment  normal.      Comments: No anxious or depressed         Assessment & Plan   Diagnoses and all orders for this visit:    1. Fibromyalgia (Primary)  -     pregabalin (LYRICA) 150 MG capsule; Take 1 capsule by mouth 2 (Two) Times a Day. For Fibromyalgia  Dispense: 180 capsule; Refill: 1    2. Old MI (myocardial infarction)    3. History of pulmonary embolus (PE)    4. Low folic acid    5. Chronic pain disorder    Other orders  -     folic acid (FOLVITE) 1 MG tablet; Take 1 tablet by mouth Daily.  Dispense: 90 tablet; Refill: 3        We will increase Lyrica to 75 mg in the morning and 150 mg in the evening for a week and then after that to 150 mg twice daily and will send new prescription on file  Plan to continue the tramadol does help pain  Has appointment with cardiology to follow heart disease  Will continue to see pulmonologist for the interstitial lung disease management and COPD  Plan Coumadin 10.5mg daily for now and INR 3 days---cont Lovenox.

## 2023-03-12 ENCOUNTER — ANTICOAGULATION VISIT (OUTPATIENT)
Dept: FAMILY MEDICINE CLINIC | Facility: CLINIC | Age: 73
End: 2023-03-12
Payer: MEDICARE

## 2023-03-12 LAB — INR PPP: 2.6 (ref 2.5–3)

## 2023-03-19 ENCOUNTER — ANTICOAGULATION VISIT (OUTPATIENT)
Dept: FAMILY MEDICINE CLINIC | Facility: CLINIC | Age: 73
End: 2023-03-19
Payer: MEDICARE

## 2023-03-19 LAB — INR PPP: 2.8 (ref 2–3)

## 2023-03-22 ENCOUNTER — ANTICOAGULATION VISIT (OUTPATIENT)
Dept: FAMILY MEDICINE CLINIC | Facility: CLINIC | Age: 73
End: 2023-03-22
Payer: MEDICARE

## 2023-03-22 ENCOUNTER — TELEPHONE (OUTPATIENT)
Dept: FAMILY MEDICINE CLINIC | Facility: CLINIC | Age: 73
End: 2023-03-22

## 2023-03-22 LAB — INR PPP: 2 (ref 2.5–3)

## 2023-03-22 NOTE — TELEPHONE ENCOUNTER
"    Caller: Javy Reeves Sr. \"GARRICK\"    Relationship to patient: Self    Best call back number: [phone]    Patient is needing: THE PATIENT WOULD LIKE TO REPORT HIS INR RESULT FOR TODAY AS A 2.0  "

## 2023-03-24 ENCOUNTER — OFFICE VISIT (OUTPATIENT)
Dept: CARDIOLOGY | Facility: CLINIC | Age: 73
End: 2023-03-24
Payer: MEDICARE

## 2023-03-24 VITALS
SYSTOLIC BLOOD PRESSURE: 158 MMHG | BODY MASS INDEX: 30.1 KG/M2 | HEART RATE: 82 BPM | DIASTOLIC BLOOD PRESSURE: 88 MMHG | WEIGHT: 215 LBS | OXYGEN SATURATION: 100 % | HEIGHT: 71 IN

## 2023-03-24 DIAGNOSIS — R07.89 CHEST PAIN, ATYPICAL: ICD-10-CM

## 2023-03-24 DIAGNOSIS — I25.2 OLD MI (MYOCARDIAL INFARCTION): Primary | ICD-10-CM

## 2023-03-24 PROCEDURE — 99214 OFFICE O/P EST MOD 30 MIN: CPT | Performed by: INTERNAL MEDICINE

## 2023-03-24 NOTE — PROGRESS NOTES
"      CARDIOLOGY    Artemio Dalton MD    ENCOUNTER DATE:  03/24/2023    Javy Reeves Sr. / 72 y.o. / male        CHIEF COMPLAINT / REASON FOR OFFICE VISIT     Precordial chest pain (004/30/2021 Follow up)  Old MI    HISTORY OF PRESENT ILLNESS       HPI  Javy Reeves Sr. is a 72 y.o. male who presents today for reevaluation.  Patient was recently diagnosed with interstitial lung disease in November 2022.  This was actually done by lung biopsy.  Patient's been placed on 2 new medications and the presents today for my opinion.  Patient has been having intermittent episodes of discomfort in his chest.  He says they are random and very vague in his description.  Patient also has been short of breath but attributes this to his lungs.  However with the symptoms he was seen today.      The following portions of the patient's history were reviewed and updated as appropriate: allergies, current medications, past family history, past medical history, past social history, past surgical history and problem list.      VITAL SIGNS     Visit Vitals  /88 (BP Location: Left arm)   Pulse 82   Ht 180.3 cm (71\")   Wt 97.5 kg (215 lb)   SpO2 100%   BMI 29.99 kg/m²         Wt Readings from Last 3 Encounters:   03/24/23 97.5 kg (215 lb)   03/10/23 97.5 kg (215 lb)   02/14/23 101 kg (222 lb)     Body mass index is 29.99 kg/m².      REVIEW OF SYSTEMS   ROS        PHYSICAL EXAMINATION     Vitals reviewed.   Constitutional:       Appearance: Healthy appearance.   Pulmonary:      Effort: Pulmonary effort is normal.   Cardiovascular:      Normal rate. Regular rhythm. Normal S1. Normal S2.      Murmurs: There is no murmur.      No gallop. No click. No rub.   Pulses:     Intact distal pulses.   Edema:     Peripheral edema absent.   Neurological:      Mental Status: Alert and oriented to person, place and time.           REVIEWED DATA     Procedures    Cardiac Procedures:  1.     Lipid Panel    Lipid Panel 12/19/22   Total " Cholesterol 129   Triglycerides 161 (A)   HDL Cholesterol 41   VLDL Cholesterol 27   LDL Cholesterol  61   (A) Abnormal value       Comments are available for some flowsheets but are not being displayed.               ASSESSMENT & PLAN      Diagnosis Plan   1. Old MI (myocardial infarction)  Adult Transthoracic Echo Complete W/ Cont if Necessary Per Protocol    Stress Test With Myocardial Perfusion One Day      2. Chest pain, atypical  Adult Transthoracic Echo Complete W/ Cont if Necessary Per Protocol    Stress Test With Myocardial Perfusion One Day            SUMMARY/DISCUSSION  1. Patient presents with a relatively new diagnosis of interstitial lung disease.  Patient does have a history of previous myocardial infarction.  Its been many years since we reassessed his cardiovascular status although more than likely it is from his lung disease.  The other issue would be whether he is developing pulmonary hypertension.  Echocardiogram would also be insightful on this.  He therefore set up for nuclear perfusion study as well as an echo.  2. Interstitial lung disease  3. Previous myocardial infarction.  4. Patient had history of pulmonary embolism.  Patient has been told to stop his warfarin.  5. Follow-up in 6 to 12 months sooner if issues occur        MEDICATIONS         Discharge Medications          Accurate as of March 24, 2023 12:37 PM. If you have any questions, ask your nurse or doctor.            Continue These Medications      Instructions Start Date   acetaminophen 650 MG 8 hr tablet  Commonly known as: TYLENOL   650 mg, Oral, Daily      albuterol sulfate  (90 Base) MCG/ACT inhaler  Commonly known as: PROVENTIL HFA;VENTOLIN HFA;PROAIR HFA   2 puffs, Inhalation, Every 4 Hours PRN      cyclobenzaprine 5 MG tablet  Commonly known as: FLEXERIL   1-2 tabs PO TID PRN spasms      Enoxaparin Sodium 100 MG/ML solution prefilled syringe syringe  Commonly known as: LOVENOX   90 mg, Subcutaneous, Every 12 Hours,  Stop Lovenox when your INR is greater than 2.5 as discussed.      folic acid 1 MG tablet  Commonly known as: FOLVITE   1 mg, Oral, Daily      loratadine 10 MG tablet  Commonly known as: CLARITIN   10 mg, Oral, Daily      multivitamin with minerals tablet tablet   1 tablet, Oral, Daily, PT HOLDING FOR SURGERY      nitroglycerin 0.4 MG SL tablet  Commonly known as: NITROSTAT   0.4 mg, Sublingual, Every 5 Minutes PRN, Take no more than 3 doses in 15 minutes.       pantoprazole 40 MG EC tablet  Commonly known as: PROTONIX   40 mg, Oral, Daily, For GERD      Pirfenidone 267 MG tablet   No dose, route, or frequency recorded.      pregabalin 150 MG capsule  Commonly known as: LYRICA   150 mg, Oral, 2 Times Daily, For Fibromyalgia      Stiolto Respimat 2.5-2.5 MCG/ACT aerosol solution inhaler  Generic drug: tiotropium bromide-olodaterol   No dose, route, or frequency recorded.      tamsulosin 0.4 MG capsule 24 hr capsule  Commonly known as: FLOMAX   0.4 mg, Oral, Nightly      traMADol 50 MG tablet  Commonly known as: Ultram   50 mg, Oral, Every 6 Hours PRN         Stop These Medications    atorvastatin 40 MG tablet  Commonly known as: LIPITOR  Stopped by: Artemio Dalton MD     warfarin 1 MG tablet  Commonly known as: COUMADIN  Stopped by: Artemio Dalton MD     warfarin 5 MG tablet  Commonly known as: COUMADIN  Stopped by: Atremio Dalton MD                **Dragon Disclaimer:   Much of this encounter note is an electronic transcription/translation of spoken language to printed text. The electronic translation of spoken language may permit erroneous, or at times, nonsensical words or phrases to be inadvertently transcribed. Although I have reviewed the note for such errors, some may still exist.

## 2023-03-29 ENCOUNTER — ANTICOAGULATION VISIT (OUTPATIENT)
Dept: FAMILY MEDICINE CLINIC | Facility: CLINIC | Age: 73
End: 2023-03-29
Payer: MEDICARE

## 2023-03-29 LAB — INR PPP: 4 (ref 2.5–3)

## 2023-03-30 ENCOUNTER — TELEPHONE (OUTPATIENT)
Dept: FAMILY MEDICINE CLINIC | Facility: CLINIC | Age: 73
End: 2023-03-30

## 2023-03-30 NOTE — TELEPHONE ENCOUNTER
PATIENT INR WAS AT3:29 4.0    Caller: JAQUELIN    Relationship to patient: PADMA    Best call back number: [phone]    Patient is needing: JAQUELIN CALLING TO REPORT THAT PATIENT INR WAS 4.0 ON 3/29/23.

## 2023-04-06 ENCOUNTER — ANTICOAGULATION VISIT (OUTPATIENT)
Dept: FAMILY MEDICINE CLINIC | Facility: CLINIC | Age: 73
End: 2023-04-06
Payer: MEDICARE

## 2023-04-06 LAB — INR PPP: 2 (ref 2–3)

## 2023-04-12 ENCOUNTER — ANTICOAGULATION VISIT (OUTPATIENT)
Dept: FAMILY MEDICINE CLINIC | Facility: CLINIC | Age: 73
End: 2023-04-12
Payer: MEDICARE

## 2023-04-12 LAB — INR PPP: 2.6 (ref 2.5–3)

## 2023-04-14 ENCOUNTER — HOSPITAL ENCOUNTER (OUTPATIENT)
Dept: CARDIOLOGY | Facility: HOSPITAL | Age: 73
Discharge: HOME | End: 2023-04-14
Payer: MEDICARE

## 2023-04-14 VITALS
WEIGHT: 215 LBS | HEART RATE: 77 BPM | BODY MASS INDEX: 30.1 KG/M2 | DIASTOLIC BLOOD PRESSURE: 70 MMHG | OXYGEN SATURATION: 98 % | HEIGHT: 71 IN | SYSTOLIC BLOOD PRESSURE: 110 MMHG

## 2023-04-14 VITALS — HEIGHT: 71 IN | BODY MASS INDEX: 30.25 KG/M2 | WEIGHT: 216.05 LBS

## 2023-04-14 DIAGNOSIS — I25.2 OLD MI (MYOCARDIAL INFARCTION): ICD-10-CM

## 2023-04-14 DIAGNOSIS — R07.89 CHEST PAIN, ATYPICAL: ICD-10-CM

## 2023-04-14 LAB
AORTIC ARCH: 3.4 CM
ASCENDING AORTA: 3.7 CM
BH CV ECHO MEAS - ACS: 2 CM
BH CV ECHO MEAS - AO MAX PG: 8 MMHG
BH CV ECHO MEAS - AO MEAN PG: 4.2 MMHG
BH CV ECHO MEAS - AO ROOT AREA (BSA CORRECTED): 3.4 CM2
BH CV ECHO MEAS - AO V2 MAX: 141.2 CM/SEC
BH CV ECHO MEAS - AO V2 VTI: 26.3 CM
BH CV ECHO MEAS - AVA(I,D): 1.95 CM2
BH CV ECHO MEAS - EDV(CUBED): 112.2 ML
BH CV ECHO MEAS - EDV(MOD-SP2): 51 ML
BH CV ECHO MEAS - EDV(MOD-SP4): 74 ML
BH CV ECHO MEAS - EF(MOD-BP): 56.7 %
BH CV ECHO MEAS - EF(MOD-SP2): 56.9 %
BH CV ECHO MEAS - EF(MOD-SP4): 55.4 %
BH CV ECHO MEAS - EF_3D-VOL: 69 %
BH CV ECHO MEAS - ESV(CUBED): 26.1 ML
BH CV ECHO MEAS - ESV(MOD-SP2): 22 ML
BH CV ECHO MEAS - ESV(MOD-SP4): 33 ML
BH CV ECHO MEAS - FS: 38.5 %
BH CV ECHO MEAS - IVS/LVPW: 1.03 CM
BH CV ECHO MEAS - IVSD: 1.12 CM
BH CV ECHO MEAS - LA 3D VOL INDEX: 35
BH CV ECHO MEAS - LAT PEAK E' VEL: 10.3 CM/SEC
BH CV ECHO MEAS - LV DIASTOLIC VOL/BSA (35-75): 34 CM2
BH CV ECHO MEAS - LV MASS(C)D: 197 GRAMS
BH CV ECHO MEAS - LV MAX PG: 3.4 MMHG
BH CV ECHO MEAS - LV MEAN PG: 2.15 MMHG
BH CV ECHO MEAS - LV SYSTOLIC VOL/BSA (12-30): 15.2 CM2
BH CV ECHO MEAS - LV V1 MAX: 92.4 CM/SEC
BH CV ECHO MEAS - LV V1 VTI: 17.7 CM
BH CV ECHO MEAS - LVIDD: 4.8 CM
BH CV ECHO MEAS - LVIDS: 3 CM
BH CV ECHO MEAS - LVOT AREA: 2.9 CM2
BH CV ECHO MEAS - LVOT DIAM: 1.92 CM
BH CV ECHO MEAS - LVPWD: 1.09 CM
BH CV ECHO MEAS - MED PEAK E' VEL: 7.7 CM/SEC
BH CV ECHO MEAS - MV A DUR: 0.15 SEC
BH CV ECHO MEAS - MV A MAX VEL: 69.8 CM/SEC
BH CV ECHO MEAS - MV DEC SLOPE: 431.8 CM/SEC2
BH CV ECHO MEAS - MV DEC TIME: 0.21 MSEC
BH CV ECHO MEAS - MV E MAX VEL: 76.7 CM/SEC
BH CV ECHO MEAS - MV E/A: 1.1
BH CV ECHO MEAS - MV MAX PG: 3.3 MMHG
BH CV ECHO MEAS - MV MEAN PG: 1.81 MMHG
BH CV ECHO MEAS - MV P1/2T: 55.2 MSEC
BH CV ECHO MEAS - MV V2 VTI: 33.1 CM
BH CV ECHO MEAS - MVA(P1/2T): 4 CM2
BH CV ECHO MEAS - MVA(VTI): 1.55 CM2
BH CV ECHO MEAS - PA ACC TIME: 0.1 SEC
BH CV ECHO MEAS - PA PR(ACCEL): 36.2 MMHG
BH CV ECHO MEAS - PA V2 MAX: 86.9 CM/SEC
BH CV ECHO MEAS - PULM A REVS DUR: 0.12 SEC
BH CV ECHO MEAS - PULM A REVS VEL: 29.8 CM/SEC
BH CV ECHO MEAS - PULM DIAS VEL: 40.4 CM/SEC
BH CV ECHO MEAS - PULM S/D: 1.55
BH CV ECHO MEAS - PULM SYS VEL: 62.4 CM/SEC
BH CV ECHO MEAS - QP/QS: 1.15
BH CV ECHO MEAS - RAP SYSTOLE: 3 MMHG
BH CV ECHO MEAS - RV MAX PG: 1.63 MMHG
BH CV ECHO MEAS - RV V1 MAX: 63.8 CM/SEC
BH CV ECHO MEAS - RV V1 VTI: 16.4 CM
BH CV ECHO MEAS - RVOT DIAM: 2.14 CM
BH CV ECHO MEAS - RVSP: 25.6 MMHG
BH CV ECHO MEAS - SI(MOD-SP2): 13.3 ML/M2
BH CV ECHO MEAS - SI(MOD-SP4): 18.9 ML/M2
BH CV ECHO MEAS - SUP REN AO DIAM: 2 CM
BH CV ECHO MEAS - SV(LVOT): 51.2 ML
BH CV ECHO MEAS - SV(MOD-SP2): 29 ML
BH CV ECHO MEAS - SV(MOD-SP4): 41 ML
BH CV ECHO MEAS - SV(RVOT): 58.7 ML
BH CV ECHO MEAS - TAPSE (>1.6): 2.8 CM
BH CV ECHO MEAS - TR MAX PG: 22.6 MMHG
BH CV ECHO MEAS - TR MAX VEL: 237.5 CM/SEC
BH CV ECHO MEASUREMENTS AVERAGE E/E' RATIO: 8.52
BH CV NUCLEAR PRIOR STUDY: 1
BH CV REST NUCLEAR ISOTOPE DOSE: 10.5 MCI
BH CV STRESS BP STAGE 1: NORMAL
BH CV STRESS COMMENTS STAGE 1: NORMAL
BH CV STRESS DOSE REGADENOSON STAGE 1: 0.4
BH CV STRESS DURATION MIN STAGE 1: 0
BH CV STRESS DURATION SEC STAGE 1: 10
BH CV STRESS HR STAGE 1: 98
BH CV STRESS NUCLEAR ISOTOPE DOSE: 33.7 MCI
BH CV STRESS PROTOCOL 1: NORMAL
BH CV STRESS RECOVERY BP: NORMAL MMHG
BH CV STRESS RECOVERY HR: 76 BPM
BH CV STRESS STAGE 1: 1
BH CV VAS BP LEFT ARM: NORMAL MMHG
BH CV XLRA - RV BASE: 3.1 CM
BH CV XLRA - RV LENGTH: 8.9 CM
BH CV XLRA - RV MID: 3.3 CM
BH CV XLRA - TDI S': 13.5 CM/SEC
LEFT ATRIUM VOLUME INDEX: 17.3 ML/M2
LV EF NUC BP: 52 %
MAXIMAL PREDICTED HEART RATE: 150 BPM
MAXIMAL PREDICTED HEART RATE: 150 BPM
PERCENT MAX PREDICTED HR: 65.33 %
SINUS: 3.5 CM
STJ: 3.4 CM
STRESS BASELINE BP: NORMAL MMHG
STRESS BASELINE HR: 71 BPM
STRESS PERCENT HR: 77 %
STRESS POST EXERCISE DUR SEC: 10 SEC
STRESS POST PEAK BP: NORMAL MMHG
STRESS POST PEAK HR: 98 BPM
STRESS TARGET HR: 128 BPM
STRESS TARGET HR: 128 BPM

## 2023-04-14 PROCEDURE — A9502 TC99M TETROFOSMIN: HCPCS | Performed by: INTERNAL MEDICINE

## 2023-04-14 PROCEDURE — 25010000002 REGADENOSON 0.4 MG/5ML SOLUTION: Performed by: INTERNAL MEDICINE

## 2023-04-14 PROCEDURE — 93306 TTE W/DOPPLER COMPLETE: CPT

## 2023-04-14 PROCEDURE — 93306 TTE W/DOPPLER COMPLETE: CPT | Performed by: INTERNAL MEDICINE

## 2023-04-14 PROCEDURE — 78452 HT MUSCLE IMAGE SPECT MULT: CPT

## 2023-04-14 PROCEDURE — 0 TECHNETIUM TETROFOSMIN KIT: Performed by: INTERNAL MEDICINE

## 2023-04-14 PROCEDURE — 93017 CV STRESS TEST TRACING ONLY: CPT

## 2023-04-14 RX ADMIN — TETROFOSMIN 1 DOSE: 1.38 INJECTION, POWDER, LYOPHILIZED, FOR SOLUTION INTRAVENOUS at 07:29

## 2023-04-14 RX ADMIN — TETROFOSMIN 1 DOSE: 1.38 INJECTION, POWDER, LYOPHILIZED, FOR SOLUTION INTRAVENOUS at 08:18

## 2023-04-14 RX ADMIN — REGADENOSON 0.4 MG: 0.08 INJECTION, SOLUTION INTRAVENOUS at 08:18

## 2023-04-20 LAB — INR PPP: 2.9 (ref 2.5–3)

## 2023-04-21 ENCOUNTER — ANTICOAGULATION VISIT (OUTPATIENT)
Dept: FAMILY MEDICINE CLINIC | Facility: CLINIC | Age: 73
End: 2023-04-21
Payer: MEDICARE

## 2023-04-26 ENCOUNTER — ANTICOAGULATION VISIT (OUTPATIENT)
Dept: FAMILY MEDICINE CLINIC | Facility: CLINIC | Age: 73
End: 2023-04-26
Payer: MEDICARE

## 2023-04-26 LAB — INR PPP: 2.8 (ref 2–3)

## 2023-04-28 ENCOUNTER — HOSPITAL ENCOUNTER (OUTPATIENT)
Dept: CT IMAGING | Facility: HOSPITAL | Age: 73
Discharge: HOME | End: 2023-04-28
Payer: MEDICARE

## 2023-04-28 DIAGNOSIS — J84.9 ILD (INTERSTITIAL LUNG DISEASE): ICD-10-CM

## 2023-04-28 PROCEDURE — 71250 CT THORAX DX C-: CPT

## 2023-05-03 ENCOUNTER — ANTICOAGULATION VISIT (OUTPATIENT)
Dept: FAMILY MEDICINE CLINIC | Facility: CLINIC | Age: 73
End: 2023-05-03
Payer: MEDICARE

## 2023-05-03 LAB — INR PPP: 2.2 (ref 2.5–3)

## 2023-05-10 ENCOUNTER — ANTICOAGULATION VISIT (OUTPATIENT)
Dept: FAMILY MEDICINE CLINIC | Facility: CLINIC | Age: 73
End: 2023-05-10
Payer: MEDICARE

## 2023-05-10 LAB — INR PPP: 2.8 (ref 2.5–3)

## 2023-05-14 RX ORDER — WARFARIN SODIUM 1 MG/1
3 TABLET ORAL DAILY
Qty: 270 TABLET | Refills: 3 | Status: SHIPPED | OUTPATIENT
Start: 2023-05-14

## 2023-05-17 LAB — INR PPP: 2.8 (ref 2.5–3)

## 2023-05-18 ENCOUNTER — ANTICOAGULATION VISIT (OUTPATIENT)
Dept: FAMILY MEDICINE CLINIC | Facility: CLINIC | Age: 73
End: 2023-05-18
Payer: MEDICARE

## 2023-05-19 ENCOUNTER — OFFICE VISIT (OUTPATIENT)
Dept: FAMILY MEDICINE CLINIC | Facility: CLINIC | Age: 73
End: 2023-05-19
Payer: MEDICARE

## 2023-05-19 VITALS
DIASTOLIC BLOOD PRESSURE: 68 MMHG | BODY MASS INDEX: 29.54 KG/M2 | RESPIRATION RATE: 16 BRPM | SYSTOLIC BLOOD PRESSURE: 106 MMHG | TEMPERATURE: 97.3 F | WEIGHT: 211 LBS | HEIGHT: 71 IN | HEART RATE: 74 BPM | OXYGEN SATURATION: 100 %

## 2023-05-19 DIAGNOSIS — I25.2 PAST HEART ATTACK: ICD-10-CM

## 2023-05-19 DIAGNOSIS — Z79.01 LONG TERM CURRENT USE OF ANTICOAGULANT THERAPY: ICD-10-CM

## 2023-05-19 DIAGNOSIS — J84.9 INTERSTITIAL LUNG DISEASE: ICD-10-CM

## 2023-05-19 DIAGNOSIS — E53.8 LOW FOLIC ACID: ICD-10-CM

## 2023-05-19 DIAGNOSIS — M50.30 DDD (DEGENERATIVE DISC DISEASE), CERVICAL: ICD-10-CM

## 2023-05-19 DIAGNOSIS — E78.2 MIXED HYPERLIPIDEMIA: ICD-10-CM

## 2023-05-19 DIAGNOSIS — Z86.711 HISTORY OF PULMONARY EMBOLUS (PE): ICD-10-CM

## 2023-05-19 DIAGNOSIS — K21.9 GASTROESOPHAGEAL REFLUX DISEASE WITHOUT ESOPHAGITIS: ICD-10-CM

## 2023-05-19 DIAGNOSIS — M79.7 FIBROMYALGIA: Primary | ICD-10-CM

## 2023-05-19 RX ORDER — WARFARIN SODIUM 5 MG/1
TABLET ORAL
COMMUNITY
Start: 2023-05-07

## 2023-05-19 RX ORDER — CYCLOBENZAPRINE HCL 5 MG
TABLET ORAL
Qty: 270 TABLET | Refills: 3 | Status: SHIPPED | OUTPATIENT
Start: 2023-05-19

## 2023-05-19 NOTE — PATIENT INSTRUCTIONS
Medicare Wellness  Personal Prevention Plan of Service     Date of Office Visit:    Encounter Provider:  Aletha Ochoa PA-C  Place of Service:  [facility]  Patient Name: Javy Reeves Sr.  :  1950    As part of the Medicare Wellness portion of your visit today, we are providing you with this personalized preventive plan of services (PPPS). This plan is based upon recommendations of the [place] Preventive Services Task Force (USPSTF) and the Advisory Committee on Immunization Practices (ACIP).    This lists the preventive care services that should be considered, and provides dates of when you are due. Items listed as completed are up-to-date and do not require any further intervention.    Health Maintenance   Topic Date Due    COVID-19 Vaccine (6 - Booster for Moderna series) 2022    ZOSTER VACCINE (2 of 2) 2022    ANNUAL WELLNESS VISIT  2023    INFLUENZA VACCINE  2023    LIPID PANEL  2023    TDAP/TD VACCINES (2 - Td or Tdap) 2027    COLORECTAL CANCER SCREENING  2028    HEPATITIS C SCREENING  Completed    Pneumococcal Vaccine 65+  Completed    AAA SCREEN (ONE-TIME)  Completed       No orders of the defined types were placed in this encounter.      No follow-ups on file.

## 2023-05-19 NOTE — PROGRESS NOTES
"Subjective   Javy Reeves Sr. is a 72 y.o. male.     History of Present Illness     Javy Reeves Sr. 72 y.o. male /68   Pulse 74   Temp 97.3 °F (36.3 °C)   Resp 16   Ht 180.3 cm (70.98\")   Wt 95.7 kg (211 lb)   SpO2 100%   BMI 29.45 kg/m²  who presents today for follow-up from last visit increase Lyrica dose for fibromyalgia pain and is doing well continues on tramadol.  Also reviewed labs from December  he has a history of   Patient Active Problem List   Diagnosis   • Arthritis   • Family history of early CAD   • DDD (degenerative disc disease), cervical   • DVT (deep venous thrombosis)   • Fibromyalgia   • Past heart attack   • Hyperlipidemia   • DDD (degenerative disc disease), lumbosacral   • History of pulmonary embolus (PE)   • Reflux esophagitis   • TIA (transient ischemic attack)   • Left groin pain   • Cervical radicular pain   • Cervical disc disorder at C6-C7 level with radiculopathy   • Wheezing   • Colon polyps   • Right lower quadrant abdominal pain   • Diarrhea   • Cervical spinal stenosis   • Cervical disc disorder at C5-C6 level with radiculopathy   • GERD (gastroesophageal reflux disease)   • Diverticulosis   • Old MI (myocardial infarction)   • Herniated cervical disc   • Encounter for therapeutic drug monitoring   • Long term current use of anticoagulant therapy   • ERRONEOUS ENCOUNTER--DISREGARD   • Stable angina   • Low folic acid   • Low serum vitamin B12   • Precordial chest pain   • Tobacco abuse   • Tobacco abuse counseling   • Interstitial lung disease (HCC)   • History of transient ischemic attack (TIA)   • Irritable bowel syndrome   • DDD (degenerative disc disease), lumbar   • Weight loss   • Choking sensation   • Failed back syndrome   • Long term (current) use of opiate analgesic   • Lumbar radiculopathy   • Lumbar spondylosis   • Chronic pain disorder   • History of colon polyps   • Acute chest pain   • Recurrent pulmonary embolism   • Subtherapeutic international " normalized ratio (INR)   • Bronchiectasis without complication   • Abdominal pain   • Presence of IVC filter   • Weight loss, unintentional   • ILD (interstitial lung disease)   • Tobacco use disorder   .      Still working on smoking--has patch    My last visit with him was 3/10/2023  Noted last visit he was taking tramadol for the fibromyalgia pain in his degenerative disc disease in his spine along with Lyrica  Increased his Lyrica dose last visit due to breakthrough pain    Last labs were 12/19/2022 with goals met with cholesterol  He was seen by ophthalmology 4-7-23 noting his bilateral cataract  Had yearly follow-up with Dr. Dalton cardiologist for CAD management.  Dr. Dalton states in his progress note the patient was duly diagnosed with interstitial lung disease November 2022 by lung biopsy.  Discussed patient's concern with medications for treatment with his CAD.  Dr. Dalton noted his concern of patient possibly developing pulmonary hypertension from prior CAD and now interstitial lung disease--- also noting his history of pulmonary embolism and did order an echo to update right ventricular systolic pressure and look at heart structure.  And ordered nuclear perfusion study  These were performed on 4/14/2023 and I reviewed results--- no evidence of ischemia normal myocardial perfusion study, EF 52%, no change from 2018 study.  Echo with EF 56.7% normal left ventricular diastolic function and right ventricular systolic pressure less than 35 mmHg    Flexeril helps spasms  Not on cpap  hematology notes  • Factor V Leiden normal prothrombin gene mutation negative rest of the hypercoagulable work-up pending  • Hypercoagulable work-up done August 24, 2022 shows lupus anticoagulant negative beta-2 glycoprotein antibody negative, anticardiolipin antibody IgG is 91And IgM is less than 9, factor II prothrombin gene mutation negative factor V Leiden negative, Antithrombin III 80% as patient was on heparin due to  slightly low.  Patient protein C&S was not done as he was on Coumadin and we can recheck anticardiolipin antibody in 3 months.  • Last CT scan November 18, 2022 showed it was negative for pulmonary embolism   Saw DR Bergman last 12-12-22 interstitial pneumonitis---to see pulm  Saw DR Jamil Willoughby, 1-13-23---to start Pirfenidone for Tx--Idiopathic pulmonary fibrosis.--had f/u 5-4-23 noted doing well on Rx --f/u 6 mos  He is taking new meds for lungs.    Getting hearing aids    The patient has read and signed the [facility] Controlled Substance Contract.  I will continue to see patient for regular follow up appointments.  They are well controlled on their medication.  ASHLEY has been reviewed by me and is updated every 3 months. The patient is aware of the potential for addiction and dependence.    The following portions of the patient's history were reviewed and updated as appropriate: allergies, current medications, past family history, past medical history, past social history, past surgical history and problem list.    Review of Systems   Constitutional: Negative for activity change, appetite change, chills, fever and unexpected weight change.   HENT: Positive for ear pain. Negative for nosebleeds, postnasal drip, rhinorrhea, sore throat and trouble swallowing.    Eyes: Negative for pain and visual disturbance.   Respiratory: Positive for cough and wheezing. Negative for chest tightness and shortness of breath.    Cardiovascular: Negative for chest pain and palpitations.   Gastrointestinal: Negative for abdominal pain and blood in stool.   Endocrine: Negative.    Genitourinary: Negative for difficulty urinating and hematuria.   Musculoskeletal: Negative for joint swelling and myalgias.   Skin: Negative for color change and rash.   Allergic/Immunologic: Negative.    Neurological: Negative for syncope, speech difficulty and headaches.   Hematological: Negative for adenopathy.   Psychiatric/Behavioral: Negative for  agitation, confusion and dysphoric mood.   All other systems reviewed and are negative.      Objective   Physical Exam  Vitals and nursing note reviewed.   Constitutional:       General: He is not in acute distress.     Appearance: He is well-developed. He is not diaphoretic.   HENT:      Head: Normocephalic.      Right Ear: External ear normal.      Left Ear: External ear normal.   Eyes:      General: No scleral icterus.     Conjunctiva/sclera: Conjunctivae normal.      Pupils: Pupils are equal, round, and reactive to light.   Neck:      Vascular: No carotid bruit.   Cardiovascular:      Rate and Rhythm: Normal rate and regular rhythm.      Pulses: Normal pulses.      Heart sounds: Normal heart sounds. No murmur heard.  Pulmonary:      Effort: Pulmonary effort is normal.      Breath sounds: Normal breath sounds. No rales.   Musculoskeletal:         General: Normal range of motion.      Cervical back: Normal range of motion and neck supple.      Right lower leg: No edema.      Left lower leg: No edema.   Skin:     General: Skin is warm and dry.      Findings: No rash.   Neurological:      Mental Status: He is alert and oriented to person, place, and time.   Psychiatric:         Mood and Affect: Mood normal. Affect is not inappropriate.         Behavior: Behavior normal.         Thought Content: Thought content normal.         Judgment: Judgment normal.         Assessment & Plan   Diagnoses and all orders for this visit:    1. Fibromyalgia (Primary)    2. Gastroesophageal reflux disease without esophagitis    3. DDD (degenerative disc disease), cervical    4. Long term current use of anticoagulant therapy    5. Past heart attack    6. Mixed hyperlipidemia    7. Low folic acid    8. History of pulmonary embolus (PE)    9. Interstitial lung disease    Other orders  -     cyclobenzaprine (FLEXERIL) 5 MG tablet; 1-2 tabs PO TID PRN spasms  Dispense: 270 tablet; Refill: 3          Need to cont Lyrica and Ultram for fibro  pain and DDD spine pain---works  Plan, Javy Reeves Sr., was seen today.  he was seen for Hyperlipidemia and will continue current medication, CAD and is currently stable on medication management and stable, CAD and is under care of their Cardiologist for medical management and stable and Vitamin D deficiency and will update labs .  Continue folic acid and vitamin D  Followed by Dr. Willoughby for interstitial lung disease doing fantastic on this medication         Answers for HPI/ROS submitted by the patient on 5/18/2023  What is the primary reason for your visit?: Cough

## 2023-05-19 NOTE — PROGRESS NOTES
The ABCs of the Annual Wellness Visit  Subsequent Medicare Wellness Visit    Subjective    Javy Reeves Sr. is a 72 y.o. male who presents for a Subsequent Medicare Wellness Visit.    The following portions of the patient's history were reviewed and   updated as appropriate: allergies, current medications, past family history, past medical history, past social history, past surgical history and problem list.    Compared to one year ago, the patient feels his physical   health is the same.    Compared to one year ago, the patient feels his mental   health is the same.    Recent Hospitalizations:  This patient has had a [facility] admission record on file within the last 365 days.    Current Medical Providers:  Patient Care Team:  Aletha Ochoa PA-C as PCP - General (Family Medicine)  Jean Coles MD as Surgeon (Neurosurgery)  Erlin West MD as Surgeon (General Surgery)  Yaneth Vasquez Jr., MD as Consulting Physician (Vascular Surgery)  Artemio Dalton MD as Consulting Physician (Cardiology)  Jamie Manley MD as Consulting Physician (Gastroenterology)  Rodolfo Eugene III, MD as Surgeon (Thoracic Surgery)  Autumn Arellano MD as Referring Physician (Hospitalist)  Harriett Madden MD as Consulting Physician (Hematology and Oncology)  Shawna Ashley MD (Vascular Surgery)  Nella Bergman MD as Surgeon (Thoracic Surgery)    Outpatient Medications Prior to Visit   Medication Sig Dispense Refill   • acetaminophen (TYLENOL) 650 MG 8 hr tablet Take 1 tablet by mouth Daily.     • albuterol sulfate  (90 Base) MCG/ACT inhaler Inhale 2 puffs Every 4 (Four) Hours As Needed for Wheezing or Shortness of Air. 24 g 3   • cyclobenzaprine (FLEXERIL) 5 MG tablet 1-2 tabs PO TID PRN spasms 30 tablet 1   • Enoxaparin Sodium (LOVENOX) 100 MG/ML solution prefilled syringe syringe Inject 0.9 mL under the skin into the appropriate area as directed Every 12 (Twelve) Hours. Stop Lovenox  when your INR is greater than 2.5 as discussed.  Indications: DVT/PE (active thrombosis) 10 mL 0   • folic acid (FOLVITE) 1 MG tablet Take 1 tablet by mouth Daily. 90 tablet 3   • loratadine (CLARITIN) 10 MG tablet Take 1 tablet by mouth Daily.     • multivitamin with minerals tablet tablet Take 1 tablet by mouth Daily. PT HOLDING FOR SURGERY     • nitroglycerin (NITROSTAT) 0.4 MG SL tablet Place 1 tablet under the tongue Every 5 (Five) Minutes As Needed for Chest Pain. Take no more than 3 doses in 15 minutes. 30 tablet 5   • pantoprazole (PROTONIX) 40 MG EC tablet Take 1 tablet by mouth Daily. For GERD 90 tablet 3   • Pirfenidone 267 MG tablet      • pregabalin (LYRICA) 150 MG capsule Take 1 capsule by mouth 2 (Two) Times a Day. For Fibromyalgia 180 capsule 1   • Stiolto Respimat 2.5-2.5 MCG/ACT aerosol solution inhaler      • tamsulosin (FLOMAX) 0.4 MG capsule 24 hr capsule Take 1 capsule by mouth Every Night. 30 capsule 0   • traMADol (Ultram) 50 MG tablet Take 1 tablet by mouth Every 6 (Six) Hours As Needed for Moderate Pain. 90 tablet 2   • warfarin (COUMADIN) 1 MG tablet TAKE 3 TABLETS BY MOUTH DAILY 270 tablet 3   • warfarin (COUMADIN) 5 MG tablet        No facility-administered medications prior to visit.       Opioid medication/s are on active medication list.  and I have evaluated his active treatment plan and pain score trends (see table).  Vitals:    05/19/23 0737   PainSc:   2     I have reviewed the chart for potential of high risk medication and harmful drug interactions in the elderly.            Aspirin is not on active medication list.  Aspirin use is not indicated based on review of current medical condition/s. Risk of harm outweighs potential benefits.  .    Patient Active Problem List   Diagnosis   • Arthritis   • Family history of early CAD   • DDD (degenerative disc disease), cervical   • DVT (deep venous thrombosis)   • Fibromyalgia   • Past heart attack   • Hyperlipidemia   • DDD  "(degenerative disc disease), lumbosacral   • History of pulmonary embolus (PE)   • Reflux esophagitis   • TIA (transient ischemic attack)   • Left groin pain   • Cervical radicular pain   • Cervical disc disorder at C6-C7 level with radiculopathy   • Wheezing   • Colon polyps   • Right lower quadrant abdominal pain   • Diarrhea   • Cervical spinal stenosis   • Cervical disc disorder at C5-C6 level with radiculopathy   • GERD (gastroesophageal reflux disease)   • Diverticulosis   • Old MI (myocardial infarction)   • Herniated cervical disc   • Encounter for therapeutic drug monitoring   • Long term current use of anticoagulant therapy   • ERRONEOUS ENCOUNTER--DISREGARD   • Stable angina   • Low folic acid   • Low serum vitamin B12   • Precordial chest pain   • Tobacco abuse   • Tobacco abuse counseling   • Interstitial lung disease (HCC)   • History of transient ischemic attack (TIA)   • Irritable bowel syndrome   • DDD (degenerative disc disease), lumbar   • Weight loss   • Choking sensation   • Failed back syndrome   • Long term (current) use of opiate analgesic   • Lumbar radiculopathy   • Lumbar spondylosis   • Chronic pain disorder   • History of colon polyps   • Acute chest pain   • Recurrent pulmonary embolism   • Subtherapeutic international normalized ratio (INR)   • Bronchiectasis without complication   • Abdominal pain   • Presence of IVC filter   • Weight loss, unintentional   • ILD (interstitial lung disease)   • Tobacco use disorder     Advance Care Planning   Advance Care Planning     Advance Directive is not on file.  ACP discussion was held with the patient during this visit. Patient does not have an advance directive, declines further assistance.     Objective    Vitals:    05/19/23 0737   BP: 106/68   Pulse: 74   Resp: 16   Temp: 97.3 °F (36.3 °C)   SpO2: 100%   Weight: 95.7 kg (211 lb)   Height: 180.3 cm (70.98\")   PainSc:   2     Estimated body mass index is 29.45 kg/m² as calculated from the " "following:    Height as of this encounter: 180.3 cm (70.98\").    Weight as of this encounter: 95.7 kg (211 lb).    BMI is >= 25 and <30. (Overweight) The following options were offered after discussion;: exercise as tolerated f/t pain      Does the patient have evidence of cognitive impairment?   No            HEALTH RISK ASSESSMENT    Smoking Status:  Social History     Tobacco Use   Smoking Status Some Days   • Packs/day: 0.50   • Years: 30.00   • Pack years: 15.00   • Types: Cigarettes   Smokeless Tobacco Never   Tobacco Comments    Since 23 years old     Alcohol Consumption:  Social History     Substance and Sexual Activity   Alcohol Use Not Currently    Comment: RARELY     Fall Risk Screen:    STEADI Fall Risk Assessment was completed, and patient is at LOW risk for falls.Assessment completed on:2023    Depression Screenin/19/2023     7:00 AM   PHQ-2/PHQ-9 Depression Screening   Little Interest or Pleasure in Doing Things 0-->not at all   Feeling Down, Depressed or Hopeless 0-->not at all   PHQ-9: Brief Depression Severity Measure Score 0       Health Habits and Functional and Cognitive Screenin/19/2023     7:00 AM   Functional & Cognitive Status   Do you have difficulty preparing food and eating? No   Do you have difficulty bathing yourself, getting dressed or grooming yourself? No   Do you have difficulty using the toilet? No   Do you have difficulty moving around from place to place? No   Do you have trouble with steps or getting out of a bed or a chair? No   Current Diet Well Balanced Diet   Dental Exam Up to date   Eye Exam Up to date   Exercise (times per week) 7 times per week   Current Exercises Include Cardiovascular Workout   Do you need help using the phone?  No   Are you deaf or do you have serious difficulty hearing?  No   Do you need help with transportation? No   Do you need help shopping? No   Do you need help preparing meals?  No   Do you need help with housework?  No "   Do you need help with laundry? No   Do you need help taking your medications? No   Do you need help managing money? No   Do you ever drive or ride in a car without wearing a seat belt? No   Have you felt unusual stress, anger or loneliness in the last month? No   Who do you live with? Spouse   If you need help, do you have trouble finding someone available to you? No   Have you been bothered in the last four weeks by sexual problems? No   Do you have difficulty concentrating, remembering or making decisions? No       Age-appropriate Screening Schedule:  Refer to the list below for future screening recommendations based on patient's age, sex and/or medical conditions. Orders for these recommended tests are listed in the plan section. The patient has been provided with a written plan.    Health Maintenance   Topic Date Due   • COVID-19 Vaccine (6 - Booster for Moderna series) 05/28/2022   • ZOSTER VACCINE (2 of 2) 11/25/2022   • ANNUAL WELLNESS VISIT  05/19/2023   • INFLUENZA VACCINE  08/01/2023   • LIPID PANEL  12/19/2023   • TDAP/TD VACCINES (2 - Td or Tdap) 08/27/2027   • COLORECTAL CANCER SCREENING  09/13/2028   • HEPATITIS C SCREENING  Completed   • Pneumococcal Vaccine 65+  Completed   • AAA SCREEN (ONE-TIME)  Completed                  CMS Preventative Services Quick Reference  Risk Factors Identified During Encounter:    Tobacco Use/Dependance Risk (use dotphrase .tobaccocessation for documentation)    The above risks/problems have been discussed with the patient.  Pertinent information has been shared with the patient in the After Visit Summary.    There are no diagnoses linked to this encounter.    Follow Up:   Next Medicare Wellness visit to be scheduled in 1 year.      An After Visit Summary and PPPS were made available to the patient.

## 2023-05-24 ENCOUNTER — ANTICOAGULATION VISIT (OUTPATIENT)
Dept: FAMILY MEDICINE CLINIC | Facility: CLINIC | Age: 73
End: 2023-05-24

## 2023-05-26 ENCOUNTER — TELEPHONE (OUTPATIENT)
Dept: FAMILY MEDICINE CLINIC | Facility: CLINIC | Age: 73
End: 2023-05-26

## 2023-05-26 NOTE — TELEPHONE ENCOUNTER
Pt is calling to get a refill on lipitor 40 mg. I see that it was D/C. Is he suppose to still be on this.

## 2023-05-27 RX ORDER — ATORVASTATIN CALCIUM 40 MG/1
40 TABLET, FILM COATED ORAL DAILY
Qty: 90 TABLET | Refills: 3 | Status: SHIPPED | OUTPATIENT
Start: 2023-05-27

## 2023-05-31 ENCOUNTER — ANTICOAGULATION VISIT (OUTPATIENT)
Dept: FAMILY MEDICINE CLINIC | Facility: CLINIC | Age: 73
End: 2023-05-31

## 2023-05-31 LAB
INR PPP: 2.6 (ref 2.5–3)
INR PPP: 3.2 (ref 2.5–3)

## 2023-06-07 ENCOUNTER — ANTICOAGULATION VISIT (OUTPATIENT)
Dept: FAMILY MEDICINE CLINIC | Facility: CLINIC | Age: 73
End: 2023-06-07
Payer: MEDICARE

## 2023-06-07 LAB — INR PPP: 3.6 (ref 2.5–3.5)

## 2023-06-08 ENCOUNTER — TELEPHONE (OUTPATIENT)
Dept: FAMILY MEDICINE CLINIC | Facility: CLINIC | Age: 73
End: 2023-06-08

## 2023-06-08 NOTE — TELEPHONE ENCOUNTER
Caller: FANNY MALCOLM    Relationship: Other    Best call back number: [phone]     What was the call regarding: JAQUELIN IS CALLING TO REPORT THE PATIENT'S INR WHICH IS 3.6 ON 6/7/23    PLEASE ADVISE

## 2023-06-14 ENCOUNTER — ANTICOAGULATION VISIT (OUTPATIENT)
Dept: FAMILY MEDICINE CLINIC | Facility: CLINIC | Age: 73
End: 2023-06-14
Payer: MEDICARE

## 2023-06-14 LAB — INR PPP: 3 (ref 2.5–3.5)

## 2023-07-19 NOTE — H&P (VIEW-ONLY)
"Chief Complaint   Patient presents with    Heartburn       Subjective     HPI    Javy Reeves Sr. is a 72 y.o. male with a past medical history noted below who presents for follow-up of GERD and diarrhea.  He has a history of DVT and is on warfarin.    His heartburn is controlled.  He is taking a ppi.  There is no recent EGD since he has been on PPI to further assess his heartburn symptoms.  He has no dysphagia.    BMs normal, no further diarrhea    Still working at the [employer] 4 days a week.  Likes to stay active.  Averages over 10,000 steps when he is working    His breathing is stable.  All treatment for lung disease.  He does not require oxygen therapy.    His last colonoscopy was with Dr. Sin in 2018 notable for multiple hyperplastic polyps.  She recommended he repeat in 10 years.    Today's visit was in the office.  Both the patient and I were wearing face masks and proper hand hygiene was performed before and after the physical exam.     Past Medical History:   Diagnosis Date    Acute and subacute infective endocarditis in diseases classified elsewhere     Allergic     Arthritis     Asthma 04/26/2021    Chest pain     Chronic low back pain     Chronic pain     Clotting disorder     Colon polyps     Coronary artery disease     DDD (degenerative disc disease), cervical     DDD (degenerative disc disease), lumbosacral     Diverticulosis     DVT (deep venous thrombosis) 1996    Left Lower extremity following arthroscopic knee surgery in 1996. IVC fliter placed at that time.    Encounter for special screening examination for neoplasm of prostate 2012    Eye exam, routine > 5 yrs ago    Eye exam, routine 01/2015    Fibromyalgia     Fibromyositis     GERD (gastroesophageal reflux disease)     HIV disease     PT STATES \"NEVER DIAGNOSED\"    Hyperlipidemia     Injury of back     Injury of neck     Irritable bowel     Limited joint range of motion     NECK    Lumbar stenosis     MI (myocardial infarction)     " Neck pain     Pain in limb     Past heart attack     Postlaminectomy syndrome of lumbar region     Pulmonary embolism 1996    Left Lower extremity following arthroscopic knee surgery in 1996. IVC fliter placed at that time.    Reflux esophagitis     Shortness of breath     WITH EXCERTION    Sleep apnea     NO CPAP    Stroke     TIA (transient ischemic attack)           Current Outpatient Medications:     acetaminophen (TYLENOL) 650 MG 8 hr tablet, Take 1 tablet by mouth Daily., Disp: , Rfl:     albuterol sulfate  (90 Base) MCG/ACT inhaler, Inhale 2 puffs Every 4 (Four) Hours As Needed for Wheezing or Shortness of Air., Disp: 24 g, Rfl: 3    atorvastatin (Lipitor) 40 MG tablet, Take 1 tablet by mouth Daily. For cholesterol, Disp: 90 tablet, Rfl: 3    cyclobenzaprine (FLEXERIL) 5 MG tablet, 1-2 tabs PO TID PRN spasms, Disp: 270 tablet, Rfl: 3    Enoxaparin Sodium (LOVENOX) 100 MG/ML solution prefilled syringe syringe, Inject 0.9 mL under the skin into the appropriate area as directed Every 12 (Twelve) Hours. Stop Lovenox when your INR is greater than 2.5 as discussed.  Indications: DVT/PE (active thrombosis), Disp: 10 mL, Rfl: 0    esomeprazole (nexIUM) 40 MG capsule, 1 capsule Daily., Disp: , Rfl:     folic acid (FOLVITE) 1 MG tablet, Take 1 tablet by mouth Daily., Disp: 90 tablet, Rfl: 3    loratadine (CLARITIN) 10 MG tablet, Take 1 tablet by mouth Daily., Disp: , Rfl:     multivitamin with minerals tablet tablet, Take 1 tablet by mouth Daily. PT HOLDING FOR SURGERY, Disp: , Rfl:     nitroglycerin (NITROSTAT) 0.4 MG SL tablet, Place 1 tablet under the tongue Every 5 (Five) Minutes As Needed for Chest Pain. Take no more than 3 doses in 15 minutes., Disp: 30 tablet, Rfl: 5    pantoprazole (PROTONIX) 40 MG EC tablet, Take 1 tablet by mouth Daily. For GERD, Disp: 90 tablet, Rfl: 3    Pirfenidone 267 MG tablet, , Disp: , Rfl:     pregabalin (LYRICA) 150 MG capsule, Take 1 capsule by mouth 2 (Two) Times a Day. For  Fibromyalgia, Disp: 180 capsule, Rfl: 1    Stiolto Respimat 2.5-2.5 MCG/ACT aerosol solution inhaler, , Disp: , Rfl:     tiZANidine (ZANAFLEX) 4 MG tablet, Every 8 (Eight) Hours., Disp: , Rfl:     traMADol (Ultram) 50 MG tablet, Take 1 tablet by mouth Every 6 (Six) Hours As Needed for Moderate Pain., Disp: 90 tablet, Rfl: 2    Turmeric 500 MG capsule, as directed Orally, Disp: , Rfl:     Umeclidinium-Vilanterol (ANORO ELLIPTA) 62.5-25 MCG/ACT aerosol powder  inhaler, 1 puff Daily., Disp: , Rfl:     warfarin (COUMADIN) 1 MG tablet, TAKE 3 TABLETS BY MOUTH DAILY, Disp: 270 tablet, Rfl: 3    tamsulosin (FLOMAX) 0.4 MG capsule 24 hr capsule, Take 1 capsule by mouth Every Night. (Patient not taking: Reported on 7/19/2023), Disp: 30 capsule, Rfl: 0      Objective     Vitals:    07/19/23 1436   BP: 152/87   Pulse: 71   Temp: 96.3 øF (35.7 øC)   SpO2: 97%         07/19/23  1436   Weight: 93.8 kg (206 lb 11.2 oz)     Body mass index is 28.83 kg/mý.    Physical Exam  Constitutional:       General: He is not in acute distress.  Pulmonary:      Effort: Pulmonary effort is normal.   Neurological:      Mental Status: He is alert and oriented to person, place, and time.   Psychiatric:         Mood and Affect: Mood normal.         Behavior: Behavior normal.         Thought Content: Thought content normal.         Judgment: Judgment normal.           WBC   Date Value Ref Range Status   12/19/2022 4.65 3.40 - 10.80 10*3/mm3 Final     RBC   Date Value Ref Range Status   12/19/2022 4.15 4.14 - 5.80 10*6/mm3 Final     Hemoglobin   Date Value Ref Range Status   12/19/2022 13.1 13.0 - 17.7 g/dL Final   12/16/2022 13.6 13.0 - 17.7 g/dL Final     Hematocrit   Date Value Ref Range Status   12/19/2022 39.7 37.5 - 51.0 % Final   12/16/2022 39.6 37.5 - 51.0 % Final     MCV   Date Value Ref Range Status   12/19/2022 95.7 79.0 - 97.0 fL Final   12/16/2022 95.2 79.0 - 97.0 fL Final     MCH   Date Value Ref Range Status   12/19/2022 31.6 26.6 -  33.0 pg Final   12/16/2022 32.7 26.6 - 33.0 pg Final     MCHC   Date Value Ref Range Status   12/19/2022 33.0 31.5 - 35.7 g/dL Final   12/16/2022 34.3 31.5 - 35.7 g/dL Final     RDW   Date Value Ref Range Status   12/19/2022 13.9 12.3 - 15.4 % Final   12/16/2022 13.2 12.3 - 15.4 % Final     RDW-SD   Date Value Ref Range Status   12/16/2022 46.5 37.0 - 54.0 fl Final     MPV   Date Value Ref Range Status   12/16/2022 8.7 6.0 - 12.0 fL Final     Platelets   Date Value Ref Range Status   12/19/2022 234 140 - 450 10*3/mm3 Final   12/16/2022 206 140 - 450 10*3/mm3 Final     Neutrophil Rel %   Date Value Ref Range Status   12/19/2022 43.3 42.7 - 76.0 % Final     Neutrophil %   Date Value Ref Range Status   12/16/2022 42.5 (L) 42.7 - 76.0 % Final     Lymphocyte Rel %   Date Value Ref Range Status   12/19/2022 38.5 19.6 - 45.3 % Final     Lymphocyte %   Date Value Ref Range Status   12/16/2022 39.4 19.6 - 45.3 % Final     Monocyte Rel %   Date Value Ref Range Status   12/19/2022 12.0 5.0 - 12.0 % Final     Monocyte %   Date Value Ref Range Status   12/16/2022 9.5 5.0 - 12.0 % Final     Eosinophil Rel %   Date Value Ref Range Status   12/19/2022 5.4 0.3 - 6.2 % Final     Eosinophil %   Date Value Ref Range Status   12/16/2022 7.1 (H) 0.3 - 6.2 % Final     Basophil Rel %   Date Value Ref Range Status   12/19/2022 0.6 0.0 - 1.5 % Final     Basophil %   Date Value Ref Range Status   12/16/2022 0.9 0.0 - 1.5 % Final     Immature Grans %   Date Value Ref Range Status   12/16/2022 0.6 (H) 0.0 - 0.5 % Final     Neutrophils Absolute   Date Value Ref Range Status   12/19/2022 2.01 1.70 - 7.00 10*3/mm3 Final     Neutrophils, Absolute   Date Value Ref Range Status   12/16/2022 1.98 1.70 - 7.00 10*3/mm3 Final     Lymphocytes Absolute   Date Value Ref Range Status   12/19/2022 1.79 0.70 - 3.10 10*3/mm3 Final     Lymphocytes, Absolute   Date Value Ref Range Status   12/16/2022 1.83 0.70 - 3.10 10*3/mm3 Final     Monocytes Absolute   Date  Value Ref Range Status   12/19/2022 0.56 0.10 - 0.90 10*3/mm3 Final     Monocytes, Absolute   Date Value Ref Range Status   12/16/2022 0.44 0.10 - 0.90 10*3/mm3 Final     Eosinophils Absolute   Date Value Ref Range Status   12/19/2022 0.25 0.00 - 0.40 10*3/mm3 Final     Eosinophils, Absolute   Date Value Ref Range Status   12/16/2022 0.33 0.00 - 0.40 10*3/mm3 Final     Basophils Absolute   Date Value Ref Range Status   12/19/2022 0.03 0.00 - 0.20 10*3/mm3 Final     Basophils, Absolute   Date Value Ref Range Status   12/16/2022 0.04 0.00 - 0.20 10*3/mm3 Final     Immature Grans, Absolute   Date Value Ref Range Status   12/16/2022 0.03 0.00 - 0.05 10*3/mm3 Final     nRBC   Date Value Ref Range Status   12/19/2022 0.0 0.0 - 0.2 /100 WBC Final   12/16/2022 0.0 0.0 - 0.2 /100 WBC Final       Lab Results   Component Value Date    GLUCOSE 101 (H) 12/19/2022    BUN 10 12/19/2022    CREATININE 0.95 12/19/2022    EGFRIFNONA 85 11/23/2021    EGFRIFAFRI 98 11/23/2021    BCR 10.5 12/19/2022    CO2 30.1 (H) 12/19/2022    CALCIUM 9.2 12/19/2022    PROTENTOTREF 7.2 12/19/2022    ALBUMIN 4.00 12/19/2022    LABIL2 1.3 12/19/2022    AST 23 12/19/2022    ALT 34 12/19/2022         Imaging Results (Last 7 Days)       ** No results found for the last 168 hours. **            I personally reviewed data as detailed below:     The labs listed above.      Office notes from: 5/19/23 pcp      Assessment and Plan  1.  GERD: Chronic issue, controlled on PPI.  However this 72-year-old man has never had an EGD to further assess symptoms.    2.  Anticoagulated: He is on warfarin for DVT    Plan  Will plan for EGD with Dr. Foss  My staff will reach out to his PCP regarding holding anticoagulation for 5 days prior to this procedure  Continue PPI in the meantime       Diagnoses and all orders for this visit:    1. Gastroesophageal reflux disease, unspecified whether esophagitis present (Primary)  -     Case Request; Standing  -     lactated ringers  infusion  -     Case Request    2. Anticoagulated    Other orders  -     Implement Anesthesia Orders Day of Procedure; Standing  -     Obtain Informed Consent; Standing  -     Follow Anesthesia Guidelines / Protocol; Future  -     Obtain Informed Consent; Future          I have discussed the above plan with the patient.  They verbalize understanding and are in agreement with the plan.  They have been advised to contact the office for any questions, concerns, or changes related to their health.    Dictated utilizing Dragon dictation

## 2023-07-25 ENCOUNTER — TELEPHONE (OUTPATIENT)
Dept: GASTROENTEROLOGY | Facility: CLINIC | Age: 73
End: 2023-07-25
Payer: MEDICARE

## 2023-07-26 LAB — INR PPP: 3.9 (ref 2.5–3.5)

## 2023-07-27 ENCOUNTER — ANTICOAGULATION VISIT (OUTPATIENT)
Dept: FAMILY MEDICINE CLINIC | Facility: CLINIC | Age: 73
End: 2023-07-27
Payer: MEDICARE

## 2023-07-27 ENCOUNTER — TELEPHONE (OUTPATIENT)
Dept: FAMILY MEDICINE CLINIC | Facility: CLINIC | Age: 73
End: 2023-07-27

## 2023-07-28 ENCOUNTER — TELEPHONE (OUTPATIENT)
Dept: GASTROENTEROLOGY | Facility: CLINIC | Age: 73
End: 2023-07-28
Payer: MEDICARE

## 2023-07-28 NOTE — TELEPHONE ENCOUNTER
"Caller: Javy Reeves Sr. \"GARRICK\"    Relationship: Self    Best call back number: [phone]     What is the best time to reach you: ANYTIME     Who are you requesting to speak with (clinical staff, provider,  specific staff member): CLINICAL     Do you know the name of the person who called: AURELIO    What was the call regarding: SEE 7/19/23 NOTE. PT RETURNING CALL FROM AURELIO RE: MEDICATION HOLD FOR PROCEDURE. CALL BACK ANYTIME OK TO LVM.    Is it okay if the provider responds through MyChart: PT PREFERS PHONE CALL  "

## 2023-08-03 ENCOUNTER — HOSPITAL ENCOUNTER (OUTPATIENT)
Facility: HOSPITAL | Age: 73
Setting detail: HOSPITAL OUTPATIENT SURGERY
Discharge: HOME | End: 2023-08-03
Attending: INTERNAL MEDICINE | Admitting: INTERNAL MEDICINE
Payer: MEDICARE

## 2023-08-03 ENCOUNTER — ANESTHESIA (OUTPATIENT)
Dept: GASTROENTEROLOGY | Facility: HOSPITAL | Age: 73
End: 2023-08-03
Payer: MEDICARE

## 2023-08-03 ENCOUNTER — ANESTHESIA EVENT (OUTPATIENT)
Dept: GASTROENTEROLOGY | Facility: HOSPITAL | Age: 73
End: 2023-08-03
Payer: MEDICARE

## 2023-08-03 ENCOUNTER — TELEPHONE (OUTPATIENT)
Dept: FAMILY MEDICINE CLINIC | Facility: CLINIC | Age: 73
End: 2023-08-03
Payer: MEDICARE

## 2023-08-03 VITALS
RESPIRATION RATE: 16 BRPM | BODY MASS INDEX: 28.14 KG/M2 | SYSTOLIC BLOOD PRESSURE: 115 MMHG | OXYGEN SATURATION: 94 % | WEIGHT: 201 LBS | DIASTOLIC BLOOD PRESSURE: 70 MMHG | HEART RATE: 63 BPM | HEIGHT: 71 IN

## 2023-08-03 DIAGNOSIS — K21.9 GASTROESOPHAGEAL REFLUX DISEASE, UNSPECIFIED WHETHER ESOPHAGITIS PRESENT: ICD-10-CM

## 2023-08-03 LAB — INR PPP: 1.1 (ref 2–3)

## 2023-08-03 PROCEDURE — 43239 EGD BIOPSY SINGLE/MULTIPLE: CPT | Performed by: INTERNAL MEDICINE

## 2023-08-03 PROCEDURE — 25010000002 PROPOFOL 10 MG/ML EMULSION: Performed by: NURSE ANESTHETIST, CERTIFIED REGISTERED

## 2023-08-03 PROCEDURE — 88305 TISSUE EXAM BY PATHOLOGIST: CPT | Performed by: INTERNAL MEDICINE

## 2023-08-03 RX ORDER — SODIUM CHLORIDE, SODIUM LACTATE, POTASSIUM CHLORIDE, CALCIUM CHLORIDE 600; 310; 30; 20 MG/100ML; MG/100ML; MG/100ML; MG/100ML
30 INJECTION, SOLUTION INTRAVENOUS CONTINUOUS
Status: DISCONTINUED | OUTPATIENT
Start: 2023-08-03 | End: 2023-08-03 | Stop reason: HOSPADM

## 2023-08-03 RX ORDER — PROPOFOL 10 MG/ML
VIAL (ML) INTRAVENOUS AS NEEDED
Status: DISCONTINUED | OUTPATIENT
Start: 2023-08-03 | End: 2023-08-03 | Stop reason: SURG

## 2023-08-03 RX ORDER — LIDOCAINE HYDROCHLORIDE 20 MG/ML
INJECTION, SOLUTION INFILTRATION; PERINEURAL AS NEEDED
Status: DISCONTINUED | OUTPATIENT
Start: 2023-08-03 | End: 2023-08-03 | Stop reason: SURG

## 2023-08-03 RX ORDER — PROPOFOL 10 MG/ML
VIAL (ML) INTRAVENOUS CONTINUOUS PRN
Status: DISCONTINUED | OUTPATIENT
Start: 2023-08-03 | End: 2023-08-03 | Stop reason: SURG

## 2023-08-03 RX ADMIN — LIDOCAINE HYDROCHLORIDE 100 MG: 20 INJECTION, SOLUTION INFILTRATION; PERINEURAL at 08:27

## 2023-08-03 RX ADMIN — SODIUM CHLORIDE, POTASSIUM CHLORIDE, SODIUM LACTATE AND CALCIUM CHLORIDE 30 ML/HR: 600; 310; 30; 20 INJECTION, SOLUTION INTRAVENOUS at 08:15

## 2023-08-03 RX ADMIN — Medication 200 MCG/KG/MIN: at 08:27

## 2023-08-03 RX ADMIN — PROPOFOL 50 MG: 10 INJECTION, EMULSION INTRAVENOUS at 08:27

## 2023-08-03 NOTE — DISCHARGE INSTRUCTIONS
For the next 24 hours patient needs to be with a responsible adult.    For 24 hours DO NOT drive, operate machinery, appliances, drink alcohol, make important decisions or sign legal documents.    Start with a light or bland diet and advance to regular diet as tolerated.    Follow recommendations on procedure report provided by your doctor.    Call Dr Ahmadi for problems [phone]    Problems may include but not limited to: large amounts of bleeding, trouble breathing, repeated vomiting, severe unrelieved pain, fever or chills.

## 2023-08-04 ENCOUNTER — ANTICOAGULATION VISIT (OUTPATIENT)
Dept: FAMILY MEDICINE CLINIC | Facility: CLINIC | Age: 73
End: 2023-08-04
Payer: MEDICARE

## 2023-08-04 LAB
LAB AP CASE REPORT: NORMAL
PATH REPORT.FINAL DX SPEC: NORMAL
PATH REPORT.GROSS SPEC: NORMAL

## 2023-08-09 ENCOUNTER — ANTICOAGULATION VISIT (OUTPATIENT)
Dept: FAMILY MEDICINE CLINIC | Facility: CLINIC | Age: 73
End: 2023-08-09
Payer: MEDICARE

## 2023-08-09 LAB — INR PPP: 2.2 (ref 2.5–3)

## 2023-08-22 ENCOUNTER — TELEPHONE (OUTPATIENT)
Dept: GASTROENTEROLOGY | Facility: CLINIC | Age: 73
End: 2023-08-22
Payer: MEDICARE

## 2023-08-22 NOTE — TELEPHONE ENCOUNTER
Call to patient and left vm (OK per  verbal release), per 's results and recommendations.   Informed patient if they had questions or concerns to call back at [phone] option 1.            ----- Message from Shane Foss MD sent at 8/22/2023 11:55 AM EDT -----  Benign  Office f/u 3 mos

## 2023-08-23 LAB — INR PPP: 3.4 (ref 2.5–3.5)

## 2023-08-24 ENCOUNTER — TELEPHONE (OUTPATIENT)
Dept: FAMILY MEDICINE CLINIC | Facility: CLINIC | Age: 73
End: 2023-08-24
Payer: MEDICARE

## 2023-08-24 ENCOUNTER — ANTICOAGULATION VISIT (OUTPATIENT)
Dept: FAMILY MEDICINE CLINIC | Facility: CLINIC | Age: 73
End: 2023-08-24
Payer: MEDICARE

## 2023-08-24 NOTE — TELEPHONE ENCOUNTER
AKUA, WITH [facility], CALLED TO REPORT THE FOLLOWING OUT OF RANGE INR.    3.4 TAKEN ON 08/23/23    PLEASE FOLLOW UP WITH PATIENT IF NEEDED.

## 2023-08-29 ENCOUNTER — APPOINTMENT (OUTPATIENT)
Dept: GENERAL RADIOLOGY | Facility: HOSPITAL | Age: 73
End: 2023-08-29
Payer: MEDICARE

## 2023-08-29 ENCOUNTER — APPOINTMENT (OUTPATIENT)
Dept: CT IMAGING | Facility: HOSPITAL | Age: 73
End: 2023-08-29
Payer: MEDICARE

## 2023-08-29 ENCOUNTER — HOSPITAL ENCOUNTER (EMERGENCY)
Facility: HOSPITAL | Age: 73
Discharge: HOME | End: 2023-08-29
Attending: EMERGENCY MEDICINE
Payer: MEDICARE

## 2023-08-29 ENCOUNTER — APPOINTMENT (OUTPATIENT)
Dept: CARDIOLOGY | Facility: HOSPITAL | Age: 73
End: 2023-08-29
Payer: MEDICARE

## 2023-08-29 VITALS
HEART RATE: 60 BPM | RESPIRATION RATE: 17 BRPM | WEIGHT: 212 LBS | SYSTOLIC BLOOD PRESSURE: 152 MMHG | OXYGEN SATURATION: 97 % | HEIGHT: 72 IN | BODY MASS INDEX: 28.71 KG/M2 | TEMPERATURE: 97.8 F | DIASTOLIC BLOOD PRESSURE: 89 MMHG

## 2023-08-29 DIAGNOSIS — M79.604 RIGHT LEG PAIN: Primary | ICD-10-CM

## 2023-08-29 DIAGNOSIS — R07.89 ATYPICAL CHEST PAIN: ICD-10-CM

## 2023-08-29 LAB
ALBUMIN SERPL-MCNC: 3.9 G/DL (ref 3.5–5.2)
ALBUMIN/GLOB SERPL: 1.1 G/DL
ALP SERPL-CCNC: 72 U/L (ref 39–117)
ALT SERPL W P-5'-P-CCNC: 14 U/L (ref 1–41)
ANION GAP SERPL CALCULATED.3IONS-SCNC: 8 MMOL/L (ref 5–15)
AST SERPL-CCNC: 21 U/L (ref 1–40)
BASOPHILS # BLD AUTO: 0.02 10*3/MM3 (ref 0–0.2)
BASOPHILS NFR BLD AUTO: 0.4 % (ref 0–1.5)
BH CV LOW VAS RIGHT GASTRONEMIUS VESSEL: 1
BH CV LOW VAS RIGHT POPLITEAL SPONT: 1
BH CV LOWER VASCULAR LEFT COMMON FEMORAL AUGMENT: NORMAL
BH CV LOWER VASCULAR LEFT COMMON FEMORAL COMPETENT: NORMAL
BH CV LOWER VASCULAR LEFT COMMON FEMORAL COMPRESS: NORMAL
BH CV LOWER VASCULAR LEFT COMMON FEMORAL PHASIC: NORMAL
BH CV LOWER VASCULAR LEFT COMMON FEMORAL SPONT: NORMAL
BH CV LOWER VASCULAR RIGHT COMMON FEMORAL AUGMENT: NORMAL
BH CV LOWER VASCULAR RIGHT COMMON FEMORAL COMPETENT: NORMAL
BH CV LOWER VASCULAR RIGHT COMMON FEMORAL COMPRESS: NORMAL
BH CV LOWER VASCULAR RIGHT COMMON FEMORAL PHASIC: NORMAL
BH CV LOWER VASCULAR RIGHT COMMON FEMORAL SPONT: NORMAL
BH CV LOWER VASCULAR RIGHT DISTAL FEMORAL COMPRESS: NORMAL
BH CV LOWER VASCULAR RIGHT GASTRONEMIUS COMPRESS: NORMAL
BH CV LOWER VASCULAR RIGHT GASTRONEMIUS THROMBUS: NORMAL
BH CV LOWER VASCULAR RIGHT GREATER SAPH AK COMPRESS: NORMAL
BH CV LOWER VASCULAR RIGHT GREATER SAPH BK COMPRESS: NORMAL
BH CV LOWER VASCULAR RIGHT LESSER SAPH COMPRESS: NORMAL
BH CV LOWER VASCULAR RIGHT MID FEMORAL AUGMENT: NORMAL
BH CV LOWER VASCULAR RIGHT MID FEMORAL COMPETENT: NORMAL
BH CV LOWER VASCULAR RIGHT MID FEMORAL COMPRESS: NORMAL
BH CV LOWER VASCULAR RIGHT MID FEMORAL PHASIC: NORMAL
BH CV LOWER VASCULAR RIGHT MID FEMORAL SPONT: NORMAL
BH CV LOWER VASCULAR RIGHT PERONEAL COMPRESS: NORMAL
BH CV LOWER VASCULAR RIGHT POPLITEAL AUGMENT: NORMAL
BH CV LOWER VASCULAR RIGHT POPLITEAL COMPETENT: NORMAL
BH CV LOWER VASCULAR RIGHT POPLITEAL COMPRESS: NORMAL
BH CV LOWER VASCULAR RIGHT POPLITEAL PHASIC: NORMAL
BH CV LOWER VASCULAR RIGHT POPLITEAL SPONT: NORMAL
BH CV LOWER VASCULAR RIGHT POPLITEAL THROMBUS: NORMAL
BH CV LOWER VASCULAR RIGHT POSTERIOR TIBIAL COMPRESS: NORMAL
BH CV LOWER VASCULAR RIGHT PROFUNDA FEMORAL COMPRESS: NORMAL
BH CV LOWER VASCULAR RIGHT PROXIMAL FEMORAL COMPRESS: NORMAL
BH CV LOWER VASCULAR RIGHT SAPHENOFEMORAL JUNCTION COMPRESS: NORMAL
BH CV LOWER VASCULAR RIGHT SOLEAL COMPRESS: NORMAL
BH CV VAS PRELIMINARY FINDINGS SCRIPTING: 1
BILIRUB SERPL-MCNC: 0.6 MG/DL (ref 0–1.2)
BUN SERPL-MCNC: 12 MG/DL (ref 8–23)
BUN/CREAT SERPL: 12.5 (ref 7–25)
CALCIUM SPEC-SCNC: 9.1 MG/DL (ref 8.6–10.5)
CHLORIDE SERPL-SCNC: 106 MMOL/L (ref 98–107)
CO2 SERPL-SCNC: 25 MMOL/L (ref 22–29)
CREAT SERPL-MCNC: 0.96 MG/DL (ref 0.76–1.27)
DEPRECATED RDW RBC AUTO: 49.3 FL (ref 37–54)
EGFRCR SERPLBLD CKD-EPI 2021: 85 ML/MIN/1.73
EOSINOPHIL # BLD AUTO: 0.07 10*3/MM3 (ref 0–0.4)
EOSINOPHIL NFR BLD AUTO: 1.3 % (ref 0.3–6.2)
ERYTHROCYTE [DISTWIDTH] IN BLOOD BY AUTOMATED COUNT: 14.3 % (ref 12.3–15.4)
GEN 5 2HR TROPONIN T REFLEX: 12 NG/L
GLOBULIN UR ELPH-MCNC: 3.4 GM/DL
GLUCOSE SERPL-MCNC: 100 MG/DL (ref 65–99)
HCT VFR BLD AUTO: 40.2 % (ref 37.5–51)
HGB BLD-MCNC: 13.9 G/DL (ref 13–17.7)
HOLD SPECIMEN: NORMAL
HOLD SPECIMEN: NORMAL
IMM GRANULOCYTES # BLD AUTO: 0.01 10*3/MM3 (ref 0–0.05)
IMM GRANULOCYTES NFR BLD AUTO: 0.2 % (ref 0–0.5)
INR PPP: 3.1 (ref 0.9–1.1)
LYMPHOCYTES # BLD AUTO: 1.62 10*3/MM3 (ref 0.7–3.1)
LYMPHOCYTES NFR BLD AUTO: 30.2 % (ref 19.6–45.3)
MCH RBC QN AUTO: 32.8 PG (ref 26.6–33)
MCHC RBC AUTO-ENTMCNC: 34.6 G/DL (ref 31.5–35.7)
MCV RBC AUTO: 94.8 FL (ref 79–97)
MONOCYTES # BLD AUTO: 0.5 10*3/MM3 (ref 0.1–0.9)
MONOCYTES NFR BLD AUTO: 9.3 % (ref 5–12)
NEUTROPHILS NFR BLD AUTO: 3.14 10*3/MM3 (ref 1.7–7)
NEUTROPHILS NFR BLD AUTO: 58.6 % (ref 42.7–76)
NRBC BLD AUTO-RTO: 0 /100 WBC (ref 0–0.2)
NT-PROBNP SERPL-MCNC: 68.4 PG/ML (ref 0–900)
PLATELET # BLD AUTO: 186 10*3/MM3 (ref 140–450)
PMV BLD AUTO: 9 FL (ref 6–12)
POTASSIUM SERPL-SCNC: 4 MMOL/L (ref 3.5–5.2)
PROT SERPL-MCNC: 7.3 G/DL (ref 6–8.5)
PROTHROMBIN TIME: 32.6 SECONDS (ref 11.7–14.2)
QT INTERVAL: 394 MS
QTC INTERVAL: 407 MS
RBC # BLD AUTO: 4.24 10*6/MM3 (ref 4.14–5.8)
SODIUM SERPL-SCNC: 139 MMOL/L (ref 136–145)
TROPONIN T DELTA: -1 NG/L
TROPONIN T SERPL HS-MCNC: 13 NG/L
WBC NRBC COR # BLD: 5.36 10*3/MM3 (ref 3.4–10.8)
WHOLE BLOOD HOLD COAG: NORMAL
WHOLE BLOOD HOLD SPECIMEN: NORMAL

## 2023-08-29 PROCEDURE — 71275 CT ANGIOGRAPHY CHEST: CPT

## 2023-08-29 PROCEDURE — 36415 COLL VENOUS BLD VENIPUNCTURE: CPT

## 2023-08-29 PROCEDURE — 93005 ELECTROCARDIOGRAM TRACING: CPT | Performed by: EMERGENCY MEDICINE

## 2023-08-29 PROCEDURE — 25510000001 IOPAMIDOL PER 1 ML: Performed by: EMERGENCY MEDICINE

## 2023-08-29 PROCEDURE — 99285 EMERGENCY DEPT VISIT HI MDM: CPT

## 2023-08-29 PROCEDURE — 93971 EXTREMITY STUDY: CPT

## 2023-08-29 PROCEDURE — 71045 X-RAY EXAM CHEST 1 VIEW: CPT

## 2023-08-29 PROCEDURE — 85610 PROTHROMBIN TIME: CPT | Performed by: EMERGENCY MEDICINE

## 2023-08-29 PROCEDURE — 93010 ELECTROCARDIOGRAM REPORT: CPT | Performed by: INTERNAL MEDICINE

## 2023-08-29 PROCEDURE — 80053 COMPREHEN METABOLIC PANEL: CPT | Performed by: EMERGENCY MEDICINE

## 2023-08-29 PROCEDURE — 93005 ELECTROCARDIOGRAM TRACING: CPT

## 2023-08-29 PROCEDURE — 84484 ASSAY OF TROPONIN QUANT: CPT | Performed by: EMERGENCY MEDICINE

## 2023-08-29 PROCEDURE — 83880 ASSAY OF NATRIURETIC PEPTIDE: CPT | Performed by: EMERGENCY MEDICINE

## 2023-08-29 PROCEDURE — 85025 COMPLETE CBC W/AUTO DIFF WBC: CPT | Performed by: EMERGENCY MEDICINE

## 2023-08-29 RX ADMIN — IOPAMIDOL 90 ML: 755 INJECTION, SOLUTION INTRAVENOUS at 11:48

## 2023-08-29 NOTE — ED NOTES
Pt began having new onset left sided and midsternal dull chest pain radiating to back at arrival to room. EKG orders placed and completed.

## 2023-08-29 NOTE — ED PROVIDER NOTES
EMERGENCY DEPARTMENT ENCOUNTER    Room Number:  34/34  PCP: Aletha Ochoa PA-C  Historian: Patient      HPI:  Chief Complaint: Right leg pain and chest pain  A complete HPI/ROS/PMH/PSH/SH/FH are unobtainable due to: None  Context: Javy Reeves Sr. is a 72 y.o. male who presents to the ED c/o right leg pain and chest pain.  With history of multiple thromboembolic events in the past.  Patient is on warfarin.  Patient states he was working yesterday and noticed some swelling and pain in his right leg.  Patient states the swelling is improved still has some pain behind his right leg.  Patient does have an IVC filter.  Patient states his Coumadin has been appropriate level.  Patient then states on his way here he start developing chest pain.  States it is more pressure going through to his back.  Is not pleuritic.  Very minimal right now.            PAST MEDICAL HISTORY  Active Ambulatory Problems     Diagnosis Date Noted    Arthritis 11/16/2015    Family history of early CAD 11/16/2015    DDD (degenerative disc disease), cervical 11/16/2015    DVT (deep venous thrombosis) 11/16/2015    Fibromyalgia 11/16/2015    Past heart attack 11/16/2015    Hyperlipidemia 11/16/2015    DDD (degenerative disc disease), lumbosacral 11/16/2015    History of pulmonary embolus (PE) 11/16/2015    Reflux esophagitis 11/16/2015    TIA (transient ischemic attack) 11/16/2015    Left groin pain 02/21/2017    Cervical radicular pain 02/12/2018    Cervical disc disorder at C6-C7 level with radiculopathy 05/16/2018    Wheezing 07/20/2018    Colon polyps 07/27/2018    Right lower quadrant abdominal pain 07/27/2018    Diarrhea 07/27/2018    Cervical spinal stenosis 08/20/2018    Cervical disc disorder at C5-C6 level with radiculopathy 08/20/2018    GERD (gastroesophageal reflux disease) 09/07/2018    Diverticulosis 09/13/2018    Old MI (myocardial infarction) 09/20/2018    Herniated cervical disc 10/05/2018    Encounter for therapeutic drug  monitoring 10/10/2018    Long term current use of anticoagulant therapy 10/10/2018    ERRONEOUS ENCOUNTER--DISREGARD 03/27/2019    Stable angina 04/26/2019    Low folic acid 09/22/2020    Low serum vitamin B12 09/22/2020    Precordial chest pain 12/01/2020    Tobacco abuse 12/01/2020    Tobacco abuse counseling 12/01/2020    Interstitial lung disease 12/01/2020    History of transient ischemic attack (TIA) 02/22/2021    Irritable bowel syndrome 04/26/2021    DDD (degenerative disc disease), lumbar 04/26/2021    Weight loss 09/08/2021    Choking sensation 09/08/2021    Failed back syndrome 11/03/2021    Long term (current) use of opiate analgesic 11/03/2021    Lumbar radiculopathy 11/03/2021    Lumbar spondylosis 11/03/2021    Chronic pain disorder 11/03/2021    History of colon polyps 07/28/2022    Acute chest pain 08/22/2022    Recurrent pulmonary embolism 08/23/2022    Subtherapeutic international normalized ratio (INR) 08/23/2022    Bronchiectasis without complication 08/23/2022    Abdominal pain 04/22/2022    Presence of IVC filter 04/22/2022    Weight loss, unintentional 04/22/2022    ILD (interstitial lung disease) 08/31/2022    Tobacco use disorder 08/31/2022     Resolved Ambulatory Problems     Diagnosis Date Noted    Hypertension 11/16/2015    Mild atherosclerosis of right carotid artery 03/27/2018     Past Medical History:   Diagnosis Date    Acute and subacute infective endocarditis in diseases classified elsewhere     Allergic     Asthma 04/26/2021    Chest pain     Chronic low back pain     Chronic pain     Clotting disorder     Coronary artery disease     Encounter for special screening examination for neoplasm of prostate 2012    Eye exam, routine > 5 yrs ago    Eye exam, routine 01/2015    Fibromyositis     HIV disease     Injury of back     Injury of neck     Irritable bowel     Limited joint range of motion     Lumbar stenosis     MI (myocardial infarction)     Neck pain     Pain in limb      Postlaminectomy syndrome of lumbar region     Pulmonary embolism 1996    Shortness of breath     Sleep apnea     Stroke          PAST SURGICAL HISTORY  Past Surgical History:   Procedure Laterality Date    ANTERIOR CERVICAL DISCECTOMY W/ FUSION N/A 10/05/2018    Procedure: CERVICAL DISCECTOMY ANTERIOR FUSION WITH INSTRUMENTATION,ACDF C5/6, C6/7 with cages and plate;  Surgeon: Jean Coles MD;  Location: [facility] MAIN OR;  Service: Neurosurgery    APPENDECTOMY N/A     BRONCHOSCOPY N/A 11/03/2020    Procedure: EBUS BRONCHOSCOPY WITH BAL & FLUOROSCOPY WITH MAC ANESTHESIA, BX & TBNA;  Surgeon: Jamil Willoughby MD;  Location: [facility] ENDOSCOPY;  Service: Pulmonary;  Laterality: N/A;  PRE/POST-ABNORMAL CT, LAD    CARDIAC CATHETERIZATION Left 1952    Left Heart Cath Left Ventriculography, selective coronary angiography; iliofemoral angiography; angio seal closure, Dr. Brady Ramos    COLONOSCOPY  09/2015    removed 2 polyps, Dr. Manley    COLONOSCOPY N/A 02/12/2007    Left colonic diverticulosis, No evidence of polypoid neoplasia, Dr. Jarret Bloom    COLONOSCOPY N/A 09/13/2018    Procedure: COLONOSCOPY TO CECUM WITH COLD BX'S POLYPECTOMY;  Surgeon: Berta Sin MD;  Location: [facility] ENDOSCOPY;  Service: General    CYSTOSCOPY TRANSURETHRAL RESECTION OF PROSTATE N/A 11/01/2004    Dr. Rodolfo Arnold    ENDOSCOPY N/A 8/3/2023    Procedure: ESOPHAGOGASTRODUODENOSCOPYmwith biopsy;  Surgeon: Shane Foss MD;  Location: [facility] ENDOSCOPY;  Service: Gastroenterology;  Laterality: N/A;  pre- gerd  post- gastritis    HAND SURGERY Right     KNEE ARTHROPLASTY Left     LAPAROSCOPIC CHOLECYSTECTOMY      LUMBAR DISC SURGERY  2006, 2007    L4-S1 pseudoarthroses - Dr Cervantes    LUMBAR DISC SURGERY N/A 01/29/2010    Removal of Instrumentation w/ decompression, Instrumentaion loosening & pt tested positive for zinc allergy, Dr. Kvng Cervantes    ROTATOR CUFF REPAIR Right 04/2012    THORACIC OUTLET SURGERY Bilateral      THORACOSCOPY Right 11/29/2022    Procedure: BRONCHOSCOPY, RIGHT THORACOSCOPY VIDEO ASSISTED WEDGE RESECTION, INTERCOSTAL NERVE BLOCK;  Surgeon: Nella Bergman MD;  Location: [facility];  Service: Thoracic;  Laterality: Right;    VENA CAVA FILTER PLACEMENT  1996    WRIST SURGERY Right     x3 starting in 1984         FAMILY HISTORY  Family History   Problem Relation Age of Onset    Diabetes Sister     Cancer Sister     Hypertension Sister         Covid    Kidney disease Sister     Stroke Sister     Heart disease Brother     Hypertension Brother     Cerebral aneurysm Brother     Sudden death Brother     Bleeding Disorder Brother     Cancer Mother     Heart disease Father         Stroke    Cancer Father         malignant neoplasm    Deep vein thrombosis Father     Heart attack Father     Malig Hyperthermia Neg Hx          SOCIAL HISTORY  Social History     Socioeconomic History    Marital status:    Tobacco Use    Smoking status: Some Days     Packs/day: 0.50     Years: 30.00     Pack years: 15.00     Types: Cigarettes    Smokeless tobacco: Never    Tobacco comments:     Since 23 years old   Vaping Use    Vaping Use: Never used   Substance and Sexual Activity    Alcohol use: Not Currently     Comment: RARELY    Drug use: Never    Sexual activity: Not Currently     Partners: Female     Birth control/protection: Tubal ligation     Comment: Wife         ALLERGIES  Zinc, Chantix [varenicline], and Hydrocodone-acetaminophen        REVIEW OF SYSTEMS  Review of Systems   Chest pain and leg pain      PHYSICAL EXAM  ED Triage Vitals   Temp Heart Rate Resp BP SpO2   08/29/23 0836 08/29/23 0836 08/29/23 0902 08/29/23 0851 08/29/23 0836   97.8 °F (36.6 °C) 97 17 149/87 95 %      Temp src Heart Rate Source Patient Position BP Location FiO2 (%)   08/29/23 0836 08/29/23 0836 -- -- --   Tympanic Monitor          Physical Exam      GENERAL: no acute distress  HENT: nares patent  EYES: no scleral icterus  CV: regular rhythm,  normal rate  RESPIRATORY: normal effort  ABDOMEN: soft  MUSCULOSKELETAL: no deformity.  Mild tenderness to right leg  NEURO: alert, moves all extremities, follows commands  PSYCH:  calm, cooperative  SKIN: warm, dry    Vital signs and nursing notes reviewed.          LAB RESULTS  Recent Results (from the past 24 hour(s))   ECG 12 Lead Chest Pain    Collection Time: 08/29/23  8:47 AM   Result Value Ref Range    QT Interval 394 ms    QTC Interval 407 ms   Green Top (Gel)    Collection Time: 08/29/23  8:57 AM   Result Value Ref Range    Extra Tube Hold for add-ons.    Lavender Top    Collection Time: 08/29/23  8:57 AM   Result Value Ref Range    Extra Tube hold for add-on    Gold Top - SST    Collection Time: 08/29/23  8:57 AM   Result Value Ref Range    Extra Tube Hold for add-ons.    Light Blue Top    Collection Time: 08/29/23  8:57 AM   Result Value Ref Range    Extra Tube Hold for add-ons.    Comprehensive Metabolic Panel    Collection Time: 08/29/23  8:57 AM    Specimen: Blood   Result Value Ref Range    Glucose 100 (H) 65 - 99 mg/dL    BUN 12 8 - 23 mg/dL    Creatinine 0.96 0.76 - 1.27 mg/dL    Sodium 139 136 - 145 mmol/L    Potassium 4.0 3.5 - 5.2 mmol/L    Chloride 106 98 - 107 mmol/L    CO2 25.0 22.0 - 29.0 mmol/L    Calcium 9.1 8.6 - 10.5 mg/dL    Total Protein 7.3 6.0 - 8.5 g/dL    Albumin 3.9 3.5 - 5.2 g/dL    ALT (SGPT) 14 1 - 41 U/L    AST (SGOT) 21 1 - 40 U/L    Alkaline Phosphatase 72 39 - 117 U/L    Total Bilirubin 0.6 0.0 - 1.2 mg/dL    Globulin 3.4 gm/dL    A/G Ratio 1.1 g/dL    BUN/Creatinine Ratio 12.5 7.0 - 25.0    Anion Gap 8.0 5.0 - 15.0 mmol/L    eGFR 85.0 >60.0 mL/min/1.73   Protime-INR    Collection Time: 08/29/23  8:57 AM    Specimen: Blood   Result Value Ref Range    Protime 32.6 (H) 11.7 - 14.2 Seconds    INR 3.10 (H) 0.90 - 1.10   BNP    Collection Time: 08/29/23  8:57 AM    Specimen: Blood   Result Value Ref Range    proBNP 68.4 0.0 - 900.0 pg/mL   High Sensitivity Troponin T     Collection Time: 08/29/23  8:57 AM    Specimen: Blood   Result Value Ref Range    HS Troponin T 13 <15 ng/L   CBC Auto Differential    Collection Time: 08/29/23  8:57 AM    Specimen: Blood   Result Value Ref Range    WBC 5.36 3.40 - 10.80 10*3/mm3    RBC 4.24 4.14 - 5.80 10*6/mm3    Hemoglobin 13.9 13.0 - 17.7 g/dL    Hematocrit 40.2 37.5 - 51.0 %    MCV 94.8 79.0 - 97.0 fL    MCH 32.8 26.6 - 33.0 pg    MCHC 34.6 31.5 - 35.7 g/dL    RDW 14.3 12.3 - 15.4 %    RDW-SD 49.3 37.0 - 54.0 fl    MPV 9.0 6.0 - 12.0 fL    Platelets 186 140 - 450 10*3/mm3    Neutrophil % 58.6 42.7 - 76.0 %    Lymphocyte % 30.2 19.6 - 45.3 %    Monocyte % 9.3 5.0 - 12.0 %    Eosinophil % 1.3 0.3 - 6.2 %    Basophil % 0.4 0.0 - 1.5 %    Immature Grans % 0.2 0.0 - 0.5 %    Neutrophils, Absolute 3.14 1.70 - 7.00 10*3/mm3    Lymphocytes, Absolute 1.62 0.70 - 3.10 10*3/mm3    Monocytes, Absolute 0.50 0.10 - 0.90 10*3/mm3    Eosinophils, Absolute 0.07 0.00 - 0.40 10*3/mm3    Basophils, Absolute 0.02 0.00 - 0.20 10*3/mm3    Immature Grans, Absolute 0.01 0.00 - 0.05 10*3/mm3    nRBC 0.0 0.0 - 0.2 /100 WBC   Duplex Venous Lower Extremity - Right    Collection Time: 08/29/23 10:42 AM   Result Value Ref Range    BH CV VAS PRELIMINARY FINDINGS SCRIPTING 1.0     Right Popliteal Spont 1.0     Right Gastronemius Vessel 1.0     Right Common Femoral Spont Y     Right Common Femoral Competent Y     Right Common Femoral Phasic Y     Right Common Femoral Compress C     Right Common Femoral Augment Y     Right Saphenofemoral Junction Compress C     Right Profunda Femoral Compress C     Right Proximal Femoral Compress C     Right Mid Femoral Spont Y     Right Mid Femoral Competent Y     Right Mid Femoral Phasic Y     Right Mid Femoral Compress C     Right Mid Femoral Augment Y     Right Distal Femoral Compress C     Right Popliteal Spont Y     Right Popliteal Competent N     Right Popliteal Phasic Y     Right Popliteal Compress P     Right Popliteal Augment Y      Right Popliteal Thrombus C     Right Posterior Tibial Compress C     Right Peroneal Compress C     Right Gastronemius Compress P     Right Gastronemius Thrombus C     BH CV LOWER VASCULAR RIGHT SOLEAL COMPRESS C     Right Greater Saph AK Compress C     Right Greater Saph BK Compress C     Right Lesser Saph Compress C     Left Common Femoral Spont Y     Left Common Femoral Competent Y     Left Common Femoral Phasic Y     Left Common Femoral Compress C     Left Common Femoral Augment Y    High Sensitivity Troponin T 2Hr    Collection Time: 08/29/23 12:06 PM    Specimen: Arm, Right; Blood   Result Value Ref Range    HS Troponin T 12 <15 ng/L    Troponin T Delta -1 >=-4 - <+4 ng/L       Ordered the above labs and reviewed the results.        RADIOLOGY  XR Chest 1 View    Result Date: 8/29/2023  XR CHEST 1 VW-  HISTORY: Male who is 72 years-old, chest pain  TECHNIQUE: Frontal view of the chest  COMPARISON: 12/12/2022  FINDINGS: The heart size is borderline. Pulmonary vasculature is unremarkable. Previously noted interstitial prominence at the lower lungs is again apparent on this exam, without obvious interval change. The lungs otherwise appear clear. No pleural effusion, or pneumothorax. No acute osseous process.      Previously noted interstitial prominence at the lower lungs is again apparent on this exam, without obvious interval change. The lungs otherwise appear clear. Borderline heart size.  This report was finalized on 8/29/2023 9:56 AM by Dr. Pedro Cunningham M.D.      Duplex Venous Lower Extremity - Right    Result Date: 8/29/2023    Chronic deep vein thrombosis in the right popliteal and gastrocnemius veins.   Deep venous reflux in the right popliteal vein.   The small saphenous vein is thick-walled.   All other right sided veins appeared normal.     CT Angiogram Chest    Result Date: 8/29/2023  Examination: CTA of the chest with contrast pulmonary embolus protocol  TECHNIQUE: CTA of the chest with contrast  per pulmonary embolus protocol. Radiation dose reduction techniques were utilized, including automated exposure control and exposure modulation based on body size. 3D reconstructions were performed   HISTORY: Chest pain and history of pulmonary embolus  COMPARISON: 4/28/2023  FINDINGS: The contrast bolus is adequate. No thrombus is seen in the main, segmental, or visualized subsegmental pulmonary arteries. There is no evidence of right heart strain.  No significant-sized noncalcified pulmonary nodule or mass are seen.Emphysematous changes are seen in the lungs. There is old granulomatous disease. There is stable bilateral basilar predominant groundglass opacity with septal line thickening and traction bronchiectasis without convincing honeycombing. There is dependent atelectasis.No consolidation, pleural effusion, or pneumothorax are seen. The central airways are patent.  The heart is normal in size and configuration, without pericardial effusion.Coronary calcifications are seen.The mediastinal vasculature is normal in caliber.No enlarged supraclavicular, axillary, or mediastinal lymph nodes are demonstrated. An enlarged right hilar lymph node measures 1.5 cm in short axis diameter.  Limited evaluation of the upper abdomen demonstrates cholecystectomy, old granulomatous disease in the spleen, and an inferior vena cava filter..  Bone windows demonstrateDegenerative changes, without suspicious osseous lesion seen. There is cervical spinal fixation instrumentation.      1. No pulmonary embolus seen  2. Stable fibrosis at the lung bases, likely NSIP type pattern  3. Right hilar lymphadenopathy  4. Incidental findings as above.    This report was finalized on 8/29/2023 12:04 PM by Dr. Binh Kuo M.D.       Ordered the above noted radiological studies.  CT chest independently interpreted by me and shows no evidence of pneumonia          PROCEDURES  Procedures        EKG          EKG time: 847  Rhythm/Rate: Normal sinus  rhythm 64  P waves and MS: Normal P waves  QRS, axis: Normal QRS  ST and T waves: Normal ST-T wave    Interpreted Contemporaneously by me, independently viewed  Unchanged compared to prior 8/22/2022      MEDICATIONS GIVEN IN ER  Medications   iopamidol (ISOVUE-370) 76 % injection 90 mL (90 mL Intravenous Given 8/29/23 1148)                   MEDICAL DECISION MAKING, PROGRESS, and CONSULTS      Discussion below represents my analysis of pertinent findings related to patient's condition, differential diagnosis, treatment plan and final disposition.      Additional sources:  - Discussed/ obtained information from independent historians: None    - External (non-ED) record review: Epic reviewed and patient underwent EGD for reflux 8/3/2023    - Chronic or social conditions impacting care: None    - Shared decision making: None      Orders placed during this visit:  Orders Placed This Encounter   Procedures    XR Chest 1 View    CT Angiogram Chest    [place] Draw    Comprehensive Metabolic Panel    Protime-INR    BNP    High Sensitivity Troponin T    CBC Auto Differential    High Sensitivity Troponin T 2Hr    ECG 12 Lead Chest Pain    Green Top (Gel)    Lavender Top    Gold Top - SST    Light Blue Top    CBC & Differential         Additional orders considered but not ordered:  None        Differential diagnosis includes but is not limited to:    Musculoskeletal leg pain, DVT, muscle sprain      Independent interpretation of labs, radiology studies, and discussions with consultants:  ED Course as of 08/29/23 1316   Tue Aug 29, 2023   1243 12:43 EDT  Patient presents for evaluation of leg pain.  Patient does not appear to have acute DVT.  Patient also states he had some mild chest pain.  CT scan is negative.  Patient has serial troponins that are negative.  Discussed options with patient as we cannot completely rule out cardiac etiology.  Patient states he wants to go home.  He only had the 1 episode of chest pain here  instructed to return immediately for worsening symptoms [SL]      ED Course User Index  [SL] Calos Weller MD                 DIAGNOSIS  Final diagnoses:   Right leg pain   Atypical chest pain         DISPOSITION  admit            Latest Documented Vital Signs:  As of 13:16 EDT  BP- 152/89 HR- 60 Temp- 97.8 °F (36.6 °C) (Tympanic) O2 sat- 97%              --    Please note that portions of this were completed with a voice recognition program.       Note Disclaimer: At [facility], we believe that sharing information builds trust and better relationships. You are receiving this note because you are receiving care at [facility] or recently visited. It is possible you will see health information before a provider has talked with you about it. This kind of information can be easy to misunderstand. To help you fully understand what it means for your health, we urge you to discuss this note with your provider.            Calos Weller MD  08/29/23 6346

## 2023-08-29 NOTE — ED TRIAGE NOTES
Pt to ed from home via PV    Pt c/o R leg pain behind his right knee since 12pm yesterday. Pt hx blood clots.

## 2023-08-31 ENCOUNTER — TELEPHONE (OUTPATIENT)
Dept: FAMILY MEDICINE CLINIC | Facility: CLINIC | Age: 73
End: 2023-08-31

## 2023-08-31 ENCOUNTER — TELEPHONE (OUTPATIENT)
Dept: FAMILY MEDICINE CLINIC | Facility: CLINIC | Age: 73
End: 2023-08-31
Payer: MEDICARE

## 2023-08-31 ENCOUNTER — ANTICOAGULATION VISIT (OUTPATIENT)
Dept: FAMILY MEDICINE CLINIC | Facility: CLINIC | Age: 73
End: 2023-08-31
Payer: MEDICARE

## 2023-08-31 LAB — INR PPP: 4.1 (ref 2.5–3)

## 2023-08-31 NOTE — TELEPHONE ENCOUNTER
PATIENT CALLED TO REPORT A 4.1 INR TAKEN ON 08/30/23.    PATIENT WOULD LIKE TO BE CONTACTED BY [facility]ERS TO FOLLOW UP.    PLEASE ADVISE  [phone]

## 2023-09-01 ENCOUNTER — PATIENT OUTREACH (OUTPATIENT)
Dept: CASE MANAGEMENT | Facility: OTHER | Age: 73
End: 2023-09-01
Payer: MEDICARE

## 2023-09-01 ENCOUNTER — TELEPHONE (OUTPATIENT)
Dept: FAMILY MEDICINE CLINIC | Facility: CLINIC | Age: 73
End: 2023-09-01

## 2023-09-01 DIAGNOSIS — I82.5Y3 CHRONIC DEEP VEIN THROMBOSIS (DVT) OF PROXIMAL VEIN OF BOTH LOWER EXTREMITIES: Primary | ICD-10-CM

## 2023-09-01 DIAGNOSIS — Z51.81 ENCOUNTER FOR THERAPEUTIC DRUG MONITORING: ICD-10-CM

## 2023-09-01 NOTE — TELEPHONE ENCOUNTER
Caller: SHREYAS    Relationship to patient: Other    Best call back number: [phone]    Patient is needing: THE PATIENT'S INR IS BEING REPORTED AS 1.9 AS OF TODAY.

## 2023-09-01 NOTE — OUTREACH NOTE
"AMBULATORY CASE MANAGEMENT NOTE    Name and Relationship of Patient/Support Person: Javy Reeves Sr. \"GARRICK\" - Self    Adult Patient Profile  Questions/Answers      Flowsheet Row Most Recent Value   Symptoms/Conditions Managed at Home hematologic   Barriers to Managing Health understanding health advice, stress of chronic illness, treatment side effects   Hematologic Symptoms/Conditions clotting disorder  [Chronic anticoagulation therapy]   Hematologic Management Strategies medication therapy, routine screening, diet modification   Hematologic Self-Management Outcome 3 (uncertain)   Hematologic Comment ED visit, pain and swelling lower leg, anticoagulation therapy, current INR        CCM Interim Update    Call to patient regarding ED visit, CCM program.  Verified patient name, address and . Introduced self and purpose for call, Discussed ED visit, patient health history.   States that swelling has dissipated and pain is gone.  Held Coumadin yesterday, INR today is 1.9    Care Coordination    ACM plan to meet with patient in the office 23 during office visit with PCP.    Will further discuss CCM program.  INR checked today in home, results 1.9 and reported to PCP.    Plan: follow up 23            Education Documentation  No documentation found.        Mariana SOTO  Ambulatory Case Management    2023, 16:22 EDT  "

## 2023-09-05 ENCOUNTER — PATIENT OUTREACH (OUTPATIENT)
Dept: CASE MANAGEMENT | Facility: OTHER | Age: 73
End: 2023-09-05
Payer: MEDICARE

## 2023-09-05 DIAGNOSIS — I82.5Y3 CHRONIC DEEP VEIN THROMBOSIS (DVT) OF PROXIMAL VEIN OF BOTH LOWER EXTREMITIES: ICD-10-CM

## 2023-09-05 DIAGNOSIS — Z51.81 ENCOUNTER FOR THERAPEUTIC DRUG MONITORING: Primary | ICD-10-CM

## 2023-09-05 NOTE — OUTREACH NOTE
"AMBULATORY CASE MANAGEMENT NOTE    Name and Relationship of Patient/Support Person: Javy Reeves Sr. \"GARRICK\" - Self    CCM Interim Update    Call to patient per provider request.  Verified name and .    Per provider advised to stay on lovenox until INR was 2.5.  Patient stating that he stopped his lovenox because his last INR was 2.5.  Last INR received was 1.9, requested INR for today, patient agrees to collect and call back with results.    Care Coordination    Follow up with patient regarding INR and lovenox use.    Plan: continue monitoring INR follow up office visit 23        Education Documentation  No documentation found.        Mairana SOTO  Ambulatory Case Management    2023, 11:17 EDT  "

## 2023-09-08 ENCOUNTER — TELEPHONE (OUTPATIENT)
Dept: FAMILY MEDICINE CLINIC | Facility: CLINIC | Age: 73
End: 2023-09-08
Payer: MEDICARE

## 2023-09-08 ENCOUNTER — ANTICOAGULATION VISIT (OUTPATIENT)
Dept: FAMILY MEDICINE CLINIC | Facility: CLINIC | Age: 73
End: 2023-09-08
Payer: MEDICARE

## 2023-09-08 LAB — INR PPP: 3.5 (ref 2–3)

## 2023-09-08 NOTE — TELEPHONE ENCOUNTER
3.5 is perfect.  Have him repeat INR in 3 days to make sure does not go higher..... Continue 12 mg daily of generic Coumadin

## 2023-09-08 NOTE — TELEPHONE ENCOUNTER
"  Caller: Javy Reeves Sr. \"GARRICK\"    Relationship: Self    Best call back number: [phone]     Who are you requesting to speak with (clinical staff, provider,  specific staff member): VAIBHAV    What was the call regarding: PATIENT IS CALLING TO REPORT HIS INR. PATIENT STATED HIS INR IS 3.5, TAKEN TODAY. PLEASE ADVISE.       "

## 2023-09-11 ENCOUNTER — ANTICOAGULATION VISIT (OUTPATIENT)
Dept: FAMILY MEDICINE CLINIC | Facility: CLINIC | Age: 73
End: 2023-09-11
Payer: MEDICARE

## 2023-09-11 ENCOUNTER — TELEPHONE (OUTPATIENT)
Dept: FAMILY MEDICINE CLINIC | Facility: CLINIC | Age: 73
End: 2023-09-11
Payer: MEDICARE

## 2023-09-11 LAB — INR PPP: 3.2 (ref 2–3)

## 2023-09-11 NOTE — TELEPHONE ENCOUNTER
Caller: NITIN FRANCES    Relationship: PATIENT    Best call back [phone]    Caller requesting test results: NITIN FRANCES    What test was performed: INR    When was the test performed: TODAY    Where was the test performed: AT HOME    Additional notes: PATIENT IS CALLING TO REPORT HIS INR FOR TODAY.  IT IS 3.2, TAKEN AT 9:39 AM.    PLEASE ADVISE.

## 2023-09-12 ENCOUNTER — TELEPHONE (OUTPATIENT)
Dept: FAMILY MEDICINE CLINIC | Facility: CLINIC | Age: 73
End: 2023-09-12

## 2023-09-13 ENCOUNTER — OFFICE VISIT (OUTPATIENT)
Dept: FAMILY MEDICINE CLINIC | Facility: CLINIC | Age: 73
End: 2023-09-13
Payer: MEDICARE

## 2023-09-13 VITALS
WEIGHT: 198 LBS | HEIGHT: 72 IN | TEMPERATURE: 97 F | BODY MASS INDEX: 26.82 KG/M2 | DIASTOLIC BLOOD PRESSURE: 68 MMHG | OXYGEN SATURATION: 96 % | SYSTOLIC BLOOD PRESSURE: 120 MMHG | RESPIRATION RATE: 16 BRPM | HEART RATE: 84 BPM

## 2023-09-13 DIAGNOSIS — E53.8 LOW FOLIC ACID: ICD-10-CM

## 2023-09-13 DIAGNOSIS — Z12.5 SCREENING PSA (PROSTATE SPECIFIC ANTIGEN): ICD-10-CM

## 2023-09-13 DIAGNOSIS — Z72.0 TOBACCO ABUSE: ICD-10-CM

## 2023-09-13 DIAGNOSIS — E55.9 VITAMIN D DEFICIENCY: ICD-10-CM

## 2023-09-13 DIAGNOSIS — M79.7 FIBROMYALGIA: ICD-10-CM

## 2023-09-13 DIAGNOSIS — F17.200 TOBACCO USE DISORDER: ICD-10-CM

## 2023-09-13 DIAGNOSIS — I71.43 INFRARENAL ABDOMINAL AORTIC ANEURYSM (AAA) WITHOUT RUPTURE: Primary | ICD-10-CM

## 2023-09-13 DIAGNOSIS — K21.9 GASTROESOPHAGEAL REFLUX DISEASE WITHOUT ESOPHAGITIS: ICD-10-CM

## 2023-09-13 DIAGNOSIS — R73.01 IMPAIRED FASTING GLUCOSE: ICD-10-CM

## 2023-09-13 DIAGNOSIS — Z09 HOSPITAL DISCHARGE FOLLOW-UP: ICD-10-CM

## 2023-09-13 DIAGNOSIS — J84.9 ILD (INTERSTITIAL LUNG DISEASE): ICD-10-CM

## 2023-09-13 DIAGNOSIS — J84.9 INTERSTITIAL LUNG DISEASE: Primary | ICD-10-CM

## 2023-09-13 DIAGNOSIS — E78.2 MIXED HYPERLIPIDEMIA: ICD-10-CM

## 2023-09-13 RX ORDER — TRAMADOL HYDROCHLORIDE 50 MG/1
50 TABLET ORAL EVERY 6 HOURS PRN
Qty: 90 TABLET | Refills: 2 | Status: SHIPPED | OUTPATIENT
Start: 2023-09-13 | End: 2024-09-12

## 2023-09-13 RX ORDER — PREGABALIN 150 MG/1
150 CAPSULE ORAL 2 TIMES DAILY
Qty: 180 CAPSULE | Refills: 1 | Status: SHIPPED | OUTPATIENT
Start: 2023-09-13

## 2023-09-13 RX ORDER — WARFARIN SODIUM 5 MG/1
TABLET ORAL
COMMUNITY
Start: 2023-08-22

## 2023-09-13 NOTE — PROGRESS NOTES
Subjective   Javy Reeves Sr. is a 72 y.o. male.     Chest Pain   Associated symptoms include back pain. Pertinent negatives include no diaphoresis.      Javy Reeves Sr. 72 y.o. male presents today for Emergency Room follow up.  he was treated leg pain on 8-29-23 .  I reviewed all of the labs and diagnostic testing and noted:    The patient's medications were not changed:  Current outpatient and discharge medications have been reconciled for the patient.  Reviewed by: Aletha Ochoa PA-C    he does not have a follow up appointment with a specialist:     CTA chest at ER 8-19-23  Narrative & Impression   Examination: CTA of the chest with contrast pulmonary embolus protocol     TECHNIQUE: CTA of the chest with contrast per pulmonary embolus  protocol. Radiation dose reduction techniques were utilized, including  automated exposure control and exposure modulation based on body size.  3D reconstructions were performed        HISTORY: Chest pain and history of pulmonary embolus     COMPARISON: 4/28/2023     FINDINGS: The contrast bolus is adequate. No thrombus is seen in the  main, segmental, or visualized subsegmental pulmonary arteries. There is  no evidence of right heart strain.     No significant-sized noncalcified pulmonary nodule or mass are  seen.Emphysematous changes are seen in the lungs. There is old  granulomatous disease. There is stable bilateral basilar predominant  groundglass opacity with septal line thickening and traction  bronchiectasis without convincing honeycombing. There is dependent  atelectasis.No consolidation, pleural effusion, or pneumothorax are  seen. The central airways are patent.     The heart is normal in size and configuration, without pericardial  effusion.Coronary calcifications are seen.The mediastinal vasculature is  normal in caliber.No enlarged supraclavicular, axillary, or mediastinal  lymph nodes are demonstrated. An enlarged right hilar lymph node  measures 1.5 cm in  short axis diameter.     Limited evaluation of the upper abdomen demonstrates cholecystectomy,  old granulomatous disease in the spleen, and an inferior vena cava  filter..     Bone windows demonstrateDegenerative changes, without suspicious osseous  lesion seen. There is cervical spinal fixation instrumentation.     IMPRESSION:  1. No pulmonary embolus seen     2. Stable fibrosis at the lung bases, likely NSIP type pattern     3. Right hilar lymphadenopathy     4. Incidental findings as above.      EKG unchanged per read Dr Uriostegui  Venous doppler RLE---no new clot   Chronic deep vein thrombosis in the right popliteal and gastrocnemius   veins.    Deep venous reflux in the right popliteal vein.     The small saphenous vein is thick-walled.     All other right sided veins appeared norm   Labs at ER in range.    I Rx Ultram and Lyrica for his chronic Fibromyalgia pain.     Dr. Dalton 3-24-23 noted his concern of patient possibly developing pulmonary hypertension from prior CAD and now interstitial lung disease--- also noting his history of pulmonary embolism and did order an echo to update right ventricular systolic pressure and look at heart structure.  And ordered nuclear perfusion study  These were performed on 4/14/2023 and I reviewed results--- no evidence of ischemia normal myocardial perfusion study, EF 52%, no change from 2018 study.  Echo with EF 56.7% normal left ventricular diastolic function and right ventricular systolic pressure less than 35 mmHg  Under DR Jamil Willoughby, pulm for ILD and on  Pirfenidone---still on inhalers  ---stable  hematology notes 11-14-23 DR HERNÁNDEZ  Factor V Leiden normal prothrombin gene mutation negative rest of the hypercoagulable work-up pending  Hypercoagulable work-up done August 24, 2022 shows lupus anticoagulant negative beta-2 glycoprotein antibody negative, anticardiolipin antibody IgG is 91And IgM is less than 9, factor II prothrombin gene mutation negative factor V Leiden  negative, Antithrombin III 80% as patient was on heparin due to slightly low.  Patient protein C&S was not done as he was on Coumadin and we can recheck anticardiolipin antibody in 3 months.  Last CT scan November 18, 2022 showed it was negative for pulmonary embolism  I must keep INR 2.5-3.5    Also has incidental infrarenal aneurysm 3.0 cm on prior CTA abd 5-19--22    EGD 8-3-23---Dx Gastritis---no H pylori  The patient has read and signed the [facility] Controlled Substance Contract.  I will continue to see patient for regular follow up appointments.  They are well controlled on their medication.  ASHLEY has been reviewed by me and is updated every 3 months. The patient is aware of the potential for addiction and dependence.    The following portions of the patient's history were reviewed and updated as appropriate: allergies, current medications, past family history, past medical history, past social history, past surgical history, and problem list.    Review of Systems   Constitutional:  Negative for diaphoresis.   HENT:  Negative for nosebleeds and trouble swallowing.    Eyes:  Negative for blurred vision and visual disturbance.   Respiratory:  Negative for choking.    Cardiovascular:  Positive for chest pain.   Gastrointestinal:  Negative for blood in stool.   Musculoskeletal:  Positive for back pain.   Allergic/Immunologic: Negative for immunocompromised state.   Neurological:  Negative for facial asymmetry and speech difficulty.   Psychiatric/Behavioral:  Negative for self-injury and suicidal ideas.      Objective   Physical Exam  Vitals and nursing note reviewed.   Constitutional:       General: He is not in acute distress.     Appearance: Normal appearance. He is well-developed. He is not diaphoretic.   HENT:      Head: Normocephalic.   Eyes:      Conjunctiva/sclera: Conjunctivae normal.      Pupils: Pupils are equal, round, and reactive to light.   Cardiovascular:      Rate and Rhythm: Normal rate  and regular rhythm.      Pulses: Normal pulses.      Heart sounds: Normal heart sounds. No murmur heard.  Pulmonary:      Effort: Pulmonary effort is normal.      Breath sounds: Normal breath sounds.   Musculoskeletal:         General: Normal range of motion.      Cervical back: Normal range of motion and neck supple.      Right lower leg: No edema.      Left lower leg: No edema.   Skin:     General: Skin is warm and dry.      Findings: No rash.   Neurological:      Mental Status: He is alert and oriented to person, place, and time.   Psychiatric:         Mood and Affect: Mood normal. Affect is not inappropriate.         Behavior: Behavior normal.         Thought Content: Thought content normal.         Judgment: Judgment normal.         Assessment & Plan   Diagnoses and all orders for this visit:    1. Infrarenal abdominal aortic aneurysm (AAA) without rupture (Primary)  Comments:  per CTA abd 5-19-22  Orders:  -     [place] aaa screen limited    2. Hospital discharge follow-up    3. Fibromyalgia  -     pregabalin (LYRICA) 150 MG capsule; Take 1 capsule by mouth 2 (Two) Times a Day. For Fibromyalgia  Dispense: 180 capsule; Refill: 1    4. ILD (interstitial lung disease)  -     traMADol (Ultram) 50 MG tablet; Take 1 tablet by mouth Every 6 (Six) Hours As Needed for Moderate Pain.  Dispense: 90 tablet; Refill: 2    5. Tobacco abuse      Labs due Dec  I sent copy of this note to Dr. Jamil Willoughby and noted the enlarged hilar node on CTA of the chest----asked if patient needs to see him ?  Patient continues on treatment for interstitial lung disease through Dr. Willoughby  Need f/u on AAA  Plan to continue the muscle relaxer for spasms and fibromyalgia does work well  We will continue the tramadol and the Lyrica for his fibromyalgia pain does help  Continue to take folic acid and vitamin D  Continue anticoagulation for the VTE recurrent  He continues to have his weight come down he is eating better and does have a physical  job.  Stop smoking

## 2023-09-13 NOTE — Clinical Note
Had CTA chest 8-29-23---concern about enlg hilar node ? See you? The heart is normal in size and configuration, without pericardial effusion.Coronary calcifications are seen.The mediastinal vasculature is normal in caliber.No enlarged supraclavicular, axillary, or mediastinal lymph nodes are demonstrated. An enlarged right hilar lymph node measures 1.5 cm in short axis diameter.

## 2023-09-15 ENCOUNTER — TELEPHONE (OUTPATIENT)
Dept: CASE MANAGEMENT | Facility: OTHER | Age: 73
End: 2023-09-15
Payer: MEDICARE

## 2023-09-15 DIAGNOSIS — Z51.81 ENCOUNTER FOR THERAPEUTIC DRUG MONITORING: ICD-10-CM

## 2023-09-15 DIAGNOSIS — I82.5Y3 CHRONIC DEEP VEIN THROMBOSIS (DVT) OF PROXIMAL VEIN OF BOTH LOWER EXTREMITIES: Primary | ICD-10-CM

## 2023-09-15 NOTE — TELEPHONE ENCOUNTER
Chart review from last outreach.  Seen on 9/13/23 by pcp., for hospital follow up.  Review of hospital findings and CTA, with diagnosis of AAA without rupture.  Continuing Lyrica twice daily for fibromyalgia.  Continues being followed by Pulmonology Dr. Willoughby for ILD, recommendations made to stop smoking.  Chronic anticoagulation being monitored d/t history of pulmonary embolis, continuing Coumadin 11mg and 12 mg., as ordered with INR of 3.20, and rechecking in one week on 9/18/23.    ACM to assist patient to decrease his smoking.  Otherwise patient is compliant with all treatment, medications and appointments.  ACM believes patient understand his healthcare advise and follows it well.  At this time ACM believes this patient capable of independent care and no longer requiring further assistance from CCM program.  Will place patient in monitor and graduate with no further needs or issues in December.    Plan: follow up with patient 10/16/23   - ACP  - further needs

## 2023-09-19 LAB — INR PPP: 4 (ref 2–3)

## 2023-09-21 ENCOUNTER — TELEPHONE (OUTPATIENT)
Dept: FAMILY MEDICINE CLINIC | Facility: CLINIC | Age: 73
End: 2023-09-21

## 2023-09-21 ENCOUNTER — ANTICOAGULATION VISIT (OUTPATIENT)
Dept: FAMILY MEDICINE CLINIC | Facility: CLINIC | Age: 73
End: 2023-09-21
Payer: MEDICARE

## 2023-09-21 LAB — INR PPP: 3.8 (ref 2–3.5)

## 2023-09-21 NOTE — TELEPHONE ENCOUNTER
"Caller: Javy Reeves Sr. \"GARRICK\"    Relationship to patient: Self    Best call back number: [phone]    Patient is needing: PATIENT CALLED AND WOULD LIKE TO HAVE VAIBHAV TO GIVE HIM A CALLBACK TO DISCUSS HIS INR RESULTS.     VAIBHAV PLEASE CONTACT PATIENT TO ADVISE.      THANKS   "

## 2023-09-21 NOTE — TELEPHONE ENCOUNTER
Hub staff attempted to follow warm transfer process and was unsuccessful     Caller: HYUN    Relationship to patient: SHREYAS    Best call back number: [phone]     Patient is needing: CALLER STATED THAT THEY WERE REPORTING A CRITICAL INR ON THE PATIENT

## 2023-09-29 ENCOUNTER — HOSPITAL ENCOUNTER (OUTPATIENT)
Dept: ULTRASOUND IMAGING | Facility: HOSPITAL | Age: 73
Discharge: HOME | End: 2023-09-29
Admitting: PHYSICIAN ASSISTANT
Payer: MEDICARE

## 2023-09-29 PROCEDURE — 76706 US ABDL AORTA SCREEN AAA: CPT

## 2023-10-06 ENCOUNTER — TELEPHONE (OUTPATIENT)
Dept: FAMILY MEDICINE CLINIC | Facility: CLINIC | Age: 73
End: 2023-10-06
Payer: MEDICARE

## 2023-10-13 NOTE — TELEPHONE ENCOUNTER
"  Caller: Javy Reeves Sr. \"GARRICK\"    Relationship to patient: Self    Best call back number: [phone]    Patient is needing: INR IS 2.8 TODAY 10/13/23        "

## 2023-10-15 NOTE — PROGRESS NOTES
Venipuncture performed in the second finger by Karen Camejo with good hemostasis. Patient tolerated well. 08/19/2019    
17:00

## 2023-10-19 ENCOUNTER — TELEPHONE (OUTPATIENT)
Dept: FAMILY MEDICINE CLINIC | Facility: CLINIC | Age: 73
End: 2023-10-19

## 2023-10-19 ENCOUNTER — ANTICOAGULATION VISIT (OUTPATIENT)
Dept: FAMILY MEDICINE CLINIC | Facility: CLINIC | Age: 73
End: 2023-10-19
Payer: MEDICARE

## 2023-10-25 ENCOUNTER — TELEPHONE (OUTPATIENT)
Dept: FAMILY MEDICINE CLINIC | Facility: CLINIC | Age: 73
End: 2023-10-25

## 2023-10-25 ENCOUNTER — ANTICOAGULATION VISIT (OUTPATIENT)
Dept: FAMILY MEDICINE CLINIC | Facility: CLINIC | Age: 73
End: 2023-10-25
Payer: MEDICARE

## 2023-10-25 LAB — INR PPP: 3.8 (ref 2–3.5)

## 2023-10-25 NOTE — TELEPHONE ENCOUNTER
Caller: AUSTEN    Relationship:     Best call back number: [phone]     What is the best time to reach you: ANY    Who are you requesting to speak with (clinical staff, provider,  specific staff member): CLINICAL     Do you know the name of the person who called:  MD HALL    What was the call regarding: AUSTEN WITH MD FOR INR TODAY 10/25/23 WAS 3.5     Is it okay if the provider responds through MyChart:

## 2023-10-25 NOTE — TELEPHONE ENCOUNTER
"  Caller: Javy Reeves Sr. \"GARRICK\"    Relationship to patient: Self    Best call back number: [phone]    Patient is needing: INR 3.8        "

## 2023-10-25 NOTE — TELEPHONE ENCOUNTER
Called patient to inform.  Pt states that his INR was 3.8.  States that he hit the incorrect button.  Gave instructions and documented INR in chart

## 2023-11-02 ENCOUNTER — TELEPHONE (OUTPATIENT)
Dept: FAMILY MEDICINE CLINIC | Facility: CLINIC | Age: 73
End: 2023-11-02
Payer: MEDICARE

## 2023-11-02 ENCOUNTER — ANTICOAGULATION VISIT (OUTPATIENT)
Dept: FAMILY MEDICINE CLINIC | Facility: CLINIC | Age: 73
End: 2023-11-02
Payer: MEDICARE

## 2023-11-02 LAB — INR PPP: 3.9 (ref 2–3)

## 2023-11-09 ENCOUNTER — TELEPHONE (OUTPATIENT)
Dept: FAMILY MEDICINE CLINIC | Facility: CLINIC | Age: 73
End: 2023-11-09
Payer: MEDICARE

## 2023-11-09 ENCOUNTER — ANTICOAGULATION VISIT (OUTPATIENT)
Dept: FAMILY MEDICINE CLINIC | Facility: CLINIC | Age: 73
End: 2023-11-09
Payer: MEDICARE

## 2023-11-09 LAB — INR PPP: 1.5 (ref 2–3)

## 2023-11-09 NOTE — TELEPHONE ENCOUNTER
"    Hub staff attempted to follow warm transfer process and was unsuccessful     Caller: Javy Reeves Sr. \"GARRICK\"    Relationship to patient: Self    Best call back number: [phone]     Patient is needing: PATIENT WAS RETURNING IRENE'S PHONE CALL REGARDING HIS INR.     PATIENT ALSO NEEDS TO SCHEDULE A FLU SHOT, SCHEDULE WOULD NOT POPULATE FOR THE HUB.     "

## 2023-11-09 NOTE — TELEPHONE ENCOUNTER
Caller: JAQUELIN    Relationship:     Best call back number: [phone]     What is the best time to reach you: ANY    Who are you requesting to speak with (clinical staff, provider,  specific staff member): CLINICAL STAFF    Do you know the name of the person who called: JAQUELIN ROUSE    What was the call regarding: JAQUELIN ROUSE CALLED THE PATIENT INR FOR YESTERDAY 11/08/23 WAS 1.5    Is it okay if the provider responds through MyChart:

## 2023-11-10 ENCOUNTER — FLU SHOT (OUTPATIENT)
Dept: FAMILY MEDICINE CLINIC | Facility: CLINIC | Age: 73
End: 2023-11-10
Payer: MEDICARE

## 2023-11-10 DIAGNOSIS — Z23 IMMUNIZATION DUE: Primary | ICD-10-CM

## 2023-11-10 PROCEDURE — 90662 IIV NO PRSV INCREASED AG IM: CPT | Performed by: PHYSICIAN ASSISTANT

## 2023-11-10 PROCEDURE — G0008 ADMIN INFLUENZA VIRUS VAC: HCPCS | Performed by: PHYSICIAN ASSISTANT

## 2023-11-13 ENCOUNTER — ANTICOAGULATION VISIT (OUTPATIENT)
Dept: FAMILY MEDICINE CLINIC | Facility: CLINIC | Age: 73
End: 2023-11-13

## 2023-11-13 LAB — INR PPP: 2.1 (ref 2–3)

## 2023-11-14 ENCOUNTER — TELEPHONE (OUTPATIENT)
Dept: CASE MANAGEMENT | Facility: OTHER | Age: 73
End: 2023-11-14
Payer: MEDICARE

## 2023-11-14 NOTE — TELEPHONE ENCOUNTER
Chart review completed from previous outreach.  At this time patient is stable and well managed on Coumadin therapy.  Patient is compliant with all follow up care and treatments.    AC will graduate patient today with no further nursing needs.

## 2023-11-27 ENCOUNTER — ANTICOAGULATION VISIT (OUTPATIENT)
Dept: FAMILY MEDICINE CLINIC | Facility: CLINIC | Age: 73
End: 2023-11-27
Payer: MEDICARE

## 2023-11-27 LAB — INR PPP: 2.3 (ref 2.5–3.5)

## 2023-11-27 RX ORDER — WARFARIN SODIUM 1 MG/1
1.5 TABLET ORAL DAILY
Qty: 270 TABLET | Refills: 3 | Status: SHIPPED | OUTPATIENT
Start: 2023-11-27

## 2023-11-27 RX ORDER — WARFARIN SODIUM 5 MG/1
10 TABLET ORAL NIGHTLY
Qty: 180 TABLET | Refills: 3 | Status: SHIPPED | OUTPATIENT
Start: 2023-11-27

## 2023-12-01 ENCOUNTER — ANTICOAGULATION VISIT (OUTPATIENT)
Dept: FAMILY MEDICINE CLINIC | Facility: CLINIC | Age: 73
End: 2023-12-01

## 2023-12-01 ENCOUNTER — TELEPHONE (OUTPATIENT)
Dept: FAMILY MEDICINE CLINIC | Facility: CLINIC | Age: 73
End: 2023-12-01

## 2023-12-01 ENCOUNTER — OFFICE VISIT (OUTPATIENT)
Dept: FAMILY MEDICINE CLINIC | Facility: CLINIC | Age: 73
End: 2023-12-01
Payer: MEDICARE

## 2023-12-01 VITALS
HEART RATE: 72 BPM | BODY MASS INDEX: 26.28 KG/M2 | SYSTOLIC BLOOD PRESSURE: 116 MMHG | DIASTOLIC BLOOD PRESSURE: 66 MMHG | OXYGEN SATURATION: 97 % | TEMPERATURE: 97.6 F | HEIGHT: 72 IN | RESPIRATION RATE: 16 BRPM | WEIGHT: 194 LBS

## 2023-12-01 DIAGNOSIS — I77.811 ECTATIC ABDOMINAL AORTA: ICD-10-CM

## 2023-12-01 DIAGNOSIS — M54.16 LUMBAR RADICULOPATHY: ICD-10-CM

## 2023-12-01 DIAGNOSIS — M50.30 DDD (DEGENERATIVE DISC DISEASE), CERVICAL: ICD-10-CM

## 2023-12-01 DIAGNOSIS — Z71.6 TOBACCO ABUSE COUNSELING: ICD-10-CM

## 2023-12-01 DIAGNOSIS — K21.9 GASTROESOPHAGEAL REFLUX DISEASE WITHOUT ESOPHAGITIS: ICD-10-CM

## 2023-12-01 DIAGNOSIS — M79.7 FIBROMYALGIA: ICD-10-CM

## 2023-12-01 DIAGNOSIS — I25.2 PAST HEART ATTACK: ICD-10-CM

## 2023-12-01 DIAGNOSIS — Z79.01 CHRONIC ANTICOAGULATION: ICD-10-CM

## 2023-12-01 DIAGNOSIS — E78.2 MIXED HYPERLIPIDEMIA: Primary | ICD-10-CM

## 2023-12-01 DIAGNOSIS — J84.9 ILD (INTERSTITIAL LUNG DISEASE): ICD-10-CM

## 2023-12-01 DIAGNOSIS — Z86.711 HISTORY OF PULMONARY EMBOLUS (PE): ICD-10-CM

## 2023-12-01 LAB — INR PPP: 3.4 (ref 2.5–3.5)

## 2023-12-01 RX ORDER — TRAMADOL HYDROCHLORIDE 50 MG/1
50 TABLET ORAL EVERY 6 HOURS PRN
Qty: 90 TABLET | Refills: 2 | Status: SHIPPED | OUTPATIENT
Start: 2023-12-01 | End: 2024-11-30

## 2023-12-01 RX ORDER — PREGABALIN 150 MG/1
150 CAPSULE ORAL 2 TIMES DAILY
Qty: 180 CAPSULE | Refills: 1 | Status: SHIPPED | OUTPATIENT
Start: 2023-12-01

## 2023-12-01 RX ORDER — PANTOPRAZOLE SODIUM 40 MG/1
40 TABLET, DELAYED RELEASE ORAL DAILY
Qty: 90 TABLET | Refills: 3 | Status: SHIPPED | OUTPATIENT
Start: 2023-12-01

## 2023-12-01 NOTE — TELEPHONE ENCOUNTER
Spoke with pt who verified dosing of 11.5 mg daily. Pt is to recheck INR on 12/4/2023. This info was discussed at appt 12/1/2023

## 2023-12-01 NOTE — PROGRESS NOTES
"Subjective   Javy Reeves Sr. is a 73 y.o. male.     Hyperlipidemia    Heartburn  He reports no choking.   Fibromyalgia  Associated symptoms include neck pain. Pertinent negatives include no diaphoresis.       Since the last visit, he has overall felt well.  He has GERD controlled on PPI Rx, Hyperlipidemia with goals met with current Rx, CAD and remains under care of their Cardiologist for mangement, CAD and remains on medication regimen, and Vitamin D deficiency and labs are at goal >30 ng/mL.  he has been compliant with current medications have reviewed them.  The patient denies medication side effects.  Will refill medications. /66   Pulse 72   Temp 97.6 °F (36.4 °C)   Resp 16   Ht 182.9 cm (72\")   Wt 88 kg (194 lb)   SpO2 97%   BMI 26.31 kg/m²   I Rx Ultram and Lyrica for his chronic Fibromyalgia pain.      Dr. Dalton 3-24-23 noted his concern of patient possibly developing pulmonary hypertension from prior CAD and now interstitial lung disease--- also noting his history of pulmonary embolism and did order an echo to update right ventricular systolic pressure and look at heart structure.  And ordered nuclear perfusion study  These were performed on 4/14/2023 and I reviewed results--- no evidence of ischemia normal myocardial perfusion study, EF 52%, no change from 2018 study.  Echo with EF 56.7% normal left ventricular diastolic function and right ventricular systolic pressure less than 35 mmHg  Under DR Jamil Willoughby, pulm for ILD and on  Pirfenidone---still on inhalers----saw him 11-1-23--  ---stable  hematology notes 11-14-23 DR HERNÁNDEZ  Factor V Leiden normal prothrombin gene mutation negative rest of the hypercoagulable work-up pending  Hypercoagulable work-up done August 24, 2022 shows lupus anticoagulant negative beta-2 glycoprotein antibody negative, anticardiolipin antibody IgG is 91And IgM is less than 9, factor II prothrombin gene mutation negative factor V Leiden negative, Antithrombin III " 80% as patient was on heparin due to slightly low.  Patient protein C&S was not done as he was on Coumadin and we can recheck anticardiolipin antibody in 3 months.  Last CT scan November 18, 2022 showed it was negative for pulmonary embolism  I must keep INR 2.5-3.5     Also has incidental infrarenal aneurysm 3.0 cm on prior CTA abd 5-19--22     EGD 8-3-23---Dx Gastritis---no H pylori  The patient has read and signed the [facility] Controlled Substance Contract.  I will continue to see patient for regular follow up appointments.  They are well controlled on their medication.  ASHLEY has been reviewed by me and is updated every 3 months. The patient is aware of the potential for addiction and dependence.     Results for orders placed or performed in visit on 11/27/23   Protime-INR    Specimen: Blood   Result Value Ref Range    INR 2.30 (A) 2.5 - 3.5     12mg coumadin --INR high  11mg too low  11.5mg daily now-------had INR last PM 3.4  INR again Monday--  Lab Results   Component Value Date    GLUCOSE 100 (H) 08/29/2023    BUN 12 08/29/2023    CREATININE 0.96 08/29/2023    EGFRRESULT 86.1 12/19/2022    EGFR 85.0 08/29/2023    BCR 12.5 08/29/2023    K 4.0 08/29/2023    CO2 25.0 08/29/2023    CALCIUM 9.1 08/29/2023    PROTENTOTREF 7.2 12/19/2022    ALBUMIN 3.9 08/29/2023    BILITOT 0.6 08/29/2023    AST 21 08/29/2023    ALT 14 08/29/2023     Lab Results   Component Value Date    HGBA1C 5.60 12/19/2022     Lab Results   Component Value Date    CHLPL 129 12/19/2022    TRIG 161 (H) 12/19/2022    HDL 41 12/19/2022    LDL 61 12/19/2022     Lab Results   Component Value Date    PSA 1.040 12/19/2022    PSA 1.1 11/23/2021    PSA 1.020 09/21/2020     hematology notes  Factor V Leiden normal prothrombin gene mutation negative rest of the hypercoagulable work-up pending  Hypercoagulable work-up done August 24, 2022 shows lupus anticoagulant negative beta-2 glycoprotein antibody negative, anticardiolipin antibody IgG is 91And  IgM is less than 9, factor II prothrombin gene mutation negative factor V Leiden negative, Antithrombin III 80% as patient was on heparin due to slightly low.  Patient protein C&S was not done as he was on Coumadin and we can recheck anticardiolipin antibody in 3 months.  Last CT scan November 18, 2022 showed it was negative for pulmonary embolism  The following portions of the patient's history were reviewed and updated as appropriate: allergies, current medications, past family history, past medical history, past social history, past surgical history, and problem list.    Review of Systems   Constitutional:  Negative for diaphoresis.   HENT:  Negative for nosebleeds and trouble swallowing.    Eyes:  Negative for blurred vision and visual disturbance.   Respiratory:  Negative for choking.    Gastrointestinal:  Negative for blood in stool.   Musculoskeletal:  Positive for back pain and neck pain.   Allergic/Immunologic: Negative for immunocompromised state.   Neurological:  Negative for facial asymmetry and speech difficulty.   Psychiatric/Behavioral:  Negative for self-injury and suicidal ideas.        Objective   Physical Exam  Vitals and nursing note reviewed.   Constitutional:       General: He is not in acute distress.     Appearance: He is well-developed. He is not diaphoretic.   HENT:      Head: Normocephalic.      Right Ear: External ear normal.      Left Ear: External ear normal.   Eyes:      General: No scleral icterus.     Conjunctiva/sclera: Conjunctivae normal.      Pupils: Pupils are equal, round, and reactive to light.   Neck:      Vascular: No carotid bruit.   Cardiovascular:      Rate and Rhythm: Normal rate and regular rhythm.      Pulses: Normal pulses.      Heart sounds: Normal heart sounds. No murmur heard.  Pulmonary:      Effort: Pulmonary effort is normal.      Breath sounds: Normal breath sounds. No rales.   Musculoskeletal:         General: Normal range of motion.      Cervical back: Normal  range of motion and neck supple.      Right lower leg: No edema.      Left lower leg: No edema.   Skin:     General: Skin is warm and dry.      Findings: No rash.   Neurological:      Mental Status: He is alert and oriented to person, place, and time.   Psychiatric:         Mood and Affect: Mood normal. Affect is not inappropriate.         Behavior: Behavior normal.         Thought Content: Thought content normal.         Judgment: Judgment normal.           Assessment & Plan   Diagnoses and all orders for this visit:    1. Mixed hyperlipidemia (Primary)    2. History of pulmonary embolus (PE)    3. DDD (degenerative disc disease), cervical    4. ILD (interstitial lung disease)  -     traMADol (Ultram) 50 MG tablet; Take 1 tablet by mouth Every 6 (Six) Hours As Needed for Moderate Pain.  Dispense: 90 tablet; Refill: 2    5. Lumbar radiculopathy    6. Tobacco abuse counseling    7. Gastroesophageal reflux disease without esophagitis    8. Past heart attack    9. Ectatic abdominal aorta  -     [place] aaa screen limited; Future    10. Fibromyalgia  -     pregabalin (LYRICA) 150 MG capsule; Take 1 capsule by mouth 2 (Two) Times a Day. For Fibromyalgia  Dispense: 180 capsule; Refill: 1    11. Chronic anticoagulation    Other orders  -     pantoprazole (PROTONIX) 40 MG EC tablet; Take 1 tablet by mouth Daily. For GERD  Dispense: 90 tablet; Refill: 3      Stop smoking  INR 3.4 last PM ----at goal 3.5-3.5  Cont folic acid  Still works---walks all day--I am watching weight  Sees Dr Dalton for CAD  AAA check 6 mos---dilated--no aneurysm   Cont Lyrica and Tramadol for chronic pain--works  Labs Dec  Sukh, Javy Reeves Sr., was seen today.  he was seen for GERD and will continue on PPI medication, Hyperlipidemia and will continue current medication, CAD and is currently stable on medication management and stable, CAD and is under care of their Cardiologist for medical management and stable, and Vitamin D deficiency and  supplemented.         Answers submitted by the patient for this visit:  Primary Reason for Visit (Submitted on 11/30/2023)  What is the primary reason for your visit?: Other  Other (Submitted on 11/30/2023)  Please describe your symptoms.: Follow up on getting INR regulated  Have you had these symptoms before?: Yes  How long have you been having these symptoms?: Greater than 2 weeks    Conflicting answers have been found for some questions. Please document the patient's answers manually.

## 2023-12-15 ENCOUNTER — TELEPHONE (OUTPATIENT)
Dept: FAMILY MEDICINE CLINIC | Facility: CLINIC | Age: 73
End: 2023-12-15
Payer: MEDICARE

## 2023-12-15 NOTE — TELEPHONE ENCOUNTER
"Caller:    Javy Reeves Sr. \"GARRICK\" (Self)       Phone:  [phone] (Mobile)     INR Number Being Reported:4.5      Day of the Week  Monday Tuesday Wednesday Thursday Friday Saturday Sunday     Dose Taken     11.5       I CALLED HIM.  HOLD DOSE TODAY AND CHECK INR TOMORROW.  IF INR <3 RESUME AT 10MG-----IF ABOVE 3---MESSAGE ME  "

## 2023-12-16 LAB — INR PPP: 2.3 (ref 2.5–3.5)

## 2023-12-18 ENCOUNTER — ANTICOAGULATION VISIT (OUTPATIENT)
Dept: FAMILY MEDICINE CLINIC | Facility: CLINIC | Age: 73
End: 2023-12-18
Payer: MEDICARE

## 2023-12-28 ENCOUNTER — PATIENT MESSAGE (OUTPATIENT)
Dept: FAMILY MEDICINE CLINIC | Facility: CLINIC | Age: 73
End: 2023-12-28
Payer: MEDICARE

## 2023-12-28 ENCOUNTER — TELEPHONE (OUTPATIENT)
Dept: FAMILY MEDICINE CLINIC | Facility: CLINIC | Age: 73
End: 2023-12-28

## 2023-12-28 RX ORDER — CEFDINIR 300 MG/1
CAPSULE ORAL
Qty: 20 CAPSULE | Refills: 0 | Status: SHIPPED | OUTPATIENT
Start: 2023-12-28

## 2023-12-28 RX ORDER — BENZONATATE 100 MG/1
CAPSULE ORAL
Qty: 40 CAPSULE | Refills: 0 | Status: SHIPPED | OUTPATIENT
Start: 2023-12-28

## 2023-12-28 NOTE — TELEPHONE ENCOUNTER
"  Caller: Javy Reeves Sr. \"GARRICK\"    Relationship: Self    Best call back number: [phone]    What is the best time to reach you: ANYTIME    Who are you requesting to speak with (clinical staff, provider,  specific staff member): CLINICAL    What was the call regarding: PATIENT STATED THAT HE WAS GETTING READY TO CHECK HIS INR TODAY 12/28/23 AND HE REALIZED HE WAS OUT OF TEST STRIPS. PATIENT STATED HE CALLED IN  TO GET THEM REFILLED BUT IT WOULD BE 7-10 DAYS BEFORE REACHING HIM. PATIENT WAS WANTING TO KNOW IF HE CAN COME IN TO GET HIS INR DONE. HUB ATTEMPTED TO WARM TRANSFER BUT WAS UNABLE. PATIENT REQUESTING A CALLBACK TO KNOW IF HE CAN GET SCHEDULED POSSIBLY TODAY.      "

## 2023-12-28 NOTE — TELEPHONE ENCOUNTER
From: Javy Reeves Sr.  To: Aletha Ochoa  Sent: 12/28/2023 4:05 AM EST  Subject: Chest and sinus infection     For the past 7 days I’ve been coughing and blowing yellow color mucus out of my nose . I haven’t had any fever or muscle aches but I can’t seem to get rid of it. I have to have some kind of infection so is it possible you can [facility] please?

## 2024-01-02 NOTE — TELEPHONE ENCOUNTER
You do not need to send a RX. Patient request them through the company no RX needed. He just needs to schedule a INR appointment until he gets his strips.

## 2024-01-03 ENCOUNTER — ANTICOAGULATION VISIT (OUTPATIENT)
Dept: FAMILY MEDICINE CLINIC | Facility: CLINIC | Age: 74
End: 2024-01-03
Payer: COMMERCIAL

## 2024-01-03 DIAGNOSIS — I82.5Y3 CHRONIC DEEP VEIN THROMBOSIS (DVT) OF PROXIMAL VEIN OF BOTH LOWER EXTREMITIES: ICD-10-CM

## 2024-01-03 DIAGNOSIS — I82.4Z9 DEEP VEIN THROMBOSIS (DVT) OF DISTAL VEIN OF LOWER EXTREMITY, UNSPECIFIED CHRONICITY, UNSPECIFIED LATERALITY: Primary | ICD-10-CM

## 2024-01-03 LAB — INR PPP: 2.5 (ref 2–3)

## 2024-01-03 PROCEDURE — 36416 COLLJ CAPILLARY BLOOD SPEC: CPT | Performed by: PHYSICIAN ASSISTANT

## 2024-01-03 PROCEDURE — 85610 PROTHROMBIN TIME: CPT | Performed by: PHYSICIAN ASSISTANT

## 2024-01-06 DIAGNOSIS — E53.8 LOW SERUM VITAMIN B12: ICD-10-CM

## 2024-01-06 DIAGNOSIS — E78.2 MIXED HYPERLIPIDEMIA: Primary | ICD-10-CM

## 2024-01-06 DIAGNOSIS — R73.01 IMPAIRED FASTING GLUCOSE: ICD-10-CM

## 2024-01-06 DIAGNOSIS — R97.20 RISING PSA LEVEL: ICD-10-CM

## 2024-01-06 LAB
25(OH)D3+25(OH)D2 SERPL-MCNC: 40.2 NG/ML (ref 30–100)
ALBUMIN SERPL-MCNC: 4.5 G/DL (ref 3.5–5.2)
ALBUMIN/GLOB SERPL: 1.5 G/DL
ALP SERPL-CCNC: 73 U/L (ref 39–117)
ALT SERPL-CCNC: 25 U/L (ref 1–41)
APPEARANCE UR: ABNORMAL
AST SERPL-CCNC: 28 U/L (ref 1–40)
BACTERIA #/AREA URNS HPF: ABNORMAL /HPF
BASOPHILS # BLD AUTO: 0.03 10*3/MM3 (ref 0–0.2)
BASOPHILS NFR BLD AUTO: 0.5 % (ref 0–1.5)
BILIRUB SERPL-MCNC: 0.4 MG/DL (ref 0–1.2)
BILIRUB UR QL STRIP: NEGATIVE
BUN SERPL-MCNC: 12 MG/DL (ref 8–23)
BUN/CREAT SERPL: 11.4 (ref 7–25)
CALCIUM SERPL-MCNC: 10 MG/DL (ref 8.6–10.5)
CASTS URNS MICRO: ABNORMAL
CHLORIDE SERPL-SCNC: 104 MMOL/L (ref 98–107)
CHOLEST SERPL-MCNC: 141 MG/DL (ref 0–200)
CO2 SERPL-SCNC: 29 MMOL/L (ref 22–29)
COLOR UR: YELLOW
CREAT SERPL-MCNC: 1.05 MG/DL (ref 0.76–1.27)
EGFRCR SERPLBLD CKD-EPI 2021: 75.9 ML/MIN/1.73
EOSINOPHIL # BLD AUTO: 0.05 10*3/MM3 (ref 0–0.4)
EOSINOPHIL NFR BLD AUTO: 0.9 % (ref 0.3–6.2)
EPI CELLS #/AREA URNS HPF: ABNORMAL /HPF
ERYTHROCYTE [DISTWIDTH] IN BLOOD BY AUTOMATED COUNT: 13.9 % (ref 12.3–15.4)
FOLATE SERPL-MCNC: >20 NG/ML (ref 4.78–24.2)
GLOBULIN SER CALC-MCNC: 3.1 GM/DL
GLUCOSE SERPL-MCNC: 84 MG/DL (ref 65–99)
GLUCOSE UR QL STRIP: NEGATIVE
HBA1C MFR BLD: 6 % (ref 4.8–5.6)
HCT VFR BLD AUTO: 46.3 % (ref 37.5–51)
HDLC SERPL-MCNC: 38 MG/DL (ref 40–60)
HGB BLD-MCNC: 15.7 G/DL (ref 13–17.7)
HGB UR QL STRIP: ABNORMAL
IMM GRANULOCYTES # BLD AUTO: 0.01 10*3/MM3 (ref 0–0.05)
IMM GRANULOCYTES NFR BLD AUTO: 0.2 % (ref 0–0.5)
KETONES UR QL STRIP: NEGATIVE
LDLC SERPL CALC-MCNC: 83 MG/DL (ref 0–100)
LEUKOCYTE ESTERASE UR QL STRIP: NEGATIVE
LYMPHOCYTES # BLD AUTO: 2.07 10*3/MM3 (ref 0.7–3.1)
LYMPHOCYTES NFR BLD AUTO: 35.3 % (ref 19.6–45.3)
MAGNESIUM SERPL-MCNC: 2 MG/DL (ref 1.6–2.4)
MCH RBC QN AUTO: 32.1 PG (ref 26.6–33)
MCHC RBC AUTO-ENTMCNC: 33.9 G/DL (ref 31.5–35.7)
MCV RBC AUTO: 94.7 FL (ref 79–97)
MONOCYTES # BLD AUTO: 0.58 10*3/MM3 (ref 0.1–0.9)
MONOCYTES NFR BLD AUTO: 9.9 % (ref 5–12)
NEUTROPHILS # BLD AUTO: 3.12 10*3/MM3 (ref 1.7–7)
NEUTROPHILS NFR BLD AUTO: 53.2 % (ref 42.7–76)
NITRITE UR QL STRIP: NEGATIVE
NRBC BLD AUTO-RTO: 0 /100 WBC (ref 0–0.2)
PH UR STRIP: 6 [PH] (ref 5–8)
PLATELET # BLD AUTO: 215 10*3/MM3 (ref 140–450)
POTASSIUM SERPL-SCNC: 5.2 MMOL/L (ref 3.5–5.2)
PROT SERPL-MCNC: 7.6 G/DL (ref 6–8.5)
PROT UR QL STRIP: ABNORMAL
PSA SERPL-MCNC: 2.04 NG/ML (ref 0–4)
RBC # BLD AUTO: 4.89 10*6/MM3 (ref 4.14–5.8)
RBC #/AREA URNS HPF: ABNORMAL /HPF
SODIUM SERPL-SCNC: 141 MMOL/L (ref 136–145)
SP GR UR STRIP: 1.02 (ref 1–1.03)
T4 FREE SERPL-MCNC: 1.05 NG/DL (ref 0.93–1.7)
TRIGL SERPL-MCNC: 111 MG/DL (ref 0–150)
TSH SERPL DL<=0.005 MIU/L-ACNC: 1.6 UIU/ML (ref 0.27–4.2)
URNS CMNT MICRO: ABNORMAL
UROBILINOGEN UR STRIP-MCNC: ABNORMAL MG/DL
VIT B12 SERPL-MCNC: 843 PG/ML (ref 211–946)
VLDLC SERPL CALC-MCNC: 20 MG/DL (ref 5–40)
WBC # BLD AUTO: 5.86 10*3/MM3 (ref 3.4–10.8)
WBC #/AREA URNS HPF: ABNORMAL /HPF

## 2024-01-12 ENCOUNTER — TELEPHONE (OUTPATIENT)
Dept: FAMILY MEDICINE CLINIC | Facility: CLINIC | Age: 74
End: 2024-01-12
Payer: COMMERCIAL

## 2024-01-12 ENCOUNTER — ANTICOAGULATION VISIT (OUTPATIENT)
Dept: FAMILY MEDICINE CLINIC | Facility: CLINIC | Age: 74
End: 2024-01-12

## 2024-01-12 LAB — INR PPP: 3.6 (ref 2.5–3.5)

## 2024-01-12 NOTE — TELEPHONE ENCOUNTER
OUT OF RANGE INR       Caller: MD INR/ VINCENT    Relationship:     Best call back number: [phone]      What was the call regarding:  OUT OF RANGE INR OF   3.6 TODAY    Is it okay if the provider responds through MyChart: CALL IF NEEDED

## 2024-01-14 RX ORDER — PANTOPRAZOLE SODIUM 40 MG/1
40 TABLET, DELAYED RELEASE ORAL DAILY
Qty: 90 TABLET | Refills: 3 | Status: SHIPPED | OUTPATIENT
Start: 2024-01-14

## 2024-01-16 ENCOUNTER — ANTICOAGULATION VISIT (OUTPATIENT)
Dept: FAMILY MEDICINE CLINIC | Facility: CLINIC | Age: 74
End: 2024-01-16
Payer: COMMERCIAL

## 2024-01-16 LAB — INR PPP: 2.9 (ref 2–3)

## 2024-01-22 ENCOUNTER — ANTICOAGULATION VISIT (OUTPATIENT)
Dept: FAMILY MEDICINE CLINIC | Facility: CLINIC | Age: 74
End: 2024-01-22
Payer: COMMERCIAL

## 2024-01-22 LAB — INR PPP: 2.7 (ref 2–3)

## 2024-01-30 ENCOUNTER — ANTICOAGULATION VISIT (OUTPATIENT)
Dept: FAMILY MEDICINE CLINIC | Facility: CLINIC | Age: 74
End: 2024-01-30
Payer: COMMERCIAL

## 2024-01-30 LAB — INR PPP: 2.5 (ref 2.5–3.5)

## 2024-02-05 LAB — INR PPP: 2.2 (ref 2–3)

## 2024-02-06 ENCOUNTER — ANTICOAGULATION VISIT (OUTPATIENT)
Dept: FAMILY MEDICINE CLINIC | Facility: CLINIC | Age: 74
End: 2024-02-06

## 2024-02-28 ENCOUNTER — PATIENT MESSAGE (OUTPATIENT)
Dept: FAMILY MEDICINE CLINIC | Facility: CLINIC | Age: 74
End: 2024-02-28
Payer: MEDICARE

## 2024-02-28 DIAGNOSIS — I82.5Y3 CHRONIC DEEP VEIN THROMBOSIS (DVT) OF PROXIMAL VEIN OF BOTH LOWER EXTREMITIES: ICD-10-CM

## 2024-02-29 RX ORDER — ENOXAPARIN SODIUM 100 MG/ML
1 INJECTION SUBCUTANEOUS EVERY 12 HOURS
Qty: 20 ML | Refills: 11 | Status: SHIPPED | OUTPATIENT
Start: 2024-02-29

## 2024-02-29 NOTE — TELEPHONE ENCOUNTER
From: Javy Reeves Sr.  To: Aletha Ochoa  Sent: 2/28/2024 7:17 PM EST  Subject: INR    On 02- @ 1905 hrs. My INR is 2.0. I will take 12mg of warfarin tonight and give myself a Enoxaparin Sodium Injection tonight also. I also need more needles because I only have two shots left in the box I have. Thank you in advance and await for further instructions.

## 2024-03-01 ENCOUNTER — OFFICE VISIT (OUTPATIENT)
Dept: FAMILY MEDICINE CLINIC | Facility: CLINIC | Age: 74
End: 2024-03-01
Payer: MEDICARE

## 2024-03-01 VITALS
HEIGHT: 72 IN | TEMPERATURE: 97.7 F | OXYGEN SATURATION: 97 % | DIASTOLIC BLOOD PRESSURE: 64 MMHG | WEIGHT: 197 LBS | BODY MASS INDEX: 26.68 KG/M2 | RESPIRATION RATE: 16 BRPM | HEART RATE: 96 BPM | SYSTOLIC BLOOD PRESSURE: 124 MMHG

## 2024-03-01 DIAGNOSIS — Z71.6 TOBACCO ABUSE COUNSELING: ICD-10-CM

## 2024-03-01 DIAGNOSIS — J84.9 ILD (INTERSTITIAL LUNG DISEASE): ICD-10-CM

## 2024-03-01 DIAGNOSIS — K21.9 GASTROESOPHAGEAL REFLUX DISEASE WITHOUT ESOPHAGITIS: ICD-10-CM

## 2024-03-01 DIAGNOSIS — E78.2 MIXED HYPERLIPIDEMIA: Primary | ICD-10-CM

## 2024-03-01 DIAGNOSIS — M79.7 FIBROMYALGIA: ICD-10-CM

## 2024-03-01 DIAGNOSIS — I26.99 RECURRENT PULMONARY EMBOLISM: ICD-10-CM

## 2024-03-01 RX ORDER — PREGABALIN 150 MG/1
150 CAPSULE ORAL 2 TIMES DAILY
Qty: 180 CAPSULE | Refills: 1 | Status: SHIPPED | OUTPATIENT
Start: 2024-03-01

## 2024-03-01 RX ORDER — WARFARIN SODIUM 5 MG/1
10 TABLET ORAL NIGHTLY
Qty: 180 TABLET | Refills: 3 | Status: SHIPPED | OUTPATIENT
Start: 2024-03-01

## 2024-03-01 RX ORDER — TRAMADOL HYDROCHLORIDE 50 MG/1
50 TABLET ORAL EVERY 6 HOURS PRN
Qty: 90 TABLET | Refills: 2 | Status: SHIPPED | OUTPATIENT
Start: 2024-03-01 | End: 2025-03-01

## 2024-03-01 NOTE — PROGRESS NOTES
"Subjective   Javy Reeves Sr. is a 73 y.o. male.     Fibromyalgia  Associated symptoms include numbness and weakness. Pertinent negatives include no abdominal pain, chest pain or fever.       Since the last visit, he has overall felt great.  He has Impaired fasting glucose and will monitor labs to watch for DMII, GERD controlled on PPI Rx, Hyperlipidemia with goals met with current Rx, CAD and remains under care of their Cardiologist for mangement, and Vitamin D deficiency and labs are at goal >30 ng/mL.  he has been compliant with current medications have reviewed them.  The patient denies medication side effects.  Will refill medications. /64   Pulse 96   Temp 97.7 °F (36.5 °C)   Resp 16   Ht 182.9 cm (72\")   Wt 89.4 kg (197 lb)   SpO2 97%   BMI 26.72 kg/m²     Results for orders placed or performed in visit on 02/06/24   Protime-INR    Specimen: Blood   Result Value Ref Range    INR 2.20 2 - 3        I Rx Ultram and Lyrica for his chronic Fibromyalgia pain.      Dr. Dalton 3-24-23 noted his concern of patient possibly developing pulmonary hypertension from prior CAD and now interstitial lung disease--- also noting his history of pulmonary embolism and did order an echo to update right ventricular systolic pressure and look at heart structure.  And ordered nuclear perfusion study  These were performed on 4/14/2023 and I reviewed results--- no evidence of ischemia normal myocardial perfusion study, EF 52%, no change from 2018 study.  Echo with EF 56.7% normal left ventricular diastolic function and right ventricular systolic pressure less than 35 mmHg  Under DR Jamil Willoughby, pulm for ILD and on  Pirfenidone---still on inhalers  ---stable  hematology notes 11-14-23 DR HERNÁNDEZ  Factor V Leiden normal prothrombin gene mutation negative rest of the hypercoagulable work-up pending  Hypercoagulable work-up done August 24, 2022 shows lupus anticoagulant negative beta-2 glycoprotein antibody negative, " anticardiolipin antibody IgG is 91And IgM is less than 9, factor II prothrombin gene mutation negative factor V Leiden negative, Antithrombin III 80% as patient was on heparin due to slightly low.  Patient protein C&S was not done as he was on Coumadin and we can recheck anticardiolipin antibody in 3 months.  Last CT scan November 18, 2022 showed it was negative for pulmonary embolism  I must keep INR 2.5-3.5   EGD 8-3-23---Dx Gastritis---no H pylori   The patient has read and signed the [facility] Controlled Substance Contract.  I will continue to see patient for regular follow up appointments.  They are well controlled on their medication.  ASHLEY has been reviewed by me and is updated every 3 months. The patient is aware of the potential for addiction and dependence.  Order drug screen  The following portions of the patient's history were reviewed and updated as appropriate: allergies, current medications, past family history, past medical history, past social history, past surgical history, and problem list.    Review of Systems   Constitutional:  Positive for unexpected weight loss. Negative for fever.   Cardiovascular:  Negative for chest pain.   Gastrointestinal:  Negative for abdominal pain.   Genitourinary:  Positive for urinary incontinence. Negative for dysuria.   Musculoskeletal:  Positive for back pain.   Neurological:  Positive for weakness and numbness.       Objective   Physical Exam  Vitals and nursing note reviewed.   Constitutional:       General: He is not in acute distress.     Appearance: Normal appearance. He is well-developed. He is not diaphoretic.   HENT:      Head: Normocephalic.   Eyes:      Conjunctiva/sclera: Conjunctivae normal.      Pupils: Pupils are equal, round, and reactive to light.   Cardiovascular:      Rate and Rhythm: Normal rate and regular rhythm.      Pulses: Normal pulses.      Heart sounds: Normal heart sounds. No murmur heard.  Pulmonary:      Effort: Pulmonary  effort is normal.      Breath sounds: Normal breath sounds. No rales.   Musculoskeletal:         General: Normal range of motion.      Cervical back: Normal range of motion and neck supple.      Right lower leg: No edema.      Left lower leg: No edema.   Lymphadenopathy:      Cervical: No cervical adenopathy.   Skin:     General: Skin is warm and dry.      Findings: No rash.   Neurological:      General: No focal deficit present.      Mental Status: He is alert and oriented to person, place, and time.   Psychiatric:         Mood and Affect: Mood normal. Affect is not inappropriate.         Behavior: Behavior normal.         Thought Content: Thought content normal.         Judgment: Judgment normal.           Assessment & Plan   Diagnoses and all orders for this visit:    1. Mixed hyperlipidemia (Primary)    2. ILD (interstitial lung disease)  -     traMADol (Ultram) 50 MG tablet; Take 1 tablet by mouth Every 6 (Six) Hours As Needed for Moderate Pain.  Dispense: 90 tablet; Refill: 2    3. Fibromyalgia  -     pregabalin (LYRICA) 150 MG capsule; Take 1 capsule by mouth 2 (Two) Times a Day. For Fibromyalgia  Dispense: 180 capsule; Refill: 1  -     ToxAssure Flex 23, Ur -    4. Tobacco abuse counseling    5. Gastroesophageal reflux disease without esophagitis    Other orders  -     warfarin (COUMADIN) 5 MG tablet; Take 2 tablets by mouth Every Night.  Dispense: 180 tablet; Refill: 3        Labs due end of March  AAA doppler due in Sept---f/u dilation  Plan, Javy Reeves Sr., was seen today.  he was seen for Imparied fasting glucose and plan follow up labs, diet, and exercise, GERD and will continue on PPI medication, Hyperlipidemia and will continue current medication, CAD and is currently stable on medication management and stable, and Vitamin D deficiency and supplemented.  Cont Warfarin----has recurrent VTE goal of INR is between 2.5 and 3.5  Continue tramadol and Lyrica for the fibromyalgia pain and does  help  Stop smoking       Answers submitted by the patient for this visit:  Primary Reason for Visit (Submitted on 2/24/2024)  What is the primary reason for your visit?: Back Pain

## 2024-03-05 ENCOUNTER — TELEPHONE (OUTPATIENT)
Dept: FAMILY MEDICINE CLINIC | Facility: CLINIC | Age: 74
End: 2024-03-05
Payer: MEDICARE

## 2024-03-05 NOTE — TELEPHONE ENCOUNTER
I sent him a message on [employer] ... He held his dose last night but because he is so tight on his INR to be 2.5-3.5 we will going to continue the 12 mg daily and check on Friday

## 2024-03-05 NOTE — TELEPHONE ENCOUNTER
Caller: NOAH    Relationship: Other    Best call back number: [phone]    PATIENT HAD AN OUT OF RANGE READING ON INR TAKEN YESTERDAY AT HOME.     READING WAS 4.1

## 2024-03-15 LAB — INR PPP: 2.6 (ref 2.5–3.5)

## 2024-03-18 ENCOUNTER — ANTICOAGULATION VISIT (OUTPATIENT)
Dept: FAMILY MEDICINE CLINIC | Facility: CLINIC | Age: 74
End: 2024-03-18
Payer: MEDICARE

## 2024-04-03 RX ORDER — PANTOPRAZOLE SODIUM 40 MG/1
40 TABLET, DELAYED RELEASE ORAL DAILY
Qty: 90 TABLET | Refills: 3 | Status: SHIPPED | OUTPATIENT
Start: 2024-04-03

## 2024-04-03 RX ORDER — FOLIC ACID 1 MG/1
1 TABLET ORAL DAILY
Qty: 90 TABLET | Refills: 3 | Status: SHIPPED | OUTPATIENT
Start: 2024-04-03

## 2024-04-05 LAB — INR PPP: 3.3 (ref 2.5–3.5)

## 2024-04-08 ENCOUNTER — ANTICOAGULATION VISIT (OUTPATIENT)
Dept: FAMILY MEDICINE CLINIC | Facility: CLINIC | Age: 74
End: 2024-04-08
Payer: MEDICARE

## 2024-04-15 DIAGNOSIS — I77.811 ECTATIC ABDOMINAL AORTA: Primary | ICD-10-CM

## 2024-04-15 LAB
INR PPP: 3.6 (ref 2.5–3.5)
INR PPP: 3.6 (ref 2.5–3.5)

## 2024-04-16 ENCOUNTER — ANTICOAGULATION VISIT (OUTPATIENT)
Dept: FAMILY MEDICINE CLINIC | Facility: CLINIC | Age: 74
End: 2024-04-16
Payer: MEDICARE

## 2024-04-20 ENCOUNTER — DOCUMENTATION (OUTPATIENT)
Dept: FAMILY MEDICINE CLINIC | Facility: CLINIC | Age: 74
End: 2024-04-20
Payer: MEDICARE

## 2024-04-20 ENCOUNTER — PATIENT MESSAGE (OUTPATIENT)
Dept: FAMILY MEDICINE CLINIC | Facility: CLINIC | Age: 74
End: 2024-04-20
Payer: MEDICARE

## 2024-04-20 NOTE — PROGRESS NOTES
Received phone call from on-call service.  Patient had taken his INR at 2:30 in the morning and his INR was greater than 5.  I called the patient and told him to withhold taking his warfarin today.  He normally takes 12 mg at 7 in the evening.  He will withhold taking his warfarin this evening.  I told him to take his INR tomorrow between 7 and 9 AM which is his normal time.  If the INR is within 2.5-3.5 he will then take 7 mg of warfarin on Sunday evening.  If the INR is above 3.5 he will not take the warfarin on Sunday.  He will take his INR like usual on Monday morning at about 8 AM and contact Aletha his usual provider for additional instructions.  Currently patient has no evidence of bleeding or is not feeling bad.

## 2024-04-23 ENCOUNTER — ANTICOAGULATION VISIT (OUTPATIENT)
Dept: FAMILY MEDICINE CLINIC | Facility: CLINIC | Age: 74
End: 2024-04-23
Payer: MEDICARE

## 2024-04-23 LAB — INR PPP: 1.6 (ref 2.5–3.5)

## 2024-04-24 LAB — INR PPP: 1.9 (ref 2–3)

## 2024-04-25 ENCOUNTER — TELEPHONE (OUTPATIENT)
Dept: FAMILY MEDICINE CLINIC | Facility: CLINIC | Age: 74
End: 2024-04-25

## 2024-04-25 ENCOUNTER — ANTICOAGULATION VISIT (OUTPATIENT)
Dept: FAMILY MEDICINE CLINIC | Facility: CLINIC | Age: 74
End: 2024-04-25
Payer: MEDICARE

## 2024-04-25 NOTE — TELEPHONE ENCOUNTER
Patient message me and I advised to continue Lovenox and continue 11 mg of Coumadin and check INR in 2-day

## 2024-04-25 NOTE — TELEPHONE ENCOUNTER
Caller: [facility]    Relationship:     Best call back number: [phone]    What was the call regarding: STATED THAT THE PATIENTS INR WAS 1.9 ON 4/24/24. STATED THAT THEY COULD NOT HOLD TO BE TRANSFERRED

## 2024-04-26 ENCOUNTER — HOSPITAL ENCOUNTER (OUTPATIENT)
Dept: ULTRASOUND IMAGING | Facility: HOSPITAL | Age: 74
Discharge: HOME | End: 2024-04-26
Payer: MEDICARE

## 2024-04-26 DIAGNOSIS — I77.811 ECTATIC ABDOMINAL AORTA: ICD-10-CM

## 2024-04-26 PROCEDURE — 76775 US EXAM ABDO BACK WALL LIM: CPT

## 2024-05-01 ENCOUNTER — TRANSCRIBE ORDERS (OUTPATIENT)
Dept: ADMINISTRATIVE | Facility: HOSPITAL | Age: 74
End: 2024-05-01
Payer: MEDICARE

## 2024-05-01 DIAGNOSIS — J84.9 ILD (INTERSTITIAL LUNG DISEASE): Primary | ICD-10-CM

## 2024-05-09 LAB — INR PPP: 3.3 (ref 2.5–3.5)

## 2024-05-10 ENCOUNTER — ANTICOAGULATION VISIT (OUTPATIENT)
Dept: FAMILY MEDICINE CLINIC | Facility: CLINIC | Age: 74
End: 2024-05-10
Payer: MEDICARE

## 2024-05-10 ENCOUNTER — TELEPHONE (OUTPATIENT)
Dept: FAMILY MEDICINE CLINIC | Facility: CLINIC | Age: 74
End: 2024-05-10

## 2024-05-10 NOTE — TELEPHONE ENCOUNTER
Hub staff attempted to follow warm transfer process and was unsuccessful     Caller: PEREZ WITH [facility]    Relationship to patient:     Best call back number: [phone]    Patient is needing: PEREZ WITH [facility] CALLED WITH CRITICAL OUT OF RANGE INR.  TRIED THE OFFICE AND NO ANSWER AND PEREZ HUNG UP.

## 2024-05-17 ENCOUNTER — ANTICOAGULATION VISIT (OUTPATIENT)
Dept: FAMILY MEDICINE CLINIC | Facility: CLINIC | Age: 74
End: 2024-05-17
Payer: MEDICARE

## 2024-05-17 LAB — INR PPP: 2.5 (ref 2–3)

## 2024-05-25 ENCOUNTER — HOSPITAL ENCOUNTER (OUTPATIENT)
Facility: HOSPITAL | Age: 74
Discharge: HOME | End: 2024-05-25
Payer: MEDICARE

## 2024-05-25 DIAGNOSIS — J84.9 ILD (INTERSTITIAL LUNG DISEASE): ICD-10-CM

## 2024-05-25 PROCEDURE — 71250 CT THORAX DX C-: CPT

## 2024-05-31 ENCOUNTER — OFFICE VISIT (OUTPATIENT)
Dept: FAMILY MEDICINE CLINIC | Facility: CLINIC | Age: 74
End: 2024-05-31
Payer: MEDICARE

## 2024-05-31 VITALS
OXYGEN SATURATION: 97 % | HEART RATE: 76 BPM | RESPIRATION RATE: 16 BRPM | BODY MASS INDEX: 26.68 KG/M2 | HEIGHT: 72 IN | SYSTOLIC BLOOD PRESSURE: 126 MMHG | WEIGHT: 197 LBS | TEMPERATURE: 97.7 F | DIASTOLIC BLOOD PRESSURE: 64 MMHG

## 2024-05-31 DIAGNOSIS — J84.9 ILD (INTERSTITIAL LUNG DISEASE): ICD-10-CM

## 2024-05-31 DIAGNOSIS — R73.01 IMPAIRED FASTING GLUCOSE: ICD-10-CM

## 2024-05-31 DIAGNOSIS — M25.561 CHRONIC PAIN OF RIGHT KNEE: Primary | ICD-10-CM

## 2024-05-31 DIAGNOSIS — R97.20 RISING PSA LEVEL: ICD-10-CM

## 2024-05-31 DIAGNOSIS — Z79.891 LONG TERM (CURRENT) USE OF OPIATE ANALGESIC: ICD-10-CM

## 2024-05-31 DIAGNOSIS — E78.2 MIXED HYPERLIPIDEMIA: ICD-10-CM

## 2024-05-31 DIAGNOSIS — Z71.6 TOBACCO ABUSE COUNSELING: ICD-10-CM

## 2024-05-31 DIAGNOSIS — M79.7 FIBROMYALGIA: ICD-10-CM

## 2024-05-31 DIAGNOSIS — K21.9 GASTROESOPHAGEAL REFLUX DISEASE WITHOUT ESOPHAGITIS: ICD-10-CM

## 2024-05-31 DIAGNOSIS — G89.29 CHRONIC PAIN OF RIGHT KNEE: Primary | ICD-10-CM

## 2024-05-31 RX ORDER — TRAMADOL HYDROCHLORIDE 50 MG/1
50 TABLET ORAL EVERY 6 HOURS PRN
Qty: 90 TABLET | Refills: 2 | Status: SHIPPED | OUTPATIENT
Start: 2024-05-31 | End: 2025-05-31

## 2024-05-31 NOTE — PATIENT INSTRUCTIONS
Medicare Wellness  Personal Prevention Plan of Service     Date of Office Visit:    Encounter Provider:  Aletha Ochoa PA-C  Place of Service:  [facility]  Patient Name: Javy Reeves Sr.  :  1950    As part of the Medicare Wellness portion of your visit today, we are providing you with this personalized preventive plan of services (PPPS). This plan is based upon recommendations of the [place] Preventive Services Task Force (USPSTF) and the Advisory Committee on Immunization Practices (ACIP).    This lists the preventive care services that should be considered, and provides dates of when you are due. Items listed as completed are up-to-date and do not require any further intervention.    Health Maintenance   Topic Date Due    RSV Vaccine - Adults (1 - 1-dose 60+ series) Never done    ZOSTER VACCINE (2 of 2) 2022    COVID-19 Vaccine ( - - season) 2023    INFLUENZA VACCINE  2024    LIPID PANEL  2025    ANNUAL WELLNESS VISIT  2025    BMI FOLLOWUP  2025    TDAP/TD VACCINES (2 - Td or Tdap) 2027    COLORECTAL CANCER SCREENING  2028    HEPATITIS C SCREENING  Completed    Pneumococcal Vaccine 65+  Completed    AAA SCREEN (ONE-TIME)  Completed       Orders Placed This Encounter   Procedures    XR Knee 1 or 2 View Right     Standing Status:   Future     Number of Occurrences:   1     Order Specific Question:   Reason for Exam:     Answer:   chronic knee pain     Order Specific Question:   Release to patient     Answer:   Routine Release [[id number]]    Comprehensive metabolic panel     Order Specific Question:   Release to patient     Answer:   Routine Release [[id number]]    Lipid panel     Order Specific Question:   Release to patient     Answer:   Routine Release [[id number]]    Hemoglobin A1c     Order Specific Question:   Release to patient     Answer:   Routine Release [[id number]]    PSA DIAGNOSTIC     Order  Specific Question:   Release to patient     Answer:   Routine Release [[id number]]    Ambulatory Referral to Orthopedic Surgery     Referral Priority:   Routine     Referral Type:   Consultation     Referral Reason:   Specialty Services Required     Referred to Provider:   Alo Torres MD     Requested Specialty:   Orthopedic Surgery     Number of Visits Requested:   1       Return in about 3 months (around 8/31/2024), or if symptoms worsen or fail to improve.

## 2024-05-31 NOTE — PROGRESS NOTES
"Subjective   Javy Reeves Sr. is a 73 y.o. male.     Hyperlipidemia  Associated symptoms include myalgias.   Heartburn  He reports no choking.   Fibromyalgia  Associated symptoms include myalgias. Pertinent negatives include no diaphoresis.       Since the last visit, he has overall felt well.  He has Impaired fasting glucose and will monitor labs to watch for DMII, GERD controlled on PPI Rx, CAD and remains under care of their Cardiologist for mangement, CAD and remains on medication regimen, Vitamin D deficiency and labs are at goal >30 ng/mL, and mixed hyperlipidemia had labs 1/5/2024 LDL goal is to be 55 and it was 83 and I do have a follow-up lab plan for July .  he has been compliant with current medications have reviewed them.  The patient denies medication side effects.  Will refill medications. /64   Pulse 76   Temp 97.7 °F (36.5 °C)   Resp 16   Ht 182.9 cm (72\")   Wt 89.4 kg (197 lb)   SpO2 97%   BMI 26.72 kg/m² .  BMI is >= 25 and <30. (Overweight) The following options were offered after discussion;: weight loss educational material (shared in after visit summary), exercise counseling/recommendations, and nutrition counseling/recommendations    Weight increased just a bit from last visit      saw DR Chavez 1/29/2024 for rising PSA did exam and urinalysis noting he had BPH stable and microscopic hematuria with negative workup since 2012 to follow-up in 2-month  I Rx Ultram and Lyrica for his chronic Fibromyalgia pain.      Dr. Dalton 3-24-23 noted his concern of patient possibly developing pulmonary hypertension from prior CAD and now interstitial lung disease--- also noting his history of pulmonary embolism and did order an echo to update right ventricular systolic pressure and look at heart structure.  And ordered nuclear perfusion study  These were performed on 4/14/2023 and I reviewed results--- no evidence of ischemia normal myocardial perfusion study, EF 52%, no change from 2018 " study.  Echo with EF 56.7% normal left ventricular diastolic function and right ventricular systolic pressure less than 35 mmHg  Under DR Jamil Willoughby pulm for ILD and on  Pirfenidone---still on inhalers--- high-resolution CT on 5/25/2024 and results are still pending  ---stable  hematology notes 11-14-23 DR HERNÁNDEZ  Factor V Leiden normal prothrombin gene mutation negative rest of the hypercoagulable work-up pending  Hypercoagulable work-up done August 24, 2022 shows lupus anticoagulant negative beta-2 glycoprotein antibody negative, anticardiolipin antibody IgG is 91And IgM is less than 9, factor II prothrombin gene mutation negative factor V Leiden negative, Antithrombin III 80% as patient was on heparin due to slightly low.  Patient protein C&S was not done as he was on Coumadin and we can recheck anticardiolipin antibody in 3 months.  Last CT scan November 18, 2022 showed it was negative for pulmonary embolism  I must keep INR 2.5-3.5   EGD 8-3-23---Dx Gastritis---no H pylori   The patient has read and signed the [facility] Controlled Substance Contract.  I will continue to see patient for regular follow up appointments.  They are well controlled on their medication.  ASHLEY has been reviewed by me and is updated every 3 months. The patient is aware of the potential for addiction and dependence.      I ordered a yearly follow-up on his Ectasia of his abdominal aorta from 9/29/2023 noting on 4/26/2024 diameter  of infrarenal aorta is 3.0 cm similar to last scan.  Noted guidelines to repeat scan every 3 years when diameter is 3 to 3.4 cm  Right knee pain---using cane d/t pain ---limps---will need Xray o evaluate how much arthritis is present and have him see Ortho for evaluation and possible injection  The following portions of the patient's history were reviewed and updated as appropriate: allergies, current medications, past family history, past medical history, past social history, past surgical history, and  problem list.    Review of Systems   Constitutional:  Negative for diaphoresis.   HENT:  Negative for nosebleeds and trouble swallowing.    Eyes:  Negative for blurred vision and visual disturbance.   Respiratory:  Negative for choking.    Gastrointestinal:  Negative for blood in stool.   Musculoskeletal:  Positive for myalgias.   Allergic/Immunologic: Negative for immunocompromised state.   Neurological:  Negative for facial asymmetry and speech difficulty.   Psychiatric/Behavioral:  Negative for self-injury and suicidal ideas.        Objective   Physical Exam  Vitals and nursing note reviewed.   Constitutional:       General: He is not in acute distress.     Appearance: Normal appearance. He is well-developed. He is not diaphoretic.   HENT:      Head: Normocephalic.   Eyes:      Conjunctiva/sclera: Conjunctivae normal.      Pupils: Pupils are equal, round, and reactive to light.   Neck:      Vascular: No carotid bruit.   Cardiovascular:      Rate and Rhythm: Normal rate and regular rhythm.      Pulses: Normal pulses.      Heart sounds: Normal heart sounds. No murmur heard.  Pulmonary:      Effort: Pulmonary effort is normal.      Breath sounds: Normal breath sounds. No rales.   Musculoskeletal:         General: Normal range of motion.      Cervical back: Normal range of motion and neck supple.   Skin:     General: Skin is warm and dry.      Findings: No rash.   Neurological:      Mental Status: He is alert and oriented to person, place, and time.   Psychiatric:         Mood and Affect: Mood normal. Affect is not inappropriate.         Behavior: Behavior normal.         Thought Content: Thought content normal.         Judgment: Judgment normal.           Assessment & Plan   Diagnoses and all orders for this visit:    1. Chronic pain of right knee (Primary)  -     XR Knee 1 or 2 View Right; Future  -     XR Knee 1 or 2 View Right    2. Mixed hyperlipidemia    3. Long term (current) use of opiate analgesic    4. ILD  (interstitial lung disease)    5. Tobacco abuse counseling    6. Fibromyalgia    7. Gastroesophageal reflux disease without esophagitis    8. Impaired fasting glucose    Need eval right knee and will obtain x-ray to evaluate status of osteoarthritis and have him see Ortho for possible injection due to his severe pain and he is on Coumadin and cannot not take anti-inflammatory  Continues to take Lyrica and tramadol for fibromyalgia pain and is helping.  Tolerating well.  Discussed INR results today of 2.2 and not at goal he is already taking Lovenox and I will increase his Coumadin to 12 mg daily with INR check in in 3 to 4 days and can stop Lovenox if above 2.5  Stop smoking  Under care of of cardiology Dr. Dalton for history of CAD surveillance  Followed closely by pulmonology Dr. Willoughby for interstitial lung disease  Next abdominal aortic ultrasound is due April 2027 following up on the infrarenal abdominal aortic ectasia  Follow-up labs due in July  Plan, Javy Reeves Sr., was seen today.  he was seen for Imparied fasting glucose and plan follow up labs, diet, and exercise, GERD and will continue on PPI medication, Hyperlipidemia and will continue current medication, and Vitamin D deficiency and supplemented.  Due for follow-up PSA level per urologist Dr. Chavez note from January--

## 2024-05-31 NOTE — PROGRESS NOTES
The ABCs of the Annual Wellness Visit  Subsequent Medicare Wellness Visit    Subjective      Javy Reeves Sr. is a 73 y.o. male who presents for a Subsequent Medicare Wellness Visit.    The following portions of the patient's history were reviewed and   updated as appropriate: allergies, current medications, past family history, past medical history, past social history, past surgical history, and problem list.    Compared to one year ago, the patient feels his physical   health is the same.    Compared to one year ago, the patient feels his mental   health is the same.    Recent Hospitalizations:  He was not admitted to the hospital during the last year.       Current Medical Providers:  Patient Care Team:  Aletha Ochoa PA-C as PCP - General (Family Medicine)  Jean Coles MD as Surgeon (Neurosurgery)  Erlin West MD as Surgeon (General Surgery)  Yaneth Vasquez Jr., MD as Consulting Physician (Vascular Surgery)  Artemio Dalton MD as Consulting Physician (Cardiology)  Jamie Manley MD as Consulting Physician (Gastroenterology)  Rodolfo Eugene III, MD as Surgeon (Thoracic Surgery)  Autumn Arellano MD as Referring Physician (Hospitalist)  Harriett Madden MD as Consulting Physician (Hematology and Oncology)  Shawna Ashley MD (Vascular Surgery)  Nella Bergman MD as Surgeon (Thoracic Surgery)    Outpatient Medications Prior to Visit   Medication Sig Dispense Refill    acetaminophen (TYLENOL) 650 MG 8 hr tablet Take 1 tablet by mouth Daily.      albuterol sulfate  (90 Base) MCG/ACT inhaler Inhale 2 puffs Every 4 (Four) Hours As Needed for Wheezing or Shortness of Air. 24 g 3    atorvastatin (Lipitor) 40 MG tablet Take 1 tablet by mouth Daily. For cholesterol 90 tablet 3    cyclobenzaprine (FLEXERIL) 5 MG tablet 1-2 tabs PO TID PRN spasms 270 tablet 3    Enoxaparin Sodium (LOVENOX) 100 MG/ML solution prefilled syringe syringe Inject 0.9 mL under the skin into the  appropriate area as directed Every 12 (Twelve) Hours. Stop Lovenox when your INR is greater than 2.5 as discussed.  Indications: DVT/PE (active thrombosis), Prevention of Unwanted Clot in Veins 20 mL 11    folic acid (FOLVITE) 1 MG tablet TAKE 1 TABLET BY MOUTH DAILY 90 tablet 3    loratadine (CLARITIN) 10 MG tablet Take 1 tablet by mouth Daily.      multivitamin with minerals tablet tablet Take 1 tablet by mouth Daily. PT HOLDING FOR SURGERY      nitroglycerin (NITROSTAT) 0.4 MG SL tablet Place 1 tablet under the tongue Every 5 (Five) Minutes As Needed for Chest Pain. Take no more than 3 doses in 15 minutes. 30 tablet 5    pantoprazole (PROTONIX) 40 MG EC tablet TAKE 1 TABLET BY MOUTH DAILY FOR GERD 90 tablet 3    Pirfenidone 267 MG tablet       pregabalin (LYRICA) 150 MG capsule Take 1 capsule by mouth 2 (Two) Times a Day. For Fibromyalgia 180 capsule 1    Stiolto Respimat 2.5-2.5 MCG/ACT aerosol solution inhaler       tamsulosin (FLOMAX) 0.4 MG capsule 24 hr capsule Take 1 capsule by mouth Every Night. 30 capsule 0    tiZANidine (ZANAFLEX) 4 MG tablet Every 8 (Eight) Hours.      traMADol (Ultram) 50 MG tablet Take 1 tablet by mouth Every 6 (Six) Hours As Needed for Moderate Pain. 90 tablet 2    Turmeric 500 MG capsule as directed Orally      Umeclidinium-Vilanterol (ANORO ELLIPTA) 62.5-25 MCG/ACT aerosol powder  inhaler 1 puff Daily.      warfarin (COUMADIN) 1 MG tablet Take 1.5 tablets by mouth Daily. Take total of 11.5 mg daily---has 5mg Rx 270 tablet 3    warfarin (COUMADIN) 5 MG tablet Take 2 tablets by mouth Every Night. 180 tablet 3     No facility-administered medications prior to visit.       Opioid medication/s are on active medication list.  and I have evaluated his active treatment plan and pain score trends (see table).  Vitals:    05/31/24 0744   PainSc:   4     I have reviewed the chart for potential of high risk medication and harmful drug interactions in the elderly.          Aspirin is not on  active medication list.  Aspirin use is not indicated based on review of current medical condition/s. Risk of harm outweighs potential benefits.  .    Patient Active Problem List   Diagnosis    Arthritis    Family history of early CAD    DDD (degenerative disc disease), cervical    DVT (deep venous thrombosis)    Fibromyalgia    Past heart attack    Hyperlipidemia    DDD (degenerative disc disease), lumbosacral    History of pulmonary embolus (PE)    Reflux esophagitis    TIA (transient ischemic attack)    Left groin pain    Cervical radicular pain    Cervical disc disorder at C6-C7 level with radiculopathy    Wheezing    Colon polyps    Right lower quadrant abdominal pain    Diarrhea    Cervical spinal stenosis    Cervical disc disorder at C5-C6 level with radiculopathy    GERD (gastroesophageal reflux disease)    Diverticulosis    Old MI (myocardial infarction)    Herniated cervical disc    Encounter for therapeutic drug monitoring    Long term current use of anticoagulant therapy    ERRONEOUS ENCOUNTER--DISREGARD    Stable angina    Low folic acid    Low serum vitamin B12    Precordial chest pain    Tobacco abuse    Tobacco abuse counseling    Interstitial lung disease    History of transient ischemic attack (TIA)    Irritable bowel syndrome    DDD (degenerative disc disease), lumbar    Weight loss    Choking sensation    Failed back syndrome    Long term (current) use of opiate analgesic    Lumbar radiculopathy    Lumbar spondylosis    Chronic pain disorder    History of colon polyps    Acute chest pain    Recurrent pulmonary embolism    Subtherapeutic international normalized ratio (INR)    Bronchiectasis without complication    Abdominal pain    Presence of IVC filter    Weight loss, unintentional    ILD (interstitial lung disease)    Tobacco use disorder    Ectatic abdominal aorta     Advance Care Planning   Advance Care Planning     Advance Directive is not on file.  ACP discussion was held with the patient  "during this visit. Patient does not have an advance directive, declines further assistance.     Objective    Vitals:    24 0744   BP: 126/64   Pulse: 76   Resp: 16   Temp: 97.7 °F (36.5 °C)   SpO2: 97%   Weight: 89.4 kg (197 lb)   Height: 182.9 cm (72\")   PainSc:   4     Estimated body mass index is 26.72 kg/m² as calculated from the following:    Height as of this encounter: 182.9 cm (72\").    Weight as of this encounter: 89.4 kg (197 lb).    BMI is >= 25 and <30. (Overweight) The following options were offered after discussion;: weight loss educational material (shared in after visit summary), exercise counseling/recommendations, and nutrition counseling/recommendations      Does the patient have evidence of cognitive impairment?   No  Declines mini cog            HEALTH RISK ASSESSMENT    Smoking Status:  Social History     Tobacco Use   Smoking Status Some Days    Current packs/day: 0.50    Average packs/day: 0.5 packs/day for 30.0 years (15.0 ttl pk-yrs)    Types: Cigarettes    Passive exposure: Never   Smokeless Tobacco Never   Tobacco Comments    Since 23 years old     Alcohol Consumption:  Social History     Substance and Sexual Activity   Alcohol Use Not Currently    Comment: RARELY     Fall Risk Screen:    CYRUS Fall Risk Assessment was completed, and patient is at LOW risk for falls.Assessment completed on:2024    Depression Screenin/31/2024     7:00 AM   PHQ-2/PHQ-9 Depression Screening   Little Interest or Pleasure in Doing Things 0-->not at all   Feeling Down, Depressed or Hopeless 0-->not at all   PHQ-9: Brief Depression Severity Measure Score 0       Health Habits and Functional and Cognitive Screenin/31/2024     7:00 AM   Functional & Cognitive Status   Do you have difficulty preparing food and eating? No   Do you have difficulty bathing yourself, getting dressed or grooming yourself? No   Do you have difficulty using the toilet? No   Do you have difficulty moving " around from place to place? No   Do you have trouble with steps or getting out of a bed or a chair? No   Current Diet Well Balanced Diet   Dental Exam Up to date   Eye Exam Not up to date   Exercise (times per week) 7 times per week   Current Exercises Include Walking   Do you need help using the phone?  No   Are you deaf or do you have serious difficulty hearing?  No   Do you need help to go to places out of walking distance? No   Do you need help shopping? No   Do you need help preparing meals?  No   Do you need help with housework?  No   Do you need help with laundry? No   Do you need help taking your medications? No   Do you need help managing money? No   Do you ever drive or ride in a car without wearing a seat belt? No   Have you felt unusual stress, anger or loneliness in the last month? No   Who do you live with? Spouse   If you need help, do you have trouble finding someone available to you? No   Have you been bothered in the last four weeks by sexual problems? No   Do you have difficulty concentrating, remembering or making decisions? No       Age-appropriate Screening Schedule:  Refer to the list below for future screening recommendations based on patient's age, sex and/or medical conditions. Orders for these recommended tests are listed in the plan section. The patient has been provided with a written plan.    Health Maintenance   Topic Date Due    RSV Vaccine - Adults (1 - 1-dose 60+ series) Never done    ZOSTER VACCINE (2 of 2) 11/25/2022    COVID-19 Vaccine (7 - 2023-24 season) 09/01/2023    BMI FOLLOWUP  05/19/2024    INFLUENZA VACCINE  08/01/2024    LIPID PANEL  01/05/2025    ANNUAL WELLNESS VISIT  05/31/2025    TDAP/TD VACCINES (2 - Td or Tdap) 08/27/2027    COLORECTAL CANCER SCREENING  09/13/2028    HEPATITIS C SCREENING  Completed    Pneumococcal Vaccine 65+  Completed    AAA SCREEN (ONE-TIME)  Completed                  CMS Preventative Services Quick Reference  Risk Factors Identified During  Encounter:    Tobacco Use/Dependance Risk (use dotphrase .tobaccocessation for documentation)    The above risks/problems have been discussed with the patient.  Pertinent information has been shared with the patient in the After Visit Summary.    There are no diagnoses linked to this encounter.    Follow Up:   Next Medicare Wellness visit to be scheduled in 1 year.      An After Visit Summary and PPPS were made available to the patient.

## 2024-06-05 LAB — INR PPP: 2.4 (ref 2.5–3.5)

## 2024-06-06 ENCOUNTER — ANTICOAGULATION VISIT (OUTPATIENT)
Dept: FAMILY MEDICINE CLINIC | Facility: CLINIC | Age: 74
End: 2024-06-06
Payer: MEDICARE

## 2024-06-13 ENCOUNTER — LAB (OUTPATIENT)
Facility: HOSPITAL | Age: 74
End: 2024-06-13
Payer: MEDICARE

## 2024-06-13 ENCOUNTER — HOSPITAL ENCOUNTER (OUTPATIENT)
Facility: HOSPITAL | Age: 74
Discharge: HOME | End: 2024-06-13
Payer: MEDICARE

## 2024-06-13 LAB
ALBUMIN SERPL-MCNC: 4 G/DL (ref 3.5–5.2)
ALBUMIN/GLOB SERPL: 1.2 G/DL
ALP SERPL-CCNC: 66 U/L (ref 39–117)
ALT SERPL W P-5'-P-CCNC: 20 U/L (ref 1–41)
ANION GAP SERPL CALCULATED.3IONS-SCNC: 7.3 MMOL/L (ref 5–15)
AST SERPL-CCNC: 26 U/L (ref 1–40)
BILIRUB SERPL-MCNC: 0.4 MG/DL (ref 0–1.2)
BUN SERPL-MCNC: 13 MG/DL (ref 8–23)
BUN/CREAT SERPL: 12.6 (ref 7–25)
CALCIUM SPEC-SCNC: 9.1 MG/DL (ref 8.6–10.5)
CHLORIDE SERPL-SCNC: 105 MMOL/L (ref 98–107)
CHOLEST SERPL-MCNC: 137 MG/DL (ref 0–200)
CO2 SERPL-SCNC: 25.7 MMOL/L (ref 22–29)
CREAT SERPL-MCNC: 1.03 MG/DL (ref 0.76–1.27)
EGFRCR SERPLBLD CKD-EPI 2021: 77.7 ML/MIN/1.73
GLOBULIN UR ELPH-MCNC: 3.4 GM/DL
GLUCOSE SERPL-MCNC: 97 MG/DL (ref 65–99)
HBA1C MFR BLD: 5.8 % (ref 4.8–5.6)
HDLC SERPL-MCNC: 37 MG/DL (ref 40–60)
INR PPP: 3.5 (ref 2.5–3.5)
LDLC SERPL CALC-MCNC: 84 MG/DL (ref 0–100)
LDLC/HDLC SERPL: 2.27 {RATIO}
POTASSIUM SERPL-SCNC: 4.2 MMOL/L (ref 3.5–5.2)
PROT SERPL-MCNC: 7.4 G/DL (ref 6–8.5)
PSA SERPL-MCNC: 1.65 NG/ML (ref 0–4)
SODIUM SERPL-SCNC: 138 MMOL/L (ref 136–145)
TRIGL SERPL-MCNC: 80 MG/DL (ref 0–150)
VLDLC SERPL-MCNC: 16 MG/DL (ref 5–40)

## 2024-06-13 PROCEDURE — 80061 LIPID PANEL: CPT | Performed by: PHYSICIAN ASSISTANT

## 2024-06-13 PROCEDURE — 80053 COMPREHEN METABOLIC PANEL: CPT | Performed by: PHYSICIAN ASSISTANT

## 2024-06-13 PROCEDURE — 84153 ASSAY OF PSA TOTAL: CPT | Performed by: PHYSICIAN ASSISTANT

## 2024-06-13 PROCEDURE — 73560 X-RAY EXAM OF KNEE 1 OR 2: CPT

## 2024-06-13 PROCEDURE — 83036 HEMOGLOBIN GLYCOSYLATED A1C: CPT | Performed by: PHYSICIAN ASSISTANT

## 2024-06-14 ENCOUNTER — OFFICE VISIT (OUTPATIENT)
Dept: ORTHOPEDIC SURGERY | Facility: CLINIC | Age: 74
End: 2024-06-14
Payer: MEDICARE

## 2024-06-14 VITALS — TEMPERATURE: 98 F | WEIGHT: 192.3 LBS | BODY MASS INDEX: 26.05 KG/M2 | HEIGHT: 72 IN

## 2024-06-14 DIAGNOSIS — M17.11 PRIMARY OSTEOARTHRITIS OF RIGHT KNEE: ICD-10-CM

## 2024-06-14 DIAGNOSIS — R52 PAIN: Primary | ICD-10-CM

## 2024-06-14 PROCEDURE — 99203 OFFICE O/P NEW LOW 30 MIN: CPT | Performed by: ORTHOPAEDIC SURGERY

## 2024-06-14 NOTE — PROGRESS NOTES
Patient ID: Javy Reeves Sr.     Chief Complaint:    Chief Complaint   Patient presents with    Right Knee - Establish Care, Pain, Initial Evaluation        HPI:    Javy Reeves Sr. is a 73 y.o. who presents today for evaluation of right knee pain.  Patient states she has had symptoms on and off for few years.  Was worse recently.  However since a week ago he feels much improved today.  When things are bothering him is generalized knee pain.  Does have history of a left total knee replacement done sometime ago is doing well with that.  States he did have some blood clots including a PE around that time and now has been on chronic warfarin.    Social History     Socioeconomic History    Marital status:    Tobacco Use    Smoking status: Some Days     Current packs/day: 0.50     Average packs/day: 0.5 packs/day for 30.0 years (15.0 ttl pk-yrs)     Types: Cigarettes     Passive exposure: Never    Smokeless tobacco: Never    Tobacco comments:     Since 23 years old   Vaping Use    Vaping status: Never Used   Substance and Sexual Activity    Alcohol use: Not Currently     Comment: RARELY    Drug use: Never    Sexual activity: Not Currently     Partners: Female     Birth control/protection: Tubal ligation     Comment: Wife     Past Medical History:   Diagnosis Date    Acute and subacute infective endocarditis in diseases classified elsewhere     Allergic     Arthritis     Asthma 04/26/2021    Chest pain     Chronic low back pain     Chronic pain     Clotting disorder     Colon polyps     Coronary artery disease     DDD (degenerative disc disease), cervical     DDD (degenerative disc disease), lumbosacral     Diverticulosis     DVT (deep venous thrombosis) 1996    Left Lower extremity following arthroscopic knee surgery in 1996. IVC fliter placed at that time.    Encounter for special screening examination for neoplasm of prostate 2012    Eye exam, routine > 5 yrs ago    Eye exam, routine 01/2015     "Fibromyalgia     Fibromyositis     GERD (gastroesophageal reflux disease)     HIV disease     PT STATES \"NEVER DIAGNOSED\"    Hyperlipidemia     ILD (interstitial lung disease)     Injury of back     Injury of neck     Irritable bowel     Limited joint range of motion     NECK    Lumbar stenosis     MI (myocardial infarction)     Neck pain     Pain in limb     Past heart attack     Postlaminectomy syndrome of lumbar region     Pulmonary embolism 1996    Left Lower extremity following arthroscopic knee surgery in 1996. IVC fliter placed at that time.    Reflux esophagitis     Shortness of breath     WITH EXCERTION    Sleep apnea     NO CPAP    Stroke     TIA (transient ischemic attack)      Family History   Problem Relation Age of Onset    Diabetes Sister     Cancer Sister     Hypertension Sister         Covid    Kidney disease Sister     Stroke Sister     Heart disease Brother     Hypertension Brother     Cerebral aneurysm Brother     Sudden death Brother     Bleeding Disorder Brother     Cancer Mother     Heart disease Father         Stroke    Cancer Father         malignant neoplasm    Deep vein thrombosis Father     Heart attack Father     Malig Hyperthermia Neg Hx        ROS:    ROS:  Constitutional:  Denies fever, shaking or chills         All other pertinent positives and negatives as noted above in HPI.    Physical Exam:     Vital Signs:  Temp 98 °F (36.7 °C) (Temporal)   Ht 182.9 cm (72\")   Wt 87.2 kg (192 lb 4.8 oz)   BMI 26.08 kg/m²   Constitutional: Awake alert and oriented x3, well developed, well nourished, no acute distress, non-toxic appearance.      Musculoskeletal:    Exam of the right  knee:  Painful gait with a subtle limp  No muscle atrophy, erythema, ecchymosis, or gross deformity noted  no knee effusion  No  joint line tenderness  Active range of motion 3-120  5/5 strength flexion and extension  The knee is stable to varus and valgus stress testing  Mild varus alignment of the limb  Lachman " negative  Posterior drawer negative  Danielle's negative  Patellofemoral grind +  Sensation grossly intact to light tough throughout the lower extremity  Skin is intact  Distal pulses are 2+  No signs or symptoms of DVT      Diagnostic Studies:     Imaging was personally and individually reviewed and discussed at length with the patient:    4V right knee(s) were taken in the office today, including AP, flexion PA, lateral, and sunrise views to evaluate the patient's complaint:  Weight bearing views show moderate degenerative changes in all three compartments with the medial compartment being most affected severe, bone-on-bone.  There is early osteophyte formation throughout all three compartments.  There is no evidence of fracture or dislocation.  No periosteal reactions or medullary lesions are seen.  Patellar height and alignment are within normal limits.     Imaging does show previous left total knee implants seem to be in satisfactory position .    Comparison films not available    AP pelvis was taken in the office today: Minimal to mild degenerative changes Azul bone sclerosis otherwise joint space maintained bilateral hips.            Assessment:     right  Knee Osteoarthritis            Plan:     Based on x-rays, history, and office evaluation, we have diagnosed Javy Reeves  with knee arthritis. At this time, we recommend starting with a conservative treatment program. This will consist of cortisone injection during significant flare-ups, NSAIDS for daily maintenance, physical therapy for strengthening and modalities as indicated, and bracing when appropriate. These measures will continue, until symptoms are no longer relieved, become more severe or function begins to significantly deteriorate. At that point joint replacement options will be discussed.    Patient discussed with his primary doctor about using some topical anti-inflammatory gel.  He may try some heel wedges.    Follow up in 6 months  unless symptoms return or a new issue occurs.  Patient will call the office to schedule an appointment.     All questions were answered, the patient understands and agrees with the plan.

## 2024-06-18 ENCOUNTER — ANTICOAGULATION VISIT (OUTPATIENT)
Dept: FAMILY MEDICINE CLINIC | Facility: CLINIC | Age: 74
End: 2024-06-18
Payer: MEDICARE

## 2024-06-19 ENCOUNTER — ANTICOAGULATION VISIT (OUTPATIENT)
Dept: FAMILY MEDICINE CLINIC | Facility: CLINIC | Age: 74
End: 2024-06-19
Payer: MEDICARE

## 2024-06-19 LAB — INR PPP: 3.7 (ref 2.5–3.5)

## 2024-06-22 LAB — INR PPP: 4.1 (ref 2.5–3.5)

## 2024-06-24 ENCOUNTER — TRANSCRIBE ORDERS (OUTPATIENT)
Dept: ADMINISTRATIVE | Facility: HOSPITAL | Age: 74
End: 2024-06-24
Payer: MEDICARE

## 2024-06-24 DIAGNOSIS — J84.9 ILD (INTERSTITIAL LUNG DISEASE): Primary | ICD-10-CM

## 2024-06-25 ENCOUNTER — ANTICOAGULATION VISIT (OUTPATIENT)
Dept: FAMILY MEDICINE CLINIC | Facility: CLINIC | Age: 74
End: 2024-06-25
Payer: MEDICARE

## 2024-06-27 ENCOUNTER — ANTICOAGULATION VISIT (OUTPATIENT)
Dept: FAMILY MEDICINE CLINIC | Facility: CLINIC | Age: 74
End: 2024-06-27
Payer: MEDICARE

## 2024-06-27 ENCOUNTER — NURSE TRIAGE (OUTPATIENT)
Dept: CALL CENTER | Facility: HOSPITAL | Age: 74
End: 2024-06-27
Payer: MEDICARE

## 2024-06-27 LAB — INR PPP: 4.2 (ref 2.5–3.5)

## 2024-06-27 NOTE — TELEPHONE ENCOUNTER
"Called to give INR to office. HUB transferred bc they could not get anyone to answer. Rang for four minutes with no answer and then busy signal. Caller had hung up.     Reason for Disposition   [1] Caller requesting NON-URGENT health information AND [2] PCP's office is the best resource    Additional Information   Negative: [1] Caller is not with the adult (patient) AND [2] reporting urgent symptoms   Negative: Lab result questions   Negative: Medication questions   Negative: Caller can't be reached by phone   Negative: Caller has already spoken to PCP or another triager   Negative: RN needs further essential information from caller in order to complete triage   Negative: Requesting regular office appointment    Answer Assessment - Initial Assessment Questions  1. REASON FOR CALL or QUESTION: \"What is your reason for calling today?\" or \"How can I best help you?\" or \"What question do you have that I can help answer?\"      Called to give INR to office. HUB transferred bc they could not get anyone to answer. Rang for four minutes with no answer and then busy signal. Caller had hung up.    Protocols used: Information Only Call - No Triage-ADULT-    "

## 2024-07-18 LAB — INR PPP: 2.8 (ref 2–3)

## 2024-07-22 ENCOUNTER — ANTICOAGULATION VISIT (OUTPATIENT)
Dept: FAMILY MEDICINE CLINIC | Facility: CLINIC | Age: 74
End: 2024-07-22
Payer: MEDICARE

## 2024-07-26 ENCOUNTER — TELEPHONE (OUTPATIENT)
Dept: FAMILY MEDICINE CLINIC | Facility: CLINIC | Age: 74
End: 2024-07-26

## 2024-07-26 NOTE — TELEPHONE ENCOUNTER
Hub staff attempted to follow warm transfer process and was unsuccessful     Caller: [facility]HIJ[facility]    Best call back number: [phone]    Patient is needing: PATIENT OUT OF RANGE INR READING, 3.6    ATTEMPT TO WARM TRANSFER TO PRACTICE, NO ANSWER.

## 2024-07-26 NOTE — TELEPHONE ENCOUNTER
Patient contacted me let me know he took a 10 mg Coumadin last night and we are making new plan to alternate 11 mg with 11.5 mg with INR check in a week.  Patient again aware

## 2024-08-02 ENCOUNTER — TELEPHONE (OUTPATIENT)
Dept: FAMILY MEDICINE CLINIC | Facility: CLINIC | Age: 74
End: 2024-08-02

## 2024-08-02 ENCOUNTER — ANTICOAGULATION VISIT (OUTPATIENT)
Dept: FAMILY MEDICINE CLINIC | Facility: CLINIC | Age: 74
End: 2024-08-02
Payer: MEDICARE

## 2024-08-02 LAB — INR PPP: 3.5 (ref 2.5–3.5)

## 2024-08-02 NOTE — TELEPHONE ENCOUNTER
Caller: JAQUELIN- SHREYAS    Relationship: Lab    Best call back number: [phone]     What was the call regarding: JAQUELIN WITH [facility] IS CALLING TO REPORT PATIENT'S INR. JAQUELIN STATES IT WAS 3.5 YESTERDAY, 08/01/24.

## 2024-08-05 RX ORDER — ATORVASTATIN CALCIUM 40 MG/1
40 TABLET, FILM COATED ORAL DAILY
Qty: 90 TABLET | Refills: 3 | OUTPATIENT
Start: 2024-08-05

## 2024-08-12 LAB — INR PPP: 2.6 (ref 2.5–3.5)

## 2024-08-13 ENCOUNTER — PRIOR AUTHORIZATION (OUTPATIENT)
Dept: FAMILY MEDICINE CLINIC | Facility: CLINIC | Age: 74
End: 2024-08-13
Payer: MEDICARE

## 2024-08-13 ENCOUNTER — ANTICOAGULATION VISIT (OUTPATIENT)
Dept: FAMILY MEDICINE CLINIC | Facility: CLINIC | Age: 74
End: 2024-08-13
Payer: MEDICARE

## 2024-08-19 LAB — INR PPP: 3.1 (ref 2.5–3.5)

## 2024-08-20 ENCOUNTER — ANTICOAGULATION VISIT (OUTPATIENT)
Dept: FAMILY MEDICINE CLINIC | Facility: CLINIC | Age: 74
End: 2024-08-20
Payer: MEDICARE

## 2024-08-20 ENCOUNTER — TELEPHONE (OUTPATIENT)
Dept: FAMILY MEDICINE CLINIC | Facility: CLINIC | Age: 74
End: 2024-08-20
Payer: MEDICARE

## 2024-08-20 NOTE — TELEPHONE ENCOUNTER
His INR goal is 2.5-3.5 and he message me this morning.   Goal was met and I took him off Lovenox and he is taking 10 mg daily and check a lab again in 3 days

## 2024-08-22 ENCOUNTER — OFFICE VISIT (OUTPATIENT)
Dept: ORTHOPEDIC SURGERY | Facility: CLINIC | Age: 74
End: 2024-08-22
Payer: MEDICARE

## 2024-08-22 VITALS — TEMPERATURE: 98.7 F | BODY MASS INDEX: 25.87 KG/M2 | WEIGHT: 191 LBS | HEIGHT: 72 IN

## 2024-08-22 DIAGNOSIS — M17.11 PRIMARY OSTEOARTHRITIS OF RIGHT KNEE: ICD-10-CM

## 2024-08-22 DIAGNOSIS — R52 PAIN: ICD-10-CM

## 2024-08-22 DIAGNOSIS — M54.16 LUMBAR RADICULOPATHY: Primary | ICD-10-CM

## 2024-08-22 RX ADMIN — LIDOCAINE HYDROCHLORIDE 2 ML: 10 INJECTION, SOLUTION EPIDURAL; INFILTRATION; INTRACAUDAL; PERINEURAL at 14:05

## 2024-08-22 RX ADMIN — METHYLPREDNISOLONE ACETATE 80 MG: 80 INJECTION, SUSPENSION INTRA-ARTICULAR; INTRALESIONAL; INTRAMUSCULAR; SOFT TISSUE at 14:05

## 2024-08-22 NOTE — PROGRESS NOTES
Patient: Javy Reeves Sr.  YOB: 1950 73 y.o. male  Medical Record Number: [id number]    Chief Complaints:   Chief Complaint   Patient presents with    Right Knee - Pain, Follow-up    Injections       History of Present Illness:Javy Reeves Sr. is a 73 y.o. male who presents for follow-up of right knee pain.  Patient also states he has pain that goes from his buttock all the way down to the bottom of his foot.  He does have some generalized knee pain and swelling at times.  But does report that the leg pain has been more of an issue for him.  Denies any distal numbness or tingling.  Denies any loss of strength.  No bowel or bladder issues.  Patient does state he had history of lumbar surgery.    Allergies:   Allergies   Allergen Reactions    Zinc Other (See Comments) and Diarrhea     Patient tested positive, had to have hardware removed from back.    Chantix [Varenicline] Other (See Comments)     Headache    Hydrocodone-Acetaminophen GI Intolerance       Medications:   Current Outpatient Medications   Medication Sig Dispense Refill    acetaminophen (TYLENOL) 650 MG 8 hr tablet Take 1 tablet by mouth Daily.      albuterol sulfate  (90 Base) MCG/ACT inhaler Inhale 2 puffs Every 4 (Four) Hours As Needed for Wheezing or Shortness of Air. 24 g 3    atorvastatin (Lipitor) 80 MG tablet Take 1 tablet by mouth Daily. For cholesterol and CAD, new dose 90 tablet 3    cyclobenzaprine (FLEXERIL) 5 MG tablet 1-2 tabs PO TID PRN spasms 270 tablet 3    Enoxaparin Sodium (LOVENOX) 100 MG/ML solution prefilled syringe syringe Inject 0.9 mL under the skin into the appropriate area as directed Every 12 (Twelve) Hours. Stop Lovenox when your INR is greater than 2.5 as discussed.  Indications: DVT/PE (active thrombosis), Prevention of Unwanted Clot in Veins 20 mL 11    folic acid (FOLVITE) 1 MG tablet TAKE 1 TABLET BY MOUTH DAILY 90 tablet 3    loratadine (CLARITIN) 10 MG tablet Take 1 tablet by mouth  "Daily.      multivitamin with minerals tablet tablet Take 1 tablet by mouth Daily. PT HOLDING FOR SURGERY      nitroglycerin (NITROSTAT) 0.4 MG SL tablet Place 1 tablet under the tongue Every 5 (Five) Minutes As Needed for Chest Pain. Take no more than 3 doses in 15 minutes. 30 tablet 5    pantoprazole (PROTONIX) 40 MG EC tablet TAKE 1 TABLET BY MOUTH DAILY FOR GERD 90 tablet 3    Pirfenidone 267 MG tablet       pregabalin (LYRICA) 150 MG capsule Take 1 capsule by mouth 2 (Two) Times a Day. For Fibromyalgia 180 capsule 1    Stiolto Respimat 2.5-2.5 MCG/ACT aerosol solution inhaler       traMADol (Ultram) 50 MG tablet Take 1 tablet by mouth Every 6 (Six) Hours As Needed for Moderate Pain. 90 tablet 2    Turmeric 500 MG capsule as directed Orally      Umeclidinium-Vilanterol (ANORO ELLIPTA) 62.5-25 MCG/ACT aerosol powder  inhaler 1 puff Daily.      warfarin (COUMADIN) 1 MG tablet Take 1.5 tablets by mouth Daily. Take total of 11.5 mg daily---has 5mg Rx 270 tablet 3    warfarin (COUMADIN) 5 MG tablet Take 2 tablets by mouth Every Night. 180 tablet 3    tamsulosin (FLOMAX) 0.4 MG capsule 24 hr capsule Take 1 capsule by mouth Every Night. (Patient not taking: Reported on 6/14/2024) 30 capsule 0    tiZANidine (ZANAFLEX) 4 MG tablet Every 8 (Eight) Hours. (Patient not taking: Reported on 6/14/2024)       No current facility-administered medications for this visit.         The following portions of the patient's history were reviewed and updated as appropriate: allergies, current medications, past family history, past medical history, past social history, past surgical history and problem list.    Review of Systems:   A 14 point review of systems was performed. All systems negative except pertinent positives/negative listed in HPI above    Physical Exam:   Vitals:    08/22/24 1401   Temp: 98.7 °F (37.1 °C)   TempSrc: Temporal   Weight: 86.6 kg (191 lb)   Height: 182.9 cm (72\")   PainSc:   1   PainLoc: Knee       General: A " and O x 3, ASA, NAD    SCLERA:    Normal    DENTITION:   Normal    Right knee exam shows some minimal tenderness and some swelling about the knee otherwise no significant changes.    Right lower extremity examined gentle hip range of motion well-tolerated motor and sensory intact distally.      Radiology:        Lumbar spine films taken and reviewed    Imaging demonstrates previous surgery about L4-L5 L5-S1 with interbody spacer placement.  Evidence of possible foraminal changes as well as degenerative changes of L3-L4 with anterior spondylolisthesis noted.  Also degenerative changes at L2-L3.    Assessment/Plan:  Lumbar radiculopathy, right knee osteoarthritis      I discussed the findings with the patient regards his knee I do think he benefit from a steroid injection we will proceed with that and then he can consider a new conservative treatment.  Regard to the leg pain I think is more back related given his history and findings on imaging for this we will make referral to our spine nurse practitioner.  Patient expressed understanding this.  There is interim we will go ahead and initiate some formal therapy.  Patient agrees with plan.    Large Joint Arthrocentesis: R knee  Date/Time: 8/22/2024 2:05 PM  Consent given by: patient  Site marked: site marked  Timeout: Immediately prior to procedure a time out was called to verify the correct patient, procedure, equipment, support staff and site/side marked as required   Supporting Documentation  Indications: pain   Procedure Details  Location: knee - R knee  Preparation: Patient was prepped and draped in the usual sterile fashion  Needle gauge: 21g.  Approach: lateral  Medications administered: 2 mL lidocaine PF 1% 1 %; 80 mg methylPREDNISolone acetate 80 MG/ML  Patient tolerance: patient tolerated the procedure well with no immediate complications

## 2024-08-23 RX ORDER — LIDOCAINE HYDROCHLORIDE 10 MG/ML
2 INJECTION, SOLUTION EPIDURAL; INFILTRATION; INTRACAUDAL; PERINEURAL
Status: COMPLETED | OUTPATIENT
Start: 2024-08-22 | End: 2024-08-22

## 2024-08-23 RX ORDER — METHYLPREDNISOLONE ACETATE 80 MG/ML
80 INJECTION, SUSPENSION INTRA-ARTICULAR; INTRALESIONAL; INTRAMUSCULAR; SOFT TISSUE
Status: COMPLETED | OUTPATIENT
Start: 2024-08-22 | End: 2024-08-22

## 2024-08-23 NOTE — PROGRESS NOTES
Patient Name: Javy Reeves Sr.   YOB: 1950  Referring Primary Care Physician: Aletha Ochoa PA-C      Chief Complaint:    Chief Complaint   Patient presents with    Lumbar Spine - Initial Evaluation, Pain        Previous Treatment:   [facility] per TJS  Former patient of Dr. Yang's last seen 03/17/2017  PT for back pain? NO    MRI:   03/16/2018 - MRI of C-Spine W/O ()   07/21/2015 - MRI of L-Spine     Neurosurgery:   [id number] - [facility] NEUROSURGERY - Jean Coles MD - Helena Garza, APRPARUL    Spine Surgery:   10/15/2018 - CERVICAL DISCECTOMY ANTERIOR FUSION - Jean Coles MD    Pain Management:   2018 - [facility] PAIN MGT - TATIANA x 3      Back Pain  This is a chronic problem. The current episode started more than 1 year ago. The problem occurs intermittently. The problem has been comes and goes since onset. The pain is present in the lumbar spine. The quality of the pain is described as aching, stabbing and shooting. The pain radiates to the right buttock, right thigh, right anterolateral lower leg and right foot (rt hip, rt groin). The pain is at a severity of 3/10. The pain is severe. The symptoms are aggravated by standing (stairs). Associated symptoms include leg pain, numbness, tingling and weakness. (RLE) He has tried muscle relaxant, NSAIDs, ice and home exercises for the symptoms. The treatment provided mild relief.        HPI:  Javy Reeves Sr. is a 73 y.o. male who presents to [facility] for evaluation of above complaints.  Primary complaint of low back pain referring to the right buttock, groin, thigh terminating at the foot.  He has a history of L4-5 fusion with Dr. Philippe in the distant past followed by removal of hardware by Dr. Cervantes due to concern for hardware allergy.  Recently saw Dr Torres for right knee pain and based on symptoms this was felt to be more radicular in nature so was referred for  further evaluation and treatment.  Also has prior history of ACDF with Dr. Coles.  This is an established patient to the practice, new to me.  He is unaccompanied today.  Prior pertinent records were reviewed.    PFSH:  See attached    ROS: As per HPI, otherwise negative    Objective:      73 y.o. male  Body mass index is 24.94 kg/m²., 83.4 kg (183 lb 14.4 oz), @HT@  Vitals:    08/26/24 0854   Temp: 97.8 °F (36.6 °C)     Pain Score    08/26/24 0854   PainSc:   3   PainLoc: Back            Spine Musculoskeletal Exam    Gait    Gait is normal.    Inspection    Coronal balance: no imbalance    Sagittal balance: no imbalance    Palpation    Thoracolumbar    Right      Muscle tone: normal    Left      Muscle tone: normal    Strength    Thoracolumbar    Thoracolumbar motor exam is normal.       Sensory    Thoracolumbar    Thoracolumbar sensation is normal.    Reflexes    Right      Quadriceps: 0/4      Achilles: 0/4     Left      Quadriceps: 0/4      Achilles: 0/4    Special Tests    Thoracolumbar      Right      SLR: no back or leg pain      Left      SLR: no back or leg pain    General      Constitutional: well-developed and well-nourished    Scleral icterus: no    Labored breathing: no    Psychiatric: normal mood and affect and no acute distress    Neurological: alert and oriented x3    Skin: intact        IMAGING:       No imaging in office today.  He did have lumbar films in office 08/22/2024 that revealed interbody devices L4-5 and L5-S1, degenerative change L3-4 and L2-3 with anterolisthesis of L3 and respect L2 and L4, vena cava filter present    Assessment:           Diagnoses and all orders for this visit:    1. Lumbar radiculopathy (Primary)  -     Cancel: Ambulatory Referral to Physical Therapy for Evaluation & Treatment  -     Cancel: Ambulatory Referral to Physical Therapy for Evaluation & Treatment  -     Ambulatory Referral to Physical Therapy for Evaluation & Treatment             Plan:  His pattern  of right leg pain does seem more radicular in nature.  He has full strength and sensation so will refer to physical therapy and see him back in 6 weeks.  If still symptomatic, will submit for lumbar MRI with and without contrast.  May try epidural injections as a next level of treatment or depending on results, may refer him back to Dr. Coles for surgical options.  He will call in the meantime if symptoms worsen and we will proceed with MRI at that point.      Return in about 6 weeks (around 10/7/2024).    EMR Dragon/Transcription Disclaimer:   Much of this encounter note is an electronic transcription/translation of spoken language to printed text. The electronic translation of spoken language may permit erroneous, or at times, nonsensical words or phrases to be inadvertently transcribed; Although I have reviewed the note for such errors, some may still exist.  Red flags have been discussed at this or previous visits to include but not limited weakness in extremities, worsening pain that does not respond to conservative treatment and bowel or bladder dysfunction. These are reasons to present to ER and patient has been informed.    The diagnosis(es), natural history, pathophysiology and treatment for diagnosis(es) were discussed. Opportunity given and questions answered. Biomechanics of pertinent body areas discussed.    EXERCISES:  Advice on benefits of, and types of regular/moderate exercise pertaining to diagnosis.  Continue HEP. For back or neck pain, recommend pilates and or yoga as tolerated. Generally it is best to start any new exercise under the guidance of a  or therapist.   MEDICATIONS:  When prescribe, the risks, benefits, warnings,side effects and alternatives of medications discussed. Advised against long term use of narcotics.   PAIN CONTROL:  Cold, heat, OTC lidocaine patches and/or ointment as needed. Avoid direct skin contact with ice. Ice 15-20 minutes 3-4 times daily as needed. For SI  joint pain, recommend ice bath in water about 50 degrees for 5 consecutive days, add ice slowly to help with adjustment and may cover with warm towel or robe to help with cold tolerance. If using lidocaine, do not apply heat in conjunction as this can cause a burn.   MEDICAL RECORDS reviewed from other provider(s) for past and current medical history pertinent to this visit..

## 2024-08-26 ENCOUNTER — OFFICE VISIT (OUTPATIENT)
Dept: ORTHOPEDIC SURGERY | Facility: CLINIC | Age: 74
End: 2024-08-26
Payer: MEDICARE

## 2024-08-26 VITALS — BODY MASS INDEX: 24.91 KG/M2 | WEIGHT: 183.9 LBS | TEMPERATURE: 97.8 F | HEIGHT: 72 IN

## 2024-08-26 DIAGNOSIS — M54.16 LUMBAR RADICULOPATHY: Primary | ICD-10-CM

## 2024-08-26 PROCEDURE — 99213 OFFICE O/P EST LOW 20 MIN: CPT | Performed by: NURSE PRACTITIONER

## 2024-08-26 PROCEDURE — 1160F RVW MEDS BY RX/DR IN RCRD: CPT | Performed by: NURSE PRACTITIONER

## 2024-08-26 PROCEDURE — 1159F MED LIST DOCD IN RCRD: CPT | Performed by: NURSE PRACTITIONER

## 2024-08-29 ENCOUNTER — TELEPHONE (OUTPATIENT)
Dept: FAMILY MEDICINE CLINIC | Facility: CLINIC | Age: 74
End: 2024-08-29
Payer: MEDICARE

## 2024-08-29 ENCOUNTER — ANTICOAGULATION VISIT (OUTPATIENT)
Dept: FAMILY MEDICINE CLINIC | Facility: CLINIC | Age: 74
End: 2024-08-29
Payer: MEDICARE

## 2024-08-29 LAB
INR PPP: 4.5 (ref 2.5–3.5)
INR PPP: 4.5 (ref 2.5–3.5)

## 2024-08-29 NOTE — TELEPHONE ENCOUNTER
Boris called from [facility] IN[facility] and pt's inr is out of range at 4.5.     Called and spoke with pt to see what he has been taking of warfarin. He has been taking 10.5 mg daily. Please advise?

## 2024-08-29 NOTE — TELEPHONE ENCOUNTER
Patient's INR goal is 2.5-3.5 and he is at 4.5 and taking 10-1/2 mg of generic Coumadin daily  I did send him a message to go down on the Coumadin to 5 mg for the next 2 doses and then resume at 10 mg daily and get INR on Tuesday and message me

## 2024-09-01 ENCOUNTER — DOCUMENTATION (OUTPATIENT)
Dept: FAMILY MEDICINE CLINIC | Facility: CLINIC | Age: 74
End: 2024-09-01
Payer: MEDICARE

## 2024-09-02 NOTE — PROGRESS NOTES
Received notification that his INR taken at 10 PM tonight was 1.3.  Reviewed note from Aletha on 8/29/2024 which stated patient had decreased his warfarin dose to 5 mg for 2 doses and then resuming his usual 10 mg dose with plans to follow-up INR on Tuesday.  No new orders or conversation with the patient after today's result.

## 2024-09-04 DIAGNOSIS — M79.7 FIBROMYALGIA: ICD-10-CM

## 2024-09-04 RX ORDER — CYCLOBENZAPRINE HCL 5 MG
TABLET ORAL
Qty: 270 TABLET | Refills: 3 | Status: SHIPPED | OUTPATIENT
Start: 2024-09-04

## 2024-09-04 RX ORDER — PREGABALIN 150 MG/1
CAPSULE ORAL
Qty: 180 CAPSULE | Refills: 1 | Status: SHIPPED | OUTPATIENT
Start: 2024-09-04

## 2024-09-06 ENCOUNTER — OFFICE VISIT (OUTPATIENT)
Dept: FAMILY MEDICINE CLINIC | Facility: CLINIC | Age: 74
End: 2024-09-06
Payer: MEDICARE

## 2024-09-06 ENCOUNTER — ANTICOAGULATION VISIT (OUTPATIENT)
Dept: FAMILY MEDICINE CLINIC | Facility: CLINIC | Age: 74
End: 2024-09-06

## 2024-09-06 VITALS
WEIGHT: 190 LBS | TEMPERATURE: 97.2 F | SYSTOLIC BLOOD PRESSURE: 126 MMHG | HEART RATE: 82 BPM | DIASTOLIC BLOOD PRESSURE: 66 MMHG | OXYGEN SATURATION: 97 % | BODY MASS INDEX: 25.73 KG/M2 | HEIGHT: 72 IN | RESPIRATION RATE: 16 BRPM

## 2024-09-06 DIAGNOSIS — M54.16 LUMBAR RADICULOPATHY: ICD-10-CM

## 2024-09-06 DIAGNOSIS — E78.2 HYPERLIPIDEMIA, MIXED: ICD-10-CM

## 2024-09-06 DIAGNOSIS — R73.01 IMPAIRED FASTING GLUCOSE: ICD-10-CM

## 2024-09-06 DIAGNOSIS — J84.9 INTERSTITIAL LUNG DISEASE: ICD-10-CM

## 2024-09-06 DIAGNOSIS — M51.36 DDD (DEGENERATIVE DISC DISEASE), LUMBAR: Primary | ICD-10-CM

## 2024-09-06 DIAGNOSIS — K21.9 GASTROESOPHAGEAL REFLUX DISEASE WITHOUT ESOPHAGITIS: ICD-10-CM

## 2024-09-06 DIAGNOSIS — J84.9 ILD (INTERSTITIAL LUNG DISEASE): ICD-10-CM

## 2024-09-06 DIAGNOSIS — E78.2 MIXED HYPERLIPIDEMIA: Primary | ICD-10-CM

## 2024-09-06 DIAGNOSIS — I25.2 OLD MI (MYOCARDIAL INFARCTION): ICD-10-CM

## 2024-09-06 DIAGNOSIS — Z12.5 SCREENING PSA (PROSTATE SPECIFIC ANTIGEN): ICD-10-CM

## 2024-09-06 DIAGNOSIS — E55.9 VITAMIN D DEFICIENCY: ICD-10-CM

## 2024-09-06 LAB — INR PPP: 1.9 (ref 2.5–3.5)

## 2024-09-06 PROCEDURE — 99213 OFFICE O/P EST LOW 20 MIN: CPT | Performed by: PHYSICIAN ASSISTANT

## 2024-09-06 PROCEDURE — 1160F RVW MEDS BY RX/DR IN RCRD: CPT | Performed by: PHYSICIAN ASSISTANT

## 2024-09-06 PROCEDURE — 1125F AMNT PAIN NOTED PAIN PRSNT: CPT | Performed by: PHYSICIAN ASSISTANT

## 2024-09-06 PROCEDURE — 1159F MED LIST DOCD IN RCRD: CPT | Performed by: PHYSICIAN ASSISTANT

## 2024-09-06 RX ORDER — TRAMADOL HYDROCHLORIDE 50 MG/1
50 TABLET ORAL EVERY 6 HOURS PRN
Qty: 90 TABLET | Refills: 2 | Status: SHIPPED | OUTPATIENT
Start: 2024-09-06 | End: 2025-09-06

## 2024-09-06 NOTE — PROGRESS NOTES
"lumnSubjective   Javy Reeves Sr. is a 73 y.o. male.     History of Present Illness     Javy Reeves Sr. 73 y.o. male /66   Pulse 82   Temp 97.2 °F (36.2 °C)   Resp 16   Ht 182.9 cm (72\")   Wt 86.2 kg (190 lb)   SpO2 97%   BMI 25.77 kg/m²  who presents today for lumbar pain. He is taking Ultram and Lyrica---takes this for Fibromyalgia and is taking this for his back and leg pain----radicular pain.  he has a history of   Patient Active Problem List   Diagnosis    Arthritis    Family history of early CAD    DDD (degenerative disc disease), cervical    DVT (deep venous thrombosis)    Fibromyalgia    Past heart attack    Hyperlipidemia    DDD (degenerative disc disease), lumbosacral    History of pulmonary embolus (PE)    Reflux esophagitis    TIA (transient ischemic attack)    Left groin pain    Cervical radicular pain    Cervical disc disorder at C6-C7 level with radiculopathy    Wheezing    Colon polyps    Right lower quadrant abdominal pain    Diarrhea    Cervical spinal stenosis    Cervical disc disorder at C5-C6 level with radiculopathy    GERD (gastroesophageal reflux disease)    Diverticulosis    Old MI (myocardial infarction)    Herniated cervical disc    Encounter for therapeutic drug monitoring    Long term current use of anticoagulant therapy    ERRONEOUS ENCOUNTER--DISREGARD    Stable angina    Low folic acid    Low serum vitamin B12    Precordial chest pain    Tobacco abuse    Tobacco abuse counseling    Interstitial lung disease    History of transient ischemic attack (TIA)    Irritable bowel syndrome    DDD (degenerative disc disease), lumbar    Weight loss    Choking sensation    Failed back syndrome    Long term (current) use of opiate analgesic    Lumbar radiculopathy    Lumbar spondylosis    Chronic pain disorder    History of colon polyps    Acute chest pain    Recurrent pulmonary embolism    Subtherapeutic international normalized ratio (INR)    Bronchiectasis without " complication    Abdominal pain    Presence of IVC filter    Weight loss, unintentional    ILD (interstitial lung disease)    Tobacco use disorder    Ectatic abdominal aorta   .      The patient has read and signed the [facility] Controlled Substance Contract.  I will continue to see patient for regular follow up appointments.  They are well controlled on their medication.  ASHLEY has been reviewed by me and is updated every 3 months. The patient is aware of the potential for addiction and dependence.    He is also getting some relief with his back pain with a back brace and heating pad at night    Since last visit was seen by GEOVANNI Klein 8/26/2024 for back pain and noted lumbar radiculopathy and right leg pain more indicative of radicular pain from his lumbar spine and made order for physical therapy and noted if no help would order lumbar MRI with and without contrast and advised may need epidural injections in future.  Has follow-up appointment with GEOVANNI scheduled for 10/7/2024 and Ortho follow-up 12/13/2024.  Saw Dr. Torres 8/22/2024 for right knee pain performed right knee exam noting some minimal tenderness and swelling about the knee otherwise no significant changes.  Also noted Right lower extremity examined gentle hip range of motion well-tolerated motor and sensory intact distally.   Also noting patient's INR on 9/5/2024 was 1.9 and subtherapeutic continues on 10 mg daily of Coumadin.  With new plan to take 12 mg for the next 2 days then resume 10 mg daily check INR in 1 week and continue to take Lovenox.  The following portions of the patient's history were reviewed and updated as appropriate: allergies, current medications, past family history, past medical history, past social history, past surgical history, and problem list.    Review of Systems   Constitutional:  Positive for unexpected weight loss. Negative for fever.   Cardiovascular:  Negative for chest pain.   Gastrointestinal:  Negative for  abdominal pain.   Genitourinary:  Negative for dysuria and urinary incontinence.   Musculoskeletal:  Positive for back pain.   Neurological:  Positive for weakness and numbness.       Objective   Physical Exam  Vitals and nursing note reviewed.   Constitutional:       General: He is not in acute distress.     Appearance: Normal appearance. He is well-developed. He is not diaphoretic.   HENT:      Head: Normocephalic.   Eyes:      Conjunctiva/sclera: Conjunctivae normal.      Pupils: Pupils are equal, round, and reactive to light.   Cardiovascular:      Rate and Rhythm: Normal rate and regular rhythm.      Heart sounds: Normal heart sounds. No murmur heard.  Pulmonary:      Effort: Pulmonary effort is normal.      Breath sounds: Normal breath sounds.   Musculoskeletal:         General: No tenderness. Normal range of motion.      Cervical back: Normal range of motion and neck supple.   Skin:     General: Skin is warm and dry.      Findings: No rash.   Neurological:      Mental Status: He is alert and oriented to person, place, and time.   Psychiatric:         Mood and Affect: Affect is not inappropriate.         Behavior: Behavior normal.         Thought Content: Thought content normal.         Judgment: Judgment normal.           Assessment & Plan   Diagnoses and all orders for this visit:    1. DDD (degenerative disc disease), lumbar (Primary)    2. ILD (interstitial lung disease)  -     traMADol (Ultram) 50 MG tablet; Take 1 tablet by mouth Every 6 (Six) Hours As Needed for Moderate Pain.  Dispense: 90 tablet; Refill: 2        labs due again in January I increased his atorvastatin last labs and need to make sure LDL goals are met due to CAD  We will continue Lyrica and Ultram for the fibromyalgia pain and the lumbar DDD we will proceed with physical therapy as recommended by Ortho... Has follow-up with Ortho medications are working well and tolerated well  Sees pulm for interstitial lung disease  We will print a  copy of the drug screen order         Answers submitted by the patient for this visit:  Primary Reason for Visit (Submitted on 9/4/2024)  What is the primary reason for your visit?: Back Pain

## 2024-09-11 LAB
1OH-MIDAZOLAM UR QL SCN: NOT DETECTED NG/MG CREAT
6MAM UR QL SCN: NEGATIVE NG/ML
7AMINOCLONAZEPAM/CREAT UR: NOT DETECTED NG/MG CREAT
A-OH ALPRAZ/CREAT UR: NOT DETECTED NG/MG CREAT
A-OH-TRIAZOLAM/CREAT UR CFM: NOT DETECTED NG/MG CREAT
ACP UR QL CFM: NOT DETECTED
ALPRAZ/CREAT UR CFM: NOT DETECTED NG/MG CREAT
AMPHETAMINES UR QL SCN: NEGATIVE NG/ML
APAP UR QL SCN: NORMAL UG/ML
APAP UR QL: NORMAL
APAP UR-MCNC: PRESENT UG/ML
BARBITURATES UR QL SCN: NEGATIVE NG/ML
BENZODIAZ SCN METH UR: NEGATIVE
BUPRENORPHINE UR QL SCN: NEGATIVE
BUPRENORPHINE/CREAT UR: NOT DETECTED NG/MG CREAT
CANNABINOIDS UR QL SCN: NEGATIVE NG/ML
CARISOPRODOL UR QL: NEGATIVE NG/ML
CLONAZEPAM/CREAT UR CFM: NOT DETECTED NG/MG CREAT
COCAINE+BZE UR QL SCN: NEGATIVE NG/ML
CREAT UR-MCNC: 198 MG/DL
D-METHORPHAN UR-MCNC: NOT DETECTED NG/ML
D-METHORPHAN+LEVORPHANOL UR QL: NOT DETECTED
DESALKYLFLURAZ/CREAT UR: NOT DETECTED NG/MG CREAT
DIAZEPAM/CREAT UR: NOT DETECTED NG/MG CREAT
ETHANOL UR QL SCN: NEGATIVE G/DL
ETHANOL UR QL SCN: NEGATIVE NG/ML
FENTANYL CTO UR SCN-MCNC: NEGATIVE NG/ML
FENTANYL/CREAT UR: NOT DETECTED NG/MG CREAT
FLUNITRAZEPAM UR QL SCN: NOT DETECTED NG/MG CREAT
GABAPENTIN UR-MCNC: NEGATIVE UG/ML
HALLUCINOGENS UR: NEGATIVE
HYPNOTICS UR QL SCN: NEGATIVE
KETAMINE UR QL: NOT DETECTED
LORAZEPAM/CREAT UR: NOT DETECTED NG/MG CREAT
MEPERIDINE UR QL SCN: NEGATIVE NG/ML
METHADONE UR QL SCN: NEGATIVE NG/ML
METHADONE+METAB UR QL SCN: NEGATIVE NG/ML
MIDAZOLAM/CREAT UR CFM: NOT DETECTED NG/MG CREAT
MISCELLANEOUS, UR: NEGATIVE
N-NORTRAMADOL/CREAT UR CFM: 966 NG/MG CREAT
NORBUPRENORPHINE/CREAT UR: NOT DETECTED NG/MG CREAT
NORDIAZEPAM/CREAT UR: NOT DETECTED NG/MG CREAT
NORFENTANYL/CREAT UR: NOT DETECTED NG/MG CREAT
NORFLUNITRAZEPAM UR-MCNC: NOT DETECTED NG/MG CREAT
NORKETAMINE UR-MCNC: NOT DETECTED UG/ML
O-NORTRAMADOL UR CFM-MCNC: >2525 NG/MG CREAT
OPIATES UR SCN-MCNC: NEGATIVE NG/ML
OXAZEPAM/CREAT UR: NOT DETECTED NG/MG CREAT
OXYCODONE CTO UR SCN-MCNC: NEGATIVE NG/ML
PCP UR QL SCN: NEGATIVE NG/ML
PRESCRIBED MEDICATIONS: NORMAL
PROPOXYPH UR QL SCN: NEGATIVE NG/ML
TAPENTADOL CTO UR SCN-MCNC: NEGATIVE NG/ML
TEMAZEPAM/CREAT UR: NOT DETECTED NG/MG CREAT
TRAMADOL UR CFM-MCNC: >2525 NG/MG CREAT
TRAMADOL UR QL CFM: NORMAL
TRAMADOL UR QL SCN: NORMAL NG/ML
ZALEPLON UR-MCNC: NOT DETECTED NG/ML
ZOLPIDEM PHENYL-4-CARB UR QL SCN: NOT DETECTED
ZOLPIDEM UR QL SCN: NOT DETECTED
ZOPICLONE-N-OXIDE UR-MCNC: NOT DETECTED NG/ML

## 2024-09-13 ENCOUNTER — ANTICOAGULATION VISIT (OUTPATIENT)
Dept: FAMILY MEDICINE CLINIC | Facility: CLINIC | Age: 74
End: 2024-09-13
Payer: MEDICARE

## 2024-09-13 LAB — INR PPP: 2.2 (ref 2.5–3.5)

## 2024-09-16 LAB — INR PPP: 2.5 (ref 2.5–3.5)

## 2024-09-18 RX ORDER — WARFARIN SODIUM 5 MG/1
10 TABLET ORAL NIGHTLY
Qty: 180 TABLET | Refills: 3 | Status: SHIPPED | OUTPATIENT
Start: 2024-09-18

## 2024-09-19 ENCOUNTER — ANTICOAGULATION VISIT (OUTPATIENT)
Dept: FAMILY MEDICINE CLINIC | Facility: CLINIC | Age: 74
End: 2024-09-19
Payer: MEDICARE

## 2024-09-21 LAB — INR PPP: 2.6 (ref 2.5–3.5)

## 2024-09-24 ENCOUNTER — ANTICOAGULATION VISIT (OUTPATIENT)
Dept: FAMILY MEDICINE CLINIC | Facility: CLINIC | Age: 74
End: 2024-09-24
Payer: MEDICARE

## 2024-09-27 LAB — INR PPP: 1.7 (ref 2.5–3.5)

## 2024-09-30 ENCOUNTER — TELEPHONE (OUTPATIENT)
Dept: FAMILY MEDICINE CLINIC | Facility: CLINIC | Age: 74
End: 2024-09-30

## 2024-09-30 ENCOUNTER — ANTICOAGULATION VISIT (OUTPATIENT)
Dept: FAMILY MEDICINE CLINIC | Facility: CLINIC | Age: 74
End: 2024-09-30
Payer: MEDICARE

## 2024-09-30 NOTE — TELEPHONE ENCOUNTER
Caller: KANDICE ABDALLA    Relationship:     Best call back number: [phone]       Who are you requesting to speak with (clinical staff, provider,  specific staff member): PCP OR CLINICAL      What was the call regardin/27 PATIENT'S INR WAS 1.7

## 2024-10-02 LAB — INR PPP: 3.9 (ref 2.5–3.5)

## 2024-10-03 ENCOUNTER — ANTICOAGULATION VISIT (OUTPATIENT)
Dept: FAMILY MEDICINE CLINIC | Facility: CLINIC | Age: 74
End: 2024-10-03
Payer: MEDICARE

## 2024-10-03 ENCOUNTER — TELEPHONE (OUTPATIENT)
Dept: FAMILY MEDICINE CLINIC | Facility: CLINIC | Age: 74
End: 2024-10-03
Payer: MEDICARE

## 2024-10-03 ENCOUNTER — TELEPHONE (OUTPATIENT)
Dept: FAMILY MEDICINE CLINIC | Facility: CLINIC | Age: 74
End: 2024-10-03

## 2024-10-03 ENCOUNTER — NURSE TRIAGE (OUTPATIENT)
Dept: CALL CENTER | Facility: HOSPITAL | Age: 74
End: 2024-10-03
Payer: MEDICARE

## 2024-10-03 NOTE — TELEPHONE ENCOUNTER
"[facility] calling with INR that is out of range. Attempted to connect with MARLY López's office. [facility] hung up during transfer.    Reason for Disposition   [1] Caller requesting NON-URGENT health information AND [2] PCP's office is the best resource    Additional Information   Negative: [1] Caller is not with the adult (patient) AND [2] reporting urgent symptoms   Negative: Lab result questions   Negative: Medication questions   Negative: Caller can't be reached by phone   Negative: Caller has already spoken to PCP or another triager   Negative: RN needs further essential information from caller in order to complete triage   Negative: Requesting regular office appointment    Answer Assessment - Initial Assessment Questions  1. REASON FOR CALL or QUESTION: \"What is your reason for calling today?\" or \"How can I best help you?\" or \"What question do you have that I can help answer?\"      [facility] calling with INR that is out of range.    Protocols used: Information Only Call-ADULT-    "

## 2024-10-06 ENCOUNTER — PATIENT MESSAGE (OUTPATIENT)
Dept: FAMILY MEDICINE CLINIC | Facility: CLINIC | Age: 74
End: 2024-10-06
Payer: MEDICARE

## 2024-10-06 DIAGNOSIS — R43.2 DYSGEUSIA: ICD-10-CM

## 2024-10-06 DIAGNOSIS — K59.00 DYSCHEZIA: Primary | ICD-10-CM

## 2024-10-07 ENCOUNTER — OFFICE VISIT (OUTPATIENT)
Dept: ORTHOPEDIC SURGERY | Facility: CLINIC | Age: 74
End: 2024-10-07
Payer: MEDICARE

## 2024-10-07 VITALS — HEIGHT: 72 IN | BODY MASS INDEX: 25.68 KG/M2 | TEMPERATURE: 98.4 F | WEIGHT: 189.6 LBS

## 2024-10-07 DIAGNOSIS — M54.16 LUMBAR RADICULOPATHY: Primary | ICD-10-CM

## 2024-10-07 PROCEDURE — 1159F MED LIST DOCD IN RCRD: CPT | Performed by: NURSE PRACTITIONER

## 2024-10-07 PROCEDURE — 99213 OFFICE O/P EST LOW 20 MIN: CPT | Performed by: NURSE PRACTITIONER

## 2024-10-07 PROCEDURE — 1160F RVW MEDS BY RX/DR IN RCRD: CPT | Performed by: NURSE PRACTITIONER

## 2024-10-07 NOTE — PROGRESS NOTES
Patient Name: Javy Reeves Sr.   YOB: 1950  Referring Primary Care Physician: Aletha Ochoa PA-C      Chief Complaint:    Chief Complaint   Patient presents with    Lumbar Spine - Follow-up, Pain            HPI:  Javy Reeves Sr. is a 74 y.o. male who presents to [facility] for follow-up of physical therapy.  Unfortunately his therapist was called to jury duty so he has only had about 2 sessions of physical therapy.  Otherwise no change in overall symptoms, however he does report that he fell down the steps missing the last 3-4 steps injuring his knees.  He was seen for his knees in October by Dr Torres and based on symptoms referred for further evaluation of the lumbar spine.    PFSH:  See attached    ROS: As per HPI, otherwise negative    Objective:      74 y.o. male  Body mass index is 25.71 kg/m²., 86 kg (189 lb 9.6 oz), @HT@  Vitals:    10/07/24 0847   Temp: 98.4 °F (36.9 °C)     Pain Score    10/07/24 0847   PainSc:   7   PainLoc: Back            Spine Musculoskeletal Exam    General      Constitutional: well-developed and well-nourished    Psychiatric: normal mood and affect and no acute distress    Neurological: alert and oriented x3        IMAGING:     No new imaging in office today.    Assessment:           Diagnoses and all orders for this visit:    1. Lumbar radiculopathy (Primary)             Plan:  For now he will complete the course of physical therapy.  He will notify the office at the end of physical therapy and if still symptomatic, we will get a lumbar MRI with and without contrast due to prior history of lumbar fusion.  Recall he also had hardware removed after the fusion for concern for metal allergy which will be important going forward.  If knee symptoms persist, we will have him follow back up with Dr. Roland.  For now he has been treating with ice, rest and elevation.    Return if symptoms worsen or fail to improve.    EMR  Dragon/Transcription Disclaimer:   Much of this encounter note is an electronic transcription/translation of spoken language to printed text. The electronic translation of spoken language may permit erroneous, or at times, nonsensical words or phrases to be inadvertently transcribed; Although I have reviewed the note for such errors, some may still exist.  Red flags have been discussed at this or previous visits to include but not limited weakness in extremities, worsening pain that does not respond to conservative treatment and bowel or bladder dysfunction. These are reasons to present to ER and patient has been informed.    The diagnosis(es), natural history, pathophysiology and treatment for diagnosis(es) were discussed. Opportunity given and questions answered. Biomechanics of pertinent body areas discussed.    EXERCISES:  Advice on benefits of, and types of regular/moderate exercise pertaining to diagnosis.  Continue HEP. For back or neck pain, recommend pilates and or yoga as tolerated. Generally it is best to start any new exercise under the guidance of a  or therapist.   MEDICATIONS:  When prescribe, the risks, benefits, warnings,side effects and alternatives of medications discussed. Advised against long term use of narcotics.   PAIN CONTROL:  Cold, heat, OTC lidocaine patches and/or ointment as needed. Avoid direct skin contact with ice. Ice 15-20 minutes 3-4 times daily as needed. For SI joint pain, recommend ice bath in water about 50 degrees for 5 consecutive days, add ice slowly to help with adjustment and may cover with warm towel or robe to help with cold tolerance. If using lidocaine, do not apply heat in conjunction as this can cause a burn.   MEDICAL RECORDS reviewed from other provider(s) for past and current medical history pertinent to this visit..

## 2024-10-07 NOTE — TELEPHONE ENCOUNTER
From: Javy Reeves Sr.  To: Aletha Ochoa  Sent: 10/6/2024 8:35 PM EDT  Subject: INR    On 10- @ 2021 hours my INR is 3.0.  I don’t understand why my blood fluctuates like it does but for the past 2 weeks I have lost my taste of food. The only thing I know of that I can eat and taste is chocolate. I know it’s time for my winter shots and was wondering if you’re going to allow me getting any Covid shot’s this year. Thank you very much for your patience.

## 2024-10-09 ENCOUNTER — ANTICOAGULATION VISIT (OUTPATIENT)
Dept: FAMILY MEDICINE CLINIC | Facility: CLINIC | Age: 74
End: 2024-10-09
Payer: MEDICARE

## 2024-10-09 LAB — INR PPP: 3 (ref 2.5–3.5)

## 2024-10-11 LAB — INR PPP: 4 (ref 2.5–3.5)

## 2024-10-14 ENCOUNTER — TELEPHONE (OUTPATIENT)
Dept: FAMILY MEDICINE CLINIC | Facility: CLINIC | Age: 74
End: 2024-10-14
Payer: MEDICARE

## 2024-10-14 ENCOUNTER — ANTICOAGULATION VISIT (OUTPATIENT)
Dept: FAMILY MEDICINE CLINIC | Facility: CLINIC | Age: 74
End: 2024-10-14
Payer: MEDICARE

## 2024-10-14 NOTE — TELEPHONE ENCOUNTER
Caller: NOHEMY WITH [facility]    Relationship: Provider    Best call back number: [phone]     What test was performed: INR TEST    When was the test performed: 10/11/24    Where was the test performed: AT HOME    Additional notes: NOHEMY IS CALLING WITH THE RESULTS OF AN AT HOME INR TEST   RESULTS ARE 4.0

## 2024-10-16 ENCOUNTER — TELEPHONE (OUTPATIENT)
Dept: FAMILY MEDICINE CLINIC | Facility: CLINIC | Age: 74
End: 2024-10-16
Payer: MEDICARE

## 2024-10-23 ENCOUNTER — ANTICOAGULATION VISIT (OUTPATIENT)
Dept: FAMILY MEDICINE CLINIC | Facility: CLINIC | Age: 74
End: 2024-10-23
Payer: MEDICARE

## 2024-10-23 ENCOUNTER — TELEPHONE (OUTPATIENT)
Dept: FAMILY MEDICINE CLINIC | Facility: CLINIC | Age: 74
End: 2024-10-23
Payer: MEDICARE

## 2024-10-23 LAB — INR PPP: 3.3 (ref 2.5–3.5)

## 2024-10-23 NOTE — TELEPHONE ENCOUNTER
ABE CALLED FROM [facility] STATING PT HAS AN OUT OF RANGE I&R OF 3.3 WHICH WAS TAKEN YESTERDAY 10/22/24

## 2024-10-23 NOTE — TELEPHONE ENCOUNTER
Patient is already message me his goal for INR is 2.5-3.5 and the 3.3 is at goal ---and continuing 10 mg of Coumadin daily

## 2024-11-04 ENCOUNTER — ANTICOAGULATION VISIT (OUTPATIENT)
Dept: FAMILY MEDICINE CLINIC | Facility: CLINIC | Age: 74
End: 2024-11-04
Payer: MEDICARE

## 2024-11-04 ENCOUNTER — TELEPHONE (OUTPATIENT)
Dept: FAMILY MEDICINE CLINIC | Facility: CLINIC | Age: 74
End: 2024-11-04

## 2024-11-04 ENCOUNTER — NURSE TRIAGE (OUTPATIENT)
Dept: CALL CENTER | Facility: HOSPITAL | Age: 74
End: 2024-11-04
Payer: MEDICARE

## 2024-11-04 LAB — INR PPP: 1.4 (ref 2–3)

## 2024-11-04 NOTE — TELEPHONE ENCOUNTER
HE DISCONNECTED BEFORE TRANSFERRING TO [facility]        Hub staff attempted to follow warm transfer process and was unsuccessful     Caller: NANCY Ellsworth [facility]    Best call back number: [phone]    Patient is needing: OUT OF RANGE INR

## 2024-11-04 NOTE — TELEPHONE ENCOUNTER
"I called patient back and went to his .  M informing him that I was returning his call regarding \"Out of range INR\".  Informed patient that I am leaving fot the day today and will be back in the office tomorrow.  Informed that he can also message Aletha Ochoa directly through [employer] and she will get back with him asap  " OFFICE VISIT    Patient: Jerel Hugo   : 1933 MRN: [id number]    SUBJECTIVE:  Chief Complaint   Patient presents with    ER F/U       A 89 year old male presents for ER follow up.     HISTORY OF PRESENT ILLNESS:    Historian: Self.    Acute right-sided thoracic back pain  States Monday night they came back from emergency room, and Tuesday night he fell again in the bathroom while trying to take off the sweatshirt (got tangled). States his middle back has been sore, since the fall in the bathroom. Strained his muscle when he twisted his body, trying to get up. Pain is not worse from, when he fell first time versus second time. First time when he fell he was not able to move, so they called EMS. States his hip pain resolved after the fall. Pain is mostly on right side, and during night while sleeping. Mild pain in back with deep breathing. Pain is present while sitting down, is much better with standing up. Wife asks if he can take Tramadol, as advised by his therapist. He already used Tramadol after his knee surgery, in the past and no side effects were noticed. Wife states she will help him with Voltaren gel.    Frequent falls  States Monday night they came back from emergency room, and Tuesday night he fell again in the bathroom while trying to take off the sweatshirt (got tangled). He uses walker 98% of the time. Wife states he seems to be very weak, leading to frequent falls. He has a shower chair, and adding more railings in the house to prevent his falls. Saw PT, and he gave 6 exercises to do once a day at home, until new therapist comes. He is not going to bathroom more often, at night. States frequent urination is not an issue, urinating normally during the day. Xray of chest, and brain was done.    Neoplasm of cerebral meninges  Got CT scan head done in 2018, no signs of meningioma. Recent CT head shows meningioma.    Additional  States his hip pain resolved after the fall.  Reports fluctuation of 1-2  lbs weight. He does weigh himself every day.  States he did not get refills of Hydrocodone on 01/16/2024.    Big toe concerns  Reports swelling of big toe. States swelling and pain is improved. Pain is very mild today. Denies erythema. Inquires if he has gout.    I have reviewed the medications and allergies listed in the medical record as obtained by my nursing staff and support staff and agree with their documentation.    Medical assistance and triage rn note reviewed and accepted and a portion the history taken from medical assistant's/triage note, also history verbally confirmed with patient      Tobacco Use: Quit          Packs/Day:       Years:            REVIEW OF SYSTEMS:  Please refer to hpi for pertinent negative and positive review of systems.       OBJECTIVE:  Visit Vitals  /84   Pulse 70   Ht 6' 2\"   Wt 108 kg (238 lb 1.6 oz)   SpO2 97%   BMI 30.57 kg/m²       PHYSICAL EXAM:    Constitutional: In no acute distress. Well developed   Eyes: The conjunctiva exhibited no abnormalities. The sclera was normal   Musculoskeletal: Tender to palpation at T8, T9 area on right side, more lateral from the paraspinal muscles. No bruising, no swelling from one side to other.  Skin: Skin moisture and turgor normal.  Psychiatric: Oriented to time, place, and person. The mood was normal. The affect was normal. The memory was unimpaired.     LAB RESULTS:    Previous and current Lab results were reviewed and can be found in the EHR     ASSESSMENT AND PLAN:    Acute right-sided thoracic back pain  Reassured that it is not rib fracture, likely muscle injury  Last change 2/9/2024  Prescribed traMADol (ULTRAM) 50 MG tablet; Take 1 tablet by mouth 2 times daily as needed for Pain.  Dispense: 15 tablet; Refill: 0  Informed him about increased fall risk, as Tramadol can make him more groggy, and sleepy.  Recommended continuing Tylenol, and apply Diclofenac gel as needed. Take Tramadol 1 tablet 30 minutes before going to bed,  at night.  Advised to inform if he has any issues with Tramadol.  Educated him about the addictive nature, and complications of long term use of Tramadol.    Frequent falls  Advised to continue with PT as prescribed.  Family is adding new equipments to prevent falls.    Neoplasm of cerebral meninges  Reviewed CT report.  Ordered MRI BRAIN W WO CONTRAST; Future  Advised to confirm from  if he can get MRI of brain done, because of his history of pacemaker.  Recommended getting MRI done, after his back pain is improved.    Additional  Educated that fluctuation in weight over a span of time is normal, however gaining weight in a day is concerning, as it might be due to heart failure.    Big toe concerns  Reassured that as pain is minimal, and there are no signs of infection.  Discussed about gout.   Will inform if symptoms worsen.    Follow-up:   No follow-ups on file.   Data Unavailable    Future Appointments   Date Time Provider Department Center   [facility]  [facility] [place] [facility]   5/14/2024  3:45 PM Oralia Dhillon MD [facility] [place]   5/15/2024  9:15 AM Maricruz Huertas MD [facility] GT   5/21/2024 10:15 AM Dakota Byers MD [facility]   7/17/2024  7:15 AM [facility] REMOTE DEVICE CHECK [place] [facility] SDT   9/18/2024  9:45 AM Maricruz Huertas MD [facility] GT   10/17/2024  1:30 PM Tate Francis MD [facility] OB   10/17/2024  1:30 PM OB [facility] DEVICE CHECK [place] [facility] OB   1/16/2025  1:15 PM Maricruz Huertas MD [facility] GT   1/29/2025  9:00 AM [facility]      Refer to orders.  Medical compliance with plan discussed and risks of non-compliance reviewed.  Patient education completed on disease process, etiology & prognosis.  Proper usage and side effects of medications reviewed & discussed.  Return to clinic as clinically indicated as discussed with the patient who verbalized understanding of the plan and is in agreement with the plan.    44 total minutes  spent caring for patient, a portion of this time was spent preparing for the appointment and documenting/reviewing the note, after the appointment  (24 minutes spent face to face in room/on video visit with patient, 17 minutes prepping before the visit and 3 minutes documenting the note, adding future orders and updating plan and after visit summary, reviewing specialist notes and attesting scribble note is applicable after the patient left)    IDenise, have created a visit summary document based on the audio recording between Dr. Maricruz Huertas MD and this patient for the physician to review, edit as needed, and authenticate.    Creation Date: 2/9/2024       The 21st Century Cures Act makes medical notes like these available to patients in the interest of transparency. However, be advised this is a medical document. It is intended as peer to peer communication. It is written in medical language and may contain abbreviations or verbiage that are unfamiliar to the general public. It may appear blunt or direct. Medical documents are intended to carry relevant information, facts as evident, and the clinical opinion of the practitioner.

## 2024-11-04 NOTE — TELEPHONE ENCOUNTER
"Called transferred from [facility].  Jeff from [facility] calling with out of range INR, office not answering.  When [facility] conferenced call, call disconnected.  Did not get results of INR.  Reason for Disposition  • Lab or radiology calling with CRITICAL test results    Additional Information  • Negative: Lab calling with strep throat test results and triager can call in prescription  • Negative: Lab calling with urinalysis test results and triager can call in prescription  • Negative: Medication questions  • Negative: Medication renewal and refill questions  • Negative: Pre-operative or pre-procedural questions  • Negative: ED call to PCP (i.e., primary care provider; doctor, NP, or PA)  • Negative: Doctor (or NP/PA) call to PCP  • Negative: Call about patient who is currently hospitalized    Answer Assessment - Initial Assessment Questions  1. REASON FOR CALL or QUESTION: \"What is your reason for calling today?\" or \"How can I best  help you?\" or \"What question do you have that I can help answer?\"      Abnormal INR  2. CALLER: Document the source of call. (e.g., laboratory, patient).      [facility]    Protocols used: PCP Call - No Triage-ADULT-    "

## 2024-11-11 ENCOUNTER — TELEPHONE (OUTPATIENT)
Dept: FAMILY MEDICINE CLINIC | Facility: CLINIC | Age: 74
End: 2024-11-11

## 2024-11-11 ENCOUNTER — ANTICOAGULATION VISIT (OUTPATIENT)
Dept: FAMILY MEDICINE CLINIC | Facility: CLINIC | Age: 74
End: 2024-11-11
Payer: MEDICARE

## 2024-11-11 LAB — INR PPP: 1.9 (ref 2.5–3.5)

## 2024-11-11 NOTE — TELEPHONE ENCOUNTER
Hub staff attempted to follow warm transfer process and was unsuccessful     Caller: SHREYAS    Relationship to patient:     Best call back number: [phone]     Patient is needing: UNABLE TO WARM TRANSFER FOR OUT OF RANGE INR OR 1.9 TAKEN NOVEMBER 8TH, 2024

## 2024-11-12 LAB — INR PPP: 3.6 (ref 1.5–3.5)

## 2024-11-13 ENCOUNTER — TELEPHONE (OUTPATIENT)
Dept: FAMILY MEDICINE CLINIC | Facility: CLINIC | Age: 74
End: 2024-11-13
Payer: MEDICARE

## 2024-11-13 ENCOUNTER — ANTICOAGULATION VISIT (OUTPATIENT)
Dept: FAMILY MEDICINE CLINIC | Facility: CLINIC | Age: 74
End: 2024-11-13
Payer: MEDICARE

## 2024-11-13 NOTE — TELEPHONE ENCOUNTER
Caller: NOHEMY    Relationship: Other    Best call back number:     [phone]       NOHEMY CALLED WITH [facility] TO REPORT PATIENTS READING TAKEN YESTERDAY: 11/12/24 READING 3.6

## 2024-11-17 LAB — INR PPP: 3.4 (ref 2.5–3.5)

## 2024-11-18 ENCOUNTER — TELEPHONE (OUTPATIENT)
Dept: FAMILY MEDICINE CLINIC | Facility: CLINIC | Age: 74
End: 2024-11-18
Payer: MEDICARE

## 2024-11-18 ENCOUNTER — ANTICOAGULATION VISIT (OUTPATIENT)
Dept: FAMILY MEDICINE CLINIC | Facility: CLINIC | Age: 74
End: 2024-11-18
Payer: MEDICARE

## 2024-11-18 NOTE — TELEPHONE ENCOUNTER
Called patient to verify and he states that he was alternating 10mg and 10.5mg at the time of his INR and this is the dose that he has been on and will continue until his next INR reading on 11/21/2024

## 2024-11-18 NOTE — TELEPHONE ENCOUNTER
Called and spoke with patient to relay message. Verified dosage of meds w/pt and he states that he has Warfarin 10mg and 1 mg on hand.  Informed pt to recheck on Thursday 11/21/2024.   Flowsheet updated

## 2024-11-18 NOTE — TELEPHONE ENCOUNTER
Received call from SHREYAS with an out of range INR for patient.  The INR for yesterday, 11/17, was 3.4.  I told her that I would send a high priority message over to CHONG and her MA.

## 2024-11-18 NOTE — TELEPHONE ENCOUNTER
I just want to make sure he is taking 10 mg alternating with 10.5 mg and was on this dose at the time of his INR?  His INR goal is 2.5-3.5

## 2024-11-22 ENCOUNTER — ANTICOAGULATION VISIT (OUTPATIENT)
Dept: FAMILY MEDICINE CLINIC | Facility: CLINIC | Age: 74
End: 2024-11-22
Payer: MEDICARE

## 2024-11-22 LAB — INR PPP: 2.6 (ref 2.5–3.5)

## 2024-12-12 ENCOUNTER — OFFICE VISIT (OUTPATIENT)
Dept: FAMILY MEDICINE CLINIC | Facility: CLINIC | Age: 74
End: 2024-12-12
Payer: MEDICARE

## 2024-12-12 ENCOUNTER — OFFICE VISIT (OUTPATIENT)
Dept: ORTHOPEDIC SURGERY | Facility: CLINIC | Age: 74
End: 2024-12-12
Payer: MEDICARE

## 2024-12-12 VITALS — TEMPERATURE: 97.8 F | WEIGHT: 188.5 LBS | HEIGHT: 72 IN | BODY MASS INDEX: 25.53 KG/M2

## 2024-12-12 VITALS
OXYGEN SATURATION: 98 % | SYSTOLIC BLOOD PRESSURE: 128 MMHG | RESPIRATION RATE: 16 BRPM | DIASTOLIC BLOOD PRESSURE: 76 MMHG | HEIGHT: 72 IN | TEMPERATURE: 97.6 F | HEART RATE: 77 BPM | WEIGHT: 187 LBS | BODY MASS INDEX: 25.33 KG/M2

## 2024-12-12 DIAGNOSIS — J84.9 ILD (INTERSTITIAL LUNG DISEASE): ICD-10-CM

## 2024-12-12 DIAGNOSIS — M17.11 PRIMARY OSTEOARTHRITIS OF RIGHT KNEE: Primary | ICD-10-CM

## 2024-12-12 LAB — INR PPP: 3.6

## 2024-12-12 PROCEDURE — 1159F MED LIST DOCD IN RCRD: CPT | Performed by: PHYSICIAN ASSISTANT

## 2024-12-12 PROCEDURE — 1125F AMNT PAIN NOTED PAIN PRSNT: CPT | Performed by: PHYSICIAN ASSISTANT

## 2024-12-12 PROCEDURE — 1160F RVW MEDS BY RX/DR IN RCRD: CPT | Performed by: ORTHOPAEDIC SURGERY

## 2024-12-12 PROCEDURE — 1160F RVW MEDS BY RX/DR IN RCRD: CPT | Performed by: PHYSICIAN ASSISTANT

## 2024-12-12 PROCEDURE — 90662 IIV NO PRSV INCREASED AG IM: CPT | Performed by: PHYSICIAN ASSISTANT

## 2024-12-12 PROCEDURE — G0008 ADMIN INFLUENZA VIRUS VAC: HCPCS | Performed by: PHYSICIAN ASSISTANT

## 2024-12-12 PROCEDURE — 99214 OFFICE O/P EST MOD 30 MIN: CPT | Performed by: PHYSICIAN ASSISTANT

## 2024-12-12 PROCEDURE — 1159F MED LIST DOCD IN RCRD: CPT | Performed by: ORTHOPAEDIC SURGERY

## 2024-12-12 RX ORDER — ALBUTEROL SULFATE 90 UG/1
2 INHALANT RESPIRATORY (INHALATION) EVERY 4 HOURS PRN
Qty: 24 G | Refills: 3 | Status: SHIPPED | OUTPATIENT
Start: 2024-12-12

## 2024-12-12 RX ORDER — TRAMADOL HYDROCHLORIDE 50 MG/1
50 TABLET ORAL EVERY 6 HOURS PRN
Qty: 90 TABLET | Refills: 2 | Status: SHIPPED | OUTPATIENT
Start: 2024-12-12 | End: 2025-12-12

## 2024-12-12 NOTE — PROGRESS NOTES
Patient: Javy Reeves Sr.  YOB: 1950 74 y.o. male  Medical Record Number: [id number]    Chief Complaints:   Chief Complaint   Patient presents with    Right Knee - Follow-up, Pain       History of Present Illness:Javy Reeves Sr. is a 74 y.o. male who presents for follow-up of right knee pain.  He states the last injection which was done in the end of August helped for 2 to 3 weeks.  He also was assessed for lumbar radiculopathy and stated some therapy help with that.  Patient is here to discuss other treatment options.    Allergies:   Allergies   Allergen Reactions    Zinc Other (See Comments) and Diarrhea     Patient tested positive, had to have hardware removed from back.    Chantix [Varenicline] Other (See Comments)     Headache    Hydrocodone-Acetaminophen GI Intolerance       Medications:   Current Outpatient Medications   Medication Sig Dispense Refill    acetaminophen (TYLENOL) 650 MG 8 hr tablet Take 1 tablet by mouth Daily.      albuterol sulfate  (90 Base) MCG/ACT inhaler Inhale 2 puffs Every 4 (Four) Hours As Needed for Wheezing or Shortness of Air. 24 g 3    atorvastatin (Lipitor) 80 MG tablet Take 1 tablet by mouth Daily. For cholesterol and CAD, new dose 90 tablet 3    cyclobenzaprine (FLEXERIL) 5 MG tablet TAKE 1 TO 2 TABLETS BY MOUTH THREE TIMES DAILY AS NEEDED FOR SPASMS 270 tablet 3    Enoxaparin Sodium (LOVENOX) 100 MG/ML solution prefilled syringe syringe Inject 0.9 mL under the skin into the appropriate area as directed Every 12 (Twelve) Hours. Stop Lovenox when your INR is greater than 2.5 as discussed.  Indications: DVT/PE (active thrombosis), Prevention of Unwanted Clot in Veins 20 mL 11    folic acid (FOLVITE) 1 MG tablet TAKE 1 TABLET BY MOUTH DAILY 90 tablet 3    loratadine (CLARITIN) 10 MG tablet Take 1 tablet by mouth Daily.      multivitamin with minerals tablet tablet Take 1 tablet by mouth Daily. PT HOLDING FOR SURGERY      nitroglycerin (NITROSTAT) 0.4 MG  "SL tablet Place 1 tablet under the tongue Every 5 (Five) Minutes As Needed for Chest Pain. Take no more than 3 doses in 15 minutes. 30 tablet 5    pantoprazole (PROTONIX) 40 MG EC tablet TAKE 1 TABLET BY MOUTH DAILY FOR GERD 90 tablet 3    Pirfenidone 267 MG tablet       pregabalin (LYRICA) 150 MG capsule TAKE 1 CAPSULE BY MOUTH TWICE DAILY FOR FIBROMYALGIA 180 capsule 1    Stiolto Respimat 2.5-2.5 MCG/ACT aerosol solution inhaler       tamsulosin (FLOMAX) 0.4 MG capsule 24 hr capsule Take 1 capsule by mouth Every Night. 30 capsule 0    traMADol (Ultram) 50 MG tablet Take 1 tablet by mouth Every 6 (Six) Hours As Needed for Moderate Pain. 90 tablet 2    Turmeric 500 MG capsule as directed Orally      Umeclidinium-Vilanterol (ANORO ELLIPTA) 62.5-25 MCG/ACT aerosol powder  inhaler 1 puff Daily.      warfarin (COUMADIN) 1 MG tablet Take 1.5 tablets by mouth Daily. Take total of 11.5 mg daily---has 5mg Rx 270 tablet 3    warfarin (COUMADIN) 5 MG tablet TAKE 2 TABLETS BY MOUTH EVERY NIGHT 180 tablet 3     No current facility-administered medications for this visit.         The following portions of the patient's history were reviewed and updated as appropriate: allergies, current medications, past family history, past medical history, past social history, past surgical history and problem list.    Review of Systems:   A 14 point review of systems was performed. All systems negative except pertinent positives/negative listed in HPI above    Physical Exam:   Vitals:    12/12/24 1444   Temp: 97.8 °F (36.6 °C)   TempSrc: Temporal   Weight: 85.5 kg (188 lb 8 oz)   Height: 182.9 cm (72\")   PainSc:   6   PainLoc: Knee       General: A and O x 3, ASA, NAD    SCLERA:    Normal    DENTITION:   Normal    Right knee unchanged    Radiology: Previous films reviewed    Assessment/Plan:  Right knee osteoarthritis    I discussed treatment options with the patient really does not want to undergo any kind of surgery.  The last injection was " only helpful for several weeks.  We discussed gel injection is a potential option.  To the patient that there is another conservative measure we could try.  He is interested in that.  We will get him approved and bring him back for that injection.  Until then he will continue conservative treatment.  All questions answered.  He understands agrees the plan.    Total time spent 20 minutes

## 2024-12-12 NOTE — PROGRESS NOTES
Jake Reeves Sr. is a 74 y.o. male.     History of Present Illness  The patient is here for a medication follow-up.    He has been on Coumadin due to breakthrough blood clots experienced while on Eliquis. His last INR was 2.6, and he is currently on a regimen of 10.5 mg of Coumadin. He is not taking Lovenox at this time. He has a history of recurrent blood clots and has been evaluated by a hematologist, who found no specific clotting disorder.    He has been diagnosed with fibromyalgia and lumbar back pain with radicular leg pain. Despite these conditions, he remains active and does not express significant discomfort. He continues to use a heating pad at night for relief. He has been under the care of GEOVANNI Klein for lumbar radiculopathy and has undergone an MRI. He has also consulted with Dr. Griffin for his knee condition, which is not yet bone-on-bone. He received an injection in his knee, which provided relief for a few weeks. He is reluctant to undergo surgery on his knee, even though it is likely to be recommended. 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 He experiences pain in various locations, which he attributes to his fibromyalgia and sciatic nerve pain. He has learned to adapt to this pain. He has been prescribed Ultram and Lyrica for his fibromyalgia pain. He has received an epidural injection and uses a back brace, which provides some relief.    He has a history of impaired fasting glucose. He has GERD, which is managed with a PPI. He has a history of heart disease and is under the care of Dr. Sierra at least annually for observation. He is on a statin to maintain his cholesterol level below 55. He has a history of pulmonary embolisms and is under the care of Dr. Jamil Willoughby for interstitial lung disease. He is on pirfenidone for this condition. He reports that his medication is provided free of cost through an efficacy program. He is not experiencing  shortness of breath, coughing, or wheezing, except when there is a sudden change in temperature. He quit smoking cigarettes in January or February 2024. He is not using a CPAP machine as he could not tolerate it. His wife reports that he snores lightly when napping in a chair. He is on folic acid, vitamin D, and B12 supplements. He is on Flexeril for muscle spasms, which he finds helpful. He is on atorvastatin 80 mg. He is on Protonix for heartburn, which he finds effective. He does not have any trouble swallowing. He is on albuterol as a rescue inhaler, which he uses infrequently, typically when the temperature exceeds 99 degrees in the summer.    SOCIAL HISTORY  The patient quit smoking cigarettes in January or February.    MEDICATIONS  Current: Lyrica, Coumadin, Ultram, atorvastatin, folic acid, vitamin D, B12, Flexeril, Protonix, albuterol   The patient has read and signed the [facility] Controlled Substance Contract.  I will continue to see patient for regular follow up appointments.  They are well controlled on their medication.  ASHLEY has been reviewed by me and is updated every 3 months. The patient is aware of the potential for addiction and dependence.      The following portions of the patient's history were reviewed and updated as appropriate: allergies, current medications, past family history, past medical history, past social history, past surgical history, and problem list.    Review of Systems   Constitutional:  Negative for chills, diaphoresis, fatigue and fever.   HENT:  Negative for congestion, sore throat and swollen glands.    Respiratory:  Negative for cough.    Cardiovascular:  Negative for chest pain.   Gastrointestinal:  Negative for abdominal pain, nausea and vomiting.   Genitourinary:  Negative for dysuria.   Musculoskeletal:  Positive for arthralgias. Negative for myalgias and neck pain.   Skin:  Negative for rash.   Neurological:  Negative for weakness and numbness.       Objective    Physical Exam  Vitals and nursing note reviewed.   Constitutional:       General: He is not in acute distress.     Appearance: Normal appearance. He is well-developed. He is not diaphoretic.   HENT:      Head: Normocephalic.      Right Ear: External ear normal.      Left Ear: External ear normal.   Eyes:      General: No scleral icterus.     Conjunctiva/sclera: Conjunctivae normal.      Pupils: Pupils are equal, round, and reactive to light.   Neck:      Vascular: No carotid bruit.   Cardiovascular:      Rate and Rhythm: Normal rate and regular rhythm.      Pulses: Normal pulses.      Heart sounds: Normal heart sounds. No murmur heard.  Pulmonary:      Effort: Pulmonary effort is normal.      Breath sounds: Normal breath sounds. No rales.   Musculoskeletal:         General: Normal range of motion.      Cervical back: Normal range of motion and neck supple.      Right lower leg: No edema.      Left lower leg: No edema.   Skin:     General: Skin is warm and dry.      Findings: No rash.   Neurological:      General: No focal deficit present.      Mental Status: He is alert and oriented to person, place, and time.   Psychiatric:         Mood and Affect: Mood normal. Affect is not inappropriate.         Behavior: Behavior normal.         Thought Content: Thought content normal.         Judgment: Judgment normal.         Physical Exam  Lungs are clear.  Heart has a normal rhythm and no murmur.    Vital Signs  Blood pressure is normal. Weight is stable.       Results  Laboratory Studies  INR was 2.6. A1c was 5.8%. Kidney and liver functions were normal.    Imaging  Abdominal aortic ultrasound showed stable dilation, no change in the last 3 years. EGD showed gastritis, esophagus was normal.    Testing  Myocardial PET perfusion study was negative for ischemia. Echocardiogram was normal.       Assessment & Plan   Diagnoses and all orders for this visit:    1. ILD (interstitial lung disease)  Comments:  per pulm  Orders:  -      traMADol (Ultram) 50 MG tablet; Take 1 tablet by mouth Every 6 (Six) Hours As Needed for Moderate Pain.  Dispense: 90 tablet; Refill: 2    Other orders  -     albuterol sulfate  (90 Base) MCG/ACT inhaler; Inhale 2 puffs Every 4 (Four) Hours As Needed for Wheezing or Shortness of Air.  Dispense: 24 g; Refill: 3        Assessment & Plan  1. Medication follow-up.  His weight remains stable, and his blood pressure readings are within the normal range. He has successfully ceased smoking cigarettes. His kidney and liver functions are excellent. His INR level is 2.6, and he is currently on a regimen of 10.5 mg of Coumadin. His A1c level has improved to 5.8 percent. His cholesterol levels have also improved, and his atorvastatin dosage has been increased to 80 mg to achieve an LDL goal of 55. His PSA level is monitored annually for screening purposes. He is scheduled for a colonoscopy in 2028. A prescription for a 3-month supply of albuterol, with refills, will be sent to [facility]. He is advised to inform us if he experiences any difficulty swallowing. His folic acid and B12 levels will be checked during his next lab visit.    2. Fibromyalgia.  He continues to experience fibromyalgia pain and is currently on Ultram and Lyrica. He reports using a heating pad at night and finds relief from a back brace. A prescription for tramadol will be sent to [facility].    3. Lumbar radiculopathy.  He has lumbar back pain with radicular leg pain. He has been using a back brace, which helps. He had an MRI and x-rays, which showed progression to bone-on-bone contact in the spine.    4. Knee pain.  He received a shot in his right knee, which provided relief for a few weeks. He is seeing an orthopedic surgeon today. He prefers to avoid surgery if possible.    5. Interstitial lung disease.  He is under the care of Dr. Jamil Miguel and is on pirfenidone. He reports no symptoms such as shortness of breath, coughing, or wheezing. A  3-month supply of albuterol will be sent to [facility].    6. Gastroesophageal reflux disease (GERD).  His GERD is controlled on Protonix. He reports no trouble swallowing.    7. History of heart disease.  He remains under the care of Dr. Sierra at least yearly for observation. He is on a statin to keep cholesterol levels below 55.    8. History of pulmonary embolisms.  He is on Coumadin 10.5 mg due to breakthrough blood clots on Eliquis. His last INR was 2.6. If his INR falls below 2.5, he will start Lovenox.    9. Impaired fasting glucose.  His A1c was down to 5.8 percent.    10. Muscle spasms.  He is taking Flexeril for muscle spasms, which has been renewed and has refills available.    PROCEDURE  The patient received an injection in his knee, which provided relief for a few weeks. 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     Has ortho Dr Melissa Landon due mid Jan  INR due today         Patient or patient representative verbalized consent for the use of Ambient Listening during the visit with  Aletha Ochoa PA-C for chart documentation. 12/12/2024  08:26 EST  Answers submitted by the patient for this visit:  Primary Reason for Visit (Submitted on 12/9/2024)  What is the primary reason for your visit?: Problem Not Listed  Problem not listed (Submitted on 12/9/2024)  Chief Complaint: Other medical problem  Reason for appointment: Follow up  anorexia: No  joint pain: No  change in stool: No  headaches: No  joint swelling: No  vertigo: No  visual change: No  Onset: at an unknown time  Chronicity: chronic  Frequency: weekly  Medications tried: Blood disorders  Additional information: I’ve been having blood clots forming even though I’m on medication for years.

## 2024-12-13 ENCOUNTER — ANTICOAGULATION VISIT (OUTPATIENT)
Dept: FAMILY MEDICINE CLINIC | Facility: CLINIC | Age: 74
End: 2024-12-13
Payer: MEDICARE

## 2024-12-13 ENCOUNTER — TELEPHONE (OUTPATIENT)
Dept: FAMILY MEDICINE CLINIC | Facility: CLINIC | Age: 74
End: 2024-12-13
Payer: MEDICARE

## 2024-12-13 LAB — INR PPP: 3.6 (ref 2.5–3.5)

## 2024-12-13 NOTE — TELEPHONE ENCOUNTER
Patient's goal is 2.5-3.5 and message to him last night he is to alternate 10 mg with 10.5 mg daily,,,, he is aware

## 2024-12-16 ENCOUNTER — ANTICOAGULATION VISIT (OUTPATIENT)
Dept: FAMILY MEDICINE CLINIC | Facility: CLINIC | Age: 74
End: 2024-12-16
Payer: MEDICARE

## 2024-12-26 ENCOUNTER — ANTICOAGULATION VISIT (OUTPATIENT)
Dept: FAMILY MEDICINE CLINIC | Facility: CLINIC | Age: 74
End: 2024-12-26
Payer: MEDICARE

## 2024-12-26 LAB — INR PPP: 2.2 (ref 2.5–3.5)

## 2025-01-02 ENCOUNTER — CLINICAL SUPPORT (OUTPATIENT)
Dept: ORTHOPEDIC SURGERY | Facility: CLINIC | Age: 75
End: 2025-01-02
Payer: MEDICARE

## 2025-01-02 VITALS — TEMPERATURE: 98.6 F | BODY MASS INDEX: 26.3 KG/M2 | HEIGHT: 72 IN | WEIGHT: 194.2 LBS

## 2025-01-02 DIAGNOSIS — M17.11 PRIMARY OSTEOARTHRITIS OF RIGHT KNEE: Primary | ICD-10-CM

## 2025-01-02 NOTE — PROGRESS NOTES
"Knee Joint Injection      Patient: Javy Reeves .        YOB: 1950            Chief Complaints: Knee pain      History of Present Illness:  Pt gets intermittent  injections with good relief. Is here for right knee gel injection.  Understands options.      Physical Exam: 74 y.o. male  General Appearance:    Alert, cooperative, in no acute distress                   Vitals:    01/02/25 1404   Temp: 98.6 °F (37 °C)   TempSrc: Temporal   Weight: 88.1 kg (194 lb 3.2 oz)   Height: 182.9 cm (72\")   PainSc:   6   PainLoc: Knee      Patient is alert and read ×3 no acute distress appears her above-listed at height weight and age.  Affect is normal respiratory rate is normal unlabored. Heart rate regular rate rhythm, sclera, dentition and hearing are normal for the purpose of this exam.  Exam and complaints are unchanged.      Procedure:  Right knee gel injection          Assessment. Persistent knee pain      Plan: Is to proceed with injection    Follow up in 6 months.     Large Joint Arthrocentesis: R knee  Date/Time: 1/2/2025 1:58 PM  Consent given by: patient  Site marked: site marked  Timeout: Immediately prior to procedure a time out was called to verify the correct patient, procedure, equipment, support staff and site/side marked as required   Supporting Documentation  Indications: pain   Procedure Details  Location: knee - R knee  Preparation: Patient was prepped and draped in the usual sterile fashion  Needle gauge: 21g.  Approach: lateral  Medications administered: 22 mg Hyaluronan 88 MG/4ML  Patient tolerance: patient tolerated the procedure well with no immediate complications       "

## 2025-01-11 ENCOUNTER — APPOINTMENT (OUTPATIENT)
Dept: GENERAL RADIOLOGY | Facility: HOSPITAL | Age: 75
End: 2025-01-11
Payer: MEDICARE

## 2025-01-11 ENCOUNTER — HOSPITAL ENCOUNTER (OUTPATIENT)
Facility: HOSPITAL | Age: 75
Setting detail: OBSERVATION
Discharge: HOME | End: 2025-01-13
Attending: EMERGENCY MEDICINE | Admitting: HOSPITALIST
Payer: MEDICARE

## 2025-01-11 ENCOUNTER — APPOINTMENT (OUTPATIENT)
Dept: CARDIOLOGY | Facility: HOSPITAL | Age: 75
End: 2025-01-11
Payer: MEDICARE

## 2025-01-11 DIAGNOSIS — I82.401 ACUTE DEEP VEIN THROMBOSIS (DVT) OF RIGHT LOWER EXTREMITY, UNSPECIFIED VEIN: ICD-10-CM

## 2025-01-11 DIAGNOSIS — S70.11XA CONTUSION OF RIGHT THIGH, INITIAL ENCOUNTER: ICD-10-CM

## 2025-01-11 DIAGNOSIS — I82.411 ACUTE DEEP VEIN THROMBOSIS (DVT) OF FEMORAL VEIN OF RIGHT LOWER EXTREMITY: Primary | ICD-10-CM

## 2025-01-11 DIAGNOSIS — R03.0 ELEVATED BLOOD PRESSURE READING: ICD-10-CM

## 2025-01-11 LAB
ALBUMIN SERPL-MCNC: 3.9 G/DL (ref 3.5–5.2)
ALBUMIN/GLOB SERPL: 1.1 G/DL
ALP SERPL-CCNC: 77 U/L (ref 39–117)
ALT SERPL W P-5'-P-CCNC: 17 U/L (ref 1–41)
ANION GAP SERPL CALCULATED.3IONS-SCNC: 8.8 MMOL/L (ref 5–15)
APTT PPP: 35.9 SECONDS (ref 22.7–35.4)
AST SERPL-CCNC: 39 U/L (ref 1–40)
BASOPHILS # BLD AUTO: 0.02 10*3/MM3 (ref 0–0.2)
BASOPHILS NFR BLD AUTO: 0.4 % (ref 0–1.5)
BH CV LOW VAS RIGHT DISTAL FEMORAL SPONT: 1
BH CV LOW VAS RIGHT MID FEMORAL SPONT: 1
BH CV LOW VAS RIGHT POPLITEAL SPONT: 1
BH CV LOWER VASCULAR LEFT COMMON FEMORAL AUGMENT: NORMAL
BH CV LOWER VASCULAR LEFT COMMON FEMORAL COMPETENT: NORMAL
BH CV LOWER VASCULAR LEFT COMMON FEMORAL COMPRESS: NORMAL
BH CV LOWER VASCULAR LEFT COMMON FEMORAL PHASIC: NORMAL
BH CV LOWER VASCULAR LEFT COMMON FEMORAL SPONT: NORMAL
BH CV LOWER VASCULAR RIGHT COMMON FEMORAL AUGMENT: NORMAL
BH CV LOWER VASCULAR RIGHT COMMON FEMORAL COMPETENT: NORMAL
BH CV LOWER VASCULAR RIGHT COMMON FEMORAL COMPRESS: NORMAL
BH CV LOWER VASCULAR RIGHT COMMON FEMORAL PHASIC: NORMAL
BH CV LOWER VASCULAR RIGHT COMMON FEMORAL SPONT: NORMAL
BH CV LOWER VASCULAR RIGHT DISTAL FEMORAL AUGMENT: NORMAL
BH CV LOWER VASCULAR RIGHT DISTAL FEMORAL COMPETENT: NORMAL
BH CV LOWER VASCULAR RIGHT DISTAL FEMORAL COMPRESS: NORMAL
BH CV LOWER VASCULAR RIGHT DISTAL FEMORAL PHASIC: NORMAL
BH CV LOWER VASCULAR RIGHT DISTAL FEMORAL SPONT: NORMAL
BH CV LOWER VASCULAR RIGHT DISTAL FEMORAL THROMBUS: NORMAL
BH CV LOWER VASCULAR RIGHT GASTRONEMIUS COMPRESS: NORMAL
BH CV LOWER VASCULAR RIGHT GREATER SAPH AK COMPRESS: NORMAL
BH CV LOWER VASCULAR RIGHT GREATER SAPH BK COMPRESS: NORMAL
BH CV LOWER VASCULAR RIGHT LESSER SAPH COMPRESS: NORMAL
BH CV LOWER VASCULAR RIGHT MID FEMORAL AUGMENT: NORMAL
BH CV LOWER VASCULAR RIGHT MID FEMORAL COMPETENT: NORMAL
BH CV LOWER VASCULAR RIGHT MID FEMORAL COMPRESS: NORMAL
BH CV LOWER VASCULAR RIGHT MID FEMORAL PHASIC: NORMAL
BH CV LOWER VASCULAR RIGHT MID FEMORAL SPONT: NORMAL
BH CV LOWER VASCULAR RIGHT MID FEMORAL THROMBUS: NORMAL
BH CV LOWER VASCULAR RIGHT PERONEAL COMPRESS: NORMAL
BH CV LOWER VASCULAR RIGHT POPLITEAL AUGMENT: NORMAL
BH CV LOWER VASCULAR RIGHT POPLITEAL COMPETENT: NORMAL
BH CV LOWER VASCULAR RIGHT POPLITEAL COMPRESS: NORMAL
BH CV LOWER VASCULAR RIGHT POPLITEAL PHASIC: NORMAL
BH CV LOWER VASCULAR RIGHT POPLITEAL SPONT: NORMAL
BH CV LOWER VASCULAR RIGHT POPLITEAL THROMBUS: NORMAL
BH CV LOWER VASCULAR RIGHT POSTERIOR TIBIAL COMPRESS: NORMAL
BH CV LOWER VASCULAR RIGHT PROFUNDA FEMORAL COMPRESS: NORMAL
BH CV LOWER VASCULAR RIGHT PROXIMAL FEMORAL COMPRESS: NORMAL
BH CV LOWER VASCULAR RIGHT SAPHENOFEMORAL JUNCTION COMPRESS: NORMAL
BH CV VAS PRELIMINARY FINDINGS SCRIPTING: 1
BILIRUB SERPL-MCNC: 0.6 MG/DL (ref 0–1.2)
BUN SERPL-MCNC: 12 MG/DL (ref 8–23)
BUN/CREAT SERPL: 11 (ref 7–25)
CALCIUM SPEC-SCNC: 8.9 MG/DL (ref 8.6–10.5)
CHLORIDE SERPL-SCNC: 104 MMOL/L (ref 98–107)
CO2 SERPL-SCNC: 27.2 MMOL/L (ref 22–29)
CREAT SERPL-MCNC: 1.09 MG/DL (ref 0.76–1.27)
DEPRECATED RDW RBC AUTO: 48.5 FL (ref 37–54)
EGFRCR SERPLBLD CKD-EPI 2021: 72.1 ML/MIN/1.73
EOSINOPHIL # BLD AUTO: 0.08 10*3/MM3 (ref 0–0.4)
EOSINOPHIL NFR BLD AUTO: 1.4 % (ref 0.3–6.2)
ERYTHROCYTE [DISTWIDTH] IN BLOOD BY AUTOMATED COUNT: 13.9 % (ref 12.3–15.4)
GLOBULIN UR ELPH-MCNC: 3.6 GM/DL
GLUCOSE SERPL-MCNC: 88 MG/DL (ref 65–99)
HCT VFR BLD AUTO: 42.7 % (ref 37.5–51)
HGB BLD-MCNC: 14.8 G/DL (ref 13–17.7)
IMM GRANULOCYTES # BLD AUTO: 0.01 10*3/MM3 (ref 0–0.05)
IMM GRANULOCYTES NFR BLD AUTO: 0.2 % (ref 0–0.5)
INR PPP: 1.9 (ref 0.9–1.1)
LYMPHOCYTES # BLD AUTO: 1.84 10*3/MM3 (ref 0.7–3.1)
LYMPHOCYTES NFR BLD AUTO: 33 % (ref 19.6–45.3)
MCH RBC QN AUTO: 33.1 PG (ref 26.6–33)
MCHC RBC AUTO-ENTMCNC: 34.7 G/DL (ref 31.5–35.7)
MCV RBC AUTO: 95.5 FL (ref 79–97)
MONOCYTES # BLD AUTO: 0.5 10*3/MM3 (ref 0.1–0.9)
MONOCYTES NFR BLD AUTO: 9 % (ref 5–12)
NEUTROPHILS NFR BLD AUTO: 3.12 10*3/MM3 (ref 1.7–7)
NEUTROPHILS NFR BLD AUTO: 56 % (ref 42.7–76)
NRBC BLD AUTO-RTO: 0 /100 WBC (ref 0–0.2)
PLATELET # BLD AUTO: 201 10*3/MM3 (ref 140–450)
PMV BLD AUTO: 8.9 FL (ref 6–12)
POTASSIUM SERPL-SCNC: 3.8 MMOL/L (ref 3.5–5.2)
PROT SERPL-MCNC: 7.5 G/DL (ref 6–8.5)
PROTHROMBIN TIME: 22 SECONDS (ref 11.7–14.2)
RBC # BLD AUTO: 4.47 10*6/MM3 (ref 4.14–5.8)
SODIUM SERPL-SCNC: 140 MMOL/L (ref 136–145)
WBC NRBC COR # BLD AUTO: 5.57 10*3/MM3 (ref 3.4–10.8)

## 2025-01-11 PROCEDURE — 94640 AIRWAY INHALATION TREATMENT: CPT

## 2025-01-11 PROCEDURE — 93971 EXTREMITY STUDY: CPT | Performed by: SURGERY

## 2025-01-11 PROCEDURE — G0378 HOSPITAL OBSERVATION PER HR: HCPCS

## 2025-01-11 PROCEDURE — 73590 X-RAY EXAM OF LOWER LEG: CPT

## 2025-01-11 PROCEDURE — 85610 PROTHROMBIN TIME: CPT | Performed by: EMERGENCY MEDICINE

## 2025-01-11 PROCEDURE — 73560 X-RAY EXAM OF KNEE 1 OR 2: CPT

## 2025-01-11 PROCEDURE — 80053 COMPREHEN METABOLIC PANEL: CPT | Performed by: EMERGENCY MEDICINE

## 2025-01-11 PROCEDURE — 96365 THER/PROPH/DIAG IV INF INIT: CPT

## 2025-01-11 PROCEDURE — 73502 X-RAY EXAM HIP UNI 2-3 VIEWS: CPT

## 2025-01-11 PROCEDURE — 25010000002 HEPARIN (PORCINE) 25000-0.45 UT/250ML-% SOLUTION: Performed by: EMERGENCY MEDICINE

## 2025-01-11 PROCEDURE — 96366 THER/PROPH/DIAG IV INF ADDON: CPT

## 2025-01-11 PROCEDURE — 85730 THROMBOPLASTIN TIME PARTIAL: CPT | Performed by: HOSPITALIST

## 2025-01-11 PROCEDURE — 99291 CRITICAL CARE FIRST HOUR: CPT

## 2025-01-11 PROCEDURE — 85025 COMPLETE CBC W/AUTO DIFF WBC: CPT | Performed by: EMERGENCY MEDICINE

## 2025-01-11 PROCEDURE — 93971 EXTREMITY STUDY: CPT

## 2025-01-11 PROCEDURE — 73552 X-RAY EXAM OF FEMUR 2/>: CPT

## 2025-01-11 RX ORDER — TRAMADOL HYDROCHLORIDE 50 MG/1
50 TABLET ORAL EVERY 6 HOURS PRN
Status: DISCONTINUED | OUTPATIENT
Start: 2025-01-11 | End: 2025-01-13 | Stop reason: HOSPADM

## 2025-01-11 RX ORDER — ALBUTEROL SULFATE 90 UG/1
2 INHALANT RESPIRATORY (INHALATION) EVERY 4 HOURS PRN
Status: DISCONTINUED | OUTPATIENT
Start: 2025-01-11 | End: 2025-01-13 | Stop reason: HOSPADM

## 2025-01-11 RX ORDER — ATORVASTATIN CALCIUM 80 MG/1
80 TABLET, FILM COATED ORAL DAILY
Status: DISCONTINUED | OUTPATIENT
Start: 2025-01-12 | End: 2025-01-13 | Stop reason: HOSPADM

## 2025-01-11 RX ORDER — CYCLOBENZAPRINE HCL 10 MG
5 TABLET ORAL 3 TIMES DAILY PRN
Status: DISCONTINUED | OUTPATIENT
Start: 2025-01-11 | End: 2025-01-13 | Stop reason: HOSPADM

## 2025-01-11 RX ORDER — BISACODYL 5 MG/1
5 TABLET, DELAYED RELEASE ORAL DAILY PRN
Status: DISCONTINUED | OUTPATIENT
Start: 2025-01-11 | End: 2025-01-13 | Stop reason: HOSPADM

## 2025-01-11 RX ORDER — MULTIPLE VITAMINS W/ MINERALS TAB 9MG-400MCG
1 TAB ORAL DAILY
Status: DISCONTINUED | OUTPATIENT
Start: 2025-01-12 | End: 2025-01-13 | Stop reason: HOSPADM

## 2025-01-11 RX ORDER — POLYETHYLENE GLYCOL 3350 17 G/17G
17 POWDER, FOR SOLUTION ORAL DAILY PRN
Status: DISCONTINUED | OUTPATIENT
Start: 2025-01-11 | End: 2025-01-13 | Stop reason: HOSPADM

## 2025-01-11 RX ORDER — ACETAMINOPHEN 650 MG/1
650 SUPPOSITORY RECTAL EVERY 4 HOURS PRN
Status: DISCONTINUED | OUTPATIENT
Start: 2025-01-11 | End: 2025-01-13 | Stop reason: HOSPADM

## 2025-01-11 RX ORDER — ARFORMOTEROL TARTRATE 15 UG/2ML
15 SOLUTION RESPIRATORY (INHALATION)
Status: DISCONTINUED | OUTPATIENT
Start: 2025-01-11 | End: 2025-01-13 | Stop reason: HOSPADM

## 2025-01-11 RX ORDER — CETIRIZINE HYDROCHLORIDE 10 MG/1
10 TABLET ORAL DAILY
Status: DISCONTINUED | OUTPATIENT
Start: 2025-01-12 | End: 2025-01-13 | Stop reason: HOSPADM

## 2025-01-11 RX ORDER — HEPARIN SODIUM 10000 [USP'U]/100ML
17 INJECTION, SOLUTION INTRAVENOUS
Status: DISCONTINUED | OUTPATIENT
Start: 2025-01-11 | End: 2025-01-12

## 2025-01-11 RX ORDER — PREGABALIN 75 MG/1
150 CAPSULE ORAL EVERY 12 HOURS SCHEDULED
Status: DISCONTINUED | OUTPATIENT
Start: 2025-01-11 | End: 2025-01-12

## 2025-01-11 RX ORDER — ONDANSETRON 2 MG/ML
4 INJECTION INTRAMUSCULAR; INTRAVENOUS EVERY 6 HOURS PRN
Status: DISCONTINUED | OUTPATIENT
Start: 2025-01-11 | End: 2025-01-13 | Stop reason: HOSPADM

## 2025-01-11 RX ORDER — ONDANSETRON 4 MG/1
4 TABLET, ORALLY DISINTEGRATING ORAL EVERY 6 HOURS PRN
Status: DISCONTINUED | OUTPATIENT
Start: 2025-01-11 | End: 2025-01-13 | Stop reason: HOSPADM

## 2025-01-11 RX ORDER — BISACODYL 10 MG
10 SUPPOSITORY, RECTAL RECTAL DAILY PRN
Status: DISCONTINUED | OUTPATIENT
Start: 2025-01-11 | End: 2025-01-13 | Stop reason: HOSPADM

## 2025-01-11 RX ORDER — PANTOPRAZOLE SODIUM 40 MG/1
40 TABLET, DELAYED RELEASE ORAL DAILY
Status: DISCONTINUED | OUTPATIENT
Start: 2025-01-12 | End: 2025-01-13 | Stop reason: HOSPADM

## 2025-01-11 RX ORDER — ACETAMINOPHEN 325 MG/1
650 TABLET ORAL EVERY 4 HOURS PRN
Status: DISCONTINUED | OUTPATIENT
Start: 2025-01-11 | End: 2025-01-13 | Stop reason: HOSPADM

## 2025-01-11 RX ORDER — ACETAMINOPHEN 160 MG/5ML
650 SOLUTION ORAL EVERY 4 HOURS PRN
Status: DISCONTINUED | OUTPATIENT
Start: 2025-01-11 | End: 2025-01-13 | Stop reason: HOSPADM

## 2025-01-11 RX ORDER — SODIUM CHLORIDE 0.9 % (FLUSH) 0.9 %
10 SYRINGE (ML) INJECTION AS NEEDED
Status: DISCONTINUED | OUTPATIENT
Start: 2025-01-11 | End: 2025-01-13 | Stop reason: HOSPADM

## 2025-01-11 RX ORDER — PIRFENIDONE 801 MG/1
801 TABLET, FILM COATED ORAL 3 TIMES DAILY
Status: DISCONTINUED | OUTPATIENT
Start: 2025-01-11 | End: 2025-01-12

## 2025-01-11 RX ORDER — POLYETHYLENE GLYCOL 3350 17 G/17G
8.5 POWDER, FOR SOLUTION ORAL DAILY PRN
COMMUNITY

## 2025-01-11 RX ORDER — FOLIC ACID 1 MG/1
1 TABLET ORAL DAILY
Status: DISCONTINUED | OUTPATIENT
Start: 2025-01-12 | End: 2025-01-13 | Stop reason: HOSPADM

## 2025-01-11 RX ORDER — AMOXICILLIN 250 MG
2 CAPSULE ORAL 2 TIMES DAILY PRN
Status: DISCONTINUED | OUTPATIENT
Start: 2025-01-11 | End: 2025-01-13 | Stop reason: HOSPADM

## 2025-01-11 RX ADMIN — CYCLOBENZAPRINE HYDROCHLORIDE 5 MG: 10 TABLET, FILM COATED ORAL at 20:38

## 2025-01-11 RX ADMIN — PREGABALIN 150 MG: 75 CAPSULE ORAL at 20:38

## 2025-01-11 RX ADMIN — TIOTROPIUM BROMIDE INHALATION SPRAY 2 PUFF: 3.12 SPRAY, METERED RESPIRATORY (INHALATION) at 21:26

## 2025-01-11 RX ADMIN — TRAMADOL HYDROCHLORIDE 50 MG: 50 TABLET, COATED ORAL at 20:39

## 2025-01-11 RX ADMIN — HEPARIN SODIUM 17 UNITS/KG/HR: 10000 INJECTION, SOLUTION INTRAVENOUS at 20:43

## 2025-01-11 NOTE — ED NOTES
Patient to ed via ems from home with complaint of fall this morning at [employer]-went home after fall, now having right leg pain unable to bear weight

## 2025-01-11 NOTE — PROGRESS NOTES
Clinical Pharmacy Services: Medication History    Javy Reeves Sr. is a 74 y.o. male presenting to [facility] for Right femoral vein DVT [I82.411]    He  has a past medical history of Acute and subacute infective endocarditis in diseases classified elsewhere, Allergic, Arthritis, Arthritis of neck (Don’t remember), Asthma (04/26/2021), Chest pain, Chronic low back pain, Chronic pain, Clotting disorder, Colon polyps, Coronary artery disease, DDD (degenerative disc disease), cervical, DDD (degenerative disc disease), lumbosacral, Diverticulosis, DVT (deep venous thrombosis) (1996), Encounter for special screening examination for neoplasm of prostate (2012), Eye exam, routine (> 5 yrs ago), Eye exam, routine (01/2015), Fibromyalgia, Fibromyositis, GERD (gastroesophageal reflux disease), HIV disease, Hyperlipidemia, ILD (interstitial lung disease), Injury of back, Injury of neck, Irritable bowel, Limited joint range of motion, Low back strain (Don’t remember), Lumbar stenosis, Lumbosacral disc disease (Don’t remember), MI (myocardial infarction), Neck pain, Neck strain, Pain in limb, Past heart attack, Postlaminectomy syndrome of lumbar region, Pulmonary embolism (1996), Reflux esophagitis, Rotator cuff syndrome, Scoliosis (), Shortness of breath, Sleep apnea, Stroke, Thoracic disc disorder, and TIA (transient ischemic attack).    Allergies as of 01/11/2025 - Reviewed 01/11/2025   Allergen Reaction Noted    Zinc Other (See Comments) and Diarrhea 07/12/2016    Chantix [varenicline] Other (See Comments) 07/26/2018    Hydrocodone-acetaminophen GI Intolerance 08/23/2022       Medication information was obtained from: [facility]  Pharmacy and Phone Number:     Prior to Admission Medications       Prescriptions Last Dose Informant Patient Reported? Taking?    acetaminophen (TYLENOL) 650 MG 8 hr tablet 1/11/2025  Yes Yes    Take 1 tablet by mouth Daily.    albuterol sulfate  (90 Base) MCG/ACT  inhaler 1/11/2025  No Yes    Inhale 2 puffs Every 4 (Four) Hours As Needed for Wheezing or Shortness of Air.    atorvastatin (Lipitor) 80 MG tablet 1/11/2025  No Yes    Take 1 tablet by mouth Daily. For cholesterol and CAD, new dose    cyclobenzaprine (FLEXERIL) 5 MG tablet 1/11/2025  No Yes    TAKE 1 TO 2 TABLETS BY MOUTH THREE TIMES DAILY AS NEEDED FOR SPASMS    Enoxaparin Sodium (LOVENOX) 100 MG/ML solution prefilled syringe syringe 1/11/2025  No Yes    Inject 0.9 mL under the skin into the appropriate area as directed Every 12 (Twelve) Hours. Stop Lovenox when your INR is greater than 2.5 as discussed.  Indications: DVT/PE (active thrombosis), Prevention of Unwanted Clot in Veins    folic acid (FOLVITE) 1 MG tablet 1/11/2025  No Yes    TAKE 1 TABLET BY MOUTH DAILY    loratadine (CLARITIN) 10 MG tablet 1/11/2025 Self Yes Yes    Take 1 tablet by mouth Daily.    multivitamin with minerals tablet tablet 1/11/2025 Self Yes Yes    Take 1 tablet by mouth Daily. PT HOLDING FOR SURGERY    pantoprazole (PROTONIX) 40 MG EC tablet 1/11/2025  No Yes    TAKE 1 TABLET BY MOUTH DAILY FOR GERD    Pirfenidone 267 MG tablet 1/11/2025  Yes Yes    Take 3 tablets by mouth 3 times a day.    polyethylene glycol (MIRALAX) 17 g packet   Yes Yes    Take 8.5 g by mouth Daily As Needed.    pregabalin (LYRICA) 150 MG capsule 1/11/2025  No Yes    TAKE 1 CAPSULE BY MOUTH TWICE DAILY FOR FIBROMYALGIA    Stiolto Respimat 2.5-2.5 MCG/ACT aerosol solution inhaler 1/11/2025  Yes Yes    traMADol (Ultram) 50 MG tablet 1/11/2025  No Yes    Take 1 tablet by mouth Every 6 (Six) Hours As Needed for Moderate Pain.    Umeclidinium-Vilanterol (ANORO ELLIPTA) 62.5-25 MCG/ACT aerosol powder  inhaler 1/11/2025  Yes Yes    1 puff Daily.    warfarin (COUMADIN) 1 MG tablet 1/11/2025  No Yes    Take 1.5 tablets by mouth Daily. Take total of 11.5 mg daily---has 5mg Rx    Patient taking differently:  Take 0.5 tablets by mouth Daily. Take total of 10.5 mg daily---has  5mg Rx    nitroglycerin (NITROSTAT) 0.4 MG SL tablet  Self No No    Place 1 tablet under the tongue Every 5 (Five) Minutes As Needed for Chest Pain. Take no more than 3 doses in 15 minutes.    warfarin (COUMADIN) 5 MG tablet   No No    TAKE 2 TABLETS BY MOUTH EVERY NIGHT    Patient taking differently:  Take 2 tablets by mouth Every Night. Take total of 10.5 mg daily---has 5mg Rx              Medication notes:     This medication list is complete to the best of my knowledge as of 1/11/2025    Please call if questions.    Sonia Campbell, PharmD, BCPS  1/11/2025 18:29 EST

## 2025-01-11 NOTE — ED PROVIDER NOTES
EMERGENCY DEPARTMENT ENCOUNTER  Room Number:  S518/1  PCP: Aletha Ochoa, MALINDA  Independent Historians: Patient and family and EMS      HPI:  Chief Complaint: had concerns including Leg Pain and Fall (+thinner, did not hit head, -LOC).     A complete HPI/ROS/PMH/PSH/SH/FH are unobtainable due to: None    Chronic or social conditions impacting patient care (Social Determinants of Health): None      Context: Javy Reeves Sr. is a 74 y.o. male with a medical history of CAD, DVT/pulmonary embolism, hyperlipidemia and degenerative disc disease who presents to the ED c/o acute severe pain in the right hip, thigh, knee and lower leg to the point he cannot stand or ambulate.  Patient had an accidental fall in the parking lot at [employer] this morning around 9 AM.  He slipped on an icy patch after he exited his vehicle and landed directly on his right hip.  He did not strike his head or have any loss of consciousness.  He was able to get back up and finish his shopping by riding in a car.  That he came home and unfortunately within 30 minutes his pain was intensifying to the point that he could not tolerate it any longer.  Now he is not able to stand on his right leg because of the pain that radiates from his hip all the way through the lower leg and ankle area.  Denies any left-sided injuries.      Review of prior external notes (non-ED) -and- Review of prior external test results outside of this encounter: I independently reviewed the orthopedic surgery progress note from January 2, 2025.  Patient had large joint arthrocentesis in the right knee at that time.  Received a gel injection for arthritis therapy    Prescription drug monitoring program review: ASHLEY reviewed by Autumn Arellano MD, Edy Aguiar MD, John Denny MD   N/A and ASHLEY query complete and reviewed. Patient receives regular prescriptions for controlled substances.    PAST MEDICAL HISTORY  Active Ambulatory Problems      Diagnosis Date Noted    Arthritis 11/16/2015    Family history of early CAD 11/16/2015    DDD (degenerative disc disease), cervical 11/16/2015    DVT (deep venous thrombosis) 11/16/2015    Fibromyalgia 11/16/2015    Past heart attack 11/16/2015    Hyperlipidemia 11/16/2015    DDD (degenerative disc disease), lumbosacral 11/16/2015    History of pulmonary embolus (PE) 11/16/2015    Reflux esophagitis 11/16/2015    TIA (transient ischemic attack) 11/16/2015    Left groin pain 02/21/2017    Cervical radicular pain 02/12/2018    Cervical disc disorder at C6-C7 level with radiculopathy 05/16/2018    Wheezing 07/20/2018    Colon polyps 07/27/2018    Right lower quadrant abdominal pain 07/27/2018    Diarrhea 07/27/2018    Cervical spinal stenosis 08/20/2018    Cervical disc disorder at C5-C6 level with radiculopathy 08/20/2018    GERD (gastroesophageal reflux disease) 09/07/2018    Diverticulosis 09/13/2018    Old MI (myocardial infarction) 09/20/2018    Herniated cervical disc 10/05/2018    Encounter for therapeutic drug monitoring 10/10/2018    Long term current use of anticoagulant therapy 10/10/2018    ERRONEOUS ENCOUNTER--DISREGARD 03/27/2019    Stable angina 04/26/2019    Low folic acid 09/22/2020    Low serum vitamin B12 09/22/2020    Precordial chest pain 12/01/2020    Tobacco abuse 12/01/2020    Tobacco abuse counseling 12/01/2020    Interstitial lung disease 12/01/2020    History of transient ischemic attack (TIA) 02/22/2021    Irritable bowel syndrome 04/26/2021    DDD (degenerative disc disease), lumbar 04/26/2021    Weight loss 09/08/2021    Choking sensation 09/08/2021    Failed back syndrome 11/03/2021    Long term (current) use of opiate analgesic 11/03/2021    Lumbar radiculopathy 11/03/2021    Lumbar spondylosis 11/03/2021    Chronic pain disorder 11/03/2021    History of colon polyps 07/28/2022    Acute chest pain 08/22/2022    Recurrent pulmonary embolism 08/23/2022    Subtherapeutic international  normalized ratio (INR) 08/23/2022    Bronchiectasis without complication 08/23/2022    Abdominal pain 04/22/2022    Presence of IVC filter 04/22/2022    Weight loss, unintentional 04/22/2022    ILD (interstitial lung disease) 08/31/2022    Tobacco use disorder 08/31/2022    Ectatic abdominal aorta 12/01/2023     Resolved Ambulatory Problems     Diagnosis Date Noted    Hypertension 11/16/2015    Mild atherosclerosis of right carotid artery 03/27/2018     Past Medical History:   Diagnosis Date    Acute and subacute infective endocarditis in diseases classified elsewhere     Allergic     Arthritis of neck Don’t remember    Asthma 04/26/2021    Chest pain     Chronic low back pain     Chronic pain     Clotting disorder     Coronary artery disease     Encounter for special screening examination for neoplasm of prostate 2012    Eye exam, routine > 5 yrs ago    Eye exam, routine 01/2015    Fibromyositis     HIV disease     Injury of back     Injury of neck     Irritable bowel     Limited joint range of motion     Low back strain Don’t remember    Lumbar stenosis     Lumbosacral disc disease Don’t remember    MI (myocardial infarction)     Neck pain     Neck strain     Pain in limb     Postlaminectomy syndrome of lumbar region     Pulmonary embolism 1996    Rotator cuff syndrome     Scoliosis     Shortness of breath     Sleep apnea     Stroke     Thoracic disc disorder          PAST SURGICAL HISTORY  Past Surgical History:   Procedure Laterality Date    ANTERIOR CERVICAL DISCECTOMY W/ FUSION N/A 10/05/2018    Procedure: CERVICAL DISCECTOMY ANTERIOR FUSION WITH INSTRUMENTATION,ACDF C5/6, C6/7 with cages and plate;  Surgeon: Jean Coles MD;  Location: [facility] OR;  Service: Neurosurgery    APPENDECTOMY N/A     BACK SURGERY  Don’t remember    BRONCHOSCOPY N/A 11/03/2020    Procedure: EBUS BRONCHOSCOPY WITH BAL & FLUOROSCOPY WITH MAC ANESTHESIA, BX & TBNA;  Surgeon: Jamil Willoughby MD;  Location: [facility]  ENDOSCOPY;  Service: Pulmonary;  Laterality: N/A;  PRE/POST-ABNORMAL CT, LAD    CARDIAC CATHETERIZATION Left 1952    Left Heart Cath Left Ventriculography, selective coronary angiography; iliofemoral angiography; angio seal closure, Dr. Brady Ramos    COLONOSCOPY  09/2015    removed 2 polyps, Dr. Manley    COLONOSCOPY N/A 02/12/2007    Left colonic diverticulosis, No evidence of polypoid neoplasia, Dr. Jarret Bloom    COLONOSCOPY N/A 09/13/2018    Procedure: COLONOSCOPY TO CECUM WITH COLD BX'S POLYPECTOMY;  Surgeon: Berta Sin MD;  Location: [facility] ENDOSCOPY;  Service: General    CYSTOSCOPY TRANSURETHRAL RESECTION OF PROSTATE N/A 11/01/2004    Dr. Rodolfo Arnold    ENDOSCOPY N/A 08/03/2023    Procedure: ESOPHAGOGASTRODUODENOSCOPYmwith biopsy;  Surgeon: Shane Foss MD;  Location: [facility] ENDOSCOPY;  Service: Gastroenterology;  Laterality: N/A;  pre- gerd  post- gastritis    HAND SURGERY Right     JOINT REPLACEMENT  Over 25 years ago    KNEE ARTHROPLASTY Left     LAPAROSCOPIC CHOLECYSTECTOMY      LUMBAR DISC SURGERY  2006, 2007    L4-S1 pseudoarthroses - Dr Cervantes    LUMBAR DISC SURGERY N/A 01/29/2010    Removal of Instrumentation w/ decompression, Instrumentaion loosening & pt tested positive for zinc allergy, Dr. Kvng Cervantes    NECK SURGERY  Don’t remember    ROTATOR CUFF REPAIR Right 04/2012    SHOULDER SURGERY  Don’t remember    THORACIC OUTLET SURGERY Bilateral     THORACOSCOPY Right 11/29/2022    Procedure: BRONCHOSCOPY, RIGHT THORACOSCOPY VIDEO ASSISTED WEDGE RESECTION, INTERCOSTAL NERVE BLOCK;  Surgeon: Nella Bergman MD;  Location: [facility] MAIN OR;  Service: Thoracic;  Laterality: Right;    TRIGGER POINT INJECTION      Several injections [facility]    VENA CAVA FILTER PLACEMENT  1996    WRIST SURGERY Right     x3 starting in 1984         FAMILY HISTORY  Family History   Problem Relation Age of Onset    Diabetes Sister     Cancer Sister     Hypertension Sister          Covid    Kidney disease Sister     Stroke Sister     Heart disease Brother     Hypertension Brother     Cerebral aneurysm Brother     Sudden death Brother     Bleeding Disorder Brother     Cancer Mother     Clotting disorder Mother         Cancer    Heart disease Father         Stroke    Cancer Father         malignant neoplasm    Deep vein thrombosis Father     Heart attack Father     Malig Hyperthermia Neg Hx          SOCIAL HISTORY  Social History     Socioeconomic History    Marital status:    Tobacco Use    Smoking status: Some Days     Current packs/day: 1.50     Average packs/day: 1.5 packs/day for 25.0 years (37.5 ttl pk-yrs)     Types: Cigars, Cigarettes     Passive exposure: Never    Smokeless tobacco: Never    Tobacco comments:     Since 23 years old   Vaping Use    Vaping status: Never Used   Substance and Sexual Activity    Alcohol use: Not Currently     Comment: RARELY    Drug use: Never    Sexual activity: Not Currently     Partners: Female     Birth control/protection: Tubal ligation     Comment: Wife only         ALLERGIES  Zinc, Chantix [varenicline], and Hydrocodone-acetaminophen      REVIEW OF SYSTEMS  Review of Systems  Included in HPI  All systems reviewed and negative except for those discussed in HPI.      PHYSICAL EXAM    I have reviewed the triage vital signs and nursing notes.    ED Triage Vitals [01/11/25 1510]   Temp Heart Rate Resp BP SpO2   98 °F (36.7 °C) 95 16 (!) 183/105 100 %      Temp src Heart Rate Source Patient Position BP Location FiO2 (%)   Tympanic Monitor -- -- --       Physical Exam  GENERAL: alert, no acute distress  SKIN: Warm, dry, no rashes  HENT: Normocephalic, atraumatic  EYES: no scleral icterus, normal conjunctivae  CV: regular rhythm, regular rate  RESPIRATORY: normal effort, lungs clear bilaterally  ABDOMEN: soft, nondistended, nontender  MUSCULOSKELETAL: no deformity.  Moderate tenderness to the right hip, thigh, knee and lower leg is noted  circumferentially and rather diffusely.  Patient has decreased range of motion for flexion of the right hip and knee because of pain on movement.  The right foot appears nontender.  Distal neurovascular exam is normal at the foot and ankle.  NEURO: alert, moves all extremities, follows commands      LAB RESULTS  Recent Results (from the past 24 hours)   Duplex Venous Lower Extremity - RIGHT    Collection Time: 01/11/25  5:15 PM   Result Value Ref Range    Right Mid Femoral Spont 1.0     Right Distal Femoral Spont 1.0     Right Popliteal Spont 1.0     Right Common Femoral Spont Y     Right Common Femoral Competent Y     Right Common Femoral Phasic Y     Right Common Femoral Compress C     Right Common Femoral Augment Y     Right Saphenofemoral Junction Compress C     Right Profunda Femoral Compress C     Right Proximal Femoral Compress C     Right Mid Femoral Spont N     Right Mid Femoral Competent N     Right Mid Femoral Phasic N     Right Mid Femoral Compress N     Right Mid Femoral Augment N     Right Mid Femoral Thrombus S     Right Distal Femoral Spont N     Right Distal Femoral Competent N     Right Distal Femoral Phasic N     Right Distal Femoral Compress N     Right Distal Femoral Augment N     Right Distal Femoral Thrombus C     Right Popliteal Spont N     Right Popliteal Competent N     Right Popliteal Phasic N     Right Popliteal Compress N     Right Popliteal Augment N     Right Popliteal Thrombus C     Right Posterior Tibial Compress C     Right Peroneal Compress C     Right Gastronemius Compress C     Right Greater Saph AK Compress C     Right Greater Saph BK Compress C     Right Lesser Saph Compress C     Left Common Femoral Spont Y     Left Common Femoral Competent Y     Left Common Femoral Phasic Y     Left Common Femoral Compress C     Left Common Femoral Augment Y     BH CV VAS PRELIMINARY FINDINGS SCRIPTING 1.0    Protime-INR    Collection Time: 01/11/25  6:19 PM    Specimen: Blood   Result Value  Ref Range    Protime 22.0 (H) 11.7 - 14.2 Seconds    INR 1.90 (H) 0.90 - 1.10   CBC Auto Differential    Collection Time: 01/11/25  6:19 PM    Specimen: Blood   Result Value Ref Range    WBC 5.57 3.40 - 10.80 10*3/mm3    RBC 4.47 4.14 - 5.80 10*6/mm3    Hemoglobin 14.8 13.0 - 17.7 g/dL    Hematocrit 42.7 37.5 - 51.0 %    MCV 95.5 79.0 - 97.0 fL    MCH 33.1 (H) 26.6 - 33.0 pg    MCHC 34.7 31.5 - 35.7 g/dL    RDW 13.9 12.3 - 15.4 %    RDW-SD 48.5 37.0 - 54.0 fl    MPV 8.9 6.0 - 12.0 fL    Platelets 201 140 - 450 10*3/mm3    Neutrophil % 56.0 42.7 - 76.0 %    Lymphocyte % 33.0 19.6 - 45.3 %    Monocyte % 9.0 5.0 - 12.0 %    Eosinophil % 1.4 0.3 - 6.2 %    Basophil % 0.4 0.0 - 1.5 %    Immature Grans % 0.2 0.0 - 0.5 %    Neutrophils, Absolute 3.12 1.70 - 7.00 10*3/mm3    Lymphocytes, Absolute 1.84 0.70 - 3.10 10*3/mm3    Monocytes, Absolute 0.50 0.10 - 0.90 10*3/mm3    Eosinophils, Absolute 0.08 0.00 - 0.40 10*3/mm3    Basophils, Absolute 0.02 0.00 - 0.20 10*3/mm3    Immature Grans, Absolute 0.01 0.00 - 0.05 10*3/mm3    nRBC 0.0 0.0 - 0.2 /100 WBC   Comprehensive Metabolic Panel    Collection Time: 01/11/25  6:19 PM    Specimen: Blood   Result Value Ref Range    Glucose 88 65 - 99 mg/dL    BUN 12 8 - 23 mg/dL    Creatinine 1.09 0.76 - 1.27 mg/dL    Sodium 140 136 - 145 mmol/L    Potassium 3.8 3.5 - 5.2 mmol/L    Chloride 104 98 - 107 mmol/L    CO2 27.2 22.0 - 29.0 mmol/L    Calcium 8.9 8.6 - 10.5 mg/dL    Total Protein 7.5 6.0 - 8.5 g/dL    Albumin 3.9 3.5 - 5.2 g/dL    ALT (SGPT) 17 1 - 41 U/L    AST (SGOT) 39 1 - 40 U/L    Alkaline Phosphatase 77 39 - 117 U/L    Total Bilirubin 0.6 0.0 - 1.2 mg/dL    Globulin 3.6 gm/dL    A/G Ratio 1.1 g/dL    BUN/Creatinine Ratio 11.0 7.0 - 25.0    Anion Gap 8.8 5.0 - 15.0 mmol/L    eGFR 72.1 >60.0 mL/min/1.73   aPTT    Collection Time: 01/11/25  6:19 PM    Specimen: Blood   Result Value Ref Range    PTT 35.9 (H) 22.7 - 35.4 seconds         RADIOLOGY  Duplex Venous Lower Extremity -  RIGHT    Result Date: 1/11/2025    Chronic right lower extremity deep vein thrombosis noted in the distal femoral and popliteal.   Sub-acute right lower extremity deep vein thrombosis noted in the mid femoral.   All other right sided veins appeared normal.     XR Hip With or Without Pelvis 2 - 3 View Right, XR Knee 1 or 2 View Right, XR Femur 2 View Right, XR Tibia Fibula 2 View Right    Result Date: 1/11/2025  XR HIP W OR WO PELVIS 2-3 VIEW RIGHT-, XR TIBIA FIBULA 2 VW RIGHT-, XR KNEE 1 OR 2 VW RIGHT-, XR FEMUR 2 VW RIGHT-01/11/2025  HISTORY: Fell, pelvis, right hip, right femur, right knee, and right lower leg injuries.  AP PELVIS AND RIGHT HIP 2 VIEWS.  No acute bone, joint or soft tissue abnormalities are seen.  RIGHT FEMUR  Four views of the right femur demonstrate no fractures or other acute bony abnormalities. There is degenerative arthritis of the right knee. Superficial femoral artery calcification is seen.  RIGHT KNEE 2 VIEWS  There is moderate medial tibiofemoral joint space narrowing. Hypertrophic changes are seen in the knee. There may be a minimal suprapatellar effusion. No fractures are seen. There is some popliteal artery calcification.  RIGHT TIBIA AND FIBULA  No fractures or acute abnormalities are seen in the right tibia and fibula. Tiny calcaneal spur is seen at the insertion site of the Achilles tendon and there is some minimal ossification along the plantar fascia near the insertion on the calcaneus.   This report was finalized on 1/11/2025 6:02 PM by Dr. Guero Gillis M.D on Workstation: [id number]         MEDICATIONS GIVEN IN ER  Medications   sodium chloride 0.9 % flush 10 mL (has no administration in time range)   acetaminophen (TYLENOL) tablet 650 mg (has no administration in time range)     Or   acetaminophen (TYLENOL) 160 MG/5ML oral solution 650 mg (has no administration in time range)     Or   acetaminophen (TYLENOL) suppository 650 mg (has no administration in time range)    ondansetron ODT (ZOFRAN-ODT) disintegrating tablet 4 mg (has no administration in time range)     Or   ondansetron (ZOFRAN) injection 4 mg (has no administration in time range)   melatonin tablet 2.5 mg (has no administration in time range)   sennosides-docusate (PERICOLACE) 8.6-50 MG per tablet 2 tablet (has no administration in time range)     And   polyethylene glycol (MIRALAX) packet 17 g (has no administration in time range)     And   bisacodyl (DULCOLAX) EC tablet 5 mg (has no administration in time range)     And   bisacodyl (DULCOLAX) suppository 10 mg (has no administration in time range)   heparin 25090 units/250 mL (100 units/mL) in 0.45 % NaCl infusion (17 Units/kg/hr × 88.1 kg Intravenous New Bag 1/11/25 2043)   albuterol sulfate HFA (PROVENTIL HFA;VENTOLIN HFA;PROAIR HFA) inhaler 2 puff (has no administration in time range)   atorvastatin (LIPITOR) tablet 80 mg (has no administration in time range)   cyclobenzaprine (FLEXERIL) tablet 5 mg (5 mg Oral Given 1/11/25 2038)   folic acid (FOLVITE) tablet 1 mg (has no administration in time range)   cetirizine (zyrTEC) tablet 10 mg (has no administration in time range)   multivitamin with minerals 1 tablet (has no administration in time range)   pantoprazole (PROTONIX) EC tablet 40 mg (has no administration in time range)   Pirfenidone tablet 801 mg (801 mg Oral Not Given 1/11/25 2324)   pregabalin (LYRICA) capsule 150 mg (150 mg Oral Given 1/11/25 2038)   traMADol (ULTRAM) tablet 50 mg (50 mg Oral Given 1/11/25 2039)   arformoterol (BROVANA) nebulizer solution 15 mcg (15 mcg Nebulization Not Given 1/11/25 2128)     And   tiotropium (SPIRIVA RESPIMAT) 2.5 mcg/act aerosol solution inhaler (2 puffs Inhalation Given 1/11/25 2126)         ORDERS PLACED DURING THIS VISIT:  Orders Placed This Encounter   Procedures    XR Hip With or Without Pelvis 2 - 3 View Right    XR Knee 1 or 2 View Right    XR Femur 2 View Right    XR Tibia Fibula 2 View Right    Protime-INR     CBC Auto Differential    Comprehensive Metabolic Panel    Basic Metabolic Panel    aPTT    aPTT    aPTT    CBC Auto Differential    CBC Auto Differential    Diet: Cardiac; Healthy Heart (2-3 Na+); Fluid Consistency: Thin (IDDSI 0)    Vital Signs    Up with assistance    Daily Weights    Strict Intake & Output    Oral Care    Place Sequential Compression Device    Maintain Sequential Compression Device    Adjust Heparin Rate Based on aPTT Using Nomogram    RN To Release aPTT Order 6 Hours After Heparin Infusion & 6 Hours After Each Infusion Change Until  2 Consecutive Therapeutic aPTTs Are Achieved    Code Status and Medical Interventions: CPR (Attempt to Resuscitate); Full    [facility] (on-call MD unless specified) Details    Inpatient Hematology & Oncology Consult    Telemetry Scan    Insert Peripheral IV    Initiate Observation Status    CBC & Differential    CBC & Differential         OUTPATIENT MEDICATION MANAGEMENT:  Current Facility-Administered Medications Ordered in Epic   Medication Dose Route Frequency Provider Last Rate Last Admin    acetaminophen (TYLENOL) tablet 650 mg  650 mg Oral Q4H PRN Autumn Arellano MD        Or    acetaminophen (TYLENOL) 160 MG/5ML oral solution 650 mg  650 mg Oral Q4H PRN Autumn Arellano MD        Or    acetaminophen (TYLENOL) suppository 650 mg  650 mg Rectal Q4H PRN Autumn Arellano MD        albuterol sulfate HFA (PROVENTIL HFA;VENTOLIN HFA;PROAIR HFA) inhaler 2 puff  2 puff Inhalation Q4H PRN Autumn Arellano MD        arformoterol (BROVANA) nebulizer solution 15 mcg  15 mcg Nebulization BID - RT Autumn Arellano MD        And    tiotropium (SPIRIVA RESPIMAT) 2.5 mcg/act aerosol solution inhaler  2 puff Inhalation Daily - RT Autumn Arellano MD   2 puff at 01/11/25 2126    [START ON 1/12/2025] atorvastatin (LIPITOR) tablet 80 mg  80 mg Oral Daily Autumn Arellano MD        sennosides-docusate (PERICOLACE) 8.6-50 MG per tablet 2 tablet   2 tablet Oral BID PRN Autumn Arellano MD        And    polyethylene glycol (MIRALAX) packet 17 g  17 g Oral Daily PRN Autumn Arellano MD        And    bisacodyl (DULCOLAX) EC tablet 5 mg  5 mg Oral Daily PRN Autumn Arellano MD        And    bisacodyl (DULCOLAX) suppository 10 mg  10 mg Rectal Daily PRN Autumn Arellano MD        [START ON 1/12/2025] cetirizine (zyrTEC) tablet 10 mg  10 mg Oral Daily Autumn Arellano MD        cyclobenzaprine (FLEXERIL) tablet 5 mg  5 mg Oral TID PRN Autumn Arellano MD   5 mg at 01/11/25 2038    [START ON 1/12/2025] folic acid (FOLVITE) tablet 1 mg  1 mg Oral Daily Autumn Arellano MD        heparin 00057 units/250 mL (100 units/mL) in 0.45 % NaCl infusion  17 Units/kg/hr Intravenous Titrated Edy Aguiar MD 14.97 mL/hr at 01/11/25 2043 17 Units/kg/hr at 01/11/25 2043    melatonin tablet 2.5 mg  2.5 mg Oral Nightly PRN Autumn Arellano MD        [START ON 1/12/2025] multivitamin with minerals 1 tablet  1 tablet Oral Daily Autumn Arellano MD        ondansetron ODT (ZOFRAN-ODT) disintegrating tablet 4 mg  4 mg Oral Q6H PRN Autumn Arellano MD        Or    ondansetron (ZOFRAN) injection 4 mg  4 mg Intravenous Q6H PRN Autumn Arellano MD        [START ON 1/12/2025] pantoprazole (PROTONIX) EC tablet 40 mg  40 mg Oral Daily Autumn Arellano MD        Pirfenidone tablet 801 mg  801 mg Oral TID Autumn Arellano MD        pregabalin (LYRICA) capsule 150 mg  150 mg Oral Q12H Autumn Arellano MD   150 mg at 01/11/25 2038    sodium chloride 0.9 % flush 10 mL  10 mL Intravenous PRN Edy Aguiar MD        traMADol (ULTRAM) tablet 50 mg  50 mg Oral Q6H PRN Stingl, Autumn Meier MD   50 mg at 01/11/25 2039     No current [facility]-ordered outpatient medications on file.         PROCEDURES  Procedures      Critical care provider statement:    Critical care time (minutes): 31.   Critical care time was  exclusive of:  Separately billable procedures and treating other patients   Critical care was necessary to treat or prevent imminent or life-threatening deterioration of the following conditions:  Circulatory Failure   Critical care was time spent personally by me on the following activities:  Development of treatment plan with patient or surrogate, discussions with consultants, evaluation of patient's response to treatment, examination of patient, obtaining history from patient or surrogate, ordering and performing treatments and interventions, ordering and review of laboratory studies, ordering and review of radiographic studies, pulse oximetry, re-evaluation of patient's condition and review of old charts. Critical Care indicators:  Others: aminophylline, diazepam, glucagon, heparin, lovenox, morphine, sodium bicarbonate, et al.      PROGRESS, DATA ANALYSIS, CONSULTS, AND MEDICAL DECISION MAKING  All labs have been independently interpreted by me.  All radiology studies have been reviewed by me. All EKG's have been independently viewed and interpreted by me.  Discussion below represents my analysis of pertinent findings related to patient's condition, differential diagnosis, treatment plan and final disposition.    Differential diagnosis includes but is not limited to hip fracture, hip dislocation, pelvic fracture, femur fracture, hip contusion, knee dislocation, knee sprain, arthritis.    Clinical Scores:                   ED Course as of 01/11/25 2328   Sat Jan 11, 2025   1730 I independently interpreted the x-ray of the right hip and pelvis and my findings are: No fracture or dislocation [SHELLEY]   1730 I independently interpreted the x-ray of the right knee and my findings are: No fracture or dislocation [SHELLEY]   1730 I independently interpreted the x-ray of the right tibia and fibula and my findings are: No fracture or dislocation [SHELLEY]   1738 I discussed with the vascular  who tells me that patient's  ultrasound study does show a subacute appearing DVT in the right femoral vein. [SHELLEY]   1742 Patient did have a recent knee arthrocentesis gel injection procedure about 1-1/2 weeks ago.  That may be related to this subacute DVT finding.  I am waiting on his labs to be drawn and resulted before making any further decisions regarding anticoagulation.  If his INR is subtherapeutic then I think it would be prudent to start him on a heparin drip. [SHELLEY]   1743 I discussed with Dr. Arellano from [facility] about this patient.  She agrees to admit him to the hospitalist service for further medical management today. [SHELLEY]   2326 INR is subtherapeutic so we are going to start him on a heparin drip for tonight.  Further care will be managed by the admitting hospitalist [SHELLEY]      ED Course User Index  [SHELLEY] Edy Aguiar MD             AS OF 23:28 EST VITALS:    BP - 176/97  HR - 72  TEMP - 98.2 °F (36.8 °C) (Oral)  O2 SATS - 98%    COMPLEXITY OF CARE  The patient requires admission.      DIAGNOSIS  Final diagnoses:   Acute deep vein thrombosis (DVT) of femoral vein of right lower extremity   Contusion of right thigh, initial encounter   Elevated blood pressure reading         DISPOSITION  ED Disposition       ED Disposition   Decision to Admit    Condition   --    Comment   Level of Care: Telemetry [5]   Diagnosis: Right femoral vein DVT [864755]   Admitting Physician: TONEY ARELLANO [[id number]]   Attending Physician: TONEY ARELLANO [[id number]]   Is patient appropriate for Inpatient Observation Unit?: Yes [1]                  Please note that portions of this document were completed with a voice recognition program.    Note Disclaimer: At [facility], we believe that sharing information builds trust and better relationships. You are receiving this note because you recently visited [facility]. It is possible you will see health information before a provider has talked with you about it. This kind of information can be easy to  misunderstand. To help you fully understand what it means for your health, we urge you to discuss this note with your provider.         Edy Aguiar MD  01/11/25 [id number]

## 2025-01-11 NOTE — H&P
"HISTORY AND PHYSICAL   [facility]        Date of Admission: 2025  Patient Identification:  Name: Javy Reeves Sr.  Age: 74 y.o.  Sex: male  :  1950  MRN: [id number]                     Primary Care Physician: Aletha Ochoa, PAPARAG    Chief Complaint:  74  year old gentleman presented to the emergency room after a fall in the [employer] parking lot this am; he was not able to get up and walk and has had pain of his right leg and hip; he did not lose consciousness; he initially felt ok but the pain became worse after he returned home; no fever or chills    History of Present Illness:   As above    Past Medical History:  Past Medical History:   Diagnosis Date    Acute and subacute infective endocarditis in diseases classified elsewhere     Allergic     Arthritis     Arthritis of neck Don’t remember    Asthma 2021    Chest pain     Chronic low back pain     Chronic pain     Clotting disorder     Colon polyps     Coronary artery disease     DDD (degenerative disc disease), cervical     DDD (degenerative disc disease), lumbosacral     Diverticulosis     DVT (deep venous thrombosis)     Left Lower extremity following arthroscopic knee surgery in . IVC fliter placed at that time.    Encounter for special screening examination for neoplasm of prostate     Eye exam, routine > 5 yrs ago    Eye exam, routine 2015    Fibromyalgia     Fibromyositis     GERD (gastroesophageal reflux disease)     HIV disease     PT STATES \"NEVER DIAGNOSED\"    Hyperlipidemia     ILD (interstitial lung disease)     Injury of back     Injury of neck     Irritable bowel     Limited joint range of motion     NECK    Low back strain Don’t remember    Lumbar stenosis     Lumbosacral disc disease Don’t remember    MI (myocardial infarction)     Neck pain     Neck strain     Pain in limb     Past heart attack     Postlaminectomy syndrome of lumbar region     Pulmonary embolism     Left Lower extremity " following arthroscopic knee surgery in 1996. IVC fliter placed at that time.    Reflux esophagitis     Rotator cuff syndrome     Don’t remember    Scoliosis     Shortness of breath     WITH EXCERTION    Sleep apnea     NO CPAP    Stroke     Thoracic disc disorder     TIA (transient ischemic attack)      Past Surgical History:  Past Surgical History:   Procedure Laterality Date    ANTERIOR CERVICAL DISCECTOMY W/ FUSION N/A 10/05/2018    Procedure: CERVICAL DISCECTOMY ANTERIOR FUSION WITH INSTRUMENTATION,ACDF C5/6, C6/7 with cages and plate;  Surgeon: Jean Coles MD;  Location: [facility] MAIN OR;  Service: Neurosurgery    APPENDECTOMY N/A     BACK SURGERY  Don’t remember    BRONCHOSCOPY N/A 11/03/2020    Procedure: EBUS BRONCHOSCOPY WITH BAL & FLUOROSCOPY WITH MAC ANESTHESIA, BX & TBNA;  Surgeon: Jamil Willoughby MD;  Location: [facility] ENDOSCOPY;  Service: Pulmonary;  Laterality: N/A;  PRE/POST-ABNORMAL CT, LAD    CARDIAC CATHETERIZATION Left 1950    Left Heart Cath Left Ventriculography, selective coronary angiography; iliofemoral angiography; angio seal closure, Dr. Brady Ramos    COLONOSCOPY  09/2015    removed 2 polyps, Dr. Manley    COLONOSCOPY N/A 02/12/2007    Left colonic diverticulosis, No evidence of polypoid neoplasia, Dr. Jarret Bloom    COLONOSCOPY N/A 09/13/2018    Procedure: COLONOSCOPY TO CECUM WITH COLD BX'S POLYPECTOMY;  Surgeon: Berta Sin MD;  Location: [facility] ENDOSCOPY;  Service: General    CYSTOSCOPY TRANSURETHRAL RESECTION OF PROSTATE N/A 11/01/2004    Dr. Rodolfo Arnold    ENDOSCOPY N/A 08/03/2023    Procedure: ESOPHAGOGASTRODUODENOSCOPYmwith biopsy;  Surgeon: Shane Foss MD;  Location: [facility] ENDOSCOPY;  Service: Gastroenterology;  Laterality: N/A;  pre- gerd  post- gastritis    HAND SURGERY Right     JOINT REPLACEMENT  Over 25 years ago    KNEE ARTHROPLASTY Left     LAPAROSCOPIC CHOLECYSTECTOMY      LUMBAR DISC SURGERY  2006, 2007    L4-S1 pseudoarthroses  - Dr Cervantes    LUMBAR DISC SURGERY N/A 01/29/2010    Removal of Instrumentation w/ decompression, Instrumentaion loosening & pt tested positive for zinc allergy, Dr. Kvng Cervantes    NECK SURGERY  Don’t remember    ROTATOR CUFF REPAIR Right 04/2012    SHOULDER SURGERY  Don’t remember    THORACIC OUTLET SURGERY Bilateral     THORACOSCOPY Right 11/29/2022    Procedure: BRONCHOSCOPY, RIGHT THORACOSCOPY VIDEO ASSISTED WEDGE RESECTION, INTERCOSTAL NERVE BLOCK;  Surgeon: Nella Bergman MD;  Location: [facility] OR;  Service: Thoracic;  Laterality: Right;    TRIGGER POINT INJECTION      Several [facility]    VENA CAVA FILTER PLACEMENT  1996    WRIST SURGERY Right     x3 starting in 1984      Home Meds:  (Not in a hospital admission)      Allergies:  Allergies   Allergen Reactions    Zinc Other (See Comments) and Diarrhea     Patient tested positive, had to have hardware removed from back.    Chantix [Varenicline] Other (See Comments)     Headache    Hydrocodone-Acetaminophen GI Intolerance     Immunizations:  Immunization History   Administered Date(s) Administered    COVID-19 (MODERNA) 1st,2nd,3rd Dose Monovalent 03/09/2021, 03/09/2021, 04/06/2021, 11/20/2021    COVID-19 (MODERNA) Monovalent Original Booster 04/02/2022    COVID-19 (PFIZER) BIVALENT 12+YRS 05/27/2023    FLUAD TRI 65YR+ 10/08/2019    Flu Vaccine Quad PF 6-35MO 10/05/2017, 10/05/2017    Flu Vaccine Quad PF >36MO 10/05/2017    Fluad Quad 65+ 09/28/2020    Fluzone High-Dose 65+YRS 10/16/2018, 10/08/2019, 12/12/2024    Fluzone High-Dose 65+yrs 10/21/2021, 12/05/2022, 11/10/2023    Fluzone Quad >6mos (Multi-dose) 11/16/2015    Influenza Seasonal Injectable 10/01/2022    Influenza, Unspecified 10/01/2022    Pneumococcal Conjugate 13-Valent (PCV13) 12/23/2015    Pneumococcal Conjugate 20-Valent (PCV20) 09/22/2022    Pneumococcal Polysaccharide (PPSV23) 09/05/2017    Shingrix 09/30/2022    Tdap 08/27/2017     Social History:    Social History     Social History Narrative    Not on file     Social History     Socioeconomic History    Marital status:    Tobacco Use    Smoking status: Some Days     Current packs/day: 1.50     Average packs/day: 1.5 packs/day for 25.0 years (37.5 ttl pk-yrs)     Types: Cigars, Cigarettes     Passive exposure: Never    Smokeless tobacco: Never    Tobacco comments:     Since 23 years old   Vaping Use    Vaping status: Never Used   Substance and Sexual Activity    Alcohol use: Not Currently     Comment: RARELY    Drug use: Never    Sexual activity: Not Currently     Partners: Female     Birth control/protection: Tubal ligation     Comment: Wife only       Family History:  Family History   Problem Relation Age of Onset    Diabetes Sister     Cancer Sister     Hypertension Sister         Covid    Kidney disease Sister     Stroke Sister     Heart disease Brother     Hypertension Brother     Cerebral aneurysm Brother     Sudden death Brother     Bleeding Disorder Brother     Cancer Mother     Clotting disorder Mother         Cancer    Heart disease Father         Stroke    Cancer Father         malignant neoplasm    Deep vein thrombosis Father     Heart attack Father     Malig Hyperthermia Neg Hx         Review of Systems  See history of present illness and past medical history.  Patient denies headache, dizziness, syncope, falls, trauma, change in vision, change in hearing, change in taste, changes in weight, changes in appetite, focal weakness, numbness, or paresthesia.  Patient denies chest pain, palpitations, dyspnea, orthopnea, PND, cough, sinus pressure, rhinorrhea, epistaxis, hemoptysis, nausea, vomiting,hematemesis, diarrhea, constipation or hematochezia.  Denies cold or heat intolerance, polydipsia, polyuria, polyphagia. Denies hematuria, pyuria, dysuria, hesitancy, frequency or urgency. Denies consumption of raw and under cooked meats foods or change in water source.  Denies fever, chills, sweats,  night sweats.  Denies missing any routine medications. Remainder of ROS is negative.    Objective:  T Max 24 hrs: Temp (24hrs), Av °F (36.7 °C), Min:98 °F (36.7 °C), Max:98 °F (36.7 °C)    Vitals Ranges:   Temp:  [98 °F (36.7 °C)] 98 °F (36.7 °C)  Heart Rate:  [71-95] 75  Resp:  [16] 16  BP: (147-183)/() 153/93      Exam:  /93   Pulse 75   Temp 98 °F (36.7 °C) (Tympanic)   Resp 16   SpO2 97%     General Appearance:    Alert, cooperative, no distress, appears stated age   Head:    Normocephalic, without obvious abnormality, atraumatic   Eyes:    PERRL, conjunctivae/corneas clear, EOM's intact, both eyes   Ears:    Normal external ear canals, both ears   Nose:   Nares normal, septum midline, mucosa normal, no drainage    or sinus tenderness   Throat:   Lips, mucosa, and tongue normal   Neck:   Supple, symmetrical, trachea midline, no adenopathy;     thyroid:  no enlargement/tenderness/nodules; no carotid    bruit or JVD   Back:     Symmetric, no curvature, ROM normal, no CVA tenderness   Lungs:     Clear to auscultation bilaterally, respirations unlabored   Chest Wall:    No tenderness or deformity    Heart:    Regular rate and rhythm, S1 and S2 normal, no murmur, rub   or gallop   Abdomen:     Soft, nontender, bowel sounds active all four quadrants,     no masses, no hepatomegaly, no splenomegaly   Extremities:   Extremities normal, atraumatic, no cyanosis or edema                       .    Data Review:  Labs in chart were reviewed.  WBC   Date Value Ref Range Status   2025 5.57 3.40 - 10.80 10*3/mm3 Final     Hemoglobin   Date Value Ref Range Status   2025 14.8 13.0 - 17.7 g/dL Final     Hematocrit   Date Value Ref Range Status   2025 42.7 37.5 - 51.0 % Final     Platelets   Date Value Ref Range Status   2025 201 140 - 450 10*3/mm3 Final     Sodium   Date Value Ref Range Status   2025 140 136 - 145 mmol/L Final     Potassium   Date Value Ref Range Status    01/11/2025 3.8 3.5 - 5.2 mmol/L Final     Chloride   Date Value Ref Range Status   01/11/2025 104 98 - 107 mmol/L Final     CO2   Date Value Ref Range Status   01/11/2025 27.2 22.0 - 29.0 mmol/L Final     BUN   Date Value Ref Range Status   01/11/2025 12 8 - 23 mg/dL Final     Creatinine   Date Value Ref Range Status   01/11/2025 1.09 0.76 - 1.27 mg/dL Final     Glucose   Date Value Ref Range Status   01/11/2025 88 65 - 99 mg/dL Final     Calcium   Date Value Ref Range Status   01/11/2025 8.9 8.6 - 10.5 mg/dL Final                Imaging Results (All)       Procedure Component Value Units Date/Time    XR Hip With or Without Pelvis 2 - 3 View Right [[id number]] Collected: 01/11/25 1700     Updated: 01/11/25 1805    Narrative:      XR HIP W OR WO PELVIS 2-3 VIEW RIGHT-, XR TIBIA FIBULA 2 VW RIGHT-, XR  KNEE 1 OR 2 VW RIGHT-, XR FEMUR 2 VW RIGHT-01/11/2025     HISTORY: Fell, pelvis, right hip, right femur, right knee, and right  lower leg injuries.     AP PELVIS AND RIGHT HIP 2 VIEWS.     No acute bone, joint or soft tissue abnormalities are seen.     RIGHT FEMUR     Four views of the right femur demonstrate no fractures or other acute  bony abnormalities. There is degenerative arthritis of the right knee.  Superficial femoral artery calcification is seen.     RIGHT KNEE 2 VIEWS     There is moderate medial tibiofemoral joint space narrowing.  Hypertrophic changes are seen in the knee. There may be a minimal  suprapatellar effusion. No fractures are seen. There is some popliteal  artery calcification.     RIGHT TIBIA AND FIBULA     No fractures or acute abnormalities are seen in the right tibia and  fibula. Tiny calcaneal spur is seen at the insertion site of the  Achilles tendon and there is some minimal ossification along the plantar  fascia near the insertion on the calcaneus.        This report was finalized on 1/11/2025 6:02 PM by Dr. Guero Gillis M.D on Workstation: [id number]       XR Knee 1 or 2 View  Right [[id number]] Collected: 01/11/25 1700     Updated: 01/11/25 1805    Narrative:      XR HIP W OR WO PELVIS 2-3 VIEW RIGHT-, XR TIBIA FIBULA 2 VW RIGHT-, XR  KNEE 1 OR 2 VW RIGHT-, XR FEMUR 2 VW RIGHT-01/11/2025     HISTORY: Fell, pelvis, right hip, right femur, right knee, and right  lower leg injuries.     AP PELVIS AND RIGHT HIP 2 VIEWS.     No acute bone, joint or soft tissue abnormalities are seen.     RIGHT FEMUR     Four views of the right femur demonstrate no fractures or other acute  bony abnormalities. There is degenerative arthritis of the right knee.  Superficial femoral artery calcification is seen.     RIGHT KNEE 2 VIEWS     There is moderate medial tibiofemoral joint space narrowing.  Hypertrophic changes are seen in the knee. There may be a minimal  suprapatellar effusion. No fractures are seen. There is some popliteal  artery calcification.     RIGHT TIBIA AND FIBULA     No fractures or acute abnormalities are seen in the right tibia and  fibula. Tiny calcaneal spur is seen at the insertion site of the  Achilles tendon and there is some minimal ossification along the plantar  fascia near the insertion on the calcaneus.        This report was finalized on 1/11/2025 6:02 PM by Dr. Guero Gillis M.D on Workstation: [id number]       XR Femur 2 View Right [[id number]] Collected: 01/11/25 1700     Updated: 01/11/25 1805    Narrative:      XR HIP W OR WO PELVIS 2-3 VIEW RIGHT-, XR TIBIA FIBULA 2 VW RIGHT-, XR  KNEE 1 OR 2 VW RIGHT-, XR FEMUR 2 VW RIGHT-01/11/2025     HISTORY: Fell, pelvis, right hip, right femur, right knee, and right  lower leg injuries.     AP PELVIS AND RIGHT HIP 2 VIEWS.     No acute bone, joint or soft tissue abnormalities are seen.     RIGHT FEMUR     Four views of the right femur demonstrate no fractures or other acute  bony abnormalities. There is degenerative arthritis of the right knee.  Superficial femoral artery calcification is seen.     RIGHT KNEE 2 VIEWS     There  is moderate medial tibiofemoral joint space narrowing.  Hypertrophic changes are seen in the knee. There may be a minimal  suprapatellar effusion. No fractures are seen. There is some popliteal  artery calcification.     RIGHT TIBIA AND FIBULA     No fractures or acute abnormalities are seen in the right tibia and  fibula. Tiny calcaneal spur is seen at the insertion site of the  Achilles tendon and there is some minimal ossification along the plantar  fascia near the insertion on the calcaneus.        This report was finalized on 1/11/2025 6:02 PM by Dr. Guero Gillis M.D on Workstation: [id number]       XR Tibia Fibula 2 View Right [[id number]] Collected: 01/11/25 1700     Updated: 01/11/25 1805    Narrative:      XR HIP W OR WO PELVIS 2-3 VIEW RIGHT-, XR TIBIA FIBULA 2 VW RIGHT-, XR  KNEE 1 OR 2 VW RIGHT-, XR FEMUR 2 VW RIGHT-01/11/2025     HISTORY: Fell, pelvis, right hip, right femur, right knee, and right  lower leg injuries.     AP PELVIS AND RIGHT HIP 2 VIEWS.     No acute bone, joint or soft tissue abnormalities are seen.     RIGHT FEMUR     Four views of the right femur demonstrate no fractures or other acute  bony abnormalities. There is degenerative arthritis of the right knee.  Superficial femoral artery calcification is seen.     RIGHT KNEE 2 VIEWS     There is moderate medial tibiofemoral joint space narrowing.  Hypertrophic changes are seen in the knee. There may be a minimal  suprapatellar effusion. No fractures are seen. There is some popliteal  artery calcification.     RIGHT TIBIA AND FIBULA     No fractures or acute abnormalities are seen in the right tibia and  fibula. Tiny calcaneal spur is seen at the insertion site of the  Achilles tendon and there is some minimal ossification along the plantar  fascia near the insertion on the calcaneus.        This report was finalized on 1/11/2025 6:02 PM by Dr. Guero Gillis M.D on Workstation: [id number]                  Assessment:  Active Hospital Problems    Diagnosis  POA    **Right femoral vein DVT [I82.411]  Yes      Resolved Hospital Problems   No resolved problems to display.   Right leg pain  Fall  Cad  Hyperlipidemia  hypertension    Plan:  Ask hematology to see him  He has had several clots and apparently some while on coumadin  Monitor on telemetry  Pain control  Dw patient and ed provider    Autumn Arellano MD  1/11/2025  18:57 EST

## 2025-01-12 LAB
ANION GAP SERPL CALCULATED.3IONS-SCNC: 9 MMOL/L (ref 5–15)
APTT PPP: 107.9 SECONDS (ref 22.7–35.4)
APTT PPP: 109 SECONDS (ref 22.7–35.4)
BASOPHILS # BLD AUTO: 0.02 10*3/MM3 (ref 0–0.2)
BASOPHILS NFR BLD AUTO: 0.4 % (ref 0–1.5)
BUN SERPL-MCNC: 10 MG/DL (ref 8–23)
BUN/CREAT SERPL: 10.9 (ref 7–25)
CALCIUM SPEC-SCNC: 8.8 MG/DL (ref 8.6–10.5)
CHLORIDE SERPL-SCNC: 102 MMOL/L (ref 98–107)
CO2 SERPL-SCNC: 26 MMOL/L (ref 22–29)
CREAT SERPL-MCNC: 0.92 MG/DL (ref 0.76–1.27)
DEPRECATED RDW RBC AUTO: 47.5 FL (ref 37–54)
EGFRCR SERPLBLD CKD-EPI 2021: 88.4 ML/MIN/1.73
EOSINOPHIL # BLD AUTO: 0.07 10*3/MM3 (ref 0–0.4)
EOSINOPHIL NFR BLD AUTO: 1.4 % (ref 0.3–6.2)
ERYTHROCYTE [DISTWIDTH] IN BLOOD BY AUTOMATED COUNT: 13.9 % (ref 12.3–15.4)
GLUCOSE SERPL-MCNC: 90 MG/DL (ref 65–99)
HCT VFR BLD AUTO: 42.4 % (ref 37.5–51)
HGB BLD-MCNC: 14.8 G/DL (ref 13–17.7)
IMM GRANULOCYTES # BLD AUTO: 0.01 10*3/MM3 (ref 0–0.05)
IMM GRANULOCYTES NFR BLD AUTO: 0.2 % (ref 0–0.5)
INR PPP: 2.01 (ref 0.9–1.1)
LYMPHOCYTES # BLD AUTO: 1.57 10*3/MM3 (ref 0.7–3.1)
LYMPHOCYTES NFR BLD AUTO: 31.8 % (ref 19.6–45.3)
MCH RBC QN AUTO: 32.6 PG (ref 26.6–33)
MCHC RBC AUTO-ENTMCNC: 34.9 G/DL (ref 31.5–35.7)
MCV RBC AUTO: 93.4 FL (ref 79–97)
MONOCYTES # BLD AUTO: 0.46 10*3/MM3 (ref 0.1–0.9)
MONOCYTES NFR BLD AUTO: 9.3 % (ref 5–12)
NEUTROPHILS NFR BLD AUTO: 2.8 10*3/MM3 (ref 1.7–7)
NEUTROPHILS NFR BLD AUTO: 56.9 % (ref 42.7–76)
NRBC BLD AUTO-RTO: 0 /100 WBC (ref 0–0.2)
PLATELET # BLD AUTO: 190 10*3/MM3 (ref 140–450)
PMV BLD AUTO: 9 FL (ref 6–12)
POTASSIUM SERPL-SCNC: 3.8 MMOL/L (ref 3.5–5.2)
PROTHROMBIN TIME: 23 SECONDS (ref 11.7–14.2)
PSA SERPL-MCNC: 1.24 NG/ML (ref 0–4)
RBC # BLD AUTO: 4.54 10*6/MM3 (ref 4.14–5.8)
SODIUM SERPL-SCNC: 137 MMOL/L (ref 136–145)
WBC NRBC COR # BLD AUTO: 4.93 10*3/MM3 (ref 3.4–10.8)

## 2025-01-12 PROCEDURE — 85730 THROMBOPLASTIN TIME PARTIAL: CPT | Performed by: HOSPITALIST

## 2025-01-12 PROCEDURE — 96372 THER/PROPH/DIAG INJ SC/IM: CPT

## 2025-01-12 PROCEDURE — 85610 PROTHROMBIN TIME: CPT | Performed by: HOSPITALIST

## 2025-01-12 PROCEDURE — 85613 RUSSELL VIPER VENOM DILUTED: CPT | Performed by: STUDENT IN AN ORGANIZED HEALTH CARE EDUCATION/TRAINING PROGRAM

## 2025-01-12 PROCEDURE — 94664 DEMO&/EVAL PT USE INHALER: CPT

## 2025-01-12 PROCEDURE — 25010000002 HEPARIN (PORCINE) 25000-0.45 UT/250ML-% SOLUTION: Performed by: EMERGENCY MEDICINE

## 2025-01-12 PROCEDURE — 85732 THROMBOPLASTIN TIME PARTIAL: CPT | Performed by: STUDENT IN AN ORGANIZED HEALTH CARE EDUCATION/TRAINING PROGRAM

## 2025-01-12 PROCEDURE — 84153 ASSAY OF PSA TOTAL: CPT | Performed by: STUDENT IN AN ORGANIZED HEALTH CARE EDUCATION/TRAINING PROGRAM

## 2025-01-12 PROCEDURE — G0378 HOSPITAL OBSERVATION PER HR: HCPCS

## 2025-01-12 PROCEDURE — 80048 BASIC METABOLIC PNL TOTAL CA: CPT | Performed by: INTERNAL MEDICINE

## 2025-01-12 PROCEDURE — 96366 THER/PROPH/DIAG IV INF ADDON: CPT

## 2025-01-12 PROCEDURE — 25010000002 ENOXAPARIN PER 10 MG: Performed by: HOSPITALIST

## 2025-01-12 PROCEDURE — 94799 UNLISTED PULMONARY SVC/PX: CPT

## 2025-01-12 PROCEDURE — 94761 N-INVAS EAR/PLS OXIMETRY MLT: CPT

## 2025-01-12 PROCEDURE — 85670 THROMBIN TIME PLASMA: CPT | Performed by: STUDENT IN AN ORGANIZED HEALTH CARE EDUCATION/TRAINING PROGRAM

## 2025-01-12 PROCEDURE — 85730 THROMBOPLASTIN TIME PARTIAL: CPT | Performed by: INTERNAL MEDICINE

## 2025-01-12 PROCEDURE — 85705 THROMBOPLASTIN INHIBITION: CPT | Performed by: STUDENT IN AN ORGANIZED HEALTH CARE EDUCATION/TRAINING PROGRAM

## 2025-01-12 PROCEDURE — 86147 CARDIOLIPIN ANTIBODY EA IG: CPT | Performed by: STUDENT IN AN ORGANIZED HEALTH CARE EDUCATION/TRAINING PROGRAM

## 2025-01-12 PROCEDURE — 99214 OFFICE O/P EST MOD 30 MIN: CPT | Performed by: STUDENT IN AN ORGANIZED HEALTH CARE EDUCATION/TRAINING PROGRAM

## 2025-01-12 PROCEDURE — 86146 BETA-2 GLYCOPROTEIN ANTIBODY: CPT | Performed by: STUDENT IN AN ORGANIZED HEALTH CARE EDUCATION/TRAINING PROGRAM

## 2025-01-12 PROCEDURE — 85025 COMPLETE CBC W/AUTO DIFF WBC: CPT | Performed by: EMERGENCY MEDICINE

## 2025-01-12 RX ORDER — PIRFENIDONE 267 MG/1
801 TABLET, FILM COATED ORAL 3 TIMES DAILY
Status: DISCONTINUED | OUTPATIENT
Start: 2025-01-12 | End: 2025-01-13 | Stop reason: HOSPADM

## 2025-01-12 RX ORDER — PREGABALIN 75 MG/1
150 CAPSULE ORAL EVERY 8 HOURS SCHEDULED
Status: DISCONTINUED | OUTPATIENT
Start: 2025-01-12 | End: 2025-01-13 | Stop reason: HOSPADM

## 2025-01-12 RX ORDER — WARFARIN SODIUM 7.5 MG/1
15 TABLET ORAL
Status: COMPLETED | OUTPATIENT
Start: 2025-01-12 | End: 2025-01-12

## 2025-01-12 RX ORDER — ENOXAPARIN SODIUM 100 MG/ML
1 INJECTION SUBCUTANEOUS EVERY 12 HOURS
Status: DISCONTINUED | OUTPATIENT
Start: 2025-01-12 | End: 2025-01-13 | Stop reason: HOSPADM

## 2025-01-12 RX ADMIN — PREGABALIN 150 MG: 75 CAPSULE ORAL at 09:09

## 2025-01-12 RX ADMIN — ARFORMOTEROL TARTRATE 15 MCG: 15 SOLUTION RESPIRATORY (INHALATION) at 20:36

## 2025-01-12 RX ADMIN — HEPARIN SODIUM 13 UNITS/KG/HR: 10000 INJECTION, SOLUTION INTRAVENOUS at 14:16

## 2025-01-12 RX ADMIN — CYCLOBENZAPRINE HYDROCHLORIDE 5 MG: 10 TABLET, FILM COATED ORAL at 21:56

## 2025-01-12 RX ADMIN — ATORVASTATIN CALCIUM 80 MG: 80 TABLET, FILM COATED ORAL at 09:09

## 2025-01-12 RX ADMIN — PANTOPRAZOLE SODIUM 40 MG: 40 TABLET, DELAYED RELEASE ORAL at 09:09

## 2025-01-12 RX ADMIN — PIRFENIDONE 801 MG: 267 TABLET, FILM COATED ORAL at 21:57

## 2025-01-12 RX ADMIN — FOLIC ACID 1 MG: 1 TABLET ORAL at 09:09

## 2025-01-12 RX ADMIN — WARFARIN 15 MG: 7.5 TABLET ORAL at 17:56

## 2025-01-12 RX ADMIN — PIRFENIDONE 801 MG: 267 TABLET, FILM COATED ORAL at 17:22

## 2025-01-12 RX ADMIN — ENOXAPARIN SODIUM 80 MG: 100 INJECTION SUBCUTANEOUS at 17:56

## 2025-01-12 RX ADMIN — Medication 10 ML: at 21:56

## 2025-01-12 RX ADMIN — TIOTROPIUM BROMIDE INHALATION SPRAY 2 PUFF: 3.12 SPRAY, METERED RESPIRATORY (INHALATION) at 08:17

## 2025-01-12 RX ADMIN — TRAMADOL HYDROCHLORIDE 50 MG: 50 TABLET, COATED ORAL at 21:57

## 2025-01-12 RX ADMIN — PREGABALIN 150 MG: 75 CAPSULE ORAL at 21:57

## 2025-01-12 RX ADMIN — Medication 1 TABLET: at 09:08

## 2025-01-12 RX ADMIN — ARFORMOTEROL TARTRATE 15 MCG: 15 SOLUTION RESPIRATORY (INHALATION) at 08:14

## 2025-01-12 NOTE — PROGRESS NOTES
[facility] Clinical Pharmacy Services: Warfarin Dosing/Monitoring Consult    Javy Reeves Sr. is a 74 y.o. male, estimated creatinine clearance is 84.3 mL/min (by C-G formula based on SCr of 0.92 mg/dL). weighing 82.1 kg (181 lb).    Results from last 7 days   Lab Units 01/12/25  0933 01/12/25  0252 01/11/25  1819   INR   --  2.01* 1.90*   APTT seconds 107.9* 109.0* 35.9*   HEMOGLOBIN g/dL  --  14.8 14.8   HEMATOCRIT %  --  42.4 42.7   PLATELETS 10*3/mm3  --  190 201     Prior to admission anticoagulation: Warfarin 10.5 mg nightly except 10 mg on Mon, Wed, Fri    Hospital Anticoagulation:  Consulting provider: Dr. Denny  Start date: 1/12/25  Indication:  Possible warfarin failure - subacute RLE DVT while on warfarin.  INR goal increased to 2.5-3.5  Target INR: 2.5 - 3.5  Expected duration: Indefinite   Bridge Therapy: No      Potential food or drug interactions:     Education complete?/Date: N/A; home medication    Assessment/Plan:  Dose: INR below new goal of 2.5-3.5.  Will give warfarin 15 mg tonight and clinical will follow up in AM.  Monitor for any signs or symptoms of bleeding  Follow up daily INRs and dose adjustments    Pharmacy will continue to follow until discharge or discontinuation of warfarin.     Javy Reeves III, [facility]  Clinical Pharmacist

## 2025-01-12 NOTE — PROGRESS NOTES
Name: Javy Reeves . ADMIT: 2025   : 1950  PCP: Aletha Ochoa PA-C    MRN: [id number] LOS: 0 days   AGE/SEX: 74 y.o. male  ROOM: [facility]     Subjective   Subjective   No events.  Right leg still very painful       Objective   Objective   Vital Signs  Temp:  [97.7 °F (36.5 °C)-98.2 °F (36.8 °C)] 97.7 °F (36.5 °C)  Heart Rate:  [69-79] 75  Resp:  [16-20] 18  BP: (137-176)/(73-97) 137/73  SpO2:  [97 %-100 %] 99 %  on   ;   Device (Oxygen Therapy): room air  Body mass index is 24.55 kg/m².  Physical Exam  Vitals reviewed.   Constitutional:       General: He is not in acute distress.     Appearance: He is not ill-appearing.   Cardiovascular:      Rate and Rhythm: Normal rate and regular rhythm.   Pulmonary:      Effort: No respiratory distress.      Breath sounds: Normal breath sounds. No wheezing or rales.   Abdominal:      General: There is no distension.      Palpations: Abdomen is soft.      Tenderness: There is no abdominal tenderness.   Musculoskeletal:      Right lower leg: No edema.      Left lower leg: No edema.   Skin:     General: Skin is warm and dry.      Findings: No rash.   Neurological:      Mental Status: He is alert and oriented to person, place, and time.   Psychiatric:         Mood and Affect: Mood normal.       Results Review     I reviewed the patient's new clinical results.  Results from last 7 days   Lab Units 25  0252 25  1819   WBC 10*3/mm3 4.93 5.57   HEMOGLOBIN g/dL 14.8 14.8   PLATELETS 10*3/mm3 190 201     Results from last 7 days   Lab Units 25  0252 25  1819   SODIUM mmol/L 137 140   POTASSIUM mmol/L 3.8 3.8   CHLORIDE mmol/L 102 104   CO2 mmol/L 26.0 27.2   BUN mg/dL 10 12   CREATININE mg/dL 0.92 1.09   GLUCOSE mg/dL 90 88   EGFR mL/min/1.73 88.4 72.1     Results from last 7 days   Lab Units 25  1819   ALBUMIN g/dL 3.9   BILIRUBIN mg/dL 0.6   ALK PHOS U/L 77   AST (SGOT) U/L 39   ALT (SGPT) U/L 17     Results from last 7 days   Lab Units  "01/12/25  0252 01/11/25  1819   CALCIUM mg/dL 8.8 8.9   ALBUMIN g/dL  --  3.9       No results found for: \"HGBA1C\", \"POCGLU\"    No radiology results for the last day    I have personally reviewed all medications:  Scheduled Medications  arformoterol, 15 mcg, Nebulization, BID - RT   And  tiotropium bromide monohydrate, 2 puff, Inhalation, Daily - RT  atorvastatin, 80 mg, Oral, Daily  cetirizine, 10 mg, Oral, Daily  enoxaparin, 1 mg/kg, Subcutaneous, Q12H  folic acid, 1 mg, Oral, Daily  multivitamin with minerals, 1 tablet, Oral, Daily  pantoprazole, 40 mg, Oral, Daily  Pirfenidone, 801 mg, Oral, TID  pregabalin, 150 mg, Oral, Q8H    Infusions  Pharmacy to dose warfarin,     Diet  Diet: Cardiac; Healthy Heart (2-3 Na+); Fluid Consistency: Thin (IDDSI 0)    I have personally reviewed:  [x]  Laboratory   []  Microbiology   [x]  Radiology   []  EKG/Telemetry  []  Cardiology/Vascular   []  Pathology    []  Records       Assessment/Plan     Active Hospital Problems    Diagnosis  POA    **Acute deep vein thrombosis (DVT) of femoral vein of right lower extremity [I82.411]  Yes    ILD (interstitial lung disease) [J84.9]  Yes    Subtherapeutic international normalized ratio (INR) [R79.1]  Yes    Presence of IVC filter [Z95.828]  Not Applicable    Lumbar radiculopathy [M54.16]  Yes    Long term current use of anticoagulant therapy [Z79.01]  Not Applicable      Resolved Hospital Problems   No resolved problems to display.       74 y.o. male on long-term anticoagulation with warfarin due to thrombophilia admitted with right leg pain after slip and fall and found to have subacute right distal femoral DVT with subtherapeutic INR at 1.9 (goal 2.5-3.5)    Reviewed records.  No fracture on imaging.  Still has a lot of pain and trouble even moving his leg in the bed.  - Await PT assessment  - Increase pregabalin dose though he is already on a high dose  - Does not want to change off tramadol    Discussed with Dr. Evangelista.  CT chest " abdomen pelvis ordered due to breakthrough thrombus.  His goal INR is 2.5-3.5 and he does admit that he did get some occasional low readings at home.  -Changed to Lovenox per heme-onc recommendations but will continue warfarin to try to get back to goal of 2.5  - Antiphospholipid antibodies were repeated, apparently had a history of 1 positive in the past but others were negative.  Reviewed heme-onc records      Disposition: Await imaging results and PT assessment.  Likely 1 to 2 days      John Denny MD  [facility]  01/12/25  17:05 EST

## 2025-01-12 NOTE — PLAN OF CARE
Goal Outcome Evaluation:  Plan of Care Reviewed With: patient        Progress: no change     Patient admitted to observation unit with Right femoral vein DVT. Alert & oriented x 4. Vital signs stable. Sinus rhythm on monitor. Hemonc consulted. Heparin drip initiated. Plan of care discussed and  questions answered. No concerns expressed at present. Call light within reach.

## 2025-01-12 NOTE — CONSULTS
[facility] GROUP INITIAL INPATIENT CONSULTATION NOTE    REASON FOR CONSULTATION:      dvt on coumadin       HISTORY OF PRESENT ILLNESS:  Javy Reeves Sr. is a 74 y.o. male who we are asked to see today in consultation for recurrent DVT/PE.  Patient had history of PE many years ago after her knee surgery.  He has been on Coumadin for more than 20 years.  He also has a [place] IVC filter placed many many years ago.  Patient has seen Dr. Madden from our group in 2022 December.  Patient has been compliant with Coumadin.  INR have been within therapeutic range throughout.  No bleeding issues.  Patient reports having a mechanical fall after sliding down on black ice yesterday.  Eventually he developed worsening neck pain which prompted ER visit.  Duplex of his right lower extremity revealed subacute DVT.  We have been consulted for possible failure of Coumadin.  Patient endorses to having unintentional weight loss-his weight was 248 about 2 years ago, now he weighs about 180 pounds.  Attributes to not eating as much.  No other B symptoms.  He also has a history of IPF with emphysema for which she follows with pulmonology.  He denies any other concerns or complaints.  Family history significant for cancer, unknown type in parents, and breast cancer in one of the sisters.  No personal history of cancer        Past Medical History:   Diagnosis Date    Acute and subacute infective endocarditis in diseases classified elsewhere     Allergic     Arthritis     Arthritis of neck Don’t remember    Asthma 04/26/2021    Chest pain     Chronic low back pain     Chronic pain     Clotting disorder     Colon polyps     Coronary artery disease     DDD (degenerative disc disease), cervical     DDD (degenerative disc disease), lumbosacral     Diverticulosis     DVT (deep venous thrombosis) 1996    Left Lower extremity following arthroscopic knee surgery in 1996. IVC fliter placed at that time.    Encounter for special  "screening examination for neoplasm of prostate 2012    Eye exam, routine > 5 yrs ago    Eye exam, routine 01/2015    Fibromyalgia     Fibromyositis     GERD (gastroesophageal reflux disease)     HIV disease     PT STATES \"NEVER DIAGNOSED\"    Hyperlipidemia     ILD (interstitial lung disease)     Injury of back     Injury of neck     Irritable bowel     Limited joint range of motion     NECK    Low back strain Don’t remember    Lumbar stenosis     Lumbosacral disc disease Don’t remember    MI (myocardial infarction)     Neck pain     Neck strain     Pain in limb     Past heart attack     Postlaminectomy syndrome of lumbar region     Pulmonary embolism 1996    Left Lower extremity following arthroscopic knee surgery in 1996. IVC fliter placed at that time.    Reflux esophagitis     Rotator cuff syndrome     Don’t remember    Scoliosis     Shortness of breath     WITH EXCERTION    Sleep apnea     NO CPAP    Stroke     Thoracic disc disorder     TIA (transient ischemic attack)        Past Surgical History:   Procedure Laterality Date    ANTERIOR CERVICAL DISCECTOMY W/ FUSION N/A 10/05/2018    Procedure: CERVICAL DISCECTOMY ANTERIOR FUSION WITH INSTRUMENTATION,ACDF C5/6, C6/7 with cages and plate;  Surgeon: Jean Coles MD;  Location: [facility] MAIN OR;  Service: Neurosurgery    APPENDECTOMY N/A     BACK SURGERY  Don’t remember    BRONCHOSCOPY N/A 11/03/2020    Procedure: EBUS BRONCHOSCOPY WITH BAL & FLUOROSCOPY WITH MAC ANESTHESIA, BX & TBNA;  Surgeon: Jamil Willoughby MD;  Location: [facility] ENDOSCOPY;  Service: Pulmonary;  Laterality: N/A;  PRE/POST-ABNORMAL CT, LAD    CARDIAC CATHETERIZATION Left 1952    Left Heart Cath Left Ventriculography, selective coronary angiography; iliofemoral angiography; angio seal closure, Dr. Brady Ramos    COLONOSCOPY  09/2015    removed 2 polyps, Dr. Manley    COLONOSCOPY N/A 02/12/2007    Left colonic diverticulosis, No evidence of polypoid neoplasia, Dr. Jarret Bloom "    COLONOSCOPY N/A 09/13/2018    Procedure: COLONOSCOPY TO CECUM WITH COLD BX'S POLYPECTOMY;  Surgeon: Berta Sin MD;  Location: [facility] ENDOSCOPY;  Service: General    CYSTOSCOPY TRANSURETHRAL RESECTION OF PROSTATE N/A 11/01/2004    Dr. Rodolfo Arnold    ENDOSCOPY N/A 08/03/2023    Procedure: ESOPHAGOGASTRODUODENOSCOPYmwith biopsy;  Surgeon: Shane Foss MD;  Location: [facility] ENDOSCOPY;  Service: Gastroenterology;  Laterality: N/A;  pre- gerd  post- gastritis    HAND SURGERY Right     JOINT REPLACEMENT  Over 25 years ago    KNEE ARTHROPLASTY Left     LAPAROSCOPIC CHOLECYSTECTOMY      LUMBAR DISC SURGERY  2006, 2007    L4-S1 pseudoarthroses - Dr Cervantes    LUMBAR DISC SURGERY N/A 01/29/2010    Removal of Instrumentation w/ decompression, Instrumentaion loosening & pt tested positive for zinc allergy, Dr. Kvng Cervantes    NECK SURGERY  Don’t remember    ROTATOR CUFF REPAIR Right 04/2012    SHOULDER SURGERY  Don’t remember    THORACIC OUTLET SURGERY Bilateral     THORACOSCOPY Right 11/29/2022    Procedure: BRONCHOSCOPY, RIGHT THORACOSCOPY VIDEO ASSISTED WEDGE RESECTION, INTERCOSTAL NERVE BLOCK;  Surgeon: Nella Bergman MD;  Location: [facility] MAIN OR;  Service: Thoracic;  Laterality: Right;    TRIGGER POINT INJECTION      Several injections by Doctors at [facility]    VENA CAVA FILTER PLACEMENT  1996    WRIST SURGERY Right     x3 starting in 1984       SOCIAL HISTORY:   reports that he has been smoking cigars and cigarettes. He has a 37.5 pack-year smoking history. He has never been exposed to tobacco smoke. He has never used smokeless tobacco. He reports that he does not currently use alcohol. He reports that he does not use drugs.    FAMILY HISTORY:  family history includes Bleeding Disorder in his brother; Cancer in his father, mother, and sister; Cerebral aneurysm in his brother; Clotting disorder in his mother; Deep vein thrombosis in his father; Diabetes in his sister; Heart attack in  his father; Heart disease in his brother and father; Hypertension in his brother and sister; Kidney disease in his sister; Stroke in his sister; Sudden death in his brother.    ALLERGIES:  Allergies   Allergen Reactions    Zinc Other (See Comments) and Diarrhea     Patient tested positive, had to have hardware removed from back.    Chantix [Varenicline] Other (See Comments)     Headache    Hydrocodone-Acetaminophen GI Intolerance       MEDICATIONS:  As listed in the electronic medical record.    Review of Systems   Constitutional:  Positive for unexpected weight change. Negative for fatigue.   Cardiovascular: Negative.    Gastrointestinal: Negative.    Genitourinary: Negative.    Hematological: Negative.        Vitals:    01/12/25 0511 01/12/25 0716 01/12/25 0814 01/12/25 0817   BP:  141/86     BP Location:  Right arm     Patient Position:  Lying     Pulse:  69 79 73   Resp:  18 20 20   Temp:  97.9 °F (36.6 °C)     TempSrc:  Oral     SpO2:  98% 99% 100%   Weight: 82.1 kg (181 lb)      Height:           Physical Exam  Constitutional:       General: He is not in acute distress.     Appearance: Normal appearance. He is normal weight.   HENT:      Head: Normocephalic and atraumatic.   Cardiovascular:      Rate and Rhythm: Normal rate and regular rhythm.      Heart sounds: Normal heart sounds. No murmur heard.  Pulmonary:      Effort: Pulmonary effort is normal. No respiratory distress.      Breath sounds: Normal breath sounds.   Abdominal:      General: Bowel sounds are normal. There is no distension.      Palpations: Abdomen is soft.   Musculoskeletal:         General: No swelling.   Skin:     General: Skin is warm and dry.   Neurological:      General: No focal deficit present.      Mental Status: He is alert and oriented to person, place, and time.         DIAGNOSTIC DATA:    Results from last 7 days   Lab Units 01/12/25  0252 01/11/25  1819   WBC 10*3/mm3 4.93 5.57   HEMOGLOBIN g/dL 14.8 14.8   HEMATOCRIT % 42.4  42.7   PLATELETS 10*3/mm3 190 201      Results from last 7 days   Lab Units 01/12/25  0252 01/11/25  1819   SODIUM mmol/L 137 140   POTASSIUM mmol/L 3.8 3.8   CHLORIDE mmol/L 102 104   CO2 mmol/L 26.0 27.2   BUN mg/dL 10 12   CREATININE mg/dL 0.92 1.09   CALCIUM mg/dL 8.8 8.9   BILIRUBIN mg/dL  --  0.6   ALK PHOS U/L  --  77   ALT (SGPT) U/L  --  17   AST (SGOT) U/L  --  39   GLUCOSE mg/dL 90 88          IMAGING:    Duplex Venous Lower Extremity - RIGHT (01/11/2025 17:15)   Duplex Venous Lower Extremity - Right (08/29/2023 10:42)   CT Angiogram Chest (08/29/2023 11:48)   Duplex Venous Lower Extremity - Bilateral CAR (11/18/2022 10:00)   CT Angiogram Chest (08/22/2022 14:01)   Duplex Venous Lower Extremity LEFT (04/20/2020 21:40)     Assessment & Plan   ASSESSMENT:  This is a 74 y.o. male with:    *Recurrent DVT/PE  *Chronic anticoagulation with warfarin  *Subacute right lower extremity DVT, mid femoral vein while on warfarin  *Unintentional weight loss  *Lupus anticoagulant positive x 1, November 2022 4/20/2020 left lower extremity duplex-chronic left lower extremity DVT in distal femoral and popliteal veins  8/22/2022 CT angiogram chest: Small PE in the most distal aspect of left upper lobe pulmonary artery extending into a segmental left upper lobe pulmonary artery branch.  August 2022 hypercoagulable workup-unremarkable (lupus anticoagulant, beta-2 glycoprotein IgG IgM antibody, anticardiolipin IgG IgM antibody, factor II mutation, factor V Leiden mutation, Antithrombin III 80%)  11/18/2022 bilateral lower extremity duplex-chronic right lower extremity DVT in popliteal and gastrocnemius.  Chronic left lower extremity DVT in popliteal and gastrocnemius  8/29/2023 right lower extremity duplex-chronic DVT in right popliteal and gastrocnemius veins.  Deep venous reflux in right popliteal vein.  1/11/2025 right lower extremity duplex-chronic right lower extremity DVT in distal femoral and popliteal.  Subacute right  lower extremity DVT in mid femoral.  Patient was previously seen by Dr. Madden, last clinic visit December 2022.    1/12/2025: I have reviewed previous imagings.  I have also reviewed INR values.  Patient has been mostly within therapeutic range except for a very few occasions-1.7 in September 2024, 1.4 and 1.9 in November 2024, and 1.9 now upon admission.  Unclear if patient had had this right mid femoral vein DVT during those subtherapeutic INR days.  Unclear if this is truly a failure of warfarin.  His most recent right lower extremity duplex shows subacute right lower extremity DVT in mid femoral vein.  .  As such, would recommend therapeutic dose of Lovenox 1 mg/kg twice daily.  Etiology for Coumadin failure is unclear.  Given unintentional weight loss, I would order CT chest abdomen pelvis to evaluate for any underlying malignancy.  We will also check PSA.  Patient denies any other B symptoms.      *Status post [place] IVC filter placed, seen on CT angiogram abdomen pelvis in May 2022      *Interstitial pulmonary fibrosis with emphysema   November 9, 2022-status post lung biopsy with Dr. Bergman  Follows with pulmonology, Dr. Jamil Willoughby      RECOMMENDATIONS/PLAN:   Most recent imaging from 1/11/2025 with subacute right lower extremity DVT.    I have  reviewed INR values.  Patient has been mostly within therapeutic range except for a very few occasions-1.7 in September 2024, 1.4 and 1.9 in November 2024, and 1.9 now upon admission.  Unclear if patient had had this right mid femoral vein DVT during those subtherapeutic INR days.  Unclear if this is truly a failure of warfarin  Obtain CT chest abdomen pelvis to evaluate for possible underlying malignancy, and as etiology for possible Coumadin failure  Check PSA  Obtain lupus anticoagulant, anticardiolipin IgG IgM and beta-2 glycoprotein IgG IgM antibodies  I would recommend switching to therapeutic dose of Lovenox 1 mg/kg for now  If no evidence of  malignancy on scans and negative lupus anticoagulant testing, we could consider DOAC or continue warfarin with a higher therapeutic INR-2.5-3.5.  CBC with differential daily.  We will follow along    Kailey Evangelista MD

## 2025-01-13 ENCOUNTER — READMISSION MANAGEMENT (OUTPATIENT)
Dept: CALL CENTER | Facility: HOSPITAL | Age: 75
End: 2025-01-13
Payer: MEDICARE

## 2025-01-13 ENCOUNTER — APPOINTMENT (OUTPATIENT)
Dept: CT IMAGING | Facility: HOSPITAL | Age: 75
End: 2025-01-13
Payer: MEDICARE

## 2025-01-13 VITALS
SYSTOLIC BLOOD PRESSURE: 162 MMHG | HEIGHT: 72 IN | WEIGHT: 177.6 LBS | HEART RATE: 71 BPM | DIASTOLIC BLOOD PRESSURE: 88 MMHG | BODY MASS INDEX: 24.06 KG/M2 | TEMPERATURE: 97.3 F | RESPIRATION RATE: 18 BRPM | OXYGEN SATURATION: 100 %

## 2025-01-13 LAB
INR PPP: 1.56 (ref 0.9–1.1)
PROTHROMBIN TIME: 18.9 SECONDS (ref 11.7–14.2)

## 2025-01-13 PROCEDURE — G0378 HOSPITAL OBSERVATION PER HR: HCPCS

## 2025-01-13 PROCEDURE — 96372 THER/PROPH/DIAG INJ SC/IM: CPT

## 2025-01-13 PROCEDURE — 99214 OFFICE O/P EST MOD 30 MIN: CPT | Performed by: INTERNAL MEDICINE

## 2025-01-13 PROCEDURE — 25510000002 DIATRIZOATE MEGLUMINE & SODIUM PER 1 ML

## 2025-01-13 PROCEDURE — 25510000001 IOPAMIDOL 61 % SOLUTION: Performed by: HOSPITALIST

## 2025-01-13 PROCEDURE — 94799 UNLISTED PULMONARY SVC/PX: CPT

## 2025-01-13 PROCEDURE — 74177 CT ABD & PELVIS W/CONTRAST: CPT

## 2025-01-13 PROCEDURE — 97116 GAIT TRAINING THERAPY: CPT

## 2025-01-13 PROCEDURE — 97161 PT EVAL LOW COMPLEX 20 MIN: CPT

## 2025-01-13 PROCEDURE — 25010000002 ENOXAPARIN PER 10 MG: Performed by: HOSPITALIST

## 2025-01-13 PROCEDURE — 71260 CT THORAX DX C+: CPT

## 2025-01-13 PROCEDURE — 85610 PROTHROMBIN TIME: CPT | Performed by: NURSE PRACTITIONER

## 2025-01-13 PROCEDURE — 94664 DEMO&/EVAL PT USE INHALER: CPT

## 2025-01-13 RX ORDER — WARFARIN SODIUM 1 MG/1
0.5 TABLET ORAL DAILY
Start: 2025-01-13

## 2025-01-13 RX ORDER — WARFARIN SODIUM 7.5 MG/1
TABLET ORAL
Qty: 2 TABLET | Refills: 0 | Status: SHIPPED | OUTPATIENT
Start: 2025-01-13 | End: 2025-01-14

## 2025-01-13 RX ORDER — IOPAMIDOL 612 MG/ML
100 INJECTION, SOLUTION INTRAVASCULAR
Status: COMPLETED | OUTPATIENT
Start: 2025-01-13 | End: 2025-01-13

## 2025-01-13 RX ORDER — ENOXAPARIN SODIUM 100 MG/ML
1 INJECTION SUBCUTANEOUS EVERY 12 HOURS
Qty: 30 ML | Refills: 0 | Status: SHIPPED | OUTPATIENT
Start: 2025-01-14 | End: 2025-01-17 | Stop reason: SDUPTHER

## 2025-01-13 RX ORDER — DIATRIZOATE MEGLUMINE AND DIATRIZOATE SODIUM 660; 100 MG/ML; MG/ML
SOLUTION ORAL; RECTAL
Status: COMPLETED
Start: 2025-01-13 | End: 2025-01-13

## 2025-01-13 RX ORDER — WARFARIN SODIUM 5 MG/1
10 TABLET ORAL NIGHTLY
Start: 2025-01-14

## 2025-01-13 RX ORDER — WARFARIN SODIUM 6 MG/1
12 TABLET ORAL
Status: DISCONTINUED | OUTPATIENT
Start: 2025-01-13 | End: 2025-01-13

## 2025-01-13 RX ORDER — WARFARIN SODIUM 7.5 MG/1
15 TABLET ORAL
Status: COMPLETED | OUTPATIENT
Start: 2025-01-13 | End: 2025-01-13

## 2025-01-13 RX ADMIN — PREGABALIN 150 MG: 75 CAPSULE ORAL at 14:34

## 2025-01-13 RX ADMIN — Medication 1 TABLET: at 08:18

## 2025-01-13 RX ADMIN — PIRFENIDONE 801 MG: 267 TABLET, FILM COATED ORAL at 14:35

## 2025-01-13 RX ADMIN — ENOXAPARIN SODIUM 80 MG: 100 INJECTION SUBCUTANEOUS at 14:35

## 2025-01-13 RX ADMIN — ENOXAPARIN SODIUM 80 MG: 100 INJECTION SUBCUTANEOUS at 05:08

## 2025-01-13 RX ADMIN — TIOTROPIUM BROMIDE INHALATION SPRAY 2 PUFF: 3.12 SPRAY, METERED RESPIRATORY (INHALATION) at 10:20

## 2025-01-13 RX ADMIN — PIRFENIDONE 801 MG: 267 TABLET, FILM COATED ORAL at 08:18

## 2025-01-13 RX ADMIN — PANTOPRAZOLE SODIUM 40 MG: 40 TABLET, DELAYED RELEASE ORAL at 08:18

## 2025-01-13 RX ADMIN — IOPAMIDOL 85 ML: 612 INJECTION, SOLUTION INTRAVENOUS at 12:57

## 2025-01-13 RX ADMIN — ARFORMOTEROL TARTRATE 15 MCG: 15 SOLUTION RESPIRATORY (INHALATION) at 10:16

## 2025-01-13 RX ADMIN — TRAMADOL HYDROCHLORIDE 50 MG: 50 TABLET, COATED ORAL at 13:24

## 2025-01-13 RX ADMIN — FOLIC ACID 1 MG: 1 TABLET ORAL at 08:18

## 2025-01-13 RX ADMIN — ATORVASTATIN CALCIUM 80 MG: 80 TABLET, FILM COATED ORAL at 08:18

## 2025-01-13 RX ADMIN — SENNOSIDES AND DOCUSATE SODIUM 2 TABLET: 50; 8.6 TABLET ORAL at 14:34

## 2025-01-13 RX ADMIN — PREGABALIN 150 MG: 75 CAPSULE ORAL at 05:09

## 2025-01-13 RX ADMIN — WARFARIN 15 MG: 7.5 TABLET ORAL at 18:53

## 2025-01-13 RX ADMIN — DIATRIZOATE MEGLUMINE AND DIATRIZOATE SODIUM 30 ML: 600; 100 SOLUTION ORAL; RECTAL at 11:04

## 2025-01-13 NOTE — DISCHARGE SUMMARY
Patient Name: Javy Reeves .  : 1950  MRN: [id number]    Date of Admission: 2025  Date of Discharge:  2025  Primary Care Physician: Aletha Ochoa, MALINDA      Chief Complaint:   Leg Pain and Fall (+thinner, did not hit head, -LOC)      Discharge Diagnoses     Active Hospital Problems    Diagnosis  POA    **Acute deep vein thrombosis (DVT) of femoral vein of right lower extremity [I82.411]  Yes    ILD (interstitial lung disease) [J84.9]  Yes    Subtherapeutic international normalized ratio (INR) [R79.1]  Yes    Presence of IVC filter [Z95.828]  Not Applicable    Lumbar radiculopathy [M54.16]  Yes    Long term current use of anticoagulant therapy [Z79.01]  Not Applicable      Resolved Hospital Problems   No resolved problems to display.        Hospital Course     Mr. Reeves is a 74 y.o. male with a history of DVT, PE, ILD, chronic anticoagulation on warfarin [place] to thrombophilia, IVC filter, chronic back and neck pain, MI, and  history of tobacco abuse who presented to [facility] initially complaining of right lower extremity pain making it difficult for him to ambulateafter he suffered at fall at [employer] in the parking lot.  Please see the admitting history and physical for further details.  He was found to have a right lower extremity DVT of and was admitted to the hospital for further evaluation and treatment.    At time of admission CT scan of lower extremity was unremarkable for acute fracture, but showed subacute right distal femoral DVT and INR of 1.9.   He has significant history of recurrent clots, and has been on warfarin for multiple years. Hematology/ Oncology has been following and recommends to continue with goal of 2.5 -3.5 as well as Lovenox bridge until INR is therapeutic.   He will be discharged on a 15mg dose of warfarin for today, and instructed to resume his home dose tomorrow until he has followed up with the anticoagulation clinic in 2 days.  He also  reports intentional weight loss. CT scan of his chest and abdomen pelvis were completed with no acute findings.there is noted chronic DDD, spondylosis but no breakthrough thrombus, or malignancies noted. Oncology has cleared him for discharge home with follow up with anticoagulation clinic in 2 days to recheck PT INR, and Hematology in 1-2 weeks for follow up, and his PCP in one week for post utilization follow-up.  He has chronic pain, that is controlled by tramadol, and Lyrica-have been continued during his hospitalization, and he is encouraged to continue those after discharge.  Physical Therapy have evaluated, and recommends home with assistance, outpatient physical therapy, as well as a walker due to decreased mobility. Ambulatory orders from Physical Therapy as well as a walker have been placed.   He has been advised to take all medications as prescribed, and to attend all follow-up appointments as scheduled.    Day of Discharge     Subjective:  He is eager to be discharged home, and verbalizes understanding of need to follow-up with anticoagulation clinic which she is already established with in 1 to 2 days to recheck INR.   Physical Exam:  Temp:  [97.3 °F (36.3 °C)-98.6 °F (37 °C)] 97.3 °F (36.3 °C)  Heart Rate:  [68-86] 71  Resp:  [16-18] 18  BP: (116-162)/(75-88) 162/88  Body mass index is 24.09 kg/m².  Physical Exam  Vitals and nursing note reviewed.   Constitutional:       Appearance: Normal appearance.   HENT:      Head: Atraumatic.      Nose: Nose normal.      Mouth/Throat:      Mouth: Mucous membranes are dry.   Eyes:      Conjunctiva/sclera: Conjunctivae normal.   Cardiovascular:      Rate and Rhythm: Normal rate and regular rhythm.      Pulses: Normal pulses.      Heart sounds: Normal heart sounds.   Pulmonary:      Effort: Pulmonary effort is normal.      Breath sounds: Normal breath sounds.   Abdominal:      General: Bowel sounds are normal.      Palpations: Abdomen is soft.   Musculoskeletal:       Right lower leg: Edema present.   Skin:     General: Skin is warm and dry.      Capillary Refill: Capillary refill takes less than 2 seconds.   Neurological:      Mental Status: He is alert and oriented to person, place, and time. Mental status is at baseline.   Psychiatric:         Mood and Affect: Mood normal.         Consultants     Consult Orders (all) (From admission, onward)       Start     Ordered    01/11/25 2026  Inpatient Hematology & Oncology Consult  Once        Specialty:  Hematology and Oncology  Provider:  Darrel Dudley II, MD    01/11/25 2025 01/11/25 1744  [facility] (on-call MD unless specified) Details  Once        Specialty:  Hospitalist  Provider:  (Not yet assigned)    01/11/25 1743                  Procedures     * Surgery not found *    Imaging Results (All)       Procedure Component Value Units Date/Time    CT Chest With Contrast Diagnostic [[id number]] Collected: 01/13/25 1311     Updated: 01/13/25 1352    Narrative:      CT CHEST W CONTRAST DIAGNOSTIC-, CT ABDOMEN PELVIS W CONTRAST-     DATE OF EXAM: 1/13/2025 12:49 PM     INDICATION: Unintentional weight loss.  Subacute DVT while on Coumadin.   ILD.  Evaluate for any possible underlying malignancy.     COMPARISON: Pelvis and hip radiographs 1/11/2025. CT chest 5/25/2024,  8/29/2023, 4/28/2023, and 11/18/2022. CT abdomen and pelvis 10/20/2021.     TECHNIQUE: Multiple contiguous axial images were acquired through the  chest, abdomen, and pelvis following the intravenous administration of  85 mL of Isovue-300. Reformatted coronal and sagittal sequences were  also reviewed. Radiation dose reduction techniques were utilized,  including automated exposure control and exposure modulation based on  body size.     FINDINGS:  CT CHEST:  Stable postoperative changes in the right lung. Similar-appearing  basilar predominant patchy groundglass opacities and interstitial  thickening in both lungs with multifocal bronchiectasis and mild  multifocal  honeycombing. Multifocal bibasilar subsegmental atelectasis  and/or scarring. Similar-appearing upper lobe predominant chronic  emphysematous changes in both lungs. Benign calcified granulomas in each  lung. No concerning pulmonary nodule. No focal lung consolidation.  Central airways are widely patent. No pneumothorax or pleural effusion.     The heart is normal in size. Mild calcified coronary artery disease.  Trace pericardial fluid. Mild fusiform by 3.9 cm dilatation of the  ascending thoracic aorta. Calcified remnants of prior granulomatous  disease in the mediastinum and each hilum. No pathologically enlarged  intrathoracic lymph nodes are identified.     Partially imaged cervical spinal fusion hardware. Flowing multilevel mid  and lower thoracic anterior osteophyte formation suggesting DISH.  Partially imaged chronic degenerative changes in both shoulders. No  acute osseous abnormality or concerning osseous lesion.     CT ABDOMEN AND PELVIS:  Status post cholecystectomy. The liver, spleen, pancreas, adrenal  glands, and kidneys are unremarkable. The urinary bladder is  unremarkable.     The stomach is moderately distended with contrast and ingested contents.  Mild to moderate colorectal stool. Extensive colonic diverticula,  without CT evidence of diverticulitis. No bowel obstruction. Status post  appendectomy.     No free fluid in the abdomen or pelvis. No free intraperitoneal air. No  pathologically enlarged lymph nodes in the abdomen or pelvis. Mild  calcified atherosclerotic disease in the abdominal aorta and its distal  branches with stable small fusiform 3.2 cm infrarenal abdominal aortic  aneurysm. Infrarenal IVC filter in place.     Stable postoperative changes from a spinal fusion and posterior  decompression at L4-5 and L5-S1 with evidence of posterior fixation  hardware removal. Solid bony bridging across the vertebral bodies and  facets. Progression of severe spondylosis at L2-L3 with grade  1  retrolisthesis of L2 on L3, a diffuse disc osteophyte complex, and a  superimposed large broad-based right posterior paracentral and right  foraminal disc extrusion resulting in severe spinal canal stenosis,  effacement of the right lateral recess, severe right neural foraminal  narrowing, and moderate to severe left neural foraminal narrowing. At  L3-L4, there is similar-appearing spondylosis and spondylolisthesis  resulting in moderate to severe spinal canal stenosis and severe  bilateral neural foraminal narrowing. Mild bilateral SI joint DJD with  right SI joint bridging osteophyte formation. Mild bilateral hip joint  DJD. No acute osseous abnormality is identified.       Impression:         1. No CT evidence of malignant/metastatic disease in the chest, abdomen,  or pelvis.  2. Severe spondylosis at L2-L3 and L3-L4, as detailed above. At L2-L3,  there is severe spinal canal stenosis, effacement of the right lateral  recess, severe right neural foraminal narrowing, and moderate to severe  left neural foraminal narrowing. At L3-L4, there is moderate to severe  spinal canal stenosis and severe bilateral neural foraminal narrowing.  3. Similar-appearing chronic changes in both lungs consistent with  chronic interstitial lung disease with a UIP pattern and superimposed  chronic emphysematous changes.  4. Additional chronic findings, as above.     This report was finalized on 1/13/2025 1:48 PM by Donald Rodarte MD on  Workstation: [id number]       CT Abdomen Pelvis With Contrast [[id number]] Collected: 01/13/25 1311     Updated: 01/13/25 1352    Narrative:      CT CHEST W CONTRAST DIAGNOSTIC-, CT ABDOMEN PELVIS W CONTRAST-     DATE OF EXAM: 1/13/2025 12:49 PM     INDICATION: Unintentional weight loss.  Subacute DVT while on Coumadin.   ILD.  Evaluate for any possible underlying malignancy.     COMPARISON: Pelvis and hip radiographs 1/11/2025. CT chest 5/25/2024,  8/29/2023, 4/28/2023, and 11/18/2022. CT abdomen  and pelvis 10/20/2021.     TECHNIQUE: Multiple contiguous axial images were acquired through the  chest, abdomen, and pelvis following the intravenous administration of  85 mL of Isovue-300. Reformatted coronal and sagittal sequences were  also reviewed. Radiation dose reduction techniques were utilized,  including automated exposure control and exposure modulation based on  body size.     FINDINGS:  CT CHEST:  Stable postoperative changes in the right lung. Similar-appearing  basilar predominant patchy groundglass opacities and interstitial  thickening in both lungs with multifocal bronchiectasis and mild  multifocal honeycombing. Multifocal bibasilar subsegmental atelectasis  and/or scarring. Similar-appearing upper lobe predominant chronic  emphysematous changes in both lungs. Benign calcified granulomas in each  lung. No concerning pulmonary nodule. No focal lung consolidation.  Central airways are widely patent. No pneumothorax or pleural effusion.     The heart is normal in size. Mild calcified coronary artery disease.  Trace pericardial fluid. Mild fusiform by 3.9 cm dilatation of the  ascending thoracic aorta. Calcified remnants of prior granulomatous  disease in the mediastinum and each hilum. No pathologically enlarged  intrathoracic lymph nodes are identified.     Partially imaged cervical spinal fusion hardware. Flowing multilevel mid  and lower thoracic anterior osteophyte formation suggesting DISH.  Partially imaged chronic degenerative changes in both shoulders. No  acute osseous abnormality or concerning osseous lesion.     CT ABDOMEN AND PELVIS:  Status post cholecystectomy. The liver, spleen, pancreas, adrenal  glands, and kidneys are unremarkable. The urinary bladder is  unremarkable.     The stomach is moderately distended with contrast and ingested contents.  Mild to moderate colorectal stool. Extensive colonic diverticula,  without CT evidence of diverticulitis. No bowel obstruction. Status  post  appendectomy.     No free fluid in the abdomen or pelvis. No free intraperitoneal air. No  pathologically enlarged lymph nodes in the abdomen or pelvis. Mild  calcified atherosclerotic disease in the abdominal aorta and its distal  branches with stable small fusiform 3.2 cm infrarenal abdominal aortic  aneurysm. Infrarenal IVC filter in place.     Stable postoperative changes from a spinal fusion and posterior  decompression at L4-5 and L5-S1 with evidence of posterior fixation  hardware removal. Solid bony bridging across the vertebral bodies and  facets. Progression of severe spondylosis at L2-L3 with grade 1  retrolisthesis of L2 on L3, a diffuse disc osteophyte complex, and a  superimposed large broad-based right posterior paracentral and right  foraminal disc extrusion resulting in severe spinal canal stenosis,  effacement of the right lateral recess, severe right neural foraminal  narrowing, and moderate to severe left neural foraminal narrowing. At  L3-L4, there is similar-appearing spondylosis and spondylolisthesis  resulting in moderate to severe spinal canal stenosis and severe  bilateral neural foraminal narrowing. Mild bilateral SI joint DJD with  right SI joint bridging osteophyte formation. Mild bilateral hip joint  DJD. No acute osseous abnormality is identified.       Impression:         1. No CT evidence of malignant/metastatic disease in the chest, abdomen,  or pelvis.  2. Severe spondylosis at L2-L3 and L3-L4, as detailed above. At L2-L3,  there is severe spinal canal stenosis, effacement of the right lateral  recess, severe right neural foraminal narrowing, and moderate to severe  left neural foraminal narrowing. At L3-L4, there is moderate to severe  spinal canal stenosis and severe bilateral neural foraminal narrowing.  3. Similar-appearing chronic changes in both lungs consistent with  chronic interstitial lung disease with a UIP pattern and superimposed  chronic emphysematous  changes.  4. Additional chronic findings, as above.     This report was finalized on 1/13/2025 1:48 PM by Donald Rodarte MD on  Workstation: [id number]       XR Hip With or Without Pelvis 2 - 3 View Right [[id number]] Collected: 01/11/25 1700     Updated: 01/11/25 1805    Narrative:      XR HIP W OR WO PELVIS 2-3 VIEW RIGHT-, XR TIBIA FIBULA 2 VW RIGHT-, XR  KNEE 1 OR 2 VW RIGHT-, XR FEMUR 2 VW RIGHT-01/11/2025     HISTORY: Fell, pelvis, right hip, right femur, right knee, and right  lower leg injuries.     AP PELVIS AND RIGHT HIP 2 VIEWS.     No acute bone, joint or soft tissue abnormalities are seen.     RIGHT FEMUR     Four views of the right femur demonstrate no fractures or other acute  bony abnormalities. There is degenerative arthritis of the right knee.  Superficial femoral artery calcification is seen.     RIGHT KNEE 2 VIEWS     There is moderate medial tibiofemoral joint space narrowing.  Hypertrophic changes are seen in the knee. There may be a minimal  suprapatellar effusion. No fractures are seen. There is some popliteal  artery calcification.     RIGHT TIBIA AND FIBULA     No fractures or acute abnormalities are seen in the right tibia and  fibula. Tiny calcaneal spur is seen at the insertion site of the  Achilles tendon and there is some minimal ossification along the plantar  fascia near the insertion on the calcaneus.        This report was finalized on 1/11/2025 6:02 PM by Dr. Guero Gillis M.D on Workstation: [id number]       XR Knee 1 or 2 View Right [[id number]] Collected: 01/11/25 1700     Updated: 01/11/25 1805    Narrative:      XR HIP W OR WO PELVIS 2-3 VIEW RIGHT-, XR TIBIA FIBULA 2 VW RIGHT-, XR  KNEE 1 OR 2 VW RIGHT-, XR FEMUR 2 VW RIGHT-01/11/2025     HISTORY: Fell, pelvis, right hip, right femur, right knee, and right  lower leg injuries.     AP PELVIS AND RIGHT HIP 2 VIEWS.     No acute bone, joint or soft tissue abnormalities are seen.     RIGHT FEMUR     Four views of the  right femur demonstrate no fractures or other acute  bony abnormalities. There is degenerative arthritis of the right knee.  Superficial femoral artery calcification is seen.     RIGHT KNEE 2 VIEWS     There is moderate medial tibiofemoral joint space narrowing.  Hypertrophic changes are seen in the knee. There may be a minimal  suprapatellar effusion. No fractures are seen. There is some popliteal  artery calcification.     RIGHT TIBIA AND FIBULA     No fractures or acute abnormalities are seen in the right tibia and  fibula. Tiny calcaneal spur is seen at the insertion site of the  Achilles tendon and there is some minimal ossification along the plantar  fascia near the insertion on the calcaneus.        This report was finalized on 1/11/2025 6:02 PM by Dr. Guero Gillis M.D on Workstation: [id number]       XR Femur 2 View Right [[id number]] Collected: 01/11/25 1700     Updated: 01/11/25 1805    Narrative:      XR HIP W OR WO PELVIS 2-3 VIEW RIGHT-, XR TIBIA FIBULA 2 VW RIGHT-, XR  KNEE 1 OR 2 VW RIGHT-, XR FEMUR 2 VW RIGHT-01/11/2025     HISTORY: Fell, pelvis, right hip, right femur, right knee, and right  lower leg injuries.     AP PELVIS AND RIGHT HIP 2 VIEWS.     No acute bone, joint or soft tissue abnormalities are seen.     RIGHT FEMUR     Four views of the right femur demonstrate no fractures or other acute  bony abnormalities. There is degenerative arthritis of the right knee.  Superficial femoral artery calcification is seen.     RIGHT KNEE 2 VIEWS     There is moderate medial tibiofemoral joint space narrowing.  Hypertrophic changes are seen in the knee. There may be a minimal  suprapatellar effusion. No fractures are seen. There is some popliteal  artery calcification.     RIGHT TIBIA AND FIBULA     No fractures or acute abnormalities are seen in the right tibia and  fibula. Tiny calcaneal spur is seen at the insertion site of the  Achilles tendon and there is some minimal ossification along the  plantar  fascia near the insertion on the calcaneus.        This report was finalized on 1/11/2025 6:02 PM by Dr. Guero Gillis M.D on Workstation: [id number]       XR Tibia Fibula 2 View Right [[id number]] Collected: 01/11/25 1700     Updated: 01/11/25 1805    Narrative:      XR HIP W OR WO PELVIS 2-3 VIEW RIGHT-, XR TIBIA FIBULA 2 VW RIGHT-, XR  KNEE 1 OR 2 VW RIGHT-, XR FEMUR 2 VW RIGHT-01/11/2025     HISTORY: Fell, pelvis, right hip, right femur, right knee, and right  lower leg injuries.     AP PELVIS AND RIGHT HIP 2 VIEWS.     No acute bone, joint or soft tissue abnormalities are seen.     RIGHT FEMUR     Four views of the right femur demonstrate no fractures or other acute  bony abnormalities. There is degenerative arthritis of the right knee.  Superficial femoral artery calcification is seen.     RIGHT KNEE 2 VIEWS     There is moderate medial tibiofemoral joint space narrowing.  Hypertrophic changes are seen in the knee. There may be a minimal  suprapatellar effusion. No fractures are seen. There is some popliteal  artery calcification.     RIGHT TIBIA AND FIBULA     No fractures or acute abnormalities are seen in the right tibia and  fibula. Tiny calcaneal spur is seen at the insertion site of the  Achilles tendon and there is some minimal ossification along the plantar  fascia near the insertion on the calcaneus.        This report was finalized on 1/11/2025 6:02 PM by Dr. Guero Gillis M.D on Workstation: [id number]             Results for orders placed during the hospital encounter of 01/11/25    Duplex Venous Lower Extremity - RIGHT    Interpretation Summary    Chronic right lower extremity deep vein thrombosis noted in the distal femoral and popliteal.    Sub-acute right lower extremity deep vein thrombosis noted in the mid femoral.    All other right sided veins appeared normal.    Results for orders placed during the hospital encounter of 04/14/23    Adult Transthoracic Echo Complete  "W/ Cont if Necessary Per Protocol    Interpretation Summary    Left ventricular systolic function is normal. Calculated left ventricular EF = 56.7%    Left ventricular diastolic function was normal.    Estimated right ventricular systolic pressure from tricuspid regurgitation is normal (<35 mmHg).    Pertinent Labs     Results from last 7 days   Lab Units 01/12/25  0252 01/11/25  1819   WBC 10*3/mm3 4.93 5.57   HEMOGLOBIN g/dL 14.8 14.8   PLATELETS 10*3/mm3 190 201     Results from last 7 days   Lab Units 01/12/25  0252 01/11/25  1819   SODIUM mmol/L 137 140   POTASSIUM mmol/L 3.8 3.8   CHLORIDE mmol/L 102 104   CO2 mmol/L 26.0 27.2   BUN mg/dL 10 12   CREATININE mg/dL 0.92 1.09   GLUCOSE mg/dL 90 88   EGFR mL/min/1.73 88.4 72.1     Results from last 7 days   Lab Units 01/11/25  1819   ALBUMIN g/dL 3.9   BILIRUBIN mg/dL 0.6   ALK PHOS U/L 77   AST (SGOT) U/L 39   ALT (SGPT) U/L 17     Results from last 7 days   Lab Units 01/12/25  0252 01/11/25  1819   CALCIUM mg/dL 8.8 8.9   ALBUMIN g/dL  --  3.9               Invalid input(s): \"LDLCALC\"          Test Results Pending at Discharge     Pending Results       Procedure [Order ID] Specimen - Date/Time    Anticardiolipin Antibody, IgG / M, Qn [[id number]] Collected: 01/12/25 1629    Specimen: Blood from Arm, Left Updated: 01/12/25 1637    Beta-2 Glycoprotein Antibodies [[id number]] Collected: 01/12/25 1629    Specimen: Blood from Arm, Left Updated: 01/12/25 1637    Lupus Anticoagulant [[id number]] Collected: 01/12/25 1630    Specimen: Blood from Arm, Left Updated: 01/12/25 1637              Discharge Details        Discharge Medications        Changes to Medications        Instructions Start Date   Enoxaparin Sodium 80 MG/0.8ML solution prefilled syringe syringe  Commonly known as: LOVENOX  What changed:   medication strength  how much to take  additional instructions   1 mg/kg (80 mg), Subcutaneous, Every 12 Hours   Start Date: January 14, 2025     warfarin 1 MG " tablet  Commonly known as: COUMADIN  What changed:   how much to take  additional instructions   0.5 mg, Oral, Daily, Take total of 10.5 mg daily---has 5mg Rx      warfarin 7.5 MG tablet  Commonly known as: COUMADIN  What changed: You were already taking a medication with the same name, and this prescription was added. Make sure you understand how and when to take each.   Indications: history of DVT/PE      warfarin 5 MG tablet  Commonly known as: COUMADIN  What changed: additional instructions   10 mg, Oral, Nightly, Take total of 10.5 mg daily---has 5mg Rx   Start Date: January 14, 2025            Continue These Medications        Instructions Start Date   albuterol sulfate  (90 Base) MCG/ACT inhaler  Commonly known as: PROVENTIL HFA;VENTOLIN HFA;PROAIR HFA   2 puffs, Inhalation, Every 4 Hours PRN      atorvastatin 80 MG tablet  Commonly known as: Lipitor   80 mg, Oral, Daily, For cholesterol and CAD, new dose      cyclobenzaprine 5 MG tablet  Commonly known as: FLEXERIL   TAKE 1 TO 2 TABLETS BY MOUTH THREE TIMES DAILY AS NEEDED FOR SPASMS      folic acid 1 MG tablet  Commonly known as: FOLVITE   1 mg, Oral, Daily      loratadine 10 MG tablet  Commonly known as: CLARITIN   10 mg, Daily      multivitamin with minerals tablet tablet   1 tablet, Daily      nitroglycerin 0.4 MG SL tablet  Commonly known as: NITROSTAT   0.4 mg, Sublingual, Every 5 Minutes PRN, Take no more than 3 doses in 15 minutes.       pantoprazole 40 MG EC tablet  Commonly known as: PROTONIX   40 mg, Oral, Daily, For GERD      Pirfenidone 267 MG tablet   3 tablets, Oral, 3 times daily      polyethylene glycol 17 g packet  Commonly known as: MIRALAX   8.5 g, Daily PRN      pregabalin 150 MG capsule  Commonly known as: LYRICA   TAKE 1 CAPSULE BY MOUTH TWICE DAILY FOR FIBROMYALGIA      Stiolto Respimat 2.5-2.5 MCG/ACT aerosol solution inhaler  Generic drug: tiotropium bromide-olodaterol   No dose, route, or frequency recorded.      traMADol 50  MG tablet  Commonly known as: Ultram   50 mg, Oral, Every 6 Hours PRN      Umeclidinium-Vilanterol 62.5-25 MCG/ACT aerosol powder  inhaler  Commonly known as: ANORO ELLIPTA   1 puff, Every 24 Hours               Allergies   Allergen Reactions    Zinc Other (See Comments) and Diarrhea     Patient tested positive, had to have hardware removed from back.    Chantix [Varenicline] Other (See Comments)     Headache    Hydrocodone-Acetaminophen GI Intolerance       Discharge Disposition:  Home or Self Care      Discharge Diet:  Diet Order   Procedures    Diet: Cardiac; Healthy Heart (2-3 Na+); Fluid Consistency: Thin (IDDSI 0)       Discharge Activity:       CODE STATUS:    Code Status and Medical Interventions: CPR (Attempt to Resuscitate); Full   Ordered at: 01/11/25 1800     Code Status (Patient has no pulse and is not breathing):    CPR (Attempt to Resuscitate)     Medical Interventions (Patient has pulse or is breathing):    Full       Future Appointments   Date Time Provider Department Center   2/5/2025  7:45 AM Aletha Ochoa PA-C [id number]   7/3/2025  1:30 PM Alo Torres MD [facility]     Additional Instructions for the Follow-ups that You Need to Schedule       Ambulatory Referral to Physical Therapy for Evaluation & Treatment   As directed      Specialty needed: Evaluate and treat   Exercises: Strengthening   Weight Bearing Status: Full weight bearing   Follow-up needed: Yes               Follow-up Information       Aletha Ochoa PA-C Follow up in 1 week(s).    Specialty: Family Medicine  Why: Post hospitlizaton follow up  Contact information:  [address] [facility] [address] [address]  [facility] [id number] [phone]               Darrel Dudley II, MD Follow up in 1 week(s).    Specialties: Hematology and Oncology, Oncology, Hematology  Why: Follow up 1-2 weeks  Contact information:  [address]  [place] [id number] [phone]               [facility] HEMATOLOGY &  ONCOLOGY Follow up in 2 day(s).    Specialty: Hematology and Oncology  Why: PT INR  Contact information:  [address]  [place] [address] [phone]  Additional information:  PH:  [phone]                           Additional Instructions for the Follow-ups that You Need to Schedule       Ambulatory Referral to Physical Therapy for Evaluation & Treatment   As directed      Specialty needed: Evaluate and treat   Exercises: Strengthening   Weight Bearing Status: Full weight bearing   Follow-up needed: Yes            Time Spent on Discharge:  Greater than 30 minutes      GEOVANNI Acosta  [facility]  01/13/25  17:37 EST

## 2025-01-13 NOTE — PROGRESS NOTES
[facility] Clinical Pharmacy Services: Warfarin Dosing/Monitoring Consult    Javy Reeves Sr. is a 74 y.o. male, estimated creatinine clearance is 82.7 mL/min (by C-G formula based on SCr of 0.92 mg/dL). weighing 80.6 kg (177 lb 9.6 oz).    Results from last 7 days   Lab Units 01/12/25  0933 01/12/25  0252 01/11/25  1819   INR   --  2.01* 1.90*   APTT seconds 107.9* 109.0* 35.9*   HEMOGLOBIN g/dL  --  14.8 14.8   HEMATOCRIT %  --  42.4 42.7   PLATELETS 10*3/mm3  --  190 201     Prior to admission anticoagulation: Warfarin 10.5 mg nightly except 10 mg on Mon, Wed, Fri    Hospital Anticoagulation:  Consulting provider: Dr. Denny  Start date: 1/12/25  Indication:  Possible warfarin failure - subacute RLE DVT while on warfarin.  INR goal increased to 2.5-3.5  Target INR: 2.5 - 3.5  Expected duration: Indefinite   Bridge Therapy: Lovenox 1mg/kg bridge    Potential food or drug interactions:   No new major DDI    Education complete?/Date: N/A; home medication    Assessment/Plan:  Warfarin 15mg given 1/12.    INR below new goal of 2.5-3.5, although trending up after bolus dose given last night.  Most recent INRs ranged from 2.6-3.6 on outpatient regimen of 70-74mg weekly.  Will give warfarin 12 mg tonight and continue to follow INR trend.  Anticipate INR will continue to rise tomorrow.  Monitor for any signs or symptoms of bleeding.  H/H stable.  Currently on lovenox 1 mg/kg q12h bridge to therapeutic INR.  Would continue until INR >2.5.  Follow up daily INRs and dose adjustments    Pharmacy will continue to follow until discharge or discontinuation of warfarin.     Kaitlin Wolfe, PharmD  Clinical Pharmacist

## 2025-01-13 NOTE — PROGRESS NOTES
REASON FOR FOLLOWUP/CHIEF COMPLAINT:   DVT while on Coumadin     HISTORY OF PRESENT ILLNESS:   Patient swathi worried about the weight loss.  Other than that he has no new complaints.  He continues on Lovenox as well as Coumadin.  The goal INR is now 2.5-3.5.  Patient reports that there were a few readings of low INR at home and during those days he tried to administer Lovenox at home.    Past Medical History, Past Surgical History, Social History, Family History have been reviewed and are without significant changes except as mentioned.    Review of Systems   Review of Systems review of systems as mentioned HPI otherwise negative      Medications:  The current medication list was reviewed in the EMR    ALLERGIES:    Allergies   Allergen Reactions    Zinc Other (See Comments) and Diarrhea     Patient tested positive, had to have hardware removed from back.    Chantix [Varenicline] Other (See Comments)     Headache    Hydrocodone-Acetaminophen GI Intolerance              Vitals:    01/13/25 0728 01/13/25 1016 01/13/25 1020 01/13/25 1116   BP: 142/81   133/77   BP Location: Right arm   Right arm   Patient Position: Lying   Sitting   Pulse: 68 86 70 72   Resp: 16 18 18 18   Temp: 97.3 °F (36.3 °C)   97.7 °F (36.5 °C)   TempSrc: Oral   Oral   SpO2: 99% 96%  100%   Weight:       Height:         Physical Exam    CONSTITUTIONAL:  Vital signs reviewed.  No distress, looks comfortable.  EYES:  Conjunctivae and lids unremarkable.  PERRLA  EARS,NOSE,MOUTH,THROAT:  Ears and nose appear unremarkable.  Lips, teeth, gums appear unremarkable.  RESPIRATORY:  Normal respiratory effort.  Lungs clear to auscultation bilaterally.  CARDIOVASCULAR:  Normal S1, S2.  No murmurs rubs or gallops.  No significant lower extremity edema.  GASTROINTESTINAL: Abdomen appears unremarkable.  Nontender.  No hepatomegaly.  No splenomegaly.  NEURO: cranial nerves 2-12 grossly intact.  No focal deficits.  Appears to have equal strength all 4  extremities.  MUSCULOSKELETAL:  Unremarkable digits/nails.  No cyanosis or clubbing.  SKIN:  Warm.  No rashes.  PSYCHIATRIC:  Normal judgment and insight.  Normal mood and affect.       RECENT LABS:  WBC   Date Value Ref Range Status   01/12/2025 4.93 3.40 - 10.80 10*3/mm3 Final   01/11/2025 5.57 3.40 - 10.80 10*3/mm3 Final     Hemoglobin   Date Value Ref Range Status   01/12/2025 14.8 13.0 - 17.7 g/dL Final   01/11/2025 14.8 13.0 - 17.7 g/dL Final     Platelets   Date Value Ref Range Status   01/12/2025 190 140 - 450 10*3/mm3 Final   01/11/2025 201 140 - 450 10*3/mm3 Final       ASSESSMENT/PLAN:  Javy Reeves Sr. S518/1     *Recurrent DVT/PE  Chronic anticoagulation with warfarin  Subacute right lower extremity DVT, mid femoral vein while on warfarin  Lupus anticoagulant positive x 1, November 2022 4/20/2020 left lower extremity duplex-chronic left lower extremity DVT in distal femoral and popliteal veins  8/22/2022 CT angiogram chest: Small PE in the most distal aspect of left upper lobe pulmonary artery extending into a segmental left upper lobe pulmonary artery branch.  August 2022 hypercoagulable workup-unremarkable (lupus anticoagulant, beta-2 glycoprotein IgG IgM antibody, anticardiolipin IgG IgM antibody, factor II mutation, factor V Leiden mutation, Antithrombin III 80%)  11/18/2022 bilateral lower extremity duplex-chronic right lower extremity DVT in popliteal and gastrocnemius.  Chronic left lower extremity DVT in popliteal and gastrocnemius  8/29/2023 right lower extremity duplex-chronic DVT in right popliteal and gastrocnemius veins.  Deep venous reflux in right popliteal vein.  1/11/2025 right lower extremity duplex-chronic right lower extremity DVT in distal femoral and popliteal.  Subacute right lower extremity DVT in mid femoral.  Patient was previously seen by Dr. Madden, last clinic visit December 2022.    On reviewing the previous INR as it appears that there were some subtherapeutic  readings.  Patient reports that on those days he was advised to take Lovenox by his primary care physician.  Unclear if we could consider this Coumadin failure given subtherapeutic INRs.  He has been restarted on Coumadin with a goal INR of 2.5-3.5.  He will also continue on Lovenox till the goal INR is attained.  Repeat APS testing pending.    *Unexpected weight loss.  Patient reports a significant amount of weight loss over the past 3 months decreasing from 240 pounds to 180 pounds.  He reports that this weight loss is unintentional.  Obviously remains worried regarding the unintentional weight loss.  Proceed with CT of the chest abdomen pelvis, these have been ordered and pending at this time.        *Status post Yudelka IVC filter placed, seen on CT angiogram abdomen pelvis in May 2022        *Interstitial pulmonary fibrosis with emphysema   November 9, 2022-status post lung biopsy with Dr. Bergman  Follows with pulmonology, Dr. Jamil Willoughby  5/25/[id number] CT stable        RECOMMENDATIONS/PLAN:   Most recent imaging from 1/11/2025 with subacute right lower extremity DVT.    Continue Coumadin with an INR goal of 2.5-3.5, continue Lovenox.  Follow-up on CT of the chest abdomen and pelvis.  PSA normal at 1.24   lupus anticoagulant, anticardiolipin IgG IgM and beta-2 glycoprotein IgG IgM antibodies pending at this  Continue therapeutic dose of Lovenox 1 mg/kg for now  continue warfarin with a higher therapeutic INR-2.5-3.5.  CBC with differential daily.  We will follow along    Patient is new to me and all issues are new to me today.

## 2025-01-13 NOTE — THERAPY EVALUATION
Patient Name: Javy Reeves .  : 1950    MRN: [id number]                              Today's Date: 2025       Admit Date: 2025    Visit Dx:     ICD-10-CM ICD-9-CM   1. Acute deep vein thrombosis (DVT) of femoral vein of right lower extremity  I82.411 453.41   2. Contusion of right thigh, initial encounter  S70.11XA 924.00   3. Elevated blood pressure reading  R03.0 796.2     Patient Active Problem List   Diagnosis    Arthritis    Family history of early CAD    DDD (degenerative disc disease), cervical    DVT (deep venous thrombosis)    Fibromyalgia    Past heart attack    Hyperlipidemia    DDD (degenerative disc disease), lumbosacral    History of pulmonary embolus (PE)    Reflux esophagitis    TIA (transient ischemic attack)    Left groin pain    Cervical radicular pain    Cervical disc disorder at C6-C7 level with radiculopathy    Wheezing    Colon polyps    Right lower quadrant abdominal pain    Diarrhea    Cervical spinal stenosis    Cervical disc disorder at C5-C6 level with radiculopathy    GERD (gastroesophageal reflux disease)    Diverticulosis    Old MI (myocardial infarction)    Herniated cervical disc    Encounter for therapeutic drug monitoring    Long term current use of anticoagulant therapy    ERRONEOUS ENCOUNTER--DISREGARD    Stable angina    Low folic acid    Low serum vitamin B12    Precordial chest pain    Tobacco abuse    Tobacco abuse counseling    Interstitial lung disease    History of transient ischemic attack (TIA)    Irritable bowel syndrome    DDD (degenerative disc disease), lumbar    Weight loss    Choking sensation    Failed back syndrome    Long term (current) use of opiate analgesic    Lumbar radiculopathy    Lumbar spondylosis    Chronic pain disorder    History of colon polyps    Acute chest pain    Recurrent pulmonary embolism    Subtherapeutic international normalized ratio (INR)    Bronchiectasis without complication    Abdominal pain    Presence of IVC  "filter    Weight loss, unintentional    ILD (interstitial lung disease)    Tobacco use disorder    Ectatic abdominal aorta    Acute deep vein thrombosis (DVT) of femoral vein of right lower extremity     Past Medical History:   Diagnosis Date    Acute and subacute infective endocarditis in diseases classified elsewhere     Allergic     Arthritis     Arthritis of neck Don’t remember    Asthma 04/26/2021    Chest pain     Chronic low back pain     Chronic pain     Clotting disorder     Colon polyps     Coronary artery disease     DDD (degenerative disc disease), cervical     DDD (degenerative disc disease), lumbosacral     Diverticulosis     DVT (deep venous thrombosis) 1996    Left Lower extremity following arthroscopic knee surgery in 1996. IVC fliter placed at that time.    Encounter for special screening examination for neoplasm of prostate 2012    Eye exam, routine > 5 yrs ago    Eye exam, routine 01/2015    Fibromyalgia     Fibromyositis     GERD (gastroesophageal reflux disease)     HIV disease     PT STATES \"NEVER DIAGNOSED\"    Hyperlipidemia     ILD (interstitial lung disease)     Injury of back     Injury of neck     Irritable bowel     Limited joint range of motion     NECK    Low back strain Don’t remember    Lumbar stenosis     Lumbosacral disc disease Don’t remember    MI (myocardial infarction)     Neck pain     Neck strain     Pain in limb     Past heart attack     Postlaminectomy syndrome of lumbar region     Pulmonary embolism 1996    Left Lower extremity following arthroscopic knee surgery in 1996. IVC fliter placed at that time.    Reflux esophagitis     Rotator cuff syndrome     Don’t remember    Scoliosis     Shortness of breath     WITH EXCERTION    Sleep apnea     NO CPAP    Stroke     Thoracic disc disorder     TIA (transient ischemic attack)      Past Surgical History:   Procedure Laterality Date    ANTERIOR CERVICAL DISCECTOMY W/ FUSION N/A 10/05/2018    Procedure: CERVICAL DISCECTOMY " ANTERIOR FUSION WITH INSTRUMENTATION,ACDF C5/6, C6/7 with cages and plate;  Surgeon: Jean Coles MD;  Location: [facility] MAIN OR;  Service: Neurosurgery    APPENDECTOMY N/A     BACK SURGERY  Don’t remember    BRONCHOSCOPY N/A 11/03/2020    Procedure: EBUS BRONCHOSCOPY WITH BAL & FLUOROSCOPY WITH MAC ANESTHESIA, BX & TBNA;  Surgeon: Jamil Willoughby MD;  Location: [facility] ENDOSCOPY;  Service: Pulmonary;  Laterality: N/A;  PRE/POST-ABNORMAL CT, LAD    CARDIAC CATHETERIZATION Left 1950    Left Heart Cath Left Ventriculography, selective coronary angiography; iliofemoral angiography; angio seal closure, Dr. Brady Ramos    COLONOSCOPY  09/2015    removed 2 polyps, Dr. Manley    COLONOSCOPY N/A 02/12/2007    Left colonic diverticulosis, No evidence of polypoid neoplasia, Dr. Jarret Bloom    COLONOSCOPY N/A 09/13/2018    Procedure: COLONOSCOPY TO CECUM WITH COLD BX'S POLYPECTOMY;  Surgeon: Berta Sin MD;  Location: [facility] ENDOSCOPY;  Service: General    CYSTOSCOPY TRANSURETHRAL RESECTION OF PROSTATE N/A 11/01/2004    Dr. Rodolfo Arnold    ENDOSCOPY N/A 08/03/2023    Procedure: ESOPHAGOGASTRODUODENOSCOPYmwith biopsy;  Surgeon: Shane Foss MD;  Location: [facility] ENDOSCOPY;  Service: Gastroenterology;  Laterality: N/A;  pre- gerd  post- gastritis    HAND SURGERY Right     JOINT REPLACEMENT  Over 25 years ago    KNEE ARTHROPLASTY Left     LAPAROSCOPIC CHOLECYSTECTOMY      LUMBAR DISC SURGERY  2006, 2007    L4-S1 pseudoarthroses - Dr Cervantes    LUMBAR DISC SURGERY N/A 01/29/2010    Removal of Instrumentation w/ decompression, Instrumentaion loosening & pt tested positive for zinc allergy, Dr. Kvng Cervantes    NECK SURGERY  Don’t remember    ROTATOR CUFF REPAIR Right 04/2012    SHOULDER SURGERY  Don’t remember    THORACIC OUTLET SURGERY Bilateral     THORACOSCOPY Right 11/29/2022    Procedure: BRONCHOSCOPY, RIGHT THORACOSCOPY VIDEO ASSISTED WEDGE RESECTION, INTERCOSTAL NERVE BLOCK;  Surgeon: Pacheco  Nella GONZALEZ MD;  Location: [facility] OR;  Service: Thoracic;  Laterality: Right;    TRIGGER POINT INJECTION      Several [facility]    VENA CAVA FILTER PLACEMENT  1996    WRIST SURGERY Right     x3 starting in 1984      General Information       Row Name 01/13/25 1004          Physical Therapy Time and Intention    Document Type evaluation  -     Mode of Treatment individual therapy;physical therapy  -       Row Name 01/13/25 1004          General Information    Patient Profile Reviewed yes  -     Prior Level of Function independent:;gait;transfer;bed mobility  -     Existing Precautions/Restrictions fall  -     Barriers to Rehab none identified  -       Row Name 01/13/25 1004          Living Environment    People in Home spouse  -       Row Name 01/13/25 1004          Home Main Entrance    Number of Stairs, Main Entrance other (see comments)  14 steps up from basement  -       Row Name 01/13/25 1004          Cognition    Orientation Status (Cognition) oriented x 4  -[facility] Name 01/13/25 1004          Safety Issues/Impairments Affecting Functional Mobility    Impairments Affecting Function (Mobility) endurance/activity tolerance;strength;pain  -               User Key  (r) = Recorded By, (t) = Taken By, (c) = Cosigned By      Initials Name Provider Type     Sheridan Avila, PT Physical Therapist                   Mobility       Row Name 01/13/25 1004          Bed Mobility    Bed Mobility supine-sit  -     Supine-Sit [place] (Bed Mobility) independent  -       Row Name 01/13/25 1004          Sit-Stand Transfer    Sit-Stand [place] (Transfers) verbal cues;standby assist  -     Assistive Device (Sit-Stand Transfers) walker, front-wheeled  -       Row Name 01/13/25 1004          Gait/Stairs (Locomotion)    [place] Level (Gait) standby assist;contact guard;verbal cues  -     Assistive Device (Gait) walker, front-wheeled  -      Distance in Feet (Gait) 30  -     Deviations/Abnormal Patterns (Gait) antalgic;rob decreased;gait speed decreased  -     Bilateral Gait Deviations forward flexed posture  -     Right Sided Gait Deviations weight shift ability decreased  -               User Key  (r) = Recorded By, (t) = Taken By, (c) = Cosigned By      Initials Name Provider Type     Sheridan Avila PT Physical Therapist                   Obj/Interventions       [facility] Name 01/13/25 1004          Range of Motion Comprehensive    General Range of Motion lower extremity range of motion deficits identified  -     Comment, General Range of Motion Impaired RLE ROM d/t pain  -[facility] Name 01/13/25 1004          Strength Comprehensive (MMT)    General Manual Muscle Testing (MMT) Assessment lower extremity strength deficits identified  -     Comment, General Manual Muscle Testing (MMT) Assessment LLE WFL, RLE grossly 4/5  -[facility] Name 01/13/25 1004          Balance    Balance Assessment sitting static balance;sitting dynamic balance;standing static balance;standing dynamic balance  -     Static Sitting Balance supervision  -     Dynamic Sitting Balance supervision  -     Position, Sitting Balance unsupported;sitting edge of bed  -     Static Standing Balance standby assist  -     Dynamic Standing Balance contact guard  -     Position/Device Used, Standing Balance supported;walker, front-wheeled  -     Balance Interventions sitting;standing;sit to stand;supported;static;dynamic  -[facility] Name 01/13/25 1004          Sensory Assessment (Somatosensory)    Sensory Assessment (Somatosensory) LE sensation intact  -               User Key  (r) = Recorded By, (t) = Taken By, (c) = Cosigned By      Initials Name Provider Type     Sheridan Avila PT Physical Therapist                   Goals/Plan       [facility] Name 01/13/25 1011          Transfer Goal 1 (PT)    Activity/Assistive Device (Transfer Goal 1, PT) transfers,  all  -     [place] Level/Cues Needed (Transfer Goal 1, PT) independent  -     Time Frame (Transfer Goal 1, PT) 1 week  -       Row Name 01/13/25 1011          Gait Training Goal 1 (PT)    Activity/Assistive Device (Gait Training Goal 1, PT) gait (walking locomotion)  -     [place] Level (Gait Training Goal 1, PT) independent  -     Distance (Gait Training Goal 1, PT) 150ft  -     Time Frame (Gait Training Goal 1, PT) 1 week  -       Row Name 01/13/25 1011          Therapy Assessment/Plan (PT)    Planned Therapy Interventions (PT) balance training;bed mobility training;gait training;home exercise program;patient/family education;ROM (range of motion);stair training;strengthening;stretching;transfer training  -               User Key  (r) = Recorded By, (t) = Taken By, (c) = Cosigned By      Initials Name Provider Type     Sheridan Avila, PT Physical Therapist                   Clinical Impression       Row Name 01/13/25 1005          Pain    Pre/Posttreatment Pain Comment C/o RLE pain with movement, did not rate  -[facility] Name 01/13/25 1005          Plan of Care Review    Plan of Care Reviewed With patient  -     Outcome Evaluation Pt is a 73 yo M admitted after a fall on ice in the [employer] parking lot. Pt reports initially he felt okay, but RLE pain got significantly worse to the point he wasn't able to bear any weight, though x-rays were all (-) for acute abnormalities. 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 Pt presents to PT with impaired strength and pain, but tolerated mobility well. Pt transferred to EOB independently and stood with CGA. Pt ambulated 30ft in his room with rwx and SBA-CGA. Pt cued to offweight onto rwx as needed, noted slow pace but tolerated well. Pt agreeable to sitting UIC and left with needs met. Pt reports he has recently worked with OPPT and would continue that if needed as his pain  decreases. Pt would likely also benefit from rwx at home short-term, states he will call his wife to confirm if they have one - if not, will need to order prior to DC. PT will continue to follow, anticipate DC home with OPPT and a rwx.  -       Row Name 01/13/25 1005          Therapy Assessment/Plan (PT)    Patient/Family Therapy Goals Statement (PT) Return to PLOF  -     Rehab Potential (PT) good  -     Criteria for Skilled Interventions Met (PT) yes  -     Therapy Frequency (PT) 5 times/wk  -       Row Name 01/13/25 1005          Vital Signs    O2 Delivery Pre Treatment room air  -     O2 Delivery Intra Treatment room air  -     O2 Delivery Post Treatment room air  -       Row Name 01/13/25 1005          Positioning and Restraints    Pre-Treatment Position in bed  -     Post Treatment Position chair  -     In Chair notified nsg;reclined;call light within reach;encouraged to call for assist  No bed alarm on arrival, pt encouraged to call out for SV assist to bathroom  -               User Key  (r) = Recorded By, (t) = Taken By, (c) = Cosigned By      Initials Name Provider Type     Sheridan Avila, PT Physical Therapist                   Outcome Measures       Row Name 01/13/25 1011 01/13/25 0005       How much help from another person do you currently need...    Turning from your back to your side while in flat bed without using bedrails? 4  - 4  -MD    Moving from lying on back to sitting on the side of a flat bed without bedrails? 4  - 3  -MD    Moving to and from a bed to a chair (including a wheelchair)? 3  - 3  -MD    Standing up from a chair using your arms (e.g., wheelchair, bedside chair)? 3  - 4  -MD    Climbing 3-5 steps with a railing? 3  - 3  -MD    To walk in hospital room? 3  - 3  -MD    AM-PAC 6 Clicks Score (PT) 20  - 20  -MD    Highest Level of Mobility Goal 6 --> Walk 10 steps or more  - 6 --> Walk 10 steps or more  -MD      Row Name 01/13/25 1011           Functional Assessment    Outcome Measure Options AM-PAC 6 Clicks Basic Mobility (PT)  -               User Key  (r) = Recorded By, (t) = Taken By, (c) = Cosigned By      Initials Name Provider Type    Oanh Ramon, RN Registered Nurse     Sheridan Avila PT Physical Therapist                                 Physical Therapy Education       Title: PT OT SLP Therapies (In Progress)       Topic: Physical Therapy (In Progress)       Point: Mobility training (Done)       Learning Progress Summary            Patient Acceptance, E,TB,D, VU,NR by  at 1/13/2025 1012                      Point: Home exercise program (Not Started)       Learner Progress:  Not documented in this visit.              Point: Body mechanics (Done)       Learning Progress Summary            Patient Acceptance, E,TB,D, VU,NR by  at 1/13/2025 1012                      Point: Precautions (Done)       Learning Progress Summary            Patient Acceptance, E,TB,D, VU,NR by  at 1/13/2025 1012                                      User Key       Initials Effective Dates Name Provider Type Discipline     04/08/22 -  Sheridan Avila PT Physical Therapist PT                  PT Recommendation and Plan  Planned Therapy Interventions (PT): balance training, bed mobility training, gait training, home exercise program, patient/family education, ROM (range of motion), stair training, strengthening, stretching, transfer training  Outcome Evaluation: Pt is a 75 yo M admitted after a fall on ice in the [employer] parking lot. Pt reports initially he felt okay, but RLE pain got significantly worse to the point he wasn't able to bear any weight, though x-rays were all (-) for acute abnormalities. 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 Pt presents to PT with impaired strength and pain, but tolerated mobility well. Pt transferred to EOB independently and stood with CGA. Pt ambulated  30ft in his room with rwx and SBA-CGA. Pt cued to offweight onto rwx as needed, noted slow pace but tolerated well. Pt agreeable to sitting UIC and left with needs met. Pt reports he has recently worked with OPPT and would continue that if needed as his pain decreases. Pt would likely also benefit from rwx at home short-term, states he will call his wife to confirm if they have one - if not, will need to order prior to DC. PT will continue to follow, anticipate DC home with OPPT and a rwx.     Time Calculation:   PT Evaluation Complexity  History, PT Evaluation Complexity: 1-2 personal factors and/or comorbidities  Examination of Body Systems (PT Eval Complexity): total of 3 or more elements  Clinical Presentation (PT Evaluation Complexity): stable  Clinical Decision Making (PT Evaluation Complexity): low complexity  Overall Complexity (PT Evaluation Complexity): low complexity     PT Charges       Row Name 01/13/25 1012             Time Calculation    Start Time 0850  -      Stop Time 0906  -      Time Calculation (min) 16 min  -      PT Received On 01/13/25  -      PT - Next Appointment 01/14/25  -      PT Goal Re-Cert Due Date 01/20/25  -         Time Calculation- PT    Total Timed Code Minutes- PT 12 minute(s)  -         Timed Charges    21936 - Gait Training Minutes  8  -      39563 - PT Therapeutic Activity Minutes 4  -         Total Minutes    Timed Charges Total Minutes 12  -       Total Minutes 12  -                User Key  (r) = Recorded By, (t) = Taken By, (c) = Cosigned By      Initials Name Provider Type     Sheridan Avila, PT Physical Therapist                  Therapy Charges for Today       Code Description Service Date Service Provider Modifiers Qty    74630327263 HC GAIT TRAINING EA 15 MIN 1/13/2025 Sheridan Avila, PT GP 1    [id number] HC PT EVAL LOW COMPLEXITY 3 1/13/2025 Sheridan Avila, PT GP 1            PT G-Codes  Outcome Measure Options: AM-PAC 6 Clicks Basic  Mobility (PT)  AM-PAC 6 Clicks Score (PT): 20  PT Discharge Summary  Anticipated Discharge Disposition (PT): home with assist, home with outpatient therapy services    Sheridan Avila, PT  1/13/2025

## 2025-01-13 NOTE — PLAN OF CARE
Goal Outcome Evaluation:  Plan of Care Reviewed With: patient        Progress: no change     Patient alert & oriented x 4. Vital signs stable. Sinus rhythm on monitor. Plan of care ongoing. No concerns expressed at present.

## 2025-01-13 NOTE — PROGRESS NOTES
[facility] Clinical Pharmacy Services: Warfarin Dosing/Monitoring Consult    Javy Reeves Sr. is a 74 y.o. male, estimated creatinine clearance is 82.7 mL/min (by C-G formula based on SCr of 0.92 mg/dL). weighing 80.6 kg (177 lb 9.6 oz).    Results from last 7 days   Lab Units 01/13/25  1737 01/12/25  0933 01/12/25  0252 01/11/25  1819   INR  1.56*  --  2.01* 1.90*   APTT seconds  --  107.9* 109.0* 35.9*   HEMOGLOBIN g/dL  --   --  14.8 14.8   HEMATOCRIT %  --   --  42.4 42.7   PLATELETS 10*3/mm3  --   --  190 201     Prior to admission anticoagulation: Warfarin 10.5 mg nightly except 10 mg on Mon, Wed, Fri    Hospital Anticoagulation:  Consulting provider: Dr. Denny  Start date: 1/12/25  Indication:  Possible warfarin failure - subacute RLE DVT while on warfarin.  INR goal increased to 2.5-3.5  Target INR: 2.5 - 3.5  Expected duration: Indefinite   Bridge Therapy: Lovenox 1mg/kg bridge    Potential food or drug interactions:   No new major DDI    Education complete?/Date: N/A; home medication    Assessment/Plan:  Warfarin 15mg given 1/12. Patient INR had not resulted yet and patient was to dc home tonight. Cancelled previously ordered dose by today' [facility] was not based on recent INR.     INR below new goal of 2.5-3.5, recheck today showed decreased INR. Will repeat 15 mg tonight prior to discharge and the ncan resume home regimen.  Monitor for any signs or symptoms of bleeding.  H/H stable.  Currently on lovenox 1 mg/kg q12h bridge to therapeutic INR.  Would continue until INR >2.5.  Follow up daily INRs and dose adjustments    Pharmacy will continue to follow until discharge or discontinuation of warfarin.     Darrel Holguin, [facility]  Clinical Pharmacist

## 2025-01-13 NOTE — PLAN OF CARE
Goal Outcome Evaluation:  Plan of Care Reviewed With: patient           Outcome Evaluation: Pt is a 75 yo M admitted after a fall on ice in the [employer] parking lot. Pt reports initially he felt okay, but RLE pain got significantly worse to the point he wasn't able to bear any weight, though x-rays were all (-) for acute abnormalities. 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 Pt presents to PT with impaired strength and pain, but tolerated mobility well. Pt transferred to EOB independently and stood with CGA. Pt ambulated 30ft in his room with rwx and SBA-CGA. Pt cued to offweight onto rwx as needed, noted slow pace but tolerated well. Pt agreeable to sitting UIC and left with needs met. Pt reports he has recently worked with OPPT and would continue that if needed as his pain decreases. Pt would likely also benefit from rwx at home short-term, states he will call his wife to confirm if they have one - if not, will need to order prior to DC. PT will continue to follow, anticipate DC home with OPPT and a rwx.    Anticipated Discharge Disposition (PT): home with assist, home with outpatient therapy services

## 2025-01-14 ENCOUNTER — TELEPHONE (OUTPATIENT)
Dept: ONCOLOGY | Facility: CLINIC | Age: 75
End: 2025-01-14
Payer: MEDICARE

## 2025-01-14 ENCOUNTER — TRANSITIONAL CARE MANAGEMENT TELEPHONE ENCOUNTER (OUTPATIENT)
Dept: CALL CENTER | Facility: HOSPITAL | Age: 75
End: 2025-01-14
Payer: MEDICARE

## 2025-01-14 LAB
B2 GLYCOPROT1 IGA SER-ACNC: <9 GPI IGA UNITS (ref 0–25)
B2 GLYCOPROT1 IGG SER-ACNC: <9 GPI IGG UNITS (ref 0–20)
B2 GLYCOPROT1 IGM SER-ACNC: <9 GPI IGM UNITS (ref 0–32)
CARDIOLIPIN IGG SER IA-ACNC: 78 GPL U/ML (ref 0–14)
CARDIOLIPIN IGM SER IA-ACNC: <9 MPL U/ML (ref 0–12)

## 2025-01-14 RX ORDER — WARFARIN SODIUM 7.5 MG/1
TABLET ORAL
Qty: 2 TABLET | Refills: 0 | Status: SHIPPED | OUTPATIENT
Start: 2025-01-14

## 2025-01-14 NOTE — OUTREACH NOTE
Prep Survey      Flowsheet Row Responses   Spiritism facility patient discharged from? [place]   Is LACE score < 7 ? No   Eligibility [facility]   Date of Admission 01/11/25   Date of Discharge 01/13/25   Discharge Disposition Home or Self Care   Discharge diagnosis Acute deep vein thrombosis   Does the patient have one of the following disease processes/diagnoses(primary or secondary)? Other   Does the patient have Home health ordered? No   Is there a DME ordered? Yes   What DME was ordered? [facility] for DME   Prep survey completed? Yes            ANANT NUNN - Registered Nurse

## 2025-01-14 NOTE — OUTREACH NOTE
Call Center TCM Note      Flowsheet Row Responses   [facility] patient discharged from? [place]   Does the patient have one of the following disease processes/diagnoses(primary or secondary)? Other   TCM attempt successful? Yes   Call start time 1401   Call end time 1404   Meds reviewed with patient/caregiver? Yes   Is the patient having any side effects they believe may be caused by any medication additions or changes? No   Does the patient have all medications ordered at discharge? Yes   Prescription comments Updated rx's for Enoxaparin Sodium, Warfarin in place   Is the patient taking all medications as directed (includes completed medication regime)? Yes   Comments Pt already has call into PCP MALINDA Ochoa for TCM APPT   Does the patient have an appointment with their PCP within 7-14 days of discharge? No appointments available   Nursing Interventions Routed TCM call to PCP office, PCP office requested to make appointment - message sent   Has home health visited the patient within 72 hours of discharge? N/A   What DME was ordered? [facility] [employer]   Has all DME been delivered? Yes   Psychosocial issues? No   Did the patient receive a copy of their discharge instructions? Yes   Nursing interventions Reviewed instructions with patient   What is the patient's perception of their health status since discharge? Improving   Is the patient/caregiver able to teach back signs and symptoms related to disease process for when to call PCP? Yes   Is the patient/caregiver able to teach back signs and symptoms related to disease process for when to call 911? Yes   Is the patient/caregiver able to teach back the hierarchy of who to call/visit for symptoms/problems? PCP, Specialist, Home health nurse, Urgent Care, ED, 911 Yes   TCM call completed? Yes   Wrap up additional comments D/C DX: DVT RLE femoral - Pt managing well. No questions. Pt already has call into PCP MALINDA Ochoa for TCM APPT   Call end time 140             Oanh Burrell MA    1/14/2025, 14:07 EST

## 2025-01-14 NOTE — PROGRESS NOTES
Discharge Planning Assessment  [facility]     Patient Name: Javy Reeves Sr.  MRN: [id number]  Today's Date: 1/14/2025    Admit Date: 1/11/2025    Plan: Home   Discharge Needs Assessment    No documentation.                  Discharge Plan       Row Name 01/14/25 1735       Plan    Plan Home    Final Discharge Disposition Code 01 - home or self-care    Final Note Home                  Continued Care and Services - Discharged on 1/13/2025 Admission date: 1/11/2025 - Discharge disposition: Home or Self Care      Durable Medical Equipment       Service Provider Request Status Services Address Phone Fax Patient Preferred    [facility] Pending - Request Sent -- [address] [place] [address] [address] [id number] [phone] --                  Expected Discharge Date and Time       Expected Discharge Date Expected Discharge Time    Jan 13, 2025            Demographic Summary    No documentation.                  Functional Status    No documentation.                  Psychosocial    No documentation.                  Abuse/Neglect    No documentation.                  Legal    No documentation.                  Substance Abuse    No documentation.                  Patient Forms    No documentation.                     Maite Mckeon, RN

## 2025-01-14 NOTE — TELEPHONE ENCOUNTER
"  Caller: Javy Reeves Sr. \"GARRICK\"    Relationship to patient: Self    Best call back number: [phone]    Type of visit: HOSPITAL FOLLOW UP    Requested date: ANY MORNING EXCEPT WEDNESDAYS    Additional notes: PLEASE CALL TO SCHEDULE. HE SAW DR. VALLADARES IN THE HOSPITAL, BUT DISCHARGE STATES TO FOLLOW UP WITH DR. YUEN. IF NO ANSWER, PLEASE LVM.    "

## 2025-01-16 LAB
APTT SCREEN TO CONFIRM RATIO: 1.08 RATIO (ref 0–1.34)
CONFIRM APTT/NORMAL: 61.8 SEC (ref 0–47.6)
INR PPP: 1.6 (ref 2.5–3.5)
LA 2 SCREEN W REFLEX-IMP: ABNORMAL
MIXING DRVVT: 39.7 SEC (ref 0–40.4)
SCREEN APTT: 40.9 SEC (ref 0–43.5)
SCREEN DRVVT: 54.5 SEC (ref 0–47)
THROMBIN TIME: 31.9 SEC (ref 0–23)
TT IMM NP PPP: 26.7 SEC (ref 0–23)
TT P HPASE PPP: 21.8 SEC (ref 0–23)

## 2025-01-17 ENCOUNTER — HOSPITAL ENCOUNTER (EMERGENCY)
Facility: HOSPITAL | Age: 75
Discharge: HOME | End: 2025-01-17
Attending: EMERGENCY MEDICINE
Payer: MEDICARE

## 2025-01-17 ENCOUNTER — ANTICOAGULATION VISIT (OUTPATIENT)
Dept: FAMILY MEDICINE CLINIC | Facility: CLINIC | Age: 75
End: 2025-01-17
Payer: MEDICARE

## 2025-01-17 ENCOUNTER — TELEPHONE (OUTPATIENT)
Dept: FAMILY MEDICINE CLINIC | Facility: CLINIC | Age: 75
End: 2025-01-17
Payer: MEDICARE

## 2025-01-17 VITALS
SYSTOLIC BLOOD PRESSURE: 141 MMHG | OXYGEN SATURATION: 97 % | HEIGHT: 72 IN | HEART RATE: 73 BPM | WEIGHT: 171 LBS | TEMPERATURE: 97.8 F | DIASTOLIC BLOOD PRESSURE: 86 MMHG | RESPIRATION RATE: 16 BRPM | BODY MASS INDEX: 23.16 KG/M2

## 2025-01-17 DIAGNOSIS — K92.1 BLACK STOOLS: Primary | ICD-10-CM

## 2025-01-17 DIAGNOSIS — R79.1 SUBTHERAPEUTIC INTERNATIONAL NORMALIZED RATIO (INR): ICD-10-CM

## 2025-01-17 LAB
ABO GROUP BLD: NORMAL
ALBUMIN SERPL-MCNC: 4 G/DL (ref 3.5–5.2)
ALBUMIN/GLOB SERPL: 1 G/DL
ALP SERPL-CCNC: 78 U/L (ref 39–117)
ALT SERPL W P-5'-P-CCNC: 16 U/L (ref 1–41)
ANION GAP SERPL CALCULATED.3IONS-SCNC: 9 MMOL/L (ref 5–15)
AST SERPL-CCNC: 18 U/L (ref 1–40)
BASOPHILS # BLD AUTO: 0.03 10*3/MM3 (ref 0–0.2)
BASOPHILS NFR BLD AUTO: 0.6 % (ref 0–1.5)
BILIRUB SERPL-MCNC: 0.6 MG/DL (ref 0–1.2)
BLD GP AB SCN SERPL QL: NEGATIVE
BUN SERPL-MCNC: 15 MG/DL (ref 8–23)
BUN/CREAT SERPL: 13.4 (ref 7–25)
CALCIUM SPEC-SCNC: 9.2 MG/DL (ref 8.6–10.5)
CHLORIDE SERPL-SCNC: 102 MMOL/L (ref 98–107)
CO2 SERPL-SCNC: 27 MMOL/L (ref 22–29)
CREAT SERPL-MCNC: 1.12 MG/DL (ref 0.76–1.27)
D-LACTATE SERPL-SCNC: 0.9 MMOL/L (ref 0.5–2)
DEPRECATED RDW RBC AUTO: 47.3 FL (ref 37–54)
EGFRCR SERPLBLD CKD-EPI 2021: 69.8 ML/MIN/1.73
EOSINOPHIL # BLD AUTO: 0.02 10*3/MM3 (ref 0–0.4)
EOSINOPHIL NFR BLD AUTO: 0.4 % (ref 0.3–6.2)
ERYTHROCYTE [DISTWIDTH] IN BLOOD BY AUTOMATED COUNT: 13.3 % (ref 12.3–15.4)
FECAL OCCULT BLOOD SCREEN, POC: NEGATIVE
GLOBULIN UR ELPH-MCNC: 4 GM/DL
GLUCOSE SERPL-MCNC: 87 MG/DL (ref 65–99)
HCT VFR BLD AUTO: 45.2 % (ref 37.5–51)
HGB BLD-MCNC: 15.4 G/DL (ref 13–17.7)
HOLD SPECIMEN: NORMAL
HOLD SPECIMEN: NORMAL
IMM GRANULOCYTES # BLD AUTO: 0.01 10*3/MM3 (ref 0–0.05)
IMM GRANULOCYTES NFR BLD AUTO: 0.2 % (ref 0–0.5)
INR PPP: 1.38 (ref 0.9–1.1)
LYMPHOCYTES # BLD AUTO: 1.6 10*3/MM3 (ref 0.7–3.1)
LYMPHOCYTES NFR BLD AUTO: 29.5 % (ref 19.6–45.3)
MCH RBC QN AUTO: 32.4 PG (ref 26.6–33)
MCHC RBC AUTO-ENTMCNC: 34.1 G/DL (ref 31.5–35.7)
MCV RBC AUTO: 95 FL (ref 79–97)
MONOCYTES # BLD AUTO: 0.58 10*3/MM3 (ref 0.1–0.9)
MONOCYTES NFR BLD AUTO: 10.7 % (ref 5–12)
NEUTROPHILS NFR BLD AUTO: 3.18 10*3/MM3 (ref 1.7–7)
NEUTROPHILS NFR BLD AUTO: 58.6 % (ref 42.7–76)
NRBC BLD AUTO-RTO: 0 /100 WBC (ref 0–0.2)
PLATELET # BLD AUTO: 223 10*3/MM3 (ref 140–450)
PMV BLD AUTO: 9.2 FL (ref 6–12)
POSITIVE CONTROL: NORMAL
POTASSIUM SERPL-SCNC: 4.3 MMOL/L (ref 3.5–5.2)
PROT SERPL-MCNC: 8 G/DL (ref 6–8.5)
PROTHROMBIN TIME: 17.2 SECONDS (ref 11.7–14.2)
RBC # BLD AUTO: 4.76 10*6/MM3 (ref 4.14–5.8)
RH BLD: POSITIVE
SODIUM SERPL-SCNC: 138 MMOL/L (ref 136–145)
T&S EXPIRATION DATE: NORMAL
WBC NRBC COR # BLD AUTO: 5.42 10*3/MM3 (ref 3.4–10.8)
WHOLE BLOOD HOLD COAG: NORMAL
WHOLE BLOOD HOLD SPECIMEN: NORMAL

## 2025-01-17 PROCEDURE — 86850 RBC ANTIBODY SCREEN: CPT | Performed by: EMERGENCY MEDICINE

## 2025-01-17 PROCEDURE — 86901 BLOOD TYPING SEROLOGIC RH(D): CPT | Performed by: EMERGENCY MEDICINE

## 2025-01-17 PROCEDURE — 82270 OCCULT BLOOD FECES: CPT | Performed by: EMERGENCY MEDICINE

## 2025-01-17 PROCEDURE — 80053 COMPREHEN METABOLIC PANEL: CPT | Performed by: EMERGENCY MEDICINE

## 2025-01-17 PROCEDURE — 85025 COMPLETE CBC W/AUTO DIFF WBC: CPT | Performed by: EMERGENCY MEDICINE

## 2025-01-17 PROCEDURE — 86900 BLOOD TYPING SEROLOGIC ABO: CPT | Performed by: EMERGENCY MEDICINE

## 2025-01-17 PROCEDURE — 99283 EMERGENCY DEPT VISIT LOW MDM: CPT

## 2025-01-17 PROCEDURE — 85610 PROTHROMBIN TIME: CPT | Performed by: EMERGENCY MEDICINE

## 2025-01-17 PROCEDURE — 83605 ASSAY OF LACTIC ACID: CPT | Performed by: EMERGENCY MEDICINE

## 2025-01-17 RX ORDER — SODIUM CHLORIDE 0.9 % (FLUSH) 0.9 %
10 SYRINGE (ML) INJECTION AS NEEDED
Status: DISCONTINUED | OUTPATIENT
Start: 2025-01-17 | End: 2025-01-17 | Stop reason: HOSPADM

## 2025-01-17 RX ORDER — ENOXAPARIN SODIUM 100 MG/ML
1 INJECTION SUBCUTANEOUS EVERY 12 HOURS
Qty: 48 ML | Refills: 11 | Status: SHIPPED | OUTPATIENT
Start: 2025-01-17

## 2025-01-17 NOTE — ED NOTES
"Ultrasound Inserted IV Site: right upper cephalic  Catheter Length: 2.5\"  Diameter: 0.55 cm  Depth: 1.25 cm    Vascular Access Score= 5  1) Palpable / Visible / Distended  2) Palpable / Visible / Not Distended  3) Easily Palpable / Not Visibile  4) Poorly Palpable / Visible  5) Poorly / Nonpalpable / NV     Angiocath visualized and advanced in the venous lumen. Flush visualized superiorly to insertion site in venous lumen. Blood return noted and collected during insertion. No signs of phlebitis noted. Standard IV dressing placed and secured.  \  "

## 2025-01-17 NOTE — ED PROVIDER NOTES
EMERGENCY DEPARTMENT ENCOUNTER    Room Number:  33/33  PCP: Aletha Ochoa PA-C  Historian: Patient      HPI:  Chief Complaint: Black stool  A complete HPI/ROS/PMH/PSH/SH/FH are unobtainable due to: None  Context: Javy Reeves Sr. is a 74 y.o. male who presents to the ED c/o   Sudden onset of black stools that he first noticed yesterday and has had 2 episodes of since that point.  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  He currently denies generalized weakness, chest pain, shortness of breath, abdominal pain, nausea/vomiting, or dizziness.          PAST MEDICAL HISTORY  Active Ambulatory Problems     Diagnosis Date Noted    Arthritis 11/16/2015    Family history of early CAD 11/16/2015    DDD (degenerative disc disease), cervical 11/16/2015    DVT (deep venous thrombosis) 11/16/2015    Fibromyalgia 11/16/2015    Past heart attack 11/16/2015    Hyperlipidemia 11/16/2015    DDD (degenerative disc disease), lumbosacral 11/16/2015    History of pulmonary embolus (PE) 11/16/2015    Reflux esophagitis 11/16/2015    TIA (transient ischemic attack) 11/16/2015    Left groin pain 02/21/2017    Cervical radicular pain 02/12/2018    Cervical disc disorder at C6-C7 level with radiculopathy 05/16/2018    Wheezing 07/20/2018    Colon polyps 07/27/2018    Right lower quadrant abdominal pain 07/27/2018    Diarrhea 07/27/2018    Cervical spinal stenosis 08/20/2018    Cervical disc disorder at C5-C6 level with radiculopathy 08/20/2018    GERD (gastroesophageal reflux disease) 09/07/2018    Diverticulosis 09/13/2018    Old MI (myocardial infarction) 09/20/2018    Herniated cervical disc 10/05/2018    Encounter for therapeutic drug monitoring 10/10/2018    Long term current use of anticoagulant therapy 10/10/2018    ERRONEOUS ENCOUNTER--DISREGARD 03/27/2019    Stable angina 04/26/2019    Low folic acid 09/22/2020    Low serum vitamin B12 09/22/2020    Precordial  chest pain 12/01/2020    Tobacco abuse 12/01/2020    Tobacco abuse counseling 12/01/2020    Interstitial lung disease 12/01/2020    History of transient ischemic attack (TIA) 02/22/2021    Irritable bowel syndrome 04/26/2021    DDD (degenerative disc disease), lumbar 04/26/2021    Weight loss 09/08/2021    Choking sensation 09/08/2021    Failed back syndrome 11/03/2021    Long term (current) use of opiate analgesic 11/03/2021    Lumbar radiculopathy 11/03/2021    Lumbar spondylosis 11/03/2021    Chronic pain disorder 11/03/2021    History of colon polyps 07/28/2022    Acute chest pain 08/22/2022    Recurrent pulmonary embolism 08/23/2022    Subtherapeutic international normalized ratio (INR) 08/23/2022    Bronchiectasis without complication 08/23/2022    Abdominal pain 04/22/2022    Presence of IVC filter 04/22/2022    Weight loss, unintentional 04/22/2022    ILD (interstitial lung disease) 08/31/2022    Tobacco use disorder 08/31/2022    Ectatic abdominal aorta 12/01/2023    Acute deep vein thrombosis (DVT) of femoral vein of right lower extremity 01/11/2025     Resolved Ambulatory Problems     Diagnosis Date Noted    Hypertension 11/16/2015    Mild atherosclerosis of right carotid artery 03/27/2018     Past Medical History:   Diagnosis Date    Acute and subacute infective endocarditis in diseases classified elsewhere     Allergic     Arthritis of neck Don’t remember    Asthma 04/26/2021    Chest pain     Chronic low back pain     Chronic pain     Clotting disorder     Coronary artery disease     Encounter for special screening examination for neoplasm of prostate 2012    Eye exam, routine > 5 yrs ago    Eye exam, routine 01/2015    Fibromyositis     HIV disease     Injury of back     Injury of neck     Irritable bowel     Limited joint range of motion     Low back strain Don’t remember    Lumbar stenosis     Lumbosacral disc disease Don’t remember    MI (myocardial infarction)     Neck pain     Neck strain      Pain in limb     Postlaminectomy syndrome of lumbar region     Pulmonary embolism 1996    Rotator cuff syndrome     Scoliosis     Shortness of breath     Sleep apnea     Stroke     Thoracic disc disorder          PAST SURGICAL HISTORY  Past Surgical History:   Procedure Laterality Date    ANTERIOR CERVICAL DISCECTOMY W/ FUSION N/A 10/05/2018    Procedure: CERVICAL DISCECTOMY ANTERIOR FUSION WITH INSTRUMENTATION,ACDF C5/6, C6/7 with cages and plate;  Surgeon: Jean Coles MD;  Location: [facility] MAIN OR;  Service: Neurosurgery    APPENDECTOMY N/A     BACK SURGERY  Don’t remember    BRONCHOSCOPY N/A 11/03/2020    Procedure: EBUS BRONCHOSCOPY WITH BAL & FLUOROSCOPY WITH MAC ANESTHESIA, BX & TBNA;  Surgeon: Jamil Willoughby MD;  Location: [facility] ENDOSCOPY;  Service: Pulmonary;  Laterality: N/A;  PRE/POST-ABNORMAL CT, LAD    CARDIAC CATHETERIZATION Left 1952    Left Heart Cath Left Ventriculography, selective coronary angiography; iliofemoral angiography; angio seal closure, Dr. Abreu Richard    COLONOSCOPY  09/2015    removed 2 polyps, Dr. Manley    COLONOSCOPY N/A 02/12/2007    Left colonic diverticulosis, No evidence of polypoid neoplasia, Dr. Jarret Bloom    COLONOSCOPY N/A 09/13/2018    Procedure: COLONOSCOPY TO CECUM WITH COLD BX'S POLYPECTOMY;  Surgeon: Berta Sin MD;  Location: [facility] ENDOSCOPY;  Service: General    CYSTOSCOPY TRANSURETHRAL RESECTION OF PROSTATE N/A 11/01/2004    Dr. Rodolfo Arnold    ENDOSCOPY N/A 08/03/2023    Procedure: ESOPHAGOGASTRODUODENOSCOPYmwith biopsy;  Surgeon: Shane Foss MD;  Location: [facility] ENDOSCOPY;  Service: Gastroenterology;  Laterality: N/A;  pre- gerd  post- gastritis    HAND SURGERY Right     JOINT REPLACEMENT  Over 25 years ago    KNEE ARTHROPLASTY Left     LAPAROSCOPIC CHOLECYSTECTOMY      LUMBAR DISC SURGERY  2006, 2007    L4-S1 pseudoarthroses - Dr Cervantes    LUMBAR DISC SURGERY N/A 01/29/2010    Removal of Instrumentation w/  decompression, Instrumentaion loosening & pt tested positive for zinc allergy, Dr. Kvng Cervantes    NECK SURGERY  Don’t remember    ROTATOR CUFF REPAIR Right 04/2012    SHOULDER SURGERY  Don’t remember    THORACIC OUTLET SURGERY Bilateral     THORACOSCOPY Right 11/29/2022    Procedure: BRONCHOSCOPY, RIGHT THORACOSCOPY VIDEO ASSISTED WEDGE RESECTION, INTERCOSTAL NERVE BLOCK;  Surgeon: Nella Bergman MD;  Location: [facility];  Service: Thoracic;  Laterality: Right;    TRIGGER POINT INJECTION      Several injections [facility]    VENA CAVA FILTER PLACEMENT  1996    WRIST SURGERY Right     x3 starting in 1984         FAMILY HISTORY  Family History   Problem Relation Age of Onset    Diabetes Sister     Cancer Sister     Hypertension Sister         Covid    Kidney disease Sister     Stroke Sister     Heart disease Brother     Hypertension Brother     Cerebral aneurysm Brother     Sudden death Brother     Bleeding Disorder Brother     Cancer Mother     Clotting disorder Mother         Cancer    Heart disease Father         Stroke    Cancer Father         malignant neoplasm    Deep vein thrombosis Father     Heart attack Father     Malig Hyperthermia Neg Hx          SOCIAL HISTORY  Social History     Socioeconomic History    Marital status:    Tobacco Use    Smoking status: Some Days     Current packs/day: 1.50     Average packs/day: 1.5 packs/day for 25.0 years (37.5 ttl pk-yrs)     Types: Cigars, Cigarettes     Passive exposure: Never    Smokeless tobacco: Never    Tobacco comments:     Since 23 years old   Vaping Use    Vaping status: Never Used   Substance and Sexual Activity    Alcohol use: Not Currently     Comment: RARELY    Drug use: Never    Sexual activity: Not Currently     Partners: Female     Birth control/protection: Tubal ligation     Comment: Wife only         ALLERGIES  Zinc, Chantix [varenicline], and Hydrocodone-acetaminophen        REVIEW OF SYSTEMS  Review of Systems    Constitutional:  Negative for activity change, appetite change and fever.   HENT:  Negative for congestion and sore throat.    Eyes: Negative.    Respiratory:  Negative for cough and shortness of breath.    Cardiovascular:  Negative for chest pain and leg swelling.   Gastrointestinal:  Positive for blood in stool. Negative for abdominal pain, diarrhea and vomiting.   Endocrine: Negative.    Genitourinary:  Negative for decreased urine volume and dysuria.   Musculoskeletal:  Negative for neck pain.   Skin:  Negative for rash and wound.   Allergic/Immunologic: Negative.    Neurological:  Negative for weakness, numbness and headaches.   Hematological: Negative.    Psychiatric/Behavioral: Negative.     All other systems reviewed and are negative.         PHYSICAL EXAM  ED Triage Vitals   Temp Pulse Resp BP SpO2   -- -- -- -- --      Temp src Heart Rate Source Patient Position BP Location FiO2 (%)   -- -- -- -- --       Physical Exam  Constitutional:       General: He is not in acute distress.     Appearance: Normal appearance. He is not ill-appearing or toxic-appearing.   HENT:      Head: Normocephalic and atraumatic.   Eyes:      Extraocular Movements: Extraocular movements intact.      Pupils: Pupils are equal, round, and reactive to light.   Cardiovascular:      Rate and Rhythm: Normal rate and regular rhythm.      Heart sounds: No murmur heard.     No friction rub. No gallop.   Pulmonary:      Effort: Pulmonary effort is normal.      Breath sounds: Normal breath sounds.   Abdominal:      General: Abdomen is flat. There is no distension.      Palpations: Abdomen is soft.      Tenderness: There is no abdominal tenderness.   Musculoskeletal:         General: No swelling or tenderness. Normal range of motion.      Cervical back: Normal range of motion and neck supple.   Skin:     General: Skin is warm and dry.   Neurological:      General: No focal deficit present.      Mental Status: He is alert and oriented to  person, place, and time.      Sensory: No sensory deficit.      Motor: No weakness.   Psychiatric:         Mood and Affect: Mood normal.         Behavior: Behavior normal.         Vital signs and nursing notes reviewed.          LAB RESULTS  Recent Results (from the past 24 hours)   Comprehensive Metabolic Panel    Collection Time: 01/17/25 11:50 AM    Specimen: Blood   Result Value Ref Range    Glucose 87 65 - 99 mg/dL    BUN 15 8 - 23 mg/dL    Creatinine 1.12 0.76 - 1.27 mg/dL    Sodium 138 136 - 145 mmol/L    Potassium 4.3 3.5 - 5.2 mmol/L    Chloride 102 98 - 107 mmol/L    CO2 27.0 22.0 - 29.0 mmol/L    Calcium 9.2 8.6 - 10.5 mg/dL    Total Protein 8.0 6.0 - 8.5 g/dL    Albumin 4.0 3.5 - 5.2 g/dL    ALT (SGPT) 16 1 - 41 U/L    AST (SGOT) 18 1 - 40 U/L    Alkaline Phosphatase 78 39 - 117 U/L    Total Bilirubin 0.6 0.0 - 1.2 mg/dL    Globulin 4.0 gm/dL    A/G Ratio 1.0 g/dL    BUN/Creatinine Ratio 13.4 7.0 - 25.0    Anion Gap 9.0 5.0 - 15.0 mmol/L    eGFR 69.8 >60.0 mL/min/1.73   Type & Screen    Collection Time: 01/17/25 11:50 AM    Specimen: Blood   Result Value Ref Range    ABO Type A     RH type Positive     Antibody Screen Negative     T&S Expiration Date 1/20/2025 11:59:59 PM    Lactic Acid, Plasma    Collection Time: 01/17/25 11:50 AM    Specimen: Blood   Result Value Ref Range    Lactate 0.9 0.5 - 2.0 mmol/L   Protime-INR    Collection Time: 01/17/25 11:50 AM    Specimen: Blood   Result Value Ref Range    Protime 17.2 (H) 11.7 - 14.2 Seconds    INR 1.38 (H) 0.90 - 1.10   Green Top (Gel)    Collection Time: 01/17/25 11:50 AM   Result Value Ref Range    Extra Tube Hold for add-ons.    Lavender Top    Collection Time: 01/17/25 11:50 AM   Result Value Ref Range    Extra Tube hold for add-on    Gold Top - SST    Collection Time: 01/17/25 11:50 AM   Result Value Ref Range    Extra Tube Hold for add-ons.    Light Blue Top    Collection Time: 01/17/25 11:50 AM   Result Value Ref Range    Extra Tube Hold for  add-ons.    CBC Auto Differential    Collection Time: 01/17/25 11:50 AM    Specimen: Blood   Result Value Ref Range    WBC 5.42 3.40 - 10.80 10*3/mm3    RBC 4.76 4.14 - 5.80 10*6/mm3    Hemoglobin 15.4 13.0 - 17.7 g/dL    Hematocrit 45.2 37.5 - 51.0 %    MCV 95.0 79.0 - 97.0 fL    MCH 32.4 26.6 - 33.0 pg    MCHC 34.1 31.5 - 35.7 g/dL    RDW 13.3 12.3 - 15.4 %    RDW-SD 47.3 37.0 - 54.0 fl    MPV 9.2 6.0 - 12.0 fL    Platelets 223 140 - 450 10*3/mm3    Neutrophil % 58.6 42.7 - 76.0 %    Lymphocyte % 29.5 19.6 - 45.3 %    Monocyte % 10.7 5.0 - 12.0 %    Eosinophil % 0.4 0.3 - 6.2 %    Basophil % 0.6 0.0 - 1.5 %    Immature Grans % 0.2 0.0 - 0.5 %    Neutrophils, Absolute 3.18 1.70 - 7.00 10*3/mm3    Lymphocytes, Absolute 1.60 0.70 - 3.10 10*3/mm3    Monocytes, Absolute 0.58 0.10 - 0.90 10*3/mm3    Eosinophils, Absolute 0.02 0.00 - 0.40 10*3/mm3    Basophils, Absolute 0.03 0.00 - 0.20 10*3/mm3    Immature Grans, Absolute 0.01 0.00 - 0.05 10*3/mm3    nRBC 0.0 0.0 - 0.2 /100 WBC   POC Occult Blood Stool    Collection Time: 01/17/25  1:35 PM    Specimen: Stool   Result Value Ref Range    Fecal Occult Blood Negative     Positive Control         Ordered the above labs and reviewed the results.        RADIOLOGY  No Radiology Exams Resulted Within Past 24 Hours    Ordered the above noted radiological studies. Reviewed by me in PACS.            PROCEDURES  Procedures          MEDICATIONS GIVEN IN ER  Medications   sodium chloride 0.9 % flush 10 mL (has no administration in time range)                   MEDICAL DECISION MAKING, PROGRESS, and CONSULTS    All labs have been independently reviewed by me.  All radiology studies have been reviewed by me and I have also reviewed the radiology report.   EKG's independently viewed and interpreted by me.  Discussion below represents my analysis of pertinent findings related to patient's condition, differential diagnosis, treatment plan and final disposition.      Additional sources:  -  Discussed/ obtained information from independent historians: History obtained from the patient himself at bedside.    - External (non-ED) record review: Medical records review, the patient was last seen and evaluated as an inpatient in the hospital from 1/11/2025 through 1/13/2025 where he was diagnosed with an acute DVT.    - Chronic or social conditions impacting care: Warfarin dependent history of DVT.    - Shared decision making: Discharge decision based on shared conversations have between myself as well as the patient at bedside.      Orders placed during this visit:  Orders Placed This Encounter   Procedures    [place] Draw    Comprehensive Metabolic Panel    Lactic Acid, Plasma    Protime-INR    CBC Auto Differential    NPO Diet NPO Type: Strict NPO    Undress & Gown    Measure Blood Pressure    Vital Signs    Pulse Oximetry    Oxygen Therapy- Nasal Cannula; Titrate 1-6 LPM Per SpO2; 90 - 95%    POC Occult Blood Stool    Type & Screen    Insert Peripheral IV    CBC & Differential    Green Top (Gel)    Lavender Top    Gold Top - SST    Light Blue Top         Differential diagnosis includes but is not limited to:    Upper GI bleed, acute blood loss anemia, GERD/gastritis, peptic ulcer disease, warfarin toxicity, or electrolyte disturbance      Independent interpretation of labs, radiology studies, and discussions with consultants:    Lab values independently interpreted by myself with my interpretation showing a normal and stable hemoglobin/hematocrit at 15.4 and 45.2 respectively.      ED Course as of 01/17/25 1352   Fri Jan 17, 2025   1337 Reevaluation, the patient is resting comfortably and without any further complaints.  His vital signs are all stable.  He reports no further episodes of GI bleeding.  I did do a rectal exam on him and it was negative for acute blood.  It only revealed heme-negative brown stool.  I did ask him to monitor his stool for any further bleeding.  We also discussed his  subtherapeutic INR that would need close follow-up and potential medication adjustments.  At this point, he is stable for discharge and all questions answered. [BM]      ED Course User Index  [BM] Perez Gordon MD           DIAGNOSIS  Final diagnoses:   Black stools   Subtherapeutic international normalized ratio (INR)         DISPOSITION  DISCHARGE    Patient discharged in stable condition.    Reviewed implications of results, diagnosis, meds, responsibility to follow up, warning signs and symptoms of possible worsening, potential complications and reasons to return to ER.    Patient/Family voiced understanding of above instructions.    Discussed plan for discharge, as there is no emergent indication for admission. Patient referred to primary care provider for BP management due to today's BP. Pt/family is agreeable and understands need for follow up and repeat testing.  Pt is aware that discharge does not mean that nothing is wrong but it indicates no emergency is present that requires admission and they must continue care with follow-up as given below or physician of their choice.     FOLLOW-UP  Aletha Ochoa, PASengC  [address]  [address]  [facility] [id number] [phone]    Schedule an appointment as soon as possible for a visit       Shane Foss MD  [address] [facility]  [address]D FLO[address]  [facility] [id number] [phone]    Schedule an appointment as soon as possible for a visit            Medication List      No changes were made to your prescriptions during this visit.                   Latest Documented Vital Signs:  As of 13:52 EST  BP- 141/86 HR- 73 Temp- 97.8 °F (36.6 °C) (Oral) O2 sat- 97%              --    Please note that portions of this were completed with a voice recognition program.       Note Disclaimer: At [facility], we believe that sharing information builds trust and better relationships. You are receiving this note because you are receiving care at [facility] or  recently visited. It is possible you will see health information before a provider has talked with you about it. This kind of information can be easy to misunderstand. To help you fully understand what it means for your health, we urge you to discuss this note with your provider.             Perez Gordon MD  01/17/25 3810

## 2025-01-17 NOTE — ED NOTES
Patient arrives via pv from home for complaints abnormal labs. Patient states he has had blood in his stool since yesterday. Patient takes Warfrin for history of blood clots. Patient states he checked his INR level at home and it was 1.6. Alert and oriented x4.

## 2025-01-17 NOTE — TELEPHONE ENCOUNTER
Spoke with MD INR she is reporting an out of range 1.6 inr for patient. Patient flow sheet was sent to the provider for review..

## 2025-01-18 RX ORDER — EZETIMIBE 10 MG/1
10 TABLET ORAL DAILY
Qty: 90 TABLET | Refills: 3 | Status: SHIPPED | OUTPATIENT
Start: 2025-01-18

## 2025-01-21 ENCOUNTER — TREATMENT (OUTPATIENT)
Dept: PHYSICAL THERAPY | Facility: CLINIC | Age: 75
End: 2025-01-21
Payer: MEDICARE

## 2025-01-21 DIAGNOSIS — G89.29 CHRONIC PAIN OF RIGHT KNEE: ICD-10-CM

## 2025-01-21 DIAGNOSIS — I82.409 ACUTE DEEP VEIN THROMBOSIS (DVT) OF LOWER EXTREMITY, UNSPECIFIED LATERALITY, UNSPECIFIED VEIN: Primary | ICD-10-CM

## 2025-01-21 DIAGNOSIS — R53.1 WEAKNESS: ICD-10-CM

## 2025-01-21 DIAGNOSIS — M25.561 CHRONIC PAIN OF RIGHT KNEE: ICD-10-CM

## 2025-01-21 LAB — INR PPP: 2.2 (ref 2.5–3.5)

## 2025-01-21 PROCEDURE — 97161 PT EVAL LOW COMPLEX 20 MIN: CPT | Performed by: PHYSICAL THERAPIST

## 2025-01-21 PROCEDURE — 97530 THERAPEUTIC ACTIVITIES: CPT | Performed by: PHYSICAL THERAPIST

## 2025-01-21 PROCEDURE — 97535 SELF CARE MNGMENT TRAINING: CPT | Performed by: PHYSICAL THERAPIST

## 2025-01-21 NOTE — PROGRESS NOTES
Physical Therapy Initial Evaluation and Plan of Care  [facility] [address], [address] [place] [address]     Patient: Javy Reeves Sr.   : 1950  Referring practitioner: Debora Roach APRN  Date of Initial Visit: 2025  Today's Date: 2025  Patient seen for 1 sessions  PT Clinic location: [address], [address]  [id number]          Visit Diagnoses:    ICD-10-CM ICD-9-CM   1. Acute deep vein thrombosis (DVT) of lower extremity, unspecified laterality, unspecified vein  I82.409 453.40   2. Weakness  R53.1 780.79   3. Chronic pain of right knee  M25.561 719.46    G89.29 338.29       Subjective Questionnaire: LEFS: 16    Subjective Evaluation    History of Present Illness  Mechanism of injury: The patient is a 74 y.o. male presenting to today's evaluation with weakness, R knee pain, and a history of recent DVT. He reports a fall on some ice in the [employer] parking lot a few weeks ago. He landed on his R side/hip and hit a big chunk of ice. He had pain in his leg after the fall and went to the hospital where it was discovered he had a DVT in the R popliteal region. He is taking blood thinners. He has been having R knee pain since the fall and hospitalizations. He saw ortho for his R knee at the beginning of January and had an injection. His pain was well controlled with the injection until the fall. He notes occasional popping in the R knee. He denies pain with popping. He has also noticed occasional swelling in his R knee. He wears a compression sleeve which helps. He is not taking any additional pain medication for his knee pain. He has not noticed any weakness. He returns to the ortho in 6 months.       Aggravating factors: not stretching, staying in the same position  Alleviating factors: heat, ice, and compression  Imaging: abdominal CT and Duplex scan of extremities for DVT. See chart for  further detail      Occupation: works at the [employer]  Prior level of function: independent without difficulty    Current Activity/Functional limitations: He currently ambulates with a cane. He started using the cane after the fall. He also notes difficulty with prolonged sitting as it increases knee pain. He has stairs at home and has to lead with his L LE. He uses the rail and takes it one step at a time. He is currently off work d/t this knee injury. He would usually have to stand for long periods of time at work. He has not tried standing for long periods of time. He also notes difficulty with sleeping d/t the leg pain. He has to sleep in the recliner. He has had problems sleeping since before his knee pain. He has not been able to play the drums since this injury.         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 See chart for further detail.         Objective          Postural Observations    Additional Postural Observation Details  POSTURE: R LE ER, B knee flexion, decreased lumbar lordosis, B scapular protraction;   PALPATION: pain on medial and lateral joint line of R knee and pain over patella  GAIT: with SPC on R, decreased rob, B knee flexion, genu varus on R, B hip drop; advised to try cane on L;     Active Range of Motion   Left Knee   Flexion: 110 degrees   Extension: 5 (lacking) degrees     Right Knee   Flexion: 85 degrees   Extension: 10 (lacking) degrees     Strength/Myotome Testing     Left Hip   Planes of  Motion   Flexion: 5  External rotation: 5  Internal rotation: 4    Right Hip   Planes of Motion   Flexion: 5  External rotation: 3 (pain)  Internal rotation: 4- (pain in knee and back)    Left Knee   Flexion: 5    Right Knee   Flexion: 4 (pain)    Left Ankle/Foot   Dorsiflexion: 5    Right Ankle/Foot   Dorsiflexion: 5    Functional Assessment     Comments  5 time sit to stand: 24 sec, B UE use, pain in knee, knee shawn twice on standing            Assessment & Plan       Assessment  Impairments: abnormal gait, abnormal or restricted ROM, impaired balance, impaired physical strength, lacks appropriate home exercise program and safety issue   Functional limitations: carrying objects, walking, standing and reaching overhead   Assessment details: The patient is a 74 y.o. male who presents to physical therapy today for R knee pain, weakness, and acute DVT. Upon initial evaluation, the patient demonstrates the following impairments: LE Weakness, decreased knee ROM, impaired performance on the 5 time sit to stand, and impaired gait mechanics.     Due to these impairments, the patient is unable to perform or has difficulty with the following functional tasks: walking, prolonged sitting, prolonged standing, stair ascent/descent, and sleeping. The patient would benefit from skilled PT services to address functional limitations and impairments and to improve patient quality of life.      Prognosis: good    Goals  Plan Goals: ST. Pt will be independent and compliant with initial HEP in 3 weeks.  2. Pt will improve 5 time sit to stand to <20 sec.    3. Pt will demonstrate good safety awareness & decision making to reduce falls risk in 3 weeks.  LT. Pt will be independent with final HEP for self-management of condition by DC.  2. Pt will improve LEFS score to >50/80 to indicate improved function by DC  3. Pt will improve 5 time sit to stand to < 18 seconds to demonstrate improved functional strength and reduced risk  for falls.  4. Pt will demonstrate at least 4+/5 strength or greater to demonstrate improved function with gait on all surfaces.       Plan  Therapy options: will be seen for skilled therapy services  Planned modality interventions: cryotherapy and thermotherapy (hydrocollator packs)  Planned therapy interventions: abdominal trunk stabilization, ADL retraining, balance/weight-bearing training, body mechanics training, flexibility, functional ROM exercises, gait training, home exercise program, joint mobilization, manual therapy, motor coordination training, neuromuscular re-education, postural training, soft tissue mobilization, spinal/joint mobilization, strengthening, stretching, therapeutic activities and transfer training  Frequency: 2x week  Duration in weeks: 4  Treatment plan discussed with: patient      Access Code: [email or web address]  URL: [email or web address]  Date: 01/21/2025  Prepared by: Peyton Bryan       See flowsheets for treatment detail.      History # of Personal Factors and/or Comorbidities: LOW (0)  Examination of Body System(s): # of elements: LOW (1-2)  Clinical Presentation: STABLE   Clinical Decision Making: LOW       Timed:         Manual Therapy:         mins  20966;     Therapeutic Exercise:    10     mins  36110;     Neuromuscular Yoandy:        mins  37960;    Therapeutic Activity:     10     mins  02113;     Gait Training:           mins  63483;     Ionto                                   mins   86689  Self Care                       10     mins   23587      Un-Timed:  Electrical Stimulation:         mins  79447 ( );  Dry Needling          mins self-pay  Traction          mins 09324  Low Eval     15     Mins  28659  Mod Eval          Mins  28804  High Eval                            Mins  98375  Re-Eval                               mins  09839      Timed Treatment:   30   mins   Total Treatment:     50   mins    PT SIGNATURE: Peyton Bryan, [facility] PT license #:  [id number]  DATE TREATMENT INITIATED: 1/21/2025    Initial Certification  Certification Period: 4/20/2025  I certify that the therapy services are furnished while this patient is under my care.  The services outlined above are required by this patient, and will be reviewed every 90 days.    PHYSICIAN: Debora Roach APRN  NPI: [id number]                                      DATE:     Please sign and return via fax to [place] - Fax #: [phone]. Thank you, [facility].

## 2025-01-23 ENCOUNTER — TREATMENT (OUTPATIENT)
Dept: PHYSICAL THERAPY | Facility: CLINIC | Age: 75
End: 2025-01-23
Payer: MEDICARE

## 2025-01-23 DIAGNOSIS — G89.29 CHRONIC PAIN OF RIGHT KNEE: ICD-10-CM

## 2025-01-23 DIAGNOSIS — R53.1 WEAKNESS: ICD-10-CM

## 2025-01-23 DIAGNOSIS — I82.409 ACUTE DEEP VEIN THROMBOSIS (DVT) OF LOWER EXTREMITY, UNSPECIFIED LATERALITY, UNSPECIFIED VEIN: Primary | ICD-10-CM

## 2025-01-23 DIAGNOSIS — M25.561 CHRONIC PAIN OF RIGHT KNEE: ICD-10-CM

## 2025-01-23 NOTE — PROGRESS NOTES
Physical Therapy Daily Treatment Note  [facility] [place]  [address], [address]  [place] [address]     Patient: Javy Reeves .  : 1950  Referring practitioner: Debora Roach APRN  Today's Date: 2025    VISIT#: 2    Visit Diagnoses:    ICD-10-CM ICD-9-CM   1. Acute deep vein thrombosis (DVT) of lower extremity, unspecified laterality, unspecified vein  I82.409 453.40   2. Weakness  R53.1 780.79   3. Chronic pain of right knee  M25.561 719.46    G89.29 338.29       Subjective   Javy Reeves reports: that his knee is moving better. He still has some pain but it is tolerable.       Objective       See Exercise, Manual, and Modality Logs for complete treatment.         Assessment/Plan  The patient tolerates the treatment session without increase in symptoms. He requires minimal verbal cueing for technique throughout the session and is able to correct his form. He demonstrates good form with squats. He finds LE strength exercises to be difficult but denies pain. He would continue to benefit from skilled intervention with focus on LE strength and ROM.     The patient was part of group therapy with 2 patients.  The patient performed the exercises and activities designated in the Flow Sheet of the patient's chart.  As the patient's therapist I provided the following: Physical Cues and Technique Correction.  The treatment will help restore the function of the patient with LE ROM and strength.  Group Therapy was performed for one time for 8 minutes.      Progress per Plan of Care          Timed:         Manual Therapy:         mins  62170;     Therapeutic Exercise:    12     mins  64741;     Neuromuscular Yoandy:        mins  46648;    Therapeutic Activity:     12     mins  23594;     Gait Training:           mins  17174;      Ionto:                                   mins  69357  Self Care:                            mins  91669    Un-Timed:  Electrical  Stimulation:         mins  79625 ( );  Dry Needling          mins self-pay  Traction          mins 69581  Re-Eval                               mins  63762  Group Therapy           __8_ mins 05068    Timed Treatment:   24   mins   Total Treatment:     47   mins    Peyton Bryan PT  Physical Therapist  Ceci MOORE license #: [id number]

## 2025-01-24 ENCOUNTER — READMISSION MANAGEMENT (OUTPATIENT)
Dept: CALL CENTER | Facility: HOSPITAL | Age: 75
End: 2025-01-24
Payer: MEDICARE

## 2025-01-24 ENCOUNTER — ANTICOAGULATION VISIT (OUTPATIENT)
Dept: FAMILY MEDICINE CLINIC | Facility: CLINIC | Age: 75
End: 2025-01-24
Payer: MEDICARE

## 2025-01-24 PROCEDURE — 85610 PROTHROMBIN TIME: CPT | Performed by: PHYSICIAN ASSISTANT

## 2025-01-24 PROCEDURE — 36416 COLLJ CAPILLARY BLOOD SPEC: CPT | Performed by: PHYSICIAN ASSISTANT

## 2025-01-24 NOTE — OUTREACH NOTE
Medical Week 2 Survey      Flowsheet Row Responses   [facility] patient discharged from? [place]   Does the patient have one of the following disease processes/diagnoses(primary or secondary)? Other   Week 2 attempt successful? Yes   Call start time 1617   Discharge diagnosis Acute deep vein thrombosis   Call end time 1624   Person spoke with today (if not patient) and relationship pt   Meds reviewed with patient/caregiver? Yes   Is the patient having any side effects they believe may be caused by any medication additions or changes? No   Does the patient have all medications ordered at discharge? Yes   Is the patient taking all medications as directed (includes completed medication regime)? Yes   Does the patient have a primary care provider?  Yes   Does the patient have an appointment with their PCP within 7 days of discharge? Yes   Has the patient kept scheduled appointments due by today? Yes   Psychosocial issues? No   What is the patient's perception of their health status since discharge? Improving   Is the patient/caregiver able to teach back signs and symptoms related to disease process for when to call PCP? Yes   Is the patient/caregiver able to teach back signs and symptoms related to disease process for when to call 911? Yes   Is the patient/caregiver able to teach back the hierarchy of who to call/visit for symptoms/problems? PCP, Specialist, Home health nurse, Urgent Care, ED, 911 Yes   Week 2 Call Completed? Yes   Is the patient interested in additional calls from an ambulatory ? No   Would this patient benefit from a Referral to Amb Social Work? No   Wrap up additional comments Pt states he is doing better now. Pt voiced complaint and this is escalated to the appropriate leadership.   Call end time 1624            Juliette TORRES - Registered Nurse

## 2025-01-27 ENCOUNTER — TREATMENT (OUTPATIENT)
Dept: PHYSICAL THERAPY | Facility: CLINIC | Age: 75
End: 2025-01-27
Payer: MEDICARE

## 2025-01-27 DIAGNOSIS — I82.409 ACUTE DEEP VEIN THROMBOSIS (DVT) OF LOWER EXTREMITY, UNSPECIFIED LATERALITY, UNSPECIFIED VEIN: Primary | ICD-10-CM

## 2025-01-27 DIAGNOSIS — M25.561 CHRONIC PAIN OF RIGHT KNEE: ICD-10-CM

## 2025-01-27 DIAGNOSIS — R53.1 WEAKNESS: ICD-10-CM

## 2025-01-27 DIAGNOSIS — G89.29 CHRONIC PAIN OF RIGHT KNEE: ICD-10-CM

## 2025-01-27 NOTE — PROGRESS NOTES
Physical Therapy Daily Treatment Note    [facility] [place]  [address], [address]  [place] [id number]    Visit # 3        Patient: Javy Reeves Sr.   : 1950  Referring practitioner: Debora Roach, *  Date of Initial Evaluation:  Type: THERAPY  Noted: 2025  Today's Date: 2025           ICD-10-CM ICD-9-CM   1. Acute deep vein thrombosis (DVT) of lower extremity, unspecified laterality, unspecified vein  I82.409 453.40   2. Weakness  R53.1 780.79   3. Chronic pain of right knee  M25.561 719.46    G89.29 338.29       Subjective  Javy Reeves Sr. reports:   This weather is making me feel achy all over.  I just feel weak and stiff in my whole lower body.      Objective   See Exercise, Manual, and Modality Logs for complete treatment     Pt Education:  HEP review - added bridging to HEP, written instructions added to [employer].  Exercise rationale/ pain free exercise performance  Anatomy and structure of affected musculature  Posture/Postural awareness  Lumbar support  Sleeping positions with pillows  Alternate exercise positions  Verbal/Tactile cues to ensure correct exercise performance/technique    Assessment/Plan  Tolerated continued progression of therapeutic exercise/therapeutic activity/neuromuscular re-ed well today, no increased pain reported during or after session.      Did require some review and cueing of previously issued HEP for proper performance. Pt demonstrates improved performance of HEP after review and practice.     Verbal cues were provided throughout for proper techniques and to avoid compensations.     Would continue to benefit from skilled PT progressing with ROM / functional LE strengthening, with progression toward functional WB as tolerated.     Progress per Plan of Care             Timed:         Manual Therapy:         mins  39025     Therapeutic Exercise:     30    mins  58391     Neuromuscular Yoandy:    10    mins   90656    Therapeutic Activity:      15    mins  29991     Gait Training:           mins  09092     Ultrasound:          mins  97298    Ionto                                   mins  37382  Self Care                            mins  01039    Un-Timed:  Electrical Stimulation:         mins 97218 ( )  Traction          mins 25368    Timed Treatment:   55   mins   Total Treatment:     55   mins       QUINTIN Hayden License #[id number]  Physical Therapist Assistant

## 2025-01-28 LAB — INR PPP: 2.2 (ref 2.5–3.5)

## 2025-01-30 ENCOUNTER — ANTICOAGULATION VISIT (OUTPATIENT)
Dept: FAMILY MEDICINE CLINIC | Facility: CLINIC | Age: 75
End: 2025-01-30
Payer: MEDICARE

## 2025-01-30 ENCOUNTER — TREATMENT (OUTPATIENT)
Dept: PHYSICAL THERAPY | Facility: CLINIC | Age: 75
End: 2025-01-30
Payer: MEDICARE

## 2025-01-30 DIAGNOSIS — R53.1 WEAKNESS: ICD-10-CM

## 2025-01-30 DIAGNOSIS — I82.409 ACUTE DEEP VEIN THROMBOSIS (DVT) OF LOWER EXTREMITY, UNSPECIFIED LATERALITY, UNSPECIFIED VEIN: Primary | ICD-10-CM

## 2025-01-30 DIAGNOSIS — M25.561 CHRONIC PAIN OF RIGHT KNEE: ICD-10-CM

## 2025-01-30 DIAGNOSIS — G89.29 CHRONIC PAIN OF RIGHT KNEE: ICD-10-CM

## 2025-01-30 NOTE — PROGRESS NOTES
Physical Therapy Daily Treatment Note    [facility] [place]  [address], [address]  [place] [address]    Visit # 4        Patient: Javy Reeves Sr.   : 1950  Referring practitioner: Debora Roach, *  Date of Initial Evaluation:  Type: THERAPY  Noted: 2025  Today's Date: 2025           ICD-10-CM ICD-9-CM   1. Acute deep vein thrombosis (DVT) of lower extremity, unspecified laterality, unspecified vein  I82.409 453.40   2. Weakness  R53.1 780.79   3. Chronic pain of right knee  M25.561 719.46    G89.29 338.29       Subjective  Javy Reeves Sr. reports:   I have not felt up to exercising over the past two days, my energy just feels low.  Pt denies any nausea or fever over the past two days.     Objective   See Exercise, Manual, and Modality Logs for complete treatment     Pt Education:  HEP review  Exercise rationale/ pain free exercise performance  Posture/Postural awareness  Lumbar support  Alternate exercise positions  Verbal/Tactile cues to ensure correct exercise performance/technique    Assessment/Plan  Tolerated continued progression of therapeutic exercise/therapeutic activity/neuromuscular re-ed well today, minimal progression d/t report of dec overall energy levels. Additional rest breaks required throughout session today.   No increased pain reported during or after session.      Verbal cues were provided throughout for proper techniques and to avoid compensations.     Would continue to benefit from skilled PT progressing with ROM / functional LE strengthening, with progression toward functional WB as tolerated.     Progress per Plan of Care             Timed:         Manual Therapy:         mins  07867     Therapeutic Exercise:     30    mins  22348     Neuromuscular Yoandy:    15    mins  52058    Therapeutic Activity:      15    mins  86698     Gait Training:           mins  82075     Ultrasound:          mins  38532    Ionto                                    mins  16374  Self Care                            mins  76058    Un-Timed:  Electrical Stimulation:         mins 30653 ( )  Traction          mins 72021    Timed Treatment:   60   mins   Total Treatment:     60   mins       QUINTIN Hayden License #[id number]  Physical Therapist Assistant

## 2025-02-03 ENCOUNTER — READMISSION MANAGEMENT (OUTPATIENT)
Dept: CALL CENTER | Facility: HOSPITAL | Age: 75
End: 2025-02-03
Payer: MEDICARE

## 2025-02-03 ENCOUNTER — TREATMENT (OUTPATIENT)
Dept: PHYSICAL THERAPY | Facility: CLINIC | Age: 75
End: 2025-02-03
Payer: MEDICARE

## 2025-02-03 DIAGNOSIS — G89.29 CHRONIC PAIN OF RIGHT KNEE: ICD-10-CM

## 2025-02-03 DIAGNOSIS — M25.561 CHRONIC PAIN OF RIGHT KNEE: ICD-10-CM

## 2025-02-03 DIAGNOSIS — R53.1 WEAKNESS: ICD-10-CM

## 2025-02-03 DIAGNOSIS — I82.409 ACUTE DEEP VEIN THROMBOSIS (DVT) OF LOWER EXTREMITY, UNSPECIFIED LATERALITY, UNSPECIFIED VEIN: Primary | ICD-10-CM

## 2025-02-03 PROCEDURE — 97110 THERAPEUTIC EXERCISES: CPT | Performed by: PHYSICAL THERAPIST

## 2025-02-03 PROCEDURE — 97112 NEUROMUSCULAR REEDUCATION: CPT | Performed by: PHYSICAL THERAPIST

## 2025-02-03 PROCEDURE — 97530 THERAPEUTIC ACTIVITIES: CPT | Performed by: PHYSICAL THERAPIST

## 2025-02-03 NOTE — PROGRESS NOTES
Physical Therapy Daily Treatment Note    [facility] [place]  [address], [address]  [place] [id number]    Visit # 5        Patient: Javy Reeves Sr.   : 1950  Referring practitioner: Debora Roach, *  Date of Initial Evaluation:  Type: THERAPY  Noted: 2025  Today's Date: 2/3/2025           ICD-10-CM ICD-9-CM   1. Acute deep vein thrombosis (DVT) of lower extremity, unspecified laterality, unspecified vein  I82.409 453.40   2. Weakness  R53.1 780.79   3. Chronic pain of right knee  M25.561 719.46    G89.29 338.29       Subjective  Javy Reeves Sr. reports:   I walked a lot over the weekend, I did have some knee pain but I didn't feel the need to use my cane.  It's still sore today just below the knee.     Objective   See Exercise, Manual, and Modality Logs for complete treatment     Pt Education:  HEP review  Exercise rationale/ pain free exercise performance  Posture/Postural awareness  Alternate exercise positions  Verbal/Tactile cues to ensure correct exercise performance/technique    Assessment/Plan  Tolerated continued progression of therapeutic exercise/therapeutic activity/neuromuscular re-ed well today, progressing with assisted and unassisted squats, clamshells and hip ABD for hip strengthening.   No increased pain reported during or after session.      Would continue to benefit from skilled PT progressing with ROM / LE strengthening, with progression into functional WB activity as tolerated.     Progress per Plan of Care             Timed:         Manual Therapy:         mins  40286     Therapeutic Exercise:     20    mins  47480     Neuromuscular Yoandy:    10    mins  95313    Therapeutic Activity:      10    mins  06261     Gait Training:           mins  65720     Ultrasound:          mins  77533    Ionto                                   mins  09459  Self Care                            mins  78865    Un-Timed:  Electrical  Stimulation:         mins 59676 ( )  Traction          mins 99417    Timed Treatment:   40   mins   Total Treatment:     52   mins       QUINTIN Hayden License #[id number]  Physical Therapist Assistant

## 2025-02-04 NOTE — OUTREACH NOTE
Medical Week 3 Survey      Flowsheet Row Responses   [facility] patient discharged from? [place]   Does the patient have one of the following disease processes/diagnoses(primary or secondary)? Other   Week 3 attempt successful? No   Unsuccessful attempts Attempt 1            Karol SIMMONS - Licensed Nurse

## 2025-02-05 ENCOUNTER — HOSPITAL ENCOUNTER (OUTPATIENT)
Facility: HOSPITAL | Age: 75
Discharge: HOME | End: 2025-02-05
Admitting: PHYSICIAN ASSISTANT
Payer: MEDICARE

## 2025-02-05 ENCOUNTER — OFFICE VISIT (OUTPATIENT)
Dept: FAMILY MEDICINE CLINIC | Facility: CLINIC | Age: 75
End: 2025-02-05
Payer: MEDICARE

## 2025-02-05 VITALS
RESPIRATION RATE: 16 BRPM | WEIGHT: 178 LBS | TEMPERATURE: 97.8 F | HEIGHT: 72 IN | DIASTOLIC BLOOD PRESSURE: 64 MMHG | BODY MASS INDEX: 24.11 KG/M2 | SYSTOLIC BLOOD PRESSURE: 118 MMHG | OXYGEN SATURATION: 99 % | HEART RATE: 90 BPM

## 2025-02-05 DIAGNOSIS — J84.9 ILD (INTERSTITIAL LUNG DISEASE): ICD-10-CM

## 2025-02-05 DIAGNOSIS — R25.1 TREMOR OF LEFT HAND: ICD-10-CM

## 2025-02-05 DIAGNOSIS — M79.642 HAND PAIN, LEFT: Primary | ICD-10-CM

## 2025-02-05 DIAGNOSIS — Z09 HOSPITAL DISCHARGE FOLLOW-UP: ICD-10-CM

## 2025-02-05 DIAGNOSIS — D68.62 LUPUS ANTICOAGULANT DISORDER: ICD-10-CM

## 2025-02-05 DIAGNOSIS — H60.91 INFLAMMATION OF RIGHT EAR CANAL: ICD-10-CM

## 2025-02-05 DIAGNOSIS — M79.7 FIBROMYALGIA: ICD-10-CM

## 2025-02-05 PROCEDURE — 73130 X-RAY EXAM OF HAND: CPT

## 2025-02-05 PROCEDURE — 1111F DSCHRG MED/CURRENT MED MERGE: CPT | Performed by: PHYSICIAN ASSISTANT

## 2025-02-05 PROCEDURE — G2211 COMPLEX E/M VISIT ADD ON: HCPCS | Performed by: PHYSICIAN ASSISTANT

## 2025-02-05 PROCEDURE — 1159F MED LIST DOCD IN RCRD: CPT | Performed by: PHYSICIAN ASSISTANT

## 2025-02-05 PROCEDURE — 1125F AMNT PAIN NOTED PAIN PRSNT: CPT | Performed by: PHYSICIAN ASSISTANT

## 2025-02-05 PROCEDURE — 1160F RVW MEDS BY RX/DR IN RCRD: CPT | Performed by: PHYSICIAN ASSISTANT

## 2025-02-05 PROCEDURE — 99214 OFFICE O/P EST MOD 30 MIN: CPT | Performed by: PHYSICIAN ASSISTANT

## 2025-02-05 RX ORDER — TRAMADOL HYDROCHLORIDE 50 MG/1
50 TABLET ORAL EVERY 6 HOURS PRN
Qty: 90 TABLET | Refills: 2 | Status: SHIPPED | OUTPATIENT
Start: 2025-02-05 | End: 2026-02-05

## 2025-02-05 RX ORDER — CYCLOBENZAPRINE HCL 5 MG
TABLET ORAL
Qty: 270 TABLET | Refills: 3 | Status: SHIPPED | OUTPATIENT
Start: 2025-02-05

## 2025-02-05 RX ORDER — NEOMYCIN SULFATE, POLYMYXIN B SULFATE, HYDROCORTISONE 3.5; 10000; 1 MG/ML; [USP'U]/ML; MG/ML
4 SOLUTION/ DROPS AURICULAR (OTIC) 4 TIMES DAILY
Qty: 10 ML | Refills: 1 | Status: SHIPPED | OUTPATIENT
Start: 2025-02-05

## 2025-02-05 RX ORDER — PREGABALIN 150 MG/1
150 CAPSULE ORAL 2 TIMES DAILY
Qty: 180 CAPSULE | Refills: 1 | Status: SHIPPED | OUTPATIENT
Start: 2025-02-05

## 2025-02-05 NOTE — PROGRESS NOTES
Subjective   Javy Reeves Sr. is a 74 y.o. male.     History of Present Illness   Javy Reeves Sr. 74 y.o. male presents today for hosptial follow up.  he was treated 1-11 to 1-13-25 for DVT.  I reviewed all of the labs and diagnostic testing and noted: This Doppler and labs for anticoagulation. Also had CT abdomen pelvis with contrast  The patient's medications were not changed:  Current outpatient and discharge medications have been reconciled for the patient.  Reviewed by: Aletha Ochoa PA-C    he does have a follow up appointment with a specialist:  Follow-up with hematology...  Prior notes:      He has been diagnosed with fibromyalgia and lumbar back pain with radicular leg pain. Despite these conditions, he remains active and does not express significant discomfort. He continues to use a heating pad at night for relief. He has been under the care of GEOVANNI Klein for lumbar radiculopathy and has undergone an MRI. He has also consulted with Dr. King for his knee condition, which is not yet bone-on-bone. He received an injection in his knee, which provided relief for a few weeks. He is reluctant to undergo surgery on his knee, even though it is likely to be recommended. 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 He experiences pain in various locations, which he attributes to his fibromyalgia and sciatic nerve pain. He has learned to adapt to this pain. He has been prescribed Ultram and Lyrica for his fibromyalgia pain. He has received an epidural injection and uses a back brace, which provides some relief.   Still not smoking cigarettes.  Remains under the care of Dr. Jamil Willoughby pulmonologist for interstitial lung disease.  He also sees Dr. Dalton at least annually for cardiac follow-up for his CAD.  Not on CPAP.  Also monitoring his GERD controlled with PPI, mixed hyperlipidemia with LDL cholesterol goal to be less than 55.  Right ear canal pain for the last several days  and this has been on and off for months and getting more.  No discharge  Also left hand pain for the last 3 days no injury and has been edematous pain is worse in his fourth and fifth fingers.  I sent him to the ER on 1/17/2025 with concern of black stools to make sure he did not have a GI bleed hemoglobin was in normal range and fecal occult blood negative.  I continue to monitor his INR closely he does have follow-up with hematology from prior hospitalization with concerns of anticoagulation and keeping it at goal.  He requires a higher dose.  He is + Lupus Anticoagulant and + anticardiolipin     Notes  Mr. Reeves is a 74 y.o. male with a history of DVT, PE, ILD, chronic anticoagulation on warfarin Feliz to thrombophilia, IVC filter, chronic back and neck pain, MI, and  history of tobacco abuse who presented to [facility] initially complaining of right lower extremity pain making it difficult for him to ambulateafter he suffered at fall at [employer] in the parking lot.  Please see the admitting history and physical for further details.  He was found to have a right lower extremity DVT of and was admitted to the hospital for further evaluation and treatment.    At time of admission CT scan of lower extremity was unremarkable for acute fracture, but showed subacute right distal femoral DVT and INR of 1.9.   He has significant history of recurrent clots, and has been on warfarin for multiple years. Hematology/ Oncology has been following and recommends to continue with goal of 2.5 -3.5 as well as Lovenox bridge until INR is therapeutic.   He will be discharged on a 15mg dose of warfarin for today, and instructed to resume his home dose tomorrow until he has followed up with the anticoagulation clinic in 2 days.  He also reports intentional weight loss. CT scan of his chest and abdomen pelvis were completed with no acute findings.there is noted chronic DDD, spondylosis but no breakthrough thrombus, or  malignancies noted. Oncology has cleared him for discharge home with follow up with anticoagulation clinic in 2 days to recheck PT INR, and Hematology in 1-2 weeks for follow up, and his PCP in one week for post utilization follow-up.  He has chronic pain, that is controlled by tramadol, and Lyrica-have been continued during his hospitalization, and he is encouraged to continue those after discharge.  Physical Therapy have evaluated, and recommends home with assistance, outpatient physical therapy, as well as a walker due to decreased mobility. Ambulatory orders from Physical Therapy as well as a walker have been placed.   He has been advised to take all medications as prescribed, and to attend all follow-up appointments as scheduled.     Last urine drug screen 9/6/2024 also note the tramadol and Lyrica are still helping the fibromyalgia pain  Flexeril is helpful for the lumbar spasms as needed    PT is helping right knee pain  Off work----off work 1-10-25   Ok return 2-25-25    The following portions of the patient's history were reviewed and updated as appropriate: allergies, current medications, past family history, past medical history, past social history, past surgical history, and problem list.    Review of Systems   Constitutional:  Negative for chills, diaphoresis, fatigue and fever.   HENT:  Negative for congestion, sore throat and swollen glands.    Respiratory:  Positive for cough.    Cardiovascular:  Negative for chest pain.   Gastrointestinal:  Negative for abdominal pain, nausea and vomiting.   Genitourinary:  Negative for dysuria.   Musculoskeletal:  Positive for arthralgias. Negative for myalgias and neck pain.   Skin:  Negative for rash.   Neurological:  Negative for weakness and numbness.       Objective   Physical Exam  Vitals and nursing note reviewed.   Constitutional:       General: He is not in acute distress.     Appearance: Normal appearance. He is well-developed. He is not diaphoretic.    HENT:      Head: Normocephalic.   Eyes:      Conjunctiva/sclera: Conjunctivae normal.      Pupils: Pupils are equal, round, and reactive to light.   Cardiovascular:      Rate and Rhythm: Normal rate and regular rhythm.      Heart sounds: Normal heart sounds. No murmur heard.  Pulmonary:      Effort: Pulmonary effort is normal.      Breath sounds: Normal breath sounds. No rales.   Musculoskeletal:         General: Tenderness present. Normal range of motion.      Cervical back: Normal range of motion and neck supple.      Comments: Pain right knee popliteal  Also sore left hand with edema fourth and fifth fingers into the distal metacarpal   Skin:     General: Skin is warm and dry.      Findings: No rash.   Neurological:      Mental Status: He is alert and oriented to person, place, and time.   Psychiatric:         Mood and Affect: Affect is not inappropriate.         Behavior: Behavior normal.         Thought Content: Thought content normal.         Judgment: Judgment normal.           Assessment & Plan   Diagnoses and all orders for this visit:    1. Hand pain, left (Primary)  -     Cancel: XR Hip With or Without Pelvis 2 - 3 View Left; Future  -     XR Hand 3+ View Left; Future  -     XR Hand 3+ View Left    2. Inflammation of right ear canal  -     Cancel: XR Hip With or Without Pelvis 2 - 3 View Left; Future    3. Tremor of left hand  Comments:  with action and wants to wait on seeing neuro    Other orders  -     neomycin-polymyxin-hydrocortisone (CORTISPORIN) 1 % solution otic solution; Administer 4 drops to the right ear 4 (Four) Times a Day. X 5 days  Dispense: 10 mL; Refill: 1             Plan, Javy Reeves Sr., was seen today.  he was seen for Imparied fasting glucose and plan follow up labs, diet, and exercise, GERD and will continue on PPI medication, Hyperlipidemia and will continue current medication, CAD and is currently stable on medication management and stable, CAD and is under care of their  Cardiologist for medical management and stable, and Vitamin D deficiency and supplemented.  Followed by Dr. Jamil Willoughby for interstitial lung disease  Action tremor left hand===with action and wants to wait on seeing neuro   Will treat right ear canal irritation with Cortisporin drops for at least 5 days avoid water and use as needed  Sending for x-ray of left hand with focus on the fourth and fifth fingers due to the 3 days of pain and edema without injury--- may be osteoarthritis if negative x-ray consider referral to hand surgery for possible injection  Will see Ortho again July 3 to follow-up on continued OA pain in the knee R  I will continue his tramadol and Lyrica for his OA pain and fibromyalgia pain and is helping and Flexeril is helping muscle spasm  Cont anticog ----Warfarin for currently alternating 10.5 mg with 11 mg daily and wean off Lovenox due to INR was 2.8

## 2025-02-05 NOTE — LETTER
February 5, 2025     Patient: Javy Reeves    YOB: 1950   Date of Visit: 2/5/2025       To Whom It May Concern:        Javy Reeves  was seen in my office today.  He has been medically unable to work since 1/10/2025 due to acute DVT right lower extremity may return to work on 2/25/2025.         Sincerely,        Aletha Ochoa PA-C    CC: No Recipients

## 2025-02-06 ENCOUNTER — READMISSION MANAGEMENT (OUTPATIENT)
Dept: CALL CENTER | Facility: HOSPITAL | Age: 75
End: 2025-02-06
Payer: MEDICARE

## 2025-02-06 ENCOUNTER — TREATMENT (OUTPATIENT)
Dept: PHYSICAL THERAPY | Facility: CLINIC | Age: 75
End: 2025-02-06
Payer: MEDICARE

## 2025-02-06 DIAGNOSIS — R53.1 WEAKNESS: ICD-10-CM

## 2025-02-06 DIAGNOSIS — I82.409 ACUTE DEEP VEIN THROMBOSIS (DVT) OF LOWER EXTREMITY, UNSPECIFIED LATERALITY, UNSPECIFIED VEIN: Primary | ICD-10-CM

## 2025-02-06 DIAGNOSIS — G89.29 CHRONIC PAIN OF RIGHT KNEE: ICD-10-CM

## 2025-02-06 DIAGNOSIS — M25.561 CHRONIC PAIN OF RIGHT KNEE: ICD-10-CM

## 2025-02-06 NOTE — OUTREACH NOTE
Medical Week 3 Survey      Flowsheet Row Responses   [facility] patient discharged from? [place]   Does the patient have one of the following disease processes/diagnoses(primary or secondary)? Other   Week 3 attempt successful? No   Unsuccessful attempts Attempt 2   Revoke Decline to participate            Myriam HESS - Registered Nurse

## 2025-02-06 NOTE — PROGRESS NOTES
Physical Therapy Daily Treatment Note  [facility] [place]  [address], [address]  [place] [address]     Patient: Javy Reeves .  : 1950  Referring practitioner: Debora Roach APRN  Today's Date: 2025    VISIT#: 6    Visit Diagnoses:    ICD-10-CM ICD-9-CM   1. Acute deep vein thrombosis (DVT) of lower extremity, unspecified laterality, unspecified vein  I82.409 453.40   2. Weakness  R53.1 780.79   3. Chronic pain of right knee  M25.561 719.46    G89.29 338.29       Subjective   Javy Reeves reports: that his back has been bothering him. He was not able to sleep last night d/t the back pain. He has been cleared to return to work on . He has been taking cyclobenzaprine which has been helpful.       Objective       See Exercise, Manual, and Modality Logs for complete treatment.         Assessment/Plan  The patient tolerates the treatment session without increase in symptoms. He requires minimal verbal cueing for technique throughout the session and is able to correct his form. He is able to progress the intensity of LAQ. He finds this challenging but denies pain. He would continue to benefit from skilled intervention with focus on LE strength and ROM.     The patient was part of group therapy with 2 patients.  The patient performed the exercises and activities designated in the Flow Sheet of the patient's chart.  As the patient's therapist I provided the following: Physical Cues and Technique Correction.  The treatment will help restore the function of the patient with LE strength.  Group Therapy was performed for one time for 8 minutes.          Progress per Plan of Care          Timed:         Manual Therapy:         mins  66762;     Therapeutic Exercise:    12     mins  93430;     Neuromuscular Yoandy:        mins  14066;    Therapeutic Activity:     12     mins  36439;     Gait Training:           mins  91081;      Ionto:                                    mins  58611  Self Care:                            mins  87371    Un-Timed:  Electrical Stimulation:         mins  84089 ( );  Dry Needling          mins self-pay  Traction          mins 90405  Re-Eval                               mins  32870  Group Therapy           _8__ mins 30357    Timed Treatment:   24   mins   Total Treatment:     48   mins    Peyton Bryan PT  Physical Therapist  [employer] license #: [id number]

## 2025-02-10 ENCOUNTER — TREATMENT (OUTPATIENT)
Dept: PHYSICAL THERAPY | Facility: CLINIC | Age: 75
End: 2025-02-10
Payer: MEDICARE

## 2025-02-10 DIAGNOSIS — M25.561 CHRONIC PAIN OF RIGHT KNEE: ICD-10-CM

## 2025-02-10 DIAGNOSIS — R53.1 WEAKNESS: ICD-10-CM

## 2025-02-10 DIAGNOSIS — I82.409 ACUTE DEEP VEIN THROMBOSIS (DVT) OF LOWER EXTREMITY, UNSPECIFIED LATERALITY, UNSPECIFIED VEIN: Primary | ICD-10-CM

## 2025-02-10 DIAGNOSIS — G89.29 CHRONIC PAIN OF RIGHT KNEE: ICD-10-CM

## 2025-02-10 PROCEDURE — 97530 THERAPEUTIC ACTIVITIES: CPT | Performed by: PHYSICAL THERAPIST

## 2025-02-10 PROCEDURE — 97110 THERAPEUTIC EXERCISES: CPT | Performed by: PHYSICAL THERAPIST

## 2025-02-10 NOTE — PROGRESS NOTES
Physical Therapy Daily Treatment Note  [facility] [place]  [address]  [place] [address]     Patient: Javy Reeves .  : 1950  Referring practitioner: Debora Roach APRN  Today's Date: 2/10/2025    VISIT#: 7    Visit Diagnoses:    ICD-10-CM ICD-9-CM   1. Acute deep vein thrombosis (DVT) of lower extremity, unspecified laterality, unspecified vein  I82.409 453.40   2. Weakness  R53.1 780.79   3. Chronic pain of right knee  M25.561 719.46    G89.29 338.29       Subjective   Javy Reeves reports:   that his (R) knee has been bothering him more, had to get up in the night and put brace on to support it and allow him to sleep better.  Cleared to RTW  but reports doesn't feel the need to rush back to work.      Objective   See Exercise, Manual, and Modality Logs for complete treatment.     Pt Education:  HEP review  Exercise rationale/ pain free exercise performance  Anatomy and structure of affected musculature  Posture/Postural awareness  Lumbar support  Sleeping positions with pillows  Alternate exercise positions  Verbal/Tactile cues to ensure correct exercise performance/technique        Assessment/Plan  Tolerated continued progression of therapeutic exercise/therapeutic activity/neuromuscular re-ed and some balance activity well today, no increased pain reported during or after session.  More activity in CKC/FWB positions today with good overall tolerance.         Progress per Plan of Care          Timed:         Manual Therapy:         mins  49639;     Therapeutic Exercise:    15     mins  61580;     Neuromuscular Yoandy:        mins  49769;    Therapeutic Activity:     15     mins  34661;     Gait Training:           mins  30895;      Ionto:                                   mins  25745  Self Care:                            mins  25395    Un-Timed:  Electrical Stimulation:         mins  27105 ( );  Dry Needling          mins  self-pay  Traction          mins 41852  Re-Eval                               mins  47308  Group Therapy           ___ mins 59360    Timed Treatment:   30   mins   Total Treatment:     30   mins    QUINTIN Hayden License #[id number]  Physical Therapist Assistant

## 2025-02-12 ENCOUNTER — TELEPHONE (OUTPATIENT)
Dept: FAMILY MEDICINE CLINIC | Facility: CLINIC | Age: 75
End: 2025-02-12
Payer: MEDICARE

## 2025-02-12 ENCOUNTER — ANTICOAGULATION VISIT (OUTPATIENT)
Dept: FAMILY MEDICINE CLINIC | Facility: CLINIC | Age: 75
End: 2025-02-12
Payer: MEDICARE

## 2025-02-12 LAB — INR PPP: 3.2 (ref 2.5–3.5)

## 2025-02-13 ENCOUNTER — TREATMENT (OUTPATIENT)
Dept: PHYSICAL THERAPY | Facility: CLINIC | Age: 75
End: 2025-02-13
Payer: MEDICARE

## 2025-02-13 DIAGNOSIS — I82.409 ACUTE DEEP VEIN THROMBOSIS (DVT) OF LOWER EXTREMITY, UNSPECIFIED LATERALITY, UNSPECIFIED VEIN: Primary | ICD-10-CM

## 2025-02-13 DIAGNOSIS — G89.29 CHRONIC PAIN OF RIGHT KNEE: ICD-10-CM

## 2025-02-13 DIAGNOSIS — M25.561 CHRONIC PAIN OF RIGHT KNEE: ICD-10-CM

## 2025-02-13 DIAGNOSIS — R53.1 WEAKNESS: ICD-10-CM

## 2025-02-13 NOTE — PROGRESS NOTES
Physical Therapy Daily Treatment Note and Progress Note  [facility] [place]  [address], [address]  [place] [address]     Patient: Javy Reeves .  : 1950  Referring practitioner: Debora Roach APRN  Today's Date: 2025    VISIT#: 8    Visit Diagnoses:    ICD-10-CM ICD-9-CM   1. Acute deep vein thrombosis (DVT) of lower extremity, unspecified laterality, unspecified vein  I82.409 453.40   2. Weakness  R53.1 780.79   3. Chronic pain of right knee  M25.561 719.46    G89.29 338.29       Subjective   Javy Reeves reports: that he had a lot of pain yesterday. He had a flare of his fibromyalgia and is having a lot of pain. Prior to that he was feeling great and notes improved function with PT.  He notes improved function with walking as he no longer ambulates with a cane. He notes continued difficulty with prolonged sitting as it bothers his low back. He continues to have difficulty with sleeping and continues to sleep in the recliner. He is not back at work yet and has not tried prolonged standing. Current pain 2/10. At worst his pain is a 10/10 in his knee last night. At best his pain is a 0/10 prior to his recent flare. Prior to the flare up he was feeling great and felt he was making a lot of progress.       Objective     Active Range of Motion   Left Knee   Flexion: 110 degrees (same)  Extension: 0 degrees (improved)     Right Knee   Flexion: 110 degrees (improved)  Extension: 5 (lacking) degrees (improved)     Strength/Myotome Testing      Left Hip   Planes of Motion   Flexion: 5 (same)  External rotation: 5 (same)  Internal rotation: 4+ (improved)     Right Hip   Planes of Motion   Flexion: 5 (same)  External rotation: 4+ (improved)  Internal rotation: 4+ (improved)     Left Knee   Flexion: 5 (same)  Extension: 5     Right Knee   Flexion: 4+ (pain) (improved)  Extension: 5        Functional Assessment      Comments  5 time sit to stand: 21 sec, B  UE use, pain in back (improved)    See Exercise, Manual, and Modality Logs for complete treatment.         Assessment/Plan  Subjectively, the patient reports improved motion and improved function with walking. He continues to have difficulty with sleeping and prolonged standing. He is not back at work yet. He was feeling great until yesterday when he had a fibromyalgia flare and has been in a lot of pain since then. Objectively, the patient demonstrates improved knee ROM, improved LE strength, and improved performance on the 5 time sit to stand. 5 time sit to stand could continue to improve to demonstrate reduced risk for falls. The patient tolerates the treatment session without increase in symptoms. He requires minimal verbal cueing for technique throughout the session and is able to correct his form. Intensity of exercise is reduced during today's session d/t high levels of pain. He would continue to benefit from skilled intervention with focus on LE strength and ROM.     The patient was part of group therapy with 2 patients.  The patient performed the exercises and activities designated in the Flow Sheet of the patient's chart.  As the patient's therapist I provided the following: Physical Cues and Technique Correction.  The treatment will help restore the function of the patient with LE strength and endurance.  Group Therapy was performed for one time for 10 minutes.          Progress per Plan of Care     Goals  Plan Goals: ST. Pt will be independent and compliant with initial HEP in 3 weeks. (Met)  2. Pt will improve 5 time sit to stand to <20 sec.  (Some progress)  3. Pt will demonstrate good safety awareness & decision making to reduce falls risk in 3 weeks. (Met)  LT. Pt will be independent with final HEP for self-management of condition by DC.  2. Pt will improve LEFS score to >50/80 to indicate improved function by DC (good progress)  3. Pt will improve 5 time sit to stand to < 18 seconds to  demonstrate improved functional strength and reduced risk for falls. (Some progress)  4. Pt will demonstrate at least 4+/5 strength or greater to demonstrate improved function with gait on all surfaces. (Good progress)     Timed:         Manual Therapy:         mins  14596;     Therapeutic Exercise:    12     mins  53073;     Neuromuscular Yoandy:        mins  88415;    Therapeutic Activity:   12      mins  35540;     Gait Training:           mins  41593;      Ionto:                                   mins  92924  Self Care:                            mins  65387    Un-Timed:  Electrical Stimulation:         mins  22761 ( );  Dry Needling          mins self-pay  Traction          mins 51943  Re-Eval                               mins  79657  Group Therapy           __10_ mins 09739    Timed Treatment:   24   mins   Total Treatment:     50   mins    Peyton Bryan PT  Physical Therapist  [employer]ORE license #: [id number]

## 2025-02-15 RX ORDER — ENOXAPARIN SODIUM 100 MG/ML
1 INJECTION SUBCUTANEOUS EVERY 12 HOURS
Qty: 11.2 ML | Refills: 11 | Status: SHIPPED | OUTPATIENT
Start: 2025-02-15

## 2025-02-17 ENCOUNTER — TREATMENT (OUTPATIENT)
Dept: PHYSICAL THERAPY | Facility: CLINIC | Age: 75
End: 2025-02-17
Payer: MEDICARE

## 2025-02-17 DIAGNOSIS — G89.29 CHRONIC PAIN OF RIGHT KNEE: ICD-10-CM

## 2025-02-17 DIAGNOSIS — R53.1 WEAKNESS: ICD-10-CM

## 2025-02-17 DIAGNOSIS — I82.409 ACUTE DEEP VEIN THROMBOSIS (DVT) OF LOWER EXTREMITY, UNSPECIFIED LATERALITY, UNSPECIFIED VEIN: Primary | ICD-10-CM

## 2025-02-17 DIAGNOSIS — M25.561 CHRONIC PAIN OF RIGHT KNEE: ICD-10-CM

## 2025-02-17 PROCEDURE — 97110 THERAPEUTIC EXERCISES: CPT | Performed by: PHYSICAL THERAPIST

## 2025-02-17 PROCEDURE — 97150 GROUP THERAPEUTIC PROCEDURES: CPT | Performed by: PHYSICAL THERAPIST

## 2025-02-17 NOTE — PROGRESS NOTES
Physical Therapy Daily Treatment Note   [facility] [place]  [address], [address]  [place] [address]     Patient: Javy Reeves .  : 1950  Referring practitioner: Debora Roach APRN  Today's Date: 2025    VISIT#: 9    Visit Diagnoses:    ICD-10-CM ICD-9-CM   1. Acute deep vein thrombosis (DVT) of lower extremity, unspecified laterality, unspecified vein  I82.409 453.40   2. Weakness  R53.1 780.79   3. Chronic pain of right knee  M25.561 719.46    G89.29 338.29       Subjective   Javy Reeves reports: that he feels good today.       Objective       See Exercise, Manual, and Modality Logs for complete treatment.         Assessment/Plan  The patient tolerates the treatment session without increase in symptoms. He requires minimal verbal cueing for technique throughout the session and is able to correct his form. He finds LE strength exercises to be difficult but denies pain. He would continue to benefit from skilled intervention with focus on LE strength and balance.     The patient was part of group therapy with 2 patients.  The patient performed the exercises and activities designated in the Flow Sheet of the patient's chart.  As the patient's therapist I provided the following: Physical Cues and Technique Correction.  The treatment will help restore the function of the patient with LE strength.  Group Therapy was performed for one time for 8 minutes.          Progress per Plan of Care          Timed:         Manual Therapy:         mins  59608;     Therapeutic Exercise:    25     mins  04510;     Neuromuscular Yoandy:        mins  17117;    Therapeutic Activity:          mins  11802;     Gait Training:           mins  91524;      Ionto:                                   mins  87772  Self Care:                            mins  16149    Un-Timed:  Electrical Stimulation:         mins  36694 ( );  Dry Needling          mins self-pay  Traction           mins 67543  Re-Eval                               mins  09797  Group Therapy           __8_ mins 76387    Timed Treatment:   25   mins   Total Treatment:     50   mins    Peyton Bryan PT  Physical Therapist  [employer] license #: [id number]

## 2025-02-20 ENCOUNTER — PATIENT MESSAGE (OUTPATIENT)
Dept: FAMILY MEDICINE CLINIC | Facility: CLINIC | Age: 75
End: 2025-02-20
Payer: MEDICARE

## 2025-02-21 ENCOUNTER — TREATMENT (OUTPATIENT)
Dept: PHYSICAL THERAPY | Facility: CLINIC | Age: 75
End: 2025-02-21
Payer: MEDICARE

## 2025-02-21 DIAGNOSIS — I82.409 ACUTE DEEP VEIN THROMBOSIS (DVT) OF LOWER EXTREMITY, UNSPECIFIED LATERALITY, UNSPECIFIED VEIN: Primary | ICD-10-CM

## 2025-02-21 DIAGNOSIS — G89.29 CHRONIC PAIN OF RIGHT KNEE: ICD-10-CM

## 2025-02-21 DIAGNOSIS — M25.561 CHRONIC PAIN OF RIGHT KNEE: ICD-10-CM

## 2025-02-21 DIAGNOSIS — R53.1 WEAKNESS: ICD-10-CM

## 2025-02-21 NOTE — PROGRESS NOTES
Physical Therapy Daily Treatment Note    [facility] [place]  [address]  [place] [address]    Visit # 10        Patient: Javy Reeves Sr.   : 1950  Referring practitioner: Debora Roach, *  Date of Initial Evaluation:  Type: THERAPY  Noted: 2025  Today's Date: 2025           ICD-10-CM ICD-9-CM   1. Acute deep vein thrombosis (DVT) of lower extremity, unspecified laterality, unspecified vein  I82.409 453.40   2. Weakness  R53.1 780.79   3. Chronic pain of right knee  M25.561 719.46    G89.29 338.29       Subjective  Javy Reeves Sr. reports:   Doing well overall, no specific c/o of note at onset of today's session.     Objective   See Exercise, Manual, and Modality Logs for complete treatment     Pt Education:  HEP review  Exercise rationale / pain free exercise performance  Anatomy and structure of affected musculature  Posture/Postural awareness  Lumbar support  Sleeping positions with pillows  Alternate exercise positions      Assessment/Plan  Tolerated continued progression of therapeutic exercise/therapeutic activity/neuromuscular re-ed well today.  Does note some inc LBP after resisted sidestep exercise, reduced after 2 min rest break.      Verbal cues were provided throughout for proper techniques and to avoid compensations.     Would continue to benefit from skilled PT progressing global strengthening as tenzin.      Progress per Plan of Care and Progress strengthening /stabilization /functional activity             Timed:         Manual Therapy:         mins  60044     Therapeutic Exercise:     15    mins  10777     Neuromuscular Yoandy:    10    mins  69235    Therapeutic Activity:      5    mins  31786     Gait Training:           mins  57195     Ultrasound:          mins  36552    Ionto                                   mins  54021  Self Care                            mins  76336    Un-Timed:  Electrical Stimulation:          mins 13443 ( )  Traction          mins 36710    Timed Treatment:   30   mins   Total Treatment:     30   mins       QUINTIN Hayden License #[id number]  Physical Therapist Assistant

## 2025-02-24 ENCOUNTER — TREATMENT (OUTPATIENT)
Dept: PHYSICAL THERAPY | Facility: CLINIC | Age: 75
End: 2025-02-24
Payer: MEDICARE

## 2025-02-24 DIAGNOSIS — M25.561 CHRONIC PAIN OF RIGHT KNEE: ICD-10-CM

## 2025-02-24 DIAGNOSIS — G89.29 CHRONIC PAIN OF RIGHT KNEE: ICD-10-CM

## 2025-02-24 DIAGNOSIS — R53.1 WEAKNESS: ICD-10-CM

## 2025-02-24 DIAGNOSIS — I82.409 ACUTE DEEP VEIN THROMBOSIS (DVT) OF LOWER EXTREMITY, UNSPECIFIED LATERALITY, UNSPECIFIED VEIN: Primary | ICD-10-CM

## 2025-02-24 PROCEDURE — 97110 THERAPEUTIC EXERCISES: CPT | Performed by: PHYSICAL THERAPIST

## 2025-02-24 PROCEDURE — 97150 GROUP THERAPEUTIC PROCEDURES: CPT | Performed by: PHYSICAL THERAPIST

## 2025-02-24 PROCEDURE — 97530 THERAPEUTIC ACTIVITIES: CPT | Performed by: PHYSICAL THERAPIST

## 2025-02-24 NOTE — PROGRESS NOTES
Physical Therapy Daily Treatment Note  [facility] [place]  [address], [address]  [place] [address]     Patient: Javy Reeves .  : 1950  Referring practitioner: Debora Roach APRN  Today's Date: 2025    VISIT#: 11    Visit Diagnoses:    ICD-10-CM ICD-9-CM   1. Acute deep vein thrombosis (DVT) of lower extremity, unspecified laterality, unspecified vein  I82.409 453.40   2. Weakness  R53.1 780.79   3. Chronic pain of right knee  M25.561 719.46    G89.29 338.29       Subjective   Javy Reeves reports: that he feels pretty good today.       Objective       See Exercise, Manual, and Modality Logs for complete treatment.         Assessment/Plan  The patient tolerates the treatment session without increase in symptoms. He requires minimal verbal cueing for technique throughout the session and is able to correct his form. He finds stair ascent and descent to be difficult but denies pain. He requires occasional rest breaks throughout the session. He would continue to benefit from skilled intervention with focus on LE strength and ROM.     The patient was part of group therapy with 2 patients.  The patient performed the exercises and activities designated in the Flow Sheet of the patient's chart.  As the patient's therapist I provided the following: Physical Cues and Technique Correction.  The treatment will help restore the function of the patient with LE strength.  Group Therapy was performed for one time for 8 minutes.          Progress per Plan of Care          Timed:         Manual Therapy:         mins  91850;     Therapeutic Exercise:    12     mins  49626;     Neuromuscular Yoandy:        mins  39387;    Therapeutic Activity:     12     mins  96355;     Gait Training:           mins  45156;      Ionto:                                   mins  61883  Self Care:                            mins  97538    Un-Timed:  Electrical Stimulation:         mins   20003 ( );  Dry Needling          mins self-pay  Traction          mins 64992  Re-Eval                               mins  17329  Group Therapy           _8__ mins 24806    Timed Treatment:   24   mins   Total Treatment:     55   mins    Peyton Bryan PT  Physical Therapist  [employer] [employer] license #: [id number]

## 2025-02-27 ENCOUNTER — TREATMENT (OUTPATIENT)
Dept: PHYSICAL THERAPY | Facility: CLINIC | Age: 75
End: 2025-02-27
Payer: MEDICARE

## 2025-02-27 DIAGNOSIS — G89.29 CHRONIC PAIN OF RIGHT KNEE: ICD-10-CM

## 2025-02-27 DIAGNOSIS — R53.1 WEAKNESS: ICD-10-CM

## 2025-02-27 DIAGNOSIS — I82.409 ACUTE DEEP VEIN THROMBOSIS (DVT) OF LOWER EXTREMITY, UNSPECIFIED LATERALITY, UNSPECIFIED VEIN: Primary | ICD-10-CM

## 2025-02-27 DIAGNOSIS — M25.561 CHRONIC PAIN OF RIGHT KNEE: ICD-10-CM

## 2025-02-27 NOTE — PROGRESS NOTES
Physical Therapy Daily Treatment Note  [facility] [place]  [address], [address]  [place] [address]     Patient: Javy Reeves .  : 1950  Referring practitioner: Debora Roach APRN  Today's Date: 2025    VISIT#: 12    Visit Diagnoses:    ICD-10-CM ICD-9-CM   1. Acute deep vein thrombosis (DVT) of lower extremity, unspecified laterality, unspecified vein  I82.409 453.40   2. Weakness  R53.1 780.79   3. Chronic pain of right knee  M25.561 719.46    G89.29 338.29       Subjective   Javy Reeves reports: that he is in pain today. He is back at work and his back and knee are hurting. He thinks he might have gone back to work too soon. He presents to today's session with a back brace on.       Objective       See Exercise, Manual, and Modality Logs for complete treatment.         Assessment/Plan  The patient tolerates the treatment session without increase in symptoms. He requires minimal verbal cueing for technique throughout the session and is able to correct his form. Intensity of today's session is reduced d/t high levels of pain at the start of the session. He would continue to benefit form skilled intervention with focus on LE strength and flexibility.     The patient was part of group therapy with 2 patients.  The patient performed the exercises and activities designated in the Flow Sheet of the patient's chart.  As the patient's therapist I provided the following: Physical Cues and Technique Correction.  The treatment will help restore the function of the patient with LE flexibility and strength.  Group Therapy was performed for one time for 8 minutes.          Progress per Plan of Care          Timed:         Manual Therapy:         mins  06893;     Therapeutic Exercise:    12     mins  26160;     Neuromuscular Yoandy:        mins  49805;    Therapeutic Activity:     12     mins  28933;     Gait Training:           mins  97083;      Ionto:                                    mins  51338  Self Care:                            mins  40258    Un-Timed:  Electrical Stimulation:         mins  46375 ( );  Dry Needling          mins self-pay  Traction          mins 38260  Re-Eval                               mins  60698  Group Therapy           __8_ mins 74559    Timed Treatment:   24   mins   Total Treatment:     50   mins    Peyton Bryan PT  Physical Therapist  [employer] license #: [id number]

## 2025-02-28 RX ORDER — CEFDINIR 300 MG/1
CAPSULE ORAL
Qty: 20 CAPSULE | Refills: 0 | Status: SHIPPED | OUTPATIENT
Start: 2025-02-28

## 2025-03-03 ENCOUNTER — TELEPHONE (OUTPATIENT)
Dept: FAMILY MEDICINE CLINIC | Facility: CLINIC | Age: 75
End: 2025-03-03
Payer: MEDICARE

## 2025-03-03 ENCOUNTER — TREATMENT (OUTPATIENT)
Dept: PHYSICAL THERAPY | Facility: CLINIC | Age: 75
End: 2025-03-03
Payer: MEDICARE

## 2025-03-03 ENCOUNTER — ANTICOAGULATION VISIT (OUTPATIENT)
Dept: FAMILY MEDICINE CLINIC | Facility: CLINIC | Age: 75
End: 2025-03-03
Payer: MEDICARE

## 2025-03-03 DIAGNOSIS — I82.409 ACUTE DEEP VEIN THROMBOSIS (DVT) OF LOWER EXTREMITY, UNSPECIFIED LATERALITY, UNSPECIFIED VEIN: Primary | ICD-10-CM

## 2025-03-03 DIAGNOSIS — G89.29 CHRONIC PAIN OF RIGHT KNEE: ICD-10-CM

## 2025-03-03 DIAGNOSIS — R53.1 WEAKNESS: ICD-10-CM

## 2025-03-03 DIAGNOSIS — M25.561 CHRONIC PAIN OF RIGHT KNEE: ICD-10-CM

## 2025-03-03 LAB
INR PPP: 4 (ref 2.5–3.5)
INR PPP: 4 (ref 2.5–3.5)

## 2025-03-03 NOTE — PROGRESS NOTES
Physical Therapy Daily Treatment Note  [facility] [place]  [address], [address]  [place] [address]     Patient: Javy Reeves .  : 1950  Referring practitioner: Debora Roach APRN  Today's Date: 3/3/2025    VISIT#: 13    Visit Diagnoses:    ICD-10-CM ICD-9-CM   1. Acute deep vein thrombosis (DVT) of lower extremity, unspecified laterality, unspecified vein  I82.409 453.40   2. Weakness  R53.1 780.79   3. Chronic pain of right knee  M25.561 719.46    G89.29 338.29       Subjective   Javy Reeves reports: that his knee is hurting a lot. He notes pain in the front of his knee and on the back. It started two days ago. It hurts the most in weight bearing. He denies any shortness of breath, fevers, or other symptoms. He plans to go to hospital if it continues to get worse.       Objective       See Exercise, Manual, and Modality Logs for complete treatment.         Assessment/Plan  The patient tolerates the treatment session without increase in symptoms. He requires minimal verbal cueing for technique throughout the session and is able to correct his form. Intensity of the session is reduced d/t high levels of pain at the start of the session. He would continue to benefit from skilled intervention with focus on LE strength and ROM.     The patient was part of group therapy with 2 patients.  The patient performed the exercises and activities designated in the Flow Sheet of the patient's chart.  As the patient's therapist I provided the following: Physical Cues and Technique Correction.  The treatment will help restore the function of the patient with LE strength and ROM.  Group Therapy was performed for one time for 8 minutes.          Progress per Plan of Care          Timed:         Manual Therapy:         mins  90824;     Therapeutic Exercise:    12     mins  17779;     Neuromuscular Yoandy:        mins  38863;    Therapeutic Activity:     12     mins   55251;     Gait Training:           mins  76218;      Ionto:                                   mins  58493  Self Care:                            mins  96160    Un-Timed:  Electrical Stimulation:         mins  31619 ( );  Dry Needling          mins self-pay  Traction          mins 33540  Re-Eval                               mins  29640  Group Therapy           __8_ mins 57174    Timed Treatment:   24   mins   Total Treatment:     48   mins    Peyton Bryan PT  Physical Therapist  [facility] license #: [id number]

## 2025-03-04 ENCOUNTER — TELEPHONE (OUTPATIENT)
Dept: ORTHOPEDIC SURGERY | Facility: CLINIC | Age: 75
End: 2025-03-04
Payer: MEDICARE

## 2025-03-04 NOTE — TELEPHONE ENCOUNTER
I called Mr. Reeves and his spouse multiple times. I was unable to reach them. I called his Emergency contact on the [facility] [employer] and relayed that Dr. Ochoa is the provider treating his blood clots and that he really should reach to her. But also per Dr. Torres if he truly believes he has a blood clot than he should go to the ED. Williams verbalized understanding and will let his father know,

## 2025-03-04 NOTE — TELEPHONE ENCOUNTER
"Caller: Javy Reeves Sr. \"GARRICK\"    Relationship to patient: Self    Best call back number: [phone]     Chief complaint: RIGHT KNEE PAIN    Type of visit: INJECTION    Requested date: ASAP     If rescheduling, when is the original appointment: 7.3.25     Additional notes:PATIENT SLIPPED AND FELL. THE PATIENT ENDED UP IN THE ER WITH A BLOOD CLOT AND INJURED HIS RIGHT KNEE FURTHER. HE FELL ON JANUARY 11TH AND HAS BEEN IN SEVERE PAIN SINCE. HE IS WONDERING IF THERE IS ANYWAY HE COULD GET IN THE APPOINTMENT SOONER AND CHECK TO SEE IF THERE IS ANOTHER BLOOD CLOT.      "

## 2025-03-05 ENCOUNTER — TREATMENT (OUTPATIENT)
Dept: PHYSICAL THERAPY | Facility: CLINIC | Age: 75
End: 2025-03-05
Payer: MEDICARE

## 2025-03-05 DIAGNOSIS — I82.409 ACUTE DEEP VEIN THROMBOSIS (DVT) OF LOWER EXTREMITY, UNSPECIFIED LATERALITY, UNSPECIFIED VEIN: Primary | ICD-10-CM

## 2025-03-05 DIAGNOSIS — M25.561 CHRONIC PAIN OF RIGHT KNEE: ICD-10-CM

## 2025-03-05 DIAGNOSIS — G89.29 CHRONIC PAIN OF RIGHT KNEE: ICD-10-CM

## 2025-03-05 NOTE — PROGRESS NOTES
Physical Therapy Daily Treatment Note  [facility] [place]  [address], [address]  [place] [address]     Patient: Javy Reeves .  : 1950  Referring practitioner: Debora Roach APRN  Today's Date: 3/5/2025    VISIT#: 14    Visit Diagnoses:    ICD-10-CM ICD-9-CM   1. Acute deep vein thrombosis (DVT) of lower extremity, unspecified laterality, unspecified vein  I82.409 453.40   2. Chronic pain of right knee  M25.561 719.46    G89.29 338.29       Subjective   Javy Reeves reports: that he is still having a lot of pain in the front of his R knee. He did not go to the hospital the other day. The pain makes him limp.       Objective       See Exercise, Manual, and Modality Logs for complete treatment.         Assessment/Plan  The patient tolerates the treatment session without increase in symptoms. He requires minimal verbal cueing for technique throughout the session and is able to correct his form. He finds SLR and heel slides to be difficult but denies pain. He is advised to rest as needed. Several exercises are not performed d/t high levels of pain at the start of the session. He would continue to benefit from skilled intervention with focus on LE strength and ROM.     The patient was part of group therapy with 2 patients.  The patient performed the exercises and activities designated in the Flow Sheet of the patient's chart.  As the patient's therapist I provided the following: Physical Cues and Technique Correction.  The treatment will help restore the function of the patient with LE ROM and strength.  Group Therapy was performed for one time for 8 minutes.          Progress per Plan of Care          Timed:         Manual Therapy:         mins  12485;     Therapeutic Exercise:    12     mins  69304;     Neuromuscular Yoandy:        mins  36654;    Therapeutic Activity:     12     mins  23044;     Gait Training:           mins  04925;      Ionto:                                    mins  70550  Self Care:                            mins  12003    Un-Timed:  Electrical Stimulation:         mins  60557 ( );  Dry Needling          mins self-pay  Traction          mins 41963  Re-Eval                               mins  25110  Group Therapy           _8__ mins 13307    Timed Treatment:   24   mins   Total Treatment:     45   mins    Peyton Bryan PT  Physical Therapist  [employer] [employer] license #: [id number]

## 2025-03-06 ENCOUNTER — HOSPITAL ENCOUNTER (EMERGENCY)
Facility: HOSPITAL | Age: 75
Discharge: HOME | End: 2025-03-06
Attending: EMERGENCY MEDICINE
Payer: MEDICARE

## 2025-03-06 ENCOUNTER — APPOINTMENT (OUTPATIENT)
Dept: CARDIOLOGY | Facility: HOSPITAL | Age: 75
End: 2025-03-06
Payer: MEDICARE

## 2025-03-06 VITALS
SYSTOLIC BLOOD PRESSURE: 150 MMHG | OXYGEN SATURATION: 100 % | TEMPERATURE: 97.7 F | DIASTOLIC BLOOD PRESSURE: 98 MMHG | HEART RATE: 77 BPM | RESPIRATION RATE: 16 BRPM

## 2025-03-06 DIAGNOSIS — M25.561 ACUTE PAIN OF RIGHT KNEE: Primary | ICD-10-CM

## 2025-03-06 LAB
ALBUMIN SERPL-MCNC: 3.7 G/DL (ref 3.5–5.2)
ALBUMIN/GLOB SERPL: 1 G/DL
ALP SERPL-CCNC: 80 U/L (ref 39–117)
ALT SERPL W P-5'-P-CCNC: 18 U/L (ref 1–41)
ANION GAP SERPL CALCULATED.3IONS-SCNC: 10.3 MMOL/L (ref 5–15)
AST SERPL-CCNC: 23 U/L (ref 1–40)
BASOPHILS # BLD AUTO: 0.02 10*3/MM3 (ref 0–0.2)
BASOPHILS NFR BLD AUTO: 0.4 % (ref 0–1.5)
BH CV LOW VAS RIGHT DISTAL FEMORAL SPONT: 1
BH CV LOW VAS RIGHT MID FEMORAL SPONT: 1
BH CV LOW VAS RIGHT POPLITEAL SPONT: 1
BH CV LOWER VASCULAR LEFT COMMON FEMORAL AUGMENT: NORMAL
BH CV LOWER VASCULAR LEFT COMMON FEMORAL COMPETENT: NORMAL
BH CV LOWER VASCULAR LEFT COMMON FEMORAL COMPRESS: NORMAL
BH CV LOWER VASCULAR LEFT COMMON FEMORAL PHASIC: NORMAL
BH CV LOWER VASCULAR LEFT COMMON FEMORAL SPONT: NORMAL
BH CV LOWER VASCULAR RIGHT COMMON FEMORAL AUGMENT: NORMAL
BH CV LOWER VASCULAR RIGHT COMMON FEMORAL COMPETENT: NORMAL
BH CV LOWER VASCULAR RIGHT COMMON FEMORAL COMPRESS: NORMAL
BH CV LOWER VASCULAR RIGHT COMMON FEMORAL PHASIC: NORMAL
BH CV LOWER VASCULAR RIGHT COMMON FEMORAL SPONT: NORMAL
BH CV LOWER VASCULAR RIGHT DISTAL FEMORAL AUGMENT: NORMAL
BH CV LOWER VASCULAR RIGHT DISTAL FEMORAL COMPETENT: NORMAL
BH CV LOWER VASCULAR RIGHT DISTAL FEMORAL COMPRESS: NORMAL
BH CV LOWER VASCULAR RIGHT DISTAL FEMORAL PHASIC: NORMAL
BH CV LOWER VASCULAR RIGHT DISTAL FEMORAL SPONT: NORMAL
BH CV LOWER VASCULAR RIGHT DISTAL FEMORAL THROMBUS: NORMAL
BH CV LOWER VASCULAR RIGHT GASTRONEMIUS COMPRESS: NORMAL
BH CV LOWER VASCULAR RIGHT GREATER SAPH AK COMPRESS: NORMAL
BH CV LOWER VASCULAR RIGHT GREATER SAPH BK COMPRESS: NORMAL
BH CV LOWER VASCULAR RIGHT LESSER SAPH COMPRESS: NORMAL
BH CV LOWER VASCULAR RIGHT MID FEMORAL AUGMENT: NORMAL
BH CV LOWER VASCULAR RIGHT MID FEMORAL COMPETENT: NORMAL
BH CV LOWER VASCULAR RIGHT MID FEMORAL COMPRESS: NORMAL
BH CV LOWER VASCULAR RIGHT MID FEMORAL PHASIC: NORMAL
BH CV LOWER VASCULAR RIGHT MID FEMORAL SPONT: NORMAL
BH CV LOWER VASCULAR RIGHT MID FEMORAL THROMBUS: NORMAL
BH CV LOWER VASCULAR RIGHT PERONEAL COMPRESS: NORMAL
BH CV LOWER VASCULAR RIGHT POPLITEAL AUGMENT: NORMAL
BH CV LOWER VASCULAR RIGHT POPLITEAL COMPETENT: NORMAL
BH CV LOWER VASCULAR RIGHT POPLITEAL COMPRESS: NORMAL
BH CV LOWER VASCULAR RIGHT POPLITEAL PHASIC: NORMAL
BH CV LOWER VASCULAR RIGHT POPLITEAL SPONT: NORMAL
BH CV LOWER VASCULAR RIGHT POPLITEAL THROMBUS: NORMAL
BH CV LOWER VASCULAR RIGHT POSTERIOR TIBIAL COMPRESS: NORMAL
BH CV LOWER VASCULAR RIGHT PROFUNDA FEMORAL COMPRESS: NORMAL
BH CV LOWER VASCULAR RIGHT PROXIMAL FEMORAL COMPRESS: NORMAL
BH CV LOWER VASCULAR RIGHT SAPHENOFEMORAL JUNCTION COMPRESS: NORMAL
BH CV VAS PRELIMINARY FINDINGS SCRIPTING: 1
BILIRUB SERPL-MCNC: 0.5 MG/DL (ref 0–1.2)
BUN SERPL-MCNC: 11 MG/DL (ref 8–23)
BUN/CREAT SERPL: 10.3 (ref 7–25)
CALCIUM SPEC-SCNC: 9.1 MG/DL (ref 8.6–10.5)
CHLORIDE SERPL-SCNC: 104 MMOL/L (ref 98–107)
CO2 SERPL-SCNC: 24.7 MMOL/L (ref 22–29)
CREAT SERPL-MCNC: 1.07 MG/DL (ref 0.76–1.27)
DEPRECATED RDW RBC AUTO: 52.3 FL (ref 37–54)
EGFRCR SERPLBLD CKD-EPI 2021: 73.7 ML/MIN/1.73
EOSINOPHIL # BLD AUTO: 0.03 10*3/MM3 (ref 0–0.4)
EOSINOPHIL NFR BLD AUTO: 0.7 % (ref 0.3–6.2)
ERYTHROCYTE [DISTWIDTH] IN BLOOD BY AUTOMATED COUNT: 14.5 % (ref 12.3–15.4)
GLOBULIN UR ELPH-MCNC: 3.7 GM/DL
GLUCOSE SERPL-MCNC: 82 MG/DL (ref 65–99)
HCT VFR BLD AUTO: 42.9 % (ref 37.5–51)
HGB BLD-MCNC: 14.2 G/DL (ref 13–17.7)
HOLD SPECIMEN: NORMAL
HOLD SPECIMEN: NORMAL
IMM GRANULOCYTES # BLD AUTO: 0 10*3/MM3 (ref 0–0.05)
IMM GRANULOCYTES NFR BLD AUTO: 0 % (ref 0–0.5)
INR PPP: 2.44 (ref 0.9–1.1)
LYMPHOCYTES # BLD AUTO: 1.65 10*3/MM3 (ref 0.7–3.1)
LYMPHOCYTES NFR BLD AUTO: 36.3 % (ref 19.6–45.3)
MCH RBC QN AUTO: 32.1 PG (ref 26.6–33)
MCHC RBC AUTO-ENTMCNC: 33.1 G/DL (ref 31.5–35.7)
MCV RBC AUTO: 97.1 FL (ref 79–97)
MONOCYTES # BLD AUTO: 0.42 10*3/MM3 (ref 0.1–0.9)
MONOCYTES NFR BLD AUTO: 9.3 % (ref 5–12)
NEUTROPHILS NFR BLD AUTO: 2.42 10*3/MM3 (ref 1.7–7)
NEUTROPHILS NFR BLD AUTO: 53.3 % (ref 42.7–76)
NRBC BLD AUTO-RTO: 0 /100 WBC (ref 0–0.2)
PLATELET # BLD AUTO: 205 10*3/MM3 (ref 140–450)
PMV BLD AUTO: 8.3 FL (ref 6–12)
POTASSIUM SERPL-SCNC: 3.7 MMOL/L (ref 3.5–5.2)
PROT SERPL-MCNC: 7.4 G/DL (ref 6–8.5)
PROTHROMBIN TIME: 26.8 SECONDS (ref 11.7–14.2)
RBC # BLD AUTO: 4.42 10*6/MM3 (ref 4.14–5.8)
SODIUM SERPL-SCNC: 139 MMOL/L (ref 136–145)
WBC NRBC COR # BLD AUTO: 4.54 10*3/MM3 (ref 3.4–10.8)
WHOLE BLOOD HOLD COAG: NORMAL
WHOLE BLOOD HOLD SPECIMEN: NORMAL

## 2025-03-06 PROCEDURE — 85025 COMPLETE CBC W/AUTO DIFF WBC: CPT | Performed by: EMERGENCY MEDICINE

## 2025-03-06 PROCEDURE — 99284 EMERGENCY DEPT VISIT MOD MDM: CPT

## 2025-03-06 PROCEDURE — 93971 EXTREMITY STUDY: CPT

## 2025-03-06 PROCEDURE — 93971 EXTREMITY STUDY: CPT | Performed by: SURGERY

## 2025-03-06 PROCEDURE — 85610 PROTHROMBIN TIME: CPT | Performed by: PHYSICIAN ASSISTANT

## 2025-03-06 PROCEDURE — 36415 COLL VENOUS BLD VENIPUNCTURE: CPT | Performed by: EMERGENCY MEDICINE

## 2025-03-06 PROCEDURE — 80053 COMPREHEN METABOLIC PANEL: CPT | Performed by: EMERGENCY MEDICINE

## 2025-03-06 RX ORDER — PREDNISONE 50 MG/1
50 TABLET ORAL DAILY
Qty: 7 TABLET | Refills: 0 | Status: SHIPPED | OUTPATIENT
Start: 2025-03-06

## 2025-03-06 NOTE — ED PROVIDER NOTES
EMERGENCY DEPARTMENT ENCOUNTER    Room Number:  S03/03  Date of encounter:  3/6/2025  PCP: Aletha Ochoa PA-C  Historian: Patient  Chronic or social conditions impacting care (social determinants of health): None    HPI:  Chief Complaint: Right knee and right foot pain  A complete HPI/ROS/PMH/PSH/SH/FH are unobtainable due to: Nothing    Context: Javy Reeves Sr. is a 74 y.o. male with a history of DVT, heart disease, who presents to the ED c/o acute right knee and right foot pain.  Patient states his right knee has been hurting him for the past 2 months since he fell in January.  Patient has been seen by Ortho.  He is also dealing with a known DVT in the right calf.  He is on Coumadin for this.  Starting today also noticed pain to the distal plantar surface of the right foot.  Denies any known trauma.  He denies any fevers, chills, chest pain.    Review of prior external notes (non-ED):   Reviewed primary care office visit from 2/5/2025.  Patient seen for hand pain, ear pain.    Review of prior external test results outside of this encounter:  I reviewed a CMP dated 1/17/2025.  Creatinine 1.12, potassium 4.3.    PAST MEDICAL HISTORY  Active Ambulatory Problems     Diagnosis Date Noted    Arthritis 11/16/2015    Family history of early CAD 11/16/2015    DDD (degenerative disc disease), cervical 11/16/2015    DVT (deep venous thrombosis) 11/16/2015    Fibromyalgia 11/16/2015    Past heart attack 11/16/2015    Hyperlipidemia 11/16/2015    DDD (degenerative disc disease), lumbosacral 11/16/2015    History of pulmonary embolus (PE) 11/16/2015    Reflux esophagitis 11/16/2015    TIA (transient ischemic attack) 11/16/2015    Left groin pain 02/21/2017    Cervical radicular pain 02/12/2018    Cervical disc disorder at C6-C7 level with radiculopathy 05/16/2018    Wheezing 07/20/2018    Colon polyps 07/27/2018    Right lower quadrant abdominal pain 07/27/2018    Diarrhea 07/27/2018    Cervical spinal stenosis 08/20/2018     Cervical disc disorder at C5-C6 level with radiculopathy 08/20/2018    GERD (gastroesophageal reflux disease) 09/07/2018    Diverticulosis 09/13/2018    Old MI (myocardial infarction) 09/20/2018    Herniated cervical disc 10/05/2018    Encounter for therapeutic drug monitoring 10/10/2018    Long term current use of anticoagulant therapy 10/10/2018    ERRONEOUS ENCOUNTER--DISREGARD 03/27/2019    Stable angina 04/26/2019    Low folic acid 09/22/2020    Low serum vitamin B12 09/22/2020    Precordial chest pain 12/01/2020    Tobacco abuse 12/01/2020    Tobacco abuse counseling 12/01/2020    Interstitial lung disease 12/01/2020    History of transient ischemic attack (TIA) 02/22/2021    Irritable bowel syndrome 04/26/2021    DDD (degenerative disc disease), lumbar 04/26/2021    Weight loss 09/08/2021    Choking sensation 09/08/2021    Failed back syndrome 11/03/2021    Long term (current) use of opiate analgesic 11/03/2021    Lumbar radiculopathy 11/03/2021    Lumbar spondylosis 11/03/2021    Chronic pain disorder 11/03/2021    History of colon polyps 07/28/2022    Acute chest pain 08/22/2022    Recurrent pulmonary embolism 08/23/2022    Subtherapeutic international normalized ratio (INR) 08/23/2022    Bronchiectasis without complication 08/23/2022    Abdominal pain 04/22/2022    Presence of IVC filter 04/22/2022    Weight loss, unintentional 04/22/2022    ILD (interstitial lung disease) 08/31/2022    Tobacco use disorder 08/31/2022    Ectatic abdominal aorta 12/01/2023    Acute deep vein thrombosis (DVT) of femoral vein of right lower extremity 01/11/2025    Lupus anticoagulant disorder 02/05/2025    Tremor of left hand 02/05/2025     Resolved Ambulatory Problems     Diagnosis Date Noted    Hypertension 11/16/2015    Mild atherosclerosis of right carotid artery 03/27/2018     Past Medical History:   Diagnosis Date    Acute and subacute infective endocarditis in diseases classified elsewhere     Allergic     Arthritis  of neck Don’t remember    Asthma 04/26/2021    Chest pain     Chronic low back pain     Chronic pain     Clotting disorder     Coronary artery disease     Encounter for special screening examination for neoplasm of prostate 2012    Eye exam, routine > 5 yrs ago    Eye exam, routine 01/2015    Fibromyositis     HIV disease     Injury of back     Injury of neck     Irritable bowel     Limited joint range of motion     Low back strain Don’t remember    Lumbar stenosis     Lumbosacral disc disease Don’t remember    MI (myocardial infarction)     Neck pain     Neck strain     Pain in limb     Postlaminectomy syndrome of lumbar region     Pulmonary embolism 1996    Rotator cuff syndrome     Scoliosis     Shortness of breath     Sleep apnea     Stroke     Thoracic disc disorder          PAST SURGICAL HISTORY  Past Surgical History:   Procedure Laterality Date    ANTERIOR CERVICAL DISCECTOMY W/ FUSION N/A 10/05/2018    Procedure: CERVICAL DISCECTOMY ANTERIOR FUSION WITH INSTRUMENTATION,ACDF C5/6, C6/7 with cages and plate;  Surgeon: Jean Coles MD;  Location: [facility] MAIN OR;  Service: Neurosurgery    APPENDECTOMY N/A     BACK SURGERY  Don’t remember    BRONCHOSCOPY N/A 11/03/2020    Procedure: EBUS BRONCHOSCOPY WITH BAL & FLUOROSCOPY WITH MAC ANESTHESIA, BX & TBNA;  Surgeon: Jamil Willoughby MD;  Location: [facility] ENDOSCOPY;  Service: Pulmonary;  Laterality: N/A;  PRE/POST-ABNORMAL CT, LAD    CARDIAC CATHETERIZATION Left 1950    Left Heart Cath Left Ventriculography, selective coronary angiography; iliofemoral angiography; angio seal closure, Dr. Brady Ramos    COLONOSCOPY  09/2015    removed 2 polyps, Dr. Manley    COLONOSCOPY N/A 02/12/2007    Left colonic diverticulosis, No evidence of polypoid neoplasia, Dr. Jarret Bloom    COLONOSCOPY N/A 09/13/2018    Procedure: COLONOSCOPY TO CECUM WITH COLD BX'S POLYPECTOMY;  Surgeon: Berta Sin MD;  Location: [facility] ENDOSCOPY;  Service: General     CYSTOSCOPY TRANSURETHRAL RESECTION OF PROSTATE N/A 11/01/2004    Dr. Rodolfo Arnold    ENDOSCOPY N/A 08/03/2023    Procedure: ESOPHAGOGASTRODUODENOSCOPYmwith biopsy;  Surgeon: Shane Foss MD;  Location: [facility] ENDOSCOPY;  Service: Gastroenterology;  Laterality: N/A;  pre- gerd  post- gastritis    HAND SURGERY Right     JOINT REPLACEMENT  Over 25 years ago    KNEE ARTHROPLASTY Left     LAPAROSCOPIC CHOLECYSTECTOMY      LUMBAR DISC SURGERY  2006, 2007    L4-S1 pseudoarthroses - Dr Cervantes    LUMBAR DISC SURGERY N/A 01/29/2010    Removal of Instrumentation w/ decompression, Instrumentaion loosening & pt tested positive for zinc allergy, Dr. Kvng Cervantes    NECK SURGERY  Don’t remember    ROTATOR CUFF REPAIR Right 04/2012    SHOULDER SURGERY  Don’t remember    THORACIC OUTLET SURGERY Bilateral     THORACOSCOPY Right 11/29/2022    Procedure: BRONCHOSCOPY, RIGHT THORACOSCOPY VIDEO ASSISTED WEDGE RESECTION, INTERCOSTAL NERVE BLOCK;  Surgeon: Nella Bergman MD;  Location: [facility] MAIN OR;  Service: Thoracic;  Laterality: Right;    TRIGGER POINT INJECTION      Several injections [facility]    VENA CAVA FILTER PLACEMENT  1996    WRIST SURGERY Right     x3 starting in 1984         FAMILY HISTORY  Family History   Problem Relation Age of Onset    Diabetes Sister     Cancer Sister     Hypertension Sister         Covid    Kidney disease Sister     Stroke Sister     Heart disease Brother     Hypertension Brother     Cerebral aneurysm Brother     Sudden death Brother     Bleeding Disorder Brother     Cancer Mother     Clotting disorder Mother         Cancer    Heart disease Father         Stroke    Cancer Father         malignant neoplasm    Deep vein thrombosis Father     Heart attack Father     Malig Hyperthermia Neg Hx          SOCIAL HISTORY  Social History     Socioeconomic History    Marital status:    Tobacco Use    Smoking status: Some Days     Current packs/day: 1.50     Average  packs/day: 1.5 packs/day for 25.0 years (37.5 ttl pk-yrs)     Types: Cigars, Cigarettes     Passive exposure: Never    Smokeless tobacco: Never    Tobacco comments:     Since 23 years old   Vaping Use    Vaping status: Never Used   Substance and Sexual Activity    Alcohol use: Not Currently     Comment: RARELY    Drug use: Never    Sexual activity: Not Currently     Partners: Female     Birth control/protection: Tubal ligation     Comment: Wife only         ALLERGIES  Zinc, Chantix [varenicline], and Hydrocodone-acetaminophen        REVIEW OF SYSTEMS  All systems reviewed and negative except for those discussed in HPI.       PHYSICAL EXAM    I have reviewed the triage vital signs and nursing notes.    ED Triage Vitals   Temp Heart Rate Resp BP SpO2   03/06/25 1353 03/06/25 1353 03/06/25 1353 03/06/25 1357 03/06/25 1353   97.7 °F (36.5 °C) 112 18 156/92 96 %      Temp src Heart Rate Source Patient Position BP Location FiO2 (%)   03/06/25 1353 03/06/25 1353 03/06/25 1357 03/06/25 1357 --   Tympanic Monitor Sitting Right arm        Physical Exam  GENERAL: Alert, oriented, not distressed  HENT: head atraumatic, no nuchal rigidity  EYES: no scleral icterus, EOMI  CV: regular rhythm, regular rate, no murmur  RESPIRATORY: normal effort, CTA  ABDOMEN: soft, nontender  MUSCULOSKELETAL: mild tenderness to the right popliteal area.  No significant joint line tenderness to the medial or lateral knee.  Fairly preserved range of motion.  No significant erythema.  Right calf is soft.  Neurovascularly intact distally.  NEURO: alert, moves all extremities, follows commands  SKIN: warm, dry        LAB RESULTS  Recent Results (from the past 24 hours)   Comprehensive Metabolic Panel    Collection Time: 03/06/25  2:02 PM    Specimen: Arm, Left; Blood   Result Value Ref Range    Glucose 82 65 - 99 mg/dL    BUN 11 8 - 23 mg/dL    Creatinine 1.07 0.76 - 1.27 mg/dL    Sodium 139 136 - 145 mmol/L    Potassium 3.7 3.5 - 5.2 mmol/L    Chloride  104 98 - 107 mmol/L    CO2 24.7 22.0 - 29.0 mmol/L    Calcium 9.1 8.6 - 10.5 mg/dL    Total Protein 7.4 6.0 - 8.5 g/dL    Albumin 3.7 3.5 - 5.2 g/dL    ALT (SGPT) 18 1 - 41 U/L    AST (SGOT) 23 1 - 40 U/L    Alkaline Phosphatase 80 39 - 117 U/L    Total Bilirubin 0.5 0.0 - 1.2 mg/dL    Globulin 3.7 gm/dL    A/G Ratio 1.0 g/dL    BUN/Creatinine Ratio 10.3 7.0 - 25.0    Anion Gap 10.3 5.0 - 15.0 mmol/L    eGFR 73.7 >60.0 mL/min/1.73   Green Top (Gel)    Collection Time: 03/06/25  2:02 PM   Result Value Ref Range    Extra Tube Hold for add-ons.    Lavender Top    Collection Time: 03/06/25  2:02 PM   Result Value Ref Range    Extra Tube hold for add-on    Gold Top - SST    Collection Time: 03/06/25  2:02 PM   Result Value Ref Range    Extra Tube Hold for add-ons.    Light Blue Top    Collection Time: 03/06/25  2:02 PM   Result Value Ref Range    Extra Tube Hold for add-ons.    CBC Auto Differential    Collection Time: 03/06/25  2:02 PM    Specimen: Arm, Left; Blood   Result Value Ref Range    WBC 4.54 3.40 - 10.80 10*3/mm3    RBC 4.42 4.14 - 5.80 10*6/mm3    Hemoglobin 14.2 13.0 - 17.7 g/dL    Hematocrit 42.9 37.5 - 51.0 %    MCV 97.1 (H) 79.0 - 97.0 fL    MCH 32.1 26.6 - 33.0 pg    MCHC 33.1 31.5 - 35.7 g/dL    RDW 14.5 12.3 - 15.4 %    RDW-SD 52.3 37.0 - 54.0 fl    MPV 8.3 6.0 - 12.0 fL    Platelets 205 140 - 450 10*3/mm3    Neutrophil % 53.3 42.7 - 76.0 %    Lymphocyte % 36.3 19.6 - 45.3 %    Monocyte % 9.3 5.0 - 12.0 %    Eosinophil % 0.7 0.3 - 6.2 %    Basophil % 0.4 0.0 - 1.5 %    Immature Grans % 0.0 0.0 - 0.5 %    Neutrophils, Absolute 2.42 1.70 - 7.00 10*3/mm3    Lymphocytes, Absolute 1.65 0.70 - 3.10 10*3/mm3    Monocytes, Absolute 0.42 0.10 - 0.90 10*3/mm3    Eosinophils, Absolute 0.03 0.00 - 0.40 10*3/mm3    Basophils, Absolute 0.02 0.00 - 0.20 10*3/mm3    Immature Grans, Absolute 0.00 0.00 - 0.05 10*3/mm3    nRBC 0.0 0.0 - 0.2 /100 WBC   Protime-INR    Collection Time: 03/06/25  2:02 PM    Specimen: Arm,  Left; Blood   Result Value Ref Range    Protime 26.8 (H) 11.7 - 14.2 Seconds    INR 2.44 (H) 0.90 - 1.10   Duplex Venous Lower Extremity Right    Collection Time: 03/06/25  3:11 PM   Result Value Ref Range    Right Mid Femoral Spont 1.0     Right Distal Femoral Spont 1.0     Right Popliteal Spont 1.0     Right Common Femoral Spont Y     Right Common Femoral Competent Y     Right Common Femoral Phasic Y     Right Common Femoral Compress C     Right Common Femoral Augment Y     Right Saphenofemoral Junction Compress C     Right Profunda Femoral Compress C     Right Proximal Femoral Compress C     Right Mid Femoral Spont N     Right Mid Femoral Competent N     Right Mid Femoral Phasic N     Right Mid Femoral Compress P     Right Mid Femoral Augment N     Right Mid Femoral Thrombus C     Right Distal Femoral Spont N     Right Distal Femoral Competent N     Right Distal Femoral Phasic N     Right Distal Femoral Compress P     Right Distal Femoral Augment N     Right Distal Femoral Thrombus C     Right Popliteal Spont N     Right Popliteal Competent N     Right Popliteal Phasic N     Right Popliteal Compress P     Right Popliteal Augment N     Right Popliteal Thrombus C     Right Posterior Tibial Compress C     Right Peroneal Compress C     Right Gastronemius Compress C     Right Greater Saph AK Compress C     Right Greater Saph BK Compress C     Right Lesser Saph Compress C     Left Common Femoral Spont Y     Left Common Femoral Competent Y     Left Common Femoral Phasic Y     Left Common Femoral Compress C     Left Common Femoral Augment Y     BH CV VAS PRELIMINARY FINDINGS SCRIPTING 1.0        Ordered the above labs and independently reviewed the results.        RADIOLOGY  Duplex Venous Lower Extremity Right    Result Date: 3/6/2025    Chronic right lower extremity deep vein thrombosis noted in the mid femoral, distal femoral and popliteal.   All other right sided veins appeared normal.      I ordered the above noted  radiological studies. Reviewed by me and discussed with radiologist.  See dictation for official radiology interpretation.      MEDICATIONS GIVEN IN ER    Medications - No data to display      ADDITIONAL ORDERS CONSIDERED BUT NOT ORDERED:  Admission was considered but after careful review of the patient's presentation, physical examination, diagnostic results, and response to treatment the patient may be safely discharged with outpatient follow-up.       PROGRESS, DATA ANALYSIS, CONSULTS, AND MEDICAL DECISION MAKING    All labs have been independently interpreted by myself.  All radiology studies have been independently interpreted by myself and discussed with radiologist dictating the report.   EKGs independently interpreted by myself.  Discussion below represents my analysis of pertinent findings related to patient's condition, differential diagnosis, treatment plan and final disposition.    I have discussed case with Dr. Cid, emergency room physician.  He has performed his own bedside examination and agrees with treatment plan.    ED Course as of 03/06/25 1625   Thu Mar 06, 2025   1510 Patient presents with acute on chronic right knee and right foot pain.  Patient states the symptoms have been present for the past 2 months since he fell.  He has been seen by Ortho.  He does have a known DVT in the right leg.  Pulses intact distally. [EE]   1511 Prelim: no new findings from prior, chronic dvt mid-femoral to popliteal [AR]   1518 Hemoglobin: 14.2 [EE]   1518 WBC: 4.54 [EE]   1518 Creatinine: 1.07 [EE]   1624 Patient is neurovascularly intact.  No significant erythema.  Patient is tender to the popliteal area raising concern for possible Bakers cyst.  We will treat with steroids.  [EE]      ED Course User Index  [AR] Annita Wright MD  [EE] Deonte Zamarripa PA       AS OF 16:25 EST VITALS:    BP - 156/92  HR - 112  TEMP - 97.7 °F (36.5 °C) (Tympanic)  O2 SATS - 96%        DIAGNOSIS  Final diagnoses:   Acute  pain of right knee         DISPOSITION  Discharged    Admission was considered but after careful review of the patient's presentation, physical examination, diagnostic results, and response to treatment the patient may be safely discharged with outpatient follow-up.         Dictated utilizing Dragon dictation     Deonte Zamarripa PA  03/06/25 1944

## 2025-03-06 NOTE — ED NOTES
Pt to ed from home via PV    Pt c/o r knee pain radiating to R foot. Pt recently had blood clot in r leg. Pt is on blood thinners. Pt denies new injury.

## 2025-03-06 NOTE — ED PROVIDER NOTES
SHARED VISIT: This visit was performed by BOTH a physician and an APC. The substantive portion of the medical decision making was performed by this attesting physician who made or approved the management plan and takes responsibility for patient management. All studies in the APC note (if performed) were independently interpreted by me.     The SILVINO and I have discussed this patient's history, physical exam, and treatment plan.  I have reviewed the documentation and personally had a face to face interaction with the patient. I affirm the documentation and agree with the treatment and plan.  The attached note describes my personal findings.      I provided a substantive portion of the care of the patient.  I personally performed the physical exam in its entirety, and below are my findings.     Brief history of present illness: 74-year-old male with a history of chronic DVTs and arthritic change of his knees presents with complaint of ongoing right knee pain.  No known trauma.  No fevers or swelling.    Physical exam:    /92 (BP Location: Right arm, Patient Position: Sitting)   Pulse 112   Temp 97.7 °F (36.5 °C) (Tympanic)   Resp 18   SpO2 96%       Physical Exam   Constitutional: No distress.  Nontoxic  HENT:  Head: Normocephalic and atraumatic.   Oropharynx: Mucous membranes are moist.   Eyes: . No scleral icterus.   Neck: Normal range of motion. Neck supple.   Cardiovascular: Pink warm and well perfused throughout.    Pulmonary/Chest: No respiratory distress.    Musculoskeletal: Moves all extremities equally.  No effusion of the right knee noted.  There is some discomfort with palpation of the popliteal fossa that may represent small Baker's cyst.  Full range of motion intact.  Patient has strong dorsalis pedis and posterior tibial pulses in the right foot.  Calf tenderness noted.  Neurological: Alert and oriented.  Speech fluent and easily intelligible  Skin: Skin is pink, warm, and dry.   Psychiatric:  Mood and affect normal.   Nursing note and vitals reviewed.        MDM:  My diagnosis for lower extremity pain and injury includes but is not limited to hip fracture, femur fracture, hip dislocation, hip contusion, hip sprain, hip strain, pelvic fracture, ischio-tibial band pain, ischio-tibial band bursitis, knee sprain, patella dislocation, knee dislocation, internal derangement of knee, fractures of the femur, tibia, fibula, ankle, foot and digits, ankle sprain, ankle dislocation, Lisfranc fracture, fracture dislocations of the digits, pulmonary embolism, claudication, peripheral vascular disease, gout, osteoarthritis, rheumatoid arthritis, bursitis, septic joint, poly-rheumatica, polyarthralgia and other inflammatory or infectious disease processes.      Please note that portions of this were completed with a voice recognition program.       Note Disclaimer: At [facility], we believe that sharing information builds trust and better relationships. You are receiving this note because you are receiving care at [facility] or recently visited. It is possible you will see health information before a provider has talked with you about it. This kind of information can be easy to misunderstand. To help you fully understand what it means for your health, we urge you to discuss this note with your provider.     Dandy Cid MD  03/06/25 [id number]

## 2025-03-09 LAB — INR PPP: 3.7

## 2025-03-10 ENCOUNTER — TELEPHONE (OUTPATIENT)
Dept: FAMILY MEDICINE CLINIC | Facility: CLINIC | Age: 75
End: 2025-03-10
Payer: MEDICARE

## 2025-03-10 ENCOUNTER — ANTICOAGULATION VISIT (OUTPATIENT)
Dept: FAMILY MEDICINE CLINIC | Facility: CLINIC | Age: 75
End: 2025-03-10
Payer: MEDICARE

## 2025-03-10 NOTE — TELEPHONE ENCOUNTER
Hub staff attempted to follow warm transfer process and was unsuccessful     Caller: SHREYAS    Best call back number: [phone]    Patient is needing:   CALLING TO REPORT AN OUT OF RANGE INR.    3.7 ON-3/9/25

## 2025-03-11 NOTE — TELEPHONE ENCOUNTER
"    Caller: Javy Reeves Sr. \"GARRICK\"    Relationship to patient: Self    Best call back number: [phone]    Chief complaint: RT KNEE PAIN     Type of visit: FOLLOW UP     Requested date: ASAP      If rescheduling, when is the original appointment: PATIENT SCHEDULED FOR  MONOVISC  INJECTIONS 07/03/2025    Additional notes:PATIENT SEEN IN ED 03/06/2025  FOR PAIN IN RIGHT KNEE - TREATED FOR BLOOD CLOT(S).   PATIENT ADVISED TO FOLLOW UP WITH ORTHO DR SERGE FITZPATRICK .  PATIENT REQUEST [facility] - FIRST AVAILABLE AT THE TIME OF CALL 04/10/2025.  ED FOLLOW UP PATIENT REQUEST EARLIER SCHEDULING         "

## 2025-03-12 ENCOUNTER — TREATMENT (OUTPATIENT)
Dept: PHYSICAL THERAPY | Facility: CLINIC | Age: 75
End: 2025-03-12
Payer: MEDICARE

## 2025-03-12 DIAGNOSIS — G89.29 CHRONIC PAIN OF RIGHT KNEE: ICD-10-CM

## 2025-03-12 DIAGNOSIS — R53.1 WEAKNESS: ICD-10-CM

## 2025-03-12 DIAGNOSIS — I82.409 ACUTE DEEP VEIN THROMBOSIS (DVT) OF LOWER EXTREMITY, UNSPECIFIED LATERALITY, UNSPECIFIED VEIN: Primary | ICD-10-CM

## 2025-03-12 DIAGNOSIS — M25.561 CHRONIC PAIN OF RIGHT KNEE: ICD-10-CM

## 2025-03-12 NOTE — PROGRESS NOTES
Physical Therapy Daily Treatment Note  [facility] [place]  [address], [address]  [place] [address]     Patient: Javy Reeves .  : 1950  Referring practitioner: Debora Roach APRN  Today's Date: 3/12/2025    VISIT#: 15    Visit Diagnoses:    ICD-10-CM ICD-9-CM   1. Acute deep vein thrombosis (DVT) of lower extremity, unspecified laterality, unspecified vein  I82.409 453.40   2. Chronic pain of right knee  M25.561 719.46    G89.29 338.29   3. Weakness  R53.1 780.79       Subjective   Javy Reeves reports: that he is having a lot of pain today. He is going to the doctor tomorrow.       Objective       See Exercise, Manual, and Modality Logs for complete treatment.         Assessment/Plan  The patient tolerates the treatment session without increase in symptoms. He requires minimal verbal cueing for technique throughout the session and is able to correct his form. Several exercises are not performed d/t high levels of pain. He would continue to benefit from skilled intervention with focus on LE ROM and strength.     The patient was part of group therapy with 2 patients.  The patient performed the exercises and activities designated in the Flow Sheet of the patient's chart.  As the patient's therapist I provided the following: Physical Cues and Technique Correction.  The treatment will help restore the function of the patient with LE flexibility.  Group Therapy was performed for one time for 8 minutes.          Progress per Plan of Care          Timed:         Manual Therapy:         mins  49461;     Therapeutic Exercise:    12     mins  18054;     Neuromuscular Yoandy:        mins  30940;    Therapeutic Activity:     12     mins  77313;     Gait Training:           mins  86060;      Ionto:                                   mins  60170  Self Care:                            mins  09382    Un-Timed:  Electrical Stimulation:         mins  06874 (  );  Dry Needling          mins self-pay  Traction          mins 27207  Re-Eval                               mins  54428  Group Therapy           __8_ mins 03295    Timed Treatment:   24   mins   Total Treatment:     45   mins    Peyton Bryan PT  Physical Therapist  Ceci MOORE license #: [id number]

## 2025-03-13 ENCOUNTER — OFFICE VISIT (OUTPATIENT)
Dept: ORTHOPEDIC SURGERY | Facility: CLINIC | Age: 75
End: 2025-03-13
Payer: MEDICARE

## 2025-03-13 VITALS — BODY MASS INDEX: 26.01 KG/M2 | TEMPERATURE: 98.4 F | HEIGHT: 72 IN | WEIGHT: 192 LBS

## 2025-03-13 DIAGNOSIS — M17.11 PRIMARY OSTEOARTHRITIS OF RIGHT KNEE: Primary | ICD-10-CM

## 2025-03-13 LAB — INR PPP: 2.8 (ref 2.5–3.5)

## 2025-03-13 PROCEDURE — G2211 COMPLEX E/M VISIT ADD ON: HCPCS | Performed by: ORTHOPAEDIC SURGERY

## 2025-03-13 PROCEDURE — 1160F RVW MEDS BY RX/DR IN RCRD: CPT | Performed by: ORTHOPAEDIC SURGERY

## 2025-03-13 PROCEDURE — 1159F MED LIST DOCD IN RCRD: CPT | Performed by: ORTHOPAEDIC SURGERY

## 2025-03-13 PROCEDURE — 99213 OFFICE O/P EST LOW 20 MIN: CPT | Performed by: ORTHOPAEDIC SURGERY

## 2025-03-13 RX ORDER — ENOXAPARIN SODIUM 100 MG/ML
INJECTION SUBCUTANEOUS
COMMUNITY
Start: 2025-01-29

## 2025-03-14 ENCOUNTER — TREATMENT (OUTPATIENT)
Dept: PHYSICAL THERAPY | Facility: CLINIC | Age: 75
End: 2025-03-14
Payer: MEDICARE

## 2025-03-14 ENCOUNTER — ANTICOAGULATION VISIT (OUTPATIENT)
Dept: FAMILY MEDICINE CLINIC | Facility: CLINIC | Age: 75
End: 2025-03-14
Payer: MEDICARE

## 2025-03-14 DIAGNOSIS — R53.1 WEAKNESS: ICD-10-CM

## 2025-03-14 DIAGNOSIS — M25.561 CHRONIC PAIN OF RIGHT KNEE: ICD-10-CM

## 2025-03-14 DIAGNOSIS — I82.409 ACUTE DEEP VEIN THROMBOSIS (DVT) OF LOWER EXTREMITY, UNSPECIFIED LATERALITY, UNSPECIFIED VEIN: Primary | ICD-10-CM

## 2025-03-14 DIAGNOSIS — G89.29 CHRONIC PAIN OF RIGHT KNEE: ICD-10-CM

## 2025-03-14 NOTE — PROGRESS NOTES
Physical Therapy Daily Treatment Note  [facility]  [address], [address]  [place] [address]     Patient: Javy Reeves .  : 1950  Referring practitioner: Debora Roach APRN  Today's Date: 3/14/2025    VISIT#: 16    Visit Diagnoses:    ICD-10-CM ICD-9-CM   1. Acute deep vein thrombosis (DVT) of lower extremity, unspecified laterality, unspecified vein  I82.409 453.40   2. Chronic pain of right knee  M25.561 719.46    G89.29 338.29   3. Weakness  R53.1 780.79     LEFS:  (declined)    Subjective   Javy Reeves reports: that he saw the MD and got a brace for his knee. Overall he feels he is improving with PT despite the recent increase in pain. He is back at work and states it is going well. He was having some pain at work but thinks the brace should help. He also notes continued improvement with walking. He continues to have difficulty with prolonged sitting. He has been having some difficulty with driving d/t the recent increase in pain. He has had to put his car in park at long red lights to stretch his leg. He rates his current pain as a 5/10. At worst his pain is a 10/10 when it wakes him up at night. At best his pain is a 0/10.       Objective   Active Range of Motion   Left Knee   Flexion: 110 degrees (same)  Extension: 0 degrees (same)     Right Knee   Flexion: 110 degrees (same)  Extension: 5 (lacking) degrees (same)     Strength/Myotome Testing      Left Hip   Planes of Motion   Flexion: 5 (same)  External rotation: 5 (same)  Internal rotation: 5 (improved)     Right Hip   Planes of Motion   Flexion: 5 (same)  External rotation: 5 (improved)  Internal rotation: 5 (improved)     Left Knee   Flexion: 5 (same)  Extension: 5 (same)     Right Knee   Flexion: 5 (improved)  Extension: 5 (same)        Functional Assessment      Comments  5 time sit to stand: 15 sec, B UE use (improved)    See Exercise, Manual, and Modality Logs for complete  treatment.         Assessment/Plan  Subjectively, the patient reports continued improvement with PT. He notes improved function with walking and standing. He is back at work. He continues to have difficulty with prolonged sitting. He also has some difficulty with driving. He is currently wearing a brace. Objectively, he demonstrates improved LE strength, improved performance on the 5 time sit to stand, and maintenance of LE ROM. The patient tolerates the treatment session without increase in symptoms. He demonstrates improved tolerance to LAQ. He would continue to benefit from skilled intervention with focus on LE strength and flexibility.     The patient was part of group therapy with 2 patients.  The patient performed the exercises and activities designated in the Flow Sheet of the patient's chart.  As the patient's therapist I provided the following: Physical Cues and Technique Correction.  The treatment will help restore the function of the patient with LE strength.  Group Therapy was performed for one time for 8 minutes.        Progress per Plan of Care      Goals  Plan Goals: ST. Pt will be independent and compliant with initial HEP in 3 weeks. (Met)  2. Pt will improve 5 time sit to stand to <20 sec.  (Met)  3. Pt will demonstrate good safety awareness & decision making to reduce falls risk in 3 weeks. (Met)  LT. Pt will be independent with final HEP for self-management of condition by DC.  2. Pt will improve LEFS score to >50/80 to indicate improved function by DC (good progress)  3. Pt will improve 5 time sit to stand to < 18 seconds to demonstrate improved functional strength and reduced risk for falls. (Met)  4. Pt will demonstrate at least 4+/5 strength or greater to demonstrate improved function with gait on all surfaces. (Met)     Timed:         Manual Therapy:         mins  54208;     Therapeutic Exercise:    12     mins  78116;     Neuromuscular Yoandy:    12    mins  57954;    Therapeutic  Activity:          mins  17249;     Gait Training:           mins  37563;      Ionto:                                   mins  66877  Self Care:                            mins  26665    Un-Timed:  Electrical Stimulation:         mins  73064 ( );  Dry Needling          mins self-pay  Traction          mins 81012  Re-Eval                               mins  87645  Group Therapy           __8_ mins 58081    Timed Treatment:   24   mins   Total Treatment:     46   mins    Peyton Bryan PT  Physical Therapist  [facility] license #: [id number]

## 2025-03-17 NOTE — PROGRESS NOTES
Patient: Javy Reeves Sr.  YOB: 1950 74 y.o. male  Medical Record Number: [id number]    Chief Complaints:   Chief Complaint   Patient presents with    Right Knee - Follow-up       History of Present Illness:Javy Reeves Sr. is a 74 y.o. male who presents for follow-up of right knee.  Patient seen previously diagnosed knee arthritis.  States he had a fall early January 7 some knee pain Rubi months.  To see his PCP who is take care of a current DVT and medication.  Have been doing okay with some gel medication but the fall symptoms got worse.  More medially based.    Allergies:   Allergies   Allergen Reactions    Zinc Other (See Comments) and Diarrhea     Patient tested positive, had to have hardware removed from back.    Chantix [Varenicline] Other (See Comments)     Headache    Hydrocodone-Acetaminophen GI Intolerance       Medications:   Current Outpatient Medications   Medication Sig Dispense Refill    albuterol sulfate  (90 Base) MCG/ACT inhaler Inhale 2 puffs Every 4 (Four) Hours As Needed for Wheezing or Shortness of Air. 24 g 3    atorvastatin (Lipitor) 80 MG tablet Take 1 tablet by mouth Daily. For cholesterol and CAD, new dose 90 tablet 3    cefdinir (OMNICEF) 300 MG capsule One cap PO BID for infection 20 capsule 0    cyclobenzaprine (FLEXERIL) 5 MG tablet TAKE 1 TO 2 TABLETS BY MOUTH THREE TIMES DAILY AS NEEDED FOR SPASMS 270 tablet 3    enoxaparin sodium (LOVENOX) 100 MG/ML solution prefilled syringe syringe       ezetimibe (ZETIA) 10 MG tablet Take 1 tablet by mouth Daily. For cholesterol with atorvastatin 90 tablet 3    folic acid (FOLVITE) 1 MG tablet TAKE 1 TABLET BY MOUTH DAILY 90 tablet 3    loratadine (CLARITIN) 10 MG tablet Take 1 tablet by mouth Daily.      multivitamin with minerals tablet tablet Take 1 tablet by mouth Daily. PT HOLDING FOR SURGERY      neomycin-polymyxin-hydrocortisone (CORTISPORIN) 1 % solution otic solution Administer 4 drops to the right ear 4  "(Four) Times a Day. X 5 days 10 mL 1    nitroglycerin (NITROSTAT) 0.4 MG SL tablet Place 1 tablet under the tongue Every 5 (Five) Minutes As Needed for Chest Pain. Take no more than 3 doses in 15 minutes. 30 tablet 5    pantoprazole (PROTONIX) 40 MG EC tablet TAKE 1 TABLET BY MOUTH DAILY FOR GERD 90 tablet 3    Pirfenidone 267 MG tablet Take 3 tablets by mouth 3 times a day.      predniSONE (DELTASONE) 50 MG tablet Take 1 tablet by mouth Daily. 7 tablet 0    pregabalin (LYRICA) 150 MG capsule Take 1 capsule by mouth 2 (Two) Times a Day. For fibromyalgia pain 180 capsule 1    Stiolto Respimat 2.5-2.5 MCG/ACT aerosol solution inhaler       traMADol (Ultram) 50 MG tablet Take 1 tablet by mouth Every 6 (Six) Hours As Needed for Moderate Pain. 90 tablet 2    Umeclidinium-Vilanterol (ANORO ELLIPTA) 62.5-25 MCG/ACT aerosol powder  inhaler 1 puff Daily.      warfarin (COUMADIN) 1 MG tablet Take 0.5 tablets by mouth Daily. Take total of 10.5 mg daily---has 5mg Rx      warfarin (COUMADIN) 5 MG tablet Take 2 tablets by mouth Every Night. Take total of 10.5 mg daily---has 5mg Rx       No current facility-administered medications for this visit.         The following portions of the patient's history were reviewed and updated as appropriate: allergies, current medications, past family history, past medical history, past social history, past surgical history and problem list.    Review of Systems:   A 14 point review of systems was performed. All systems negative except pertinent positives/negative listed in HPI above    Physical Exam:   Vitals:    03/13/25 1529   Temp: 98.4 °F (36.9 °C)   Weight: 87.1 kg (192 lb)   Height: 182.9 cm (72\")       General: A and O x 3, ASA, NAD    SCLERA:    Normal    DENTITION:   Normal  Gentle range of motion somewhat decreased due to discomfort more medially based.  Motor and sensory intact distally.        Assessment/Plan:  Knee arthritis    Will try conservative treatment for increased " arthritic flare.  he would like to try a web brace as well.  Possible next visit may consider different steroid medication.  All questions answered.  Patient will follow-up in 6 months.  If symptoms worsen he may see us sooner in the interim.

## 2025-03-20 ENCOUNTER — ANTICOAGULATION VISIT (OUTPATIENT)
Dept: FAMILY MEDICINE CLINIC | Facility: CLINIC | Age: 75
End: 2025-03-20
Payer: MEDICARE

## 2025-03-20 LAB — INR PPP: 2.5 (ref 2.5–3.5)

## 2025-03-24 ENCOUNTER — RESULTS FOLLOW-UP (OUTPATIENT)
Dept: FAMILY MEDICINE CLINIC | Facility: CLINIC | Age: 75
End: 2025-03-24
Payer: MEDICARE

## 2025-03-25 RX ORDER — WARFARIN SODIUM 1 MG/1
TABLET ORAL
Qty: 270 TABLET | Refills: 1 | Status: SHIPPED | OUTPATIENT
Start: 2025-03-25

## 2025-03-27 ENCOUNTER — PATIENT MESSAGE (OUTPATIENT)
Dept: FAMILY MEDICINE CLINIC | Facility: CLINIC | Age: 75
End: 2025-03-27
Payer: MEDICARE

## 2025-03-27 LAB — INR PPP: 3.7 (ref 2.5–3.5)

## 2025-03-28 ENCOUNTER — TELEPHONE (OUTPATIENT)
Dept: FAMILY MEDICINE CLINIC | Facility: CLINIC | Age: 75
End: 2025-03-28
Payer: MEDICARE

## 2025-03-28 NOTE — TELEPHONE ENCOUNTER
Thanks.  I sent message and plan----he messages me on My Chart.  His INR goal 2.5-3.5     The defibrillation pads were placed in the anterior/lateral position.

## 2025-03-28 NOTE — TELEPHONE ENCOUNTER
Received call from Magdalena lizarraga MD INR at [phone] stating that they wanted to report an out of range INR for PT.    3/27/2025 - 3.7      Taking 10.5 alternating with 11 mg.  Goal is 2.5-3.5    Do 10.5 mg next 2 days then alternate again

## 2025-03-29 RX ORDER — WARFARIN SODIUM 5 MG/1
10 TABLET ORAL NIGHTLY
Qty: 180 TABLET | Refills: 1 | Status: SHIPPED | OUTPATIENT
Start: 2025-03-29

## 2025-03-31 ENCOUNTER — ANTICOAGULATION VISIT (OUTPATIENT)
Dept: FAMILY MEDICINE CLINIC | Facility: CLINIC | Age: 75
End: 2025-03-31
Payer: MEDICARE

## 2025-04-04 ENCOUNTER — ANTICOAGULATION VISIT (OUTPATIENT)
Dept: FAMILY MEDICINE CLINIC | Facility: CLINIC | Age: 75
End: 2025-04-04
Payer: MEDICARE

## 2025-04-04 LAB — INR PPP: 3 (ref 2.5–3.5)

## 2025-04-05 ENCOUNTER — RESULTS FOLLOW-UP (OUTPATIENT)
Dept: FAMILY MEDICINE CLINIC | Facility: CLINIC | Age: 75
End: 2025-04-05
Payer: MEDICARE

## 2025-04-11 ENCOUNTER — TELEPHONE (OUTPATIENT)
Dept: FAMILY MEDICINE CLINIC | Facility: CLINIC | Age: 75
End: 2025-04-11
Payer: MEDICARE

## 2025-04-11 ENCOUNTER — ANTICOAGULATION VISIT (OUTPATIENT)
Dept: FAMILY MEDICINE CLINIC | Facility: CLINIC | Age: 75
End: 2025-04-11
Payer: MEDICARE

## 2025-04-11 LAB — INR PPP: 3.9 (ref 2–3)

## 2025-04-11 NOTE — TELEPHONE ENCOUNTER
Magdalena with [facility] called the report an out of range inr that was taken yesterday 4/10/25 at 6pm  it was 3.9

## 2025-04-12 RX ORDER — PANTOPRAZOLE SODIUM 40 MG/1
40 TABLET, DELAYED RELEASE ORAL DAILY
Qty: 90 TABLET | Refills: 3 | Status: SHIPPED | OUTPATIENT
Start: 2025-04-12

## 2025-04-18 ENCOUNTER — ANTICOAGULATION VISIT (OUTPATIENT)
Dept: FAMILY MEDICINE CLINIC | Facility: CLINIC | Age: 75
End: 2025-04-18
Payer: MEDICARE

## 2025-04-18 LAB — INR PPP: 3.4 (ref 2.5–3.5)

## 2025-04-21 NOTE — PROGRESS NOTES
Follow-up (Hospital f/u)      4/22/2025, interval history:  He is here for follow-up.  Currently on Coumadin with goal INR 2.5-3.5.  He does have a machine at home.  He also has as needed Lovenox which he takes if INR gets below 2.5.  His primary care is managing his INRs.  No bleeding issues.  He reports eating home-cooked healthy meals that his wife fixes for him; as well as staying quite active and walking about 9000-10,000 steps a day.  Reports he feels very healthy.    HISTORY OF PRESENT ILLNESS:  Javy Reeves Sr. is a 74 y.o. male who we are asked to see today in consultation for recurrent DVT/PE.  Patient had history of PE many years ago after her knee surgery.  He has been on Coumadin for more than 20 years.  He also has a [place] IVC filter placed many many years ago.  Patient has seen Dr. Madden from our group in 2022 December.  Patient has been compliant with Coumadin.  INR have been within therapeutic range throughout.  No bleeding issues.  Patient reports having a mechanical fall after sliding down on black ice yesterday.  Eventually he developed worsening neck pain which prompted ER visit.  Duplex of his right lower extremity revealed subacute DVT.  We have been consulted for possible failure of Coumadin.  Patient endorses to having unintentional weight loss-his weight was 248 about 2 years ago, now he weighs about 180 pounds.  Attributes to not eating as much.  No other B symptoms.  He also has a history of IPF with emphysema for which she follows with pulmonology.  He denies any other concerns or complaints.  Family history significant for cancer, unknown type in parents, and breast cancer in one of the sisters.  No personal history of cancer       MEDICATIONS    Current Outpatient Medications:     albuterol sulfate  (90 Base) MCG/ACT inhaler, Inhale 2 puffs Every 4 (Four) Hours As Needed for Wheezing or Shortness of Air., Disp: 24 g, Rfl: 3    atorvastatin (Lipitor) 80 MG tablet,  Take 1 tablet by mouth Daily. For cholesterol and CAD, new dose, Disp: 90 tablet, Rfl: 3    cefdinir (OMNICEF) 300 MG capsule, One cap PO BID for infection, Disp: 20 capsule, Rfl: 0    cyclobenzaprine (FLEXERIL) 5 MG tablet, TAKE 1 TO 2 TABLETS BY MOUTH THREE TIMES DAILY AS NEEDED FOR SPASMS, Disp: 270 tablet, Rfl: 3    enoxaparin sodium (LOVENOX) 100 MG/ML solution prefilled syringe syringe, , Disp: , Rfl:     ezetimibe (ZETIA) 10 MG tablet, Take 1 tablet by mouth Daily. For cholesterol with atorvastatin, Disp: 90 tablet, Rfl: 3    folic acid (FOLVITE) 1 MG tablet, TAKE 1 TABLET BY MOUTH DAILY, Disp: 90 tablet, Rfl: 3    loratadine (CLARITIN) 10 MG tablet, Take 1 tablet by mouth Daily., Disp: , Rfl:     multivitamin with minerals tablet tablet, Take 1 tablet by mouth Daily. PT HOLDING FOR SURGERY, Disp: , Rfl:     neomycin-polymyxin-hydrocortisone (CORTISPORIN) 1 % solution otic solution, Administer 4 drops to the right ear 4 (Four) Times a Day. X 5 days, Disp: 10 mL, Rfl: 1    nitroglycerin (NITROSTAT) 0.4 MG SL tablet, Place 1 tablet under the tongue Every 5 (Five) Minutes As Needed for Chest Pain. Take no more than 3 doses in 15 minutes., Disp: 30 tablet, Rfl: 5    pantoprazole (PROTONIX) 40 MG EC tablet, TAKE 1 TABLET BY MOUTH DAILY FOR GERD, Disp: 90 tablet, Rfl: 3    Pirfenidone 267 MG tablet, Take 3 tablets by mouth 3 times a day., Disp: , Rfl:     predniSONE (DELTASONE) 50 MG tablet, Take 1 tablet by mouth Daily., Disp: 7 tablet, Rfl: 0    pregabalin (LYRICA) 150 MG capsule, Take 1 capsule by mouth 2 (Two) Times a Day. For fibromyalgia pain, Disp: 180 capsule, Rfl: 1    Stiolto Respimat 2.5-2.5 MCG/ACT aerosol solution inhaler, , Disp: , Rfl:     traMADol (Ultram) 50 MG tablet, Take 1 tablet by mouth Every 6 (Six) Hours As Needed for Moderate Pain., Disp: 90 tablet, Rfl: 2    Umeclidinium-Vilanterol (ANORO ELLIPTA) 62.5-25 MCG/ACT aerosol powder  inhaler, 1 puff Daily., Disp: , Rfl:     warfarin (COUMADIN)  "1 MG tablet, TAKE 1.5 TABLETS BY MOUTH EVERY DAY TAKE A TOTAL OF 11.5 MG DAILY, Disp: 270 tablet, Rfl: 1    warfarin (COUMADIN) 5 MG tablet, TAKE 2 TABLETS BY MOUTH EVERY NIGHT, Disp: 180 tablet, Rfl: 1    ALLERGIES:     Allergies   Allergen Reactions    Zinc Other (See Comments) and Diarrhea     Patient tested positive, had to have hardware removed from back.    Chantix [Varenicline] Other (See Comments)     Headache    Hydrocodone-Acetaminophen GI Intolerance       I have reviewed Past Medical, Surgical History, Social History, Meds and Allergies.     REVIEW OF SYSTEMS:  See interval History               Vitals:    04/22/25 1444   BP: 152/79   Pulse: 72   Resp: 17   Temp: 97.8 °F (36.6 °C)   TempSrc: Oral   SpO2: 100%   Weight: 86.1 kg (189 lb 12.8 oz)   Height: 182.9 cm (72.01\")   PainSc: 5    PainLoc: Back  Comment: right hip         4/22/2025     2:43 PM   Current Status   ECOG score 0        PHYSICAL EXAM:    CONSTITUTIONAL:  Vital signs reviewed.  No distress, looks comfortable.  RESPIRATORY:  Normal respiratory effort.  Lungs clear to auscultation bilaterally.  CARDIOVASCULAR:  Normal S1, S2.  No murmurs rubs or gallops.  No significant lower extremity edema.  GASTROINTESTINAL: Abdomen appears unremarkable.  Nontender.      RECENT LABS:        WBC   Date Value Ref Range Status   04/22/2025 4.19 3.40 - 10.80 10*3/mm3 Final   03/06/2025 4.54 3.40 - 10.80 10*3/mm3 Final   01/17/2025 5.42 3.40 - 10.80 10*3/mm3 Final   01/17/2025 5.8 3.4 - 10.8 x10E3/uL Final   01/12/2025 4.93 3.40 - 10.80 10*3/mm3 Final   01/11/2025 5.57 3.40 - 10.80 10*3/mm3 Final   01/05/2024 5.86 3.40 - 10.80 10*3/mm3 Final   08/29/2023 5.36 3.40 - 10.80 10*3/mm3 Final   12/19/2022 4.65 3.40 - 10.80 10*3/mm3 Final   12/16/2022 4.65 3.40 - 10.80 10*3/mm3 Final   11/30/2022 10.18 3.40 - 10.80 10*3/mm3 Final   11/22/2022 4.85 3.40 - 10.80 10*3/mm3 Final   11/14/2022 5.59 3.40 - 10.80 10*3/mm3 Final   10/04/2022 4.11 3.40 - 10.80 10*3/mm3 Final "   08/25/2022 5.30 3.40 - 10.80 10*3/mm3 Final   08/24/2022 4.72 3.40 - 10.80 10*3/mm3 Final   08/23/2022 4.16 3.40 - 10.80 10*3/mm3 Final   08/22/2022 4.24 3.40 - 10.80 10*3/mm3 Final   11/23/2021 3.9 3.4 - 10.8 x10E3/uL Final   11/23/2020 5.54 3.40 - 10.80 10*3/mm3 Final   09/21/2020 4.21 3.40 - 10.80 10*3/mm3 Final   04/20/2020 5.03 3.40 - 10.80 10*3/mm3 Final   04/26/2019 4.86 3.40 - 10.80 10*3/mm3 Final   11/16/2018 5.98 4.50 - 10.70 10*3/mm3 Final   10/07/2018 18.14 (H) 4.50 - 10.70 10*3/mm3 Final   09/28/2018 4.77 4.50 - 10.70 10*3/mm3 Final   07/10/2018 6.96 4.50 - 10.70 10*3/mm3 Final   03/22/2017 5.84 4.50 - 10.70 10*3/mm3 Final   03/25/2016 4.24 (L) 4.50 - 10.70 10*3/mm3 Final   11/20/2015 6.05 4.50 - 10.70 K/Cumm Final   12/26/2014 4.34 (L) 4.50 - 10.70 K/Cumm Final     Hemoglobin   Date Value Ref Range Status   04/22/2025 14.3 13.0 - 17.7 g/dL Final   03/06/2025 14.2 13.0 - 17.7 g/dL Final   01/17/2025 15.4 13.0 - 17.7 g/dL Final   01/17/2025 15.4 13.0 - 17.7 g/dL Final   01/12/2025 14.8 13.0 - 17.7 g/dL Final   01/11/2025 14.8 13.0 - 17.7 g/dL Final   01/05/2024 15.7 13.0 - 17.7 g/dL Final   08/29/2023 13.9 13.0 - 17.7 g/dL Final   12/19/2022 13.1 13.0 - 17.7 g/dL Final   12/16/2022 13.6 13.0 - 17.7 g/dL Final   11/30/2022 12.6 (L) 13.0 - 17.7 g/dL Final   11/22/2022 13.7 13.0 - 17.7 g/dL Final   11/14/2022 14.2 13.0 - 17.7 g/dL Final   10/04/2022 13.9 13.0 - 17.7 g/dL Final   08/25/2022 14.1 13.0 - 17.7 g/dL Final   08/24/2022 14.5 13.0 - 17.7 g/dL Final   08/23/2022 14.0 13.0 - 17.7 g/dL Final   08/22/2022 14.0 13.0 - 17.7 g/dL Final   11/23/2021 14.0 13.0 - 17.7 g/dL Final   11/23/2020 14.0 13.0 - 17.7 g/dL Final   09/21/2020 13.7 13.0 - 17.7 g/dL Final   04/20/2020 14.5 13.0 - 17.7 g/dL Final   04/26/2019 15.5 13.0 - 17.7 g/dL Final   11/16/2018 14.3 13.7 - 17.6 g/dL Final   10/07/2018 13.9 13.7 - 17.6 g/dL Final   09/28/2018 14.7 13.7 - 17.6 g/dL Final   07/10/2018 16.0 13.7 - 17.6 g/dL Final    03/22/2017 14.6 13.7 - 17.6 g/dL Final   03/25/2016 14.0 13.7 - 17.6 g/dL Final   11/20/2015 14.1 13.7 - 17.6 g/dL Final   12/26/2014 14.5 13.7 - 17.6 g/dL Final     Platelets   Date Value Ref Range Status   04/22/2025 187 140 - 450 10*3/mm3 Final   03/06/2025 205 140 - 450 10*3/mm3 Final   01/17/2025 223 140 - 450 10*3/mm3 Final   01/17/2025 226 150 - 450 x10E3/uL Final   01/12/2025 190 140 - 450 10*3/mm3 Final   01/11/2025 201 140 - 450 10*3/mm3 Final   01/05/2024 215 140 - 450 10*3/mm3 Final   08/29/2023 186 140 - 450 10*3/mm3 Final   12/19/2022 234 140 - 450 10*3/mm3 Final   12/16/2022 206 140 - 450 10*3/mm3 Final   11/30/2022 165 140 - 450 10*3/mm3 Final   11/22/2022 183 140 - 450 10*3/mm3 Final   11/14/2022 172 140 - 450 10*3/mm3 Final   10/04/2022 177 140 - 450 10*3/mm3 Final   08/25/2022 169 140 - 450 10*3/mm3 Final   08/24/2022 197 140 - 450 10*3/mm3 Final   08/23/2022 169 140 - 450 10*3/mm3 Final   08/22/2022 166 140 - 450 10*3/mm3 Final   11/23/2021 180 150 - 450 x10E3/uL Final   11/23/2020 174 140 - 450 10*3/mm3 Final   09/21/2020 180 140 - 450 10*3/mm3 Final   04/20/2020 187 140 - 450 10*3/mm3 Final   04/26/2019 203 140 - 450 10*3/mm3 Final   11/16/2018 187 140 - 500 10*3/mm3 Final   10/07/2018 178 140 - 500 10*3/mm3 Final   09/28/2018 191 140 - 500 10*3/mm3 Final   07/10/2018 191 140 - 500 10*3/mm3 Final   03/22/2017 193 140 - 500 10*3/mm3 Final   03/25/2016 208 140 - 500 10*3/mm3 Final   11/20/2015 170 140 - 500 K/Cumm Final   12/26/2014 186 140 - 500 K/Cumm Final         Assessment:  *Recurrent DVT/PE  *Persistently elevated anticardiolipin IgG antibody   Chronic anticoagulation with warfarin .Subacute right lower extremity DVT, mid femoral vein while on warfarin  Lupus anticoagulant positive x 1, November 2022 4/20/2020 left lower extremity duplex-chronic left lower extremity DVT in distal femoral and popliteal veins  8/22/2022 CT angiogram chest: Small PE in the most distal aspect of left upper  lobe pulmonary artery extending into a segmental left upper lobe pulmonary artery branch.  August 2022 hypercoagulable workup-unremarkable (lupus anticoagulant, beta-2 glycoprotein IgG IgM antibody, anticardiolipin IgG IgM antibody, factor II mutation, factor V Leiden mutation, Antithrombin III 80%)  11/18/2022 bilateral lower extremity duplex-chronic right lower extremity DVT in popliteal and gastrocnemius.  Chronic left lower extremity DVT in popliteal and gastrocnemius  8/29/2023 right lower extremity duplex-chronic DVT in right popliteal and gastrocnemius veins.  Deep venous reflux in right popliteal vein.  1/11/2025 right lower extremity duplex-chronic right lower extremity DVT in distal femoral and popliteal.  Subacute right lower extremity DVT in mid femoral.  Patient was previously seen by Dr. Madden, last clinic visit December 2022.    On reviewing the previous INR as it appears that there were some subtherapeutic readings.  Patient reports that on those days he was advised to take Lovenox by his primary care physician.  Unclear if we could consider this Coumadin failure given subtherapeutic INRs.  He has been restarted on Coumadin with a goal INR of 2.5-3.5.  He will also continue on Lovenox till the goal INR is attained.  January 2025 lupus anticoagulant and beta-2 glycoprotein IgG and IgM antibodies negative.  Anticardiolipin IgG positive elevated at 78     *Unexpected weight loss  Patient reports a significant amount of weight loss over the past 3 months decreasing from 240 pounds to 180 pounds.  1/13/2025 CT chest abdomen pelvis-no evidence of malignancy  PSA from January 2025-within normal limits  I suspect his weight loss is likely secondary to his healthy eating habits as well as good activity level.       *Status post [facility] IVC filter placed, seen on CT angiogram abdomen pelvis in May 2022        *Interstitial pulmonary fibrosis with emphysema   November 9, 2022-status post lung biopsy with  Dr. Bergman  Follows with pulmonology, Dr. Jamil Willoughby  5/25/[id number] CT stable        RECOMMENDATIONS/PLAN:   Continue Coumadin with a higher INR goal of 2.5-3.5. Has lovenox prn for INR less than 2.5. Managed by PCP  I suspect his weight loss is likely secondary to his healthy eating habits as well as good activity level.  Pan scan as well as PSA from January 2025 within normal limits.    MD visit in 6 months with labs

## 2025-04-22 ENCOUNTER — OFFICE VISIT (OUTPATIENT)
Dept: ONCOLOGY | Facility: CLINIC | Age: 75
End: 2025-04-22
Payer: MEDICARE

## 2025-04-22 ENCOUNTER — LAB (OUTPATIENT)
Dept: LAB | Facility: HOSPITAL | Age: 75
End: 2025-04-22
Payer: MEDICARE

## 2025-04-22 VITALS
BODY MASS INDEX: 25.71 KG/M2 | TEMPERATURE: 97.8 F | RESPIRATION RATE: 17 BRPM | OXYGEN SATURATION: 100 % | WEIGHT: 189.8 LBS | HEART RATE: 72 BPM | SYSTOLIC BLOOD PRESSURE: 152 MMHG | HEIGHT: 72 IN | DIASTOLIC BLOOD PRESSURE: 79 MMHG

## 2025-04-22 DIAGNOSIS — I82.5Y3 CHRONIC DEEP VEIN THROMBOSIS (DVT) OF PROXIMAL VEIN OF BOTH LOWER EXTREMITIES: ICD-10-CM

## 2025-04-22 DIAGNOSIS — I82.5Y3 CHRONIC DEEP VEIN THROMBOSIS (DVT) OF PROXIMAL VEIN OF BOTH LOWER EXTREMITIES: Primary | ICD-10-CM

## 2025-04-22 DIAGNOSIS — Z79.01 ANTICOAGULATED ON WARFARIN: ICD-10-CM

## 2025-04-22 LAB
ALBUMIN SERPL-MCNC: 3.6 G/DL (ref 3.5–5.2)
ALBUMIN/GLOB SERPL: 1.1 G/DL
ALP SERPL-CCNC: 75 U/L (ref 39–117)
ALT SERPL W P-5'-P-CCNC: 20 U/L (ref 1–41)
ANION GAP SERPL CALCULATED.3IONS-SCNC: 12.2 MMOL/L (ref 5–15)
AST SERPL-CCNC: 25 U/L (ref 1–40)
BASOPHILS # BLD AUTO: 0.02 10*3/MM3 (ref 0–0.2)
BASOPHILS NFR BLD AUTO: 0.5 % (ref 0–1.5)
BILIRUB SERPL-MCNC: 0.6 MG/DL (ref 0–1.2)
BUN SERPL-MCNC: 9 MG/DL (ref 8–23)
BUN/CREAT SERPL: 10.5 (ref 7–25)
CALCIUM SPEC-SCNC: 8.9 MG/DL (ref 8.6–10.5)
CHLORIDE SERPL-SCNC: 105 MMOL/L (ref 98–107)
CO2 SERPL-SCNC: 24.8 MMOL/L (ref 22–29)
CREAT SERPL-MCNC: 0.86 MG/DL (ref 0.76–1.27)
DEPRECATED RDW RBC AUTO: 50 FL (ref 37–54)
EGFRCR SERPLBLD CKD-EPI 2021: 92 ML/MIN/1.73
EOSINOPHIL # BLD AUTO: 0.03 10*3/MM3 (ref 0–0.4)
EOSINOPHIL NFR BLD AUTO: 0.7 % (ref 0.3–6.2)
ERYTHROCYTE [DISTWIDTH] IN BLOOD BY AUTOMATED COUNT: 14.3 % (ref 12.3–15.4)
GLOBULIN UR ELPH-MCNC: 3.4 GM/DL
GLUCOSE SERPL-MCNC: 74 MG/DL (ref 65–99)
HCT VFR BLD AUTO: 42.8 % (ref 37.5–51)
HGB BLD-MCNC: 14.3 G/DL (ref 13–17.7)
IMM GRANULOCYTES # BLD AUTO: 0.01 10*3/MM3 (ref 0–0.05)
IMM GRANULOCYTES NFR BLD AUTO: 0.2 % (ref 0–0.5)
LYMPHOCYTES # BLD AUTO: 1.55 10*3/MM3 (ref 0.7–3.1)
LYMPHOCYTES NFR BLD AUTO: 37 % (ref 19.6–45.3)
MCH RBC QN AUTO: 31.8 PG (ref 26.6–33)
MCHC RBC AUTO-ENTMCNC: 33.4 G/DL (ref 31.5–35.7)
MCV RBC AUTO: 95.1 FL (ref 79–97)
MONOCYTES # BLD AUTO: 0.47 10*3/MM3 (ref 0.1–0.9)
MONOCYTES NFR BLD AUTO: 11.2 % (ref 5–12)
NEUTROPHILS NFR BLD AUTO: 2.11 10*3/MM3 (ref 1.7–7)
NEUTROPHILS NFR BLD AUTO: 50.4 % (ref 42.7–76)
NRBC BLD AUTO-RTO: 0 /100 WBC (ref 0–0.2)
PLATELET # BLD AUTO: 187 10*3/MM3 (ref 140–450)
PMV BLD AUTO: 8.8 FL (ref 6–12)
POTASSIUM SERPL-SCNC: 4 MMOL/L (ref 3.5–5.2)
PROT SERPL-MCNC: 7 G/DL (ref 6–8.5)
RBC # BLD AUTO: 4.5 10*6/MM3 (ref 4.14–5.8)
SODIUM SERPL-SCNC: 142 MMOL/L (ref 136–145)
WBC NRBC COR # BLD AUTO: 4.19 10*3/MM3 (ref 3.4–10.8)

## 2025-04-22 PROCEDURE — 80053 COMPREHEN METABOLIC PANEL: CPT

## 2025-04-22 PROCEDURE — 85025 COMPLETE CBC W/AUTO DIFF WBC: CPT

## 2025-04-22 PROCEDURE — 36415 COLL VENOUS BLD VENIPUNCTURE: CPT

## 2025-04-28 LAB — INR PPP: 1.4 (ref 2.5–3.5)

## 2025-04-29 ENCOUNTER — ANTICOAGULATION VISIT (OUTPATIENT)
Dept: FAMILY MEDICINE CLINIC | Facility: CLINIC | Age: 75
End: 2025-04-29
Payer: MEDICARE

## 2025-05-01 LAB — INR PPP: 1.6 (ref 2.5–3.5)

## 2025-05-02 ENCOUNTER — ANTICOAGULATION VISIT (OUTPATIENT)
Dept: FAMILY MEDICINE CLINIC | Facility: CLINIC | Age: 75
End: 2025-05-02
Payer: MEDICARE

## 2025-05-04 ENCOUNTER — RESULTS FOLLOW-UP (OUTPATIENT)
Dept: FAMILY MEDICINE CLINIC | Facility: CLINIC | Age: 75
End: 2025-05-04
Payer: MEDICARE

## 2025-05-05 ENCOUNTER — OFFICE VISIT (OUTPATIENT)
Dept: FAMILY MEDICINE CLINIC | Facility: CLINIC | Age: 75
End: 2025-05-05
Payer: MEDICARE

## 2025-05-05 VITALS
TEMPERATURE: 95.9 F | OXYGEN SATURATION: 98 % | BODY MASS INDEX: 25.68 KG/M2 | HEART RATE: 92 BPM | HEIGHT: 72 IN | SYSTOLIC BLOOD PRESSURE: 120 MMHG | DIASTOLIC BLOOD PRESSURE: 68 MMHG | WEIGHT: 189.6 LBS

## 2025-05-05 DIAGNOSIS — E78.2 MIXED HYPERLIPIDEMIA: ICD-10-CM

## 2025-05-05 DIAGNOSIS — J84.9 INTERSTITIAL LUNG DISEASE: ICD-10-CM

## 2025-05-05 DIAGNOSIS — J84.9 ILD (INTERSTITIAL LUNG DISEASE): ICD-10-CM

## 2025-05-05 DIAGNOSIS — R73.01 IMPAIRED FASTING GLUCOSE: ICD-10-CM

## 2025-05-05 DIAGNOSIS — E55.9 VITAMIN D DEFICIENCY: ICD-10-CM

## 2025-05-05 DIAGNOSIS — K21.9 GASTROESOPHAGEAL REFLUX DISEASE WITHOUT ESOPHAGITIS: ICD-10-CM

## 2025-05-05 DIAGNOSIS — M79.7 FIBROMYALGIA: Primary | ICD-10-CM

## 2025-05-05 DIAGNOSIS — I25.2 OLD MI (MYOCARDIAL INFARCTION): ICD-10-CM

## 2025-05-05 PROCEDURE — 1126F AMNT PAIN NOTED NONE PRSNT: CPT | Performed by: PHYSICIAN ASSISTANT

## 2025-05-05 PROCEDURE — 1160F RVW MEDS BY RX/DR IN RCRD: CPT | Performed by: PHYSICIAN ASSISTANT

## 2025-05-05 PROCEDURE — 1159F MED LIST DOCD IN RCRD: CPT | Performed by: PHYSICIAN ASSISTANT

## 2025-05-05 PROCEDURE — 99214 OFFICE O/P EST MOD 30 MIN: CPT | Performed by: PHYSICIAN ASSISTANT

## 2025-05-05 RX ORDER — TRAMADOL HYDROCHLORIDE 50 MG/1
50 TABLET ORAL EVERY 6 HOURS PRN
Qty: 90 TABLET | Refills: 2 | Status: SHIPPED | OUTPATIENT
Start: 2025-05-05 | End: 2026-05-05

## 2025-05-05 RX ORDER — ATORVASTATIN CALCIUM 80 MG/1
80 TABLET, FILM COATED ORAL DAILY
Qty: 90 TABLET | Refills: 3 | Status: SHIPPED | OUTPATIENT
Start: 2025-05-05

## 2025-05-05 NOTE — PROGRESS NOTES
"Subjective   Javy Reeves Sr. is a 74 y.o. male.     Fibromyalgia  Symptoms include cough and myalgias.    Pertinent negative symptoms include no abdominal pain, no chest pain, no chills, no congestion, no diaphoresis, no fatigue, no fever, no nausea, no neck pain, no numbness, no rash, no sore throat, no swollen glands, no dysuria, no vomiting and no weakness.       Since the last visit, he has overall felt fairly well.  He has Impaired fasting glucose and will monitor labs to watch for DMII, GERD controlled on PPI Rx, Hyperlipidemia with goals met with current Rx, CAD and remains under care of their Cardiologist for mangement, CAD and remains on medication regimen, and Vitamin D deficiency and labs are at goal >30 ng/mL.  he has been compliant with current medications have reviewed them.  The patient denies medication side effects.  Will refill medications. /82   Pulse 92   Temp 95.9 °F (35.5 °C)   Ht 182.9 cm (72.01\")   Wt 86 kg (189 lb 9.6 oz)   SpO2 98%   BMI 25.71 kg/m² .      Lab Results   Component Value Date    GLUCOSE 74 04/22/2025    BUN 9 04/22/2025    CREATININE 0.86 04/22/2025     04/22/2025    K 4.0 04/22/2025     04/22/2025    CALCIUM 8.9 04/22/2025    PROTEINTOT 7.0 04/22/2025    ALBUMIN 3.6 04/22/2025    ALT 20 04/22/2025    AST 25 04/22/2025    ALKPHOS 75 04/22/2025    BILITOT 0.6 04/22/2025    GLOB 3.4 04/22/2025    AGRATIO 1.1 04/22/2025    BCR 10.5 04/22/2025    ANIONGAP 12.2 04/22/2025    EGFR 92.0 04/22/2025     Lab Results   Component Value Date    WBC 4.19 04/22/2025    HGB 14.3 04/22/2025    HCT 42.8 04/22/2025    MCV 95.1 04/22/2025     04/22/2025     Did update labs 1/17/2024 and noted stable PSA at 1.7, vitamin levels all at goal.    Also goal of LDL cholesterol to be less than 55-70 range due to coronary artery disease also sees Dr. Dalton cardiologist.    Sees ortho Dr Torres---knees right  Has not seen Dr. Dalton cardiology for coronary artery " disease since 3/24/2023 patient needs to make appointment  Results for orders placed or performed in visit on 05/02/25   Protime-INR    Collection Time: 05/01/25 12:00 AM    Specimen: Blood   Result Value Ref Range    INR 1.60 (A) 2.50 - 3.50     *Note: Due to a large number of results and/or encounters for the requested time period, some results have not been displayed. A complete set of results can be found in Results Review.       He messaged me yesterday his INR was 3.2 and goals were met can stop Lovenox is INR goal is 2.5-3.5 due to recurrent VTE and made plans to do 11.5 mg alternating with 12 mg of warfarin with INR again on Wednesday.     Saw DR Evangelista---hematology---4-22-25  Followed up for his recurrent VTE and reviewed his past history also noting his IVC.  He is + Lupus Anticoagulant and + anticardiolipin     Last urine drug screen 9/6/2024 also note the tramadol and Lyrica are still helping the fibromyalgia pain  Flexeril is helpful for the lumbar spasms as needed   Prior notes:      He has been diagnosed with fibromyalgia and lumbar back pain with radicular leg pain. Despite these conditions, he remains active and does not express significant discomfort. He continues to use a heating pad at night for relief. He has been under the care of GEOVANNI Klein for lumbar radiculopathy and has undergone an MRI. He has also consulted with Dr. King for his knee condition, which is not yet bone-on-bone. He received an injection in his knee, which provided relief for a few weeks. He is reluctant to undergo surgery on his knee, even though it is likely to be recommended. 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 He experiences pain in various locations, which he attributes to his fibromyalgia and sciatic nerve pain. He has learned to adapt to this pain. He has been prescribed Ultram and Lyrica for his fibromyalgia pain. He has received an epidural injection and uses a back brace, which  provides some relief.   Also had workup for unintentional weight loss with CT abdomen and pelvis and chest were checked 1/13/2025 no evidence of malignancy  He is no longer smoking cigarettes but does smoke cigars    Sees Dr. Jamil Miguel for asymptomatic emphysema and interstitial lung disease remains on Stiolto and Pirfenidone  Also note his thoracic aorta was stable on last CT 1/13/2025 at 3.9 cm  The following portions of the patient's history were reviewed and updated as appropriate: allergies, current medications, past family history, past medical history, past social history, past surgical history, and problem list.    Review of Systems   Constitutional:  Negative for chills, diaphoresis, fatigue and fever.   HENT:  Negative for congestion, sore throat and swollen glands.    Respiratory:  Positive for cough. Negative for shortness of breath.    Cardiovascular:  Negative for chest pain.   Gastrointestinal:  Negative for abdominal pain, nausea and vomiting.   Genitourinary:  Negative for dysuria.   Musculoskeletal:  Positive for myalgias. Negative for neck pain.   Skin:  Negative for rash.   Neurological:  Negative for weakness and numbness.       Objective   Physical Exam  Vitals and nursing note reviewed.   Constitutional:       General: He is not in acute distress.     Appearance: Normal appearance. He is well-developed. He is not diaphoretic.   HENT:      Head: Normocephalic.      Right Ear: External ear normal.      Left Ear: External ear normal.   Eyes:      Conjunctiva/sclera: Conjunctivae normal.      Pupils: Pupils are equal, round, and reactive to light.   Cardiovascular:      Rate and Rhythm: Normal rate and regular rhythm.      Heart sounds: Normal heart sounds. No murmur heard.  Pulmonary:      Effort: Pulmonary effort is normal.      Breath sounds: Normal breath sounds. No rales.   Musculoskeletal:         General: Normal range of motion.      Cervical back: Normal range of motion and neck supple.       Right lower leg: No edema.      Left lower leg: No edema.   Skin:     General: Skin is warm and dry.      Findings: No rash.   Neurological:      Mental Status: He is alert and oriented to person, place, and time.   Psychiatric:         Mood and Affect: Mood normal. Affect is not inappropriate.         Behavior: Behavior normal.         Thought Content: Thought content normal.         Judgment: Judgment normal.           Assessment & Plan   Diagnoses and all orders for this visit:    1. Fibromyalgia (Primary)    2. Interstitial lung disease    3. Mixed hyperlipidemia    4. Gastroesophageal reflux disease without esophagitis    5. ILD (interstitial lung disease)    6. Impaired fasting glucose    7. Old MI (myocardial infarction)    8. Vitamin D deficiency      Plan, Javy Reeves Sr., was seen today.  he was seen for Imparied fasting glucose and plan follow up labs, diet, and exercise, GERD and will continue on PPI medication, Hyperlipidemia and will continue current medication, CAD and is currently stable on medication management and stable, CAD and is under care of their Cardiologist for medical management and stable, and Vitamin D deficiency and supplemented.    Due for appointment with cardiologist Dr. Dalton for follow-up on coronary artery disease  Also sees hematology for recurrent VTE and hypercoagulable state remains on warfarin next INR is due on Wednesday  Still takes Lyrica and tramadol for the fibromyalgia and that helps  Sees Dr. Jamil Willoughby for asymptomatic emphysema and interstitial lung disease remains on Stiolto and Pirfenidone   Stop smoking cigars

## 2025-05-06 ENCOUNTER — TELEPHONE (OUTPATIENT)
Dept: CARDIOLOGY | Age: 75
End: 2025-05-06

## 2025-05-06 NOTE — TELEPHONE ENCOUNTER
"Caller: Javy Reeves Sr. \"GARRICK\"    Relationship to patient: Self    Best call back number: [phone]    Patient is needing: PT IS REQUESTING AN APPT WITH DR. MCGOVERN. PCP TOLD PT IT HAS BEEN A WHILE SINCE HE HAS BEEN SEEN IN OFFICE. PT WAS IN HOSPITAL IN JAN DUE TO A FALL ON THE ICE THAT CAUSE SOME BLOOD CLOTS. PLEASE CALL TO SCHEDULE           "

## 2025-05-08 ENCOUNTER — ANTICOAGULATION VISIT (OUTPATIENT)
Dept: FAMILY MEDICINE CLINIC | Facility: CLINIC | Age: 75
End: 2025-05-08
Payer: MEDICARE

## 2025-05-08 ENCOUNTER — TELEPHONE (OUTPATIENT)
Dept: FAMILY MEDICINE CLINIC | Facility: CLINIC | Age: 75
End: 2025-05-08
Payer: MEDICARE

## 2025-05-08 LAB — INR PPP: 3.6 (ref 2.5–3.5)

## 2025-05-08 NOTE — TELEPHONE ENCOUNTER
Patient sent me a message on [employer] our new plan is to go down to 11 mg today and alternate with 11.5 mg and check INR Monday

## 2025-05-08 NOTE — TELEPHONE ENCOUNTER
Nancy from [facility] transferred over MDINR (LAURA)    OUT OF RANGE INR  3.6 - 5/7/25

## 2025-05-12 LAB — INR PPP: 3.5 (ref 2.5–3.5)

## 2025-05-13 ENCOUNTER — ANTICOAGULATION VISIT (OUTPATIENT)
Dept: FAMILY MEDICINE CLINIC | Facility: CLINIC | Age: 75
End: 2025-05-13
Payer: MEDICARE

## 2025-05-13 ENCOUNTER — RESULTS FOLLOW-UP (OUTPATIENT)
Dept: FAMILY MEDICINE CLINIC | Facility: CLINIC | Age: 75
End: 2025-05-13
Payer: MEDICARE

## 2025-05-13 NOTE — PROGRESS NOTES
RM:________     PCP: Aletha Ochoa PA-C                                     Last EKG :  2023    : 1950  AGE: 74 y.o.    REASON FOR VISIT: __________________________________________    ____________________________________________________________                                                       Wt Readings from Last 3 Encounters:   25 86 kg (189 lb 9.6 oz)   25 86.1 kg (189 lb 12.8 oz)   25 87.1 kg (192 lb)      BP Readings from Last 3 Encounters:   25 120/68   25 152/79   25 150/98          WT: ____________ HT: ______ BP: __________ HR ______   02% _______                 Lipid Panel          2024    08:52 2025    10:13   Lipid Panel   Total Cholesterol 137     Total Cholesterol  134    Triglycerides 80  87    HDL Cholesterol 37  38    VLDL Cholesterol 16  17    LDL Cholesterol  84  79    LDL/HDL Ratio 2.27

## 2025-05-14 ENCOUNTER — OFFICE VISIT (OUTPATIENT)
Dept: CARDIOLOGY | Age: 75
End: 2025-05-14
Payer: MEDICARE

## 2025-05-14 VITALS
OXYGEN SATURATION: 98 % | DIASTOLIC BLOOD PRESSURE: 90 MMHG | HEART RATE: 68 BPM | WEIGHT: 183.8 LBS | HEIGHT: 72 IN | BODY MASS INDEX: 24.89 KG/M2 | SYSTOLIC BLOOD PRESSURE: 146 MMHG

## 2025-05-14 DIAGNOSIS — I82.5Y3 CHRONIC DEEP VEIN THROMBOSIS (DVT) OF PROXIMAL VEIN OF BOTH LOWER EXTREMITIES: ICD-10-CM

## 2025-05-14 DIAGNOSIS — I26.99 RECURRENT PULMONARY EMBOLISM: Primary | ICD-10-CM

## 2025-05-14 DIAGNOSIS — I25.2 OLD MI (MYOCARDIAL INFARCTION): ICD-10-CM

## 2025-05-14 NOTE — ASSESSMENT & PLAN NOTE
Denies angina or dyspnea  ECG nonischemic  Patient very active and exercises daily without exertional symptoms  Continue GDMT:  Atorvastatin

## 2025-05-14 NOTE — PROGRESS NOTES
"    CARDIOLOGY        Patient Name: Javy Reeves Sr.  :1950  Age: 74 y.o.  Primary Cardiologist: Artemio Dalton MD  Encounter Provider:  GEOVANNI Nunez    Date of Service: 2025      CHIEF COMPLAINT / REASON FOR OFFICE VISIT     Follow-up (Overdue follow-up) and Coronary Artery Disease        Coronary Artery Disease  Pertinent negatives include no leg swelling or weight gain.     Javy Reeves Sr. is a 74 y.o. male who presents today for overdue assessment.     Pt has a  history significant for history of recurrent PE anticoagulated with warfarin, prior MI, interstitial lung disease.    Patient reports today as he is overdue for routine follow-up.  His PCP encouraged him to reach back out to check-in.  Reports that he is having 0 complaints.  He still exercises daily and continues to work daily at a [employer] station in [facility].  He is monitoring his blood pressure and reports that it is stable at home in the 120s-130s.  Denies cardiac symptoms.    HISTORY OF PRESENT ILLNESS     Patient was last evaluated in clinic in , at that time he had recently been diagnosed with interstitial lung disease and was having some chest discomfort.  Given history of prior MI patient had stress test and echocardiogram.    Echocardiogram 2023 LVEF 56%.  Normal diastolic function.    Myocardial perfusion stress test 2023 indicates a normal myocardial perfusion study without evidence of ischemia.  Impressions consistent with a low restudy.      The following portions of the patient's history were reviewed and updated as appropriate: allergies, current medications, past family history, past medical history, past social history, past surgical history and problem list.      VITAL SIGNS     Visit Vitals  /90 (BP Location: Left arm, Patient Position: Sitting, Cuff Size: Adult)   Pulse 68   Ht 182.9 cm (72.01\")   Wt 83.4 kg (183 lb 12.8 oz)   SpO2 98%   BMI 24.92 kg/m²         Wt Readings from Last 3 " Encounters:   05/14/25 83.4 kg (183 lb 12.8 oz)   05/05/25 86 kg (189 lb 9.6 oz)   04/22/25 86.1 kg (189 lb 12.8 oz)     Body mass index is 24.92 kg/m².      REVIEW OF SYSTEMS     Review of Systems   Constitutional: Negative for chills, fever, weight gain and weight loss.   Cardiovascular:  Negative for leg swelling.   Respiratory:  Negative for cough, snoring and wheezing.    Hematologic/Lymphatic: Negative for bleeding problem. Does not bruise/bleed easily.   Skin:  Negative for color change.   Musculoskeletal:  Negative for falls, joint pain and myalgias.   Gastrointestinal:  Negative for melena.   Genitourinary:  Negative for hematuria.   Neurological:  Negative for excessive daytime sleepiness.   Psychiatric/Behavioral:  Negative for depression. The patient is not nervous/anxious.            PHYSICAL EXAMINATION     Constitutional:       Appearance: Normal appearance. Well-developed.   Eyes:      Conjunctiva/sclera: Conjunctivae normal.   Neck:      Vascular: No carotid bruit.   Pulmonary:      Effort: Pulmonary effort is normal.      Breath sounds: Normal breath sounds.   Cardiovascular:      Normal rate. Regular rhythm. Normal S1. Normal S2.       Murmurs: There is no murmur.      No gallop.  No click. No rub.   Edema:     Peripheral edema absent.   Musculoskeletal: Normal range of motion. Skin:     General: Skin is warm and dry.   Neurological:      Mental Status: Alert and oriented to person, place, and time.      GCS: GCS eye subscore is 4. GCS verbal subscore is 5. GCS motor subscore is 6.   Psychiatric:         Speech: Speech normal.         Behavior: Behavior normal.         Thought Content: Thought content normal.         Judgment: Judgment normal.           REVIEWED DATA       ECG 12 Lead    Date/Time: 5/14/2025 8:59 AM  Performed by: Neela Arnett APRN    Authorized by: Neela Arnett APRN  Comparison: compared with previous ECG from 8/29/2023  Rhythm: sinus rhythm  Rate: normal  BPM:  68  Conduction: conduction normal  ST Segments: ST segments normal  T Waves: T waves normal  QRS axis: left    Clinical impression: non-specific ECG              Lipid Panel          6/13/2024    08:52 1/17/2025    10:13   Lipid Panel   Total Cholesterol 137     Total Cholesterol  134    Triglycerides 80  87    HDL Cholesterol 37  38    VLDL Cholesterol 16  17    LDL Cholesterol  84  79    LDL/HDL Ratio 2.27         Lab Results   Component Value Date     04/22/2025     03/06/2025    K 4.0 04/22/2025    K 3.7 03/06/2025     04/22/2025     03/06/2025    CO2 24.8 04/22/2025    CO2 24.7 03/06/2025    BUN 9 04/22/2025    BUN 11 03/06/2025    CREATININE 0.86 04/22/2025    CREATININE 1.07 03/06/2025    EGFRIFNONA 85 11/23/2021    EGFRIFNONA 82 09/21/2020    EGFRIFAFRI >60 05/19/2022    EGFRIFAFRI 98 11/23/2021    GLUCOSE 74 04/22/2025    GLUCOSE 82 03/06/2025    CALCIUM 8.9 04/22/2025    CALCIUM 9.1 03/06/2025    ALBUMIN 3.6 04/22/2025    ALBUMIN 3.7 03/06/2025    BILITOT 0.6 04/22/2025    BILITOT 0.5 03/06/2025    AST 25 04/22/2025    AST 23 03/06/2025    ALT 20 04/22/2025    ALT 18 03/06/2025     Lab Results   Component Value Date    WBC 4.19 04/22/2025    WBC 4.54 03/06/2025    HGB 14.3 04/22/2025    HGB 14.2 03/06/2025    HCT 42.8 04/22/2025    HCT 42.9 03/06/2025    MCV 95.1 04/22/2025    MCV 97.1 (H) 03/06/2025     04/22/2025     03/06/2025     Lab Results   Component Value Date    PROBNP 68.4 08/29/2023    PROBNP 59.4 08/22/2022     Lab Results   Component Value Date    TROPONINT 12 08/29/2023     Lab Results   Component Value Date    TSH 3.640 01/17/2025    TSH 1.600 01/05/2024             ASSESSMENT & PLAN     Diagnoses and all orders for this visit:    1. Recurrent pulmonary embolism (Primary)  Assessment & Plan:  Anticoagulated with warfarin, does home INR's and send to PCP      2. Old MI (myocardial infarction)  Assessment & Plan:  Denies angina or dyspnea  ECG  nonischemic  Patient very active and exercises daily without exertional symptoms  Continue GDMT:  Atorvastatin      Orders:  -     ECG 12 Lead    3. Chronic deep vein thrombosis (DVT) of proximal vein of both lower extremities  Assessment & Plan:  Anticoagulated with warfarin, does home INR's and send to PCP          Return in about 1 year (around 5/14/2026) for Dr. Hamlet Shen.    Future Appointments         Provider Department Center    6/10/2025 3:00 PM (Arrive by 2:45 PM) [facility] CT 3 [facility]    7/3/2025 1:30 PM (Arrive by 1:15 PM) Alo Torres MD [facility] ORTHOPEDICS [facility]    8/5/2025 8:15 AM (Arrive by 8:00 AM) Aletha Ochoa PA-C [facility] PRIMARY CARE [facility]    10/7/2025 2:00 PM LAB CHAIR 4 [facility] ONCOLOGY CBC LAB [facility]    10/7/2025 2:20 PM Kailey Evangelista MD [facility] HEMATOLOGY & ONCOLOGY [facility]    1/8/2026 9:15 AM (Arrive by 9:00 AM) Aletha Ochoa PA-C [facility] PRIMARY CARE [facility]    5/19/2026 9:40 AM (Arrive by 9:25 AM) Artemio Dalton MD [facility] CARDIOLOGY [facility]              MEDICATIONS         Discharge Medications            Accurate as of May 14, 2025  9:40 AM. If you have any questions, ask your nurse or doctor.                Continue These Medications        Instructions Start Date   albuterol sulfate  (90 Base) MCG/ACT inhaler  Commonly known as: PROVENTIL HFA;VENTOLIN HFA;PROAIR HFA   2 puffs, Inhalation, Every 4 Hours PRN      atorvastatin 80 MG tablet  Commonly known as: Lipitor   80 mg, Oral, Daily, For cholesterol and CAD,      cefdinir 300 MG capsule  Commonly known as: OMNICEF   One cap PO BID for infection      cyclobenzaprine 5 MG tablet  Commonly known as: FLEXERIL   TAKE 1 TO 2 TABLETS BY MOUTH THREE TIMES DAILY AS NEEDED FOR SPASMS      enoxaparin sodium 100 MG/ML solution prefilled syringe syringe  Commonly known as: LOVENOX        ezetimibe 10 MG tablet  Commonly known as: ZETIA   10 mg, Oral, Daily, For cholesterol with atorvastatin      folic acid 1 MG tablet  Commonly known as: FOLVITE   1 mg, Oral, Daily      loratadine 10 MG tablet  Commonly known as: CLARITIN   10 mg, Daily      multivitamin with minerals tablet tablet   1 tablet, Daily      neomycin-polymyxin-hydrocortisone 1 % solution otic solution  Commonly known as: CORTISPORIN   4 drops, Right Ear, 4 Times Daily, X 5 days      nitroglycerin 0.4 MG SL tablet  Commonly known as: NITROSTAT   0.4 mg, Sublingual, Every 5 Minutes PRN, Take no more than 3 doses in 15 minutes.       pantoprazole 40 MG EC tablet  Commonly known as: PROTONIX   40 mg, Oral, Daily, For GERD      Pirfenidone 267 MG tablet   3 tablets, 3 times daily      pregabalin 150 MG capsule  Commonly known as: LYRICA   150 mg, Oral, 2 Times Daily, For fibromyalgia pain      Stiolto Respimat 2.5-2.5 MCG/ACT aerosol solution inhaler  Generic drug: tiotropium bromide-olodaterol   No dose, route, or frequency recorded.      traMADol 50 MG tablet  Commonly known as: Ultram   50 mg, Oral, Every 6 Hours PRN      Umeclidinium-Vilanterol 62.5-25 MCG/ACT aerosol powder  inhaler  Commonly known as: ANORO ELLIPTA   1 puff, Every 24 Hours      warfarin 1 MG tablet  Commonly known as: COUMADIN   TAKE 1.5 TABLETS BY MOUTH EVERY DAY TAKE A TOTAL OF 11.5 MG DAILY      warfarin 5 MG tablet  Commonly known as: COUMADIN   10 mg, Oral, Nightly                   **Dragon Disclaimer:   Much of this encounter note is an electronic transcription/translation of spoken language to printed text. The electronic translation of spoken language may permit erroneous, or at times, nonsensical words or phrases to be inadvertently transcribed. Although I have reviewed the note for such errors, some may still exist.

## 2025-05-20 ENCOUNTER — DOCUMENTATION (OUTPATIENT)
Dept: FAMILY MEDICINE CLINIC | Facility: CLINIC | Age: 75
End: 2025-05-20
Payer: MEDICARE

## 2025-05-20 NOTE — PROGRESS NOTES
I received a message concerning this patient's home INR result of 1.5.  The patient takes Warfarin 11 mg and alternates with 11.5 mg the next day.  I spoke to the patient and he just took his normal 11 mg dose.  I informed his PCP Aletha Ochoa PA-C and she will contact the patient via [employer] to make Warfarin dose adjustments.

## 2025-05-21 ENCOUNTER — RESULTS FOLLOW-UP (OUTPATIENT)
Dept: FAMILY MEDICINE CLINIC | Facility: CLINIC | Age: 75
End: 2025-05-21
Payer: MEDICARE

## 2025-05-21 DIAGNOSIS — K92.1 TARRY STOOL: Primary | ICD-10-CM

## 2025-05-21 DIAGNOSIS — R19.4 BOWEL HABIT CHANGES: ICD-10-CM

## 2025-05-26 LAB — INR PPP: 3.1 (ref 2.5–3.5)

## 2025-05-27 ENCOUNTER — ANTICOAGULATION VISIT (OUTPATIENT)
Dept: FAMILY MEDICINE CLINIC | Facility: CLINIC | Age: 75
End: 2025-05-27
Payer: MEDICARE

## 2025-06-02 LAB — INR PPP: 2.7

## 2025-06-03 ENCOUNTER — TELEPHONE (OUTPATIENT)
Dept: GASTROENTEROLOGY | Facility: CLINIC | Age: 75
End: 2025-06-03
Payer: MEDICARE

## 2025-06-03 ENCOUNTER — OFFICE VISIT (OUTPATIENT)
Dept: GASTROENTEROLOGY | Facility: CLINIC | Age: 75
End: 2025-06-03
Payer: MEDICARE

## 2025-06-03 VITALS
TEMPERATURE: 97.5 F | BODY MASS INDEX: 25.35 KG/M2 | SYSTOLIC BLOOD PRESSURE: 144 MMHG | HEIGHT: 72 IN | DIASTOLIC BLOOD PRESSURE: 75 MMHG | HEART RATE: 91 BPM | WEIGHT: 187.2 LBS

## 2025-06-03 DIAGNOSIS — R19.4 CHANGE IN BOWEL HABITS: ICD-10-CM

## 2025-06-03 DIAGNOSIS — R15.1 FECAL SMEARING: Primary | ICD-10-CM

## 2025-06-03 PROCEDURE — 1160F RVW MEDS BY RX/DR IN RCRD: CPT | Performed by: PHYSICIAN ASSISTANT

## 2025-06-03 PROCEDURE — 99214 OFFICE O/P EST MOD 30 MIN: CPT | Performed by: PHYSICIAN ASSISTANT

## 2025-06-03 PROCEDURE — 1159F MED LIST DOCD IN RCRD: CPT | Performed by: PHYSICIAN ASSISTANT

## 2025-06-03 NOTE — Clinical Note
Contact Aletha Ochoa PA-C to see about bridging coumadin with Lovenox which he takes for DVT. Needs colonoscopy

## 2025-06-03 NOTE — TELEPHONE ENCOUNTER
Patient request to schedule on September and just call back if there was any cancellation   MACO Maynard for COLONOSCOPY on 9/4/25  arrive at 10:00  . Gave prep instructions to pt in office ....Ganipara      Message sent to clinical pool to get clearance for pt to hold coumadin

## 2025-06-03 NOTE — PROGRESS NOTES
"Chief Complaint  Bowel habit changes    Subjective          History of Present Illness    Javy Reeves Sr. is a  74 y.o. male presents for change in bowel habits with episodes of stool incontinence with flatulence.  He is a patient of Dr. Foss last seen for EGD on 8/3/2023.    He presents today with vague complaints of stool leakage occurring with flatulence. Lower abdominal pain. He reports Bms 3 times/week which is usual for him. Large lumpy stools. Admits to hard black stool. Does take peptobismol for runny stools which does help. Also feels black stool related to oreo's at least 1 time.    Denies hematochezia, abnormal weight loss, nocturnal symptoms.     4/22/2025 CBC normal with hemoglobin 14.3.  Normal CMP.    8/30/2023 EGD with patchy minimal gastritis    His last colonoscopy was with Dr. Sin in 2018 notable for multiple hyperplastic polyps. She recommended he repeat in 10 years.  Denies family history of colon cancer.    He has a history of DVT and is on warfarin.  5/26/2025 INR 3.1.    Objective   Vital Signs:   /75   Pulse 91   Temp 97.5 °F (36.4 °C) (Temporal)   Ht 182.9 cm (72.01\")   Wt 84.9 kg (187 lb 3.2 oz)   BMI 25.38 kg/m²       Physical Exam  Vitals reviewed.   Constitutional:       General: He is awake. He is not in acute distress.     Appearance: Normal appearance. He is well-developed and well-groomed.   HENT:      Head: Normocephalic.   Pulmonary:      Effort: Pulmonary effort is normal. No respiratory distress.   Abdominal:      General: Abdomen is flat. Bowel sounds are normal. There is no distension.      Palpations: Abdomen is soft. There is no hepatomegaly or mass.      Tenderness: There is generalized abdominal tenderness. There is no guarding or rebound.      Comments: Rectal exam: No external masses or lesions of the anus or perineum. Mild amount of irritation at 12 O'clock, nontender.  No evidence of bleeding externally.  Mildly decreased sphincter tone.  No " internal masses palpated.  Stool is normal in appearance. Firm smooth prostate.      Skin:     Coloration: Skin is not pale.   Neurological:      Mental Status: He is alert and oriented to person, place, and time.      Gait: Gait is intact.   Psychiatric:         Mood and Affect: Mood and affect normal.         Speech: Speech normal.         Behavior: Behavior is cooperative.         Judgment: Judgment normal.          Result Review :             Assessment and Plan    Diagnoses and all orders for this visit:    1. Fecal smearing (Primary)  -     Case Request; Standing  -     Implement Anesthesia orders day of procedure.; Standing  -     Follow Anesthesia Guidelines / Protocol; Future  -     Verify bowel prep was successful; Standing  -     Give tap water enema if bowel prep was insufficient; Standing  -     Obtain Informed Consent; Standing  -     Case Request    2. Change in bowel habits  -     Case Request; Standing  -     Implement Anesthesia orders day of procedure.; Standing  -     Follow Anesthesia Guidelines / Protocol; Future  -     Verify bowel prep was successful; Standing  -     Give tap water enema if bowel prep was insufficient; Standing  -     Obtain Informed Consent; Standing  -     Case Request    Recommend proceeding with Colonoscopy for change in bowel habits with stool leakage.    Recommend fiber daily to see if this improves stool leakage in the meantime.    Will need to bridge Coumadin with Lovenox which she takes for DVT history with PCP.  We will send message to her to help coordinate.    Follow Up   Return for Colonoscopy.    Dragon dictation used throughout this note.     Marylin Pereira PA-C

## 2025-06-04 ENCOUNTER — TELEPHONE (OUTPATIENT)
Dept: GASTROENTEROLOGY | Facility: CLINIC | Age: 75
End: 2025-06-04
Payer: MEDICARE

## 2025-06-04 ENCOUNTER — ANTICOAGULATION VISIT (OUTPATIENT)
Dept: FAMILY MEDICINE CLINIC | Facility: CLINIC | Age: 75
End: 2025-06-04
Payer: MEDICARE

## 2025-06-04 NOTE — TELEPHONE ENCOUNTER
Patient called. Advised as per marylin's note. He states he has given himself Lovenox, when needed, for years and knows how to do this. Update to Marylin.

## 2025-06-04 NOTE — TELEPHONE ENCOUNTER
Marylin Pereira PA-C to [facility] Clinical 2 Pool (Selected Message)        6/4/25 12:30 PM  Please make sure patient knows how to bridge the Coumadin with the Lovenox.  He has done this before and was familiar with it in the office.  But I just want to make sure.

## 2025-06-04 NOTE — TELEPHONE ENCOUNTER
----- Message from Katharine WILSON sent at 6/3/2025  3:13 PM EDT -----  Patient need clearance to hold coumadin 5 days prior to scope date

## 2025-06-10 ENCOUNTER — ANTICOAGULATION VISIT (OUTPATIENT)
Dept: FAMILY MEDICINE CLINIC | Facility: CLINIC | Age: 75
End: 2025-06-10
Payer: MEDICARE

## 2025-06-10 ENCOUNTER — TELEPHONE (OUTPATIENT)
Dept: FAMILY MEDICINE CLINIC | Facility: CLINIC | Age: 75
End: 2025-06-10
Payer: MEDICARE

## 2025-06-10 ENCOUNTER — HOSPITAL ENCOUNTER (OUTPATIENT)
Dept: CT IMAGING | Facility: HOSPITAL | Age: 75
Discharge: HOME | End: 2025-06-10
Admitting: INTERNAL MEDICINE
Payer: MEDICARE

## 2025-06-10 DIAGNOSIS — J84.9 ILD (INTERSTITIAL LUNG DISEASE): ICD-10-CM

## 2025-06-10 LAB — INR PPP: 4.4

## 2025-06-10 PROCEDURE — 71250 CT THORAX DX C-: CPT

## 2025-06-10 NOTE — TELEPHONE ENCOUNTER
Echo with [facility] called to report an out of range INR for patient    Taken yesterday 6/9/25 of 4.4  Patient has already reported INR and instructions were sent to patient via [employer] messaging by Aletha Ochoa PA-C

## 2025-06-13 ENCOUNTER — RESULTS FOLLOW-UP (OUTPATIENT)
Dept: FAMILY MEDICINE CLINIC | Facility: CLINIC | Age: 75
End: 2025-06-13
Payer: MEDICARE

## 2025-06-13 ENCOUNTER — PRIOR AUTHORIZATION (OUTPATIENT)
Dept: FAMILY MEDICINE CLINIC | Facility: CLINIC | Age: 75
End: 2025-06-13
Payer: MEDICARE

## 2025-06-13 ENCOUNTER — ANTICOAGULATION VISIT (OUTPATIENT)
Dept: FAMILY MEDICINE CLINIC | Facility: CLINIC | Age: 75
End: 2025-06-13
Payer: MEDICARE

## 2025-06-13 LAB — INR PPP: 3 (ref 2.5–3.5)

## 2025-06-20 ENCOUNTER — ANTICOAGULATION VISIT (OUTPATIENT)
Dept: FAMILY MEDICINE CLINIC | Facility: CLINIC | Age: 75
End: 2025-06-20
Payer: MEDICARE

## 2025-06-20 LAB — INR PPP: 3 (ref 2.5–3.5)

## 2025-06-27 ENCOUNTER — TELEPHONE (OUTPATIENT)
Dept: FAMILY MEDICINE CLINIC | Facility: CLINIC | Age: 75
End: 2025-06-27
Payer: MEDICARE

## 2025-06-27 ENCOUNTER — ANTICOAGULATION VISIT (OUTPATIENT)
Dept: FAMILY MEDICINE CLINIC | Facility: CLINIC | Age: 75
End: 2025-06-27
Payer: MEDICARE

## 2025-06-27 LAB — INR PPP: 4.4 (ref 2.5–3.5)

## 2025-07-02 RX ORDER — ATORVASTATIN CALCIUM 80 MG/1
TABLET, FILM COATED ORAL
Qty: 90 TABLET | Refills: 3 | Status: SHIPPED | OUTPATIENT
Start: 2025-07-02

## 2025-07-03 ENCOUNTER — ANTICOAGULATION VISIT (OUTPATIENT)
Dept: FAMILY MEDICINE CLINIC | Facility: CLINIC | Age: 75
End: 2025-07-03
Payer: MEDICARE

## 2025-07-03 ENCOUNTER — TELEPHONE (OUTPATIENT)
Dept: FAMILY MEDICINE CLINIC | Facility: CLINIC | Age: 75
End: 2025-07-03
Payer: MEDICARE

## 2025-07-03 ENCOUNTER — CLINICAL SUPPORT (OUTPATIENT)
Dept: ORTHOPEDIC SURGERY | Facility: CLINIC | Age: 75
End: 2025-07-03
Payer: MEDICARE

## 2025-07-03 VITALS — WEIGHT: 186.1 LBS | BODY MASS INDEX: 25.21 KG/M2 | HEIGHT: 72 IN | TEMPERATURE: 97.8 F

## 2025-07-03 DIAGNOSIS — R52 PAIN: ICD-10-CM

## 2025-07-03 DIAGNOSIS — M17.11 PRIMARY OSTEOARTHRITIS OF RIGHT KNEE: Primary | ICD-10-CM

## 2025-07-03 LAB
INR PPP: 3.3 (ref 2.5–3.5)
INR PPP: 3.3 (ref 2.5–3.5)

## 2025-07-03 NOTE — PROGRESS NOTES
"Knee Joint Injection      Patient: Javy Reeves .        YOB: 1950            Chief Complaints: Knee pain      History of Present Illness:  Pt gets intermittent  injections with good relief. Is here for right knee gel injection.  Understands options.      Physical Exam: 74 y.o. male  General Appearance:    Alert, cooperative, in no acute distress                   Vitals:    07/03/25 1352   Temp: 97.8 °F (36.6 °C)   TempSrc: Temporal   Weight: 84.4 kg (186 lb 1.6 oz)   Height: 182.9 cm (72.01\")   PainSc: 0-No pain   PainLoc: Knee      Patient is alert and read ×3 no acute distress appears her above-listed at height weight and age.  Affect is normal respiratory rate is normal unlabored. Heart rate regular rate rhythm, sclera, dentition and hearing are normal for the purpose of this exam.  Exam and complaints are unchanged.      Procedure:  Right gel injection          Assessment. Persistent knee pain      Plan: Is to proceed with injection    Large Joint Arthrocentesis: R knee  Date/Time: 7/3/2025 2:49 PM  Consent given by: patient  Site marked: site marked  Timeout: Immediately prior to procedure a time out was called to verify the correct patient, procedure, equipment, support staff and site/side marked as required   Supporting Documentation  Indications: pain   Procedure Details  Location: knee - R knee  Preparation: Patient was prepped and draped in the usual sterile fashion  Needle gauge: 21g.  Approach: lateral  Medications administered: 22 mg Hyaluronan 88 MG/4ML  Patient tolerance: patient tolerated the procedure well with no immediate complications                   "

## 2025-07-03 NOTE — TELEPHONE ENCOUNTER
His goal is 2.5-3.5 and I am very pleased with 3.3 we will continue alternating 11 mg with 11.5 mg with weekly INR check -----I did send him [employer] message

## 2025-07-03 NOTE — TELEPHONE ENCOUNTER
Caller: SHREYAS    Relationship:     Best call back number: [phone]       What was the call regarding: INR RESULTS REPORTED ON 7.2.25 WERE 3.3

## 2025-07-08 ENCOUNTER — EXTERNAL PBMM DATA (OUTPATIENT)
Dept: PHARMACY | Facility: OTHER | Age: 75
End: 2025-07-08
Payer: MEDICARE

## 2025-07-11 ENCOUNTER — ANTICOAGULATION VISIT (OUTPATIENT)
Dept: FAMILY MEDICINE CLINIC | Facility: CLINIC | Age: 75
End: 2025-07-11
Payer: MEDICARE

## 2025-07-11 LAB — INR PPP: 3.7 (ref 2.5–3.5)

## 2025-07-18 ENCOUNTER — ANTICOAGULATION VISIT (OUTPATIENT)
Dept: FAMILY MEDICINE CLINIC | Facility: CLINIC | Age: 75
End: 2025-07-18
Payer: MEDICARE

## 2025-07-18 LAB — INR PPP: 2.9 (ref 2.5–3.5)

## 2025-07-25 ENCOUNTER — ANTICOAGULATION VISIT (OUTPATIENT)
Dept: FAMILY MEDICINE CLINIC | Facility: CLINIC | Age: 75
End: 2025-07-25
Payer: MEDICARE

## 2025-07-25 LAB — INR PPP: 3 (ref 2.5–3.5)

## 2025-07-31 LAB — INR PPP: 3.4 (ref 2.5–3.5)

## 2025-08-01 ENCOUNTER — TELEPHONE (OUTPATIENT)
Dept: FAMILY MEDICINE CLINIC | Facility: CLINIC | Age: 75
End: 2025-08-01
Payer: MEDICARE

## 2025-08-01 ENCOUNTER — ANTICOAGULATION VISIT (OUTPATIENT)
Dept: FAMILY MEDICINE CLINIC | Facility: CLINIC | Age: 75
End: 2025-08-01
Payer: MEDICARE

## 2025-08-01 LAB — INR PPP: 3.4 (ref 2.5–3.5)

## 2025-08-01 NOTE — TELEPHONE ENCOUNTER
HUB TO RELAY  Left patient a voicemail [er Aletha Ochoa His INR goal is 2.5-3.5... So this is fine and no change continue same and check INR weekly

## 2025-08-05 ENCOUNTER — OFFICE VISIT (OUTPATIENT)
Dept: FAMILY MEDICINE CLINIC | Facility: CLINIC | Age: 75
End: 2025-08-05
Payer: MEDICARE

## 2025-08-05 VITALS
DIASTOLIC BLOOD PRESSURE: 68 MMHG | WEIGHT: 190 LBS | HEART RATE: 75 BPM | BODY MASS INDEX: 25.73 KG/M2 | OXYGEN SATURATION: 99 % | HEIGHT: 72 IN | SYSTOLIC BLOOD PRESSURE: 118 MMHG

## 2025-08-05 DIAGNOSIS — D68.62 LUPUS ANTICOAGULANT DISORDER: ICD-10-CM

## 2025-08-05 DIAGNOSIS — E78.2 MIXED HYPERLIPIDEMIA: ICD-10-CM

## 2025-08-05 DIAGNOSIS — K21.9 GASTROESOPHAGEAL REFLUX DISEASE WITHOUT ESOPHAGITIS: ICD-10-CM

## 2025-08-05 DIAGNOSIS — F17.200 TOBACCO USE DISORDER: ICD-10-CM

## 2025-08-05 DIAGNOSIS — E78.2 HYPERLIPIDEMIA, MIXED: ICD-10-CM

## 2025-08-05 DIAGNOSIS — J84.9 ILD (INTERSTITIAL LUNG DISEASE): ICD-10-CM

## 2025-08-05 DIAGNOSIS — M79.7 FIBROMYALGIA: Primary | ICD-10-CM

## 2025-08-05 DIAGNOSIS — I26.99 RECURRENT PULMONARY EMBOLISM: ICD-10-CM

## 2025-08-05 DIAGNOSIS — E55.9 VITAMIN D DEFICIENCY, UNSPECIFIED: ICD-10-CM

## 2025-08-05 DIAGNOSIS — G89.4 CHRONIC PAIN DISORDER: ICD-10-CM

## 2025-08-05 DIAGNOSIS — R73.01 IMPAIRED FASTING GLUCOSE: ICD-10-CM

## 2025-08-05 RX ORDER — PREGABALIN 150 MG/1
150 CAPSULE ORAL 2 TIMES DAILY
Qty: 180 CAPSULE | Refills: 1 | Status: SHIPPED | OUTPATIENT
Start: 2025-08-05

## 2025-08-05 RX ORDER — FOLIC ACID 1 MG/1
1 TABLET ORAL DAILY
Qty: 90 TABLET | Refills: 3 | Status: SHIPPED | OUTPATIENT
Start: 2025-08-05

## 2025-08-05 RX ORDER — TRAMADOL HYDROCHLORIDE 50 MG/1
50 TABLET ORAL EVERY 6 HOURS PRN
Qty: 90 TABLET | Refills: 2 | Status: SHIPPED | OUTPATIENT
Start: 2025-08-05 | End: 2026-08-05

## 2025-08-15 ENCOUNTER — ANTICOAGULATION VISIT (OUTPATIENT)
Dept: FAMILY MEDICINE CLINIC | Facility: CLINIC | Age: 75
End: 2025-08-15
Payer: MEDICARE

## 2025-08-15 LAB — INR PPP: 2.9 (ref 2.5–3.5)

## 2025-08-22 ENCOUNTER — ANTICOAGULATION VISIT (OUTPATIENT)
Dept: FAMILY MEDICINE CLINIC | Facility: CLINIC | Age: 75
End: 2025-08-22
Payer: MEDICARE

## 2025-08-22 ENCOUNTER — RESULTS FOLLOW-UP (OUTPATIENT)
Dept: FAMILY MEDICINE CLINIC | Facility: CLINIC | Age: 75
End: 2025-08-22
Payer: MEDICARE

## 2025-08-22 LAB — INR PPP: 2.9 (ref 2.5–3.5)

## 2025-08-29 ENCOUNTER — ANTICOAGULATION VISIT (OUTPATIENT)
Dept: FAMILY MEDICINE CLINIC | Facility: CLINIC | Age: 75
End: 2025-08-29
Payer: MEDICARE

## 2025-08-29 LAB — INR PPP: 2.4 (ref 2.5–3.5)

## (undated) DEVICE — ANTIBACTERIAL UNDYED BRAIDED (POLYGLACTIN 910), SYNTHETIC ABSORBABLE SUTURE: Brand: COATED VICRYL

## (undated) DEVICE — NDL SPINE 20G 3 1/2 YEL STRL 1P/U

## (undated) DEVICE — TUBING, SUCTION, 1/4" X 10', STRAIGHT: Brand: MEDLINE

## (undated) DEVICE — THE TORRENT IRRIGATION SCOPE CONNECTOR IS USED WITH THE TORRENT IRRIGATION TUBING TO PROVIDE IRRIGATION FLUIDS SUCH AS STERILE WATER DURING GASTROINTESTINAL ENDOSCOPIC PROCEDURES WHEN USED IN CONJUNCTION WITH AN IRRIGATION PUMP (OR ELECTROSURGICAL UNIT).: Brand: TORRENT

## (undated) DEVICE — DRILL BIT 7080510 11 MM DRILL BIT S

## (undated) DEVICE — CONTAINER,SPECIMEN,OR STERILE,4OZ: Brand: MEDLINE

## (undated) DEVICE — KT ANTI FOG W/FLD AND SPNG

## (undated) DEVICE — MSK AIRWY LARYNG LMA PILOT SZ4

## (undated) DEVICE — VIOLET BRAIDED (POLYGLACTIN 910), SYNTHETIC ABSORBABLE SUTURE: Brand: COATED VICRYL

## (undated) DEVICE — UNIVERSAL PACK: Brand: CARDINAL HEALTH

## (undated) DEVICE — PENCL ES MEGADINE EZ/CLEAN BUTN W/HOLSTR 10FT

## (undated) DEVICE — SMOKE EVACUATION TUBING WITH 7/8 IN TO 1/4 IN REDUCER: Brand: BUFFALO FILTER

## (undated) DEVICE — SENSR O2 OXIMAX FNGR A/ 18IN NONSTR

## (undated) DEVICE — LOU THORACIC: Brand: MEDLINE INDUSTRIES, INC.

## (undated) DEVICE — KT ORCA ORCAPOD DISP STRL

## (undated) DEVICE — ADAPT SWVL FIBROPTIC BRONCH

## (undated) DEVICE — OASIS DRAIN, SINGLE, INLINE & ATS COMPATIBLE: Brand: OASIS

## (undated) DEVICE — COLR CERV MED/DENS NRW LNG

## (undated) DEVICE — DRP MICROSCP LEICA VARILENS2 132X406CM

## (undated) DEVICE — DISPOSABLE BIPOLAR FORCEPS 7 3/4" (19.7CM) SCOVILLE BAYONET, 1.5MM TIP AND 12 FT. (3.6M) CABLE: Brand: KIRWAN

## (undated) DEVICE — LABEL SHEET CUSTOM 2X2 YELLOW: Brand: MEDLINE INDUSTRIES, INC.

## (undated) DEVICE — PATIENT RETURN ELECTRODE, SINGLE-USE, CONTACT QUALITY MONITORING, ADULT, WITH 9FT CORD, FOR PATIENTS WEIGING OVER 33LBS. (15KG): Brand: MEGADYNE

## (undated) DEVICE — WOUND RETRACTOR AND PROTECTOR: Brand: ALEXIS WOUND PROTECTOR-RETRACTOR

## (undated) DEVICE — TUBING, SUCTION, 1/4" X 20', STRAIGHT: Brand: MEDLINE INDUSTRIES, INC.

## (undated) DEVICE — APPL CHLORAPREP W/TINT 10.5ML PERC STRL

## (undated) DEVICE — LIMB HOLDER, WRIST/ANKLE: Brand: DEROYAL

## (undated) DEVICE — ADHS SKIN DERMABOND TOP ADVANCED

## (undated) DEVICE — GLV SURG BIOGEL LTX PF 7

## (undated) DEVICE — VITAL SIGNS™ JACKSON-REES CIRCUITS: Brand: VITAL SIGNS™

## (undated) DEVICE — CONN TBG Y 5 IN 1 LF STRL

## (undated) DEVICE — ADAPT CLN BIOGUARD AIR/H2O DISP

## (undated) DEVICE — ENCORE® LATEX ORTHO SIZE 8, STERILE LATEX POWDER-FREE SURGICAL GLOVE: Brand: ENCORE

## (undated) DEVICE — APPL CHLORAPREP HI/LITE 26ML ORNG

## (undated) DEVICE — FRCP BX RADJAW4 NDL 2.8 240CM LG OG BX40

## (undated) DEVICE — BITEBLOCK OMNI BLOC

## (undated) DEVICE — SUT SILK 0 PSL 18IN 580H

## (undated) DEVICE — GLV SURG BIOGEL LTX PF 6

## (undated) DEVICE — Device: Brand: BALLOON

## (undated) DEVICE — DRSNG SURESITE WNDW 4X4.5

## (undated) DEVICE — SINGLE-USE BIOPSY FORCEPS: Brand: RADIAL JAW 4

## (undated) DEVICE — Device: Brand: DEFENDO AIR/WATER/SUCTION AND BIOPSY VALVE

## (undated) DEVICE — GOWN,NON-REINFORCED,SIRUS,SET IN SLV,XXL: Brand: MEDLINE

## (undated) DEVICE — PK NEURO SPINE 40

## (undated) DEVICE — NDL HYPO PRECISIONGLIDE REG 20G 1 1/2

## (undated) DEVICE — UNIVERSAL STAPLER: Brand: ENDO GIA ULTRA

## (undated) DEVICE — CANN NASL CO2 TRULINK W/O2 A/

## (undated) DEVICE — TRAP,MUCUS SPECIMEN, 80CC: Brand: MEDLINE

## (undated) DEVICE — FRCP BX RADJAW4 PULM WO NDL STD1.8X2 100

## (undated) DEVICE — ADHS SKIN SURG TISS VISC PREMIERPRO EXOFIN HI/VISC FAST/DRY

## (undated) DEVICE — ELECTRD BLD EZ CLN MOD XLNG 2.75IN

## (undated) DEVICE — 3.0MM NEURO (MATCH HEAD) LESS AGGRESSIVE

## (undated) DEVICE — SINGLE USE SUCTION VALVE MAJ-209: Brand: SINGLE USE SUCTION VALVE (STERILE)

## (undated) DEVICE — LARGE BORE STOPCOCK WITH EXTENSION SET, MALE LUER LOCK ADAPTER WITH RETRACTABLE COLLAR

## (undated) DEVICE — SINGLE USE ASPIRATION NEEDLE: Brand: SINGLE USE ASPIRATION NEEDLE

## (undated) DEVICE — MEDI-VAC YANKAUER SUCTION HANDLE W/BULBOUS TIP: Brand: CARDINAL HEALTH

## (undated) DEVICE — LN SMPL CO2 SHTRM SD STREAM W/M LUER

## (undated) DEVICE — CANN O2 ETCO2 FITS ALL CONN CO2 SMPL A/ 7IN DISP LF

## (undated) DEVICE — LN SMPL O2 NASL/ORL SMART/CAPNOLINE PLS A/

## (undated) DEVICE — GOWN ,SIRUS,NONREINFORCED SMALL: Brand: MEDLINE

## (undated) DEVICE — DRSNG TELFA PAD NONADH STR 1S 3X4IN

## (undated) DEVICE — SINGLE USE BIOPSY VALVE MAJ-210: Brand: SINGLE USE BIOPSY VALVE (STERILE)

## (undated) DEVICE — EXTENSION SET, MALE LUER LOCK ADAPTER WITH RETRACTABLE COLLAR